# Patient Record
Sex: MALE | Race: WHITE | NOT HISPANIC OR LATINO | Employment: FULL TIME | URBAN - METROPOLITAN AREA
[De-identification: names, ages, dates, MRNs, and addresses within clinical notes are randomized per-mention and may not be internally consistent; named-entity substitution may affect disease eponyms.]

---

## 2017-03-22 RX ORDER — METFORMIN HYDROCHLORIDE 500 MG/1
TABLET ORAL
Qty: 180 TABLET | Refills: 0 | Status: SHIPPED | OUTPATIENT
Start: 2017-03-22 | End: 2017-03-30 | Stop reason: SDUPTHER

## 2017-03-23 DIAGNOSIS — E11.65 UNCONTROLLED TYPE 2 DIABETES MELLITUS WITH COMPLICATION, UNSPECIFIED LONG TERM INSULIN USE STATUS: Primary | ICD-10-CM

## 2017-03-23 DIAGNOSIS — E11.8 UNCONTROLLED TYPE 2 DIABETES MELLITUS WITH COMPLICATION, UNSPECIFIED LONG TERM INSULIN USE STATUS: Primary | ICD-10-CM

## 2017-03-28 ENCOUNTER — LAB VISIT (OUTPATIENT)
Dept: LAB | Facility: HOSPITAL | Age: 42
End: 2017-03-28
Attending: INTERNAL MEDICINE
Payer: COMMERCIAL

## 2017-03-28 DIAGNOSIS — E11.65 UNCONTROLLED TYPE 2 DIABETES MELLITUS WITH COMPLICATION, UNSPECIFIED LONG TERM INSULIN USE STATUS: ICD-10-CM

## 2017-03-28 DIAGNOSIS — E11.8 UNCONTROLLED TYPE 2 DIABETES MELLITUS WITH COMPLICATION, UNSPECIFIED LONG TERM INSULIN USE STATUS: ICD-10-CM

## 2017-03-28 LAB
ALBUMIN SERPL BCP-MCNC: 3.9 G/DL
ALP SERPL-CCNC: 77 U/L
ALT SERPL W/O P-5'-P-CCNC: 14 U/L
ANION GAP SERPL CALC-SCNC: 13 MMOL/L
AST SERPL-CCNC: 17 U/L
BILIRUB SERPL-MCNC: 0.7 MG/DL
BUN SERPL-MCNC: 14 MG/DL
CALCIUM SERPL-MCNC: 9.7 MG/DL
CHLORIDE SERPL-SCNC: 101 MMOL/L
CO2 SERPL-SCNC: 23 MMOL/L
CREAT SERPL-MCNC: 1.1 MG/DL
EST. GFR  (AFRICAN AMERICAN): >60 ML/MIN/1.73 M^2
EST. GFR  (NON AFRICAN AMERICAN): >60 ML/MIN/1.73 M^2
GLUCOSE SERPL-MCNC: 205 MG/DL
POTASSIUM SERPL-SCNC: 4.4 MMOL/L
PROT SERPL-MCNC: 7.5 G/DL
SODIUM SERPL-SCNC: 137 MMOL/L

## 2017-03-28 PROCEDURE — 80053 COMPREHEN METABOLIC PANEL: CPT

## 2017-03-28 PROCEDURE — 83036 HEMOGLOBIN GLYCOSYLATED A1C: CPT

## 2017-03-28 PROCEDURE — 36415 COLL VENOUS BLD VENIPUNCTURE: CPT | Mod: PO

## 2017-03-29 LAB
ESTIMATED AVG GLUCOSE: 214 MG/DL
HBA1C MFR BLD HPLC: 9.1 %

## 2017-03-30 ENCOUNTER — OFFICE VISIT (OUTPATIENT)
Dept: INTERNAL MEDICINE | Facility: CLINIC | Age: 42
End: 2017-03-30
Payer: COMMERCIAL

## 2017-03-30 VITALS
HEART RATE: 68 BPM | BODY MASS INDEX: 24.15 KG/M2 | SYSTOLIC BLOOD PRESSURE: 110 MMHG | WEIGHT: 159.38 LBS | HEIGHT: 68 IN | DIASTOLIC BLOOD PRESSURE: 72 MMHG | TEMPERATURE: 98 F | RESPIRATION RATE: 16 BRPM

## 2017-03-30 DIAGNOSIS — F41.9 ANXIETY: ICD-10-CM

## 2017-03-30 PROCEDURE — 99213 OFFICE O/P EST LOW 20 MIN: CPT | Mod: S$GLB,,, | Performed by: INTERNAL MEDICINE

## 2017-03-30 PROCEDURE — 3060F POS MICROALBUMINURIA REV: CPT | Mod: S$GLB,,, | Performed by: INTERNAL MEDICINE

## 2017-03-30 PROCEDURE — 3046F HEMOGLOBIN A1C LEVEL >9.0%: CPT | Mod: S$GLB,,, | Performed by: INTERNAL MEDICINE

## 2017-03-30 PROCEDURE — 99999 PR PBB SHADOW E&M-EST. PATIENT-LVL III: CPT | Mod: PBBFAC,,, | Performed by: INTERNAL MEDICINE

## 2017-03-30 PROCEDURE — 1160F RVW MEDS BY RX/DR IN RCRD: CPT | Mod: S$GLB,,, | Performed by: INTERNAL MEDICINE

## 2017-03-30 PROCEDURE — 2022F DILAT RTA XM EVC RTNOPTHY: CPT | Mod: S$GLB,,, | Performed by: INTERNAL MEDICINE

## 2017-03-30 RX ORDER — KETOCONAZOLE 20 MG/ML
SHAMPOO, SUSPENSION TOPICAL
Refills: 6 | COMMUNITY
Start: 2017-01-30 | End: 2017-10-18 | Stop reason: SDUPTHER

## 2017-03-30 RX ORDER — SYRINGE WITH NEEDLE, 1 ML 25GX5/8"
SYRINGE, EMPTY DISPOSABLE MISCELLANEOUS
Refills: 1 | COMMUNITY
Start: 2017-01-25 | End: 2017-07-05 | Stop reason: SDUPTHER

## 2017-03-30 RX ORDER — METFORMIN HYDROCHLORIDE 850 MG/1
850 TABLET ORAL 2 TIMES DAILY WITH MEALS
Qty: 60 TABLET | Refills: 2 | Status: SHIPPED | OUTPATIENT
Start: 2017-03-30 | End: 2017-07-19 | Stop reason: SDUPTHER

## 2017-03-30 RX ORDER — ESCITALOPRAM OXALATE 10 MG/1
10 TABLET ORAL DAILY
Qty: 30 TABLET | Refills: 0 | Status: SHIPPED | OUTPATIENT
Start: 2017-03-30 | End: 2017-05-31 | Stop reason: SDUPTHER

## 2017-03-30 NOTE — MR AVS SNAPSHOT
[facility]   [facility]  [place] [address]  Phone: [phone]  Fax: [phone]                  Jason Sandra   3/30/2017 7:40 AM   Office Visit    Description:  Male : 1975   Provider:  Meghan Brewster MD   Department:  [facility]           Reason for Visit     Follow-up           Diagnoses this Visit        Comments    Uncontrolled type 2 diabetes mellitus without complication, without long-term current use of insulin    -  Primary     Anxiety                To Do List           Goals (5 Years of Data)     None       These Medications        Disp Refills Start End    metformin (GLUCOPHAGE) 850 MG tablet 60 tablet 2 3/30/2017     Take 1 tablet (850 mg total) by mouth 2 (two) times daily with meals. - Oral    Pharmacy: [facility]/pharmacy #[id number] - [place] - [address] Ph #: [phone]       escitalopram oxalate (LEXAPRO) 10 MG tablet 30 tablet 0 3/30/2017 3/30/2018    Take 1 tablet (10 mg total) by mouth once daily. - Oral    Pharmacy: [facility]/pharmacy #[id number] - [place], [place] - [address] Ph #: [phone]         [facility]     [facility] On Call Nurse Care Line -  Assistance  Unless otherwise directed by your provider, please contact [facility], our nurse care line that is available for  assistance.     Registered nurses in the [facility] provide: appointment scheduling, clinical advisement, health education, and other advisory services.  Call: [phone] (toll free)               Medications           Message regarding Medications     Verify the changes and/or additions to your medication regime listed below are the same as discussed with your clinician today.  If any of these changes or additions are incorrect, please notify your healthcare provider.        START taking these NEW medications        Refills    escitalopram oxalate (LEXAPRO) 10 MG tablet 0    Sig: Take 1 tablet (10 mg total) by mouth  "once daily.    Class: Normal    Route: Oral      CHANGE how you are taking these medications     Start Taking Instead of    metformin (GLUCOPHAGE) 850 MG tablet metformin (GLUCOPHAGE) 500 MG tablet    Dosage:  Take 1 tablet (850 mg total) by mouth 2 (two) times daily with meals. Dosage:  TAKE 1 TABLET (500 MG TOTAL) BY MOUTH 2 (TWO) TIMES DAILY WITH MEALS.    Reason for Change:  Reorder            Verify that the below list of medications is an accurate representation of the medications you are currently taking.  If none reported, the list may be blank. If incorrect, please contact your healthcare provider. Carry this list with you in case of emergency.           Current Medications     BD LUER-CHRISTY SYRINGE 3 mL 21 x 1 1/2" Syrg 1 UNITS BY MISC.(NON-DRUG COMBO ROUTE) ROUTE ONCE A WEEK.    ketoconazole (NIZORAL) 2 % shampoo USE TO SHAMPOO SCALP EVERY SEBORRHEA    metformin (GLUCOPHAGE) 850 MG tablet Take 1 tablet (850 mg total) by mouth 2 (two) times daily with meals.    syringe, disposable, 1 mL Syrg 1 Units by Misc.(Non-Drug; Combo Route) route once a week.    testosterone cypionate (DEPOTESTOTERONE CYPIONATE) 200 mg/mL injection INJECT 1 ML INTO THE MUSCLE TWICE WEEKLY    escitalopram oxalate (LEXAPRO) 10 MG tablet Take 1 tablet (10 mg total) by mouth once daily.           Clinical Reference Information           Your Vitals Were     BP Pulse Temp Resp Height Weight    110/72 (BP Location: Left arm, Patient Position: Sitting, BP Method: Manual) 68 97.9 °F (36.6 °C) (Oral) 16 5' 7.5" (1.715 m) 72.3 kg (159 lb 6.3 oz)    BMI                24.6 kg/m2          Blood Pressure          Most Recent Value    BP  110/72      Allergies as of 3/30/2017     No Known Allergies      Immunizations Administered on Date of Encounter - 3/30/2017     None      [facility] Sign-Up     Activating your [facility] account is as easy as 1-2-3!     1) Visit [email or web address], select Sign Up Now, enter this activation code and your date of " birth, then select Next.  [email or web address]  Expires: 5/14/2017  8:04 AM      2) Create a username and password to use when you visit [facility] in the future and select a security question in case you lose your password and select Next.    3) Enter your e-mail address and click Sign Up!    Additional Information  If you have questions, please e-mail [email or web address] or call [phone] to talk to our [facility] staff. Remember, [facility] is NOT to be used for urgent needs. For medical emergencies, dial 911.         Language Assistance Services     ATTENTION: Language assistance services are available, free of charge. Please call [phone].      ATENCIÓN: Si habla español, tiene a suero disposición servicios gratuitos de asistencia lingüística. Llame al [phone].     CHÚ Ý: N?u b?n nói Ti?ng Vi?t, có các d?ch v? h? tr? ngôn ng? mi?n phí dành cho b?n. G?i s? [phone].         [facility] complies with applicable Federal civil rights laws and does not discriminate on the basis of race, color, national origin, age, disability, or sex.

## 2017-03-30 NOTE — PROGRESS NOTES
Subjective:       Patient ID: Jason Sandra is a 42 y.o. male who presents for Follow-up      Diabetes   He presents for his follow-up diabetic visit. He has type 2 diabetes mellitus. His disease course has been worsening. Hypoglycemia symptoms include headaches (occasional) and nervousness/anxiousness. Pertinent negatives for hypoglycemia include no dizziness. Pertinent negatives for diabetes include no blurred vision, no chest pain, no fatigue, no polydipsia, no polyuria, no visual change and no weakness. There are no hypoglycemic complications. Pertinent negatives for diabetic complications include no heart disease or nephropathy. Risk factors for coronary artery disease include diabetes mellitus, male sex, sedentary lifestyle and stress. He is compliant with treatment some of the time. He is following a generally unhealthy diet. He rarely participates in exercise. An ACE inhibitor/angiotensin II receptor blocker is not being taken.   Anxiety   Presents for follow-up visit. Symptoms include insomnia and nervous/anxious behavior. Patient reports no chest pain, depressed mood, dizziness, hyperventilation, nausea, palpitations or shortness of breath. Symptoms occur most days. The severity of symptoms is causing significant distress. The quality of sleep is fair.       Patient reports noncompliance with diabetic diet and says he stopped taking metformin shortly after it was prescribed in December. He says he stopped because he was worried that it was worsening his anxiety but he noticed no improvement in anxiety. He does have an old BG meter but has not checked his blood sugar in years. He admits to eating fast food regularly but recently resumed a healthy diet and plans to begin exercising. Has a treadmill and weights at home. Weight has decreased from 164 lbs to 159 lbs since th last visit.      Review of Systems   Constitutional: Negative for chills, fatigue and fever.   HENT: Negative for congestion, rhinorrhea and  sinus pressure.    Eyes: Negative for blurred vision and visual disturbance.   Respiratory: Negative for cough, chest tightness, shortness of breath and wheezing.    Cardiovascular: Negative for chest pain, palpitations and leg swelling.   Gastrointestinal: Negative for abdominal pain, constipation, diarrhea, nausea and vomiting.   Endocrine: Negative for polydipsia and polyuria.   Musculoskeletal: Negative for arthralgias and myalgias.   Skin: Negative for rash.   Neurological: Positive for headaches (occasional). Negative for dizziness, weakness and light-headedness.   Hematological: Negative for adenopathy.   Psychiatric/Behavioral: Positive for sleep disturbance. Negative for dysphoric mood. The patient is nervous/anxious and has insomnia.        Objective:      Physical Exam   Constitutional: He is oriented to person, place, and time. Vital signs are normal. He appears well-developed and well-nourished. No distress.   HENT:   Head: Normocephalic and atraumatic.   Right Ear: Hearing and external ear normal.   Left Ear: Hearing and external ear normal.   Nose: Nose normal.   Mouth/Throat: Uvula is midline and oropharynx is clear and moist. No oropharyngeal exudate.   Eyes: EOM and lids are normal.   Neck: Trachea normal and normal range of motion. Neck supple.   Cardiovascular: Normal rate, regular rhythm, normal heart sounds, intact distal pulses and normal pulses.    No murmur heard.  Pulmonary/Chest: Effort normal and breath sounds normal. He has no wheezes.   Abdominal: Soft. Bowel sounds are normal. He exhibits no distension.   Musculoskeletal: He exhibits no edema or tenderness.   Neurological: He is alert and oriented to person, place, and time. Coordination and gait normal.   Skin: Skin is warm, dry and intact. No rash noted. He is not diaphoretic.   Psychiatric: He has a normal mood and affect.   Vitals reviewed.      Assessment:       1. Uncontrolled type 2 diabetes mellitus without complication,  without long-term current use of insulin    2. Anxiety        Plan:       -- start metformin 850mg bid  -- start Lexapro 10mg daily  -- check HbA1c and bmp at next visit    RTC in 3 months for follow up    Meghan Brewster MD

## 2017-04-12 ENCOUNTER — TELEPHONE (OUTPATIENT)
Dept: INTERNAL MEDICINE | Facility: CLINIC | Age: 42
End: 2017-04-12

## 2017-04-12 RX ORDER — LANCETS
1 EACH MISCELLANEOUS 2 TIMES DAILY
Qty: 100 EACH | Refills: 5 | Status: SHIPPED | OUTPATIENT
Start: 2017-04-12 | End: 2019-06-21

## 2017-04-12 NOTE — TELEPHONE ENCOUNTER
----- Message from Summer Rico sent at 4/12/2017  9:38 AM CDT -----  Contact: Self/[phone]  Diabetic or Medical Supplies.  What supplies are needed: lancets  What is the brand name of the supplies: Freestyle  Is this a refill or new RX:  refill  Who prescribed the supplies:    Pharmacy/company name, phone # and location:  [facility]/pharmacy #[id number] - [place], [place] - [address] [phone] (Phone)  [phone] (Fax)    Comments:

## 2017-04-17 NOTE — TELEPHONE ENCOUNTER
----- Message from Shanae MATT Knott sent at 4/13/2017  3:32 PM CDT -----  Contact: Call Pt [phone]  RX request - refill or new RX.  Is this a refill or new RX:  New  RX name and strength: One Touch Ultra Test Strips, and Meter  Directions: 1 strip TID  Is this a 30 day or 90 day RX:  90 days  Pharmacy name and phone #: [facility], [phone]   Comments:

## 2017-04-18 ENCOUNTER — TELEPHONE (OUTPATIENT)
Dept: INTERNAL MEDICINE | Facility: CLINIC | Age: 42
End: 2017-04-18

## 2017-04-18 RX ORDER — INSULIN PUMP SYRINGE, 3 ML
1 EACH MISCELLANEOUS ONCE
Qty: 1 EACH | Refills: 0 | Status: SHIPPED | OUTPATIENT
Start: 2017-04-18 | End: 2019-06-21

## 2017-04-18 NOTE — TELEPHONE ENCOUNTER
----- Message from Brennon Mitchell sent at 4/18/2017  9:03 AM CDT -----  Contact: Donna Mackenzie at [phone]  Pt script for One Touch Strips/System is not covered by insurance. Freestyle brand is covered. Pharmacy wants to know if they can switch script to the Freestyle brand.

## 2017-05-31 RX ORDER — ESCITALOPRAM OXALATE 10 MG/1
10 TABLET ORAL DAILY
Qty: 30 TABLET | Refills: 0 | Status: SHIPPED | OUTPATIENT
Start: 2017-05-31 | End: 2017-10-18

## 2017-06-23 ENCOUNTER — TELEPHONE (OUTPATIENT)
Dept: INTERNAL MEDICINE | Facility: CLINIC | Age: 42
End: 2017-06-23

## 2017-06-23 NOTE — TELEPHONE ENCOUNTER
----- Message from Shanae Knott sent at 6/23/2017  9:30 AM CDT -----  Contact: Pt [phone]  Pt called requesting to speak to you concerning labs

## 2017-07-05 DIAGNOSIS — E29.1 HYPOGONADISM MALE: Primary | ICD-10-CM

## 2017-07-05 RX ORDER — SYRINGE WITH NEEDLE, 1 ML 25GX5/8"
SYRINGE, EMPTY DISPOSABLE MISCELLANEOUS
Qty: 50 SYRINGE | Refills: 0 | Status: SHIPPED | OUTPATIENT
Start: 2017-07-05 | End: 2019-09-26 | Stop reason: SDUPTHER

## 2017-07-05 RX ORDER — TESTOSTERONE CYPIONATE 200 MG/ML
INJECTION, SOLUTION INTRAMUSCULAR
Qty: 10 ML | Refills: 0 | Status: SHIPPED | OUTPATIENT
Start: 2017-07-05 | End: 2017-10-18 | Stop reason: SDUPTHER

## 2017-07-05 NOTE — TELEPHONE ENCOUNTER
Pt has not been seen since 1/15. He needs routine f/u appt and lab testing. Will refill T rx now, will need appt prior to any more refills. Yearly lab testing is required while on T.    CBC, T, PSA ordered.

## 2017-07-19 RX ORDER — METFORMIN HYDROCHLORIDE 850 MG/1
850 TABLET ORAL 2 TIMES DAILY WITH MEALS
Qty: 60 TABLET | Refills: 2 | Status: SHIPPED | OUTPATIENT
Start: 2017-07-19 | End: 2017-08-04 | Stop reason: SDUPTHER

## 2017-08-04 RX ORDER — METFORMIN HYDROCHLORIDE 850 MG/1
850 TABLET ORAL 2 TIMES DAILY WITH MEALS
Qty: 180 TABLET | Refills: 0 | Status: SHIPPED | OUTPATIENT
Start: 2017-08-04 | End: 2017-10-19

## 2017-08-25 ENCOUNTER — OFFICE VISIT (OUTPATIENT)
Dept: INTERNAL MEDICINE | Facility: CLINIC | Age: 42
End: 2017-08-25
Payer: COMMERCIAL

## 2017-08-25 VITALS
BODY MASS INDEX: 23.93 KG/M2 | WEIGHT: 157.88 LBS | SYSTOLIC BLOOD PRESSURE: 123 MMHG | DIASTOLIC BLOOD PRESSURE: 79 MMHG | TEMPERATURE: 98 F | HEIGHT: 68 IN | RESPIRATION RATE: 16 BRPM | HEART RATE: 89 BPM

## 2017-08-25 DIAGNOSIS — H66.90 EAR INFECTION: Primary | ICD-10-CM

## 2017-08-25 PROCEDURE — 99999 PR PBB SHADOW E&M-EST. PATIENT-LVL III: CPT | Mod: PBBFAC,,, | Performed by: FAMILY MEDICINE

## 2017-08-25 PROCEDURE — 99214 OFFICE O/P EST MOD 30 MIN: CPT | Mod: S$GLB,,, | Performed by: FAMILY MEDICINE

## 2017-08-25 PROCEDURE — 3008F BODY MASS INDEX DOCD: CPT | Mod: S$GLB,,, | Performed by: FAMILY MEDICINE

## 2017-08-25 RX ORDER — SULFAMETHOXAZOLE AND TRIMETHOPRIM 800; 160 MG/1; MG/1
1 TABLET ORAL 2 TIMES DAILY
Qty: 20 TABLET | Refills: 0 | Status: SHIPPED | OUTPATIENT
Start: 2017-08-25 | End: 2017-09-04

## 2017-08-25 NOTE — PROGRESS NOTES
Subjective:       Patient ID: Jason Sandra is a 42 y.o. male.    Chief Complaint: Facial Swelling    HPI 42-year-old white male presents to clinic today secondary to a complaint of left ear swelling first noted on Friday and worsening.  He cannot recall any recent injury but does remember bumping his ear a few weeks ago.  He denies any pain to the ear itself but does note that the ear has been swelling.  He has not used any medicine for treatment.  Review of Systems   Constitutional: Negative for appetite change, chills, fatigue and fever.   HENT: Positive for ear pain (Left ear swelling). Negative for congestion, hearing loss, postnasal drip, rhinorrhea, sinus pressure, sore throat and tinnitus.    Eyes: Negative for redness, itching and visual disturbance.   Respiratory: Negative for cough, chest tightness and shortness of breath.    Cardiovascular: Negative for chest pain and palpitations.   Gastrointestinal: Negative for abdominal pain, constipation, diarrhea, nausea and vomiting.   Genitourinary: Negative for decreased urine volume, difficulty urinating, dysuria, frequency, hematuria and urgency.   Musculoskeletal: Negative for back pain, myalgias, neck pain and neck stiffness.   Skin: Negative for rash.   Neurological: Positive for headaches. Negative for dizziness and light-headedness.   Psychiatric/Behavioral: Negative.        Objective:      Physical Exam   Constitutional: He is oriented to person, place, and time. He appears well-developed and well-nourished. No distress.   HENT:   Head: Normocephalic and atraumatic.   Right Ear: External ear normal.   Left Ear: External ear normal. There is swelling.   Ears:    Nose: Nose normal.   Mouth/Throat: Oropharynx is clear and moist. No oropharyngeal exudate.   Eyes: Conjunctivae and EOM are normal. Pupils are equal, round, and reactive to light. Right eye exhibits no discharge. Left eye exhibits no discharge. No scleral icterus.   Neck: Normal range of motion.  Neck supple. No JVD present. No tracheal deviation present. No thyromegaly present.   Cardiovascular: Normal rate, regular rhythm, normal heart sounds and intact distal pulses.  Exam reveals no gallop and no friction rub.    No murmur heard.  Pulmonary/Chest: Effort normal and breath sounds normal. No stridor. No respiratory distress. He has no wheezes. He has no rales.   Abdominal: Soft. Bowel sounds are normal. He exhibits no distension and no mass. There is no tenderness. There is no rebound and no guarding.   Musculoskeletal: Normal range of motion. He exhibits no edema or tenderness.   Lymphadenopathy:     He has no cervical adenopathy.   Neurological: He is alert and oriented to person, place, and time.   Skin: Skin is warm and dry. No rash noted. He is not diaphoretic. No erythema. No pallor.   Psychiatric: He has a normal mood and affect. His behavior is normal. Judgment and thought content normal.   Nursing note and vitals reviewed.      Assessment:       1. Ear infection        Plan:      1.  Bactrim DS 1 tablet by mouth twice a day ×10 days.  2.  Tylenol and ibuprofen as needed for pain.  3.  Cool compresses as needed.  4.  Return to clinic as needed if symptoms persist or worsen.

## 2017-10-12 ENCOUNTER — LAB VISIT (OUTPATIENT)
Dept: LAB | Facility: HOSPITAL | Age: 42
End: 2017-10-12
Attending: INTERNAL MEDICINE
Payer: COMMERCIAL

## 2017-10-12 DIAGNOSIS — E29.1 HYPOGONADISM MALE: ICD-10-CM

## 2017-10-12 LAB
ANION GAP SERPL CALC-SCNC: 9 MMOL/L
BASOPHILS # BLD AUTO: 0.01 K/UL
BASOPHILS NFR BLD: 0.2 %
BUN SERPL-MCNC: 14 MG/DL
CALCIUM SERPL-MCNC: 9.5 MG/DL
CHLORIDE SERPL-SCNC: 101 MMOL/L
CO2 SERPL-SCNC: 27 MMOL/L
COMPLEXED PSA SERPL-MCNC: 1.3 NG/ML
CREAT SERPL-MCNC: 1.1 MG/DL
DIFFERENTIAL METHOD: NORMAL
EOSINOPHIL # BLD AUTO: 0.2 K/UL
EOSINOPHIL NFR BLD: 3.7 %
ERYTHROCYTE [DISTWIDTH] IN BLOOD BY AUTOMATED COUNT: 13.3 %
EST. GFR  (AFRICAN AMERICAN): >60 ML/MIN/1.73 M^2
EST. GFR  (NON AFRICAN AMERICAN): >60 ML/MIN/1.73 M^2
ESTIMATED AVG GLUCOSE: 137 MG/DL
GLUCOSE SERPL-MCNC: 124 MG/DL
HBA1C MFR BLD HPLC: 6.4 %
HCT VFR BLD AUTO: 43.8 %
HGB BLD-MCNC: 15.5 G/DL
LYMPHOCYTES # BLD AUTO: 2.1 K/UL
LYMPHOCYTES NFR BLD: 37.5 %
MCH RBC QN AUTO: 30.8 PG
MCHC RBC AUTO-ENTMCNC: 35.4 G/DL
MCV RBC AUTO: 87 FL
MONOCYTES # BLD AUTO: 0.4 K/UL
MONOCYTES NFR BLD: 7.9 %
NEUTROPHILS # BLD AUTO: 2.8 K/UL
NEUTROPHILS NFR BLD: 50.3 %
PLATELET # BLD AUTO: 219 K/UL
PMV BLD AUTO: 10.4 FL
POTASSIUM SERPL-SCNC: 4.6 MMOL/L
RBC # BLD AUTO: 5.04 M/UL
SODIUM SERPL-SCNC: 137 MMOL/L
TESTOST SERPL-MCNC: 204 NG/DL
WBC # BLD AUTO: 5.46 K/UL

## 2017-10-12 PROCEDURE — 84153 ASSAY OF PSA TOTAL: CPT

## 2017-10-12 PROCEDURE — 36415 COLL VENOUS BLD VENIPUNCTURE: CPT | Mod: PO

## 2017-10-12 PROCEDURE — 80048 BASIC METABOLIC PNL TOTAL CA: CPT

## 2017-10-12 PROCEDURE — 84403 ASSAY OF TOTAL TESTOSTERONE: CPT

## 2017-10-12 PROCEDURE — 83036 HEMOGLOBIN GLYCOSYLATED A1C: CPT

## 2017-10-12 PROCEDURE — 85025 COMPLETE CBC W/AUTO DIFF WBC: CPT

## 2017-10-18 ENCOUNTER — OFFICE VISIT (OUTPATIENT)
Dept: UROLOGY | Facility: CLINIC | Age: 42
End: 2017-10-18
Attending: UROLOGY
Payer: COMMERCIAL

## 2017-10-18 VITALS
WEIGHT: 167 LBS | SYSTOLIC BLOOD PRESSURE: 130 MMHG | DIASTOLIC BLOOD PRESSURE: 83 MMHG | HEART RATE: 80 BPM | BODY MASS INDEX: 25.31 KG/M2 | HEIGHT: 68 IN

## 2017-10-18 DIAGNOSIS — E29.1 HYPOGONADISM MALE: Primary | ICD-10-CM

## 2017-10-18 PROCEDURE — 99214 OFFICE O/P EST MOD 30 MIN: CPT | Mod: S$GLB,,, | Performed by: UROLOGY

## 2017-10-18 RX ORDER — TESTOSTERONE CYPIONATE 200 MG/ML
INJECTION, SOLUTION INTRAMUSCULAR
Qty: 20 ML | Refills: 5 | Status: SHIPPED | OUTPATIENT
Start: 2017-10-18 | End: 2018-08-01 | Stop reason: SDUPTHER

## 2017-10-18 RX ORDER — TESTOSTERONE CYPIONATE 200 MG/ML
INJECTION, SOLUTION INTRAMUSCULAR
Qty: 20 ML | Refills: 5 | Status: SHIPPED | OUTPATIENT
Start: 2017-10-18 | End: 2017-10-18 | Stop reason: SDUPTHER

## 2017-10-18 RX ORDER — KETOCONAZOLE 20 MG/ML
SHAMPOO, SUSPENSION TOPICAL
Qty: 120 ML | Refills: 6 | Status: SHIPPED | OUTPATIENT
Start: 2017-10-18 | End: 2018-07-09

## 2017-10-18 NOTE — PROGRESS NOTES
"Subjective:       Jason Sandra is a 42 y.o. male who is an established pt who was seen for for evaluation of low testosterone.      He was last seen in 1/15 for low T. He is on q weekly injection therapy (200mg, 1mL IM) currently. He does notice some fatigue at the end of the week after injection. Each injection lasting 4-5 days. He has previously tried Testopel with a good initial response but symptoms returned 3 months later. He did not have this repeated.     He also has a history of chronic prostatitis but is asymptomatic currently.      6/14: T - 586, PSA - 0.8, H  12/14: PSA - 0.9, Hgb - 16.4  10/17: T - 204, PSA - 1.3, Hgb - 15.5       The following portions of the patient's history were reviewed and updated as appropriate: allergies, current medications, past family history, past medical history, past social history, past surgical history and problem list.    Review of Systems  Constitutional: no fever or chills  ENT: no nasal congestion or sore throat  Respiratory: no cough or shortness of breath  Cardiovascular: no chest pain or palpitations  Gastrointestinal: no nausea or vomiting, tolerating diet  Genitourinary: as per HPI  Hematologic/Lymphatic: no easy bruising or lymphadenopathy  Musculoskeletal: no arthralgias or myalgias  Skin: no rashes or lesions  Neurological: no seizures or tremors  Behavioral/Psych: no auditory or visual hallucinations       Objective:    Vitals: /83 (BP Location: Right arm, Patient Position: Sitting, BP Method: Large (Automatic))   Pulse 80   Ht 5' 7.5" (1.715 m)   Wt 75.8 kg (167 lb)   BMI 25.77 kg/m²     Physical Exam   General: well developed, well nourished in no acute distress  Head: normocephalic, atraumatic  Neck: supple, trachea midline  HEENT: EOMI, mucus membranes moist, sclera anicteric, no hearing impairment  Lungs: symmetric expansion, non-labored breathing  Cardiovascular: regular rate and rhythm, normal pulses  Abdomen: soft, non tender, non distended, " no palpable masses, no hernias, bladder nonpalpable  Musculoskeletal: no peripheral edema, normal ROM  Lymphatics: no cervical or inguinal lymphadenopathy  Skin: no rashes or lesions  Neuro: alert and oriented x 3, no gross deficits  Psych: normal judgment and insight, normal mood/affect and non-anxious  Genitourinary:   patient declined exam   PATRICIA: patient declined exam      Lab Review   Urine analysis shows - no urine given    Lab Results   Component Value Date    WBC 5.46 10/12/2017    HGB 15.5 10/12/2017    HCT 43.8 10/12/2017    MCV 87 10/12/2017     10/12/2017     Lab Results   Component Value Date    CREATININE 1.1 10/12/2017    BUN 14 10/12/2017     Lab Results   Component Value Date    PSA 0.74 05/12/2011       Imaging  NA        Assessment/Plan:      1. Hypogonadism male    - Refill T injection therapy - now taking it 2x weekly   - Testopel 2013   - Propecia given for hair loss, he will stop if T injections less effective     2. Chronic prostatitis    - No current issues, stable        Follow up in 12 months with labs

## 2017-10-19 ENCOUNTER — OFFICE VISIT (OUTPATIENT)
Dept: INTERNAL MEDICINE | Facility: CLINIC | Age: 42
End: 2017-10-19
Payer: COMMERCIAL

## 2017-10-19 VITALS
WEIGHT: 168 LBS | SYSTOLIC BLOOD PRESSURE: 114 MMHG | RESPIRATION RATE: 12 BRPM | TEMPERATURE: 98 F | HEART RATE: 80 BPM | BODY MASS INDEX: 25.46 KG/M2 | DIASTOLIC BLOOD PRESSURE: 72 MMHG | HEIGHT: 68 IN

## 2017-10-19 DIAGNOSIS — R06.02 SOB (SHORTNESS OF BREATH): ICD-10-CM

## 2017-10-19 DIAGNOSIS — F41.9 ANXIETY: ICD-10-CM

## 2017-10-19 PROCEDURE — 90686 IIV4 VACC NO PRSV 0.5 ML IM: CPT | Mod: S$GLB,,, | Performed by: INTERNAL MEDICINE

## 2017-10-19 PROCEDURE — 90471 IMMUNIZATION ADMIN: CPT | Mod: S$GLB,,, | Performed by: INTERNAL MEDICINE

## 2017-10-19 PROCEDURE — 99214 OFFICE O/P EST MOD 30 MIN: CPT | Mod: 25,S$GLB,, | Performed by: INTERNAL MEDICINE

## 2017-10-19 PROCEDURE — 99999 PR PBB SHADOW E&M-EST. PATIENT-LVL IV: CPT | Mod: PBBFAC,,, | Performed by: INTERNAL MEDICINE

## 2017-10-19 RX ORDER — METFORMIN HYDROCHLORIDE EXTENDED-RELEASE TABLETS 1000 MG/1
1000 TABLET, FILM COATED, EXTENDED RELEASE ORAL
Qty: 90 TABLET | Refills: 3 | Status: SHIPPED | OUTPATIENT
Start: 2017-10-19 | End: 2017-10-25

## 2017-10-19 NOTE — PROGRESS NOTES
Subjective:       Patient ID: Jsaon Sandra is a 42 y.o. male.    Chief Complaint: f/u diabetes    HPI     42-year-old male here for follow-up of diabetes.    Diabetes -   Medications: Metformin 850 mg bid (ACEi/ARB, statin).  He has been taking the metformin for a few months and reports that it is messing with his stomach.  BG Range: does not check his BG  Patient does check his feet on a regular basis.  He does occasionally have numbness and tingling of his feet.   His last A1c's were   Hemoglobin A1C   Date Value Ref Range Status   10/12/2017 6.4 (H) 4.0 - 5.6 % Final     Comment:     According to ADA guidelines, hemoglobin A1c <7.0% represents  optimal control in non-pregnant diabetic patients. Different  metrics may apply to specific patient populations.   Standards of Medical Care in Diabetes-2016.  For the purpose of screening for the presence of diabetes:  <5.7%     Consistent with the absence of diabetes  5.7-6.4%  Consistent with increasing risk for diabetes   (prediabetes)  >or=6.5%  Consistent with diabetes  Currently, no consensus exists for use of hemoglobin A1c  for diagnosis of diabetes for children.  This Hemoglobin A1c assay has significant interference with fetal   hemoglobin   (HbF). The results are invalid for patients with abnormal amounts of   HbF,   including those with known Hereditary Persistence   of Fetal Hemoglobin. Heterozygous hemoglobin variants (HbAS, HbAC,   HbAD, HbAE, HbA2) do not significantly interfere with this assay;   however, presence of multiple variants in a sample may impact the %   interference.     03/28/2017 9.1 (H) 4.5 - 6.2 % Final     Comment:     According to ADA guidelines, hemoglobin A1C <7.0% represents  optimal control in non-pregnant diabetic patients.  Different  metrics may apply to specific populations.   Standards of Medical Care in Diabetes - 2016.  For the purpose of screening for the presence of diabetes:  <5.7%     Consistent with the absence of  diabetes  5.7-6.4%  Consistent with increasing risk for diabetes   (prediabetes)  >or=6.5%  Consistent with diabetes  Currently no consensus exists for use of hemoglobin A1C  for diagnosis of diabetes for children.     12/20/2016 8.5 (H) 4.5 - 6.2 % Final     Comment:     According to ADA guidelines, hemoglobin A1C <7.0% represents  optimal control in non-pregnant diabetic patients.  Different  metrics may apply to specific populations.   Standards of Medical Care in Diabetes - 2016.  For the purpose of screening for the presence of diabetes:  <5.7%     Consistent with the absence of diabetes  5.7-6.4%  Consistent with increasing risk for diabetes   (prediabetes)  >or=6.5%  Consistent with diabetes  Currently no consensus exists for use of hemoglobin A1C  for diagnosis of diabetes for children.     .   Urine microalbumin to creatinine ratio: UTD.  Patient does not have CKD.  Complications present include: - retinopathy, - neuropathy, - nephropathy, - stroke, - heart disease, and - peripheral vascular disease.    He has not been taking the lexapro.  He is asking for a therapist recommendation.    He is winded sometimes.  He does not get winded at work as a .  When he runs, he feels it for a while.    Review of Systems    Objective:      Physical Exam   Constitutional: He is oriented to person, place, and time. He appears well-developed and well-nourished.   HENT:   Head: Normocephalic and atraumatic.   Mouth/Throat: No oropharyngeal exudate.   Eyes: EOM are normal. Pupils are equal, round, and reactive to light. Right eye exhibits no discharge. Left eye exhibits no discharge. No scleral icterus.   Neck: Normal range of motion. Neck supple. No tracheal deviation present. No thyromegaly present.   Cardiovascular: Normal rate, regular rhythm and normal heart sounds.  Exam reveals no gallop and no friction rub.    No murmur heard.  Pulmonary/Chest: Effort normal and breath sounds normal. No respiratory distress.  He has no wheezes. He has no rales. He exhibits no tenderness.   Abdominal: Soft. Bowel sounds are normal. He exhibits no distension and no mass. There is no tenderness. There is no rebound and no guarding.   Musculoskeletal: Normal range of motion. He exhibits no edema or tenderness.   Neurological: He is alert and oriented to person, place, and time.   Skin: Skin is warm and dry. No rash noted. No erythema. No pallor.   Psychiatric: He has a normal mood and affect. His behavior is normal.   Vitals reviewed.      Assessment:       1. Uncontrolled type 2 diabetes mellitus without complication, without long-term current use of insulin    2. Anxiety    3. SOB (shortness of breath)        Plan:       1.  Metformin 1000 mg daily.  2.  Refer to psychology.  Given number.  3.  Check lung function test.

## 2017-10-23 ENCOUNTER — HOSPITAL ENCOUNTER (OUTPATIENT)
Dept: PULMONOLOGY | Facility: CLINIC | Age: 42
Discharge: HOME | End: 2017-10-23
Payer: COMMERCIAL

## 2017-10-23 ENCOUNTER — TELEPHONE (OUTPATIENT)
Dept: UROLOGY | Facility: CLINIC | Age: 42
End: 2017-10-23

## 2017-10-23 DIAGNOSIS — R06.02 SOB (SHORTNESS OF BREATH): ICD-10-CM

## 2017-10-23 LAB
POST FEV1 FVC: 0.83
POST FEV1: 3.27
POST FVC: 3.95
PRE FEV1 FVC: 80
PRE FEV1: 3.02
PRE FVC: 3.77
PREDICTED FEV1 FVC: 83
PREDICTED FEV1: 3.53
PREDICTED FVC: 4.26

## 2017-10-23 PROCEDURE — 94060 EVALUATION OF WHEEZING: CPT | Mod: S$GLB,,, | Performed by: INTERNAL MEDICINE

## 2017-10-23 PROCEDURE — 94729 DIFFUSING CAPACITY: CPT | Mod: S$GLB,,, | Performed by: INTERNAL MEDICINE

## 2017-10-23 PROCEDURE — 94727 GAS DIL/WSHOT DETER LNG VOL: CPT | Mod: 51,S$GLB,, | Performed by: INTERNAL MEDICINE

## 2017-10-23 NOTE — TELEPHONE ENCOUNTER
----- Message from Ericka Becerra sent at 10/23/2017  3:08 PM CDT -----  Contact: Self  Pt is callling to speak with Staff regarding the pt's medication; testosterone cypionate (DEPOTESTOTERONE CYPIONATE) 200 mg/mL injection.  Pt says they have been packaged differently and it will cost him a lot more for less.  He would like to speak with your regarding a solution that will help him; cost wise.    He can be reached at [phone].    Thank you.

## 2017-10-23 NOTE — TELEPHONE ENCOUNTER
Spoke to pt advised medication was given to him do to what insurance will cover. Pt states will decide if he can afford this because it is more than what he used to pay./rivera

## 2017-10-25 ENCOUNTER — TELEPHONE (OUTPATIENT)
Dept: INTERNAL MEDICINE | Facility: CLINIC | Age: 42
End: 2017-10-25

## 2017-10-25 RX ORDER — FLUTICASONE PROPIONATE AND SALMETEROL 100; 50 UG/1; UG/1
1 POWDER RESPIRATORY (INHALATION) 2 TIMES DAILY
Qty: 60 EACH | Refills: 3 | Status: SHIPPED | OUTPATIENT
Start: 2017-10-25 | End: 2018-07-09

## 2017-10-25 RX ORDER — ALBUTEROL SULFATE 90 UG/1
1-2 AEROSOL, METERED RESPIRATORY (INHALATION) EVERY 6 HOURS PRN
Qty: 1 EACH | Refills: 3 | Status: SHIPPED | OUTPATIENT
Start: 2017-10-25 | End: 2018-07-09 | Stop reason: SDUPTHER

## 2017-10-25 RX ORDER — METFORMIN HYDROCHLORIDE 500 MG/1
1000 TABLET, EXTENDED RELEASE ORAL
Qty: 180 TABLET | Refills: 3 | Status: SHIPPED | OUTPATIENT
Start: 2017-10-25 | End: 2018-02-16 | Stop reason: SDUPTHER

## 2017-10-25 NOTE — TELEPHONE ENCOUNTER
Lung function test suggestive of asthma or COPD.  I'm going to call in Advair to use twice a day along with a rescue inhaler.  Patient should return to clinic in a month to reassess.

## 2017-10-25 NOTE — TELEPHONE ENCOUNTER
----- Message from Ericka Becerra sent at 10/25/2017  2:22 PM CDT -----  Contact: Danny, [facility]  Mr. Delgado is calling to speak with Staff regarding clarification on a new prescription.    He can be reached at [phone].    Thank you.

## 2017-11-08 RX ORDER — METFORMIN HYDROCHLORIDE 850 MG/1
850 TABLET ORAL 2 TIMES DAILY WITH MEALS
Qty: 180 TABLET | Refills: 0 | Status: SHIPPED | OUTPATIENT
Start: 2017-11-08 | End: 2018-02-16 | Stop reason: SDUPTHER

## 2018-01-26 DIAGNOSIS — E11.9 TYPE 2 DIABETES MELLITUS WITHOUT COMPLICATION: ICD-10-CM

## 2018-02-16 RX ORDER — METFORMIN HYDROCHLORIDE 850 MG/1
850 TABLET ORAL 2 TIMES DAILY WITH MEALS
Qty: 180 TABLET | Refills: 0 | Status: SHIPPED | OUTPATIENT
Start: 2018-02-16 | End: 2018-07-09 | Stop reason: SDUPTHER

## 2018-02-21 DIAGNOSIS — E11.8 UNCONTROLLED TYPE 2 DIABETES MELLITUS WITH COMPLICATION, UNSPECIFIED LONG TERM INSULIN USE STATUS: Primary | ICD-10-CM

## 2018-02-21 DIAGNOSIS — E11.65 UNCONTROLLED TYPE 2 DIABETES MELLITUS WITH COMPLICATION, UNSPECIFIED LONG TERM INSULIN USE STATUS: Primary | ICD-10-CM

## 2018-06-05 DIAGNOSIS — E11.8 TYPE 2 DIABETES MELLITUS WITH COMPLICATION, UNSPECIFIED WHETHER LONG TERM INSULIN USE: Primary | ICD-10-CM

## 2018-06-15 ENCOUNTER — LAB VISIT (OUTPATIENT)
Dept: LAB | Facility: HOSPITAL | Age: 43
End: 2018-06-15
Attending: NURSE PRACTITIONER
Payer: COMMERCIAL

## 2018-06-15 DIAGNOSIS — E11.8 TYPE 2 DIABETES MELLITUS WITH COMPLICATION, UNSPECIFIED WHETHER LONG TERM INSULIN USE: ICD-10-CM

## 2018-06-15 LAB
ALBUMIN SERPL BCP-MCNC: 4.2 G/DL
ALP SERPL-CCNC: 85 U/L
ALT SERPL W/O P-5'-P-CCNC: 15 U/L
ANION GAP SERPL CALC-SCNC: 9 MMOL/L
AST SERPL-CCNC: 19 U/L
BASOPHILS # BLD AUTO: 0.05 K/UL
BASOPHILS NFR BLD: 0.7 %
BILIRUB SERPL-MCNC: 1 MG/DL
BUN SERPL-MCNC: 13 MG/DL
CALCIUM SERPL-MCNC: 10.2 MG/DL
CHLORIDE SERPL-SCNC: 99 MMOL/L
CHOLEST SERPL-MCNC: 201 MG/DL
CHOLEST/HDLC SERPL: 3.5 {RATIO}
CO2 SERPL-SCNC: 28 MMOL/L
CREAT SERPL-MCNC: 1 MG/DL
DIFFERENTIAL METHOD: NORMAL
EOSINOPHIL # BLD AUTO: 0.1 K/UL
EOSINOPHIL NFR BLD: 0.7 %
ERYTHROCYTE [DISTWIDTH] IN BLOOD BY AUTOMATED COUNT: 11.7 %
EST. GFR  (AFRICAN AMERICAN): >60 ML/MIN/1.73 M^2
EST. GFR  (NON AFRICAN AMERICAN): >60 ML/MIN/1.73 M^2
ESTIMATED AVG GLUCOSE: 249 MG/DL
GLUCOSE SERPL-MCNC: 150 MG/DL
HBA1C MFR BLD HPLC: 10.3 %
HCT VFR BLD AUTO: 44.2 %
HDLC SERPL-MCNC: 57 MG/DL
HDLC SERPL: 28.4 %
HGB BLD-MCNC: 15.3 G/DL
IMM GRANULOCYTES # BLD AUTO: 0.02 K/UL
IMM GRANULOCYTES NFR BLD AUTO: 0.3 %
LDLC SERPL CALC-MCNC: 125.2 MG/DL
LYMPHOCYTES # BLD AUTO: 2.8 K/UL
LYMPHOCYTES NFR BLD: 37.5 %
MCH RBC QN AUTO: 29.8 PG
MCHC RBC AUTO-ENTMCNC: 34.6 G/DL
MCV RBC AUTO: 86 FL
MONOCYTES # BLD AUTO: 0.6 K/UL
MONOCYTES NFR BLD: 8.2 %
NEUTROPHILS # BLD AUTO: 4 K/UL
NEUTROPHILS NFR BLD: 52.6 %
NONHDLC SERPL-MCNC: 144 MG/DL
NRBC BLD-RTO: 0 /100 WBC
PLATELET # BLD AUTO: 229 K/UL
PMV BLD AUTO: 10.4 FL
POTASSIUM SERPL-SCNC: 3.8 MMOL/L
PROT SERPL-MCNC: 7.8 G/DL
RBC # BLD AUTO: 5.14 M/UL
SODIUM SERPL-SCNC: 136 MMOL/L
TRIGL SERPL-MCNC: 94 MG/DL
TSH SERPL DL<=0.005 MIU/L-ACNC: 1.64 UIU/ML
WBC # BLD AUTO: 7.52 K/UL

## 2018-06-15 PROCEDURE — 80053 COMPREHEN METABOLIC PANEL: CPT

## 2018-06-15 PROCEDURE — 85025 COMPLETE CBC W/AUTO DIFF WBC: CPT

## 2018-06-15 PROCEDURE — 36415 COLL VENOUS BLD VENIPUNCTURE: CPT | Mod: PO

## 2018-06-15 PROCEDURE — 84443 ASSAY THYROID STIM HORMONE: CPT

## 2018-06-15 PROCEDURE — 83036 HEMOGLOBIN GLYCOSYLATED A1C: CPT

## 2018-06-15 PROCEDURE — 80061 LIPID PANEL: CPT

## 2018-07-09 ENCOUNTER — OFFICE VISIT (OUTPATIENT)
Dept: INTERNAL MEDICINE | Facility: CLINIC | Age: 43
End: 2018-07-09
Payer: COMMERCIAL

## 2018-07-09 VITALS
TEMPERATURE: 99 F | HEIGHT: 68 IN | SYSTOLIC BLOOD PRESSURE: 110 MMHG | BODY MASS INDEX: 23.63 KG/M2 | DIASTOLIC BLOOD PRESSURE: 64 MMHG | WEIGHT: 155.88 LBS | HEART RATE: 70 BPM | RESPIRATION RATE: 18 BRPM | OXYGEN SATURATION: 98 %

## 2018-07-09 DIAGNOSIS — E78.00 HYPERCHOLESTEROLEMIA: ICD-10-CM

## 2018-07-09 DIAGNOSIS — M25.511 ACUTE PAIN OF BOTH SHOULDERS: ICD-10-CM

## 2018-07-09 DIAGNOSIS — Z00.00 ANNUAL PHYSICAL EXAM: Primary | ICD-10-CM

## 2018-07-09 DIAGNOSIS — Z91.199 NONCOMPLIANCE: ICD-10-CM

## 2018-07-09 DIAGNOSIS — Z91.89 CANDIDATE FOR STATIN THERAPY DUE TO RISK OF FUTURE CARDIOVASCULAR EVENT: ICD-10-CM

## 2018-07-09 DIAGNOSIS — M25.512 ACUTE PAIN OF BOTH SHOULDERS: ICD-10-CM

## 2018-07-09 DIAGNOSIS — J98.4 RESTRICTIVE LUNG DISEASE: ICD-10-CM

## 2018-07-09 DIAGNOSIS — R06.02 SHORTNESS OF BREATH: ICD-10-CM

## 2018-07-09 PROCEDURE — 90732 PPSV23 VACC 2 YRS+ SUBQ/IM: CPT | Mod: S$GLB,,, | Performed by: INTERNAL MEDICINE

## 2018-07-09 PROCEDURE — 99396 PREV VISIT EST AGE 40-64: CPT | Mod: 25,S$GLB,, | Performed by: INTERNAL MEDICINE

## 2018-07-09 PROCEDURE — 3046F HEMOGLOBIN A1C LEVEL >9.0%: CPT | Mod: CPTII,S$GLB,, | Performed by: INTERNAL MEDICINE

## 2018-07-09 PROCEDURE — 90471 IMMUNIZATION ADMIN: CPT | Mod: S$GLB,,, | Performed by: INTERNAL MEDICINE

## 2018-07-09 PROCEDURE — 99999 PR PBB SHADOW E&M-EST. PATIENT-LVL IV: CPT | Mod: PBBFAC,,, | Performed by: INTERNAL MEDICINE

## 2018-07-09 RX ORDER — ATORVASTATIN CALCIUM 10 MG/1
10 TABLET, FILM COATED ORAL DAILY
Qty: 90 TABLET | Refills: 0 | Status: SHIPPED | OUTPATIENT
Start: 2018-07-09 | End: 2018-10-31 | Stop reason: SDUPTHER

## 2018-07-09 RX ORDER — ALBUTEROL SULFATE 90 UG/1
1-2 AEROSOL, METERED RESPIRATORY (INHALATION) EVERY 6 HOURS PRN
Qty: 1 EACH | Refills: 0 | Status: SHIPPED | OUTPATIENT
Start: 2018-07-09 | End: 2018-08-08

## 2018-07-09 RX ORDER — METFORMIN HYDROCHLORIDE 1000 MG/1
1000 TABLET ORAL 2 TIMES DAILY WITH MEALS
Qty: 180 TABLET | Refills: 0 | Status: SHIPPED | OUTPATIENT
Start: 2018-07-09 | End: 2018-10-22 | Stop reason: SDUPTHER

## 2018-07-09 NOTE — PROGRESS NOTES
"Subjective:      Jason Sandra is a 43 y.o. male who presents for annual exam.    Family History:  family history includes No Known Problems in his mother.    Health Maintenance:  Health Maintenance       Date Due Completion Date    Foot Exam 03/02/1983 ---    Eye Exam 03/02/1983 ---    Pneumococcal PPSV23 (Medium Risk) (1) 03/02/1991 ---    Low Dose Statin 03/02/1994 ---    Influenza Vaccine 08/01/2018 10/19/2017    Urine Microalbumin 10/12/2018 10/12/2017    Hemoglobin A1c 12/15/2018 6/15/2018    Lipid Panel 06/15/2019 6/15/2018    TETANUS VACCINE 12/20/2026 12/20/2016        Eye exam: needs  Dental Exam: 2017    Tetanus: 2016    Diet: admits to unhealthy diet  Exercise: minimal  Body mass index is 23.7 kg/m².    Meds:   Current Outpatient Prescriptions:     metFORMIN (GLUCOPHAGE) 1000 MG tablet, Take 1 tablet (1,000 mg total) by mouth 2 (two) times daily with meals., Disp: 180 tablet, Rfl: 0    testosterone cypionate (DEPOTESTOTERONE CYPIONATE) 200 mg/mL injection, INJECT 1 ML INTO THE MUSCLE TWICE WEEKLY, Disp: 20 mL, Rfl: 5    albuterol 90 mcg/actuation inhaler, Inhale 1-2 puffs into the lungs every 6 (six) hours as needed., Disp: 1 each, Rfl: 0    atorvastatin (LIPITOR) 10 MG tablet, Take 1 tablet (10 mg total) by mouth once daily., Disp: 90 tablet, Rfl: 0    BD LUER-CHRISTY SYRINGE 3 mL 21 gauge x 1 1/2" Syrg, 1 UNITS BY MISC.(NON-DRUG COMBO ROUTE) ROUTE ONCE A WEEK., Disp: 50 Syringe, Rfl: 0    blood sugar diagnostic Strp, 1 strip by Misc.(Non-Drug; Combo Route) route 3 (three) times daily. One touch ultra, Disp: 300 strip, Rfl: 1    blood-glucose meter kit, 1 each by Other route once. Use as instructed, Disp: 1 each, Rfl: 0    lancets Misc, 1 lancet by Misc.(Non-Drug; Combo Route) route 2 (two) times daily., Disp: 100 each, Rfl: 5    syringe, disposable, 1 mL Syrg, 1 Units by Misc.(Non-Drug; Combo Route) route once a week., Disp: 50 Syringe, Rfl: 2    PMHx:   Past Medical History:   Diagnosis Date    " Diabetes mellitus        PSHx:     Past Surgical History:   Procedure Laterality Date    NOSE SURGERY         SocHx:   Social History     Social History    Marital status:      Spouse name: N/A    Number of children: N/A    Years of education: N/A     Social History Main Topics    Smoking status: Never Smoker    Smokeless tobacco: None    Alcohol use Yes      Comment: socially    Drug use: No    Sexual activity: Yes     Partners: Female     Other Topics Concern    None     Social History Narrative    [place]       Review of Systems   Constitutional: Negative for chills, diaphoresis, fatigue and fever.   HENT: Negative for congestion, dental problem, ear discharge, ear pain, hearing loss, mouth sores, postnasal drip, rhinorrhea, sinus pressure and sore throat.    Eyes: Negative for redness and visual disturbance.   Respiratory: Positive for shortness of breath (intermittent, has not tried albuterol) and wheezing. Negative for cough and chest tightness.    Cardiovascular: Negative for chest pain, palpitations and leg swelling.   Gastrointestinal: Negative for abdominal pain, blood in stool, constipation, diarrhea, nausea and vomiting. Abdominal distention: at times, does not take Advair.   Endocrine: Negative for polydipsia, polyphagia and polyuria.   Genitourinary: Negative for dysuria, frequency, hematuria and urgency.   Musculoskeletal: Negative for arthralgias and myalgias.   Skin: Negative for rash.   Neurological: Negative for dizziness, weakness, light-headedness, numbness and headaches.        Occasional tingling in feet   Hematological: Negative for adenopathy.   Psychiatric/Behavioral: Negative for dysphoric mood. The patient is not nervous/anxious.        Objective:      Physical Exam   Constitutional: He is oriented to person, place, and time. Vital signs are normal. He appears well-developed and well-nourished. No distress.   HENT:   Head: Normocephalic and atraumatic.   Right Ear:  Hearing, tympanic membrane, external ear and ear canal normal. Tympanic membrane is not erythematous and not bulging.   Left Ear: Hearing, tympanic membrane, external ear and ear canal normal. Tympanic membrane is not erythematous and not bulging.   Nose: Nose normal.   Mouth/Throat: Uvula is midline, oropharynx is clear and moist and mucous membranes are normal. No oropharyngeal exudate or posterior oropharyngeal erythema.   Eyes: Conjunctivae, EOM and lids are normal. Pupils are equal, round, and reactive to light. No scleral icterus.   Neck: Normal range of motion. Neck supple. No thyroid mass and no thyromegaly present.   Cardiovascular: Normal rate, regular rhythm, normal heart sounds, intact distal pulses and normal pulses.    No murmur heard.  Pulmonary/Chest: Effort normal and breath sounds normal. No tachypnea and no bradypnea. He has no wheezes.   Abdominal: Soft. Bowel sounds are normal. He exhibits no distension. There is no tenderness. There is no rigidity, no rebound and no guarding.   Musculoskeletal: Normal range of motion. He exhibits no edema.        Right shoulder: He exhibits normal range of motion, no tenderness, no bony tenderness, no swelling, no pain and no spasm.        Left shoulder: He exhibits normal range of motion, no swelling and no pain.   Lymphadenopathy:     He has no cervical adenopathy.        Right: No supraclavicular adenopathy present.        Left: No supraclavicular adenopathy present.   Neurological: He is alert and oriented to person, place, and time. He has normal reflexes. He displays normal reflexes. Coordination and gait normal.   Skin: Skin is warm, dry and intact. No rash noted.   Psychiatric: He has a normal mood and affect.   Vitals reviewed.      Assessment:       1. Annual physical exam    2. Uncontrolled type 2 diabetes mellitus without complication, without long-term current use of insulin    3. Acute pain of both shoulders    4. Restrictive lung disease    5.  Shortness of breath    6. Candidate for statin therapy due to risk of future cardiovascular event    7. Hypercholesterolemia    8. Noncompliance        Plan:       1. Annual physical exam  - reviewed labs with patient  - Pneumococcal Polysaccharide Vaccine (23 Valent) (SQ/IM)    2. Uncontrolled type 2 diabetes mellitus without complication, without long-term current use of insulin  - HbA1c 6.4 --> 10.3, poor diet and minimal exercise  - patient also not clear of exact dose of metformin he was taking  - metFORMIN (GLUCOPHAGE) 1000 MG tablet; Take 1 tablet (1,000 mg total) by mouth 2 (two) times daily with meals.  Dispense: 180 tablet; Refill: 0  - atorvastatin (LIPITOR) 10 MG tablet; Take 1 tablet (10 mg total) by mouth once daily.  Dispense: 90 tablet; Refill: 0  - Hemoglobin A1c; Future at next visit    3. Acute pain of both shoulders  - minimal pain today, should use tylenol if needed  - call if shoulder pain worsens    4. Restrictive lung disease  - PFT's with restrictive changes  - albuterol 90 mcg/actuation inhaler; Inhale 1-2 puffs into the lungs every 6 (six) hours as needed.  Dispense: 1 each; Refill: 0    5. Shortness of breath  - albuterol 90 mcg/actuation inhaler; Inhale 1-2 puffs into the lungs every 6 (six) hours as needed.  Dispense: 1 each; Refill: 0    6. Candidate for statin therapy due to risk of future cardiovascular event  - atorvastatin (LIPITOR) 10 MG tablet; Take 1 tablet (10 mg total) by mouth once daily.  Dispense: 90 tablet; Refill: 0    7. Hypercholesterolemia  - start lipitor, check cmp, lipid panel at next visit    8. Noncompliance  - resume medications and healthy diet    RTC in 3 months or sooner if needed    Meghan Brewster MD

## 2018-07-09 NOTE — Clinical Note
Recall 3mo f/u, please make a note that ok for him to schedule at 4pm due to work---- but not for everybody please

## 2018-07-16 ENCOUNTER — LAB VISIT (OUTPATIENT)
Dept: LAB | Facility: OTHER | Age: 43
End: 2018-07-16
Attending: UROLOGY
Payer: COMMERCIAL

## 2018-07-16 DIAGNOSIS — E29.1 HYPOGONADISM MALE: ICD-10-CM

## 2018-07-16 LAB
BASOPHILS # BLD AUTO: 0.02 K/UL
BASOPHILS NFR BLD: 0.4 %
COMPLEXED PSA SERPL-MCNC: 1 NG/ML
DIFFERENTIAL METHOD: ABNORMAL
EOSINOPHIL # BLD AUTO: 0.1 K/UL
EOSINOPHIL NFR BLD: 0.9 %
ERYTHROCYTE [DISTWIDTH] IN BLOOD BY AUTOMATED COUNT: 12.9 %
HCT VFR BLD AUTO: 39.9 %
HGB BLD-MCNC: 13.5 G/DL
LYMPHOCYTES # BLD AUTO: 2.1 K/UL
LYMPHOCYTES NFR BLD: 36.2 %
MCH RBC QN AUTO: 29.3 PG
MCHC RBC AUTO-ENTMCNC: 33.8 G/DL
MCV RBC AUTO: 87 FL
MONOCYTES # BLD AUTO: 0.6 K/UL
MONOCYTES NFR BLD: 10 %
NEUTROPHILS # BLD AUTO: 3 K/UL
NEUTROPHILS NFR BLD: 52.3 %
PLATELET # BLD AUTO: 236 K/UL
PMV BLD AUTO: 9.7 FL
RBC # BLD AUTO: 4.61 M/UL
TESTOST SERPL-MCNC: 346 NG/DL
WBC # BLD AUTO: 5.69 K/UL

## 2018-07-16 PROCEDURE — 84153 ASSAY OF PSA TOTAL: CPT

## 2018-07-16 PROCEDURE — 84403 ASSAY OF TOTAL TESTOSTERONE: CPT

## 2018-07-16 PROCEDURE — 36415 COLL VENOUS BLD VENIPUNCTURE: CPT

## 2018-07-16 PROCEDURE — 85025 COMPLETE CBC W/AUTO DIFF WBC: CPT

## 2018-08-01 ENCOUNTER — OFFICE VISIT (OUTPATIENT)
Dept: UROLOGY | Facility: CLINIC | Age: 43
End: 2018-08-01
Attending: UROLOGY
Payer: COMMERCIAL

## 2018-08-01 VITALS
WEIGHT: 155.31 LBS | HEIGHT: 68 IN | BODY MASS INDEX: 23.54 KG/M2 | DIASTOLIC BLOOD PRESSURE: 75 MMHG | SYSTOLIC BLOOD PRESSURE: 123 MMHG | HEART RATE: 70 BPM

## 2018-08-01 DIAGNOSIS — N41.1 CHRONIC PROSTATITIS: ICD-10-CM

## 2018-08-01 DIAGNOSIS — E29.1 HYPOGONADISM MALE: Primary | ICD-10-CM

## 2018-08-01 LAB
BILIRUB SERPL-MCNC: NORMAL MG/DL
BLOOD URINE, POC: NORMAL
COLOR, POC UA: YELLOW
GLUCOSE UR QL STRIP: 50
KETONES UR QL STRIP: NORMAL
LEUKOCYTE ESTERASE URINE, POC: NORMAL
NITRITE, POC UA: NORMAL
PH, POC UA: 6
PROTEIN, POC: NORMAL
SPECIFIC GRAVITY, POC UA: 1.01
UROBILINOGEN, POC UA: NORMAL

## 2018-08-01 PROCEDURE — 99214 OFFICE O/P EST MOD 30 MIN: CPT | Mod: 25,S$GLB,, | Performed by: UROLOGY

## 2018-08-01 PROCEDURE — 3008F BODY MASS INDEX DOCD: CPT | Mod: CPTII,S$GLB,, | Performed by: UROLOGY

## 2018-08-01 PROCEDURE — 81002 URINALYSIS NONAUTO W/O SCOPE: CPT | Mod: S$GLB,,, | Performed by: UROLOGY

## 2018-08-01 RX ORDER — TESTOSTERONE CYPIONATE 200 MG/ML
INJECTION, SOLUTION INTRAMUSCULAR
Qty: 10 ML | Refills: 5 | Status: SHIPPED | OUTPATIENT
Start: 2018-08-01 | End: 2018-10-15 | Stop reason: SDUPTHER

## 2018-08-01 RX ORDER — TESTOSTERONE CYPIONATE 200 MG/ML
200 INJECTION, SOLUTION INTRAMUSCULAR
Status: COMPLETED | OUTPATIENT
Start: 2018-08-01 | End: 2018-08-08

## 2018-08-01 NOTE — PROGRESS NOTES
"Subjective:       Jason Sandra is a 43 y.o. male who is an established pt who was seen for for evaluation of low testosterone.      He is on q weekly injection therapy (200mg, 1mL IM) currently. He does notice some fatigue at the end of the week after injection. Each injection lasting 4-5 days. He has previously tried Testopel with a good initial response but symptoms returned 3 months later. He did not have this repeated.     Has not had T injection in about a month. Needs refill rx today.     He also has a history of chronic prostatitis but is asymptomatic currently.      6/14: T - 586, PSA - 0.8, H  12/14: PSA - 0.9, Hgb - 16.4  10/17: T - 204, PSA - 1.3, Hgb - 15.5   7/18: T - 346, PSA - 1.0, Hgb - 13.5    AUASS - 11, 2 (mostly satisfied)      The following portions of the patient's history were reviewed and updated as appropriate: allergies, current medications, past family history, past medical history, past social history, past surgical history and problem list.    Review of Systems  Constitutional: no fever or chills  ENT: no nasal congestion or sore throat  Respiratory: no cough or shortness of breath  Cardiovascular: no chest pain or palpitations  Gastrointestinal: no nausea or vomiting, tolerating diet  Genitourinary: as per HPI  Hematologic/Lymphatic: no easy bruising or lymphadenopathy  Musculoskeletal: no arthralgias or myalgias  Skin: no rashes or lesions  Neurological: no seizures or tremors  Behavioral/Psych: no auditory or visual hallucinations       Objective:    Vitals: /75 (BP Location: Right arm, Patient Position: Sitting, BP Method: Medium (Automatic))   Pulse 70   Ht 5' 8" (1.727 m)   Wt 70.5 kg (155 lb 5 oz)   BMI 23.62 kg/m²     Physical Exam   General: well developed, well nourished in no acute distress  Head: normocephalic, atraumatic  Neck: supple, trachea midline  HEENT: EOMI, mucus membranes moist, sclera anicteric, no hearing impairment  Lungs: symmetric expansion, " non-labored breathing  Cardiovascular: regular rate and rhythm, normal pulses  Abdomen: soft, non tender, non distended, no palpable masses, no hernias, bladder nonpalpable  Musculoskeletal: no peripheral edema, normal ROM  Lymphatics: no cervical or inguinal lymphadenopathy  Skin: no rashes or lesions  Neuro: alert and oriented x 3, no gross deficits  Psych: normal judgment and insight, normal mood/affect and non-anxious  Genitourinary:   patient declined exam   PATRICIA: patient declined exam      Lab Review   Urine analysis shows - 50 glucose    Lab Results   Component Value Date    WBC 5.69 07/16/2018    HGB 13.5 (L) 07/16/2018    HCT 39.9 (L) 07/16/2018    MCV 87 07/16/2018     07/16/2018     Lab Results   Component Value Date    CREATININE 1.0 06/15/2018    BUN 13 06/15/2018     Lab Results   Component Value Date    PSA 0.74 05/12/2011       Imaging  NA        Assessment/Plan:      1. Hypogonadism male    - Refill T injection therapy - now taking it 2x weekly (1cc 2x weekly)   - Testopel 2013   - Propecia given previously for hair loss, he will stop if T injections less effective     2. Chronic prostatitis    - No current issues, stable     May give T injection now. Will await insurance approval first.        Follow up in 12 months with labs

## 2018-08-03 DIAGNOSIS — E29.1 HYPOGONADISM IN MALE: Primary | ICD-10-CM

## 2018-08-08 ENCOUNTER — CLINICAL SUPPORT (OUTPATIENT)
Dept: UROLOGY | Facility: CLINIC | Age: 43
End: 2018-08-08
Payer: COMMERCIAL

## 2018-08-08 VITALS
BODY MASS INDEX: 23.49 KG/M2 | DIASTOLIC BLOOD PRESSURE: 70 MMHG | HEIGHT: 68 IN | HEART RATE: 67 BPM | WEIGHT: 155 LBS | SYSTOLIC BLOOD PRESSURE: 112 MMHG

## 2018-08-08 DIAGNOSIS — E29.1 HYPOGONADISM MALE: Primary | ICD-10-CM

## 2018-08-08 PROCEDURE — 96372 THER/PROPH/DIAG INJ SC/IM: CPT | Mod: S$GLB,,, | Performed by: NURSE PRACTITIONER

## 2018-08-08 RX ADMIN — TESTOSTERONE CYPIONATE 200 MG: 200 INJECTION, SOLUTION INTRAMUSCULAR at 11:08

## 2018-08-08 NOTE — PROGRESS NOTES
Jason was seen today for follow-up.    Diagnoses and all orders for this visit:    Hypogonadism male      Pt here today for testosterone injection.  Testosterone cypionate 200mg IM administered to right glut.  Tolerated well. Resume injections at home in 2 weeks.   Follow up with Dr. Bentley as previously scheduled.

## 2018-08-13 ENCOUNTER — OFFICE VISIT (OUTPATIENT)
Dept: PRIMARY CARE CLINIC | Facility: CLINIC | Age: 43
End: 2018-08-13
Payer: COMMERCIAL

## 2018-08-13 VITALS
BODY MASS INDEX: 23.37 KG/M2 | RESPIRATION RATE: 18 BRPM | HEIGHT: 68 IN | HEART RATE: 89 BPM | OXYGEN SATURATION: 97 % | WEIGHT: 154.19 LBS | DIASTOLIC BLOOD PRESSURE: 77 MMHG | TEMPERATURE: 99 F | SYSTOLIC BLOOD PRESSURE: 131 MMHG

## 2018-08-13 DIAGNOSIS — M25.552 PAIN OF LEFT HIP JOINT: ICD-10-CM

## 2018-08-13 DIAGNOSIS — R63.4 WEIGHT LOSS: ICD-10-CM

## 2018-08-13 DIAGNOSIS — E29.1 HYPOGONADISM MALE: ICD-10-CM

## 2018-08-13 DIAGNOSIS — M25.511 CHRONIC RIGHT SHOULDER PAIN: ICD-10-CM

## 2018-08-13 DIAGNOSIS — M79.605 LEG PAIN, DIFFUSE, LEFT: ICD-10-CM

## 2018-08-13 DIAGNOSIS — R00.2 PALPITATION: ICD-10-CM

## 2018-08-13 DIAGNOSIS — G89.29 CHRONIC RIGHT SHOULDER PAIN: ICD-10-CM

## 2018-08-13 DIAGNOSIS — J98.4 RESTRICTIVE LUNG DISEASE: ICD-10-CM

## 2018-08-13 DIAGNOSIS — R53.82 CHRONIC FATIGUE: ICD-10-CM

## 2018-08-13 PROCEDURE — 3045F PR MOST RECENT HEMOGLOBIN A1C LEVEL 7.0-9.0%: CPT | Mod: CPTII,S$GLB,, | Performed by: INTERNAL MEDICINE

## 2018-08-13 PROCEDURE — 3008F BODY MASS INDEX DOCD: CPT | Mod: CPTII,S$GLB,, | Performed by: INTERNAL MEDICINE

## 2018-08-13 PROCEDURE — 99999 PR PBB SHADOW E&M-EST. PATIENT-LVL V: CPT | Mod: PBBFAC,,, | Performed by: INTERNAL MEDICINE

## 2018-08-13 PROCEDURE — 99213 OFFICE O/P EST LOW 20 MIN: CPT | Mod: S$GLB,,, | Performed by: INTERNAL MEDICINE

## 2018-08-14 NOTE — PROGRESS NOTES
Subjective:       Patient ID: Jason Sandra is a 43 y.o. male.    Chief Complaint: Leg Numbness    HPI  Pt c/o pain left hip thigh down left leg for mos not better and extreme tired fatigue has low testosteron on injection last level was normal and wt loss normal appetide and palpiations in heart at night  Review of Systems    Objective:      Physical Exam   Constitutional: He is oriented to person, place, and time. He appears well-developed and well-nourished. No distress.   HENT:   Head: Normocephalic and atraumatic.   Right Ear: External ear normal.   Left Ear: External ear normal.   Nose: Nose normal.   Mouth/Throat: Oropharynx is clear and moist. No oropharyngeal exudate.   Eyes: Conjunctivae and EOM are normal. Pupils are equal, round, and reactive to light. Right eye exhibits no discharge. Left eye exhibits no discharge.   Neck: Normal range of motion. Neck supple. No thyromegaly present.   Cardiovascular: Normal rate, regular rhythm, normal heart sounds and intact distal pulses. Exam reveals no gallop and no friction rub.   No murmur heard.  Pulmonary/Chest: Effort normal and breath sounds normal. No respiratory distress. He has no wheezes. He has no rales. He exhibits no tenderness.   Abdominal: Soft. Bowel sounds are normal. He exhibits no distension. There is no tenderness. There is no rebound and no guarding.   Musculoskeletal: Normal range of motion. He exhibits no edema, tenderness or deformity.   Lymphadenopathy:     He has no cervical adenopathy.   Neurological: He is alert and oriented to person, place, and time.   Skin: Skin is warm and dry. Capillary refill takes less than 2 seconds. No rash noted. No erythema.   Psychiatric: He has a normal mood and affect. Judgment and thought content normal.   Nursing note and vitals reviewed.      Assessment:       1. Uncontrolled type 2 diabetes mellitus without complication, without long-term current use of insulin    2. Weight loss    3. Chronic fatigue    4.  Leg pain, diffuse, left    5. Hypogonadism male    6. Chronic right shoulder pain    7. Restrictive lung disease    8. Palpitation    9. Pain of left hip joint        Plan:       Uncontrolled type 2 diabetes mellitus without complication, without long-term current use of insulin  -     POCT URINE DIPSTICK WITHOUT MICROSCOPE  -     Microalbumin/creatinine urine ratio; Future; Expected date: 08/13/2018  -     Hemoglobin A1c; Future; Expected date: 08/13/2018    Weight loss  -     X-Ray Chest PA And Lateral; Future; Expected date: 08/13/2018  -     TSH; Future; Expected date: 08/13/2018  -     T4, free; Future; Expected date: 08/13/2018    Chronic fatigue  -     CBC auto differential; Future; Expected date: 08/13/2018  -     Comprehensive metabolic panel; Future; Expected date: 08/13/2018  -     KIRTI; Future; Expected date: 08/13/2018    Leg pain, diffuse, left  -     Rheumatoid factor; Future; Expected date: 08/13/2018  -     Sedimentation rate; Future; Expected date: 08/13/2018  -     Uric acid; Future; Expected date: 08/13/2018  -     X-Ray Lumbar Spine AP And Lateral; Future; Expected date: 08/13/2018    Hypogonadism male  -     Luteinizing hormone; Future; Expected date: 08/13/2018  -     Follicle stimulating hormone; Future; Expected date: 08/13/2018  -     Testosterone; Future; Expected date: 08/13/2018    Chronic right shoulder pain  -     X-ray Shoulder 2 or More Views Right; Future; Expected date: 08/13/2018    Restrictive lung disease    Palpitation  -     POCT EKG 12-LEAD (NOT FOR [facility] USE)    Pain of left hip joint  -     X-Ray Hip 2 or 3 views Left; Future; Expected date: 08/13/2018

## 2018-08-16 ENCOUNTER — TELEPHONE (OUTPATIENT)
Dept: PRIMARY CARE CLINIC | Facility: CLINIC | Age: 43
End: 2018-08-16

## 2018-08-16 NOTE — TELEPHONE ENCOUNTER
----- Message from Sherine Massey sent at 8/16/2018 11:58 AM CDT -----  Contact: self  Pt would like to speak with the nurse regarding test results.  He can be reached at [phone]

## 2018-08-17 NOTE — TELEPHONE ENCOUNTER
Let pt know all xrays are normal except CXR not done not sure why? Pt can come to hospital have it done any time during working hrs

## 2018-08-17 NOTE — TELEPHONE ENCOUNTER
Let pt know all labs are normal and DM fairly controlled Hgba1c 7.6 need <7.0 try control DM better with diet exercise labs in 3mos

## 2018-08-17 NOTE — TELEPHONE ENCOUNTER
Spoke with patient notified him of results states his understanding. Asking for results of all x-rays. Please result.

## 2018-08-21 ENCOUNTER — TELEPHONE (OUTPATIENT)
Dept: PRIMARY CARE CLINIC | Facility: CLINIC | Age: 43
End: 2018-08-21

## 2018-08-21 NOTE — TELEPHONE ENCOUNTER
----- Message from Maricruz Patel sent at 8/21/2018 11:26 AM CDT -----  Type:  Patient Returning Call    Who Called:  Patient  Who Left Message for Patient:  Not documented  Does the patient know what this is regarding?:  Test results  Best Call Back Number:  [phone]  Additional Information:  Phone call not documented in chart.

## 2018-08-21 NOTE — TELEPHONE ENCOUNTER
Spoke with patient and notified of results, scheduled appointment for tomorrow because patient states there is definitely something wrong and wants to possibly see about having another test done.

## 2018-08-22 ENCOUNTER — OFFICE VISIT (OUTPATIENT)
Dept: PRIMARY CARE CLINIC | Facility: CLINIC | Age: 43
End: 2018-08-22
Payer: COMMERCIAL

## 2018-08-22 VITALS
BODY MASS INDEX: 23.57 KG/M2 | RESPIRATION RATE: 18 BRPM | OXYGEN SATURATION: 97 % | SYSTOLIC BLOOD PRESSURE: 119 MMHG | HEART RATE: 87 BPM | WEIGHT: 155.5 LBS | DIASTOLIC BLOOD PRESSURE: 82 MMHG | HEIGHT: 68 IN | TEMPERATURE: 99 F

## 2018-08-22 DIAGNOSIS — M54.32 SCIATICA OF LEFT SIDE: ICD-10-CM

## 2018-08-22 DIAGNOSIS — G89.29 HIP PAIN, CHRONIC, LEFT: ICD-10-CM

## 2018-08-22 DIAGNOSIS — M79.605 LEG PAIN, DIFFUSE, LEFT: Primary | ICD-10-CM

## 2018-08-22 DIAGNOSIS — M25.552 HIP PAIN, CHRONIC, LEFT: ICD-10-CM

## 2018-08-22 PROCEDURE — 3045F PR MOST RECENT HEMOGLOBIN A1C LEVEL 7.0-9.0%: CPT | Mod: CPTII,S$GLB,, | Performed by: INTERNAL MEDICINE

## 2018-08-22 PROCEDURE — 99213 OFFICE O/P EST LOW 20 MIN: CPT | Mod: S$GLB,,, | Performed by: INTERNAL MEDICINE

## 2018-08-22 PROCEDURE — 99999 PR PBB SHADOW E&M-EST. PATIENT-LVL IV: CPT | Mod: PBBFAC,,, | Performed by: INTERNAL MEDICINE

## 2018-08-22 PROCEDURE — 3008F BODY MASS INDEX DOCD: CPT | Mod: CPTII,S$GLB,, | Performed by: INTERNAL MEDICINE

## 2018-08-22 NOTE — PROGRESS NOTES
Subjective:       Patient ID: Jason Sandra is a 43 y.o. male.    Chief Complaint: Results    HPI  Pt is here for F/U discuss labs xray and left hip still hurt a lot when sitting ambulating radiate down left left and into left lower back review labs all normal except Hgba1c 7.6 still sl high pt states can work on it get lower 7.0 since lower back pain and left hip pain down left leg still hurt a lot and interfere with his job will get MRI LS and left Hip  Review of Systems    Objective:      Physical Exam   Constitutional: He is oriented to person, place, and time. He appears well-developed and well-nourished. No distress.   HENT:   Head: Normocephalic and atraumatic.   Right Ear: External ear normal.   Left Ear: External ear normal.   Nose: Nose normal.   Mouth/Throat: Oropharynx is clear and moist. No oropharyngeal exudate.   Eyes: Conjunctivae and EOM are normal. Pupils are equal, round, and reactive to light. Right eye exhibits no discharge. Left eye exhibits no discharge.   Neck: Normal range of motion. Neck supple. No thyromegaly present.   Cardiovascular: Normal rate, regular rhythm, normal heart sounds and intact distal pulses. Exam reveals no gallop and no friction rub.   No murmur heard.  Pulmonary/Chest: Effort normal and breath sounds normal. No respiratory distress. He has no wheezes. He has no rales. He exhibits no tenderness.   Abdominal: Soft. Bowel sounds are normal. He exhibits no distension. There is no tenderness. There is no rebound and no guarding.   Genitourinary: Penile tenderness (tenderness left lateral hip with aplaption radiating down left leg and into left lower back) present.   Musculoskeletal: Normal range of motion. He exhibits no edema, tenderness or deformity.   Lymphadenopathy:     He has no cervical adenopathy.   Neurological: He is alert and oriented to person, place, and time.   Skin: Skin is warm and dry. Capillary refill takes less than 2 seconds. No rash noted. No erythema.    Psychiatric: He has a normal mood and affect. Judgment and thought content normal.   Nursing note and vitals reviewed.      Assessment:       1. Leg pain, diffuse, left    2. Sciatica of left side    3. Hip pain, chronic, left    4. Uncontrolled type 2 diabetes mellitus without complication, without long-term current use of insulin        Plan:       Leg pain, diffuse, left  -     MRI Lumbar Spine Without Contrast; Future; Expected date: 08/22/2018    Sciatica of left side  -     MRI Lumbar Spine Without Contrast; Future; Expected date: 08/22/2018    Hip pain, chronic, left  -     MRI Hip Without Contrast Left; Future; Expected date: 08/22/2018    Uncontrolled type 2 diabetes mellitus without complication, without long-term current use of insulin  Comments:  fairly control discuss with ptneed lower 7.0 pt will try dietexercise and repeat labs in 3 mos

## 2018-08-24 NOTE — TELEPHONE ENCOUNTER
Spoke with patient notified him of results also scheduled both MRI's for patient. Patient verbally states understanding

## 2018-09-06 ENCOUNTER — TELEPHONE (OUTPATIENT)
Dept: PRIMARY CARE CLINIC | Facility: CLINIC | Age: 43
End: 2018-09-06

## 2018-09-06 NOTE — TELEPHONE ENCOUNTER
----- Message from Marcy Lopez sent at 9/6/2018  3:39 PM CDT -----  Contact: [phone]  Patient is requesting a call back from the nurse want to know was mri results received?    Please call the patient upon request at phone number [phone].

## 2018-09-07 ENCOUNTER — OFFICE VISIT (OUTPATIENT)
Dept: PRIMARY CARE CLINIC | Facility: CLINIC | Age: 43
End: 2018-09-07
Payer: COMMERCIAL

## 2018-09-07 VITALS
OXYGEN SATURATION: 97 % | DIASTOLIC BLOOD PRESSURE: 72 MMHG | WEIGHT: 155.19 LBS | SYSTOLIC BLOOD PRESSURE: 136 MMHG | HEIGHT: 68 IN | TEMPERATURE: 98 F | RESPIRATION RATE: 18 BRPM | BODY MASS INDEX: 23.52 KG/M2 | HEART RATE: 75 BPM

## 2018-09-07 DIAGNOSIS — M51.16 LUMBAR DISC DISEASE WITH RADICULOPATHY: Primary | ICD-10-CM

## 2018-09-07 DIAGNOSIS — M25.511 CHRONIC RIGHT SHOULDER PAIN: ICD-10-CM

## 2018-09-07 DIAGNOSIS — G89.29 CHRONIC RIGHT SHOULDER PAIN: ICD-10-CM

## 2018-09-07 DIAGNOSIS — D86.9 SARCOIDOSIS: ICD-10-CM

## 2018-09-07 PROCEDURE — 3008F BODY MASS INDEX DOCD: CPT | Mod: CPTII,S$GLB,, | Performed by: INTERNAL MEDICINE

## 2018-09-07 PROCEDURE — 99999 PR PBB SHADOW E&M-EST. PATIENT-LVL IV: CPT | Mod: PBBFAC,,, | Performed by: INTERNAL MEDICINE

## 2018-09-07 PROCEDURE — 99213 OFFICE O/P EST LOW 20 MIN: CPT | Mod: S$GLB,,, | Performed by: INTERNAL MEDICINE

## 2018-09-07 RX ORDER — ETODOLAC 400 MG/1
400 TABLET, FILM COATED ORAL 2 TIMES DAILY
Qty: 30 TABLET | Refills: 1 | Status: SHIPPED | OUTPATIENT
Start: 2018-09-07 | End: 2018-09-25

## 2018-09-09 NOTE — PROGRESS NOTES
Subjective:       Patient ID: Jason Sandra is a 43 y.o. male.    Chief Complaint: Results    HPI   Pt still with chronic intermittent lower back pain radiate left hip left lower ext discuss MRI result with pt pain pro from LS disc ds with spinal stenoses and still has shoulder pain for several months right a lot worse than left hurt when do doing his job at work used to lift [employer] object at work dis uss tx referal to ortho DM much better controlled  Review of Systems    Objective:      Physical Exam   Constitutional: He is oriented to person, place, and time. He appears well-developed and well-nourished. No distress.   HENT:   Head: Normocephalic and atraumatic.   Right Ear: External ear normal.   Left Ear: External ear normal.   Nose: Nose normal.   Mouth/Throat: Oropharynx is clear and moist. No oropharyngeal exudate.   Eyes: Conjunctivae and EOM are normal. Pupils are equal, round, and reactive to light. Right eye exhibits no discharge. Left eye exhibits no discharge.   Neck: Normal range of motion. Neck supple. No thyromegaly present.   Cardiovascular: Normal rate, regular rhythm, normal heart sounds and intact distal pulses. Exam reveals no gallop and no friction rub.   No murmur heard.  Pulmonary/Chest: Effort normal and breath sounds normal. No respiratory distress. He has no wheezes. He has no rales. He exhibits no tenderness.   Abdominal: Soft. Bowel sounds are normal. He exhibits no distension. There is no tenderness. There is no rebound and no guarding.   Musculoskeletal: Normal range of motion. He exhibits tenderness (rt shoulder decrease ROM with ROM rt shoulder and tender when abduct >80 degree). He exhibits no edema or deformity.   Lymphadenopathy:     He has no cervical adenopathy.   Neurological: He is alert and oriented to person, place, and time.   Skin: Skin is warm and dry. Capillary refill takes less than 2 seconds. No rash noted. No erythema.   Psychiatric: He has a normal mood and affect.  Judgment and thought content normal.   Nursing note and vitals reviewed.      Assessment:       1. Lumbar disc disease with radiculopathy    2. Chronic right shoulder pain    3. Sarcoidosis        Plan:       Lumbar disc disease with radiculopathy  Comments:  try NSAIDS recm PT and ortho due to job time contraint will try short course of NSAIDS  Orders:  -     etodolac (LODINE) 400 MG tablet; Take 1 tablet (400 mg total) by mouth 2 (two) times daily.  Dispense: 30 tablet; Refill: 1    Chronic right shoulder pain  -     MRI Shoulder Without Contrast Right; Future; Expected date: 09/09/2018    Sarcoidosis  Comments:  stable not active at present time

## 2018-09-12 DIAGNOSIS — M51.36 DDD (DEGENERATIVE DISC DISEASE), LUMBAR: Primary | ICD-10-CM

## 2018-09-18 ENCOUNTER — TELEPHONE (OUTPATIENT)
Dept: PRIMARY CARE CLINIC | Facility: CLINIC | Age: 43
End: 2018-09-18

## 2018-09-18 ENCOUNTER — TELEPHONE (OUTPATIENT)
Dept: ORTHOPEDICS | Facility: CLINIC | Age: 43
End: 2018-09-18

## 2018-09-18 LAB
LEFT EYE DM RETINOPATHY: NEGATIVE
RIGHT EYE DM RETINOPATHY: NEGATIVE

## 2018-09-18 NOTE — TELEPHONE ENCOUNTER
----- Message from RT Jessica sent at 9/18/2018  3:47 PM CDT -----  Contact: pt   pt , requesting MRI scan results, thanks.

## 2018-09-19 ENCOUNTER — OFFICE VISIT (OUTPATIENT)
Dept: OPTOMETRY | Facility: CLINIC | Age: 43
End: 2018-09-19
Payer: COMMERCIAL

## 2018-09-19 DIAGNOSIS — H52.13 MYOPIA WITH ASTIGMATISM AND PRESBYOPIA, BILATERAL: ICD-10-CM

## 2018-09-19 DIAGNOSIS — H52.4 MYOPIA WITH ASTIGMATISM AND PRESBYOPIA, BILATERAL: ICD-10-CM

## 2018-09-19 DIAGNOSIS — E11.9 TYPE 2 DIABETES MELLITUS WITHOUT OPHTHALMIC MANIFESTATIONS: Primary | ICD-10-CM

## 2018-09-19 DIAGNOSIS — H52.203 MYOPIA WITH ASTIGMATISM AND PRESBYOPIA, BILATERAL: ICD-10-CM

## 2018-09-19 PROCEDURE — 99999 PR PBB SHADOW E&M-EST. PATIENT-LVL II: CPT | Mod: PBBFAC,,, | Performed by: OPTOMETRIST

## 2018-09-19 PROCEDURE — 92015 DETERMINE REFRACTIVE STATE: CPT | Mod: S$GLB,,, | Performed by: OPTOMETRIST

## 2018-09-19 PROCEDURE — 92004 COMPRE OPH EXAM NEW PT 1/>: CPT | Mod: S$GLB,,, | Performed by: OPTOMETRIST

## 2018-09-19 NOTE — PROGRESS NOTES
Assessment /Plan     For exam results, see Encounter Report.    Type 2 diabetes mellitus without ophthalmic manifestations    Myopia with astigmatism and presbyopia, bilateral            1.  No retinopathy--monitor yearly.  BS control.  Eye health normal OU.  2.  Bifocal rx given.

## 2018-09-25 ENCOUNTER — OFFICE VISIT (OUTPATIENT)
Dept: ORTHOPEDICS | Facility: CLINIC | Age: 43
End: 2018-09-25
Payer: COMMERCIAL

## 2018-09-25 ENCOUNTER — HOSPITAL ENCOUNTER (OUTPATIENT)
Dept: RADIOLOGY | Facility: HOSPITAL | Age: 43
Discharge: HOME | End: 2018-09-25
Attending: PHYSICIAN ASSISTANT
Payer: COMMERCIAL

## 2018-09-25 VITALS
HEIGHT: 68 IN | BODY MASS INDEX: 23.79 KG/M2 | HEART RATE: 83 BPM | WEIGHT: 156.94 LBS | SYSTOLIC BLOOD PRESSURE: 105 MMHG | DIASTOLIC BLOOD PRESSURE: 72 MMHG

## 2018-09-25 DIAGNOSIS — M25.852 FEMOROACETABULAR IMPINGEMENT OF LEFT HIP: Primary | ICD-10-CM

## 2018-09-25 DIAGNOSIS — M25.511 RIGHT SHOULDER PAIN, UNSPECIFIED CHRONICITY: ICD-10-CM

## 2018-09-25 DIAGNOSIS — M51.36 DDD (DEGENERATIVE DISC DISEASE), LUMBAR: ICD-10-CM

## 2018-09-25 PROCEDURE — 72120 X-RAY BEND ONLY L-S SPINE: CPT | Mod: TC

## 2018-09-25 PROCEDURE — 3008F BODY MASS INDEX DOCD: CPT | Mod: CPTII,S$GLB,, | Performed by: PHYSICIAN ASSISTANT

## 2018-09-25 PROCEDURE — 99204 OFFICE O/P NEW MOD 45 MIN: CPT | Mod: S$GLB,,, | Performed by: PHYSICIAN ASSISTANT

## 2018-09-25 PROCEDURE — 99999 PR PBB SHADOW E&M-EST. PATIENT-LVL III: CPT | Mod: PBBFAC,,, | Performed by: PHYSICIAN ASSISTANT

## 2018-09-25 PROCEDURE — 72120 X-RAY BEND ONLY L-S SPINE: CPT | Mod: 26,,, | Performed by: RADIOLOGY

## 2018-09-25 RX ORDER — MELOXICAM 15 MG/1
15 TABLET ORAL DAILY
Qty: 30 TABLET | Refills: 0 | Status: SHIPPED | OUTPATIENT
Start: 2018-09-25 | End: 2018-10-09 | Stop reason: SDUPTHER

## 2018-09-25 NOTE — PROGRESS NOTES
"DATE: 9/25/2018  PATIENT: Jason Sandra    Supervising Physician: Rolando Polanco M.D.    CHIEF COMPLAINT: low back and left hip pain    HISTORY:  Jason Sandra is a 43 y.o. male here for initial evaluation of low back and left hip and anterior leg pain (Back - 8, Leg - 8).  The pain in the hip is what bothers him most.  The pain has been present for 2 years. The patient describes the pain as aching.  The pain is worse with nothing in particular and improved by nothing in particular. There is occasional associated numbness and tingling. There is no subjective weakness. Prior treatments have included nothing in particular, no medications, physical therapy, ESIs or surgery.    The patient denies myelopathic symptoms such as handwriting changes or difficulty with buttons/coins/keys. Denies perineal paresthesias, bowel/bladder dysfunction.    PAST MEDICAL/SURGICAL HISTORY:  Past Medical History:   Diagnosis Date    Diabetes mellitus     Hypogonadism in male      Past Surgical History:   Procedure Laterality Date    NOSE SURGERY         Medications:   Current Outpatient Medications on File Prior to Visit   Medication Sig Dispense Refill    metFORMIN (GLUCOPHAGE) 1000 MG tablet Take 1 tablet (1,000 mg total) by mouth 2 (two) times daily with meals. 180 tablet 0    syringe, disposable, 1 mL Syrg 1 Units by Misc.(Non-Drug; Combo Route) route once a week. 50 Syringe 2    testosterone cypionate (DEPOTESTOTERONE CYPIONATE) 200 mg/mL injection INJECT 1 ML INTO THE MUSCLE TWICE WEEKLY 10 mL 5    atorvastatin (LIPITOR) 10 MG tablet Take 1 tablet (10 mg total) by mouth once daily. 90 tablet 0    BD LUER-CHRISTY SYRINGE 3 mL 21 gauge x 1 1/2" Syrg 1 UNITS BY MISC.(NON-DRUG COMBO ROUTE) ROUTE ONCE A WEEK. 50 Syringe 0    blood sugar diagnostic Strp 1 strip by Misc.(Non-Drug; Combo Route) route 3 (three) times daily. One touch ultra 300 strip 1    blood-glucose meter kit 1 each by Other route once. Use as instructed 1 each 0    " lancets Misc 1 lancet by Misc.(Non-Drug; Combo Route) route 2 (two) times daily. 100 each 5    [DISCONTINUED] etodolac (LODINE) 400 MG tablet Take 1 tablet (400 mg total) by mouth 2 (two) times daily. 30 tablet 1     No current facility-administered medications on file prior to visit.        Social History:   Social History     Socioeconomic History    Marital status:      Spouse name: Not on file    Number of children: Not on file    Years of education: Not on file    Highest education level: Not on file   Social Needs    Financial resource strain: Not on file    Food insecurity - worry: Not on file    Food insecurity - inability: Not on file    Transportation needs - medical: Not on file    Transportation needs - non-medical: Not on file   Occupational History    Not on file   Tobacco Use    Smoking status: Never Smoker    Smokeless tobacco: Never Used   Substance and Sexual Activity    Alcohol use: Yes     Comment: socially    Drug use: No    Sexual activity: Yes     Partners: Female   Other Topics Concern    Not on file   Social History Narrative           REVIEW OF SYSTEMS:  Constitution: Negative. Negative for chills, fever and night sweats.   Cardiovascular: Negative for chest pain and syncope.   Respiratory: Negative for cough and shortness of breath.   Gastrointestinal: See HPI. Negative for nausea/vomiting. Negative for abdominal pain.  Genitourinary: See HPI. Negative for discoloration or dysuria.  Skin: Negative for dry skin, itching and rash.   Hematologic/Lymphatic: Negative for bleeding problem. Does not bruise/bleed easily.   Musculoskeletal: Negative for falls and muscle weakness.   Neurological: See HPI. No seizures.   Endocrine: Negative for polydipsia, polyphagia and polyuria.   Allergic/Immunologic: Negative for hives and persistent infections.     EXAM:  /72 (BP Location: Left arm, Patient Position: Sitting, BP Method: Large (Automatic))   Pulse 83   Ht 5'  "8" (1.727 m)   Wt 71.2 kg (156 lb 15.5 oz)   BMI 23.87 kg/m²     General: The patient is a very pleasant 43 y.o. male in no apparent distress, the patient is oriented to person, place and time.  Psych: Normal mood and affect  HEENT: Vision grossly intact, hearing intact to the spoken word.  Lungs: Respirations unlabored.  Gait: Normal station and gait, no difficulty with toe or heel walk.   Skin: Dorsal lumbar skin negative for rashes, lesions, hairy patches and surgical scars. There is no lumbar tenderness to palpation.  Range of motion: Lumbar range of motion is acceptable.  Spinal Balance: Global saggital and coronal spinal balance acceptable, not significant for scoliosis and kyphosis.  Musculoskeletal: No pain with the range of motion of the bilateral hips. No trochanteric tenderness to palpation.  Vascular: Bilateral lower extremities warm and well perfused, dorsalis pedis pulses 2+ bilaterally.  Neurological: Normal strength and tone in all major motor groups in the bilateral lower extremities. Normal sensation to light touch in the L2-S1 dermatomes bilaterally.  Deep tendon reflexes symmetric 2+ in the bilateral lower extremities.  Negative Babinski bilaterally. Straight leg raise negative bilaterally.    IMAGING:      Today I personally reviewed AP, Lat and Flex/Ex  upright L-spine films that demonstrate normal disc spacing and alignment.  No acute abnormalities.    MRI lumbar spine demonstrates mild neural foraminal narrowing at L5/S1 without significant spinal stenosis.     MRI left hip shows femoroacetabular impingement of the left hip.      Body mass index is 23.87 kg/m².    Hemoglobin A1C   Date Value Ref Range Status   08/13/2018 7.6 (H) 4.0 - 5.6 % Final     Comment:     ADA Screening Guidelines:  5.7-6.4%  Consistent with prediabetes  >or=6.5%  Consistent with diabetes  High levels of fetal hemoglobin interfere with the HbA1C  assay. Heterozygous hemoglobin variants (HbS, HgC, etc)do  not " significantly interfere with this assay.   However, presence of multiple variants may affect accuracy.     06/15/2018 10.3 (H) 4.0 - 5.6 % Final     Comment:     ADA Screening Guidelines:  5.7-6.4%  Consistent with prediabetes  >or=6.5%  Consistent with diabetes  High levels of fetal hemoglobin interfere with the HbA1C  assay. Heterozygous hemoglobin variants (HbS, HgC, etc)do  not significantly interfere with this assay.   However, presence of multiple variants may affect accuracy.     10/12/2017 6.4 (H) 4.0 - 5.6 % Final     Comment:     According to ADA guidelines, hemoglobin A1c <7.0% represents  optimal control in non-pregnant diabetic patients. Different  metrics may apply to specific patient populations.   Standards of Medical Care in Diabetes-2016.  For the purpose of screening for the presence of diabetes:  <5.7%     Consistent with the absence of diabetes  5.7-6.4%  Consistent with increasing risk for diabetes   (prediabetes)  >or=6.5%  Consistent with diabetes  Currently, no consensus exists for use of hemoglobin A1c  for diagnosis of diabetes for children.  This Hemoglobin A1c assay has significant interference with fetal   hemoglobin   (HbF). The results are invalid for patients with abnormal amounts of   HbF,   including those with known Hereditary Persistence   of Fetal Hemoglobin. Heterozygous hemoglobin variants (HbAS, HbAC,   HbAD, HbAE, HbA2) do not significantly interfere with this assay;   however, presence of multiple variants in a sample may impact the %   interference.         ASSESSMENT/PLAN:    Diagnoses and all orders for this visit:    Femoroacetabular impingement of left hip  -     Ambulatory Referral to Physical/Occupational Therapy    Right shoulder pain, unspecified chronicity  -     Ambulatory Referral to Physical/Occupational Therapy    Other orders  -     meloxicam (MOBIC) 15 MG tablet; Take 1 tablet (15 mg total) by mouth once daily.        Referral for PT placed today at [facility].  Prescription for mobic sent to the pharmacy.  Follow up after therapy if symptoms persist.     Follow-up if symptoms worsen or fail to improve.

## 2018-10-09 RX ORDER — ETODOLAC 400 MG/1
TABLET, FILM COATED ORAL
Qty: 30 TABLET | Refills: 1 | Status: SHIPPED | OUTPATIENT
Start: 2018-10-09 | End: 2019-06-21

## 2018-10-15 RX ORDER — TESTOSTERONE CYPIONATE 200 MG/ML
INJECTION, SOLUTION INTRAMUSCULAR
Qty: 10 ML | Refills: 5 | Status: SHIPPED | OUTPATIENT
Start: 2018-10-15 | End: 2019-06-21

## 2018-10-22 RX ORDER — METFORMIN HYDROCHLORIDE 1000 MG/1
TABLET ORAL
Qty: 180 TABLET | Refills: 0 | Status: SHIPPED | OUTPATIENT
Start: 2018-10-22 | End: 2019-01-28 | Stop reason: SDUPTHER

## 2018-10-31 DIAGNOSIS — Z91.89 CANDIDATE FOR STATIN THERAPY DUE TO RISK OF FUTURE CARDIOVASCULAR EVENT: ICD-10-CM

## 2018-10-31 RX ORDER — ATORVASTATIN CALCIUM 10 MG/1
TABLET, FILM COATED ORAL
Qty: 90 TABLET | Refills: 0 | Status: SHIPPED | OUTPATIENT
Start: 2018-10-31 | End: 2019-06-21

## 2018-11-01 RX ORDER — MELOXICAM 15 MG/1
TABLET ORAL
Qty: 30 TABLET | Refills: 0 | Status: SHIPPED | OUTPATIENT
Start: 2018-11-01 | End: 2019-06-21

## 2019-01-28 RX ORDER — METFORMIN HYDROCHLORIDE 1000 MG/1
TABLET ORAL
Qty: 180 TABLET | Refills: 0 | Status: SHIPPED | OUTPATIENT
Start: 2019-01-28 | End: 2020-08-12 | Stop reason: SDUPTHER

## 2019-06-07 ENCOUNTER — OFFICE VISIT (OUTPATIENT)
Dept: URGENT CARE | Facility: CLINIC | Age: 44
End: 2019-06-07
Payer: COMMERCIAL

## 2019-06-07 VITALS
HEART RATE: 77 BPM | TEMPERATURE: 98 F | WEIGHT: 147 LBS | OXYGEN SATURATION: 98 % | RESPIRATION RATE: 16 BRPM | HEIGHT: 68 IN | SYSTOLIC BLOOD PRESSURE: 135 MMHG | BODY MASS INDEX: 22.28 KG/M2 | DIASTOLIC BLOOD PRESSURE: 76 MMHG

## 2019-06-07 DIAGNOSIS — Z87.438 HX OF ACUTE PROSTATITIS: ICD-10-CM

## 2019-06-07 DIAGNOSIS — R39.15 URGENCY OF URINATION: Primary | ICD-10-CM

## 2019-06-07 DIAGNOSIS — R30.0 DYSURIA: ICD-10-CM

## 2019-06-07 LAB
BILIRUB UR QL STRIP: NEGATIVE
GLUCOSE UR QL STRIP: POSITIVE
KETONES UR QL STRIP: NEGATIVE
LEUKOCYTE ESTERASE UR QL STRIP: NEGATIVE
PH, POC UA: 6 (ref 5–8)
POC BLOOD, URINE: NEGATIVE
POC NITRATES, URINE: NEGATIVE
PROT UR QL STRIP: NEGATIVE
SP GR UR STRIP: 1.01 (ref 1–1.03)
UROBILINOGEN UR STRIP-ACNC: ABNORMAL (ref 0.3–2.2)

## 2019-06-07 PROCEDURE — 81003 URINALYSIS AUTO W/O SCOPE: CPT | Mod: QW,S$GLB,, | Performed by: EMERGENCY MEDICINE

## 2019-06-07 PROCEDURE — 87086 URINE CULTURE/COLONY COUNT: CPT

## 2019-06-07 PROCEDURE — 3008F PR BODY MASS INDEX (BMI) DOCUMENTED: ICD-10-PCS | Mod: CPTII,S$GLB,, | Performed by: EMERGENCY MEDICINE

## 2019-06-07 PROCEDURE — 81003 POCT URINALYSIS, DIPSTICK, AUTOMATED, W/O SCOPE: ICD-10-PCS | Mod: QW,S$GLB,, | Performed by: EMERGENCY MEDICINE

## 2019-06-07 PROCEDURE — 99203 PR OFFICE/OUTPT VISIT, NEW, LEVL III, 30-44 MIN: ICD-10-PCS | Mod: S$GLB,,, | Performed by: EMERGENCY MEDICINE

## 2019-06-07 PROCEDURE — 99203 OFFICE O/P NEW LOW 30 MIN: CPT | Mod: S$GLB,,, | Performed by: EMERGENCY MEDICINE

## 2019-06-07 PROCEDURE — 3008F BODY MASS INDEX DOCD: CPT | Mod: CPTII,S$GLB,, | Performed by: EMERGENCY MEDICINE

## 2019-06-07 RX ORDER — CIPROFLOXACIN 500 MG/1
500 TABLET ORAL 2 TIMES DAILY
Qty: 20 TABLET | Refills: 1 | Status: SHIPPED | OUTPATIENT
Start: 2019-06-07 | End: 2019-06-18 | Stop reason: ALTCHOICE

## 2019-06-08 NOTE — PROGRESS NOTES
"Subjective:       Patient ID: Jason Sandra is a 44 y.o. male.    Vitals:  height is 5' 8" (1.727 m) and weight is 66.7 kg (147 lb). His temperature is 98.2 °F (36.8 °C). His blood pressure is 135/76 and his pulse is 77. His respiration is 16 and oxygen saturation is 98%.     Chief Complaint: Urinary Tract Infection    Pt c/o lower back pian and frequency, pt has had prostatitis before   Onset three days         Patient states he has a history of chronic prostatitis that at 1 point took years to get rid of.  He states that over the last several days he has had a left-sided deep dull flank pain as well as left lower pelvic pain and some dysuria and urgency and always has frequency but he thinks that is because of his diabetes that he does not take the medicine 4.  He denies fevers or other abdominal pain or vomiting or nausea or hematuria or cloudy urine or pain when sitting or pain when defecating, however he states he feels somewhat similar to the last time when he was diagnosed with prostatitis and treated with long-term antibiotics.    Urinary Tract Infection    This is a new problem. Episode onset: three days. The problem has been gradually worsening. The patient is experiencing no pain. There has been no fever. He is sexually active. There is no history of pyelonephritis. Associated symptoms include frequency and urgency. Pertinent negatives include no chills, hematuria, nausea, vomiting or rash. He has tried nothing for the symptoms.       Constitution: Negative for chills and fever.   Neck: Negative for painful lymph nodes.   Gastrointestinal: Negative for abdominal pain, nausea and vomiting.   Genitourinary: Positive for frequency and urgency. Negative for dysuria, urine decreased, hematuria, history of kidney stones, genital trauma, painful intercourse, genital sore, penile discharge, painful ejaculation, penile pain, penile swelling, scrotal swelling and testicular pain.   Musculoskeletal: Negative for " back pain.   Skin: Negative for rash and lesion.   Hematologic/Lymphatic: Negative for swollen lymph nodes.       Objective:      Physical Exam   Constitutional: He is oriented to person, place, and time. He appears well-developed and well-nourished. No distress.   HENT:   Head: Normocephalic and atraumatic.   Nose: No nasal deformity. No epistaxis.   Mouth/Throat: Mucous membranes are normal.   Eyes: Conjunctivae and lids are normal.   Neck: Trachea normal, normal range of motion and phonation normal. Neck supple.   Cardiovascular: Normal rate, regular rhythm, normal heart sounds and intact distal pulses.   Pulmonary/Chest: Effort normal and breath sounds normal.   Abdominal: Soft. Normal appearance and bowel sounds are normal. There is no tenderness. There is no CVA tenderness.   Genitourinary:   Genitourinary Comments: Deferred rectal and prostate exam.  Empirically treating with Urology follow-up so as not to see infection in this uncontrolled diabetic.   Musculoskeletal: Normal range of motion.   Neurological: He is alert and oriented to person, place, and time. He has normal reflexes.   Skin: Skin is warm, dry and intact. He is not diaphoretic.   Psychiatric: He has a normal mood and affect. His speech is normal. Cognition and memory are normal.   Nursing note and vitals reviewed.      Assessment:       1. Urgency of urination    2. Hx of acute prostatitis    3. Dysuria        Plan:         Urgency of urination  -     Culture, Urine  -     Ambulatory referral to Urology    Hx of acute prostatitis  -     Culture, Urine  -     Ambulatory referral to Urology    Dysuria  -     POCT Urinalysis, Dipstick, Automated, W/O Scope  -     Culture, Urine  -     Ambulatory referral to Urology    Other orders  -     ciprofloxacin HCl (CIPRO) 500 MG tablet; Take 1 tablet (500 mg total) by mouth 2 (two) times daily. for 10 days  Dispense: 20 tablet; Refill: 1          Patient Instructions     Follow-up with Urology.  Referral  made from urgent care and they will call you to set up appointment  Urinalysis at the bedside is negative for infection  Urine culture sent and we will call you with the results  Ciprofloxacin prescription 10 day course.  Flush her system with fluids  Go to the ER for high fevers and worse symptoms                Anatomy of the Male Urinary Tract  Your urinary tract helps to get rid of your bodys liquid waste. The kidneys constantly filter the blood to collect unneeded chemicals and water, making urine. Urine travels through the ureters to the bladder. The bladder fills with urine, holding it until youre ready to release it. Signals from the brain tell the sphincter (muscles around the opening of the bladder) when to let urine flow out of the bladder. The urethra is the tube that carries urine from the bladder out of the body. In men, the prostate gland wraps around the urethra near the bladder.     Front view of male urinary tract.     Date Last Reviewed: 1/1/2017 © 2000-2017 [employer]. [address] [place] [address]. All rights reserved. This information is not intended as a substitute for professional medical care. Always follow your healthcare professional's instructions.        Dysuria     Painful urination (dysuria) is often caused by a problem in the urinary tract.   Dysuria is pain felt during urination. It is often described as a burning. Learn more about this problem and how it can be treated.  What causes dysuria?  Possible causes include:  · Infection with a bacteria or virus such as a urinary tract infection (UTI or a sexually transmitted infection (STI)  · Sensitivity or allergy to chemicals such as those found in lotions and other products  · Prostate or bladder problems  · Radiation therapy to the pelvic area  How is dysuria diagnosed?  Your healthcare provider will examine you. He or she will ask about your symptoms and health. After talking with you and doing a  "physical exam, your healthcare provider may know what is causing your dysuria. He or she will usually request  a sample of your urine. Tests of your urine, or a "urinalysis," are done. A urinalysis may include:  · Looking at the urine sample (visual exam)  · Checking for substances (chemical exam)  · Looking at a small amount under a microscope (microscopic exam)  Some parts of the urinalysis may be done in the provider's office and some in a lab. And, the urine sample may be checked for bacteria and yeast (urine culture). Your healthcare provider will tell you more about these tests if they are needed.  How is dysuria treated?  Treatment depends on the cause. If you have a bacterial infection, you may need antibiotics. You may be given medicines to make it easier for you to urinate and help relieve pain. Your healthcare provider can tell you more about your treatment options. Untreated, symptoms may get worse.  When to call your healthcare provider  Call the healthcare provider right away if you have any of the following:  · Fever of 100.4°F (38°C) or higher   · No improvement after three days of treatment  · Trouble urinating because of pain  · New or increased discharge from the vagina or penis  · Rash or joint pain  · Increased back or abdominal pain  · Enlarged painful lymph nodes (lumps) in the groin   Date Last Reviewed: 1/1/2017  © 5108-5857 [employer]. [address] [place] [address]. All rights reserved. This information is not intended as a substitute for professional medical care. Always follow your healthcare professional's instructions.        Prostatitis    The prostate gland is located deep inside the body at the base of the bladder. Prostatitis is an inflammation of the prostate gland. This can occur with or without infection. Most cases of prostatitis are long term (chronic). Most do not involve a bacterial infection.  · Chronic prostatitis is more common in older men. It " is usually an inflammatory condition and not an infection. But, bacterial infection can also cause chronic prostatitis. It can cause pain in the rectum, urethra, bladder, or scrotum. It can also make you unable to fully empty the bladder.  You may urinate often, or have burning with urination. Prostatitis may also cause painful ejaculation and erectile dysfunction.  · Sudden onset (acute) prostatitis usually occurs in men younger than 35. It is from a bacterial infection. You may have severe symptoms such as fever, chills, muscle aches, and pain in the area between the scrotum and anus (perineum). You may have a hard time urinating, or have pain or burning when urinating. There may be blood or pus in the urine.  Your healthcare provider may do a culture test on prostate fluids or discharge from the penis. This will help determine if bacteria are the cause. Treatment can include antibiotics, anti-inflammatory medicine, prostate medicines, and stool softeners.  Home care  These guidelines will help you care for yourself at home:  · Rest at home until the fever is gone and you are feeling better.  · A hot sitz bath may offer some relief. Fill a tub with 6 inches of hot water. Allow the water to run so you can keep it hot for 10 to 15 minutes.  · Drink plenty of fluids. Do not drink alcohol or caffeine until all symptoms are gone.  · If your healthcare gives you an antibiotic, take it exactly as you are told. Take it until it is all gone.  · Constipation causes straining and pain. Avoid constipation by eating natural laxatives such as prunes, fresh fruits, and whole-grain cereals. If needed, use a mild over-the-counter (OTC) laxative for constipation. An OTC stool softener may be used to keep the stools soft.  · If sex is uncomfortable or painful, avoid until symptoms get better.  · You may use OTC medicines for pain and fever, unless another medicine was given. If you have chronic liver or kidney disease, talk with  your healthcare provider before using these medicines. Also talk with your provider if you've ever had a stomach ulcer or GI bleeding.  Follow-up care  Follow up with your healthcare provider, a urologist, or as advised to be sure you are responding to treatment. Your healthcare provider may want to see you after you finish your antibiotics to be sure the infection has cleared. If a culture was taken, you may call for the results as directed. A culture test can help your healthcare provider know if you are on the correct antibiotic.  Call 911  Call 911 if any of these occur:  · Weakness, dizziness, or fainting  When to seek medical advice  Call your healthcare provider right away if any of these occur:  · Fever of 100.4°F (38°C) or higher after 3 days of treatment, or as advised  · Unable to pass urine for 8 hours  · Pressure or pain in your bladder gets worse  · Painful swelling of the testicle or scrotum  Date Last Reviewed: 10/1/2016  © 5022-5555 [employer]. [address] [place] [address], [place] [address]. All rights reserved. This information is not intended as a substitute for professional medical care. Always follow your healthcare professional's instructions.

## 2019-06-08 NOTE — PATIENT INSTRUCTIONS
Follow-up with Urology.  Referral made from urgent care and they will call you to set up appointment  Urinalysis at the bedside is negative for infection  Urine culture sent and we will call you with the results  Ciprofloxacin prescription 10 day course.  Flush her system with fluids  Go to the ER for high fevers and worse symptoms                Anatomy of the Male Urinary Tract  Your urinary tract helps to get rid of your bodys liquid waste. The kidneys constantly filter the blood to collect unneeded chemicals and water, making urine. Urine travels through the ureters to the bladder. The bladder fills with urine, holding it until youre ready to release it. Signals from the brain tell the sphincter (muscles around the opening of the bladder) when to let urine flow out of the bladder. The urethra is the tube that carries urine from the bladder out of the body. In men, the prostate gland wraps around the urethra near the bladder.     Front view of male urinary tract.     Date Last Reviewed: 1/1/2017  © 4146-2587 [employer]. [address] [place] [address], [place] [address]. All rights reserved. This information is not intended as a substitute for professional medical care. Always follow your healthcare professional's instructions.        Dysuria     Painful urination (dysuria) is often caused by a problem in the urinary tract.   Dysuria is pain felt during urination. It is often described as a burning. Learn more about this problem and how it can be treated.  What causes dysuria?  Possible causes include:  · Infection with a bacteria or virus such as a urinary tract infection (UTI or a sexually transmitted infection (STI)  · Sensitivity or allergy to chemicals such as those found in lotions and other products  · Prostate or bladder problems  · Radiation therapy to the pelvic area  How is dysuria diagnosed?  Your healthcare provider will examine you. He or she will ask about your symptoms and health. After  "talking with you and doing a physical exam, your healthcare provider may know what is causing your dysuria. He or she will usually request  a sample of your urine. Tests of your urine, or a "urinalysis," are done. A urinalysis may include:  · Looking at the urine sample (visual exam)  · Checking for substances (chemical exam)  · Looking at a small amount under a microscope (microscopic exam)  Some parts of the urinalysis may be done in the provider's office and some in a lab. And, the urine sample may be checked for bacteria and yeast (urine culture). Your healthcare provider will tell you more about these tests if they are needed.  How is dysuria treated?  Treatment depends on the cause. If you have a bacterial infection, you may need antibiotics. You may be given medicines to make it easier for you to urinate and help relieve pain. Your healthcare provider can tell you more about your treatment options. Untreated, symptoms may get worse.  When to call your healthcare provider  Call the healthcare provider right away if you have any of the following:  · Fever of 100.4°F (38°C) or higher   · No improvement after three days of treatment  · Trouble urinating because of pain  · New or increased discharge from the vagina or penis  · Rash or joint pain  · Increased back or abdominal pain  · Enlarged painful lymph nodes (lumps) in the groin   Date Last Reviewed: 1/1/2017  © 9039-9006 [employer]. [address] [place] [address]. All rights reserved. This information is not intended as a substitute for professional medical care. Always follow your healthcare professional's instructions.        Prostatitis    The prostate gland is located deep inside the body at the base of the bladder. Prostatitis is an inflammation of the prostate gland. This can occur with or without infection. Most cases of prostatitis are long term (chronic). Most do not involve a bacterial infection.  · Chronic prostatitis is " more common in older men. It is usually an inflammatory condition and not an infection. But, bacterial infection can also cause chronic prostatitis. It can cause pain in the rectum, urethra, bladder, or scrotum. It can also make you unable to fully empty the bladder.  You may urinate often, or have burning with urination. Prostatitis may also cause painful ejaculation and erectile dysfunction.  · Sudden onset (acute) prostatitis usually occurs in men younger than 35. It is from a bacterial infection. You may have severe symptoms such as fever, chills, muscle aches, and pain in the area between the scrotum and anus (perineum). You may have a hard time urinating, or have pain or burning when urinating. There may be blood or pus in the urine.  Your healthcare provider may do a culture test on prostate fluids or discharge from the penis. This will help determine if bacteria are the cause. Treatment can include antibiotics, anti-inflammatory medicine, prostate medicines, and stool softeners.  Home care  These guidelines will help you care for yourself at home:  · Rest at home until the fever is gone and you are feeling better.  · A hot sitz bath may offer some relief. Fill a tub with 6 inches of hot water. Allow the water to run so you can keep it hot for 10 to 15 minutes.  · Drink plenty of fluids. Do not drink alcohol or caffeine until all symptoms are gone.  · If your healthcare gives you an antibiotic, take it exactly as you are told. Take it until it is all gone.  · Constipation causes straining and pain. Avoid constipation by eating natural laxatives such as prunes, fresh fruits, and whole-grain cereals. If needed, use a mild over-the-counter (OTC) laxative for constipation. An OTC stool softener may be used to keep the stools soft.  · If sex is uncomfortable or painful, avoid until symptoms get better.  · You may use OTC medicines for pain and fever, unless another medicine was given. If you have chronic liver or  kidney disease, talk with your healthcare provider before using these medicines. Also talk with your provider if you've ever had a stomach ulcer or GI bleeding.  Follow-up care  Follow up with your healthcare provider, a urologist, or as advised to be sure you are responding to treatment. Your healthcare provider may want to see you after you finish your antibiotics to be sure the infection has cleared. If a culture was taken, you may call for the results as directed. A culture test can help your healthcare provider know if you are on the correct antibiotic.  Call 911  Call 911 if any of these occur:  · Weakness, dizziness, or fainting  When to seek medical advice  Call your healthcare provider right away if any of these occur:  · Fever of 100.4°F (38°C) or higher after 3 days of treatment, or as advised  · Unable to pass urine for 8 hours  · Pressure or pain in your bladder gets worse  · Painful swelling of the testicle or scrotum  Date Last Reviewed: 10/1/2016  © 2298-5347 [employer]. [address] [place] [address], [place] [address]. All rights reserved. This information is not intended as a substitute for professional medical care. Always follow your healthcare professional's instructions.

## 2019-06-09 ENCOUNTER — TELEPHONE (OUTPATIENT)
Dept: URGENT CARE | Facility: CLINIC | Age: 44
End: 2019-06-09

## 2019-06-09 LAB — BACTERIA UR CULT: NO GROWTH

## 2019-06-09 NOTE — TELEPHONE ENCOUNTER
Patient contact regarding negative urine culture results.  Patient informed to continue current treatment and to follow up with Urology Clinic as previously scheduled if symptoms do not improve.  Patient verbalizes understanding instructions and agrees with plan.

## 2019-06-18 ENCOUNTER — APPOINTMENT (OUTPATIENT)
Dept: LAB | Facility: HOSPITAL | Age: 44
End: 2019-06-18
Attending: NURSE PRACTITIONER
Payer: COMMERCIAL

## 2019-06-18 ENCOUNTER — OFFICE VISIT (OUTPATIENT)
Dept: UROLOGY | Facility: CLINIC | Age: 44
End: 2019-06-18
Payer: COMMERCIAL

## 2019-06-18 VITALS
TEMPERATURE: 98 F | DIASTOLIC BLOOD PRESSURE: 70 MMHG | HEART RATE: 72 BPM | BODY MASS INDEX: 22.19 KG/M2 | HEIGHT: 68 IN | SYSTOLIC BLOOD PRESSURE: 111 MMHG | WEIGHT: 146.38 LBS | RESPIRATION RATE: 18 BRPM

## 2019-06-18 DIAGNOSIS — R79.89 LOW TESTOSTERONE IN MALE: ICD-10-CM

## 2019-06-18 DIAGNOSIS — N41.1 CHRONIC PROSTATITIS: Primary | ICD-10-CM

## 2019-06-18 LAB
BACTERIA #/AREA URNS HPF: NORMAL /HPF
BILIRUB SERPL-MCNC: ABNORMAL MG/DL
BILIRUB UR QL STRIP: NEGATIVE
BLOOD URINE, POC: ABNORMAL
CLARITY UR: CLEAR
COLOR UR: YELLOW
COLOR, POC UA: YELLOW
GLUCOSE UR QL STRIP: 500
GLUCOSE UR QL STRIP: ABNORMAL
HGB UR QL STRIP: NEGATIVE
KETONES UR QL STRIP: ABNORMAL
KETONES UR QL STRIP: NEGATIVE
LEUKOCYTE ESTERASE UR QL STRIP: NEGATIVE
LEUKOCYTE ESTERASE URINE, POC: ABNORMAL
MICROSCOPIC COMMENT: NORMAL
NITRITE UR QL STRIP: NEGATIVE
NITRITE, POC UA: ABNORMAL
PH UR STRIP: 6 [PH] (ref 5–8)
PH, POC UA: 5
PROT UR QL STRIP: NEGATIVE
PROTEIN, POC: ABNORMAL
RBC #/AREA URNS HPF: 2 /HPF (ref 0–4)
SP GR UR STRIP: <=1.005 (ref 1–1.03)
SPECIFIC GRAVITY, POC UA: 1
SQUAMOUS #/AREA URNS HPF: 3 /HPF
URN SPEC COLLECT METH UR: ABNORMAL
UROBILINOGEN UR STRIP-ACNC: NEGATIVE EU/DL
UROBILINOGEN, POC UA: 0.2
WBC #/AREA URNS HPF: 0 /HPF (ref 0–5)
YEAST URNS QL MICRO: NORMAL

## 2019-06-18 PROCEDURE — 3008F BODY MASS INDEX DOCD: CPT | Mod: CPTII,S$GLB,, | Performed by: NURSE PRACTITIONER

## 2019-06-18 PROCEDURE — 99999 PR PBB SHADOW E&M-EST. PATIENT-LVL IV: ICD-10-PCS | Mod: PBBFAC,,, | Performed by: NURSE PRACTITIONER

## 2019-06-18 PROCEDURE — 87086 URINE CULTURE/COLONY COUNT: CPT

## 2019-06-18 PROCEDURE — 99999 PR PBB SHADOW E&M-EST. PATIENT-LVL IV: CPT | Mod: PBBFAC,,, | Performed by: NURSE PRACTITIONER

## 2019-06-18 PROCEDURE — 3008F PR BODY MASS INDEX (BMI) DOCUMENTED: ICD-10-PCS | Mod: CPTII,S$GLB,, | Performed by: NURSE PRACTITIONER

## 2019-06-18 PROCEDURE — 99214 OFFICE O/P EST MOD 30 MIN: CPT | Mod: 25,S$GLB,, | Performed by: NURSE PRACTITIONER

## 2019-06-18 PROCEDURE — 99214 PR OFFICE/OUTPT VISIT, EST, LEVL IV, 30-39 MIN: ICD-10-PCS | Mod: 25,S$GLB,, | Performed by: NURSE PRACTITIONER

## 2019-06-18 PROCEDURE — 81000 URINALYSIS NONAUTO W/SCOPE: CPT

## 2019-06-18 PROCEDURE — 81002 URINALYSIS NONAUTO W/O SCOPE: CPT | Mod: S$GLB,,, | Performed by: NURSE PRACTITIONER

## 2019-06-18 PROCEDURE — 81002 POCT URINE DIPSTICK WITHOUT MICROSCOPE: ICD-10-PCS | Mod: S$GLB,,, | Performed by: NURSE PRACTITIONER

## 2019-06-18 RX ORDER — DOXYCYCLINE HYCLATE 100 MG
100 TABLET ORAL 2 TIMES DAILY
Qty: 60 TABLET | Refills: 0 | Status: SHIPPED | OUTPATIENT
Start: 2019-06-18 | End: 2019-07-18

## 2019-06-18 NOTE — LETTER
June 18, 2019      Jason Garrido MD  [address] [facility]  [place] [address]           [facility]  [address] [place] [address] [address]  [facility] [address]  Phone: [phone]  Fax: [phone]          Patient: Jason Sandra   MR Number: [id number]   YOB: 1975   Date of Visit: 6/18/2019       Dear Dr. Jason Garrido:    Thank you for referring Jason Sandra to me for evaluation. Attached you will find relevant portions of my assessment and plan of care.    If you have questions, please do not hesitate to call me. I look forward to following Jason Sandra along with you.    Sincerely,    Georgia Aviles, [facility]    Enclosure  CC:  No Recipients    If you would like to receive this communication electronically, please contact [email or web address] or [phone] to request more information on [employer] Link access.    For providers and/or their staff who would like to refer a patient to [facility], please contact us through our one-stop-shop provider referral line, [facility], at [phone].    If you feel you have received this communication in error or would no longer like to receive these types of communications, please e-mail [email or web address]

## 2019-06-18 NOTE — PROGRESS NOTES
[facility] Note  Staff: BERNARDO HolguinC    PCP: Dr. Tyler Castillo    Chief Complaint: Chronic prostatitis    Subjective:        HPI: Jason Sandra is a 44 y.o. male presents with prostate pain, increased urinary frequency and urgency of urination.    Pt was recently seen by Urgent Care on 6/7/2019 for possible UTI vs. Prostate infection.  His complaints were of lower back pains x 3 days prior to UC visit.  Pt was prescribed Cipro 500 mg BID x 10 days.     Pt was last seen by Dr. Aracelis Bentley at [facility] on 8/1/2018 for evaluation of low testosterone levels.    Last PSA Screening:   Lab Results   Component Value Date    PSA 0.74 05/12/2011    PSA 0.90 03/07/2011    PSA 0.85 12/09/2010    PSADIAG 1.0 07/16/2018    PSADIAG 1.3 10/12/2017     REVIEW OF SYSTEMS:  A comprehensive 10 system review was performed and is negative except as noted above in HPI    PMHx:  Past Medical History:   Diagnosis Date    Diabetes mellitus     Hypogonadism in male      Kidney stones: No  Cataracts? None    PSHx:  Past Surgical History:   Procedure Laterality Date    NOSE SURGERY       Fam Hx:   malignancies: No    kidney stones: No     Allergies:  Patient has no known allergies.    Medications: reviewed   Anticoagulation: Yes - Mobic 15 mg daily    Objective:     Vitals:    06/18/19 1538   BP: 111/70   Pulse: 72   Resp: 18   Temp: 98.1 °F (36.7 °C)     General:WDWN in NAD  Eyes: PERRLA, normal conjunctiva  Respiratory: no increased work on breathing, clear to auscultation  Cardiovascular: regular rate and rhythm. No obvious extremity edema.  GI: palpation of masses. No tenderness. No hepatosplenomegaly to palpation.  Musculoskeletal: normal range of motion of bilateral upper extremities. Normal muscle strength and tone.  Skin: no obvious rashes or lesions. No tightening of skin noted.  Neurologic: CN grossly normal. Normal sensation.   Psychiatric: awake, alert and oriented  x 3. Mood and affect normal. Cooperative.    :  Pt deferred    LABS REVIEW:  UA today:  Color:Clear, Yellow  Spec. Grav.  1.005  PH  5.0  500 Glucose    UCx:   Results for orders placed or performed in visit on 06/07/19   Culture, Urine   Result Value Ref Range    Urine Culture, Routine No growth      Cr:   Lab Results   Component Value Date    CREATININE 0.8 08/13/2018     Assessment:       1. Chronic prostatitis    2. Low testosterone in male          Plan:   Hx of chronic prostatitis:    UA and Urine culture to be performed.  Vibra-tabs 100 mg BID x 30 days prescribed to pt today.  Draw PSA, total and free     F/u with Dr. Bentley at [facility] for further re-evaluation and treatment of testosterone therapy and prostatitis.    [facility]: Active    Georgia Aviles, PEDROP-C

## 2019-06-18 NOTE — PATIENT INSTRUCTIONS
Chronic Prostatitis/Chronic Pelvic Pain Syndrome (CP/CPPS)      With a healthy prostate, urine flows easily through the urethra.     Chronic prostatitis/chronic pelvic pain syndrome (CP/CPPS) is ongoing pain in the area of the prostate gland. CP/CPPS is the most common of the 4 types of prostatitis which also include acute bacterial prostatitis, chronic bacterial prostatitis, and asymptomatic inflammatory prostatitis. The prostate gland is part of the male reproductive system. It sits just below the bladder and surrounds the urethra. The urethra is the tube that takes urine and semen out of the body. CP/CPPS is the most common form of pain of the gland. It is also known as nonbacterial prostatitis. Symptoms such as pain and trouble urinating may come and go.  What causes CP/CPPS?  The exact cause of CP/CPPS isnt known. It may be caused by an infection that comes back again and again. It may be caused by inflammation of the gland. Muscle spasms in the pelvis may be a cause. Other causes of CP/CPPS may include:  · Stress that tightens the pelvic muscles  · Urine flowing back up into the prostate ducts  · Not ejaculating often  In many cases, the cause isnt clear.  What are the symptoms of CP/CPPS?  Some men dont have symptoms. Or, they may have symptoms that come and go. The symptoms can include:  · Pain in the genitals and pelvic area  · Trouble urinating  · Pain while urinating  · Pain during or after ejaculation  How is CP/CPPS diagnosed?  Your healthcare provider will ask about your medical history and your symptoms. He or she may give you a physical exam, including a rectal exam. Your urine, blood, and semen may be tested for bacteria or certain chemicals. In some cases, you may have other tests. You may have an ultrasound or a transrectal ultrasound-guided biopsy. This is done to take tiny pieces of tissue to look at with a microscope. Or, you may have imaging tests such as a CT scan, MRI, or urodynamic  studies that look at urine flow and other issues. These are done to look at your abdomen and pelvic areas.  How is CP/CPPS treated?  The goal of treatment is to help relieve symptoms. Treatments can include one or more of these:  · Antibiotics  · Anti-inflammatory or muscle-relaxing medicines  · Alpha-blocker medicines, which relax the muscles in and around the gland  · Sitz baths  · Prostate massage  · Dietary changes  · Biofeedback  · Surgery  · Other medicines or herbal treatments  Date Last Reviewed: 1/1/2017 © 2000-2017 [employer]. [address] [place] [address]. All rights reserved. This information is not intended as a substitute for professional medical care. Always follow your healthcare professional's instructions.

## 2019-06-19 ENCOUNTER — LAB VISIT (OUTPATIENT)
Dept: LAB | Facility: OTHER | Age: 44
End: 2019-06-19
Payer: COMMERCIAL

## 2019-06-19 DIAGNOSIS — E78.00 HYPERCHOLESTEROLEMIA: ICD-10-CM

## 2019-06-19 DIAGNOSIS — Z91.89 CANDIDATE FOR STATIN THERAPY DUE TO RISK OF FUTURE CARDIOVASCULAR EVENT: ICD-10-CM

## 2019-06-19 DIAGNOSIS — E29.1 HYPOGONADISM MALE: ICD-10-CM

## 2019-06-19 DIAGNOSIS — N41.1 CHRONIC PROSTATITIS: ICD-10-CM

## 2019-06-19 LAB
ALBUMIN SERPL BCP-MCNC: 4 G/DL (ref 3.5–5.2)
ALP SERPL-CCNC: 83 U/L (ref 55–135)
ALT SERPL W/O P-5'-P-CCNC: 19 U/L (ref 10–44)
ANION GAP SERPL CALC-SCNC: 8 MMOL/L (ref 8–16)
AST SERPL-CCNC: 18 U/L (ref 10–40)
BASOPHILS # BLD AUTO: 0.02 K/UL (ref 0–0.2)
BASOPHILS NFR BLD: 0.3 % (ref 0–1.9)
BILIRUB SERPL-MCNC: 0.6 MG/DL (ref 0.1–1)
BUN SERPL-MCNC: 18 MG/DL (ref 6–20)
CALCIUM SERPL-MCNC: 10.2 MG/DL (ref 8.7–10.5)
CHLORIDE SERPL-SCNC: 100 MMOL/L (ref 95–110)
CO2 SERPL-SCNC: 27 MMOL/L (ref 23–29)
COMPLEXED PSA SERPL-MCNC: 0.52 NG/ML (ref 0–4)
CREAT SERPL-MCNC: 1.1 MG/DL (ref 0.5–1.4)
DIFFERENTIAL METHOD: NORMAL
EOSINOPHIL # BLD AUTO: 0.1 K/UL (ref 0–0.5)
EOSINOPHIL NFR BLD: 0.9 % (ref 0–8)
ERYTHROCYTE [DISTWIDTH] IN BLOOD BY AUTOMATED COUNT: 12.4 % (ref 11.5–14.5)
EST. GFR  (AFRICAN AMERICAN): >60 ML/MIN/1.73 M^2
EST. GFR  (NON AFRICAN AMERICAN): >60 ML/MIN/1.73 M^2
ESTIMATED AVG GLUCOSE: 309 MG/DL (ref 68–131)
GLUCOSE SERPL-MCNC: 242 MG/DL (ref 70–110)
HBA1C MFR BLD HPLC: 12.4 % (ref 4–5.6)
HCT VFR BLD AUTO: 42.3 % (ref 40–54)
HGB BLD-MCNC: 14.5 G/DL (ref 14–18)
LYMPHOCYTES # BLD AUTO: 2.4 K/UL (ref 1–4.8)
LYMPHOCYTES NFR BLD: 40.9 % (ref 18–48)
MCH RBC QN AUTO: 29.3 PG (ref 27–31)
MCHC RBC AUTO-ENTMCNC: 34.3 G/DL (ref 32–36)
MCV RBC AUTO: 86 FL (ref 82–98)
MONOCYTES # BLD AUTO: 0.5 K/UL (ref 0.3–1)
MONOCYTES NFR BLD: 9.2 % (ref 4–15)
NEUTROPHILS # BLD AUTO: 2.8 K/UL (ref 1.8–7.7)
NEUTROPHILS NFR BLD: 48.4 % (ref 38–73)
PLATELET # BLD AUTO: 227 K/UL (ref 150–350)
PMV BLD AUTO: 10.2 FL (ref 9.2–12.9)
POTASSIUM SERPL-SCNC: 5 MMOL/L (ref 3.5–5.1)
PROT SERPL-MCNC: 7.4 G/DL (ref 6–8.4)
RBC # BLD AUTO: 4.95 M/UL (ref 4.6–6.2)
SODIUM SERPL-SCNC: 135 MMOL/L (ref 136–145)
TESTOST SERPL-MCNC: 435 NG/DL (ref 304–1227)
WBC # BLD AUTO: 5.75 K/UL (ref 3.9–12.7)

## 2019-06-19 PROCEDURE — 84154 ASSAY OF PSA FREE: CPT

## 2019-06-19 PROCEDURE — 85025 COMPLETE CBC W/AUTO DIFF WBC: CPT

## 2019-06-19 PROCEDURE — 84153 ASSAY OF PSA TOTAL: CPT

## 2019-06-19 PROCEDURE — 36415 COLL VENOUS BLD VENIPUNCTURE: CPT

## 2019-06-19 PROCEDURE — 84403 ASSAY OF TOTAL TESTOSTERONE: CPT

## 2019-06-19 PROCEDURE — 80053 COMPREHEN METABOLIC PANEL: CPT

## 2019-06-19 PROCEDURE — 83036 HEMOGLOBIN GLYCOSYLATED A1C: CPT

## 2019-06-20 LAB
BACTERIA UR CULT: NO GROWTH
PROSTATE SPECIFIC ANTIGEN, TOTAL: 0.52 NG/ML (ref 0–4)
PSA FREE MFR SERPL: 17.31 %
PSA FREE SERPL-MCNC: 0.09 NG/ML (ref 0.01–1.5)

## 2019-06-21 ENCOUNTER — OFFICE VISIT (OUTPATIENT)
Dept: PRIMARY CARE CLINIC | Facility: CLINIC | Age: 44
End: 2019-06-21
Payer: COMMERCIAL

## 2019-06-21 VITALS
TEMPERATURE: 98 F | HEIGHT: 68 IN | RESPIRATION RATE: 18 BRPM | SYSTOLIC BLOOD PRESSURE: 107 MMHG | BODY MASS INDEX: 22.1 KG/M2 | WEIGHT: 145.81 LBS | HEART RATE: 89 BPM | DIASTOLIC BLOOD PRESSURE: 67 MMHG | OXYGEN SATURATION: 99 %

## 2019-06-21 DIAGNOSIS — Z91.199 NONCOMPLIANCE WITH DIABETES TREATMENT: ICD-10-CM

## 2019-06-21 DIAGNOSIS — E11.9 TYPE 2 DIABETES MELLITUS WITHOUT COMPLICATION: ICD-10-CM

## 2019-06-21 PROCEDURE — 99213 OFFICE O/P EST LOW 20 MIN: CPT | Mod: S$GLB,,, | Performed by: INTERNAL MEDICINE

## 2019-06-21 PROCEDURE — 3008F PR BODY MASS INDEX (BMI) DOCUMENTED: ICD-10-PCS | Mod: CPTII,S$GLB,, | Performed by: INTERNAL MEDICINE

## 2019-06-21 PROCEDURE — 3046F HEMOGLOBIN A1C LEVEL >9.0%: CPT | Mod: CPTII,S$GLB,, | Performed by: INTERNAL MEDICINE

## 2019-06-21 PROCEDURE — 3046F PR MOST RECENT HEMOGLOBIN A1C LEVEL > 9.0%: ICD-10-PCS | Mod: CPTII,S$GLB,, | Performed by: INTERNAL MEDICINE

## 2019-06-21 PROCEDURE — 99213 PR OFFICE/OUTPT VISIT, EST, LEVL III, 20-29 MIN: ICD-10-PCS | Mod: S$GLB,,, | Performed by: INTERNAL MEDICINE

## 2019-06-21 PROCEDURE — 3008F BODY MASS INDEX DOCD: CPT | Mod: CPTII,S$GLB,, | Performed by: INTERNAL MEDICINE

## 2019-06-21 PROCEDURE — 99999 PR PBB SHADOW E&M-EST. PATIENT-LVL III: CPT | Mod: PBBFAC,,, | Performed by: INTERNAL MEDICINE

## 2019-06-21 PROCEDURE — 99999 PR PBB SHADOW E&M-EST. PATIENT-LVL III: ICD-10-PCS | Mod: PBBFAC,,, | Performed by: INTERNAL MEDICINE

## 2019-06-21 RX ORDER — GLIMEPIRIDE 2 MG/1
2 TABLET ORAL
Qty: 90 TABLET | Refills: 3 | Status: SHIPPED | OUTPATIENT
Start: 2019-06-21 | End: 2020-08-12 | Stop reason: SDUPTHER

## 2019-06-21 RX ORDER — LANCETS
EACH MISCELLANEOUS
Qty: 200 EACH | Refills: 3 | Status: SHIPPED | OUTPATIENT
Start: 2019-06-21 | End: 2021-10-28

## 2019-06-21 RX ORDER — INSULIN PUMP SYRINGE, 3 ML
EACH MISCELLANEOUS
Qty: 1 EACH | Refills: 0 | Status: SHIPPED | OUTPATIENT
Start: 2019-06-21 | End: 2020-06-20

## 2019-06-22 NOTE — PROGRESS NOTES
Subjective:       Patient ID: Jason Sandra is a 44 y.o. male.    Chief Complaint: Annual Exam    HPI  Pt is here c/o wt loss polyuria poly dipsia has not been taking his mefoirmin and not c/w diet in last few mos but begin to cahnge back to DM diet and start taking metformin again BID no sob cp no n/v/d ofever chill  Review of Systems    Objective:      Physical Exam   Constitutional: He is oriented to person, place, and time. He appears well-developed and well-nourished. No distress.   Apparent wt loss   HENT:   Head: Normocephalic and atraumatic.   Right Ear: External ear normal.   Left Ear: External ear normal.   Nose: Nose normal.   Mouth/Throat: Oropharynx is clear and moist. No oropharyngeal exudate.   Eyes: Pupils are equal, round, and reactive to light. Conjunctivae and EOM are normal. Right eye exhibits no discharge. Left eye exhibits no discharge.   Neck: Normal range of motion. Neck supple. No thyromegaly present.   Cardiovascular: Normal rate, regular rhythm, normal heart sounds and intact distal pulses. Exam reveals no gallop and no friction rub.   No murmur heard.  Pulmonary/Chest: Effort normal and breath sounds normal. No respiratory distress. He has no wheezes. He has no rales. He exhibits no tenderness.   Abdominal: Soft. Bowel sounds are normal. He exhibits no distension. There is no tenderness. There is no rebound and no guarding.   Musculoskeletal: Normal range of motion. He exhibits no edema, tenderness or deformity.   Lymphadenopathy:     He has no cervical adenopathy.   Neurological: He is alert and oriented to person, place, and time.   Skin: Skin is warm and dry. Capillary refill takes less than 2 seconds. No rash noted. No erythema.   Psychiatric: He has a normal mood and affect. Judgment and thought content normal.   Nursing note and vitals reviewed.      Assessment:       1. Uncontrolled type 2 diabetes mellitus without complication, without long-term current use of insulin    2.  Noncompliance with diabetes treatment        Plan:       Uncontrolled type 2 diabetes mellitus without complication, without long-term current use of insulin  -     blood-glucose meter kit; To check BG 2 times daily, to use with insurance preferred meter  Dispense: 1 each; Refill: 0  -     lancets Misc; To check BG 2 times daily, to use with insurance preferred meter  Dispense: 200 each; Refill: 3  -     blood sugar diagnostic Strp; To check BG 2 times daily, to use with insurance preferred meter  Dispense: 200 strip; Refill: 3  -     glimepiride (AMARYL) 2 MG tablet; Take 1 tablet (2 mg total) by mouth before breakfast.  Dispense: 90 tablet; Refill: 3    Noncompliance with diabetes treatment  Comments:  pt begin back on diet for DM and ytake metormin bid will add glimepiride since glucose still high repeat labs in 6 weeks

## 2019-07-16 RX ORDER — DOXYCYCLINE HYCLATE 100 MG
TABLET ORAL
Qty: 60 TABLET | Refills: 0 | OUTPATIENT
Start: 2019-07-16

## 2019-08-30 NOTE — TELEPHONE ENCOUNTER
Prescribed Metformin ER 1,000 mg (1 daily), however, pt's insurance company will not cover this, but will cover Metformin 500 mg (2 by mouth daily).    Advised Danny that we will confer with the MD and a new rx will be sent to the pharmacy electronically.    Verbalized understanding and agreeable to such.    Thanks.   no

## 2019-09-24 ENCOUNTER — PATIENT OUTREACH (OUTPATIENT)
Dept: ADMINISTRATIVE | Facility: OTHER | Age: 44
End: 2019-09-24

## 2019-09-24 DIAGNOSIS — E11.9 TYPE 2 DIABETES MELLITUS WITHOUT COMPLICATION, UNSPECIFIED WHETHER LONG TERM INSULIN USE: Primary | ICD-10-CM

## 2019-09-26 ENCOUNTER — OFFICE VISIT (OUTPATIENT)
Dept: UROLOGY | Facility: CLINIC | Age: 44
End: 2019-09-26
Payer: COMMERCIAL

## 2019-09-26 VITALS
SYSTOLIC BLOOD PRESSURE: 122 MMHG | BODY MASS INDEX: 24.57 KG/M2 | WEIGHT: 162.13 LBS | DIASTOLIC BLOOD PRESSURE: 64 MMHG | HEART RATE: 68 BPM | RESPIRATION RATE: 15 BRPM | HEIGHT: 68 IN

## 2019-09-26 DIAGNOSIS — N41.1 CHRONIC PROSTATITIS: ICD-10-CM

## 2019-09-26 DIAGNOSIS — R79.89 LOW TESTOSTERONE IN MALE: Primary | ICD-10-CM

## 2019-09-26 PROCEDURE — 99999 PR PBB SHADOW E&M-EST. PATIENT-LVL III: ICD-10-PCS | Mod: PBBFAC,,, | Performed by: UROLOGY

## 2019-09-26 PROCEDURE — 99214 OFFICE O/P EST MOD 30 MIN: CPT | Mod: S$GLB,,, | Performed by: UROLOGY

## 2019-09-26 PROCEDURE — 3008F PR BODY MASS INDEX (BMI) DOCUMENTED: ICD-10-PCS | Mod: CPTII,S$GLB,, | Performed by: UROLOGY

## 2019-09-26 PROCEDURE — 99214 PR OFFICE/OUTPT VISIT, EST, LEVL IV, 30-39 MIN: ICD-10-PCS | Mod: S$GLB,,, | Performed by: UROLOGY

## 2019-09-26 PROCEDURE — 3008F BODY MASS INDEX DOCD: CPT | Mod: CPTII,S$GLB,, | Performed by: UROLOGY

## 2019-09-26 PROCEDURE — 99999 PR PBB SHADOW E&M-EST. PATIENT-LVL III: CPT | Mod: PBBFAC,,, | Performed by: UROLOGY

## 2019-09-26 RX ORDER — TESTOSTERONE CYPIONATE 200 MG/ML
200 INJECTION, SOLUTION INTRAMUSCULAR
Qty: 10 ML | Refills: 5 | Status: SHIPPED | OUTPATIENT
Start: 2019-09-26 | End: 2019-09-26 | Stop reason: SDUPTHER

## 2019-09-26 RX ORDER — TESTOSTERONE CYPIONATE 200 MG/ML
200 INJECTION, SOLUTION INTRAMUSCULAR
Qty: 10 ML | Refills: 5 | Status: SHIPPED | OUTPATIENT
Start: 2019-09-26 | End: 2020-04-24

## 2019-09-26 RX ORDER — FINASTERIDE 1 MG/1
1 TABLET, FILM COATED ORAL DAILY
Refills: 4 | COMMUNITY
Start: 2019-07-17 | End: 2020-03-02 | Stop reason: SDUPTHER

## 2019-09-26 RX ORDER — SYRINGE W-NEEDLE,DISPOSAB,3 ML 25GX5/8"
SYRINGE, EMPTY DISPOSABLE MISCELLANEOUS
Qty: 50 SYRINGE | Refills: 11 | Status: SHIPPED | OUTPATIENT
Start: 2019-09-26 | End: 2021-02-11 | Stop reason: SDUPTHER

## 2019-09-26 RX ORDER — KETOCONAZOLE 20 MG/ML
SHAMPOO, SUSPENSION TOPICAL
Refills: 11 | COMMUNITY
Start: 2019-07-22 | End: 2021-10-28

## 2019-09-26 RX ORDER — BETAMETHASONE VALERATE 1.2 MG/G
AEROSOL, FOAM TOPICAL
Refills: 5 | COMMUNITY
Start: 2019-07-23 | End: 2020-03-02

## 2019-09-26 NOTE — PROGRESS NOTES
"Subjective:       Jason Sandra is a 44 y.o. male who is an established pt who was seen for for evaluation of low testosterone.      He is on q weekly injection therapy (200mg, 1mL IM) currently. He does notice some fatigue at the end of the week after injection. Each injection lasting 4-5 days. He has previously tried Testopel with a good initial response but symptoms returned 3 months later. He did not have this repeated.     He also has a history of chronic prostatitis but is asymptomatic currently.      Doing well with injections - doing injections twice weekly.    6/14: T - 586, PSA - 0.8, H  12/14: PSA - 0.9, Hgb - 16.4  10/17: T - 204, PSA - 1.3, Hgb - 15.5   7/18: T - 346, PSA - 1.0, Hgb - 13.5  9/19: T - 435, PSA - 0.52, Hgb - 14.5       AUASS - 11, 2 (mostly satisfied)      The following portions of the patient's history were reviewed and updated as appropriate: allergies, current medications, past family history, past medical history, past social history, past surgical history and problem list.    Review of Systems  Constitutional: no fever or chills  ENT: no nasal congestion or sore throat  Respiratory: no cough or shortness of breath  Cardiovascular: no chest pain or palpitations  Gastrointestinal: no nausea or vomiting, tolerating diet  Genitourinary: as per HPI  Hematologic/Lymphatic: no easy bruising or lymphadenopathy  Musculoskeletal: no arthralgias or myalgias  Skin: no rashes or lesions  Neurological: no seizures or tremors  Behavioral/Psych: no auditory or visual hallucinations       Objective:    Vitals: /64   Pulse 68   Resp 15   Ht 5' 8" (1.727 m)   Wt 73.5 kg (162 lb 2.4 oz)   BMI 24.65 kg/m²     Physical Exam   General: well developed, well nourished in no acute distress  Head: normocephalic, atraumatic  Neck: supple, trachea midline  HEENT: EOMI, mucus membranes moist, sclera anicteric, no hearing impairment  Lungs: symmetric expansion, non-labored breathing  Cardiovascular: " regular rate and rhythm, normal pulses  Abdomen: soft, non tender, non distended, no palpable masses, no hernias, bladder nonpalpable  Musculoskeletal: no peripheral edema, normal ROM  Lymphatics: no cervical or inguinal lymphadenopathy  Skin: no rashes or lesions  Neuro: alert and oriented x 3, no gross deficits  Psych: normal judgment and insight, normal mood/affect and non-anxious  Genitourinary:   patient declined exam   PATRICIA: patient declined exam      Lab Review   Urine analysis shows - 50 glucose    Lab Results   Component Value Date    WBC 5.75 06/19/2019    HGB 14.5 06/19/2019    HCT 42.3 06/19/2019    MCV 86 06/19/2019     06/19/2019     Lab Results   Component Value Date    CREATININE 1.1 06/19/2019    BUN 18 06/19/2019     Lab Results   Component Value Date    PSA 0.74 05/12/2011       Imaging  NA        Assessment/Plan:      1. Hypogonadism male    - Refill T injection therapy - now taking it 2x weekly (1cc 2x weekly)   - Testopel 2013   - Propecia given previously for hair loss, he will stop if T injections less effective     2. Chronic prostatitis    - No current issues, stable         Follow up in 12 months with labs

## 2019-10-24 DIAGNOSIS — E11.9 TYPE 2 DIABETES MELLITUS WITHOUT COMPLICATION: ICD-10-CM

## 2020-01-09 DIAGNOSIS — E11.9 TYPE 2 DIABETES MELLITUS WITHOUT COMPLICATION: ICD-10-CM

## 2020-02-19 ENCOUNTER — TELEPHONE (OUTPATIENT)
Dept: PRIMARY CARE CLINIC | Facility: CLINIC | Age: 45
End: 2020-02-19

## 2020-02-19 DIAGNOSIS — Z13.220 ENCOUNTER FOR LIPID SCREENING FOR CARDIOVASCULAR DISEASE: ICD-10-CM

## 2020-02-19 DIAGNOSIS — Z13.6 ENCOUNTER FOR LIPID SCREENING FOR CARDIOVASCULAR DISEASE: ICD-10-CM

## 2020-02-19 NOTE — TELEPHONE ENCOUNTER
Spoke to patient and he wants his routine labs ordered so he can have them drawn before he schedules a office visit.    ----- Message from Sierra Watkins sent at 2/19/2020  4:07 PM CST -----  Contact: Patient  Type: Needs Medical Advice    Who Called:  Jason, patient  Symptoms (please be specific):  N/A  How long has patient had these symptoms:  N/A  Pharmacy name and phone #:  N/A  Best Call Back Number: [phone]  Additional Information: Calling to ask for lab orders to be added to his chart. Please call him. Thanks.

## 2020-02-20 NOTE — TELEPHONE ENCOUNTER
Pt advised lab orders entered.  Lab will require a 10 hour fast. Call to schedule follow up appt one week after having labs drawn.  Pt declined scheduling f/u appt now.

## 2020-02-24 ENCOUNTER — LAB VISIT (OUTPATIENT)
Dept: LAB | Facility: OTHER | Age: 45
End: 2020-02-24
Attending: INTERNAL MEDICINE
Payer: COMMERCIAL

## 2020-02-24 DIAGNOSIS — Z13.6 ENCOUNTER FOR LIPID SCREENING FOR CARDIOVASCULAR DISEASE: ICD-10-CM

## 2020-02-24 DIAGNOSIS — Z13.220 ENCOUNTER FOR LIPID SCREENING FOR CARDIOVASCULAR DISEASE: ICD-10-CM

## 2020-02-24 LAB
ALBUMIN SERPL BCP-MCNC: 4.3 G/DL (ref 3.5–5.2)
ALP SERPL-CCNC: 81 U/L (ref 55–135)
ALT SERPL W/O P-5'-P-CCNC: 24 U/L (ref 10–44)
ANION GAP SERPL CALC-SCNC: 8 MMOL/L (ref 8–16)
AST SERPL-CCNC: 25 U/L (ref 10–40)
BASOPHILS # BLD AUTO: 0.02 K/UL (ref 0–0.2)
BASOPHILS NFR BLD: 0.4 % (ref 0–1.9)
BILIRUB SERPL-MCNC: 1 MG/DL (ref 0.1–1)
BUN SERPL-MCNC: 14 MG/DL (ref 6–20)
CALCIUM SERPL-MCNC: 9.7 MG/DL (ref 8.7–10.5)
CHLORIDE SERPL-SCNC: 101 MMOL/L (ref 95–110)
CHOLEST SERPL-MCNC: 200 MG/DL (ref 120–199)
CHOLEST/HDLC SERPL: 3.4 {RATIO} (ref 2–5)
CO2 SERPL-SCNC: 28 MMOL/L (ref 23–29)
CREAT SERPL-MCNC: 1.2 MG/DL (ref 0.5–1.4)
DIFFERENTIAL METHOD: NORMAL
EOSINOPHIL # BLD AUTO: 0.1 K/UL (ref 0–0.5)
EOSINOPHIL NFR BLD: 1.4 % (ref 0–8)
ERYTHROCYTE [DISTWIDTH] IN BLOOD BY AUTOMATED COUNT: 12 % (ref 11.5–14.5)
EST. GFR  (AFRICAN AMERICAN): >60 ML/MIN/1.73 M^2
EST. GFR  (NON AFRICAN AMERICAN): >60 ML/MIN/1.73 M^2
ESTIMATED AVG GLUCOSE: 235 MG/DL (ref 68–131)
GLUCOSE SERPL-MCNC: 264 MG/DL (ref 70–110)
HBA1C MFR BLD HPLC: 9.8 % (ref 4–5.6)
HCT VFR BLD AUTO: 45.4 % (ref 40–54)
HDLC SERPL-MCNC: 58 MG/DL (ref 40–75)
HDLC SERPL: 29 % (ref 20–50)
HGB BLD-MCNC: 15 G/DL (ref 14–18)
IMM GRANULOCYTES # BLD AUTO: 0.01 K/UL (ref 0–0.04)
IMM GRANULOCYTES NFR BLD AUTO: 0.2 % (ref 0–0.5)
LDLC SERPL CALC-MCNC: 131.8 MG/DL (ref 63–159)
LYMPHOCYTES # BLD AUTO: 1.8 K/UL (ref 1–4.8)
LYMPHOCYTES NFR BLD: 34.9 % (ref 18–48)
MCH RBC QN AUTO: 29 PG (ref 27–31)
MCHC RBC AUTO-ENTMCNC: 33 G/DL (ref 32–36)
MCV RBC AUTO: 88 FL (ref 82–98)
MONOCYTES # BLD AUTO: 0.5 K/UL (ref 0.3–1)
MONOCYTES NFR BLD: 9.9 % (ref 4–15)
NEUTROPHILS # BLD AUTO: 2.8 K/UL (ref 1.8–7.7)
NEUTROPHILS NFR BLD: 53.2 % (ref 38–73)
NONHDLC SERPL-MCNC: 142 MG/DL
NRBC BLD-RTO: 0 /100 WBC
PLATELET # BLD AUTO: 241 K/UL (ref 150–350)
PMV BLD AUTO: 10.2 FL (ref 9.2–12.9)
POTASSIUM SERPL-SCNC: 4.8 MMOL/L (ref 3.5–5.1)
PROT SERPL-MCNC: 7.7 G/DL (ref 6–8.4)
RBC # BLD AUTO: 5.17 M/UL (ref 4.6–6.2)
SODIUM SERPL-SCNC: 137 MMOL/L (ref 136–145)
TRIGL SERPL-MCNC: 51 MG/DL (ref 30–150)
WBC # BLD AUTO: 5.16 K/UL (ref 3.9–12.7)

## 2020-02-24 PROCEDURE — 80053 COMPREHEN METABOLIC PANEL: CPT

## 2020-02-24 PROCEDURE — 83036 HEMOGLOBIN GLYCOSYLATED A1C: CPT

## 2020-02-24 PROCEDURE — 85025 COMPLETE CBC W/AUTO DIFF WBC: CPT

## 2020-02-24 PROCEDURE — 80061 LIPID PANEL: CPT

## 2020-02-24 PROCEDURE — 36415 COLL VENOUS BLD VENIPUNCTURE: CPT

## 2020-03-02 ENCOUNTER — OFFICE VISIT (OUTPATIENT)
Dept: PRIMARY CARE CLINIC | Facility: CLINIC | Age: 45
End: 2020-03-02
Payer: COMMERCIAL

## 2020-03-02 VITALS
DIASTOLIC BLOOD PRESSURE: 80 MMHG | HEART RATE: 75 BPM | SYSTOLIC BLOOD PRESSURE: 130 MMHG | RESPIRATION RATE: 18 BRPM | WEIGHT: 157.19 LBS | BODY MASS INDEX: 23.82 KG/M2 | TEMPERATURE: 98 F | HEIGHT: 68 IN | OXYGEN SATURATION: 99 %

## 2020-03-02 DIAGNOSIS — G47.429 NARCOLEPSY DUE TO UNDERLYING CONDITION WITHOUT CATAPLEXY: ICD-10-CM

## 2020-03-02 DIAGNOSIS — R53.82 CHRONIC FATIGUE: ICD-10-CM

## 2020-03-02 DIAGNOSIS — R40.0 UNCONTROLLED DAYTIME SOMNOLENCE: ICD-10-CM

## 2020-03-02 PROCEDURE — 3008F BODY MASS INDEX DOCD: CPT | Mod: CPTII,S$GLB,, | Performed by: INTERNAL MEDICINE

## 2020-03-02 PROCEDURE — 99999 PR PBB SHADOW E&M-EST. PATIENT-LVL IV: ICD-10-PCS | Mod: PBBFAC,,, | Performed by: INTERNAL MEDICINE

## 2020-03-02 PROCEDURE — 3046F PR MOST RECENT HEMOGLOBIN A1C LEVEL > 9.0%: ICD-10-PCS | Mod: CPTII,S$GLB,, | Performed by: INTERNAL MEDICINE

## 2020-03-02 PROCEDURE — 99213 OFFICE O/P EST LOW 20 MIN: CPT | Mod: S$GLB,,, | Performed by: INTERNAL MEDICINE

## 2020-03-02 PROCEDURE — 99999 PR PBB SHADOW E&M-EST. PATIENT-LVL IV: CPT | Mod: PBBFAC,,, | Performed by: INTERNAL MEDICINE

## 2020-03-02 PROCEDURE — 99213 PR OFFICE/OUTPT VISIT, EST, LEVL III, 20-29 MIN: ICD-10-PCS | Mod: S$GLB,,, | Performed by: INTERNAL MEDICINE

## 2020-03-02 PROCEDURE — 3008F PR BODY MASS INDEX (BMI) DOCUMENTED: ICD-10-PCS | Mod: CPTII,S$GLB,, | Performed by: INTERNAL MEDICINE

## 2020-03-02 PROCEDURE — 3046F HEMOGLOBIN A1C LEVEL >9.0%: CPT | Mod: CPTII,S$GLB,, | Performed by: INTERNAL MEDICINE

## 2020-03-02 RX ORDER — INSULIN PUMP SYRINGE, 3 ML
EACH MISCELLANEOUS
Qty: 1 EACH | Refills: 0 | Status: SHIPPED | OUTPATIENT
Start: 2020-03-02 | End: 2021-10-28

## 2020-03-02 RX ORDER — LANCETS 28 GAUGE
EACH MISCELLANEOUS
Qty: 200 EACH | Refills: 3 | Status: SHIPPED | OUTPATIENT
Start: 2020-03-02 | End: 2021-10-28

## 2020-03-02 NOTE — PROGRESS NOTES
Subjective:       Patient ID: Jason Sandra is a 45 y.o. male.    Chief Complaint: Follow-up; Arm Pain (left arm pain); and Results (lab results)    HPI  patient here for follow-up and review blood test own unremarkable except for high hemoglobin A1c and high glucose from diabetes patient is on metformin and glipizide does not have the glucometer to check his glucose at home seen by diabetic teaching nurse before but not endocrinology patient main complaint today is feeling sleepy and go into sleep during the daytime for few seconds on and off not able to control it and headache .he denies short of breath chest pain has sleep apnea study for more than 10 yrs ago was normal patient also having chronic pain in the left knee left leg had MRI of lumbar spine with lumbar spine disc dizzy L4-L5 with moderate spinal stenosis  Review of Systems    Objective:      Physical Exam   Constitutional: He is oriented to person, place, and time. He appears well-developed and well-nourished. No distress.   HENT:   Head: Normocephalic and atraumatic.   Right Ear: External ear normal.   Left Ear: External ear normal.   Nose: Nose normal.   Mouth/Throat: Oropharynx is clear and moist. No oropharyngeal exudate.   Eyes: Pupils are equal, round, and reactive to light. Conjunctivae and EOM are normal. Right eye exhibits no discharge. Left eye exhibits no discharge.   Neck: Normal range of motion. Neck supple. No thyromegaly present.   Cardiovascular: Normal rate, regular rhythm, normal heart sounds and intact distal pulses. Exam reveals no gallop and no friction rub.   No murmur heard.  Pulmonary/Chest: Effort normal and breath sounds normal. No respiratory distress. He has no wheezes. He has no rales. He exhibits no tenderness.   Abdominal: Soft. Bowel sounds are normal. He exhibits no distension. There is no tenderness. There is no rebound and no guarding.   Musculoskeletal: Normal range of motion. He exhibits no edema, tenderness or  deformity.   Lymphadenopathy:     He has no cervical adenopathy.   Neurological: He is alert and oriented to person, place, and time.   Skin: Skin is warm and dry. Capillary refill takes less than 2 seconds. No rash noted. No erythema.   Psychiatric: He has a normal mood and affect. Judgment and thought content normal.   Nursing note and vitals reviewed.      Assessment:       1. Uncontrolled type 2 diabetes mellitus without complication, without long-term current use of insulin    2. Chronic fatigue    3. Narcolepsy due to underlying condition without cataplexy    4. Uncontrolled daytime somnolence        Plan:       Uncontrolled type 2 diabetes mellitus without complication, without long-term current use of insulin  -     SITagliptin (JANUVIA) 50 MG Tab; Take 1 tablet (50 mg total) by mouth once daily.  Dispense: 90 tablet; Refill: 3  -     blood-glucose meter kit; To check BG 2 times daily, to use with insurance preferred meter  Dispense: 1 each; Refill: 0  -     lancets Misc; To check BG 2 times daily, to use with insurance preferred meter  Dispense: 200 each; Refill: 3  -     blood sugar diagnostic Strp; To check BG 2 times daily, to use with insurance preferred meter  Dispense: 200 strip; Refill: 3  -     Ambulatory referral/consult to Diabetes Education; Future; Expected date: 03/09/2020  -     Ambulatory referral/consult to Endocrinology; Future; Expected date: 03/09/2020    Chronic fatigue  -     Ambulatory referral/consult to Neurology; Future; Expected date: 03/09/2020    Narcolepsy due to underlying condition without cataplexy  -     Ambulatory referral/consult to Neurology; Future; Expected date: 03/09/2020  -     Polysomnogram (CPAP will be added if patient meets diagnostic criteria.); Future    Uncontrolled daytime somnolence  -     Polysomnogram (CPAP will be added if patient meets diagnostic criteria.); Future

## 2020-03-03 ENCOUNTER — TELEPHONE (OUTPATIENT)
Dept: PRIMARY CARE CLINIC | Facility: CLINIC | Age: 45
End: 2020-03-03

## 2020-03-03 NOTE — TELEPHONE ENCOUNTER
----- Message from Ananya Best sent at 3/3/2020  9:58 AM CST -----  Contact: [phone]  Type: Returning a call    Who left a message? Someone from the office    When did the practice call? today    Comments:  Please advise, thank you

## 2020-03-03 NOTE — TELEPHONE ENCOUNTER
Called pt. Explained about appt. With [facility] management and the need to set up an appointment with [facility] to get better control on his elevated A1c of 9.8. Also gave pt. The number to Dr. Ruano Neurology to be evaluated for his narcolepsy

## 2020-03-09 ENCOUNTER — PATIENT OUTREACH (OUTPATIENT)
Dept: ADMINISTRATIVE | Facility: OTHER | Age: 45
End: 2020-03-09

## 2020-03-13 ENCOUNTER — TELEPHONE (OUTPATIENT)
Dept: PRIMARY CARE CLINIC | Facility: CLINIC | Age: 45
End: 2020-03-13

## 2020-03-13 NOTE — TELEPHONE ENCOUNTER
----- Message from Sierra Watkins sent at 3/13/2020 11:03 AM CDT -----  Contact: Patient  Type: Needs Medical Advice    Who Called:  Jason patient  Symptoms (please be specific):  N/A  How long has patient had these symptoms:  N/A  Pharmacy name and phone #:  N/A  Best Call Back Number: [phone]  Additional Information: Calling to inquire about the MRI that was supposed to be scheduled. Please advise. Thanks.

## 2020-03-17 ENCOUNTER — TELEPHONE (OUTPATIENT)
Dept: PRIMARY CARE CLINIC | Facility: CLINIC | Age: 45
End: 2020-03-17

## 2020-03-17 NOTE — TELEPHONE ENCOUNTER
----- Message from Renay Hdez sent at 3/17/2020  4:01 PM CDT -----  Contact: Patient [phone]  Patient is returning a phone call.  Who left a message for the patient:  Mila WHARTON  Does patient know what this is regarding:  Not sure  Comments:

## 2020-03-25 ENCOUNTER — TELEPHONE (OUTPATIENT)
Dept: DIABETES | Facility: CLINIC | Age: 45
End: 2020-03-25

## 2020-04-17 RX ORDER — TESTOSTERONE CYPIONATE 200 MG/ML
200 INJECTION, SOLUTION INTRAMUSCULAR
Qty: 10 ML | Refills: 5 | Status: CANCELLED | OUTPATIENT
Start: 2020-04-20 | End: 2020-10-19

## 2020-04-21 DIAGNOSIS — Z01.84 ANTIBODY RESPONSE EXAMINATION: ICD-10-CM

## 2020-04-27 RX ORDER — TESTOSTERONE CYPIONATE 200 MG/ML
200 INJECTION, SOLUTION INTRAMUSCULAR
Qty: 10 ML | Refills: 0 | Status: SHIPPED | OUTPATIENT
Start: 2020-04-27 | End: 2020-05-01 | Stop reason: SDUPTHER

## 2020-04-28 ENCOUNTER — LAB VISIT (OUTPATIENT)
Dept: LAB | Facility: OTHER | Age: 45
End: 2020-04-28
Attending: INTERNAL MEDICINE
Payer: COMMERCIAL

## 2020-04-28 ENCOUNTER — TELEPHONE (OUTPATIENT)
Dept: UROLOGY | Facility: CLINIC | Age: 45
End: 2020-04-28

## 2020-04-28 ENCOUNTER — PATIENT MESSAGE (OUTPATIENT)
Dept: UROLOGY | Facility: CLINIC | Age: 45
End: 2020-04-28

## 2020-04-28 DIAGNOSIS — Z01.84 ANTIBODY RESPONSE EXAMINATION: ICD-10-CM

## 2020-04-28 LAB — SARS-COV-2 IGG SERPL QL IA: NEGATIVE

## 2020-04-28 PROCEDURE — 36415 COLL VENOUS BLD VENIPUNCTURE: CPT

## 2020-04-28 PROCEDURE — 86769 SARS-COV-2 COVID-19 ANTIBODY: CPT

## 2020-04-28 NOTE — TELEPHONE ENCOUNTER
Tried to contact regarding medication being refill voice mail not set up. Will send a message via [facility]rivera

## 2020-04-29 ENCOUNTER — TELEPHONE (OUTPATIENT)
Dept: UROLOGY | Facility: CLINIC | Age: 45
End: 2020-04-29

## 2020-04-29 NOTE — TELEPHONE ENCOUNTER
----- Message from Maddison Loomis sent at 4/29/2020  4:09 PM CDT -----  Contact: Patient   Type: Patient Call Back    Who called: Patient     What is the request in detail: Pt is requesting a call back in regards to his testosterone cypionate (DEPOTESTOTERONE CYPIONATE) 200 mg/mL injection. Pt states that the dosage is not right.     Can the clinic reply by [facility]?    Would the patient rather a call back or a response via [facility]? Call back     Best call back number: [phone]

## 2020-04-30 ENCOUNTER — TELEPHONE (OUTPATIENT)
Dept: UROLOGY | Facility: CLINIC | Age: 45
End: 2020-04-30

## 2020-04-30 NOTE — TELEPHONE ENCOUNTER
----- Message from Michela Moreland sent at 4/30/2020  3:43 PM CDT -----  Type:  Patient Returning Call    Who Called: pt     Who Left Message for Patient:  Michela    Does the patient know what this is regarding?: yes    Would the patient rather a call back or a response via [facility]? Call back     Best Call Back Number: [phone]    Additional Information:

## 2020-05-01 ENCOUNTER — TELEPHONE (OUTPATIENT)
Dept: UROLOGY | Facility: CLINIC | Age: 45
End: 2020-05-01

## 2020-05-01 DIAGNOSIS — R79.89 LOW TESTOSTERONE IN MALE: Primary | ICD-10-CM

## 2020-05-01 RX ORDER — TESTOSTERONE CYPIONATE 200 MG/ML
200 INJECTION, SOLUTION INTRAMUSCULAR
Qty: 10 ML | Refills: 0 | Status: SHIPPED | OUTPATIENT
Start: 2020-05-04 | End: 2020-06-10 | Stop reason: SDUPTHER

## 2020-05-01 NOTE — TELEPHONE ENCOUNTER
Spoke to patient, new Rx has been sent to pharmacy and patient has been scheduled for labs and a follow up.

## 2020-05-01 NOTE — TELEPHONE ENCOUNTER
----- Message from Lorie Leach sent at 5/1/2020  1:09 PM CDT -----  Contact: Patient [phone]  Type:  Patient Returning Call    Who Called: Patient    Who Left Message for Patient:  Tasha    Does the patient know what this is regarding?: Rx    Would the patient rather a call back or a response via [facility]? Call back    Best Call Back Number: [phone]

## 2020-05-01 NOTE — TELEPHONE ENCOUNTER
Patient states Dr. Bentley prescribed 200mg of testerone twice a week. He is requesting a corrected Rx

## 2020-05-01 NOTE — TELEPHONE ENCOUNTER
This is a very high dose.  I will refill in following Dr. Bentley's order and note.    I will reorder labs.    I want him to come in for a visit in a month to review the labs and also to make sure he is injecting correctly.  Please make sure he is injecting IM and not SQ.

## 2020-05-05 ENCOUNTER — TELEPHONE (OUTPATIENT)
Dept: PRIMARY CARE CLINIC | Facility: CLINIC | Age: 45
End: 2020-05-05

## 2020-05-05 NOTE — TELEPHONE ENCOUNTER
Pt. Was last seen on 03/01/2020 last imaging performed on this pt. Was an MRI in 2018. Nothing discuss in his note from March regarding needing a CT scan. Please advise on which imaging this pt. Needs if any at all?

## 2020-05-05 NOTE — TELEPHONE ENCOUNTER
----- Message from Sierra Watknis sent at 5/5/2020  3:37 PM CDT -----  Contact: Patient  Type: Needs Medical Advice    Who Called:  Jason patient  Symptoms (please be specific):  N/A  How long has patient had these symptoms:  N/A  Pharmacy name and phone #:  N/A  Best Call Back Number: [phone]  Additional Information: Patient thought an MRI or CT Scan was supposed to be ordered for him. Please advise. Thanks.

## 2020-05-07 ENCOUNTER — OFFICE VISIT (OUTPATIENT)
Dept: PRIMARY CARE CLINIC | Facility: CLINIC | Age: 45
End: 2020-05-07
Payer: COMMERCIAL

## 2020-05-07 DIAGNOSIS — R55 CONSCIOUSNESS LOSS, TRANSIENT: ICD-10-CM

## 2020-05-07 DIAGNOSIS — R55 SYNCOPE, UNSPECIFIED SYNCOPE TYPE: Primary | ICD-10-CM

## 2020-05-07 DIAGNOSIS — R51.9 NONINTRACTABLE HEADACHE, UNSPECIFIED CHRONICITY PATTERN, UNSPECIFIED HEADACHE TYPE: ICD-10-CM

## 2020-05-07 PROCEDURE — 99213 OFFICE O/P EST LOW 20 MIN: CPT | Mod: 95,,, | Performed by: INTERNAL MEDICINE

## 2020-05-07 PROCEDURE — 99213 PR OFFICE/OUTPT VISIT, EST, LEVL III, 20-29 MIN: ICD-10-PCS | Mod: 95,,, | Performed by: INTERNAL MEDICINE

## 2020-05-07 NOTE — PROGRESS NOTES
Subjective:    The patient location is: home  The chief complaint leading to consultation is: uncontrol fall into sleep  Visit type: audio only  Total time spent with patient: 10 minutes  Each patient to whom he or she provides medical services by telemedicine is:  (1) informed of the relationship between the physician and patient and the respective role of any other health care provider with respect to management of the patient; and (2) notified that he or she may decline to receive medical services by telemedicine and may withdraw from such care at any time.    Notes:    Patient ID: Jason Sandra is a 45 y.o. male.    Chief Complaint: No chief complaint on file.    HPI  patient is still with episodic transient loss of consciousness like he is going into deep sleep with headache is happen any time not able to control he denies  fever chill no nausea vomiting or any other neurological symptoms has not had CT scan of the brain yet  Review of Systems    Objective:      Physical Exam  on the phone patient sounds stable no distress no headache no short of breath or chest pain no physical symptom  Assessment:       1. Syncope, unspecified syncope type    2. Consciousness loss, transient    3. Nonintractable headache, unspecified chronicity pattern, unspecified headache type        Plan:       Syncope, unspecified syncope type  -     CT Head Without Contrast; Future; Expected date: 05/07/2020    Consciousness loss, transient  -     CT Head Without Contrast; Future; Expected date: 05/07/2020    Nonintractable headache, unspecified chronicity pattern, unspecified headache type  -     CT Head Without Contrast; Future; Expected date: 05/07/2020

## 2020-05-12 ENCOUNTER — PATIENT OUTREACH (OUTPATIENT)
Dept: ADMINISTRATIVE | Facility: OTHER | Age: 45
End: 2020-05-12

## 2020-05-14 ENCOUNTER — PATIENT MESSAGE (OUTPATIENT)
Dept: DIABETES | Facility: CLINIC | Age: 45
End: 2020-05-14

## 2020-06-05 ENCOUNTER — LAB VISIT (OUTPATIENT)
Dept: LAB | Facility: OTHER | Age: 45
End: 2020-06-05
Attending: INTERNAL MEDICINE
Payer: COMMERCIAL

## 2020-06-05 DIAGNOSIS — R79.89 LOW TESTOSTERONE IN MALE: ICD-10-CM

## 2020-06-05 LAB
BASOPHILS # BLD AUTO: 0.03 K/UL (ref 0–0.2)
BASOPHILS NFR BLD: 0.5 % (ref 0–1.9)
DIFFERENTIAL METHOD: NORMAL
EOSINOPHIL # BLD AUTO: 0.1 K/UL (ref 0–0.5)
EOSINOPHIL NFR BLD: 1.6 % (ref 0–8)
ERYTHROCYTE [DISTWIDTH] IN BLOOD BY AUTOMATED COUNT: 13.3 % (ref 11.5–14.5)
HCT VFR BLD AUTO: 44.6 % (ref 40–54)
HGB BLD-MCNC: 14.9 G/DL (ref 14–18)
IMM GRANULOCYTES # BLD AUTO: 0.02 K/UL (ref 0–0.04)
IMM GRANULOCYTES NFR BLD AUTO: 0.3 % (ref 0–0.5)
LYMPHOCYTES # BLD AUTO: 2.5 K/UL (ref 1–4.8)
LYMPHOCYTES NFR BLD: 38.7 % (ref 18–48)
MCH RBC QN AUTO: 29.6 PG (ref 27–31)
MCHC RBC AUTO-ENTMCNC: 33.4 G/DL (ref 32–36)
MCV RBC AUTO: 89 FL (ref 82–98)
MONOCYTES # BLD AUTO: 0.6 K/UL (ref 0.3–1)
MONOCYTES NFR BLD: 9.6 % (ref 4–15)
NEUTROPHILS # BLD AUTO: 3.1 K/UL (ref 1.8–7.7)
NEUTROPHILS NFR BLD: 49.3 % (ref 38–73)
NRBC BLD-RTO: 0 /100 WBC
PLATELET # BLD AUTO: 259 K/UL (ref 150–350)
PMV BLD AUTO: 9.7 FL (ref 9.2–12.9)
RBC # BLD AUTO: 5.04 M/UL (ref 4.6–6.2)
WBC # BLD AUTO: 6.33 K/UL (ref 3.9–12.7)

## 2020-06-05 PROCEDURE — 85025 COMPLETE CBC W/AUTO DIFF WBC: CPT

## 2020-06-05 PROCEDURE — 84403 ASSAY OF TOTAL TESTOSTERONE: CPT

## 2020-06-05 PROCEDURE — 80061 LIPID PANEL: CPT

## 2020-06-05 PROCEDURE — 80076 HEPATIC FUNCTION PANEL: CPT

## 2020-06-05 PROCEDURE — 84153 ASSAY OF PSA TOTAL: CPT

## 2020-06-05 PROCEDURE — 36415 COLL VENOUS BLD VENIPUNCTURE: CPT

## 2020-06-06 LAB
ALBUMIN SERPL BCP-MCNC: 4.1 G/DL (ref 3.5–5.2)
ALP SERPL-CCNC: 87 U/L (ref 55–135)
ALT SERPL W/O P-5'-P-CCNC: 16 U/L (ref 10–44)
AST SERPL-CCNC: 20 U/L (ref 10–40)
BILIRUB DIRECT SERPL-MCNC: 0.3 MG/DL (ref 0.1–0.3)
BILIRUB SERPL-MCNC: 0.6 MG/DL (ref 0.1–1)
CHOLEST SERPL-MCNC: 179 MG/DL (ref 120–199)
CHOLEST/HDLC SERPL: 3.3 {RATIO} (ref 2–5)
COMPLEXED PSA SERPL-MCNC: 0.78 NG/ML (ref 0–4)
HDLC SERPL-MCNC: 54 MG/DL (ref 40–75)
HDLC SERPL: 30.2 % (ref 20–50)
LDLC SERPL CALC-MCNC: 111.6 MG/DL (ref 63–159)
NONHDLC SERPL-MCNC: 125 MG/DL
PROT SERPL-MCNC: 7.7 G/DL (ref 6–8.4)
TESTOST SERPL-MCNC: >1500 NG/DL (ref 304–1227)
TRIGL SERPL-MCNC: 67 MG/DL (ref 30–150)

## 2020-06-10 ENCOUNTER — OFFICE VISIT (OUTPATIENT)
Dept: UROLOGY | Facility: CLINIC | Age: 45
End: 2020-06-10
Payer: COMMERCIAL

## 2020-06-10 ENCOUNTER — PATIENT OUTREACH (OUTPATIENT)
Dept: ADMINISTRATIVE | Facility: OTHER | Age: 45
End: 2020-06-10

## 2020-06-10 VITALS
SYSTOLIC BLOOD PRESSURE: 138 MMHG | HEIGHT: 68 IN | DIASTOLIC BLOOD PRESSURE: 74 MMHG | BODY MASS INDEX: 24.13 KG/M2 | WEIGHT: 159.19 LBS

## 2020-06-10 DIAGNOSIS — R79.89 LOW TESTOSTERONE IN MALE: Primary | ICD-10-CM

## 2020-06-10 DIAGNOSIS — N40.0 BPH WITHOUT OBSTRUCTION/LOWER URINARY TRACT SYMPTOMS: ICD-10-CM

## 2020-06-10 PROCEDURE — 99213 OFFICE O/P EST LOW 20 MIN: CPT | Mod: S$GLB,,, | Performed by: UROLOGY

## 2020-06-10 PROCEDURE — 3008F BODY MASS INDEX DOCD: CPT | Mod: CPTII,S$GLB,, | Performed by: UROLOGY

## 2020-06-10 PROCEDURE — 3008F PR BODY MASS INDEX (BMI) DOCUMENTED: ICD-10-PCS | Mod: CPTII,S$GLB,, | Performed by: UROLOGY

## 2020-06-10 PROCEDURE — 99999 PR PBB SHADOW E&M-EST. PATIENT-LVL III: ICD-10-PCS | Mod: PBBFAC,,, | Performed by: UROLOGY

## 2020-06-10 PROCEDURE — 99999 PR PBB SHADOW E&M-EST. PATIENT-LVL III: CPT | Mod: PBBFAC,,, | Performed by: UROLOGY

## 2020-06-10 PROCEDURE — 99213 PR OFFICE/OUTPT VISIT, EST, LEVL III, 20-29 MIN: ICD-10-PCS | Mod: S$GLB,,, | Performed by: UROLOGY

## 2020-06-10 RX ORDER — TESTOSTERONE CYPIONATE 200 MG/ML
200 INJECTION, SOLUTION INTRAMUSCULAR
Qty: 10 ML | Refills: 4 | Status: SHIPPED | OUTPATIENT
Start: 2020-06-11 | End: 2020-07-20 | Stop reason: SDUPTHER

## 2020-06-10 NOTE — PROGRESS NOTES
Subjective:       Patient ID: Jason Sandra is a 45 y.o. male who was last seen in this office Visit date not found    Chief Complaint:   Chief Complaint   Patient presents with    Low Testosterone     1 year follow up on low testosterone/ lab review       He is on q weekly injection therapy (200mg, 1mL IM) currently. He does notice some fatigue at the end of the week after injection. Each injection lasting 4-5 days. He has previously tried Testopel with a good initial response but symptoms returned 3 months later. He did not have this repeated.     He also has a history of chronic prostatitis but is asymptomatic currently.      Doing well with injections - doing injections twice weekly.    6/14: T - 586, PSA - 0.8, H  12/14: PSA - 0.9, Hgb - 16.4  10/17: T - 204, PSA - 1.3, Hgb - 15.5   7/18: T - 346, PSA - 1.0, Hgb - 13.5  9/19: T - 435, PSA - 0.52, Hgb - 14.5     6/5/2020 labs reviewed: he had injected that morning.    ACTIVE MEDICAL ISSUES:  Patient Active Problem List   Diagnosis    Diabetes type 2, uncontrolled    SOB (shortness of breath)    Hypercholesterolemia    Restrictive lung disease    Hypogonadism male    Chronic prostatitis       ALLERGIES AND MEDICATIONS: updated and reviewed.  Review of patient's allergies indicates:  No Known Allergies  Current Outpatient Medications   Medication Sig    blood sugar diagnostic Strp 1 strip by Misc.(Non-Drug; Combo Route) route 3 (three) times daily. One touch ultra    blood sugar diagnostic Strp To check BG 2 times daily, to use with insurance preferred meter    blood sugar diagnostic Strp To check BG 2 times daily, to use with insurance preferred meter    blood-glucose meter kit To check BG 2 times daily, to use with insurance preferred meter    blood-glucose meter kit To check BG 2 times daily, to use with insurance preferred meter    clobetasoL (CLOBEX) 0.05 % shampoo Apply to scalp in place of regular shampoo daily as needed for scalp itching  "   finasteride (PROPECIA) 1 mg tablet Take 1 tablet (1 mg total) by mouth once daily.    glimepiride (AMARYL) 2 MG tablet Take 1 tablet (2 mg total) by mouth before breakfast.    ketoconazole (NIZORAL) 2 % shampoo APPLY TO SCALP IN PLACE OF REGULAR SHAMPOO 2-3X WEEKLY, LEAVE ON FOR 5 MINUTES    lancets 28 gauge Misc To check BG 2 times daily, to use with insurance preferred meter    lancets Misc To check BG 2 times daily, to use with insurance preferred meter    metFORMIN (GLUCOPHAGE) 1000 MG tablet TAKE 1 TABLET BY MOUTH TWICE A DAY WITH FOOD    minoxidiL (LONITEN) 2.5 MG tablet take two tablets by mouth daily    SITagliptin (JANUVIA) 50 MG Tab Take 1 tablet (50 mg total) by mouth once daily.    syringe with needle (BD LUER-CHRISTY SYRINGE) 3 mL 21 gauge x 1 1/2" Syrg 1 UNITS BY MISC.(NON-DRUG COMBO ROUTE) ROUTE ONCE A WEEK.    syringe, disposable, 1 mL Syrg 1 Units by Misc.(Non-Drug; Combo Route) route once a week.    [START ON 6/11/2020] testosterone cypionate (DEPOTESTOTERONE CYPIONATE) 200 mg/mL injection Inject 1 mL (200 mg total) into the muscle twice a week.     No current facility-administered medications for this visit.        Review of Systems   Constitutional: Negative for activity change, fatigue, fever and unexpected weight change.   HENT: Negative for congestion.    Eyes: Negative for redness.   Respiratory: Negative for chest tightness and shortness of breath.    Cardiovascular: Negative for chest pain and leg swelling.   Gastrointestinal: Negative for abdominal pain, constipation, diarrhea, nausea and vomiting.   Genitourinary: Negative for dysuria, flank pain, frequency, hematuria, penile pain, penile swelling, scrotal swelling, testicular pain and urgency.   Musculoskeletal: Negative for arthralgias and back pain.   Neurological: Negative for dizziness and light-headedness.   Psychiatric/Behavioral: Negative for behavioral problems and confusion. The patient is not nervous/anxious.    All " "other systems reviewed and are negative.      Objective:      Vitals:    06/10/20 1555   BP: 138/74   Weight: 72.2 kg (159 lb 2.8 oz)   Height: 5' 8" (1.727 m)     Physical Exam   Nursing note and vitals reviewed.  Constitutional: He is oriented to person, place, and time. He appears well-developed and well-nourished.   HENT:   Head: Normocephalic.   Eyes: Conjunctivae are normal.   Neck: Normal range of motion. Neck supple. No tracheal deviation present. No thyromegaly present.   Cardiovascular: Normal rate and normal heart sounds.    Pulmonary/Chest: Effort normal and breath sounds normal. No respiratory distress. He has no wheezes.   Abdominal: Soft. Bowel sounds are normal. There is no hepatosplenomegaly. There is no tenderness. There is no rebound and no CVA tenderness. No hernia.   Musculoskeletal: Normal range of motion. He exhibits no edema or tenderness.   Lymphadenopathy:     He has no cervical adenopathy.   Neurological: He is alert and oriented to person, place, and time.   Skin: Skin is warm and dry. No rash noted. No erythema.     Psychiatric: He has a normal mood and affect. His behavior is normal. Judgment and thought content normal.       Urine dipstick shows negative for all components.  Micro exam: negative for WBC's or RBC's.    Assessment:       1. Low testosterone in male    2. BPH without obstruction/lower urinary tract symptoms          Plan:       1. Low testosterone in male  We reviewed how to inject.  I explained to him that he is at a very high dose, but it seems that he is injecting IM and not SQ.  His labs look fine.      Follow up in 6 months with labs with Dr. Bentley    2. BPH without obstruction/lower urinary tract symptoms  No issues            Follow up in about 6 months (around 12/10/2020) for Follow up, Review labs.      "

## 2020-08-13 RX ORDER — METFORMIN HYDROCHLORIDE 1000 MG/1
TABLET ORAL
Qty: 180 TABLET | Refills: 0 | Status: SHIPPED | OUTPATIENT
Start: 2020-08-13 | End: 2021-10-28

## 2020-08-13 RX ORDER — GLIMEPIRIDE 2 MG/1
2 TABLET ORAL
Qty: 90 TABLET | Refills: 3 | Status: SHIPPED | OUTPATIENT
Start: 2020-08-13 | End: 2021-10-05 | Stop reason: SDUPTHER

## 2020-09-29 ENCOUNTER — PATIENT MESSAGE (OUTPATIENT)
Dept: OTHER | Facility: OTHER | Age: 45
End: 2020-09-29

## 2020-10-05 ENCOUNTER — PATIENT MESSAGE (OUTPATIENT)
Dept: ADMINISTRATIVE | Facility: HOSPITAL | Age: 45
End: 2020-10-05

## 2020-12-23 DIAGNOSIS — E11.9 TYPE 2 DIABETES MELLITUS WITHOUT COMPLICATION: ICD-10-CM

## 2021-01-04 ENCOUNTER — PATIENT MESSAGE (OUTPATIENT)
Dept: ADMINISTRATIVE | Facility: HOSPITAL | Age: 46
End: 2021-01-04

## 2021-02-03 DIAGNOSIS — R79.89 LOW TESTOSTERONE IN MALE: ICD-10-CM

## 2021-02-03 RX ORDER — TESTOSTERONE CYPIONATE 200 MG/ML
200 INJECTION, SOLUTION INTRAMUSCULAR
Qty: 10 ML | Refills: 4 | Status: SHIPPED | OUTPATIENT
Start: 2021-02-04 | End: 2021-02-11 | Stop reason: SDUPTHER

## 2021-02-10 ENCOUNTER — LAB VISIT (OUTPATIENT)
Dept: LAB | Facility: OTHER | Age: 46
End: 2021-02-10
Attending: UROLOGY
Payer: COMMERCIAL

## 2021-02-10 DIAGNOSIS — R79.89 LOW TESTOSTERONE IN MALE: ICD-10-CM

## 2021-02-10 LAB
ALBUMIN SERPL BCP-MCNC: 4.1 G/DL (ref 3.5–5.2)
ALP SERPL-CCNC: 146 U/L (ref 55–135)
ALT SERPL W/O P-5'-P-CCNC: 25 U/L (ref 10–44)
AST SERPL-CCNC: 21 U/L (ref 10–40)
BASOPHILS # BLD AUTO: 0.03 K/UL (ref 0–0.2)
BASOPHILS NFR BLD: 0.5 % (ref 0–1.9)
BILIRUB DIRECT SERPL-MCNC: 0.3 MG/DL (ref 0.1–0.3)
BILIRUB SERPL-MCNC: 0.7 MG/DL (ref 0.1–1)
CHOLEST SERPL-MCNC: 196 MG/DL (ref 120–199)
CHOLEST/HDLC SERPL: 3.8 {RATIO} (ref 2–5)
COMPLEXED PSA SERPL-MCNC: 0.53 NG/ML (ref 0–4)
DIFFERENTIAL METHOD: ABNORMAL
EOSINOPHIL # BLD AUTO: 0.1 K/UL (ref 0–0.5)
EOSINOPHIL NFR BLD: 1.1 % (ref 0–8)
ERYTHROCYTE [DISTWIDTH] IN BLOOD BY AUTOMATED COUNT: 12.9 % (ref 11.5–14.5)
HCT VFR BLD AUTO: 40.7 % (ref 40–54)
HDLC SERPL-MCNC: 52 MG/DL (ref 40–75)
HDLC SERPL: 26.5 % (ref 20–50)
HGB BLD-MCNC: 13.7 G/DL (ref 14–18)
IMM GRANULOCYTES # BLD AUTO: 0.02 K/UL (ref 0–0.04)
IMM GRANULOCYTES NFR BLD AUTO: 0.4 % (ref 0–0.5)
LDLC SERPL CALC-MCNC: 102.6 MG/DL (ref 63–159)
LYMPHOCYTES # BLD AUTO: 1.8 K/UL (ref 1–4.8)
LYMPHOCYTES NFR BLD: 33 % (ref 18–48)
MCH RBC QN AUTO: 28.7 PG (ref 27–31)
MCHC RBC AUTO-ENTMCNC: 33.7 G/DL (ref 32–36)
MCV RBC AUTO: 85 FL (ref 82–98)
MONOCYTES # BLD AUTO: 0.5 K/UL (ref 0.3–1)
MONOCYTES NFR BLD: 9.9 % (ref 4–15)
NEUTROPHILS # BLD AUTO: 3 K/UL (ref 1.8–7.7)
NEUTROPHILS NFR BLD: 55.1 % (ref 38–73)
NONHDLC SERPL-MCNC: 144 MG/DL
NRBC BLD-RTO: 0 /100 WBC
PLATELET # BLD AUTO: 234 K/UL (ref 150–350)
PMV BLD AUTO: 10 FL (ref 9.2–12.9)
PROT SERPL-MCNC: 7.5 G/DL (ref 6–8.4)
RBC # BLD AUTO: 4.78 M/UL (ref 4.6–6.2)
TESTOST SERPL-MCNC: 421 NG/DL (ref 304–1227)
TRIGL SERPL-MCNC: 207 MG/DL (ref 30–150)
WBC # BLD AUTO: 5.48 K/UL (ref 3.9–12.7)

## 2021-02-10 PROCEDURE — 80061 LIPID PANEL: CPT

## 2021-02-10 PROCEDURE — 80076 HEPATIC FUNCTION PANEL: CPT

## 2021-02-10 PROCEDURE — 84153 ASSAY OF PSA TOTAL: CPT

## 2021-02-10 PROCEDURE — 36415 COLL VENOUS BLD VENIPUNCTURE: CPT

## 2021-02-10 PROCEDURE — 85025 COMPLETE CBC W/AUTO DIFF WBC: CPT

## 2021-02-10 PROCEDURE — 84403 ASSAY OF TOTAL TESTOSTERONE: CPT

## 2021-02-11 ENCOUNTER — OFFICE VISIT (OUTPATIENT)
Dept: UROLOGY | Facility: CLINIC | Age: 46
End: 2021-02-11
Payer: COMMERCIAL

## 2021-02-11 ENCOUNTER — PATIENT OUTREACH (OUTPATIENT)
Dept: ADMINISTRATIVE | Facility: OTHER | Age: 46
End: 2021-02-11

## 2021-02-11 VITALS — HEIGHT: 68 IN | WEIGHT: 159 LBS | BODY MASS INDEX: 24.1 KG/M2

## 2021-02-11 DIAGNOSIS — N41.1 CHRONIC PROSTATITIS: ICD-10-CM

## 2021-02-11 DIAGNOSIS — N40.0 BPH WITHOUT OBSTRUCTION/LOWER URINARY TRACT SYMPTOMS: ICD-10-CM

## 2021-02-11 DIAGNOSIS — R79.89 LOW TESTOSTERONE IN MALE: Primary | ICD-10-CM

## 2021-02-11 PROCEDURE — 99213 PR OFFICE/OUTPT VISIT, EST, LEVL III, 20-29 MIN: ICD-10-PCS | Mod: S$GLB,,, | Performed by: UROLOGY

## 2021-02-11 PROCEDURE — 1126F AMNT PAIN NOTED NONE PRSNT: CPT | Mod: S$GLB,,, | Performed by: UROLOGY

## 2021-02-11 PROCEDURE — 3008F BODY MASS INDEX DOCD: CPT | Mod: CPTII,S$GLB,, | Performed by: UROLOGY

## 2021-02-11 PROCEDURE — 1126F PR PAIN SEVERITY QUANTIFIED, NO PAIN PRESENT: ICD-10-PCS | Mod: S$GLB,,, | Performed by: UROLOGY

## 2021-02-11 PROCEDURE — 99999 PR PBB SHADOW E&M-EST. PATIENT-LVL III: CPT | Mod: PBBFAC,,, | Performed by: UROLOGY

## 2021-02-11 PROCEDURE — 3008F PR BODY MASS INDEX (BMI) DOCUMENTED: ICD-10-PCS | Mod: CPTII,S$GLB,, | Performed by: UROLOGY

## 2021-02-11 PROCEDURE — 99999 PR PBB SHADOW E&M-EST. PATIENT-LVL III: ICD-10-PCS | Mod: PBBFAC,,, | Performed by: UROLOGY

## 2021-02-11 PROCEDURE — 99213 OFFICE O/P EST LOW 20 MIN: CPT | Mod: S$GLB,,, | Performed by: UROLOGY

## 2021-02-11 RX ORDER — TESTOSTERONE CYPIONATE 200 MG/ML
200 INJECTION, SOLUTION INTRAMUSCULAR
Qty: 10 ML | Refills: 5 | Status: SHIPPED | OUTPATIENT
Start: 2021-02-11 | End: 2021-08-24

## 2021-02-11 RX ORDER — SYRINGE W-NEEDLE,DISPOSAB,3 ML 25GX5/8"
SYRINGE, EMPTY DISPOSABLE MISCELLANEOUS
Qty: 50 SYRINGE | Refills: 11 | Status: SHIPPED | OUTPATIENT
Start: 2021-02-11 | End: 2021-10-28

## 2021-03-10 DIAGNOSIS — E11.9 TYPE 2 DIABETES MELLITUS WITHOUT COMPLICATION: ICD-10-CM

## 2021-07-06 ENCOUNTER — PATIENT MESSAGE (OUTPATIENT)
Dept: ADMINISTRATIVE | Facility: HOSPITAL | Age: 46
End: 2021-07-06

## 2021-08-06 ENCOUNTER — IMMUNIZATION (OUTPATIENT)
Dept: INTERNAL MEDICINE | Facility: CLINIC | Age: 46
End: 2021-08-06
Payer: COMMERCIAL

## 2021-08-06 DIAGNOSIS — Z23 NEED FOR VACCINATION: Primary | ICD-10-CM

## 2021-08-06 PROCEDURE — 0001A COVID-19, MRNA, LNP-S, PF, 30 MCG/0.3 ML DOSE VACCINE: CPT | Mod: CV19,,, | Performed by: INTERNAL MEDICINE

## 2021-08-06 PROCEDURE — 0001A COVID-19, MRNA, LNP-S, PF, 30 MCG/0.3 ML DOSE VACCINE: ICD-10-PCS | Mod: CV19,,, | Performed by: INTERNAL MEDICINE

## 2021-08-06 PROCEDURE — 91300 COVID-19, MRNA, LNP-S, PF, 30 MCG/0.3 ML DOSE VACCINE: ICD-10-PCS | Mod: ,,, | Performed by: INTERNAL MEDICINE

## 2021-08-06 PROCEDURE — 91300 COVID-19, MRNA, LNP-S, PF, 30 MCG/0.3 ML DOSE VACCINE: CPT | Mod: ,,, | Performed by: INTERNAL MEDICINE

## 2021-08-23 DIAGNOSIS — R79.89 LOW TESTOSTERONE IN MALE: ICD-10-CM

## 2021-08-24 RX ORDER — TESTOSTERONE CYPIONATE 200 MG/ML
200 INJECTION, SOLUTION INTRAMUSCULAR
Qty: 8 ML | Refills: 5 | Status: SHIPPED | OUTPATIENT
Start: 2021-08-26 | End: 2022-03-07

## 2021-08-27 ENCOUNTER — IMMUNIZATION (OUTPATIENT)
Dept: INTERNAL MEDICINE | Facility: CLINIC | Age: 46
End: 2021-08-27
Payer: COMMERCIAL

## 2021-08-27 DIAGNOSIS — Z23 NEED FOR VACCINATION: Primary | ICD-10-CM

## 2021-08-27 PROCEDURE — 91300 COVID-19, MRNA, LNP-S, PF, 30 MCG/0.3 ML DOSE VACCINE: CPT | Mod: ,,, | Performed by: INTERNAL MEDICINE

## 2021-08-27 PROCEDURE — 91300 COVID-19, MRNA, LNP-S, PF, 30 MCG/0.3 ML DOSE VACCINE: ICD-10-PCS | Mod: ,,, | Performed by: INTERNAL MEDICINE

## 2021-08-27 PROCEDURE — 0002A COVID-19, MRNA, LNP-S, PF, 30 MCG/0.3 ML DOSE VACCINE: CPT | Mod: CV19,,, | Performed by: INTERNAL MEDICINE

## 2021-08-27 PROCEDURE — 0002A COVID-19, MRNA, LNP-S, PF, 30 MCG/0.3 ML DOSE VACCINE: ICD-10-PCS | Mod: CV19,,, | Performed by: INTERNAL MEDICINE

## 2021-10-04 ENCOUNTER — TELEPHONE (OUTPATIENT)
Dept: PRIMARY CARE CLINIC | Facility: CLINIC | Age: 46
End: 2021-10-04

## 2021-10-06 RX ORDER — GLIMEPIRIDE 2 MG/1
2 TABLET ORAL
Qty: 90 TABLET | Refills: 0 | Status: SHIPPED | OUTPATIENT
Start: 2021-10-06 | End: 2021-10-28 | Stop reason: SDUPTHER

## 2021-10-21 ENCOUNTER — LAB VISIT (OUTPATIENT)
Dept: LAB | Facility: OTHER | Age: 46
End: 2021-10-21
Attending: INTERNAL MEDICINE
Payer: COMMERCIAL

## 2021-10-21 DIAGNOSIS — E11.9 TYPE 2 DIABETES MELLITUS WITHOUT COMPLICATION: ICD-10-CM

## 2021-10-21 DIAGNOSIS — R79.89 LOW TESTOSTERONE IN MALE: ICD-10-CM

## 2021-10-21 LAB
BASOPHILS # BLD AUTO: 0.03 K/UL (ref 0–0.2)
BASOPHILS NFR BLD: 0.5 % (ref 0–1.9)
CHOLEST SERPL-MCNC: 201 MG/DL (ref 120–199)
CHOLEST/HDLC SERPL: 4.3 {RATIO} (ref 2–5)
COMPLEXED PSA SERPL-MCNC: 0.74 NG/ML (ref 0–4)
DIFFERENTIAL METHOD: ABNORMAL
EOSINOPHIL # BLD AUTO: 0.1 K/UL (ref 0–0.5)
EOSINOPHIL NFR BLD: 1.8 % (ref 0–8)
ERYTHROCYTE [DISTWIDTH] IN BLOOD BY AUTOMATED COUNT: 11.7 % (ref 11.5–14.5)
ESTIMATED AVG GLUCOSE: ABNORMAL MG/DL (ref 68–131)
HBA1C MFR BLD: >14 % (ref 4–5.6)
HCT VFR BLD AUTO: 43.9 % (ref 40–54)
HDLC SERPL-MCNC: 47 MG/DL (ref 40–75)
HDLC SERPL: 23.4 % (ref 20–50)
HGB BLD-MCNC: 15.1 G/DL (ref 14–18)
IMM GRANULOCYTES # BLD AUTO: 0.01 K/UL (ref 0–0.04)
IMM GRANULOCYTES NFR BLD AUTO: 0.2 % (ref 0–0.5)
LDLC SERPL CALC-MCNC: 142.8 MG/DL (ref 63–159)
LYMPHOCYTES # BLD AUTO: 1.9 K/UL (ref 1–4.8)
LYMPHOCYTES NFR BLD: 33.2 % (ref 18–48)
MCH RBC QN AUTO: 29.5 PG (ref 27–31)
MCHC RBC AUTO-ENTMCNC: 34.4 G/DL (ref 32–36)
MCV RBC AUTO: 86 FL (ref 82–98)
MONOCYTES # BLD AUTO: 0.9 K/UL (ref 0.3–1)
MONOCYTES NFR BLD: 15.1 % (ref 4–15)
NEUTROPHILS # BLD AUTO: 2.8 K/UL (ref 1.8–7.7)
NEUTROPHILS NFR BLD: 49.2 % (ref 38–73)
NONHDLC SERPL-MCNC: 154 MG/DL
NRBC BLD-RTO: 0 /100 WBC
PLATELET # BLD AUTO: 232 K/UL (ref 150–450)
PMV BLD AUTO: 9.6 FL (ref 9.2–12.9)
RBC # BLD AUTO: 5.12 M/UL (ref 4.6–6.2)
TESTOST SERPL-MCNC: >1500 NG/DL (ref 304–1227)
TRIGL SERPL-MCNC: 56 MG/DL (ref 30–150)
WBC # BLD AUTO: 5.63 K/UL (ref 3.9–12.7)

## 2021-10-21 PROCEDURE — 85025 COMPLETE CBC W/AUTO DIFF WBC: CPT | Performed by: UROLOGY

## 2021-10-21 PROCEDURE — 84403 ASSAY OF TOTAL TESTOSTERONE: CPT | Performed by: UROLOGY

## 2021-10-21 PROCEDURE — 36415 COLL VENOUS BLD VENIPUNCTURE: CPT | Performed by: UROLOGY

## 2021-10-21 PROCEDURE — 84153 ASSAY OF PSA TOTAL: CPT | Performed by: UROLOGY

## 2021-10-21 PROCEDURE — 83036 HEMOGLOBIN GLYCOSYLATED A1C: CPT | Performed by: INTERNAL MEDICINE

## 2021-10-21 PROCEDURE — 80061 LIPID PANEL: CPT | Performed by: UROLOGY

## 2021-10-28 ENCOUNTER — OFFICE VISIT (OUTPATIENT)
Dept: PRIMARY CARE CLINIC | Facility: CLINIC | Age: 46
End: 2021-10-28
Payer: COMMERCIAL

## 2021-10-28 VITALS
OXYGEN SATURATION: 98 % | HEART RATE: 101 BPM | RESPIRATION RATE: 18 BRPM | DIASTOLIC BLOOD PRESSURE: 80 MMHG | SYSTOLIC BLOOD PRESSURE: 126 MMHG | WEIGHT: 158.06 LBS | BODY MASS INDEX: 23.95 KG/M2 | HEIGHT: 68 IN

## 2021-10-28 DIAGNOSIS — E11.65 UNCONTROLLED TYPE 2 DIABETES MELLITUS WITH HYPERGLYCEMIA: ICD-10-CM

## 2021-10-28 DIAGNOSIS — Z13.220 ENCOUNTER FOR LIPID SCREENING FOR CARDIOVASCULAR DISEASE: ICD-10-CM

## 2021-10-28 DIAGNOSIS — Z13.6 ENCOUNTER FOR LIPID SCREENING FOR CARDIOVASCULAR DISEASE: ICD-10-CM

## 2021-10-28 DIAGNOSIS — Z00.00 ANNUAL PHYSICAL EXAM: ICD-10-CM

## 2021-10-28 DIAGNOSIS — E11.9 TYPE 2 DIABETES MELLITUS WITHOUT COMPLICATION, WITHOUT LONG-TERM CURRENT USE OF INSULIN: Primary | ICD-10-CM

## 2021-10-28 DIAGNOSIS — G47.429 NARCOLEPSY DUE TO UNDERLYING CONDITION WITHOUT CATAPLEXY: ICD-10-CM

## 2021-10-28 PROCEDURE — 3079F DIAST BP 80-89 MM HG: CPT | Mod: CPTII,S$GLB,, | Performed by: INTERNAL MEDICINE

## 2021-10-28 PROCEDURE — 99999 PR PBB SHADOW E&M-EST. PATIENT-LVL IV: CPT | Mod: PBBFAC,,, | Performed by: INTERNAL MEDICINE

## 2021-10-28 PROCEDURE — 3008F BODY MASS INDEX DOCD: CPT | Mod: CPTII,S$GLB,, | Performed by: INTERNAL MEDICINE

## 2021-10-28 PROCEDURE — 3046F PR MOST RECENT HEMOGLOBIN A1C LEVEL > 9.0%: ICD-10-PCS | Mod: CPTII,S$GLB,, | Performed by: INTERNAL MEDICINE

## 2021-10-28 PROCEDURE — 1160F PR REVIEW ALL MEDS BY PRESCRIBER/CLIN PHARMACIST DOCUMENTED: ICD-10-PCS | Mod: CPTII,S$GLB,, | Performed by: INTERNAL MEDICINE

## 2021-10-28 PROCEDURE — 99214 OFFICE O/P EST MOD 30 MIN: CPT | Mod: S$GLB,,, | Performed by: INTERNAL MEDICINE

## 2021-10-28 PROCEDURE — 99999 PR PBB SHADOW E&M-EST. PATIENT-LVL IV: ICD-10-PCS | Mod: PBBFAC,,, | Performed by: INTERNAL MEDICINE

## 2021-10-28 PROCEDURE — 3008F PR BODY MASS INDEX (BMI) DOCUMENTED: ICD-10-PCS | Mod: CPTII,S$GLB,, | Performed by: INTERNAL MEDICINE

## 2021-10-28 PROCEDURE — 1160F RVW MEDS BY RX/DR IN RCRD: CPT | Mod: CPTII,S$GLB,, | Performed by: INTERNAL MEDICINE

## 2021-10-28 PROCEDURE — 3074F SYST BP LT 130 MM HG: CPT | Mod: CPTII,S$GLB,, | Performed by: INTERNAL MEDICINE

## 2021-10-28 PROCEDURE — 3046F HEMOGLOBIN A1C LEVEL >9.0%: CPT | Mod: CPTII,S$GLB,, | Performed by: INTERNAL MEDICINE

## 2021-10-28 PROCEDURE — 99214 PR OFFICE/OUTPT VISIT, EST, LEVL IV, 30-39 MIN: ICD-10-PCS | Mod: S$GLB,,, | Performed by: INTERNAL MEDICINE

## 2021-10-28 PROCEDURE — 1159F MED LIST DOCD IN RCRD: CPT | Mod: CPTII,S$GLB,, | Performed by: INTERNAL MEDICINE

## 2021-10-28 PROCEDURE — 1159F PR MEDICATION LIST DOCUMENTED IN MEDICAL RECORD: ICD-10-PCS | Mod: CPTII,S$GLB,, | Performed by: INTERNAL MEDICINE

## 2021-10-28 PROCEDURE — 3079F PR MOST RECENT DIASTOLIC BLOOD PRESSURE 80-89 MM HG: ICD-10-PCS | Mod: CPTII,S$GLB,, | Performed by: INTERNAL MEDICINE

## 2021-10-28 PROCEDURE — 3074F PR MOST RECENT SYSTOLIC BLOOD PRESSURE < 130 MM HG: ICD-10-PCS | Mod: CPTII,S$GLB,, | Performed by: INTERNAL MEDICINE

## 2021-10-28 RX ORDER — ATORVASTATIN CALCIUM 20 MG/1
20 TABLET, FILM COATED ORAL DAILY
Qty: 90 TABLET | Refills: 3 | Status: ON HOLD | OUTPATIENT
Start: 2021-10-28 | End: 2023-01-01

## 2021-10-28 RX ORDER — GLIMEPIRIDE 4 MG/1
4 TABLET ORAL
Qty: 90 TABLET | Refills: 3 | Status: SHIPPED | OUTPATIENT
Start: 2021-10-28 | End: 2022-04-20

## 2021-11-09 ENCOUNTER — OFFICE VISIT (OUTPATIENT)
Dept: SLEEP MEDICINE | Facility: CLINIC | Age: 46
End: 2021-11-09
Payer: COMMERCIAL

## 2021-11-09 VITALS
HEIGHT: 68 IN | SYSTOLIC BLOOD PRESSURE: 119 MMHG | BODY MASS INDEX: 23.95 KG/M2 | HEART RATE: 87 BPM | WEIGHT: 158.06 LBS | DIASTOLIC BLOOD PRESSURE: 76 MMHG

## 2021-11-09 DIAGNOSIS — G47.429 NARCOLEPSY DUE TO UNDERLYING CONDITION WITHOUT CATAPLEXY: Primary | ICD-10-CM

## 2021-11-09 DIAGNOSIS — R06.83 SNORING: ICD-10-CM

## 2021-11-09 DIAGNOSIS — G47.10 PERSISTENT DISORDER OF INITIATING OR MAINTAINING WAKEFULNESS: ICD-10-CM

## 2021-11-09 DIAGNOSIS — R35.1 NOCTURIA: ICD-10-CM

## 2021-11-09 DIAGNOSIS — G47.10 HYPERSOMNOLENCE: ICD-10-CM

## 2021-11-09 PROCEDURE — 99204 PR OFFICE/OUTPT VISIT, NEW, LEVL IV, 45-59 MIN: ICD-10-PCS | Mod: S$GLB,,, | Performed by: INTERNAL MEDICINE

## 2021-11-09 PROCEDURE — 1159F MED LIST DOCD IN RCRD: CPT | Mod: CPTII,S$GLB,, | Performed by: INTERNAL MEDICINE

## 2021-11-09 PROCEDURE — 99999 PR PBB SHADOW E&M-EST. PATIENT-LVL III: CPT | Mod: PBBFAC,,, | Performed by: INTERNAL MEDICINE

## 2021-11-09 PROCEDURE — 3078F PR MOST RECENT DIASTOLIC BLOOD PRESSURE < 80 MM HG: ICD-10-PCS | Mod: CPTII,S$GLB,, | Performed by: INTERNAL MEDICINE

## 2021-11-09 PROCEDURE — 3074F PR MOST RECENT SYSTOLIC BLOOD PRESSURE < 130 MM HG: ICD-10-PCS | Mod: CPTII,S$GLB,, | Performed by: INTERNAL MEDICINE

## 2021-11-09 PROCEDURE — 99204 OFFICE O/P NEW MOD 45 MIN: CPT | Mod: S$GLB,,, | Performed by: INTERNAL MEDICINE

## 2021-11-09 PROCEDURE — 3008F PR BODY MASS INDEX (BMI) DOCUMENTED: ICD-10-PCS | Mod: CPTII,S$GLB,, | Performed by: INTERNAL MEDICINE

## 2021-11-09 PROCEDURE — 3046F PR MOST RECENT HEMOGLOBIN A1C LEVEL > 9.0%: ICD-10-PCS | Mod: CPTII,S$GLB,, | Performed by: INTERNAL MEDICINE

## 2021-11-09 PROCEDURE — 3074F SYST BP LT 130 MM HG: CPT | Mod: CPTII,S$GLB,, | Performed by: INTERNAL MEDICINE

## 2021-11-09 PROCEDURE — 3078F DIAST BP <80 MM HG: CPT | Mod: CPTII,S$GLB,, | Performed by: INTERNAL MEDICINE

## 2021-11-09 PROCEDURE — 3008F BODY MASS INDEX DOCD: CPT | Mod: CPTII,S$GLB,, | Performed by: INTERNAL MEDICINE

## 2021-11-09 PROCEDURE — 3046F HEMOGLOBIN A1C LEVEL >9.0%: CPT | Mod: CPTII,S$GLB,, | Performed by: INTERNAL MEDICINE

## 2021-11-09 PROCEDURE — 99999 PR PBB SHADOW E&M-EST. PATIENT-LVL III: ICD-10-PCS | Mod: PBBFAC,,, | Performed by: INTERNAL MEDICINE

## 2021-11-09 PROCEDURE — 1159F PR MEDICATION LIST DOCUMENTED IN MEDICAL RECORD: ICD-10-PCS | Mod: CPTII,S$GLB,, | Performed by: INTERNAL MEDICINE

## 2021-11-11 ENCOUNTER — TELEPHONE (OUTPATIENT)
Dept: SLEEP MEDICINE | Facility: OTHER | Age: 46
End: 2021-11-11
Payer: COMMERCIAL

## 2021-11-11 RX ORDER — GLIMEPIRIDE 2 MG/1
2 TABLET ORAL
Qty: 90 TABLET | Refills: 0 | OUTPATIENT
Start: 2021-11-11 | End: 2022-02-09

## 2021-11-16 ENCOUNTER — TELEPHONE (OUTPATIENT)
Dept: SLEEP MEDICINE | Facility: OTHER | Age: 46
End: 2021-11-16
Payer: COMMERCIAL

## 2021-11-17 ENCOUNTER — HOSPITAL ENCOUNTER (OUTPATIENT)
Dept: SLEEP MEDICINE | Facility: OTHER | Age: 46
Discharge: HOME | End: 2021-11-17
Attending: INTERNAL MEDICINE
Payer: COMMERCIAL

## 2021-11-17 DIAGNOSIS — G47.10 PERSISTENT DISORDER OF INITIATING OR MAINTAINING WAKEFULNESS: ICD-10-CM

## 2021-11-17 DIAGNOSIS — R06.83 SNORING: ICD-10-CM

## 2021-11-17 DIAGNOSIS — R35.1 NOCTURIA: ICD-10-CM

## 2021-11-17 DIAGNOSIS — G47.10 HYPERSOMNOLENCE: ICD-10-CM

## 2021-11-17 DIAGNOSIS — G47.429 NARCOLEPSY DUE TO UNDERLYING CONDITION WITHOUT CATAPLEXY: ICD-10-CM

## 2021-11-17 PROCEDURE — 95800 SLP STDY UNATTENDED: CPT

## 2021-11-17 PROCEDURE — 95806 PR SLEEP STUDY, UNATTENDED, SIMUL RECORD HR/O2 SAT/RESP FLOW/RESP EFFT: ICD-10-PCS | Mod: 26,,, | Performed by: INTERNAL MEDICINE

## 2021-11-17 PROCEDURE — 95806 SLEEP STUDY UNATT&RESP EFFT: CPT | Mod: 26,,, | Performed by: INTERNAL MEDICINE

## 2021-11-22 ENCOUNTER — PATIENT MESSAGE (OUTPATIENT)
Dept: SLEEP MEDICINE | Facility: CLINIC | Age: 46
End: 2021-11-22
Payer: COMMERCIAL

## 2021-12-10 ENCOUNTER — PATIENT MESSAGE (OUTPATIENT)
Dept: SLEEP MEDICINE | Facility: CLINIC | Age: 46
End: 2021-12-10
Payer: COMMERCIAL

## 2021-12-13 DIAGNOSIS — G47.33 OSA (OBSTRUCTIVE SLEEP APNEA): Primary | ICD-10-CM

## 2022-01-10 ENCOUNTER — PATIENT MESSAGE (OUTPATIENT)
Dept: ADMINISTRATIVE | Facility: OTHER | Age: 47
End: 2022-01-10
Payer: COMMERCIAL

## 2022-01-21 ENCOUNTER — PATIENT MESSAGE (OUTPATIENT)
Dept: ADMINISTRATIVE | Facility: HOSPITAL | Age: 47
End: 2022-01-21
Payer: COMMERCIAL

## 2022-03-05 DIAGNOSIS — R79.89 LOW TESTOSTERONE IN MALE: ICD-10-CM

## 2022-03-07 RX ORDER — TESTOSTERONE CYPIONATE 200 MG/ML
200 INJECTION, SOLUTION INTRAMUSCULAR
Qty: 8 ML | Refills: 5 | Status: SHIPPED | OUTPATIENT
Start: 2022-03-07 | End: 2022-09-14

## 2022-03-21 ENCOUNTER — PATIENT MESSAGE (OUTPATIENT)
Dept: ADMINISTRATIVE | Facility: HOSPITAL | Age: 47
End: 2022-03-21
Payer: COMMERCIAL

## 2022-04-20 RX ORDER — GLIMEPIRIDE 2 MG/1
2 TABLET ORAL
Qty: 30 TABLET | Refills: 0 | Status: ON HOLD | OUTPATIENT
Start: 2022-04-20 | End: 2023-01-01

## 2022-04-20 NOTE — TELEPHONE ENCOUNTER
Patient is extremely overdue for blood work, especially an A1C. Patient's last A1C was over a 14. NEEDS AND APPOINTMENT AND LABS ASAP!

## 2022-04-20 NOTE — TELEPHONE ENCOUNTER
Care Due:                  Date            Visit Type   Department     Provider  --------------------------------------------------------------------------------                                EP -                              PRIMARY      [facility]  Last Visit: 10-      CARE ([facility])   PRIMARY CARE   Tyler Castillo  Next Visit: None Scheduled  None         None Found                                                            Last  Test          Frequency    Reason                     Performed    Due Date  --------------------------------------------------------------------------------    CMP.........  12 months..  atorvastatin.............  Not Found    Overdue    Powered by [employer]. Reference number: [id number].   4/20/2022 8:37:19 AM CDT

## 2022-04-21 ENCOUNTER — TELEPHONE (OUTPATIENT)
Dept: PRIMARY CARE CLINIC | Facility: CLINIC | Age: 47
End: 2022-04-21
Payer: COMMERCIAL

## 2022-09-28 ENCOUNTER — PATIENT OUTREACH (OUTPATIENT)
Dept: ADMINISTRATIVE | Facility: HOSPITAL | Age: 47
End: 2022-09-28
Payer: COMMERCIAL

## 2022-09-28 ENCOUNTER — PATIENT MESSAGE (OUTPATIENT)
Dept: ADMINISTRATIVE | Facility: HOSPITAL | Age: 47
End: 2022-09-28
Payer: COMMERCIAL

## 2023-01-01 PROBLEM — R00.2 PALPITATIONS: Status: ACTIVE | Noted: 2023-01-01

## 2023-01-03 PROBLEM — R00.2 PALPITATIONS: Status: RESOLVED | Noted: 2023-01-01 | Resolved: 2023-01-03

## 2023-01-03 PROBLEM — N17.9 AKI (ACUTE KIDNEY INJURY): Status: ACTIVE | Noted: 2023-01-03

## 2023-01-03 PROBLEM — N17.9 AKI (ACUTE KIDNEY INJURY): Status: RESOLVED | Noted: 2023-01-03 | Resolved: 2023-01-03

## 2023-01-04 ENCOUNTER — PATIENT OUTREACH (OUTPATIENT)
Dept: ADMINISTRATIVE | Facility: CLINIC | Age: 48
End: 2023-01-04
Payer: COMMERCIAL

## 2023-01-04 NOTE — PROGRESS NOTES
C3 nurse spoke with Jason Sandra  for a TCC post hospital discharge follow up call. The patient has a scheduled HOSFU appointment with Tyler Castillo MD  on 01/13/2023 @ 2825.    PLEASE NOTE HOSP F/U SHOULD BE WITHIN 5-7 DAYS POST DISCHARGE DATE.    DECLINE NP

## 2023-01-13 ENCOUNTER — OFFICE VISIT (OUTPATIENT)
Dept: PRIMARY CARE CLINIC | Facility: CLINIC | Age: 48
End: 2023-01-13
Payer: COMMERCIAL

## 2023-01-13 VITALS
HEIGHT: 66 IN | HEART RATE: 93 BPM | DIASTOLIC BLOOD PRESSURE: 72 MMHG | RESPIRATION RATE: 18 BRPM | WEIGHT: 147.63 LBS | SYSTOLIC BLOOD PRESSURE: 108 MMHG | BODY MASS INDEX: 23.72 KG/M2 | OXYGEN SATURATION: 99 %

## 2023-01-13 DIAGNOSIS — E11.10 DIABETIC KETOACIDOSIS WITHOUT COMA ASSOCIATED WITH TYPE 2 DIABETES MELLITUS: Primary | ICD-10-CM

## 2023-01-13 DIAGNOSIS — Z13.6 ENCOUNTER FOR LIPID SCREENING FOR CARDIOVASCULAR DISEASE: ICD-10-CM

## 2023-01-13 DIAGNOSIS — E11.65 UNCONTROLLED TYPE 2 DIABETES MELLITUS WITH HYPERGLYCEMIA: ICD-10-CM

## 2023-01-13 DIAGNOSIS — Z11.59 NEED FOR HEPATITIS C SCREENING TEST: ICD-10-CM

## 2023-01-13 DIAGNOSIS — Z13.220 ENCOUNTER FOR LIPID SCREENING FOR CARDIOVASCULAR DISEASE: ICD-10-CM

## 2023-01-13 PROCEDURE — 1160F RVW MEDS BY RX/DR IN RCRD: CPT | Mod: CPTII,S$GLB,, | Performed by: INTERNAL MEDICINE

## 2023-01-13 PROCEDURE — 3074F PR MOST RECENT SYSTOLIC BLOOD PRESSURE < 130 MM HG: ICD-10-PCS | Mod: CPTII,S$GLB,, | Performed by: INTERNAL MEDICINE

## 2023-01-13 PROCEDURE — 3008F PR BODY MASS INDEX (BMI) DOCUMENTED: ICD-10-PCS | Mod: CPTII,S$GLB,, | Performed by: INTERNAL MEDICINE

## 2023-01-13 PROCEDURE — 1111F DSCHRG MED/CURRENT MED MERGE: CPT | Mod: CPTII,S$GLB,, | Performed by: INTERNAL MEDICINE

## 2023-01-13 PROCEDURE — 1159F MED LIST DOCD IN RCRD: CPT | Mod: CPTII,S$GLB,, | Performed by: INTERNAL MEDICINE

## 2023-01-13 PROCEDURE — 99999 PR PBB SHADOW E&M-EST. PATIENT-LVL V: ICD-10-PCS | Mod: PBBFAC,,, | Performed by: INTERNAL MEDICINE

## 2023-01-13 PROCEDURE — 1159F PR MEDICATION LIST DOCUMENTED IN MEDICAL RECORD: ICD-10-PCS | Mod: CPTII,S$GLB,, | Performed by: INTERNAL MEDICINE

## 2023-01-13 PROCEDURE — 3078F PR MOST RECENT DIASTOLIC BLOOD PRESSURE < 80 MM HG: ICD-10-PCS | Mod: CPTII,S$GLB,, | Performed by: INTERNAL MEDICINE

## 2023-01-13 PROCEDURE — 99214 OFFICE O/P EST MOD 30 MIN: CPT | Mod: S$GLB,,, | Performed by: INTERNAL MEDICINE

## 2023-01-13 PROCEDURE — 3074F SYST BP LT 130 MM HG: CPT | Mod: CPTII,S$GLB,, | Performed by: INTERNAL MEDICINE

## 2023-01-13 PROCEDURE — 3078F DIAST BP <80 MM HG: CPT | Mod: CPTII,S$GLB,, | Performed by: INTERNAL MEDICINE

## 2023-01-13 PROCEDURE — 3008F BODY MASS INDEX DOCD: CPT | Mod: CPTII,S$GLB,, | Performed by: INTERNAL MEDICINE

## 2023-01-13 PROCEDURE — 1160F PR REVIEW ALL MEDS BY PRESCRIBER/CLIN PHARMACIST DOCUMENTED: ICD-10-PCS | Mod: CPTII,S$GLB,, | Performed by: INTERNAL MEDICINE

## 2023-01-13 PROCEDURE — 99214 PR OFFICE/OUTPT VISIT, EST, LEVL IV, 30-39 MIN: ICD-10-PCS | Mod: S$GLB,,, | Performed by: INTERNAL MEDICINE

## 2023-01-13 PROCEDURE — 1111F PR DISCHARGE MEDS RECONCILED W/ CURRENT OUTPATIENT MED LIST: ICD-10-PCS | Mod: CPTII,S$GLB,, | Performed by: INTERNAL MEDICINE

## 2023-01-13 PROCEDURE — 99999 PR PBB SHADOW E&M-EST. PATIENT-LVL V: CPT | Mod: PBBFAC,,, | Performed by: INTERNAL MEDICINE

## 2023-01-13 NOTE — LETTER
January 13, 2023      [facility] [address], [address]  [place] [address]  Phone: [phone]  Fax: [phone]       Patient: Jason Sandra   YOB: 1975  Date of Visit: 01/13/2023    To Whom It May Concern:    Nicky Sandra  was at [facility] on 01/13/2023. The patient may return to work/school on 1/14/2023 with no restrictions. If you have any questions or concerns, or if I can be of further assistance, please do not hesitate to contact me.    Sincerely,    Tyler Castillo MD

## 2023-01-16 NOTE — PROGRESS NOTES
Subjective:       Patient ID: Jason Sandra is a 47 y.o. male.    Chief Complaint: Hospital Follow Up    HPI visit today for follow-up of hospitalization review hospital records he has history a diabetes mellitus he has not been follow the diet or take any medication and has been working very hard since his family is moving he went into DKA requiring hospitalization for 3 day DKA resolved patient is doing much better now physically tolerate p.o. diet no nausea vomiting and all Levemir he currently deny any physical symptoms he has appointment with endocrinology he is doing Accu-Chek twice a day and glucose level has been going down better but not below 150 before meal yet  Review of Systems   Constitutional:  Negative for unexpected weight change.   Respiratory:  Negative for shortness of breath.    Cardiovascular:  Negative for chest pain.   Gastrointestinal:  Negative for abdominal pain.   Musculoskeletal:  Negative for back pain.   Psychiatric/Behavioral:  Negative for dysphoric mood.      Objective:      Physical Exam  Vitals and nursing note reviewed.   Constitutional:       General: He is not in acute distress.     Appearance: He is well-developed.   HENT:      Head: Normocephalic and atraumatic.      Right Ear: External ear normal.      Left Ear: External ear normal.      Nose: Nose normal.      Mouth/Throat:      Pharynx: No oropharyngeal exudate.   Eyes:      Extraocular Movements: Extraocular movements intact.      Conjunctiva/sclera: Conjunctivae normal.      Pupils: Pupils are equal, round, and reactive to light.   Neck:      Thyroid: No thyromegaly.   Cardiovascular:      Rate and Rhythm: Normal rate and regular rhythm.      Heart sounds: Normal heart sounds. No murmur heard.    No friction rub. No gallop.   Pulmonary:      Effort: Pulmonary effort is normal. No respiratory distress.      Breath sounds: Normal breath sounds. No wheezing.   Abdominal:      General: Bowel sounds are normal. There is  "no distension.      Palpations: Abdomen is soft.      Tenderness: There is no abdominal tenderness.   Musculoskeletal:         General: No tenderness or deformity. Normal range of motion.      Cervical back: Normal range of motion and neck supple.   Lymphadenopathy:      Cervical: No cervical adenopathy.   Skin:     General: Skin is warm and dry.      Findings: No erythema or rash.   Neurological:      Mental Status: He is alert and oriented to person, place, and time.   Psychiatric:         Thought Content: Thought content normal.         Judgment: Judgment normal.       Assessment:       1. Need for hepatitis C screening test    2. Uncontrolled type 2 diabetes mellitus with hyperglycemia          Plan:       Need for hepatitis C screening test  -     HEPATITIS C RNA, QUANTITATIVE, PCR; Future; Expected date: 01/13/2023  -     HIV 1/2 Ag/Ab (4th Gen); Future; Expected date: 01/13/2023    Uncontrolled type 2 diabetes mellitus with hyperglycemia  -     Ambulatory referral/consult to Endocrinology; Future; Expected date: 01/14/2023        Medication List with Changes/Refills   Current Medications    BLOOD SUGAR DIAGNOSTIC STRP    To check BG 2 times daily, to use with insurance preferred meter    BLOOD-GLUCOSE METER KIT    To check BG 2 times daily, to use with insurance preferred meter    INSULIN DETEMIR U-100 (LEVEMIR FLEXTOUCH U-100 INSULN) 100 UNIT/ML (3 ML) INPN PEN    Inject 20 Units into the skin every evening.    KETOCONAZOLE (NIZORAL) 2 % SHAMPOO    apply to scalp in place of regular shampoo 2-3x per week    LANCETS MISC    To check BG 2 times daily, to use with insurance preferred meter    PEN NEEDLE, DIABETIC 32 GAUGE X 5/32" NDLE    1 each by Misc.(Non-Drug; Combo Route) route Daily.    TESTOSTERONE CYPIONATE (DEPOTESTOTERONE CYPIONATE) 200 MG/ML INJECTION    INJECT 1 ML (200 MG TOTAL) INTO THE MUSCLE TWICE A WEEK.        " Home

## 2023-01-17 ENCOUNTER — OFFICE VISIT (OUTPATIENT)
Dept: DIABETES | Facility: CLINIC | Age: 48
End: 2023-01-17
Payer: COMMERCIAL

## 2023-01-17 VITALS
OXYGEN SATURATION: 98 % | SYSTOLIC BLOOD PRESSURE: 108 MMHG | DIASTOLIC BLOOD PRESSURE: 70 MMHG | HEART RATE: 92 BPM | HEIGHT: 66 IN | BODY MASS INDEX: 23.67 KG/M2 | WEIGHT: 147.31 LBS

## 2023-01-17 DIAGNOSIS — E10.9 KETOSIS PRONE DIABETES: ICD-10-CM

## 2023-01-17 DIAGNOSIS — E11.65 UNCONTROLLED TYPE 2 DIABETES MELLITUS WITH HYPERGLYCEMIA: ICD-10-CM

## 2023-01-17 DIAGNOSIS — E11.65 TYPE 2 DIABETES MELLITUS WITH HYPERGLYCEMIA, WITH LONG-TERM CURRENT USE OF INSULIN: ICD-10-CM

## 2023-01-17 DIAGNOSIS — Z79.4 TYPE 2 DIABETES MELLITUS WITH HYPERGLYCEMIA, WITH LONG-TERM CURRENT USE OF INSULIN: ICD-10-CM

## 2023-01-17 DIAGNOSIS — E78.5 DYSLIPIDEMIA, GOAL LDL BELOW 100: ICD-10-CM

## 2023-01-17 DIAGNOSIS — E10.9 INSULIN DEPENDENT DIABETES MELLITUS TYPE IA: ICD-10-CM

## 2023-01-17 DIAGNOSIS — Z71.9 HEALTH EDUCATION/COUNSELING: ICD-10-CM

## 2023-01-17 DIAGNOSIS — R79.89 LOW SERUM C-PEPTIDE: ICD-10-CM

## 2023-01-17 DIAGNOSIS — E10.65 TYPE 1 DIABETES MELLITUS WITH HYPERGLYCEMIA: Primary | ICD-10-CM

## 2023-01-17 LAB — GLUCOSE SERPL-MCNC: 378 MG/DL (ref 70–110)

## 2023-01-17 PROCEDURE — 3066F PR DOCUMENTATION OF TREATMENT FOR NEPHROPATHY: ICD-10-PCS | Mod: CPTII,S$GLB,, | Performed by: NURSE PRACTITIONER

## 2023-01-17 PROCEDURE — 99999 PR PBB SHADOW E&M-EST. PATIENT-LVL V: CPT | Mod: PBBFAC,,, | Performed by: NURSE PRACTITIONER

## 2023-01-17 PROCEDURE — 3008F PR BODY MASS INDEX (BMI) DOCUMENTED: ICD-10-PCS | Mod: CPTII,S$GLB,, | Performed by: NURSE PRACTITIONER

## 2023-01-17 PROCEDURE — 3078F DIAST BP <80 MM HG: CPT | Mod: CPTII,S$GLB,, | Performed by: NURSE PRACTITIONER

## 2023-01-17 PROCEDURE — 1160F RVW MEDS BY RX/DR IN RCRD: CPT | Mod: CPTII,S$GLB,, | Performed by: NURSE PRACTITIONER

## 2023-01-17 PROCEDURE — 3008F BODY MASS INDEX DOCD: CPT | Mod: CPTII,S$GLB,, | Performed by: NURSE PRACTITIONER

## 2023-01-17 PROCEDURE — 3061F PR NEG MICROALBUMINURIA RESULT DOCUMENTED/REVIEW: ICD-10-PCS | Mod: CPTII,S$GLB,, | Performed by: NURSE PRACTITIONER

## 2023-01-17 PROCEDURE — 3078F PR MOST RECENT DIASTOLIC BLOOD PRESSURE < 80 MM HG: ICD-10-PCS | Mod: CPTII,S$GLB,, | Performed by: NURSE PRACTITIONER

## 2023-01-17 PROCEDURE — 1159F MED LIST DOCD IN RCRD: CPT | Mod: CPTII,S$GLB,, | Performed by: NURSE PRACTITIONER

## 2023-01-17 PROCEDURE — 99205 PR OFFICE/OUTPT VISIT, NEW, LEVL V, 60-74 MIN: ICD-10-PCS | Mod: S$GLB,,, | Performed by: NURSE PRACTITIONER

## 2023-01-17 PROCEDURE — 3074F SYST BP LT 130 MM HG: CPT | Mod: CPTII,S$GLB,, | Performed by: NURSE PRACTITIONER

## 2023-01-17 PROCEDURE — 3074F PR MOST RECENT SYSTOLIC BLOOD PRESSURE < 130 MM HG: ICD-10-PCS | Mod: CPTII,S$GLB,, | Performed by: NURSE PRACTITIONER

## 2023-01-17 PROCEDURE — 3061F NEG MICROALBUMINURIA REV: CPT | Mod: CPTII,S$GLB,, | Performed by: NURSE PRACTITIONER

## 2023-01-17 PROCEDURE — 3046F PR MOST RECENT HEMOGLOBIN A1C LEVEL > 9.0%: ICD-10-PCS | Mod: CPTII,S$GLB,, | Performed by: NURSE PRACTITIONER

## 2023-01-17 PROCEDURE — 82962 POCT GLUCOSE, HAND-HELD DEVICE: ICD-10-PCS | Mod: S$GLB,,, | Performed by: NURSE PRACTITIONER

## 2023-01-17 PROCEDURE — 82962 GLUCOSE BLOOD TEST: CPT | Mod: S$GLB,,, | Performed by: NURSE PRACTITIONER

## 2023-01-17 PROCEDURE — 1111F DSCHRG MED/CURRENT MED MERGE: CPT | Mod: CPTII,S$GLB,, | Performed by: NURSE PRACTITIONER

## 2023-01-17 PROCEDURE — 3046F HEMOGLOBIN A1C LEVEL >9.0%: CPT | Mod: CPTII,S$GLB,, | Performed by: NURSE PRACTITIONER

## 2023-01-17 PROCEDURE — 1111F PR DISCHARGE MEDS RECONCILED W/ CURRENT OUTPATIENT MED LIST: ICD-10-PCS | Mod: CPTII,S$GLB,, | Performed by: NURSE PRACTITIONER

## 2023-01-17 PROCEDURE — 99999 PR PBB SHADOW E&M-EST. PATIENT-LVL V: ICD-10-PCS | Mod: PBBFAC,,, | Performed by: NURSE PRACTITIONER

## 2023-01-17 PROCEDURE — 1160F PR REVIEW ALL MEDS BY PRESCRIBER/CLIN PHARMACIST DOCUMENTED: ICD-10-PCS | Mod: CPTII,S$GLB,, | Performed by: NURSE PRACTITIONER

## 2023-01-17 PROCEDURE — 1159F PR MEDICATION LIST DOCUMENTED IN MEDICAL RECORD: ICD-10-PCS | Mod: CPTII,S$GLB,, | Performed by: NURSE PRACTITIONER

## 2023-01-17 PROCEDURE — 99205 OFFICE O/P NEW HI 60 MIN: CPT | Mod: S$GLB,,, | Performed by: NURSE PRACTITIONER

## 2023-01-17 PROCEDURE — 3066F NEPHROPATHY DOC TX: CPT | Mod: CPTII,S$GLB,, | Performed by: NURSE PRACTITIONER

## 2023-01-17 RX ORDER — INSULIN LISPRO-AABC 100 [IU]/ML
INJECTION, SOLUTION SUBCUTANEOUS
Qty: 5 PEN | Refills: 6 | Status: SHIPPED | OUTPATIENT
Start: 2023-01-17 | End: 2023-03-22 | Stop reason: SDUPTHER

## 2023-01-17 RX ORDER — PEN NEEDLE, DIABETIC 30 GX3/16"
1 NEEDLE, DISPOSABLE MISCELLANEOUS 4 TIMES DAILY
Qty: 100 EACH | Refills: 11 | Status: SHIPPED | OUTPATIENT
Start: 2023-01-17 | End: 2023-02-13 | Stop reason: SDUPTHER

## 2023-01-17 RX ORDER — BLOOD-GLUCOSE SENSOR
EACH MISCELLANEOUS
Qty: 3 EACH | Refills: 11 | Status: SHIPPED | OUTPATIENT
Start: 2023-01-17 | End: 2023-11-28 | Stop reason: SDUPTHER

## 2023-01-17 RX ORDER — BLOOD-GLUCOSE TRANSMITTER
EACH MISCELLANEOUS
Qty: 1 EACH | Refills: 4 | Status: SHIPPED | OUTPATIENT
Start: 2023-01-17 | End: 2023-11-28 | Stop reason: SDUPTHER

## 2023-01-17 RX ORDER — ATORVASTATIN CALCIUM 20 MG/1
20 TABLET, FILM COATED ORAL DAILY
Qty: 90 TABLET | Refills: 2 | Status: SHIPPED | OUTPATIENT
Start: 2023-01-17 | End: 2023-11-22 | Stop reason: SDUPTHER

## 2023-01-17 RX ORDER — GLUCAGON 3 MG/1
POWDER NASAL
Qty: 2 EACH | Refills: 1 | Status: SHIPPED | OUTPATIENT
Start: 2023-01-17

## 2023-01-17 RX ORDER — INSULIN DEGLUDEC 100 U/ML
16 INJECTION, SOLUTION SUBCUTANEOUS NIGHTLY
Qty: 5 PEN | Refills: 6 | Status: SHIPPED | OUTPATIENT
Start: 2023-01-17 | End: 2023-03-22 | Stop reason: SDUPTHER

## 2023-01-17 NOTE — PATIENT INSTRUCTIONS
Levemir  16 units nightly -- until you run out and then change to Tresiba -- 16 units nightly   You do not have to take with food     Start Lyumjev-- this is meal time insulin   This is with food- right before eating   Start with 8 units before each meal-- 3 times per day   If you skip a meal- skip the Lyumjev   2-4 units for carbohydrate snacks--- chips, cookies, ice cream, fruits         Snacks can be an important part of a balanced, healthy meal plan. They allow you to eat more frequently, feeling full and satisfied throughout the day. Also, they allow you to spread carbohydrates evenly, which may stabilize blood sugars.  Plus, snacks are enjoyable!     The amount of carbohydrate needed at snacks varies. Generally, less than 15 grams of carbohydrate per snack is recommended.  Below you will find some tasty treats.       0-5 gm carb  Crystal Light  Vitamin Water Zero  Herbal tea, unsweetened  2 tsp peanut butter on celery  1./2 cup sugar-free jell-o  1 sugar-free popsicle  ¼ cup blueberries  8oz Blue Gabriella unsweetened almond milk  5 baby carrots & celery sticks, cucumbers, bell peppers dipped in ¼ cup salsa, 2Tbsp light ranch dressing or 2Tbsp plain Greek yogurt  10 Goldfish crackers  ½ oz low-fat cheese or string cheese  1 closed handful of nuts, unsalted  1 Tbsp of sunflower seeds, unsalted  1 cup Smart Pop popcorn  1 whole grain brown rice cake        15 gm carb  1 small piece of fruit or ½ banana or 1/2 cup lite canned fruit  3 anne marie cracker squares  3 cups Smart Pop popcorn, top spray butter, Lester lite salt or cinnamon and Truvia  5 Vanilla Wafers  ½ cup low fat, no added sugar ice cream or frozen yogurt (Blue bell, Blue Bunny, Weight Watchers, Skinny Cow)  ½ turkey, ham, or chicken sandwich  ½ c fruit with ½ c Cottage cheese  4-6 unsalted wheat crackers with 1 oz low fat cheese or 1 tbsp peanut butter   30-45 goldfish crackers (depending on flavor)   7-8 Jewish mini brown rice cakes (caramel, apple  cinnamon, chocolate)   12 Islam mini brown rice cakes (cheddar, bbq, ranch)   1/3 cup hummus dip with raw veg  1/2 whole wheat bettie, 1Tbsp hummus  Mini Pizza (1/2 whole wheat English muffin, low-fat  cheese, tomato sauce)  100 calorie snack pack (Oreo, Chips Ahoy, Ritz Mix, Baked Cheetos)  4-6 oz. light or Greek Style yogurt (Chobani, Yoplait, Okios, [place]oneyfield)  ½ cup sugar-free pudding    6 in. wheat tortilla or bettie oven toasted chips (topped with spray butter flavoring, cinnamon, Truvia OR spray butter, garlic powder, chili powder)   18 BBQ Popchips (available at [employer], [employer], Fresh Market)

## 2023-01-17 NOTE — PROGRESS NOTES
CC:   Chief Complaint   Patient presents with    Diabetes Mellitus       HPI: Jason Sandra is a 47 y.o. male presents for an initial visit today for the management of diabetes     He was diagnosed with Type 2  diabetes at least since 2011 on routine labs   Pre diabetes prior to that   He was initially started on Metformin     Family hx of diabetes: maybe younger brother, father   Hospitalized for diabetes: yes- DKA in 01/2023  Insulin therapy: 2023  No personal or FH of thyroid cancer or personal of pancreatic cancer or pancreatitis.       Recently hospitalized for DKA on January 1, 2023  On his arrival to the emergency room he reported that he had been on any medications for diabetes in over a year  Moving to the [place] area  His blood sugar was 316  + beta -6.2  A1c was >14% in October 2021   He was sent home on basal insulin    He works at [employer]      DIABETES COMPLICATIONS: none      Diabetes Management Status    ASA:  No    Statin: Not taking  ACE/ARB: Not taking      The 10-year ASCVD risk score (Darleen BARCENAS, et al., 2019) is: 3.2%    Values used to calculate the score:      Age: 47 years      Sex: Male      Is Non- : No      Diabetic: Yes      Tobacco smoker: No      Systolic Blood Pressure: 108 mmHg      Is BP treated: No      HDL Cholesterol: 66 mg/dL      Total Cholesterol: 197 mg/dL    Screening or Prevention Patient's value Goal Complete/Controlled?   HgA1C Testing and Control   Lab Results   Component Value Date    HGBA1C >14.0 (H) 01/17/2023      Annually/Less than 8% No     Lipid profile : 01/17/2023 Annually No     LDL control Lab Results   Component Value Date    LDLCALC 121.0 01/17/2023    Annually/Less than 100 mg/dl  No     Nephropathy screening Lab Results   Component Value Date    LABMICR <5.0 01/17/2023     Lab Results   Component Value Date    PROTEINUA 1+ (A) 01/01/2023    Annually Yes     Blood pressure BP Readings from Last 1 Encounters:   01/17/23 108/70     Less than 140/90 Yes     Dilated retinal exam : 09/19/2018 Annually Yes     Foot exam   : 01/17/2023 Annually No         CURRENT A1C:    Hemoglobin A1C   Date Value Ref Range Status   01/17/2023 >14.0 (H) 4.0 - 5.6 % Final     Comment:     ADA Screening Guidelines:  5.7-6.4%  Consistent with prediabetes  >or=6.5%  Consistent with diabetes    High levels of fetal hemoglobin interfere with the HbA1C  assay. Heterozygous hemoglobin variants (HbS, HgC, etc)do  not significantly interfere with this assay.   However, presence of multiple variants may affect accuracy.     10/21/2021 >14.0 (H) 4.0 - 5.6 % Final     Comment:     ADA Screening Guidelines:  5.7-6.4%  Consistent with prediabetes  >or=6.5%  Consistent with diabetes    High levels of fetal hemoglobin interfere with the HbA1C  assay. Heterozygous hemoglobin variants (HbS, HgC, etc)do  not significantly interfere with this assay.   However, presence of multiple variants may affect accuracy.     02/24/2020 9.8 (H) 4.0 - 5.6 % Final     Comment:     ADA Screening Guidelines:  5.7-6.4%  Consistent with prediabetes  >or=6.5%  Consistent with diabetes  High levels of fetal hemoglobin interfere with the HbA1C  assay. Heterozygous hemoglobin variants (HbS, HgC, etc)do  not significantly interfere with this assay.   However, presence of multiple variants may affect accuracy.         GOAL A1C: 6.5% without hypoglycemia       DM MEDICATIONS USED IN THE PAST: Levmeir    Metformin   Glipizide   Januvia   Glimepiride       CURRENT DIABETES MEDICATIONS: Levemir  20 units nightly at 8 -9 PM    Insulin: pens.    Missed doses: rarely   Injecting to abdomen   Once the insulin leaked out         BLOOD GLUCOSE MONITORING:  Checking his BG 2 times per day   Per oral recall: 70's in the AM -- every day waking up in the 70's and feeling bad   300's after eating   Before eating 170-200's   200-300's with eating        Results for orders placed or performed in visit on 01/17/23   POCT  Glucose, Hand-Held Device   Result Value Ref Range    POC Glucose 378 (A) 70 - 110 MG/DL         HYPOGLYCEMIA:  No  Glucagon kit: denies   Medic alert bracelet: denies       MEALS: eating 3 meals per day   BF: ham and cheese croissant   Lunch: cafeteria at work   Dinner: home cooked or fast food - spaghetti   Snack: peanuts, chips  Drinks: water and milk   Skim milk - 2 glasses per day   Previously coke zeros -- 20 a day        CURRENT EXERCISE:  Yes  Home routine   Push ups/ dips/ sit ups       Review of Systems  Review of Systems   Constitutional:  Negative for appetite change, fatigue and unexpected weight change.   HENT:  Negative for trouble swallowing.    Eyes:  Negative for visual disturbance.   Respiratory:  Negative for shortness of breath.    Cardiovascular:  Negative for chest pain.   Gastrointestinal:  Negative for nausea.   Endocrine: Positive for polydipsia, polyphagia and polyuria.   Genitourinary:         + Nocturia 2-3 times per night    Skin:  Negative for wound.   Neurological:  Negative for numbness.     Physical Exam   Physical Exam  Vitals and nursing note reviewed.   Constitutional:       Appearance: He is well-developed.   HENT:      Head: Normocephalic and atraumatic.      Right Ear: External ear normal.      Left Ear: External ear normal.      Nose: Nose normal.   Neck:      Thyroid: No thyromegaly.      Trachea: No tracheal deviation.   Cardiovascular:      Rate and Rhythm: Normal rate and regular rhythm.      Heart sounds: No murmur heard.  Pulmonary:      Effort: Pulmonary effort is normal. No respiratory distress.      Breath sounds: Normal breath sounds.   Abdominal:      Palpations: Abdomen is soft.      Tenderness: There is no abdominal tenderness.      Hernia: No hernia is present.   Musculoskeletal:      Cervical back: Normal range of motion and neck supple.   Skin:     General: Skin is warm and dry.      Capillary Refill: Capillary refill takes less than 2 seconds.      Findings:  No rash.      Comments: Injection sites are normal appearing. No lipo hypertropthy or atrophy     Neurological:      Mental Status: He is alert and oriented to person, place, and time.      Cranial Nerves: No cranial nerve deficit.   Psychiatric:         Behavior: Behavior normal.         Judgment: Judgment normal.       FOOT EXAMINATION: Appropriate footwear     Protective Sensation (w/ 10 gram monofilament):  Right: Intact  Left: Intact    Visual Inspection:  Normal -  Bilateral, Nails Intact - without Evidence of Foot Deformity- Bilateral, and Onychomycosis -  Bilateral    Pedal Pulses:   Right: Present  Left: Present    Posterior tibialis:   Right:Present  Left: Present        Lab Results   Component Value Date    TSH 2.641 01/01/2023           Insulin dependent diabetes mellitus type IA  Labs with low c-peptide today   Confirming that patient is insulin def and more like T1DM   Antibodies pending   Uncontrolled   Needs prandial insulin.   Needs to work on diabetic diet   dexcom sample in clinic today       -- Medication Changes:  Weight based at 0.5 units/kg/day TDD   Levemir  16 units nightly -- until you run out and then change to Tresiba -- 16 units nightly   You do not have to take with food     Start Lyumjev-- this is meal time insulin   This is with food- right before eating   Start with 8 units before each meal-- 3 times per day   If you skip a meal- skip the Lyumjev   2-4 units for carbohydrate snacks--- chips, cookies, ice cream, fruits       -- Reviewed goals of therapy are to get the best control we can without hypoglycemia.  -- Reviewed patient's current insulin regimen. Clarified proper insulin dose and timing in relation to meals, etc. Insulin injection sites and proper rotation instructed.    -- Advised frequent self blood glucose monitoring.  Patient encouraged to document glucose results and bring them to every clinic visit. RX for dexcom personal CGM sent to pharmacy - [facility].   --  Hypoglycemia precautions discussed. Instructed on precautions before driving.    -- Call for Bg repeatedly < 70 or > 200.   -- Close adherence to lifestyle changes recommended.   -- Periodic follow ups for eye evaluations, foot care and dental care suggested.  -- Refer to diabetes education -- 2 visits  1. Diabetes diet/ comprehensive review - T1- low - c-peptide   2. Dexcom start         Dyslipidemia, goal LDL below 100  Start Lipitor 20 mg nightly       Addendum 01/23/2023---scott and anti islet cell antibody negative  However C-peptide level low with hyperglycemia  Recently admitted for DKA  Insulin dependent ketosis prone      Spent 60 minutes with patient with >50% time spent in counseling, as noted on diet, insulin, T1/T2, medication options, prandial insulin, BG monitoring, dexcom, dexcom sample   Discussed DM course, progression, and the need for good BG control to prevent or allay long-term complications. Reviewed proper hypoglycemia management. Discussed consistency in BG monitoring to allow for safe titration of insulin.       Addendum 01/30/2023  Antibodies negative  C-peptide level low  Will treat as a type 1 diabetic at this time        Follow up in about 6 weeks (around 2/28/2023).  Labs today please-- all labs -   Dexcom sample today please   Schedule with diabetes education at [facility]-- extended visit-- 1- to discuss diabetes and comprehensive review 2. Dexcom start   Follow up with me in 6 weeks         Orders Placed This Encounter   Procedures    Anti-islet cell antibody     Standing Status:   Future     Number of Occurrences:   1     Standing Expiration Date:   7/17/2024    C-Peptide     Standing Status:   Future     Number of Occurrences:   1     Standing Expiration Date:   7/17/2024    Glutamic Acid Decarboxylase     Standing Status:   Future     Number of Occurrences:   1     Standing Expiration Date:   7/17/2024    Basic Metabolic Panel     Standing Status:   Future     Number of Occurrences:    1     Standing Expiration Date:   7/17/2024    Microalbumin/Creatinine Ratio, Urine     Standing Status:   Future     Number of Occurrences:   1     Standing Expiration Date:   7/17/2024     Order Specific Question:   Specimen Source     Answer:   Urine    Lipid Panel     Standing Status:   Future     Number of Occurrences:   1     Standing Expiration Date:   3/17/2024    Hemoglobin A1C     Standing Status:   Future     Number of Occurrences:   1     Standing Expiration Date:   7/17/2024    INSULIN ANTIBODY     Standing Status:   Future     Number of Occurrences:   1     Standing Expiration Date:   3/17/2024    Ambulatory referral/consult to Diabetes Education     Standing Status:   Future     Standing Expiration Date:   7/17/2024     Referral Priority:   Routine     Referral Type:   Consultation     Referral Reason:   Specialty Services Required     Requested Specialty:   Diabetes     Number of Visits Requested:   1    POCT Glucose, Hand-Held Device       Recommendations were discussed with the patient in detail  The patient verbalized understanding and agrees with the plan outlined as above.     This note was partly generated with [employer] voice recognition software. I apologize for any possible typographical errors.

## 2023-01-18 PROBLEM — E10.65 TYPE 1 DIABETES MELLITUS WITH HYPERGLYCEMIA: Status: ACTIVE | Noted: 2023-01-17

## 2023-01-18 PROBLEM — E78.5 DYSLIPIDEMIA, GOAL LDL BELOW 100: Status: ACTIVE | Noted: 2018-07-09

## 2023-01-18 NOTE — ASSESSMENT & PLAN NOTE
Labs with low c-peptide today   Confirming that patient is insulin def and more like T1DM   Antibodies pending   Uncontrolled   Needs prandial insulin.   Needs to work on diabetic diet   dexcom sample in clinic today       -- Medication Changes:  Weight based at 0.5 units/kg/day TDD   Levemir  16 units nightly -- until you run out and then change to Tresiba -- 16 units nightly   You do not have to take with food     Start Lyumjev-- this is meal time insulin   This is with food- right before eating   Start with 8 units before each meal-- 3 times per day   If you skip a meal- skip the Lyumjev   2-4 units for carbohydrate snacks--- chips, cookies, ice cream, fruits       -- Reviewed goals of therapy are to get the best control we can without hypoglycemia.  -- Reviewed patient's current insulin regimen. Clarified proper insulin dose and timing in relation to meals, etc. Insulin injection sites and proper rotation instructed.    -- Advised frequent self blood glucose monitoring.  Patient encouraged to document glucose results and bring them to every clinic visit. RX for dexcom personal CGM sent to pharmacy - [facility].   -- Hypoglycemia precautions discussed. Instructed on precautions before driving.    -- Call for Bg repeatedly < 70 or > 200.   -- Close adherence to lifestyle changes recommended.   -- Periodic follow ups for eye evaluations, foot care and dental care suggested.  -- Refer to diabetes education -- 2 visits  1. Diabetes diet/ comprehensive review - T1- low - c-peptide   2. Dexcom start

## 2023-01-23 PROBLEM — E10.9 INSULIN DEPENDENT DIABETES MELLITUS TYPE IA: Status: ACTIVE | Noted: 2023-01-17

## 2023-01-23 PROBLEM — E10.9 KETOSIS PRONE DIABETES: Status: ACTIVE | Noted: 2023-01-23

## 2023-01-24 ENCOUNTER — PATIENT MESSAGE (OUTPATIENT)
Dept: DIABETES | Facility: CLINIC | Age: 48
End: 2023-01-24
Payer: COMMERCIAL

## 2023-01-24 ENCOUNTER — TELEPHONE (OUTPATIENT)
Dept: PHARMACY | Facility: CLINIC | Age: 48
End: 2023-01-24
Payer: COMMERCIAL

## 2023-01-24 NOTE — TELEPHONE ENCOUNTER
DOCUMENTATION ONLY  Dexcom G6  Sensor  Approval date: 1/19/2023 to 1/18/2024  Case ID# [id number]    Transmitter  Approval date: 1/19/2023 to 1/18/2024  Case ID# [id number]       Approval date: 1/19/2023 to 1/18/2024  Case ID# [id number]

## 2023-01-25 ENCOUNTER — TELEPHONE (OUTPATIENT)
Dept: PHARMACY | Facility: CLINIC | Age: 48
End: 2023-01-25
Payer: COMMERCIAL

## 2023-02-08 ENCOUNTER — CLINICAL SUPPORT (OUTPATIENT)
Dept: DIABETES | Facility: CLINIC | Age: 48
End: 2023-02-08
Payer: COMMERCIAL

## 2023-02-08 VITALS — HEIGHT: 66 IN | WEIGHT: 168.44 LBS | BODY MASS INDEX: 27.07 KG/M2

## 2023-02-08 DIAGNOSIS — Z79.4 TYPE 2 DIABETES MELLITUS WITH HYPERGLYCEMIA, WITH LONG-TERM CURRENT USE OF INSULIN: ICD-10-CM

## 2023-02-08 DIAGNOSIS — E11.65 TYPE 2 DIABETES MELLITUS WITH HYPERGLYCEMIA, WITH LONG-TERM CURRENT USE OF INSULIN: ICD-10-CM

## 2023-02-08 PROCEDURE — G0108 DIAB MANAGE TRN  PER INDIV: HCPCS | Mod: S$GLB,,, | Performed by: FAMILY MEDICINE

## 2023-02-08 PROCEDURE — 99999 PR PBB SHADOW E&M-EST. PATIENT-LVL III: ICD-10-PCS | Mod: PBBFAC,,,

## 2023-02-08 PROCEDURE — G0108 PR DIAB MANAGE TRN  PER INDIV: ICD-10-PCS | Mod: S$GLB,,, | Performed by: FAMILY MEDICINE

## 2023-02-08 PROCEDURE — 99999 PR PBB SHADOW E&M-EST. PATIENT-LVL III: CPT | Mod: PBBFAC,,,

## 2023-02-09 NOTE — PROGRESS NOTES
Diabetes Care Specialist Progress Note  Author: Carlita Suarez RN, CDE  Date: 2/9/2023    Program Intake  Reason for Diabetes Program Visit:: Initial Diabetes Assessment  Current diabetes risk level:: high    Lab Results   Component Value Date    HGBA1C >14.0 (H) 01/17/2023     Clinical     Problem Review  Reviewed Problem List with Patient: yes    Clinical Assessment  Current Diabetes Treatment: Insulin    Medication Information  How do you obtain your medications?: Patient drives  How many days a week do you miss your medications?: Never  Do you sometimes have difficulty refilling your medications?: No  Medication adherence impacting ability to self-manage diabetes?: No     Nutritional Status  Meal Plan 24 Hour Recall: Breakfast, Lunch, Dinner, Snack  Meal Plan 24 Hour Recall - Breakfast: 3-4am: [employer] or [employer] ham and cheese croissant (2) - water , 6-7a second breakfast: 1 breakfast sandwich - water  Meal Plan 24 Hour Recall - Lunch: hospital cafeteria 11:30a: special of the day meat/veggie/starch plate - water  Meal Plan 24 Hour Recall - Dinner: eats on drive home (3pm) - lunchmeat sandwich, water  and second dinner (6-8pm) - red beans, spaghetti etc. water  Meal Plan 24 Hour Recall - Snack: 9am break: hosp cafeteria: omelet w/ ham, cheese, spinach, tomato - water 9-10pm: protein shake  Change in appetite?: Yes  Recent Changes in Weight: Weight Gain  Current nutritional status an area of need that is impacting patient's ability to self-manage diabetes?: No    Additional Social History     Access to Mass Media & Technology  Does the patient have access to any of the following devices or technologies?: Smart phone  Media or technology needs impacting ability to self-manage diabetes?: No    Cognitive/Behavioral Health  Alert and Oriented: Yes  Difficulty Thinking: No  Requires Prompting: No  Requires assistance for routine expression?: No  Cognitive or behavioral barriers impacting ability to self-manage  diabetes?: No    Culture/Yarsani  Culture or Jehovah's witness beliefs that may impact ability to access healthcare: No    Communication  Language preference: English  Hearing Problems: No  Vision Problems: No  Communication needs impacting ability to self-manage diabetes?: No    Health Literacy  Preferred Learning Method: Face to Face  How often do you need to have someone help you read instructions, pamphlets, or written material from your doctor or pharmacy?: Rarely  Health literacy needs impacting ability to self-manage diabetes?: No    Diabetes Self-Management Skills Assessment    Diabetes Disease Process/Treatment Options  Patient/caregiver able to state what happens when someone has diabetes.: somewhat  Patient/caregiver knows what type of diabetes they have.: yes  Diabetes Type : Type I (has been dx w/ Type 2 for years, stopped taking meds last year - in hosp w/ DKA in Jan - understands labwork changes: DONELL/Type I)  Patient/caregiver able to identify at least three signs and symptoms of diabetes.: yes  Identified signs and symptoms:: blurred vision, fatigue, frequent urination, increased thirst  Patient able to identify at least three risk factors for diabetes.: yes  Identified risk factors:: age over 40  Diabetes Disease Process/Treatment Options: Skills Assessment Completed: Yes  Assessment indicates:: Adequate understanding  Area of need?: No    Nutrition/Healthy Eating  Challenges to healthy eating:: portion control, snacking between meals and at night  Method of carbohydrate measurement:: no method  Patient can identify foods that impact blood sugar.: yes  Patient-identified foods:: fruit/fruit juice, soda, sweets  Nutrition/Healthy Eating Skills Assessment Completed:: Yes  Assessment indicates:: Knowledge deficit, Instruction Needed  Area of need?: Yes    Physical Activity/Exercise  Patient's daily activity level:: constantly moving  Patient formally exercises outside of work.: yes  Exercise Type: using  exercise equipment, weight training  Intensity: Moderate  Frequency: four or more times a week  Duration: 30 min  Patient can identify forms of physical activity.: yes  Stated forms of physical activity:: any movement performed by muscles that uses energy  Patient can identify reasons why exercise/physical activity is important in diabetes management.: yes  Identified reasons:: helps insulin work better, improves blood circulation, lowers risk of heart disease and stroke, relieves stress, keeps body and joints flexible, strengthens heart, muscles, and bones, lowers blood glucose, blood pressure, and cholesterol, tones muscles  Physical Activity/Exercise Skills Assessment Completed: : Yes  Assessment indicates:: Knowledge deficit, Instruction Needed  Area of need?: Yes    Medications  Patient is able to describe current diabetes management routine.: yes  Diabetes management routine:: diet, exercise, insulin  Patient is able to identify current diabetes medications, dosages, and appropriate timing of medications.: (S) yes (some questions/confusion about snack dosing)  Patient understands the purpose of the medications taken for diabetes.: yes  Patient reports problems or concerns with current medication regimen.: no  Medication Skills Assessment Completed:: Yes  Assessment indicates:: Knowledge deficit, Instruction Needed  Area of need?: Yes    Home Blood Glucose Monitoring  Patient states that blood sugar is checked at home daily.: yes  Monitoring Method:: personal continuous glucose monitor  Personal CGM type:: Dexcom G6  Patient is able to use personal CGM appropriately.: yes  CGM Report reviewed?: yes  Home Blood Glucose Monitoring Skills Assessment Completed: : Yes  Assessment indicates:: Adequate understanding, Knowledge deficit, Instruction Needed  Area of need?: Yes    Acute Complications  Patient is able to identify types of acute complications: Yes  Patient Identified:: Diabetic Ketoacidosis, Hyperglycemia,  Hypoglycemia  Patient is able to state the basic meaning of hypoglycemia?: Yes  Able to state the blood sugar range for hypoglycemia?: (S) no (see comments) (feeling pseudo hypo and having symptoms @ BG of 100 - has been treating)  Patient can identify general symptoms of hypoglycemia: yes  Patient identified:: shakiness, dizziness  Able to state proper treatment of hypoglycemia?: no (see comments)  Patient is able to state the basic meaning of hyperglycemia?: Yes  Able to state the blood sugar range for hyperglycemia?: yes  Patient stated range:: >250  Patient able to state proper treatment of hyperglycemia?: yes  Patient identified:: contact provider, increase water intake, monitor blood sugar, take medication as recommended  Acute Complications Skills Assessment Completed: : Yes  Assessment indicates:: Knowledge deficit, Instruction Needed  Area of need?: Yes    Chronic Complications  Chronic Complications Skills Assessment Completed: : No  Deferred due to:: Time    Psychosocial/Coping  Psychosocial/Coping Skills Assessment Completed: : No  Deffered due to:: Time    Diabetes Self Support Plan     Assessment Summary and Plan    Based on today's diabetes care assessment, the following areas of need were identified:      Social 2/8/2023   Access to Mass Media/Tech No   Cognitive/Behavioral Health No   Culture/Oriental orthodox No   Communication No   Health Literacy No      Clinical 2/8/2023   Medication Adherence No   Nutritional Status No      Diabetes Self-Management Skills 2/8/2023   Diabetes Disease Process/Treatment Options No   Nutrition/Healthy Eating Yes: see care planning    Physical Activity/Exercise Yes: pt exercising during lunch hour since energy levels improving    Medication Yes: see care planning    Home Blood Glucose Monitoring Yes: using Dexcom G6 appropriately via cell phone tylor - reviewed in range goals for BG    Acute Complications Yes: see care planning       Today's interventions were provided through  individual discussion, instruction, and written materials were provided.      Patient verbalized understanding of instruction and written materials.  Pt was able to return back demonstration of instructions today. Patient understood key points, needs reinforcement and further instruction.     Diabetes Self-Management Care Plan:    Today's Diabetes Self-Management Care Plan was developed with Jason's input. Jason has agreed to work toward the following goal(s) to improve his/her overall diabetes control.      Care Plan: Diabetes Management   Updates made since 1/10/2023 12:00 AM        Problem: Medications         Goal: Pt will take MDI as prescribed.    Start Date: 2023   Expected End Date: 3/11/2023   Priority: High   Barriers: No Barriers Identified   Note:    23 -   In depth review of pt meals/snacks and insulin dosing. Confirmed meals = ~60 grams of carb, and take 3-8u full meal doses >4 hours apart. Reviewed snack dosinu for ~30gram snack for right now. Did discuss ways to reduce carbs in snacks and help require less insulin for these low carb snacks.        Task: Reviewed with patient all current diabetes medications and provided basic review of the purpose, dosage, frequency, side effects, and storage of both oral and injectable diabetes medications. Completed 2023        Task: Reviewed possible resources for acquiring cost prohibitive medication.         Task: Instructed patient on how to self-administer MDI. Completed 2023        Task: Discussed guidelines for preventing, detecting and treating hypoglycemia and hyperglycemia and reviewed the importance of meal and medication timing with diabetes mediations for prevention of hypoglycemia and maximum drug benefit. Completed 2023        Problem: Acute Complications         Goal: Pt will only drink sweet drink when BG <70; when symptomatic in 100's, pt will eat small snack w/ protein and carbs and monitor BG.    Start Date: 2023    Expected End Date: 3/11/2023   Priority: High   Barriers: No Barriers Identified        Task: Reviewed proper treatment of hypoglycemia with the rule of 15--patient to eat 15g simple carbohydrate (4 glucose tablets, 1 glucose gel, 5 pieces hard candy, ½ cup fruit juice, ½ can regular soda, etc) and wait 15 minutes and recheck home glucose. Completed 2/9/2023        Task: Reviewed common causes and precautions to help prevent hyper/hypoglycemic events. Completed 2/9/2023        Task: Reviewed signs and symptoms of hyper/hypoglycemia, what range is considered to be hyper/hypoglycemia, and when to seek further medical attention. Completed 2/9/2023        Task: Discussed, sick day planning, natural disaster planning, and/or travel planning to prevent hyper/hypoglycemia. Completed 2/9/2023        Task: Discussed risk factors for developing diabetic ketoacidosis (DKA), strategies for reducing risk, testing with ketone test strips if BG is >240mg/dl, basic protocol for managing DKA, and when to seek further medical attention. Completed 2/9/2023        Problem: Healthy Eating         Goal: Pt will make 2 snacks per day <15 grams of carb per snack.    Start Date: 2/8/2023   Expected End Date: 3/11/2023   Priority: High   Barriers: No Barriers Identified   Note:    2/8/23 -   Pt with +polyphagia, although reports having large appetite since began working ~15yo. 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 Reviewed pt meal patterns, has long commute and eating fast food breakfast. Discussed importance of swapping out for higher quality breakfast. Pt agreeable to working on packing healthier snacks that include veggies (like veggie + hummus). Reviewed insulin dosing for snacks.        Task: Reviewed the sources and role of Carbohydrate, Protein, and Fat and how each nutrient impacts blood sugar. Completed 2/9/2023        Task: Provided visual examples using dry measuring cups, food models, and other familiar objects  such as computer mouse, deck or cards, tennis ball etc. to help with visualization of portions. Completed 2/9/2023        Task: Explained how to count carbohydrates using the food label and the use of dry measuring cups for accurate carb counting. Completed 2/9/2023        Task: Discussed strategies for choosing healthier menu options when dining out. Completed 2/9/2023        Task: Recommended replacing beverages containing high sugar content with noncaloric/sugar free options and/or water. Completed 2/9/2023        Task: Review the importance of balancing carbohydrates with each meal using portion control techniques to count servings of carbohydrate and label reading to identify serving size and amount of total carbs per serving. Completed 2/9/2023        Task: Provided Sample plate method and reviewed the use of the plate to estimate amounts of carbohydrate per meal. Completed 2/9/2023        Follow Up Plan     Follow up in about 1 month (around 3/8/2023) for review of Dexcom upload and goal progress. .    Today's care plan and follow up schedule was discussed with patient.  Jason verbalized understanding of the care plan, goals, and agrees to follow up plan.        The patient was encouraged to communicate with his/her health care provider/physician and care team regarding his/her condition(s) and treatment.  I provided the patient with my contact information today and encouraged to contact me via phone or [facility] Patient Portal as needed.     Length of Visit   Total Time: 60 Minutes

## 2023-02-13 DIAGNOSIS — Z79.4 TYPE 2 DIABETES MELLITUS WITH HYPERGLYCEMIA, WITH LONG-TERM CURRENT USE OF INSULIN: ICD-10-CM

## 2023-02-13 DIAGNOSIS — E11.65 TYPE 2 DIABETES MELLITUS WITH HYPERGLYCEMIA, WITH LONG-TERM CURRENT USE OF INSULIN: ICD-10-CM

## 2023-02-13 RX ORDER — PEN NEEDLE, DIABETIC 30 GX3/16"
NEEDLE, DISPOSABLE MISCELLANEOUS
Qty: 200 EACH | Refills: 11 | Status: SHIPPED | OUTPATIENT
Start: 2023-02-13 | End: 2023-11-28 | Stop reason: SDUPTHER

## 2023-02-13 NOTE — TELEPHONE ENCOUNTER
----- Message from Vira Suarez sent at 2/13/2023  3:08 PM CST -----  Regarding: Consult/Advisory      Name Of Caller:      Contact Preference?: [phone]      Nature of Call? Pt is calling because he needs more pen needle, diabetic 32 gauge x 5/32 and the pharmacy states that the insurance will not pay for it until March

## 2023-02-14 ENCOUNTER — PATIENT OUTREACH (OUTPATIENT)
Dept: ADMINISTRATIVE | Facility: HOSPITAL | Age: 48
End: 2023-02-14
Payer: COMMERCIAL

## 2023-02-14 NOTE — PROGRESS NOTES
Health Maintenance Due   Topic Date Due    Hepatitis C Screening  Never done    HIV Screening  Never done    Colorectal Cancer Screening  Never done    Pneumococcal Vaccines (Age 0-64) (2 - PCV) 07/09/2019    Eye Exam  09/19/2019    COVID-19 Vaccine (3 - Booster for Pfizer series) 10/22/2021        Chart review done.   HM updated.   Immunizations reviewed & updated.   Care Everywhere updated.   [facility] reviewed

## 2023-03-01 ENCOUNTER — OFFICE VISIT (OUTPATIENT)
Dept: URGENT CARE | Facility: CLINIC | Age: 48
End: 2023-03-01
Payer: COMMERCIAL

## 2023-03-01 VITALS
DIASTOLIC BLOOD PRESSURE: 88 MMHG | SYSTOLIC BLOOD PRESSURE: 144 MMHG | HEART RATE: 89 BPM | HEIGHT: 68 IN | OXYGEN SATURATION: 98 % | WEIGHT: 162 LBS | TEMPERATURE: 98 F | BODY MASS INDEX: 24.55 KG/M2 | RESPIRATION RATE: 18 BRPM

## 2023-03-01 DIAGNOSIS — R53.83 FATIGUE, UNSPECIFIED TYPE: ICD-10-CM

## 2023-03-01 DIAGNOSIS — I88.9 PREAURICULAR LYMPHADENITIS: ICD-10-CM

## 2023-03-01 DIAGNOSIS — M54.6 ACUTE MIDLINE THORACIC BACK PAIN: Primary | ICD-10-CM

## 2023-03-01 PROCEDURE — 1160F RVW MEDS BY RX/DR IN RCRD: CPT | Mod: CPTII,S$GLB,, | Performed by: NURSE PRACTITIONER

## 2023-03-01 PROCEDURE — 99214 PR OFFICE/OUTPT VISIT, EST, LEVL IV, 30-39 MIN: ICD-10-PCS | Mod: S$GLB,,, | Performed by: NURSE PRACTITIONER

## 2023-03-01 PROCEDURE — 3066F NEPHROPATHY DOC TX: CPT | Mod: CPTII,S$GLB,, | Performed by: NURSE PRACTITIONER

## 2023-03-01 PROCEDURE — 3079F DIAST BP 80-89 MM HG: CPT | Mod: CPTII,S$GLB,, | Performed by: NURSE PRACTITIONER

## 2023-03-01 PROCEDURE — 99214 OFFICE O/P EST MOD 30 MIN: CPT | Mod: S$GLB,,, | Performed by: NURSE PRACTITIONER

## 2023-03-01 PROCEDURE — 1159F MED LIST DOCD IN RCRD: CPT | Mod: CPTII,S$GLB,, | Performed by: NURSE PRACTITIONER

## 2023-03-01 PROCEDURE — 3008F PR BODY MASS INDEX (BMI) DOCUMENTED: ICD-10-PCS | Mod: CPTII,S$GLB,, | Performed by: NURSE PRACTITIONER

## 2023-03-01 PROCEDURE — 1160F PR REVIEW ALL MEDS BY PRESCRIBER/CLIN PHARMACIST DOCUMENTED: ICD-10-PCS | Mod: CPTII,S$GLB,, | Performed by: NURSE PRACTITIONER

## 2023-03-01 PROCEDURE — 1159F PR MEDICATION LIST DOCUMENTED IN MEDICAL RECORD: ICD-10-PCS | Mod: CPTII,S$GLB,, | Performed by: NURSE PRACTITIONER

## 2023-03-01 PROCEDURE — 3008F BODY MASS INDEX DOCD: CPT | Mod: CPTII,S$GLB,, | Performed by: NURSE PRACTITIONER

## 2023-03-01 PROCEDURE — 3061F NEG MICROALBUMINURIA REV: CPT | Mod: CPTII,S$GLB,, | Performed by: NURSE PRACTITIONER

## 2023-03-01 PROCEDURE — 3046F PR MOST RECENT HEMOGLOBIN A1C LEVEL > 9.0%: ICD-10-PCS | Mod: CPTII,S$GLB,, | Performed by: NURSE PRACTITIONER

## 2023-03-01 PROCEDURE — 3077F PR MOST RECENT SYSTOLIC BLOOD PRESSURE >= 140 MM HG: ICD-10-PCS | Mod: CPTII,S$GLB,, | Performed by: NURSE PRACTITIONER

## 2023-03-01 PROCEDURE — 3061F PR NEG MICROALBUMINURIA RESULT DOCUMENTED/REVIEW: ICD-10-PCS | Mod: CPTII,S$GLB,, | Performed by: NURSE PRACTITIONER

## 2023-03-01 PROCEDURE — 3066F PR DOCUMENTATION OF TREATMENT FOR NEPHROPATHY: ICD-10-PCS | Mod: CPTII,S$GLB,, | Performed by: NURSE PRACTITIONER

## 2023-03-01 PROCEDURE — 3077F SYST BP >= 140 MM HG: CPT | Mod: CPTII,S$GLB,, | Performed by: NURSE PRACTITIONER

## 2023-03-01 PROCEDURE — 3046F HEMOGLOBIN A1C LEVEL >9.0%: CPT | Mod: CPTII,S$GLB,, | Performed by: NURSE PRACTITIONER

## 2023-03-01 PROCEDURE — 3079F PR MOST RECENT DIASTOLIC BLOOD PRESSURE 80-89 MM HG: ICD-10-PCS | Mod: CPTII,S$GLB,, | Performed by: NURSE PRACTITIONER

## 2023-03-01 RX ORDER — CEPHALEXIN 500 MG/1
500 CAPSULE ORAL EVERY 12 HOURS
Qty: 14 CAPSULE | Refills: 0 | Status: SHIPPED | OUTPATIENT
Start: 2023-03-01 | End: 2023-03-09

## 2023-03-01 NOTE — PROGRESS NOTES
"Subjective:       Patient ID: Jason Sandra is a 47 y.o. male.    Vitals:  height is 5' 8" (1.727 m) and weight is 73.5 kg (162 lb). His oral temperature is 98.1 °F (36.7 °C). His blood pressure is 144/88 (abnormal) and his pulse is 89. His respiration is 18 and oxygen saturation is 98%.     Chief Complaint: Facial Pain and Neck Pain    Pt presents today with upper back pain near his neck, fatigue, swollen glands by right ear & pain when eating x1 wk. States when he chews it hurts his ear/gland. Tried tylenol w/o relief.     Facial Pain  This is a new problem. The current episode started in the past 7 days. The problem occurs intermittently. The problem has been gradually worsening. Associated symptoms include fatigue, headaches, neck pain and swollen glands. Pertinent negatives include no chest pain, congestion, coughing, fever, numbness, sore throat, urinary symptoms or weakness. The symptoms are aggravated by eating. He has tried acetaminophen for the symptoms. The treatment provided no relief.     Constitution: Positive for fatigue. Negative for fever.   HENT:  Negative for congestion and sore throat.    Neck: Positive for neck pain.   Cardiovascular:  Negative for chest pain.   Respiratory:  Negative for cough.    Neurological:  Positive for headaches. Negative for numbness.     Objective:     Physical Exam   Constitutional: He is oriented to person, place, and time. He appears well-developed. He is cooperative.  Non-toxic appearance. He does not appear ill. No distress.   HENT:   Head: Normocephalic and atraumatic.       Ears:   Right Ear: Hearing, external ear and ear canal normal. Tympanic membrane is not erythematous. A middle ear effusion is present.   Left Ear: Hearing, external ear and ear canal normal. Tympanic membrane is not erythematous. A middle ear effusion is present.   Nose: Nose normal. No mucosal edema, rhinorrhea or nasal deformity. No epistaxis. Right sinus exhibits no maxillary sinus " tenderness and no frontal sinus tenderness. Left sinus exhibits no maxillary sinus tenderness and no frontal sinus tenderness.   Mouth/Throat: Uvula is midline and mucous membranes are normal. No trismus in the jaw. Normal dentition. No uvula swelling. Posterior oropharyngeal edema and posterior oropharyngeal erythema present. No oropharyngeal exudate. Tonsils are 2+ on the right. Tonsils are 2+ on the left.   Eyes: Conjunctivae and lids are normal. No scleral icterus.   Neck: Trachea normal and phonation normal. Neck supple. No edema present. No erythema present. No neck rigidity present.   Cardiovascular: Regular rhythm, normal heart sounds and normal pulses.   Pulmonary/Chest: Effort normal and breath sounds normal. No respiratory distress. He has no decreased breath sounds. He has no wheezes. He has no rhonchi.   Abdominal: Normal appearance.   Musculoskeletal: Normal range of motion.         General: No deformity. Normal range of motion.      Thoracic back: He exhibits tenderness. He exhibits no bony tenderness.        Back:    Neurological: He is alert and oriented to person, place, and time. He exhibits normal muscle tone. Coordination normal.   Skin: Skin is warm, dry, intact, not diaphoretic and not pale.   Psychiatric: His speech is normal and behavior is normal. Judgment and thought content normal.   Nursing note and vitals reviewed.      Assessment:       1. Acute midline thoracic back pain    2. Preauricular lymphadenitis    3. Fatigue, unspecified type          Plan:         Acute midline thoracic back pain    Preauricular lymphadenitis  -     cephALEXin (KEFLEX) 500 MG capsule; Take 1 capsule (500 mg total) by mouth every 12 (twelve) hours. for 7 days  Dispense: 14 capsule; Refill: 0    Fatigue, unspecified type                 Use heat/ice to area for 20 minutes 3-4 times a day for comfort.  No heavy lifting.  Take ibuprofen as directed for pain. You may take tylenol instead if you are unable to take  NSAIDS.  Take muscle relaxer as prescribed for muscle spasms. Avoid driving since it may cause drowsiness.  Apply analgesic rubs as directed on label to help with pain.  Go to ER if you have fever of 103 or higher, bowel/bladder incontinence, numbness, tingling, or weakness to lower extremities, or if your symptoms worsen in any way.    Follow up with ortho/PCP within 2 weeks if no improvement.

## 2023-03-02 NOTE — PATIENT INSTRUCTIONS
Use heat/ice to area for 20 minutes 3-4 times a day for comfort.  No heavy lifting.  Take ibuprofen as directed for pain. You may take tylenol instead if you are unable to take NSAIDS.  Take muscle relaxer as prescribed for muscle spasms. Avoid driving since it may cause drowsiness.  Apply analgesic rubs as directed on label to help with pain.  Go to ER if you have fever of 103 or higher, bowel/bladder incontinence, numbness, tingling, or weakness to lower extremities, or if your symptoms worsen in any way.    Follow up with ortho/PCP within 2 weeks if no improvement.

## 2023-03-04 ENCOUNTER — OFFICE VISIT (OUTPATIENT)
Dept: INTERNAL MEDICINE | Facility: CLINIC | Age: 48
End: 2023-03-04
Payer: COMMERCIAL

## 2023-03-04 VITALS
HEART RATE: 85 BPM | WEIGHT: 162.69 LBS | DIASTOLIC BLOOD PRESSURE: 96 MMHG | SYSTOLIC BLOOD PRESSURE: 138 MMHG | BODY MASS INDEX: 24.66 KG/M2 | HEIGHT: 68 IN | OXYGEN SATURATION: 99 % | TEMPERATURE: 97 F

## 2023-03-04 DIAGNOSIS — M54.2 NECK PAIN: Primary | ICD-10-CM

## 2023-03-04 DIAGNOSIS — R59.0 PERIAURICULAR ADENOPATHY: ICD-10-CM

## 2023-03-04 DIAGNOSIS — E10.9 INSULIN DEPENDENT DIABETES MELLITUS TYPE IA: ICD-10-CM

## 2023-03-04 PROCEDURE — 3066F PR DOCUMENTATION OF TREATMENT FOR NEPHROPATHY: ICD-10-PCS | Mod: CPTII,S$GLB,, | Performed by: FAMILY MEDICINE

## 2023-03-04 PROCEDURE — 3075F SYST BP GE 130 - 139MM HG: CPT | Mod: CPTII,S$GLB,, | Performed by: FAMILY MEDICINE

## 2023-03-04 PROCEDURE — 3008F PR BODY MASS INDEX (BMI) DOCUMENTED: ICD-10-PCS | Mod: CPTII,S$GLB,, | Performed by: FAMILY MEDICINE

## 2023-03-04 PROCEDURE — 1160F PR REVIEW ALL MEDS BY PRESCRIBER/CLIN PHARMACIST DOCUMENTED: ICD-10-PCS | Mod: CPTII,S$GLB,, | Performed by: FAMILY MEDICINE

## 2023-03-04 PROCEDURE — 3008F BODY MASS INDEX DOCD: CPT | Mod: CPTII,S$GLB,, | Performed by: FAMILY MEDICINE

## 2023-03-04 PROCEDURE — 3075F PR MOST RECENT SYSTOLIC BLOOD PRESS GE 130-139MM HG: ICD-10-PCS | Mod: CPTII,S$GLB,, | Performed by: FAMILY MEDICINE

## 2023-03-04 PROCEDURE — 3080F DIAST BP >= 90 MM HG: CPT | Mod: CPTII,S$GLB,, | Performed by: FAMILY MEDICINE

## 2023-03-04 PROCEDURE — 3046F PR MOST RECENT HEMOGLOBIN A1C LEVEL > 9.0%: ICD-10-PCS | Mod: CPTII,S$GLB,, | Performed by: FAMILY MEDICINE

## 2023-03-04 PROCEDURE — 3061F PR NEG MICROALBUMINURIA RESULT DOCUMENTED/REVIEW: ICD-10-PCS | Mod: CPTII,S$GLB,, | Performed by: FAMILY MEDICINE

## 2023-03-04 PROCEDURE — 3046F HEMOGLOBIN A1C LEVEL >9.0%: CPT | Mod: CPTII,S$GLB,, | Performed by: FAMILY MEDICINE

## 2023-03-04 PROCEDURE — 1159F PR MEDICATION LIST DOCUMENTED IN MEDICAL RECORD: ICD-10-PCS | Mod: CPTII,S$GLB,, | Performed by: FAMILY MEDICINE

## 2023-03-04 PROCEDURE — 3080F PR MOST RECENT DIASTOLIC BLOOD PRESSURE >= 90 MM HG: ICD-10-PCS | Mod: CPTII,S$GLB,, | Performed by: FAMILY MEDICINE

## 2023-03-04 PROCEDURE — 3061F NEG MICROALBUMINURIA REV: CPT | Mod: CPTII,S$GLB,, | Performed by: FAMILY MEDICINE

## 2023-03-04 PROCEDURE — 3066F NEPHROPATHY DOC TX: CPT | Mod: CPTII,S$GLB,, | Performed by: FAMILY MEDICINE

## 2023-03-04 PROCEDURE — 99214 OFFICE O/P EST MOD 30 MIN: CPT | Mod: S$GLB,,, | Performed by: FAMILY MEDICINE

## 2023-03-04 PROCEDURE — 99214 PR OFFICE/OUTPT VISIT, EST, LEVL IV, 30-39 MIN: ICD-10-PCS | Mod: S$GLB,,, | Performed by: FAMILY MEDICINE

## 2023-03-04 PROCEDURE — 1159F MED LIST DOCD IN RCRD: CPT | Mod: CPTII,S$GLB,, | Performed by: FAMILY MEDICINE

## 2023-03-04 PROCEDURE — 99999 PR PBB SHADOW E&M-EST. PATIENT-LVL V: CPT | Mod: PBBFAC,,, | Performed by: FAMILY MEDICINE

## 2023-03-04 PROCEDURE — 1160F RVW MEDS BY RX/DR IN RCRD: CPT | Mod: CPTII,S$GLB,, | Performed by: FAMILY MEDICINE

## 2023-03-04 PROCEDURE — 99999 PR PBB SHADOW E&M-EST. PATIENT-LVL V: ICD-10-PCS | Mod: PBBFAC,,, | Performed by: FAMILY MEDICINE

## 2023-03-04 RX ORDER — CLINDAMYCIN HYDROCHLORIDE 300 MG/1
300 CAPSULE ORAL EVERY 8 HOURS
Qty: 30 CAPSULE | Refills: 0 | Status: SHIPPED | OUTPATIENT
Start: 2023-03-04 | End: 2023-03-14

## 2023-03-04 RX ORDER — BLOOD-GLUCOSE METER
EACH MISCELLANEOUS
COMMUNITY
Start: 2023-01-03

## 2023-03-04 RX ORDER — CYCLOBENZAPRINE HCL 5 MG
5-10 TABLET ORAL 3 TIMES DAILY PRN
Qty: 30 TABLET | Refills: 1 | Status: SHIPPED | OUTPATIENT
Start: 2023-03-04 | End: 2023-03-14

## 2023-03-04 NOTE — PROGRESS NOTES
Subjective:       Patient ID: Jason Sandra is a 48 y.o. male.    Chief Complaint: Adenopathy    PCP: no local PCP, new to area    Patient presents to clinic today reporting continued pain/swelling near right ear. Symptoms started 10 days ago, seen at urgent care and started on keflex. No improvement. Also reports neck pain. No fever, normal range of motion of neck. Denies sore throat or difficulty swallowing. No hearing loss. Has appointment with Mariana Joyce on Monday at [facility]. Patient is otherwise without concerns today.    Review of Systems   Constitutional:  Negative for chills, fatigue, fever and unexpected weight change.   HENT:  Positive for ear pain. Negative for congestion, hearing loss, sore throat and trouble swallowing.    Eyes:  Negative for visual disturbance.   Respiratory:  Negative for cough and shortness of breath.    Cardiovascular:  Negative for chest pain.   Musculoskeletal:  Positive for myalgias and neck pain.   Neurological:  Positive for headaches.       Objective:      Physical Exam  Vitals reviewed.   Constitutional:       General: He is not in acute distress.     Appearance: He is well-developed.   HENT:      Head: Normocephalic and atraumatic.        Right Ear: Tympanic membrane and ear canal normal.      Left Ear: Tympanic membrane and ear canal normal.      Nose: No mucosal edema or rhinorrhea.      Comments: Pale, boggy appearance to nasal mucosa     Mouth/Throat:      Dentition: Normal dentition.      Pharynx: Oropharynx is clear. Uvula midline. No pharyngeal swelling, oropharyngeal exudate or posterior oropharyngeal erythema.   Eyes:      General:         Right eye: No discharge.         Left eye: No discharge.      Conjunctiva/sclera: Conjunctivae normal.      Pupils: Pupils are equal, round, and reactive to light.   Neck:      Comments: Able to touch his chin to his chest  Cardiovascular:      Rate and Rhythm: Normal rate and regular rhythm.      Heart sounds: No murmur  heard.    No friction rub. No gallop.   Pulmonary:      Effort: Pulmonary effort is normal. No respiratory distress.      Breath sounds: Normal breath sounds. No wheezing or rales.   Musculoskeletal:      Cervical back: Neck supple. No edema, erythema or rigidity. Pain with movement, spinous process tenderness and muscular tenderness present. Decreased range of motion.   Lymphadenopathy:      Cervical: No cervical adenopathy.   Skin:     General: Skin is warm and dry.      Findings: No rash.   Neurological:      Mental Status: He is alert and oriented to person, place, and time.       Assessment:       1. Neck pain    2. Periauricular adenopathy    3. Insulin dependent diabetes mellitus type IA        Plan:   1. Neck pain  -     cyclobenzaprine (FLEXERIL) 5 MG tablet; Take 1-2 tablets (5-10 mg total) by mouth 3 (three) times daily as needed for Muscle spasms.  Dispense: 30 tablet; Refill: 1  -     X-Ray Cervical Spine AP And Lateral; Future; Expected date: 03/04/2023    2. Periauricular adenopathy  -     clindamycin (CLEOCIN) 300 MG capsule; Take 1 capsule (300 mg total) by mouth every 8 (eight) hours. for 10 days  Dispense: 30 capsule; Refill: 0    3. Insulin dependent diabetes mellitus type IA  Assessment & Plan:  Uncontrolled, reports followed by Endocrinology        Flexeril as above, advised no driving or operating heavy machinery with medication.  Avoid NSAIDs secondary to elevated BP/diabetes.  Discontinued keflex and start clindamycin. Advised if no better and no worse, I have low suspicion for infectious process.  C-spine x-ray to evaluate for cervical disc disease.  Consider ENT consult if symptoms worsen/persist.  Advised ER for neck rigidity, fever, or significant worsening symptoms.  Keep appointment scheduled on Monday with Mariana Joyce NP for reassessment.  Patient expressed understanding and agreement with plan.

## 2023-03-05 ENCOUNTER — HOSPITAL ENCOUNTER (EMERGENCY)
Facility: HOSPITAL | Age: 48
Discharge: HOME | End: 2023-03-05
Attending: EMERGENCY MEDICINE
Payer: COMMERCIAL

## 2023-03-05 VITALS
BODY MASS INDEX: 24.86 KG/M2 | WEIGHT: 163.5 LBS | TEMPERATURE: 98 F | HEART RATE: 97 BPM | OXYGEN SATURATION: 99 % | SYSTOLIC BLOOD PRESSURE: 141 MMHG | DIASTOLIC BLOOD PRESSURE: 83 MMHG | RESPIRATION RATE: 20 BRPM

## 2023-03-05 DIAGNOSIS — M54.12 CERVICAL RADICULAR PAIN: ICD-10-CM

## 2023-03-05 DIAGNOSIS — H92.03: Primary | ICD-10-CM

## 2023-03-05 LAB
ALBUMIN SERPL BCP-MCNC: 3.8 G/DL (ref 3.5–5.2)
ALP SERPL-CCNC: 80 U/L (ref 55–135)
ALT SERPL W/O P-5'-P-CCNC: 24 U/L (ref 10–44)
ANION GAP SERPL CALC-SCNC: 10 MMOL/L (ref 8–16)
AST SERPL-CCNC: 24 U/L (ref 10–40)
BASOPHILS # BLD AUTO: 0.04 K/UL (ref 0–0.2)
BASOPHILS NFR BLD: 0.9 % (ref 0–1.9)
BILIRUB SERPL-MCNC: 0.7 MG/DL (ref 0.1–1)
BUN SERPL-MCNC: 19 MG/DL (ref 6–20)
CALCIUM SERPL-MCNC: 9.4 MG/DL (ref 8.7–10.5)
CHLORIDE SERPL-SCNC: 104 MMOL/L (ref 95–110)
CO2 SERPL-SCNC: 24 MMOL/L (ref 23–29)
CREAT SERPL-MCNC: 0.9 MG/DL (ref 0.5–1.4)
DIFFERENTIAL METHOD: ABNORMAL
EOSINOPHIL # BLD AUTO: 0.1 K/UL (ref 0–0.5)
EOSINOPHIL NFR BLD: 1.7 % (ref 0–8)
ERYTHROCYTE [DISTWIDTH] IN BLOOD BY AUTOMATED COUNT: 11 % (ref 11.5–14.5)
EST. GFR  (NO RACE VARIABLE): >60 ML/MIN/1.73 M^2
GLUCOSE SERPL-MCNC: 96 MG/DL (ref 70–110)
HCT VFR BLD AUTO: 48.1 % (ref 40–54)
HGB BLD-MCNC: 16.3 G/DL (ref 14–18)
IMM GRANULOCYTES # BLD AUTO: 0.01 K/UL (ref 0–0.04)
IMM GRANULOCYTES NFR BLD AUTO: 0.2 % (ref 0–0.5)
LYMPHOCYTES # BLD AUTO: 2 K/UL (ref 1–4.8)
LYMPHOCYTES NFR BLD: 43.8 % (ref 18–48)
MCH RBC QN AUTO: 30.5 PG (ref 27–31)
MCHC RBC AUTO-ENTMCNC: 33.9 G/DL (ref 32–36)
MCV RBC AUTO: 90 FL (ref 82–98)
MONOCYTES # BLD AUTO: 0.5 K/UL (ref 0.3–1)
MONOCYTES NFR BLD: 11 % (ref 4–15)
NEUTROPHILS # BLD AUTO: 2 K/UL (ref 1.8–7.7)
NEUTROPHILS NFR BLD: 42.4 % (ref 38–73)
NRBC BLD-RTO: 0 /100 WBC
PLATELET # BLD AUTO: 207 K/UL (ref 150–450)
PMV BLD AUTO: 9.5 FL (ref 9.2–12.9)
POTASSIUM SERPL-SCNC: 4 MMOL/L (ref 3.5–5.1)
PROT SERPL-MCNC: 7.5 G/DL (ref 6–8.4)
RBC # BLD AUTO: 5.34 M/UL (ref 4.6–6.2)
SODIUM SERPL-SCNC: 138 MMOL/L (ref 136–145)
WBC # BLD AUTO: 4.64 K/UL (ref 3.9–12.7)

## 2023-03-05 PROCEDURE — 99285 EMERGENCY DEPT VISIT HI MDM: CPT | Mod: 25

## 2023-03-05 PROCEDURE — 85025 COMPLETE CBC W/AUTO DIFF WBC: CPT | Performed by: EMERGENCY MEDICINE

## 2023-03-05 PROCEDURE — 80053 COMPREHEN METABOLIC PANEL: CPT | Performed by: EMERGENCY MEDICINE

## 2023-03-05 PROCEDURE — 25500020 PHARM REV CODE 255: Performed by: EMERGENCY MEDICINE

## 2023-03-05 RX ADMIN — IOHEXOL 75 ML: 350 INJECTION, SOLUTION INTRAVENOUS at 10:03

## 2023-03-05 NOTE — Clinical Note
"Jason Connellian" Jocy was seen and treated in our emergency department on 3/5/2023.  He may return to work on 03/07/2023.       If you have any questions or concerns, please don't hesitate to call.      Melo Hammond MD"

## 2023-03-06 ENCOUNTER — OFFICE VISIT (OUTPATIENT)
Dept: INTERNAL MEDICINE | Facility: CLINIC | Age: 48
End: 2023-03-06
Payer: COMMERCIAL

## 2023-03-06 ENCOUNTER — HOSPITAL ENCOUNTER (OUTPATIENT)
Dept: RADIOLOGY | Facility: HOSPITAL | Age: 48
Discharge: HOME | End: 2023-03-06
Attending: FAMILY MEDICINE
Payer: COMMERCIAL

## 2023-03-06 VITALS
OXYGEN SATURATION: 98 % | WEIGHT: 163.81 LBS | HEART RATE: 103 BPM | DIASTOLIC BLOOD PRESSURE: 70 MMHG | TEMPERATURE: 98 F | BODY MASS INDEX: 24.83 KG/M2 | SYSTOLIC BLOOD PRESSURE: 124 MMHG | HEIGHT: 68 IN

## 2023-03-06 DIAGNOSIS — M54.12 CERVICAL RADICULOPATHY: ICD-10-CM

## 2023-03-06 DIAGNOSIS — R59.0 PERIAURICULAR ADENOPATHY: ICD-10-CM

## 2023-03-06 DIAGNOSIS — M54.2 NECK PAIN: ICD-10-CM

## 2023-03-06 DIAGNOSIS — E10.9 INSULIN DEPENDENT DIABETES MELLITUS TYPE IA: ICD-10-CM

## 2023-03-06 DIAGNOSIS — R41.3 MEMORY DEFICITS: ICD-10-CM

## 2023-03-06 DIAGNOSIS — R68.84 PAIN IN UPPER JAW: ICD-10-CM

## 2023-03-06 DIAGNOSIS — M54.2 NECK PAIN: Primary | ICD-10-CM

## 2023-03-06 PROCEDURE — 3078F PR MOST RECENT DIASTOLIC BLOOD PRESSURE < 80 MM HG: ICD-10-PCS | Mod: CPTII,S$GLB,,

## 2023-03-06 PROCEDURE — 3066F PR DOCUMENTATION OF TREATMENT FOR NEPHROPATHY: ICD-10-PCS | Mod: CPTII,S$GLB,,

## 2023-03-06 PROCEDURE — 3046F HEMOGLOBIN A1C LEVEL >9.0%: CPT | Mod: CPTII,S$GLB,,

## 2023-03-06 PROCEDURE — 1159F MED LIST DOCD IN RCRD: CPT | Mod: CPTII,S$GLB,,

## 2023-03-06 PROCEDURE — 99214 OFFICE O/P EST MOD 30 MIN: CPT | Mod: S$GLB,,,

## 2023-03-06 PROCEDURE — 72040 X-RAY EXAM NECK SPINE 2-3 VW: CPT | Mod: 26,,, | Performed by: RADIOLOGY

## 2023-03-06 PROCEDURE — 72040 XR CERVICAL SPINE AP LATERAL: ICD-10-PCS | Mod: 26,,, | Performed by: RADIOLOGY

## 2023-03-06 PROCEDURE — 3046F PR MOST RECENT HEMOGLOBIN A1C LEVEL > 9.0%: ICD-10-PCS | Mod: CPTII,S$GLB,,

## 2023-03-06 PROCEDURE — 99214 PR OFFICE/OUTPT VISIT, EST, LEVL IV, 30-39 MIN: ICD-10-PCS | Mod: S$GLB,,,

## 2023-03-06 PROCEDURE — 3074F PR MOST RECENT SYSTOLIC BLOOD PRESSURE < 130 MM HG: ICD-10-PCS | Mod: CPTII,S$GLB,,

## 2023-03-06 PROCEDURE — 1160F PR REVIEW ALL MEDS BY PRESCRIBER/CLIN PHARMACIST DOCUMENTED: ICD-10-PCS | Mod: CPTII,S$GLB,,

## 2023-03-06 PROCEDURE — 3061F PR NEG MICROALBUMINURIA RESULT DOCUMENTED/REVIEW: ICD-10-PCS | Mod: CPTII,S$GLB,,

## 2023-03-06 PROCEDURE — 3061F NEG MICROALBUMINURIA REV: CPT | Mod: CPTII,S$GLB,,

## 2023-03-06 PROCEDURE — 72040 X-RAY EXAM NECK SPINE 2-3 VW: CPT | Mod: TC

## 2023-03-06 PROCEDURE — 3008F PR BODY MASS INDEX (BMI) DOCUMENTED: ICD-10-PCS | Mod: CPTII,S$GLB,,

## 2023-03-06 PROCEDURE — 1159F PR MEDICATION LIST DOCUMENTED IN MEDICAL RECORD: ICD-10-PCS | Mod: CPTII,S$GLB,,

## 2023-03-06 PROCEDURE — 3074F SYST BP LT 130 MM HG: CPT | Mod: CPTII,S$GLB,,

## 2023-03-06 PROCEDURE — 3066F NEPHROPATHY DOC TX: CPT | Mod: CPTII,S$GLB,,

## 2023-03-06 PROCEDURE — 99999 PR PBB SHADOW E&M-EST. PATIENT-LVL V: ICD-10-PCS | Mod: PBBFAC,,,

## 2023-03-06 PROCEDURE — 99999 PR PBB SHADOW E&M-EST. PATIENT-LVL V: CPT | Mod: PBBFAC,,,

## 2023-03-06 PROCEDURE — 3008F BODY MASS INDEX DOCD: CPT | Mod: CPTII,S$GLB,,

## 2023-03-06 PROCEDURE — 1160F RVW MEDS BY RX/DR IN RCRD: CPT | Mod: CPTII,S$GLB,,

## 2023-03-06 PROCEDURE — 3078F DIAST BP <80 MM HG: CPT | Mod: CPTII,S$GLB,,

## 2023-03-06 RX ORDER — GABAPENTIN 100 MG/1
100 CAPSULE ORAL 3 TIMES DAILY
Qty: 90 CAPSULE | Refills: 11 | Status: SHIPPED | OUTPATIENT
Start: 2023-03-06 | End: 2023-04-05

## 2023-03-06 NOTE — PROGRESS NOTES
Jason Sandra  03/06/2023  [id number]    Primary Doctor Margo  Patient Care Team:  Primary Doctor Margo as PCP - General  Carlita Suarez, RN, CDE as Diabetes Educator (Diabetes)          Visit Type:an urgent visit for an existing problem     Chief Complaint:  Chief Complaint   Patient presents with    Neck Pain    Mid-back Pain       History of Present Illness:    Patient seen in office on 3/4 for neck pain and periauricular adenopathy  Was given flexeril and clindamycin   Xray of cervical spine was ordered    Prior seeing Dr. Mcconnell on March 4 went to   Started on keflex  He felt that it was not helping the lymph nodes  Dr. Mcconnell switched medicine to Clindamycin     Went to the ER on yesterday     male patient with a PMHx of DM who presents to the Emergency Department for evaluation of lymphadenopathy which onset gradually 12 days PTA. Pt complains of pain on both sides of his neck which began 12 days PTA. Pt reports it feels as if this area is swollen. Pt also reports occasional numbness to all 4 limbs. Pt also reports right TMJ/jaw pain, especially when chewing food. Pt reports being told he has lupus years ago which was not treated at the time and has not come up again. Family member also reports pt has had a cognitive decline over the past 8 months, mostly shown by a worsening memory    CMP and CBC did not show any abnormalities  CT soft tissue neck   No acute abnormality.  Multiple normal sized cervical lymph nodes without convincing evidence of pathologic adenopathy    Was told to follow up with ENT and Neurologist     Having headaches and numbness in his arm  He also has pain in his jaw  When he eats feels a sharp pain     Noticed that he has some problems with his memory  Started a few years ago  Has not discussed with his PCP       History:  Past Medical History:   Diagnosis Date    Diabetes mellitus     Hypogonadism in male      Past Surgical History:   Procedure Laterality Date    NOSE SURGERY       Family  History   Problem Relation Age of Onset    No Known Problems Mother      Social History     Socioeconomic History    Marital status: Legally    Tobacco Use    Smoking status: Never     Passive exposure: Never    Smokeless tobacco: Never   Substance and Sexual Activity    Alcohol use: Yes     Comment: socially    Drug use: No    Sexual activity: Yes     Partners: Female   Social History Narrative    [place]     Social Determinants of Health     Financial Resource Strain: Low Risk     Difficulty of Paying Living Expenses: Not hard at all   Food Insecurity: No Food Insecurity    Worried About Running Out of Food in the Last Year: Never true    Ran Out of Food in the Last Year: Never true   Transportation Needs: No Transportation Needs    Lack of Transportation (Medical): No    Lack of Transportation (Non-Medical): No   Stress: No Stress Concern Present    Feeling of Stress : Only a little   Social Connections: Moderately Isolated    Frequency of Communication with Friends and Family: More than three times a week    Frequency of Social Gatherings with Friends and Family: More than three times a week    Attends Restoration Services: Never    Active Member of Clubs or Organizations: No    Attends Club or Organization Meetings: Never    Marital Status:    Housing Stability: Low Risk     Unable to Pay for Housing in the Last Year: No    Number of Places Lived in the Last Year: 2    Unstable Housing in the Last Year: No     Patient Active Problem List   Diagnosis    Diabetic ketoacidosis without coma associated with type 2 diabetes mellitus    SOB (shortness of breath)    Dyslipidemia, goal LDL below 100    Restrictive lung disease    Hypogonadism male    Chronic prostatitis    Narcolepsy due to underlying condition without cataplexy    Insulin dependent diabetes mellitus type IA    Ketosis prone diabetes     Review of patient's allergies indicates:  No Known Allergies    The following were reviewed at this  "visit: active problem list, medication list, allergies, family history, social history, and health maintenance.    Medications:  Current Outpatient Medications on File Prior to Visit   Medication Sig Dispense Refill    ACCU-CHEK GUIDE ME GLUCOSE MTR Misc CHECK BLOOD SUGAR TWICE A DAY      atorvastatin (LIPITOR) 20 MG tablet Take 1 tablet (20 mg total) by mouth once daily. 90 tablet 2    blood sugar diagnostic Strp To check BG 2 times daily, to use with insurance preferred meter 200 strip 2    blood-glucose sensor (DEXCOM G6 SENSOR) Chen 1 sensor every 10 days 3 each 11    blood-glucose transmitter (DEXCOM G6 TRANSMITTER) Chen 1 transmitter every 3 months 1 each 4    cephALEXin (KEFLEX) 500 MG capsule Take 1 capsule (500 mg total) by mouth every 12 (twelve) hours. for 7 days 14 capsule 0    clindamycin (CLEOCIN) 300 MG capsule Take 1 capsule (300 mg total) by mouth every 8 (eight) hours. for 10 days 30 capsule 0    cyclobenzaprine (FLEXERIL) 5 MG tablet Take 1-2 tablets (5-10 mg total) by mouth 3 (three) times daily as needed for Muscle spasms. 30 tablet 1    glucagon (BAQSIMI) 3 mg/actuation Spry 3 mg (one actuation) into a single nostril; if no response, may repeat in 15 minutes using a new intranasal device. 2 each 1    insulin degludec (TRESIBA FLEXTOUCH U-100) 100 unit/mL (3 mL) insulin pen Inject 16 Units into the skin every evening. Max TDD of 30 units 5 pen 6    insulin lispro-aabc (LYUMJEV KWIKPEN U-100 INSULIN) 100 unit/mL pen Inject 8 units 3 times per day before meals, 2-4 units for snack, . + correction scale. MAX TDD Of 50 units 5 pen 6    lancets Misc To check BG 2 times daily, to use with insurance preferred meter 200 each 2    pen needle, diabetic 32 gauge x 5/32" Ndle To use 7 times per day with insulin injections- for meals, long-acting insulin and for snacks 200 each 11    testosterone cypionate (DEPOTESTOTERONE CYPIONATE) 200 mg/mL injection INJECT 1 ML (200 MG TOTAL) INTO THE MUSCLE TWICE A " WEEK. 10 mL 5     No current facility-administered medications on file prior to visit.       Medications have been reviewed and reconciled with patient at this visit.  Barriers to medications reviewed with patient.    Adverse reactions to current medications reviewed with patient..    Over the counter medications reviewed and reconciled with patient.    Exam:  Wt Readings from Last 3 Encounters:   03/06/23 74.3 kg (163 lb 12.8 oz)   03/05/23 74.2 kg (163 lb 7.5 oz)   03/04/23 73.8 kg (162 lb 11.2 oz)     Temp Readings from Last 3 Encounters:   03/06/23 98.3 °F (36.8 °C) (Tympanic)   03/05/23 97.8 °F (36.6 °C) (Oral)   03/04/23 97.4 °F (36.3 °C) (Tympanic)     BP Readings from Last 3 Encounters:   03/06/23 124/70   03/05/23 (!) 141/83   03/04/23 (!) 138/96     Pulse Readings from Last 3 Encounters:   03/06/23 103   03/05/23 97   03/04/23 85     Body mass index is 24.91 kg/m².      Review of Systems   Respiratory:  Negative for shortness of breath.    Cardiovascular:  Negative for chest pain and palpitations.   Musculoskeletal:  Positive for neck pain.   Psychiatric/Behavioral:  Positive for memory loss.    Physical Exam  Vitals and nursing note reviewed.   Constitutional:       General: He is not in acute distress.     Appearance: He is well-developed. He is not diaphoretic.   HENT:      Head: Normocephalic and atraumatic.      Comments: Boggy nasal turbinates      Right Ear: Tympanic membrane and external ear normal.      Left Ear: Tympanic membrane and external ear normal.   Eyes:      General:         Right eye: No discharge.         Left eye: No discharge.      Conjunctiva/sclera: Conjunctivae normal.      Pupils: Pupils are equal, round, and reactive to light.   Cardiovascular:      Rate and Rhythm: Normal rate and regular rhythm.      Heart sounds: Normal heart sounds. No murmur heard.  Pulmonary:      Effort: Pulmonary effort is normal. No respiratory distress.      Breath sounds: Normal breath sounds. No  wheezing.   Abdominal:      General: Bowel sounds are normal.   Musculoskeletal:      Cervical back: Tenderness present. No rigidity. Pain with movement present.   Lymphadenopathy:      Head:      Right side of head: Preauricular adenopathy present.      Left side of head: Preauricular adenopathy present.   Neurological:      Mental Status: He is alert.       Laboratory Reviewed ({Yes)  Lab Results   Component Value Date    WBC 4.64 03/05/2023    HGB 16.3 03/05/2023    HCT 48.1 03/05/2023     03/05/2023    CHOL 197 01/17/2023    TRIG 50 01/17/2023    HDL 66 01/17/2023    ALT 24 03/05/2023    AST 24 03/05/2023     03/05/2023    K 4.0 03/05/2023     03/05/2023    CREATININE 0.9 03/05/2023    BUN 19 03/05/2023    CO2 24 03/05/2023    TSH 2.641 01/01/2023    PSA 0.74 05/12/2011    HGBA1C >14.0 (H) 01/17/2023       Jason was seen today for neck pain and mid-back pain.    Diagnoses and all orders for this visit:    Neck pain  -     gabapentin (NEURONTIN) 100 MG capsule; Take 1 capsule (100 mg total) by mouth 3 (three) times daily.    Periauricular adenopathy  -     Ambulatory referral/consult to ENT; Future    Cervical radiculopathy  -     gabapentin (NEURONTIN) 100 MG capsule; Take 1 capsule (100 mg total) by mouth 3 (three) times daily.    Insulin dependent diabetes mellitus type IA  Referred to endo for DM management     Memory deficits  -     VITAMIN B12; Future  -     HIV 1/2 Ag/Ab (4th Gen); Future  -     RPR; Future  -     Ambulatory referral/consult to Neurology; Future    Pain in upper jaw  -     Ambulatory referral/consult to ENT; Future    Offered physical therapy for pain  Pt refused at this time   Dr. Mcconnell ordered CT of cervical spine    Encouraged to continue using Clindamycin for periauricular adenopathy     Refer to ENT and Neuro  Est care appt with Dr. Mcconnell at later time     Care Plan/Goals: Reviewed    Goals    None         Follow up: No follow-ups on file.    After visit summary  was printed and given to patient upon discharge today.  Patient goals and care plan are included in After Visit Summary.

## 2023-03-09 ENCOUNTER — OFFICE VISIT (OUTPATIENT)
Dept: INTERNAL MEDICINE | Facility: CLINIC | Age: 48
End: 2023-03-09
Payer: COMMERCIAL

## 2023-03-09 ENCOUNTER — LAB VISIT (OUTPATIENT)
Dept: LAB | Facility: HOSPITAL | Age: 48
End: 2023-03-09
Payer: COMMERCIAL

## 2023-03-09 ENCOUNTER — OFFICE VISIT (OUTPATIENT)
Dept: NEUROLOGY | Facility: CLINIC | Age: 48
End: 2023-03-09
Payer: COMMERCIAL

## 2023-03-09 ENCOUNTER — OFFICE VISIT (OUTPATIENT)
Dept: OTOLARYNGOLOGY | Facility: CLINIC | Age: 48
End: 2023-03-09
Payer: COMMERCIAL

## 2023-03-09 VITALS
SYSTOLIC BLOOD PRESSURE: 142 MMHG | HEIGHT: 68 IN | WEIGHT: 160.94 LBS | RESPIRATION RATE: 20 BRPM | WEIGHT: 160.94 LBS | OXYGEN SATURATION: 100 % | DIASTOLIC BLOOD PRESSURE: 86 MMHG | BODY MASS INDEX: 24.39 KG/M2 | HEIGHT: 68 IN | HEART RATE: 80 BPM | DIASTOLIC BLOOD PRESSURE: 89 MMHG | BODY MASS INDEX: 24.39 KG/M2 | TEMPERATURE: 98 F | SYSTOLIC BLOOD PRESSURE: 138 MMHG | HEART RATE: 86 BPM

## 2023-03-09 DIAGNOSIS — Z11.59 NEED FOR HEPATITIS C SCREENING TEST: ICD-10-CM

## 2023-03-09 DIAGNOSIS — R76.8 ANA POSITIVE: ICD-10-CM

## 2023-03-09 DIAGNOSIS — R59.0 PERIAURICULAR ADENOPATHY: ICD-10-CM

## 2023-03-09 DIAGNOSIS — R41.3 MEMORY CHANGES: ICD-10-CM

## 2023-03-09 DIAGNOSIS — R41.9 COGNITIVE COMPLAINTS WITH NORMAL EXAM: ICD-10-CM

## 2023-03-09 DIAGNOSIS — R07.9 CHEST PAIN, UNSPECIFIED TYPE: ICD-10-CM

## 2023-03-09 DIAGNOSIS — R68.89 FORGETFULNESS: ICD-10-CM

## 2023-03-09 DIAGNOSIS — E10.9 INSULIN DEPENDENT DIABETES MELLITUS TYPE IA: ICD-10-CM

## 2023-03-09 DIAGNOSIS — Z82.0 FAMILY HISTORY OF ALZHEIMER'S DISEASE: ICD-10-CM

## 2023-03-09 DIAGNOSIS — Z65.8 PSYCHOSOCIAL STRESSORS: ICD-10-CM

## 2023-03-09 DIAGNOSIS — M32.9 PERSONAL HISTORY OF SYSTEMIC LUPUS ERYTHEMATOSUS (SLE): ICD-10-CM

## 2023-03-09 DIAGNOSIS — R41.3 MEMORY DEFICITS: ICD-10-CM

## 2023-03-09 DIAGNOSIS — M32.9 PERSONAL HISTORY OF SYSTEMIC LUPUS ERYTHEMATOSUS (SLE): Primary | ICD-10-CM

## 2023-03-09 DIAGNOSIS — K11.20 SIALOADENITIS: Primary | ICD-10-CM

## 2023-03-09 DIAGNOSIS — R21 BUTTERFLY RASH: ICD-10-CM

## 2023-03-09 DIAGNOSIS — R68.84 PAIN IN UPPER JAW: ICD-10-CM

## 2023-03-09 DIAGNOSIS — R41.9 COGNITIVE COMPLAINTS WITH NORMAL EXAM: Primary | ICD-10-CM

## 2023-03-09 LAB
FOLATE SERPL-MCNC: 14.3 NG/ML (ref 4–24)
HCV AB SERPL QL IA: NORMAL
HCYS SERPL-SCNC: 4 UMOL/L (ref 4–16.5)
HIV 1+2 AB+HIV1 P24 AG SERPL QL IA: NORMAL
TSH SERPL DL<=0.005 MIU/L-ACNC: 3.84 UIU/ML (ref 0.4–4)
VIT B12 SERPL-MCNC: 459 PG/ML (ref 210–950)

## 2023-03-09 PROCEDURE — 3066F NEPHROPATHY DOC TX: CPT | Mod: CPTII,S$GLB,, | Performed by: OTOLARYNGOLOGY

## 2023-03-09 PROCEDURE — 3066F NEPHROPATHY DOC TX: CPT | Mod: CPTII,S$GLB,, | Performed by: FAMILY MEDICINE

## 2023-03-09 PROCEDURE — 3061F NEG MICROALBUMINURIA REV: CPT | Mod: CPTII,S$GLB,, | Performed by: FAMILY MEDICINE

## 2023-03-09 PROCEDURE — 99214 PR OFFICE/OUTPT VISIT, EST, LEVL IV, 30-39 MIN: ICD-10-PCS | Mod: S$GLB,,, | Performed by: FAMILY MEDICINE

## 2023-03-09 PROCEDURE — 99999 PR PBB SHADOW E&M-EST. PATIENT-LVL V: ICD-10-PCS | Mod: PBBFAC,,, | Performed by: FAMILY MEDICINE

## 2023-03-09 PROCEDURE — 1159F PR MEDICATION LIST DOCUMENTED IN MEDICAL RECORD: ICD-10-PCS | Mod: CPTII,S$GLB,, | Performed by: NURSE PRACTITIONER

## 2023-03-09 PROCEDURE — 3046F HEMOGLOBIN A1C LEVEL >9.0%: CPT | Mod: CPTII,S$GLB,, | Performed by: OTOLARYNGOLOGY

## 2023-03-09 PROCEDURE — 3066F PR DOCUMENTATION OF TREATMENT FOR NEPHROPATHY: ICD-10-PCS | Mod: CPTII,S$GLB,, | Performed by: OTOLARYNGOLOGY

## 2023-03-09 PROCEDURE — 86592 SYPHILIS TEST NON-TREP QUAL: CPT

## 2023-03-09 PROCEDURE — 1159F PR MEDICATION LIST DOCUMENTED IN MEDICAL RECORD: ICD-10-PCS | Mod: CPTII,S$GLB,, | Performed by: OTOLARYNGOLOGY

## 2023-03-09 PROCEDURE — 3008F BODY MASS INDEX DOCD: CPT | Mod: CPTII,S$GLB,, | Performed by: FAMILY MEDICINE

## 2023-03-09 PROCEDURE — 3046F HEMOGLOBIN A1C LEVEL >9.0%: CPT | Mod: CPTII,S$GLB,, | Performed by: FAMILY MEDICINE

## 2023-03-09 PROCEDURE — 86039 ANTINUCLEAR ANTIBODIES (ANA): CPT

## 2023-03-09 PROCEDURE — 3008F PR BODY MASS INDEX (BMI) DOCUMENTED: ICD-10-PCS | Mod: CPTII,S$GLB,, | Performed by: FAMILY MEDICINE

## 2023-03-09 PROCEDURE — 82746 ASSAY OF FOLIC ACID SERUM: CPT | Performed by: NURSE PRACTITIONER

## 2023-03-09 PROCEDURE — 99999 PR PBB SHADOW E&M-EST. PATIENT-LVL V: CPT | Mod: PBBFAC,,, | Performed by: FAMILY MEDICINE

## 2023-03-09 PROCEDURE — 36415 COLL VENOUS BLD VENIPUNCTURE: CPT | Performed by: FAMILY MEDICINE

## 2023-03-09 PROCEDURE — 3079F DIAST BP 80-89 MM HG: CPT | Mod: CPTII,S$GLB,, | Performed by: NURSE PRACTITIONER

## 2023-03-09 PROCEDURE — 3061F NEG MICROALBUMINURIA REV: CPT | Mod: CPTII,S$GLB,, | Performed by: NURSE PRACTITIONER

## 2023-03-09 PROCEDURE — 1159F MED LIST DOCD IN RCRD: CPT | Mod: CPTII,S$GLB,, | Performed by: FAMILY MEDICINE

## 2023-03-09 PROCEDURE — 99205 OFFICE O/P NEW HI 60 MIN: CPT | Mod: S$GLB,,, | Performed by: NURSE PRACTITIONER

## 2023-03-09 PROCEDURE — 3046F PR MOST RECENT HEMOGLOBIN A1C LEVEL > 9.0%: ICD-10-PCS | Mod: CPTII,S$GLB,, | Performed by: OTOLARYNGOLOGY

## 2023-03-09 PROCEDURE — 99999 PR PBB SHADOW E&M-EST. PATIENT-LVL V: CPT | Mod: PBBFAC,,, | Performed by: NURSE PRACTITIONER

## 2023-03-09 PROCEDURE — 3061F NEG MICROALBUMINURIA REV: CPT | Mod: CPTII,S$GLB,, | Performed by: OTOLARYNGOLOGY

## 2023-03-09 PROCEDURE — 86803 HEPATITIS C AB TEST: CPT | Performed by: FAMILY MEDICINE

## 2023-03-09 PROCEDURE — 3077F SYST BP >= 140 MM HG: CPT | Mod: CPTII,S$GLB,, | Performed by: NURSE PRACTITIONER

## 2023-03-09 PROCEDURE — 3061F PR NEG MICROALBUMINURIA RESULT DOCUMENTED/REVIEW: ICD-10-PCS | Mod: CPTII,S$GLB,, | Performed by: FAMILY MEDICINE

## 2023-03-09 PROCEDURE — 3079F DIAST BP 80-89 MM HG: CPT | Mod: CPTII,S$GLB,, | Performed by: FAMILY MEDICINE

## 2023-03-09 PROCEDURE — 1159F MED LIST DOCD IN RCRD: CPT | Mod: CPTII,S$GLB,, | Performed by: OTOLARYNGOLOGY

## 2023-03-09 PROCEDURE — 86038 ANTINUCLEAR ANTIBODIES: CPT | Performed by: FAMILY MEDICINE

## 2023-03-09 PROCEDURE — 99204 PR OFFICE/OUTPT VISIT, NEW, LEVL IV, 45-59 MIN: ICD-10-PCS | Mod: S$GLB,,, | Performed by: OTOLARYNGOLOGY

## 2023-03-09 PROCEDURE — 3046F HEMOGLOBIN A1C LEVEL >9.0%: CPT | Mod: CPTII,S$GLB,, | Performed by: NURSE PRACTITIONER

## 2023-03-09 PROCEDURE — 3066F PR DOCUMENTATION OF TREATMENT FOR NEPHROPATHY: ICD-10-PCS | Mod: CPTII,S$GLB,, | Performed by: FAMILY MEDICINE

## 2023-03-09 PROCEDURE — 3077F PR MOST RECENT SYSTOLIC BLOOD PRESSURE >= 140 MM HG: ICD-10-PCS | Mod: CPTII,S$GLB,, | Performed by: NURSE PRACTITIONER

## 2023-03-09 PROCEDURE — 99999 PR PBB SHADOW E&M-EST. PATIENT-LVL III: CPT | Mod: PBBFAC,,, | Performed by: OTOLARYNGOLOGY

## 2023-03-09 PROCEDURE — 3066F NEPHROPATHY DOC TX: CPT | Mod: CPTII,S$GLB,, | Performed by: NURSE PRACTITIONER

## 2023-03-09 PROCEDURE — 3079F PR MOST RECENT DIASTOLIC BLOOD PRESSURE 80-89 MM HG: ICD-10-PCS | Mod: CPTII,S$GLB,, | Performed by: NURSE PRACTITIONER

## 2023-03-09 PROCEDURE — 3066F PR DOCUMENTATION OF TREATMENT FOR NEPHROPATHY: ICD-10-PCS | Mod: CPTII,S$GLB,, | Performed by: NURSE PRACTITIONER

## 2023-03-09 PROCEDURE — 82607 VITAMIN B-12: CPT

## 2023-03-09 PROCEDURE — 3008F PR BODY MASS INDEX (BMI) DOCUMENTED: ICD-10-PCS | Mod: CPTII,S$GLB,, | Performed by: NURSE PRACTITIONER

## 2023-03-09 PROCEDURE — 99999 PR PBB SHADOW E&M-EST. PATIENT-LVL V: ICD-10-PCS | Mod: PBBFAC,,, | Performed by: NURSE PRACTITIONER

## 2023-03-09 PROCEDURE — 3075F SYST BP GE 130 - 139MM HG: CPT | Mod: CPTII,S$GLB,, | Performed by: FAMILY MEDICINE

## 2023-03-09 PROCEDURE — 1159F MED LIST DOCD IN RCRD: CPT | Mod: CPTII,S$GLB,, | Performed by: NURSE PRACTITIONER

## 2023-03-09 PROCEDURE — 83090 ASSAY OF HOMOCYSTEINE: CPT | Performed by: NURSE PRACTITIONER

## 2023-03-09 PROCEDURE — 3075F PR MOST RECENT SYSTOLIC BLOOD PRESS GE 130-139MM HG: ICD-10-PCS | Mod: CPTII,S$GLB,, | Performed by: FAMILY MEDICINE

## 2023-03-09 PROCEDURE — 99204 OFFICE O/P NEW MOD 45 MIN: CPT | Mod: S$GLB,,, | Performed by: OTOLARYNGOLOGY

## 2023-03-09 PROCEDURE — 3046F PR MOST RECENT HEMOGLOBIN A1C LEVEL > 9.0%: ICD-10-PCS | Mod: CPTII,S$GLB,, | Performed by: NURSE PRACTITIONER

## 2023-03-09 PROCEDURE — 99999 PR PBB SHADOW E&M-EST. PATIENT-LVL III: ICD-10-PCS | Mod: PBBFAC,,, | Performed by: OTOLARYNGOLOGY

## 2023-03-09 PROCEDURE — 99205 PR OFFICE/OUTPT VISIT, NEW, LEVL V, 60-74 MIN: ICD-10-PCS | Mod: S$GLB,,, | Performed by: NURSE PRACTITIONER

## 2023-03-09 PROCEDURE — 99214 OFFICE O/P EST MOD 30 MIN: CPT | Mod: S$GLB,,, | Performed by: FAMILY MEDICINE

## 2023-03-09 PROCEDURE — 3061F PR NEG MICROALBUMINURIA RESULT DOCUMENTED/REVIEW: ICD-10-PCS | Mod: CPTII,S$GLB,, | Performed by: NURSE PRACTITIONER

## 2023-03-09 PROCEDURE — 3008F BODY MASS INDEX DOCD: CPT | Mod: CPTII,S$GLB,, | Performed by: NURSE PRACTITIONER

## 2023-03-09 PROCEDURE — 87389 HIV-1 AG W/HIV-1&-2 AB AG IA: CPT

## 2023-03-09 PROCEDURE — 3079F PR MOST RECENT DIASTOLIC BLOOD PRESSURE 80-89 MM HG: ICD-10-PCS | Mod: CPTII,S$GLB,, | Performed by: FAMILY MEDICINE

## 2023-03-09 PROCEDURE — 3061F PR NEG MICROALBUMINURIA RESULT DOCUMENTED/REVIEW: ICD-10-PCS | Mod: CPTII,S$GLB,, | Performed by: OTOLARYNGOLOGY

## 2023-03-09 PROCEDURE — 84443 ASSAY THYROID STIM HORMONE: CPT | Performed by: NURSE PRACTITIONER

## 2023-03-09 PROCEDURE — 1159F PR MEDICATION LIST DOCUMENTED IN MEDICAL RECORD: ICD-10-PCS | Mod: CPTII,S$GLB,, | Performed by: FAMILY MEDICINE

## 2023-03-09 PROCEDURE — 86235 NUCLEAR ANTIGEN ANTIBODY: CPT | Mod: 59 | Performed by: FAMILY MEDICINE

## 2023-03-09 PROCEDURE — 3046F PR MOST RECENT HEMOGLOBIN A1C LEVEL > 9.0%: ICD-10-PCS | Mod: CPTII,S$GLB,, | Performed by: FAMILY MEDICINE

## 2023-03-09 NOTE — PROGRESS NOTES
Subjective:       Patient ID: Jason Sandra is a 48 y.o. male here today to establish care.    Chief Complaint: Establish Care    Patient presents to clinic today to establish care.    Review of Systems   Constitutional:  Positive for chills and fatigue. Negative for fever and unexpected weight change.   HENT:  Negative for congestion, dental problem, ear pain, hearing loss, rhinorrhea and trouble swallowing.    Eyes:  Negative for pain and visual disturbance.   Respiratory:  Negative for cough and shortness of breath.    Cardiovascular:  Positive for chest pain and leg swelling. Negative for palpitations.   Gastrointestinal:  Negative for abdominal distention, abdominal pain, blood in stool, constipation, diarrhea, nausea and vomiting.   Genitourinary:  Negative for difficulty urinating, scrotal swelling and testicular pain.   Musculoskeletal:  Positive for arthralgias. Negative for myalgias.   Skin:  Positive for rash.   Neurological:  Positive for numbness and headaches. Negative for dizziness and weakness.   Hematological:  Positive for adenopathy. Does not bruise/bleed easily.   Psychiatric/Behavioral:  Negative for dysphoric mood and sleep disturbance. The patient is not nervous/anxious.      Objective:      Physical Exam  Vitals reviewed.   Constitutional:       General: He is not in acute distress.     Appearance: He is well-developed.   HENT:      Head: Normocephalic and atraumatic.     Eyes:      General: Lids are normal. No scleral icterus.     Extraocular Movements: Extraocular movements intact.      Conjunctiva/sclera: Conjunctivae normal.      Pupils: Pupils are equal, round, and reactive to light.   Cardiovascular:      Rate and Rhythm: Normal rate and regular rhythm.      Heart sounds: No murmur heard.    No friction rub. No gallop.   Pulmonary:      Effort: Pulmonary effort is normal.      Breath sounds: Normal breath sounds. No decreased breath sounds, wheezing, rhonchi or rales.    Neurological:      Mental Status: He is alert and oriented to person, place, and time.      Cranial Nerves: No cranial nerve deficit.      Gait: Gait normal.   Psychiatric:         Mood and Affect: Mood and affect normal.       Assessment:       1. Personal history of systemic lupus erythematosus (SLE)    2. Chest pain, unspecified type    3. Butterfly rash    4. Periauricular adenopathy    5. Insulin dependent diabetes mellitus type IA    6. Memory changes    7. Need for hepatitis C screening test          Plan:   1. Personal history of systemic lupus erythematosus (SLE)  -     KIRTI; Future; Expected date: 03/09/2023  -     Ambulatory referral/consult to Rheumatology; Future; Expected date: 03/16/2023    2. Chest pain, unspecified type  -     Ambulatory referral/consult to Cardiology; Future; Expected date: 03/16/2023    3. Butterfly rash  -     Ambulatory referral/consult to Rheumatology; Future; Expected date: 03/16/2023    4. Periauricular adenopathy  Assessment & Plan:  CT neck in ER showed no concerning LAD; patient reports improvement on clindamycin; no pain on left, still with some on the right; scheduled to see ENT today      5. Insulin dependent diabetes mellitus type IA  Overview:  Followed by Endocrinology, Luly Pacheco NP, continue current treatment plan     Assessment & Plan:  Follow up 3/22/23      6. Memory changes  Assessment & Plan:  Scheduled with Neurology today      7. Need for hepatitis C screening test  -     Hepatitis C Antibody; Future; Expected date: 03/09/2023      Health Maintenance reviewed/updated.

## 2023-03-09 NOTE — PROGRESS NOTES
Referring Provider:    [place]  [id number] [place] [address]  Subjective:   Patient: Jason Sandra [id number], :1975   Visit date:3/9/2023 10:56 AM    Chief Complaint:  Other (Right periauricular adenopathy, onset 2 weeks ago. Pain on bitting into something  )    HPI:    Prior notes reviewed by myself.  Clinical documentation obtained by nursing staff reviewed.     48-year-old gentleman presents for evaluation of possible periauricular lymphadenopathy.  This was 1st noted 2 weeks ago.  He has also noticed some tenderness in this area as well as pain whenever he 1st bites into food.  No foul taste in his mouth.  This has subsided with oral antibiotics but has not completely resolved.  He has a history of SLE and diabetes.      Objective:     Physical Exam:  Vitals:  There were no vitals taken for this visit.  General appearance:  Well developed, well nourished    Ears:  Otoscopy of external auditory canals and tympanic membranes was normal, clinical speech reception thresholds grossly intact, no mass/lesion of auricle.    Nose:  No masses/lesions of external nose, nasal mucosa, septum, and turbinates were within normal limits.    Mouth:  No mass/lesion of lips, teeth, gums, hard/soft palate, tongue, tonsils, or oropharynx.    Neck & Lymphatics:  No cervical lymphadenopathy, no neck mass/crepitus/ asymmetry, trachea is midline, no thyroid enlargement/tenderness/mass. TTP over right TMJ and right parotid gland        [x]  Data Reviewed:    Lab Results   Component Value Date    WBC 4.64 2023    HGB 16.3 2023    HCT 48.1 2023    MCV 90 2023    EOSINOPHIL 1.7 2023         [x]  Independent interpretation of test: Diffuse inflammation of right parotid gland  CT Soft Tissue Neck With Contrast  Order: [id number]  Status: Final result     Visible to patient: Yes (seen)     Next appt: 2023 at 12:00 PM in Rheumatology (Juan R Sen MD)     0 Result  Notes  Details    Reading Physician Reading Date Result Priority   Alex Ortiz MD  [phone] 3/5/2023 STAT     Narrative & Impression  EXAMINATION:  CT SOFT TISSUE NECK WITH CONTRAST     CLINICAL HISTORY:  Lymphadenopathy, neck;     TECHNIQUE:  Low dose axial images as well as sagittal and coronal reconstructions were performed from the skull base to the clavicles following the intravenous administration of 75 mL of Omnipaque 350.  All CT scans at this location are performed using dose modulation techniques as appropriate to a performed exam including the following: Automated exposure control; adjustment of the mA and/or kV according to patient size (this includes techniques or standardized protocols for targeted exams where dose is matched to indication/reason for exam; i. e. extremities or head); use of iterative reconstruction technique.     COMPARISON:  None.     FINDINGS:  Multiple normal sized cervical lymph nodes without convincing evidence of pathologic adenopathy.  Parotid, submandibular, and thyroid glands are unremarkable.     Pharyngeal and laryngeal soft tissues demonstrate no acute abnormality.     No visualized soft tissue mass or focal fluid collection.     Unremarkable lung apices.  No acute osseous abnormality is evident.  Visualized intracranial structures demonstrate no acute abnormality.     Impression:     No acute abnormality.  Multiple normal sized cervical lymph nodes without convincing evidence of pathologic adenopathy.  Consider further follow-up as warranted.        Electronically signed by: Alex Ortiz  Date:                                            03/05/2023  Time:                                           11:25             Assessment & Plan:   Sialoadenitis    Periauricular adenopathy  -     Ambulatory referral/consult to ENT    Pain in upper jaw  -     Ambulatory referral/consult to ENT        We reviewed his history, exam and imaging in detail.  Today he has minimal  swelling but some residual tenderness over his right parotid gland as well as over his right TMJ.  He has clear saliva noted from both parotid ducts.  His CT demonstrates inflammation of the right parotid gland that would be consistent with sialoadenitis.  This appears to be nearly resolved on physical exam today.  We discussed conservative treatment measures as noted below and he will follow up as needed.    His exam today is consistent with sialoadenitis of the right parotid.    We also discussed conservative measures to help treat sialoadenitis, including massage, warm compresses, aggressive hydration, and oral sialogogues (lemon drops or peppermints).  I will see the patient back in 2-3 weeks with a CT to evaluate their progress.  If their condition worsens in any way, the will return to the ER or come see me for a sooner appointment.

## 2023-03-09 NOTE — PROGRESS NOTES
Subjective:       Patient ID: Jason Sandra is a 48 y.o. male.    Chief Complaint: Memory issues     Referred by PCP: Mariana Joyce NP       HPIThe patient is here for memory loss evaluation. Per wife she noted changes that began approximately 10 months ago in 2022. The patient spouse joined visit via telephone. 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 She states that they are building a house and the patient is  forgetting details about the build such as supplies picked out or the decision that him and his wife has made for the building plan. He becomes agigated if she tries to get him to recall what was previously discussed. The patient  is no forgetting where placed certain things. The patient not  forgetting names. The patient is driving and denies getting lost.  The patient is not losing personal items and does not put them in odd places. No confusion around and inside the house. Has trouble managing appointments, forgetful of certain task. Patient is able to remember date and time, keeping up with medications and appointments and keeping up with major holidays and political changes. The patient is independent in handling finances. The patient is still independent with ADLs. No hallucinations or delusions. No seizures. He does have psychosocial stressors. Has issues with communication issues with wife. Father with diagnosis of AD and Mother with MG. No language problems. Uncontrolled DM Type I > 14.0 01-17--2023, that is now being managed by PCP.  No problems handling tools. No history of strokes.  No history of hypothyroidism. No history of alcoholism. No history of B12 deficiency. No history of depression. No history of STDs.  No history of HIV infection. No toxic exposures.  No history of traumatic brain injury. No tremors or abnormal movements. No falls or instability. No urinary incontinence. Patient becomes tired easily. No change in sleep and good  appetite. Father has AD 89.         Review of Systems   Constitutional:  Positive for activity change.   HENT:  Positive for ear pain.    Eyes: Negative.    Respiratory: Negative.     Cardiovascular: Negative.    Gastrointestinal: Negative.    Endocrine: Negative.    Genitourinary: Negative.    Musculoskeletal:  Positive for back pain, myalgias and neck pain.   Skin: Negative.    Allergic/Immunologic: Negative.    Neurological:  Positive for numbness.        Bilateral arms numbness, and legs numbness    Hematological: Negative.    Psychiatric/Behavioral:  Positive for confusion and decreased concentration.                Current Outpatient Medications:     ACCU-CHEK GUIDE ME GLUCOSE MTR Misc, CHECK BLOOD SUGAR TWICE A DAY, Disp: , Rfl:     atorvastatin (LIPITOR) 20 MG tablet, Take 1 tablet (20 mg total) by mouth once daily., Disp: 90 tablet, Rfl: 2    blood sugar diagnostic Strp, To check BG 2 times daily, to use with insurance preferred meter, Disp: 200 strip, Rfl: 2    blood-glucose sensor (DEXCOM G6 SENSOR) Chen, 1 sensor every 10 days, Disp: 3 each, Rfl: 11    blood-glucose transmitter (DEXCOM G6 TRANSMITTER) Chen, 1 transmitter every 3 months, Disp: 1 each, Rfl: 4    cephALEXin (KEFLEX) 500 MG capsule, Take 1 capsule (500 mg total) by mouth every 12 (twelve) hours. for 7 days, Disp: 14 capsule, Rfl: 0    clindamycin (CLEOCIN) 300 MG capsule, Take 1 capsule (300 mg total) by mouth every 8 (eight) hours. for 10 days, Disp: 30 capsule, Rfl: 0    cyclobenzaprine (FLEXERIL) 5 MG tablet, Take 1-2 tablets (5-10 mg total) by mouth 3 (three) times daily as needed for Muscle spasms., Disp: 30 tablet, Rfl: 1    gabapentin (NEURONTIN) 100 MG capsule, Take 1 capsule (100 mg total) by mouth 3 (three) times daily., Disp: 90 capsule, Rfl: 11    glucagon (BAQSIMI) 3 mg/actuation Spry, 3 mg (one actuation) into a single nostril; if no response, may repeat in 15 minutes using a new intranasal device., Disp: 2 each, Rfl: 1     "insulin degludec (TRESIBA FLEXTOUCH U-100) 100 unit/mL (3 mL) insulin pen, Inject 16 Units into the skin every evening. Max TDD of 30 units, Disp: 5 pen, Rfl: 6    insulin lispro-aabc (LYUMJEV KWIKPEN U-100 INSULIN) 100 unit/mL pen, Inject 8 units 3 times per day before meals, 2-4 units for snack, . + correction scale. MAX TDD Of 50 units, Disp: 5 pen, Rfl: 6    lancets Misc, To check BG 2 times daily, to use with insurance preferred meter, Disp: 200 each, Rfl: 2    pen needle, diabetic 32 gauge x 5/32" Ndle, To use 7 times per day with insulin injections- for meals, long-acting insulin and for snacks, Disp: 200 each, Rfl: 11    testosterone cypionate (DEPOTESTOTERONE CYPIONATE) 200 mg/mL injection, INJECT 1 ML (200 MG TOTAL) INTO THE MUSCLE TWICE A WEEK., Disp: 10 mL, Rfl: 5  Past Medical History:   Diagnosis Date    Diabetes mellitus     Hypogonadism in male      Past Surgical History:   Procedure Laterality Date    NOSE SURGERY       Social History     Socioeconomic History    Marital status: Legally    Tobacco Use    Smoking status: Never     Passive exposure: Never    Smokeless tobacco: Never   Substance and Sexual Activity    Alcohol use: Yes     Comment: socially    Drug use: No    Sexual activity: Yes     Partners: Female   Social History Narrative         Social Determinants of Health     Financial Resource Strain: Low Risk     Difficulty of Paying Living Expenses: Not hard at all   Food Insecurity: No Food Insecurity    Worried About Running Out of Food in the Last Year: Never true    Ran Out of Food in the Last Year: Never true   Transportation Needs: No Transportation Needs    Lack of Transportation (Medical): No    Lack of Transportation (Non-Medical): No   Stress: No Stress Concern Present    Feeling of Stress : Only a little   Social Connections: Moderately Isolated    Frequency of Communication with Friends and Family: More than three times a week    Frequency of Social Gatherings " with Friends and Family: More than three times a week    Attends [employer] Services: Never    Active Member of Clubs or Organizations: No    Attends Club or Organization Meetings: Never    Marital Status:    Housing Stability: Low Risk     Unable to Pay for Housing in the Last Year: No    Number of Places Lived in the Last Year: 2    Unstable Housing in the Last Year: No       Past/Current Medical/Surgical History, Past/Current Social History, Past/Current Family History and Past/Current Medications were reviewed in detail.      Objective:           VITAL SIGNS WERE REVIEWED      GENERAL APPEARANCE:     The patient looks comfortable.    Body habitus is normal BMI 24.47 KG     No signs of respiratory distress.    Normal breathing pattern.    No dysmorphic features    Normal eye contact.     GENERAL MEDICAL EXAM:    HEENT:  Head is atraumatic normocephalic.     No tender temporal arteries. Fundoscopic (Ophthalmoscopic) exam showed no disc edema.      Neck and Axillae: No JVD. No visible lesions.    No carotid bruits. No thyromegaly. No lymphadenopathy.    Cardiopulmonary: No cyanosis. No tachypnea. Normal respiratory effort.    Clear breath sounds. S1, S2 with regular rhythm . No murmurs.     Gastrointestinal/Urogenital:  No jaundice. No stomas or lesions. No visible hernias. No catheters.     Abdomen is soft non-tender. No masses or organomegaly.    Skin, Hair and Nails: No pathognonomic skin rash. No neurofibromatosis. No visible lesions.No stigmata of autoimmune disease. No clubbing.    Skin is warm and moist. No palpable masses.    Limbs: No varicose veins. No visible swelling.    No palpable edema. Pulses are symmetric. Pedal pulses are palpable.      Muskoskeletal: No visible deformities.No visible lesions.    No spine tenderness. No signs of longstanding neuropathy. No dislocations or fractures.            Neurologic Exam     Mental Status   Oriented to person, place, and time.   Oriented to person.    Oriented to place. Oriented to country, city, area, street and number.   Oriented to time. Oriented to year, month, date, day and season.   Registration: recalls 3 of 3 objects. Follows 3 step commands.   Attention: normal. Concentration: normal.   Speech: speech is normal and not slurred   Level of consciousness: alert  Knowledge: good and consistent with education. Able to perform simple calculations.   Able to name object. Able to read. Able to repeat. Able to write. Normal comprehension.     MOCA 28/30    Visuospatial/Executive 5/5    Naming                            3/3    Attention                         6/6    Language                         3/3    Abstraction                    2/2    Recall                                3/5    Orientation                     6/6    Recovered Recall 1 with cue     NORMAL-MILD NCD 26-30    Education some college      Cranial Nerves     CN II   Visual fields full to confrontation.   Visual acuity: normal with correction  Right visual field deficit: none  Left visual field deficit: none     CN III, IV, VI   Pupils are equal, round, and reactive to light.  Extraocular motions are normal.   Right pupil: Size: 2 mm. Shape: regular. Reactivity: brisk. Consensual response: intact. Accommodation: intact.   Left pupil: Size: 2 mm. Shape: regular. Reactivity: brisk. Consensual response: intact. Accommodation: intact.   CN III: no CN III palsy  CN VI: no CN VI palsy  Nystagmus: none   Diplopia: none  Ophthalmoparesis: none  Upgaze: normal  Downgaze: normal  Conjugate gaze: present  Vestibulo-ocular reflex: present    CN V   Facial sensation intact.   Right facial sensation deficit: none  Left facial sensation deficit: none    CN VII   Right facial weakness: none  Left facial weakness: none    CN VIII   CN VIII normal.   Hearing: intact    CN IX, X   CN IX normal.   CN X normal.   Palate: symmetric    CN XI   CN XI normal.   Right sternocleidomastoid strength: normal  Left  sternocleidomastoid strength: normal  Right trapezius strength: normal  Left trapezius strength: normal    CN XII   CN XII normal.   Tongue: not atrophic  Fasciculations: absent  Tongue deviation: none    Motor Exam   Muscle bulk: normal  Overall muscle tone: normal  Right arm tone: normal  Left arm tone: normal  Right arm pronator drift: absent  Left arm pronator drift: absent  Right leg tone: normal  Left leg tone: normal    Strength   Strength 5/5 throughout.   Right neck flexion: 5/5  Left neck flexion: 5/5  Right neck extension: 5/5  Left neck extension: 5/5  Right deltoid: 5/5  Left deltoid: 5/5  Right biceps: 5/5  Left biceps: 5/5  Right triceps: 5/5  Left triceps: 5/5  Right wrist flexion: 5/5  Left wrist flexion: 5/5  Right wrist extension: 5/5  Left wrist extension: 5/5  Right interossei: 5/5  Left interossei: 5/5  Right iliopsoas: 5/5  Left iliopsoas: 5/5  Right quadriceps: 5/5  Left quadriceps: 5/5  Right hamstrin/5  Left hamstrin/5  Right glutei: 5/5  Left glutei: 5/5  Right anterior tibial: 5/5  Left anterior tibial: 5/5  Right posterior tibial: 5/5  Left posterior tibial: 5/5  Right peroneal: 5/5  Left peroneal: 5/5  Right gastroc: 5/5  Left gastroc: 5/5    Sensory Exam   Light touch normal.   Right arm light touch: normal  Left arm light touch: normal  Right leg light touch: normal  Left leg light touch: normal  Vibration normal.   Right arm vibration: normal  Left arm vibration: normal  Right leg vibration: normal  Left leg vibration: normal  Proprioception normal.   Right arm proprioception: normal  Left arm proprioception: normal  Right leg proprioception: normal  Left leg proprioception: normal  Pinprick normal.   Right arm pinprick: normal  Left arm pinprick: normal  Right leg pinprick: normal  Left leg pinprick: normal  Graphesthesia: normal  Stereognosis: normal    Gait, Coordination, and Reflexes     Gait  Gait: normal    Coordination   Romberg: negative  Finger to nose coordination:  normal  Heel to shin coordination: normal  Tandem walking coordination: normal    Tremor   Resting tremor: absent  Intention tremor: absent  Action tremor: absent    Reflexes   Right brachioradialis: 2+  Left brachioradialis: 2+  Right biceps: 2+  Left biceps: 2+  Right triceps: 2+  Left triceps: 2+  Right patellar: 2+  Left patellar: 2+  Right plantar: normal  Left plantar: normal  Right Lewis: absent  Left Lewis: absent  Right ankle clonus: absent  Left ankle clonus: absent  Right pendular knee jerk: absent  Left pendular knee jerk: absent    Lab Results   Component Value Date    WBC 4.64 03/05/2023    HGB 16.3 03/05/2023    HCT 48.1 03/05/2023    MCV 90 03/05/2023     03/05/2023     Sodium   Date Value Ref Range Status   03/05/2023 138 136 - 145 mmol/L Final     Potassium   Date Value Ref Range Status   03/05/2023 4.0 3.5 - 5.1 mmol/L Final     Chloride   Date Value Ref Range Status   03/05/2023 104 95 - 110 mmol/L Final     CO2   Date Value Ref Range Status   03/05/2023 24 23 - 29 mmol/L Final     Glucose   Date Value Ref Range Status   03/05/2023 96 70 - 110 mg/dL Final     BUN   Date Value Ref Range Status   03/05/2023 19 6 - 20 mg/dL Final     Creatinine   Date Value Ref Range Status   03/05/2023 0.9 0.5 - 1.4 mg/dL Final     Calcium   Date Value Ref Range Status   03/05/2023 9.4 8.7 - 10.5 mg/dL Final     Total Protein   Date Value Ref Range Status   03/05/2023 7.5 6.0 - 8.4 g/dL Final     Albumin   Date Value Ref Range Status   03/05/2023 3.8 3.5 - 5.2 g/dL Final     Total Bilirubin   Date Value Ref Range Status   03/05/2023 0.7 0.1 - 1.0 mg/dL Final     Comment:     For infants and newborns, interpretation of results should be based  on gestational age, weight and in agreement with clinical  observations.    Premature Infant recommended reference ranges:  Up to 24 hours.............<8.0 mg/dL  Up to 48 hours............<12.0 mg/dL  3-5 days..................<15.0 mg/dL  6-29  days.................<15.0 mg/dL       Alkaline Phosphatase   Date Value Ref Range Status   03/05/2023 80 55 - 135 U/L Final     AST   Date Value Ref Range Status   03/05/2023 24 10 - 40 U/L Final     ALT   Date Value Ref Range Status   03/05/2023 24 10 - 44 U/L Final     Anion Gap   Date Value Ref Range Status   03/05/2023 10 8 - 16 mmol/L Final     eGFR if    Date Value Ref Range Status   02/24/2020 >60 >60 mL/min/1.73 m^2 Final     eGFR if non    Date Value Ref Range Status   02/24/2020 >60 >60 mL/min/1.73 m^2 Final     Comment:     Calculation used to obtain the estimated glomerular filtration  rate (eGFR) is the CKD-EPI equation.        No results found for: [id number]  Lab Results   Component Value Date    TSH 2.641 01/01/2023    FREET4 0.76 08/13/2018     Labs Reviewed:     03-09- 2023    TSH 3.840 NL, HC 4.0 NL, FA  14.3 NL, KIRTI +, KIRTI Profile neg, RPR Neg, HIV Neg, Vitamin B12 459 NL     Labs normal Except positive KIRTI, will refer to Rheumatology for further evaluation   CT of Head WO    05-    No acute intracerebral process   Labs Reviewed:    03-    RPR Neg, Vitamin B 12 358 NL, HIV neg,  TSH 2.756 NL     NL   Reviewed the neuroimaging independently       Assessment:       1. Cognitive complaints with normal exam    2. Memory deficits    3. Family history of Alzheimer's disease    4. Forgetfulness    5. Psychosocial stressors          Plan:     COGNITIVE COMPLAINTS WITH NORMAL EXAM/ FORGETFULNESS/FAMILY HISTORY OF ALZHEIMER (FATHER) / PSYCHOSOCIAL STRESS/       EVALUATION     Brain MRI to evaluate the frontal and temporal lobes.    TSH,HIV, RPR, FA,, HC, B12, MMA, RPR to evaluate for treatable causes of memory loss.    Comprehensive Neuropsychological Testing.    Explained to the patient and family that medications are intended to slow down the progression and not stop it or reverse the disease.The disease is relentless, progressive and so far cannot be  controlled.     I counseled the patient and family that the rate of progression is extremely variable and the average (10 years) is an inaccurate measure.     NO DMA recommended at this time    Recommend optimization of Psychosocial stressors     HOME CARE     Help with finances and decision making.    Join support group.    Proofing the house and use labeling.    Avoid antihistamines and anticholinergics.    Avoid changing routine.    Use written reminders.    Avoid multitasking.    Healthy diet, exercise (physical and cognitive).    Good sleep hygiene.    MEDICAL/SURGICAL COMORBIDITIES     All relevant medical comorbidities noted and managed by primary care physician and medical care team.          MISCELLANEOUS MEDICAL PROBLEMS       HEALTHY LIFESTYLE AND PREVENTATIVE CARE    Encouraged the patient to adhere to the age-appropriate health maintenance guidelines including screening tests and vaccinations.     Discussed the overall importance of healthy lifestyle, optimal weight, exercise, healthy diet, good sleep hygiene and avoiding drugs including smoking, alcohol and recreational drugs. The patient verbalized full understanding.       Advised the patient to follow COVID-19 prevention measures.       I spent 140 minutes face to face with the patient    Time spent in counseling and coordination of care including discussions etiology of diagnosis, pathogenesis of diagnosis, prognosis of diagnosis,, diagnostic results, impression and recommendations, diagnostic studies, management, risks and benefits of treatment, instructions of disease self-management, treatment instructions, follow up requirements, patient and family counseling/involvement in care compliance with treatment regimen. All of the patient's questions were answered during this discussion.         Jackelyn Wolf, MSN NP      Collaborating Provider: Bobby Ruiz MD, FAAN Neurologist/Epileptologist

## 2023-03-09 NOTE — ASSESSMENT & PLAN NOTE
CT neck in ER showed no concerning LAD; patient reports improvement on clindamycin; no pain on left, still with some on the right; scheduled to see ENT today

## 2023-03-10 LAB
ANA PATTERN 1: NORMAL
ANA PATTERN 2: NORMAL
ANA SER QL IF: POSITIVE
ANA TITER 2: NORMAL
ANA TITR SER IF: NORMAL {TITER}
RPR SER QL: NORMAL

## 2023-03-11 LAB
ANTI SM ANTIBODY: 0.12 RATIO (ref 0–0.99)
ANTI SM/RNP ANTIBODY: 0.14 RATIO (ref 0–0.99)
ANTI-SM INTERPRETATION: NEGATIVE
ANTI-SM/RNP INTERPRETATION: NEGATIVE
ANTI-SSA ANTIBODY: 0.1 RATIO (ref 0–0.99)
ANTI-SSA INTERPRETATION: NEGATIVE
ANTI-SSB ANTIBODY: 0.1 RATIO (ref 0–0.99)
ANTI-SSB INTERPRETATION: NEGATIVE
DSDNA AB SER-ACNC: NORMAL [IU]/ML

## 2023-03-12 PROBLEM — Z82.0 FAMILY HISTORY OF ALZHEIMER'S DISEASE: Status: ACTIVE | Noted: 2023-03-12

## 2023-03-12 PROBLEM — Z65.8 PSYCHOSOCIAL STRESSORS: Status: ACTIVE | Noted: 2023-03-12

## 2023-03-12 PROBLEM — R68.89 FORGETFULNESS: Status: ACTIVE | Noted: 2023-03-09

## 2023-03-12 PROBLEM — R41.9 COGNITIVE COMPLAINTS WITH NORMAL EXAM: Status: ACTIVE | Noted: 2023-03-12

## 2023-03-12 PROBLEM — R76.8 ANA POSITIVE: Status: ACTIVE | Noted: 2023-03-12

## 2023-03-12 NOTE — PROGRESS NOTES
Labs Reviewed:     03-09- 2023    TSH 3.840 NL, HC 4.0 NL, FA  14.3 NL, KIRTI +, KIRTI Profile neg, RPR Neg, HIV Neg, Vitamin B12 459 NL     Labs normal Except positive KIRTI, will refer to Rheumatology for further evaluation

## 2023-03-14 ENCOUNTER — HOSPITAL ENCOUNTER (OUTPATIENT)
Dept: RADIOLOGY | Facility: HOSPITAL | Age: 48
Discharge: HOME | End: 2023-03-14
Attending: INTERNAL MEDICINE
Payer: COMMERCIAL

## 2023-03-14 ENCOUNTER — OFFICE VISIT (OUTPATIENT)
Dept: RHEUMATOLOGY | Facility: CLINIC | Age: 48
End: 2023-03-14
Payer: COMMERCIAL

## 2023-03-14 ENCOUNTER — OFFICE VISIT (OUTPATIENT)
Dept: CARDIOLOGY | Facility: CLINIC | Age: 48
End: 2023-03-14
Payer: COMMERCIAL

## 2023-03-14 VITALS
DIASTOLIC BLOOD PRESSURE: 82 MMHG | HEART RATE: 102 BPM | OXYGEN SATURATION: 98 % | SYSTOLIC BLOOD PRESSURE: 140 MMHG | BODY MASS INDEX: 24.74 KG/M2 | WEIGHT: 162.69 LBS

## 2023-03-14 VITALS
HEIGHT: 68 IN | WEIGHT: 160.94 LBS | SYSTOLIC BLOOD PRESSURE: 143 MMHG | DIASTOLIC BLOOD PRESSURE: 87 MMHG | HEART RATE: 94 BPM | BODY MASS INDEX: 24.39 KG/M2

## 2023-03-14 DIAGNOSIS — R21 BUTTERFLY RASH: ICD-10-CM

## 2023-03-14 DIAGNOSIS — R41.9 COGNITIVE COMPLAINTS WITH NORMAL EXAM: ICD-10-CM

## 2023-03-14 DIAGNOSIS — R53.82 CHRONIC FATIGUE: ICD-10-CM

## 2023-03-14 DIAGNOSIS — Z51.81 MEDICATION MONITORING ENCOUNTER: ICD-10-CM

## 2023-03-14 DIAGNOSIS — M35.00 SICCA, UNSPECIFIED TYPE: ICD-10-CM

## 2023-03-14 DIAGNOSIS — R76.8 ANA POSITIVE: ICD-10-CM

## 2023-03-14 DIAGNOSIS — E55.9 VITAMIN D INSUFFICIENCY: ICD-10-CM

## 2023-03-14 DIAGNOSIS — E11.69 HYPERLIPIDEMIA ASSOCIATED WITH TYPE 2 DIABETES MELLITUS: ICD-10-CM

## 2023-03-14 DIAGNOSIS — R03.0 ELEVATED BLOOD PRESSURE READING: ICD-10-CM

## 2023-03-14 DIAGNOSIS — R94.31 ABNORMAL ELECTROCARDIOGRAM (ECG) (EKG): Primary | ICD-10-CM

## 2023-03-14 DIAGNOSIS — L40.50 PSORIATIC ARTHRITIS: ICD-10-CM

## 2023-03-14 DIAGNOSIS — E11.10 DIABETIC KETOACIDOSIS WITHOUT COMA ASSOCIATED WITH TYPE 2 DIABETES MELLITUS: ICD-10-CM

## 2023-03-14 DIAGNOSIS — M32.9 PERSONAL HISTORY OF SYSTEMIC LUPUS ERYTHEMATOSUS (SLE): ICD-10-CM

## 2023-03-14 DIAGNOSIS — L40.50 PSORIATIC ARTHRITIS: Primary | ICD-10-CM

## 2023-03-14 DIAGNOSIS — R07.9 CHEST PAIN, UNSPECIFIED TYPE: ICD-10-CM

## 2023-03-14 DIAGNOSIS — Z82.49 FAMILY HISTORY OF HEART DISEASE: ICD-10-CM

## 2023-03-14 DIAGNOSIS — R41.3 MEMORY DEFICITS: ICD-10-CM

## 2023-03-14 DIAGNOSIS — E10.9 INSULIN DEPENDENT DIABETES MELLITUS TYPE IA: ICD-10-CM

## 2023-03-14 DIAGNOSIS — Z79.899 HIGH RISK MEDICATION USE: ICD-10-CM

## 2023-03-14 DIAGNOSIS — E78.5 HYPERLIPIDEMIA ASSOCIATED WITH TYPE 2 DIABETES MELLITUS: ICD-10-CM

## 2023-03-14 DIAGNOSIS — Z65.8 PSYCHOSOCIAL STRESSORS: ICD-10-CM

## 2023-03-14 PROCEDURE — 99999 PR PBB SHADOW E&M-EST. PATIENT-LVL V: ICD-10-PCS | Mod: PBBFAC,,, | Performed by: STUDENT IN AN ORGANIZED HEALTH CARE EDUCATION/TRAINING PROGRAM

## 2023-03-14 PROCEDURE — 99204 OFFICE O/P NEW MOD 45 MIN: CPT | Mod: S$GLB,,, | Performed by: INTERNAL MEDICINE

## 2023-03-14 PROCEDURE — 1159F MED LIST DOCD IN RCRD: CPT | Mod: CPTII,S$GLB,, | Performed by: STUDENT IN AN ORGANIZED HEALTH CARE EDUCATION/TRAINING PROGRAM

## 2023-03-14 PROCEDURE — 3046F PR MOST RECENT HEMOGLOBIN A1C LEVEL > 9.0%: ICD-10-PCS | Mod: CPTII,S$GLB,, | Performed by: STUDENT IN AN ORGANIZED HEALTH CARE EDUCATION/TRAINING PROGRAM

## 2023-03-14 PROCEDURE — 3008F BODY MASS INDEX DOCD: CPT | Mod: CPTII,S$GLB,, | Performed by: STUDENT IN AN ORGANIZED HEALTH CARE EDUCATION/TRAINING PROGRAM

## 2023-03-14 PROCEDURE — 73130 X-RAY EXAM OF HAND: CPT | Mod: 26,50,, | Performed by: RADIOLOGY

## 2023-03-14 PROCEDURE — 3066F NEPHROPATHY DOC TX: CPT | Mod: CPTII,S$GLB,, | Performed by: STUDENT IN AN ORGANIZED HEALTH CARE EDUCATION/TRAINING PROGRAM

## 2023-03-14 PROCEDURE — 3008F BODY MASS INDEX DOCD: CPT | Mod: CPTII,S$GLB,, | Performed by: INTERNAL MEDICINE

## 2023-03-14 PROCEDURE — 1159F PR MEDICATION LIST DOCUMENTED IN MEDICAL RECORD: ICD-10-PCS | Mod: CPTII,S$GLB,, | Performed by: INTERNAL MEDICINE

## 2023-03-14 PROCEDURE — 72200 XR SACROILIAC JOINTS 3 VIEWS: ICD-10-PCS | Mod: 26,,, | Performed by: RADIOLOGY

## 2023-03-14 PROCEDURE — 3046F HEMOGLOBIN A1C LEVEL >9.0%: CPT | Mod: CPTII,S$GLB,, | Performed by: INTERNAL MEDICINE

## 2023-03-14 PROCEDURE — 3077F SYST BP >= 140 MM HG: CPT | Mod: CPTII,S$GLB,, | Performed by: STUDENT IN AN ORGANIZED HEALTH CARE EDUCATION/TRAINING PROGRAM

## 2023-03-14 PROCEDURE — 73130 X-RAY EXAM OF HAND: CPT | Mod: TC,50,FY,PO

## 2023-03-14 PROCEDURE — 3079F PR MOST RECENT DIASTOLIC BLOOD PRESSURE 80-89 MM HG: ICD-10-PCS | Mod: CPTII,S$GLB,, | Performed by: INTERNAL MEDICINE

## 2023-03-14 PROCEDURE — 3061F PR NEG MICROALBUMINURIA RESULT DOCUMENTED/REVIEW: ICD-10-PCS | Mod: CPTII,S$GLB,, | Performed by: INTERNAL MEDICINE

## 2023-03-14 PROCEDURE — 3066F NEPHROPATHY DOC TX: CPT | Mod: CPTII,S$GLB,, | Performed by: INTERNAL MEDICINE

## 2023-03-14 PROCEDURE — 3079F DIAST BP 80-89 MM HG: CPT | Mod: CPTII,S$GLB,, | Performed by: INTERNAL MEDICINE

## 2023-03-14 PROCEDURE — 3061F NEG MICROALBUMINURIA REV: CPT | Mod: CPTII,S$GLB,, | Performed by: STUDENT IN AN ORGANIZED HEALTH CARE EDUCATION/TRAINING PROGRAM

## 2023-03-14 PROCEDURE — 73630 XR FOOT COMPLETE 3 VIEW BILATERAL: ICD-10-PCS | Mod: 26,50,, | Performed by: RADIOLOGY

## 2023-03-14 PROCEDURE — 99204 OFFICE O/P NEW MOD 45 MIN: CPT | Mod: S$GLB,,, | Performed by: STUDENT IN AN ORGANIZED HEALTH CARE EDUCATION/TRAINING PROGRAM

## 2023-03-14 PROCEDURE — 99999 PR PBB SHADOW E&M-EST. PATIENT-LVL V: ICD-10-PCS | Mod: PBBFAC,,, | Performed by: INTERNAL MEDICINE

## 2023-03-14 PROCEDURE — 3046F PR MOST RECENT HEMOGLOBIN A1C LEVEL > 9.0%: ICD-10-PCS | Mod: CPTII,S$GLB,, | Performed by: INTERNAL MEDICINE

## 2023-03-14 PROCEDURE — 99204 PR OFFICE/OUTPT VISIT, NEW, LEVL IV, 45-59 MIN: ICD-10-PCS | Mod: S$GLB,,, | Performed by: STUDENT IN AN ORGANIZED HEALTH CARE EDUCATION/TRAINING PROGRAM

## 2023-03-14 PROCEDURE — 73130 XR HAND COMPLETE 3 VIEWS BILATERAL: ICD-10-PCS | Mod: 26,50,, | Performed by: RADIOLOGY

## 2023-03-14 PROCEDURE — 1159F PR MEDICATION LIST DOCUMENTED IN MEDICAL RECORD: ICD-10-PCS | Mod: CPTII,S$GLB,, | Performed by: STUDENT IN AN ORGANIZED HEALTH CARE EDUCATION/TRAINING PROGRAM

## 2023-03-14 PROCEDURE — 3079F PR MOST RECENT DIASTOLIC BLOOD PRESSURE 80-89 MM HG: ICD-10-PCS | Mod: CPTII,S$GLB,, | Performed by: STUDENT IN AN ORGANIZED HEALTH CARE EDUCATION/TRAINING PROGRAM

## 2023-03-14 PROCEDURE — 3066F PR DOCUMENTATION OF TREATMENT FOR NEPHROPATHY: ICD-10-PCS | Mod: CPTII,S$GLB,, | Performed by: STUDENT IN AN ORGANIZED HEALTH CARE EDUCATION/TRAINING PROGRAM

## 2023-03-14 PROCEDURE — 99999 PR PBB SHADOW E&M-EST. PATIENT-LVL V: CPT | Mod: PBBFAC,,, | Performed by: INTERNAL MEDICINE

## 2023-03-14 PROCEDURE — 1159F MED LIST DOCD IN RCRD: CPT | Mod: CPTII,S$GLB,, | Performed by: INTERNAL MEDICINE

## 2023-03-14 PROCEDURE — 72200 X-RAY EXAM SI JOINTS: CPT | Mod: 26,,, | Performed by: RADIOLOGY

## 2023-03-14 PROCEDURE — 72200 X-RAY EXAM SI JOINTS: CPT | Mod: TC,FY,PO

## 2023-03-14 PROCEDURE — 73630 X-RAY EXAM OF FOOT: CPT | Mod: 26,50,, | Performed by: RADIOLOGY

## 2023-03-14 PROCEDURE — 99204 PR OFFICE/OUTPT VISIT, NEW, LEVL IV, 45-59 MIN: ICD-10-PCS | Mod: S$GLB,,, | Performed by: INTERNAL MEDICINE

## 2023-03-14 PROCEDURE — 3061F PR NEG MICROALBUMINURIA RESULT DOCUMENTED/REVIEW: ICD-10-PCS | Mod: CPTII,S$GLB,, | Performed by: STUDENT IN AN ORGANIZED HEALTH CARE EDUCATION/TRAINING PROGRAM

## 2023-03-14 PROCEDURE — 3077F SYST BP >= 140 MM HG: CPT | Mod: CPTII,S$GLB,, | Performed by: INTERNAL MEDICINE

## 2023-03-14 PROCEDURE — 3061F NEG MICROALBUMINURIA REV: CPT | Mod: CPTII,S$GLB,, | Performed by: INTERNAL MEDICINE

## 2023-03-14 PROCEDURE — 3008F PR BODY MASS INDEX (BMI) DOCUMENTED: ICD-10-PCS | Mod: CPTII,S$GLB,, | Performed by: INTERNAL MEDICINE

## 2023-03-14 PROCEDURE — 3079F DIAST BP 80-89 MM HG: CPT | Mod: CPTII,S$GLB,, | Performed by: STUDENT IN AN ORGANIZED HEALTH CARE EDUCATION/TRAINING PROGRAM

## 2023-03-14 PROCEDURE — 73630 X-RAY EXAM OF FOOT: CPT | Mod: TC,50,FY,PO

## 2023-03-14 PROCEDURE — 3008F PR BODY MASS INDEX (BMI) DOCUMENTED: ICD-10-PCS | Mod: CPTII,S$GLB,, | Performed by: STUDENT IN AN ORGANIZED HEALTH CARE EDUCATION/TRAINING PROGRAM

## 2023-03-14 PROCEDURE — 3066F PR DOCUMENTATION OF TREATMENT FOR NEPHROPATHY: ICD-10-PCS | Mod: CPTII,S$GLB,, | Performed by: INTERNAL MEDICINE

## 2023-03-14 PROCEDURE — 99999 PR PBB SHADOW E&M-EST. PATIENT-LVL V: CPT | Mod: PBBFAC,,, | Performed by: STUDENT IN AN ORGANIZED HEALTH CARE EDUCATION/TRAINING PROGRAM

## 2023-03-14 PROCEDURE — 3046F HEMOGLOBIN A1C LEVEL >9.0%: CPT | Mod: CPTII,S$GLB,, | Performed by: STUDENT IN AN ORGANIZED HEALTH CARE EDUCATION/TRAINING PROGRAM

## 2023-03-14 PROCEDURE — 3077F PR MOST RECENT SYSTOLIC BLOOD PRESSURE >= 140 MM HG: ICD-10-PCS | Mod: CPTII,S$GLB,, | Performed by: STUDENT IN AN ORGANIZED HEALTH CARE EDUCATION/TRAINING PROGRAM

## 2023-03-14 PROCEDURE — 3077F PR MOST RECENT SYSTOLIC BLOOD PRESSURE >= 140 MM HG: ICD-10-PCS | Mod: CPTII,S$GLB,, | Performed by: INTERNAL MEDICINE

## 2023-03-14 RX ORDER — HYDROXYCHLOROQUINE SULFATE 200 MG/1
200 TABLET, FILM COATED ORAL DAILY
Qty: 30 TABLET | Refills: 3 | Status: SHIPPED | OUTPATIENT
Start: 2023-03-14 | End: 2023-04-13

## 2023-03-14 RX ORDER — METHOTREXATE 2.5 MG/1
15 TABLET ORAL
Qty: 24 TABLET | Refills: 3 | Status: SHIPPED | OUTPATIENT
Start: 2023-03-14 | End: 2024-03-13

## 2023-03-14 RX ORDER — FOLIC ACID 1 MG/1
1 TABLET ORAL DAILY
Qty: 30 TABLET | Refills: 4 | Status: SHIPPED | OUTPATIENT
Start: 2023-03-14 | End: 2023-06-09

## 2023-03-14 NOTE — PROGRESS NOTES
Section of Cardiology                  Cardiac Clinic Note    Chief Complaint/Reason for consultation: chest pain      HPI:   Jason Sandra is a 48 y.o. male with h/o HLD, diabetes who was referred to cardiology clinic by PCP Dr. Mcconnell for evaluation.    3/14/23  Reports chest pain and SOB about 6 months, has been stable  Works in construction, walks up stairs, heavy lifting  SOB was a few days ago, had to stop his activity   Chest pain, intermittent throughout the day, stops his activity  Palpitations are separate  Has joint pain, neck pain, shoulder  (sister has lupus)  Saw rheum today, started on MTX as a trial   Exercise daily, with weight training, no cardio, no issues with this for 25 min    Family hx: dad- CABG at age 59 yo  Denies tobacco or ETOH abuse       EKG 1/1/23 ST, nonspec TW abn, LVH, 107 bpm    ECHO      STRESS TEST    LHC      ROS: All 10 systems reviewed. Please refer to the HPI for pertinent positives. All other systems negative.     Past Medical History  Past Medical History:   Diagnosis Date    Diabetes mellitus     Hypogonadism in male        Surgical History  Past Surgical History:   Procedure Laterality Date    NOSE SURGERY            Allergies:   Review of patient's allergies indicates:  No Known Allergies    Social History:  Social History     Socioeconomic History    Marital status: Legally    Tobacco Use    Smoking status: Never     Passive exposure: Never    Smokeless tobacco: Never   Substance and Sexual Activity    Alcohol use: Yes     Comment: socially    Drug use: No    Sexual activity: Yes     Partners: Female   Social History Narrative         Social Determinants of Health     Financial Resource Strain: Low Risk     Difficulty of Paying Living Expenses: Not hard at all   Food Insecurity: No Food Insecurity    Worried About Running Out of Food in the Last Year: Never true    Ran Out of Food in the Last Year: Never true   Transportation Needs: No  Transportation Needs    Lack of Transportation (Medical): No    Lack of Transportation (Non-Medical): No   Stress: No Stress Concern Present    Feeling of Stress : Only a little   Social Connections: Moderately Isolated    Frequency of Communication with Friends and Family: More than three times a week    Frequency of Social Gatherings with Friends and Family: More than three times a week    Attends Oriental orthodox Services: Never    Active Member of Clubs or Organizations: No    Attends Club or Organization Meetings: Never    Marital Status:    Housing Stability: Low Risk     Unable to Pay for Housing in the Last Year: No    Number of Places Lived in the Last Year: 2    Unstable Housing in the Last Year: No       Family History:  family history includes No Known Problems in his mother.    Home Medications:  Current Outpatient Medications on File Prior to Visit   Medication Sig Dispense Refill    ACCU-CHEK GUIDE ME GLUCOSE MTR Misc CHECK BLOOD SUGAR TWICE A DAY      atorvastatin (LIPITOR) 20 MG tablet Take 1 tablet (20 mg total) by mouth once daily. 90 tablet 2    blood sugar diagnostic Strp To check BG 2 times daily, to use with insurance preferred meter 200 strip 2    blood-glucose sensor (DEXCOM G6 SENSOR) Chen 1 sensor every 10 days 3 each 11    blood-glucose transmitter (DEXCOM G6 TRANSMITTER) Chen 1 transmitter every 3 months 1 each 4    folic acid (FOLVITE) 1 MG tablet Take 1 tablet (1 mg total) by mouth once daily. 30 tablet 4    gabapentin (NEURONTIN) 100 MG capsule Take 1 capsule (100 mg total) by mouth 3 (three) times daily. 90 capsule 11    glucagon (BAQSIMI) 3 mg/actuation Spry 3 mg (one actuation) into a single nostril; if no response, may repeat in 15 minutes using a new intranasal device. 2 each 1    hydrOXYchloroQUINE (PLAQUENIL) 200 mg tablet Take 1 tablet (200 mg total) by mouth once daily. 30 tablet 3    insulin degludec (TRESIBA FLEXTOUCH U-100) 100 unit/mL (3 mL) insulin pen Inject 16 Units  "into the skin every evening. Max TDD of 30 units 5 pen 6    insulin lispro-aabc (LYUMJEV KWIKPEN U-100 INSULIN) 100 unit/mL pen Inject 8 units 3 times per day before meals, 2-4 units for snack, . + correction scale. MAX TDD Of 50 units 5 pen 6    lancets Misc To check BG 2 times daily, to use with insurance preferred meter 200 each 2    methotrexate 2.5 MG Tab Take 6 tablets (15 mg total) by mouth every 7 days. 24 tablet 3    pen needle, diabetic 32 gauge x 5/32" Ndle To use 7 times per day with insulin injections- for meals, long-acting insulin and for snacks 200 each 11    testosterone cypionate (DEPOTESTOTERONE CYPIONATE) 200 mg/mL injection INJECT 1 ML (200 MG TOTAL) INTO THE MUSCLE TWICE A WEEK. 10 mL 5    clindamycin (CLEOCIN) 300 MG capsule Take 1 capsule (300 mg total) by mouth every 8 (eight) hours. for 10 days (Patient not taking: Reported on 3/14/2023) 30 capsule 0    cyclobenzaprine (FLEXERIL) 5 MG tablet Take 1-2 tablets (5-10 mg total) by mouth 3 (three) times daily as needed for Muscle spasms. (Patient not taking: Reported on 3/14/2023) 30 tablet 1     No current facility-administered medications on file prior to visit.       Physical exam:  BP (!) 140/82   Pulse 102   Wt 73.8 kg (162 lb 11.2 oz)   SpO2 98%   BMI 24.74 kg/m²         General: Pt is a 48 y.o. year old male who is AAOx3, in NAD, is pleasant, well nourished, looks stated age  HEENT: PERRL, EOMI, Oral mucosa pink & moist  CVS  No abnormal cardiac pulsations noted on inspection. JVP not raised. The apical impulse is normal on palpation, and is located in the left 5th intercostal space in the mid - clavicular line. No palpable thrills or abnormal pulsations noted. RR, S1 - S2 heard, no murmurs, rubs or gallops appreciated.   PUL : CTA B/L. No wheezes/crackles heard   ABD : BS +, soft. No tenderness elicited   LE : No C/C/E. Distal Pulses palpable B/L         LABS:    Chemistry:   Lab Results   Component Value Date     03/05/2023    " K 4.0 03/05/2023     03/05/2023    CO2 24 03/05/2023    BUN 19 03/05/2023    CREATININE 0.9 03/05/2023    CALCIUM 9.4 03/05/2023     Cardiac Markers:   Lab Results   Component Value Date    TROPONINI <0.006 01/01/2023     Cardiac Markers (Last 3):   Lab Results   Component Value Date    TROPONINI <0.006 01/01/2023     CBC:   Lab Results   Component Value Date    WBC 4.64 03/05/2023    HGB 16.3 03/05/2023    HCT 48.1 03/05/2023    HCT 47 01/01/2023    MCV 90 03/05/2023     03/05/2023     Lipids:   Lab Results   Component Value Date    CHOL 197 01/17/2023    TRIG 50 01/17/2023    HDL 66 01/17/2023     Coagulation: No results found for: PT, INR, APTT        Assessment      1. Abnormal electrocardiogram (ECG) (EKG)    2. Chest pain, unspecified type    3. Diabetic ketoacidosis without coma associated with type 2 diabetes mellitus    4. Hyperlipidemia associated with type 2 diabetes mellitus    5. Family history of heart disease    6. Elevated blood pressure reading         Plan:    Chest pain/shortness of breath   Abnormal EKG   Family history of heart disease   Obtain nuclear stress test  Obtain echocardiogram     Diabetes   A1c > 14  Continue therapy    HLD   as of 1/23  Continue statin    Elevated blood pressure reading   Blood pressure been      This note was prepared using voice recognition system and is likely to have sound alike errors that may have been overlooked even after proofreading.     I have reviewed all pertinent chart information.  Plans and recommendations have been formulated under my direct supervision. All questions answered and patient voiced understanding.   If symptoms persist go to the ED.    RTC in 1month        Karl Payne MD  Cardiology

## 2023-03-14 NOTE — PROGRESS NOTES
RHEUMATOLOGY OUTPATIENT CLINIC NOTE    3/14/2023    Attending Rheumatologist: Juan R Sen  Primary Care Provider/Physician Requesting Consultation: Zoraida Mcconnell MD   Chief Complaint/Reason For Consultation:  Positive KIRTI      Subjective:     Jason Sandra is a 48 y.o. White male with positive KIRTI and chronic pain syndrome for evaluation.    Main concern of chronic widespread pain.    Review of Systems   Constitutional:  Positive for malaise/fatigue. Negative for fever.   HENT:          Moderate xerostomia   Eyes:  Negative for photophobia and pain.        No hx of glaucoma   Respiratory:  Negative for cough, hemoptysis and shortness of breath (REYNA, chronic).         No hx of asthma   Gastrointestinal:  Negative for blood in stool.        Refers intermittent dysphagia to solids.  No hx diverticulosis   Genitourinary:  Negative for hematuria.   Musculoskeletal:  Positive for back pain, joint pain, myalgias and neck pain.        FHx of SLE on sister   Skin:  Positive for rash.        History of sister with history of rash reminiscent of psoriasis.  Denies RP   Neurological:  Positive for weakness. Negative for focal weakness.   Endo/Heme/Allergies:         No hx of dvt/pe     Chronic comorbid conditions affecting medical decision making today:  Past Medical History:   Diagnosis Date    Diabetes mellitus     Hypogonadism in male      Past Surgical History:   Procedure Laterality Date    NOSE SURGERY       Family History   Problem Relation Age of Onset    No Known Problems Mother      Social History     Tobacco Use   Smoking Status Never    Passive exposure: Never   Smokeless Tobacco Never       Current Outpatient Medications:     ACCU-CHEK GUIDE ME GLUCOSE MTR Misc, CHECK BLOOD SUGAR TWICE A DAY, Disp: , Rfl:     atorvastatin (LIPITOR) 20 MG tablet, Take 1 tablet (20 mg total) by mouth once daily., Disp: 90 tablet, Rfl: 2    blood sugar diagnostic Strp, To check BG 2 times daily, to use with insurance  "preferred meter, Disp: 200 strip, Rfl: 2    blood-glucose sensor (DEXCOM G6 SENSOR) Chen, 1 sensor every 10 days, Disp: 3 each, Rfl: 11    blood-glucose transmitter (DEXCOM G6 TRANSMITTER) Chen, 1 transmitter every 3 months, Disp: 1 each, Rfl: 4    gabapentin (NEURONTIN) 100 MG capsule, Take 1 capsule (100 mg total) by mouth 3 (three) times daily., Disp: 90 capsule, Rfl: 11    glucagon (BAQSIMI) 3 mg/actuation Spry, 3 mg (one actuation) into a single nostril; if no response, may repeat in 15 minutes using a new intranasal device., Disp: 2 each, Rfl: 1    insulin degludec (TRESIBA FLEXTOUCH U-100) 100 unit/mL (3 mL) insulin pen, Inject 16 Units into the skin every evening. Max TDD of 30 units, Disp: 5 pen, Rfl: 6    insulin lispro-aabc (LYUMJEV KWIKPEN U-100 INSULIN) 100 unit/mL pen, Inject 8 units 3 times per day before meals, 2-4 units for snack, . + correction scale. MAX TDD Of 50 units, Disp: 5 pen, Rfl: 6    lancets Misc, To check BG 2 times daily, to use with insurance preferred meter, Disp: 200 each, Rfl: 2    pen needle, diabetic 32 gauge x 5/32" Ndle, To use 7 times per day with insulin injections- for meals, long-acting insulin and for snacks, Disp: 200 each, Rfl: 11    testosterone cypionate (DEPOTESTOTERONE CYPIONATE) 200 mg/mL injection, INJECT 1 ML (200 MG TOTAL) INTO THE MUSCLE TWICE A WEEK., Disp: 10 mL, Rfl: 5    clindamycin (CLEOCIN) 300 MG capsule, Take 1 capsule (300 mg total) by mouth every 8 (eight) hours. for 10 days (Patient not taking: Reported on 3/14/2023), Disp: 30 capsule, Rfl: 0    cyclobenzaprine (FLEXERIL) 5 MG tablet, Take 1-2 tablets (5-10 mg total) by mouth 3 (three) times daily as needed for Muscle spasms. (Patient not taking: Reported on 3/14/2023), Disp: 30 tablet, Rfl: 1     Objective:     Vitals:    03/14/23 1150   BP: (!) 143/87   Pulse: 94     Physical Exam   Eyes: Conjunctivae are normal.   Pulmonary/Chest: Effort normal. No respiratory distress.   Musculoskeletal:         " General: Tenderness and deformity present. No swelling.   Skin: Rash noted. There is erythema.   Toe nail keratosis, onycholysis, ridging, yellow spots     Reviewed available old and all outside pertinent medical records available.    All lab results personally reviewed and interpreted by me.       ASSESSMENT      Encounter Diagnoses   Name Primary?    Personal history of systemic lupus erythematosus (SLE)     Butterfly rash     KIRTI positive     Diabetic ketoacidosis without coma associated with type 2 diabetes mellitus     Insulin dependent diabetes mellitus type IA     Vitamin D insufficiency     Psoriatic arthritis Yes    Memory deficits     Psychosocial stressors     Cognitive complaints with normal exam     High risk medication use     Medication monitoring encounter     Sicca, unspecified type     Chronic fatigue       PLAN     Chronic widespread pain and fatigue.  Psoriasiform rash with reports of malar rash intermittently. Associated sicca sx.  FHx of sister with skin disorder.  TTP some enthesis on exam, + nail changes.  Squeeze tenderness some joints.  Elevated KIRTI w/ negative SUMAN profile.  No concerning cytopenias or evidence of nephropathy despite hx od DM I.  Impression of UCTD favoring psoriatic arthritis based on rash tylor, nail changes, enthesopathy.  DDx CTD related rash, considering incomplete SLE or early SjS vs inflamed seborrheic dermatitis in context of OA and FMS sx. Suggest trial of MTX 15mg per week, FA and HCQ for at least 3 months.  Side effects discussed.  Dermatology evaluation to consider skin bx if no improvement of rash w/ DRMARDs. Suggest biotene supp for sicca sx.  More specific ab for prognosis.  XR to establish baseline.  Repeat lbs close to f/u visit.    Juan R Sen M.D.

## 2023-03-17 ENCOUNTER — PATIENT MESSAGE (OUTPATIENT)
Dept: DIABETES | Facility: CLINIC | Age: 48
End: 2023-03-17
Payer: COMMERCIAL

## 2023-03-20 ENCOUNTER — LAB VISIT (OUTPATIENT)
Dept: LAB | Facility: OTHER | Age: 48
End: 2023-03-20
Attending: INTERNAL MEDICINE
Payer: COMMERCIAL

## 2023-03-20 DIAGNOSIS — Z13.6 ENCOUNTER FOR LIPID SCREENING FOR CARDIOVASCULAR DISEASE: ICD-10-CM

## 2023-03-20 DIAGNOSIS — E11.9 TYPE 2 DIABETES MELLITUS WITHOUT COMPLICATION, WITHOUT LONG-TERM CURRENT USE OF INSULIN: ICD-10-CM

## 2023-03-20 DIAGNOSIS — R68.89 FORGETFULNESS: ICD-10-CM

## 2023-03-20 DIAGNOSIS — R41.9 COGNITIVE COMPLAINTS WITH NORMAL EXAM: ICD-10-CM

## 2023-03-20 DIAGNOSIS — K11.20 SIALOADENITIS: Primary | ICD-10-CM

## 2023-03-20 DIAGNOSIS — G47.429 NARCOLEPSY DUE TO UNDERLYING CONDITION WITHOUT CATAPLEXY: ICD-10-CM

## 2023-03-20 DIAGNOSIS — Z11.59 NEED FOR HEPATITIS C SCREENING TEST: ICD-10-CM

## 2023-03-20 DIAGNOSIS — R41.3 MEMORY DEFICITS: ICD-10-CM

## 2023-03-20 DIAGNOSIS — E11.10 DIABETIC KETOACIDOSIS WITHOUT COMA ASSOCIATED WITH TYPE 2 DIABETES MELLITUS: ICD-10-CM

## 2023-03-20 DIAGNOSIS — E11.65 UNCONTROLLED TYPE 2 DIABETES MELLITUS WITH HYPERGLYCEMIA: ICD-10-CM

## 2023-03-20 DIAGNOSIS — Z82.0 FAMILY HISTORY OF ALZHEIMER'S DISEASE: ICD-10-CM

## 2023-03-20 DIAGNOSIS — Z13.220 ENCOUNTER FOR LIPID SCREENING FOR CARDIOVASCULAR DISEASE: ICD-10-CM

## 2023-03-20 LAB
ALBUMIN SERPL BCP-MCNC: 3.8 G/DL (ref 3.5–5.2)
ALBUMIN/CREAT UR: 6.3 UG/MG (ref 0–30)
ALP SERPL-CCNC: 79 U/L (ref 55–135)
ALT SERPL W/O P-5'-P-CCNC: 30 U/L (ref 10–44)
ANION GAP SERPL CALC-SCNC: 7 MMOL/L (ref 8–16)
AST SERPL-CCNC: 23 U/L (ref 10–40)
BASOPHILS # BLD AUTO: 0.03 K/UL (ref 0–0.2)
BASOPHILS NFR BLD: 0.6 % (ref 0–1.9)
BILIRUB SERPL-MCNC: 0.6 MG/DL (ref 0.1–1)
BUN SERPL-MCNC: 18 MG/DL (ref 6–20)
CALCIUM SERPL-MCNC: 9.7 MG/DL (ref 8.7–10.5)
CHLORIDE SERPL-SCNC: 102 MMOL/L (ref 95–110)
CHOLEST SERPL-MCNC: 165 MG/DL (ref 120–199)
CHOLEST/HDLC SERPL: 3 {RATIO} (ref 2–5)
CO2 SERPL-SCNC: 25 MMOL/L (ref 23–29)
CREAT SERPL-MCNC: 1 MG/DL (ref 0.5–1.4)
CREAT UR-MCNC: 80 MG/DL (ref 23–375)
DIFFERENTIAL METHOD: ABNORMAL
EOSINOPHIL # BLD AUTO: 0.1 K/UL (ref 0–0.5)
EOSINOPHIL NFR BLD: 1.8 % (ref 0–8)
ERYTHROCYTE [DISTWIDTH] IN BLOOD BY AUTOMATED COUNT: 11 % (ref 11.5–14.5)
EST. GFR  (NO RACE VARIABLE): >60 ML/MIN/1.73 M^2
ESTIMATED AVG GLUCOSE: 203 MG/DL (ref 68–131)
GLUCOSE SERPL-MCNC: 203 MG/DL (ref 70–110)
HBA1C MFR BLD: 8.7 % (ref 4–5.6)
HCT VFR BLD AUTO: 47.2 % (ref 40–54)
HDLC SERPL-MCNC: 55 MG/DL (ref 40–75)
HDLC SERPL: 33.3 % (ref 20–50)
HGB BLD-MCNC: 16 G/DL (ref 14–18)
HIV 1+2 AB+HIV1 P24 AG SERPL QL IA: NORMAL
IMM GRANULOCYTES # BLD AUTO: 0.01 K/UL (ref 0–0.04)
IMM GRANULOCYTES NFR BLD AUTO: 0.2 % (ref 0–0.5)
LDLC SERPL CALC-MCNC: 101 MG/DL (ref 63–159)
LYMPHOCYTES # BLD AUTO: 1.7 K/UL (ref 1–4.8)
LYMPHOCYTES NFR BLD: 34.6 % (ref 18–48)
MCH RBC QN AUTO: 30.7 PG (ref 27–31)
MCHC RBC AUTO-ENTMCNC: 33.9 G/DL (ref 32–36)
MCV RBC AUTO: 90 FL (ref 82–98)
MICROALBUMIN UR DL<=1MG/L-MCNC: 5 UG/ML
MONOCYTES # BLD AUTO: 0.4 K/UL (ref 0.3–1)
MONOCYTES NFR BLD: 7.8 % (ref 4–15)
NEUTROPHILS # BLD AUTO: 2.8 K/UL (ref 1.8–7.7)
NEUTROPHILS NFR BLD: 55 % (ref 38–73)
NONHDLC SERPL-MCNC: 110 MG/DL
NRBC BLD-RTO: 0 /100 WBC
PLATELET # BLD AUTO: 172 K/UL (ref 150–450)
PMV BLD AUTO: 9.3 FL (ref 9.2–12.9)
POTASSIUM SERPL-SCNC: 4.6 MMOL/L (ref 3.5–5.1)
PROT SERPL-MCNC: 7.8 G/DL (ref 6–8.4)
RBC # BLD AUTO: 5.22 M/UL (ref 4.6–6.2)
RPR SER QL: NORMAL
SODIUM SERPL-SCNC: 134 MMOL/L (ref 136–145)
T4 FREE SERPL-MCNC: 1.04 NG/DL (ref 0.71–1.51)
TRIGL SERPL-MCNC: 45 MG/DL (ref 30–150)
TSH SERPL DL<=0.005 MIU/L-ACNC: 2.76 UIU/ML (ref 0.4–4)
VIT B12 SERPL-MCNC: 358 PG/ML (ref 210–950)
WBC # BLD AUTO: 5 K/UL (ref 3.9–12.7)

## 2023-03-20 PROCEDURE — 85025 COMPLETE CBC W/AUTO DIFF WBC: CPT | Performed by: INTERNAL MEDICINE

## 2023-03-20 PROCEDURE — 36415 COLL VENOUS BLD VENIPUNCTURE: CPT | Performed by: INTERNAL MEDICINE

## 2023-03-20 PROCEDURE — 83036 HEMOGLOBIN GLYCOSYLATED A1C: CPT | Performed by: INTERNAL MEDICINE

## 2023-03-20 PROCEDURE — 82570 ASSAY OF URINE CREATININE: CPT | Performed by: INTERNAL MEDICINE

## 2023-03-20 PROCEDURE — 84439 ASSAY OF FREE THYROXINE: CPT | Performed by: INTERNAL MEDICINE

## 2023-03-20 PROCEDURE — 86592 SYPHILIS TEST NON-TREP QUAL: CPT | Performed by: NURSE PRACTITIONER

## 2023-03-20 PROCEDURE — 87389 HIV-1 AG W/HIV-1&-2 AB AG IA: CPT | Performed by: INTERNAL MEDICINE

## 2023-03-20 PROCEDURE — 82607 VITAMIN B-12: CPT | Performed by: NURSE PRACTITIONER

## 2023-03-20 PROCEDURE — 87522 HEPATITIS C REVRS TRNSCRPJ: CPT | Performed by: INTERNAL MEDICINE

## 2023-03-20 PROCEDURE — 84443 ASSAY THYROID STIM HORMONE: CPT | Performed by: INTERNAL MEDICINE

## 2023-03-20 PROCEDURE — 80061 LIPID PANEL: CPT | Performed by: INTERNAL MEDICINE

## 2023-03-20 PROCEDURE — 80053 COMPREHEN METABOLIC PANEL: CPT | Performed by: INTERNAL MEDICINE

## 2023-03-20 RX ORDER — HYDROCODONE BITARTRATE AND ACETAMINOPHEN 5; 325 MG/1; MG/1
1 TABLET ORAL EVERY 6 HOURS PRN
Qty: 12 TABLET | Refills: 0 | Status: SHIPPED | OUTPATIENT
Start: 2023-03-20 | End: 2023-03-23 | Stop reason: SDUPTHER

## 2023-03-20 RX ORDER — AMOXICILLIN AND CLAVULANATE POTASSIUM 875; 125 MG/1; MG/1
1 TABLET, FILM COATED ORAL 2 TIMES DAILY
Qty: 20 TABLET | Refills: 0 | Status: SHIPPED | OUTPATIENT
Start: 2023-03-20 | End: 2023-03-28

## 2023-03-21 ENCOUNTER — TELEPHONE (OUTPATIENT)
Dept: DIABETES | Facility: CLINIC | Age: 48
End: 2023-03-21
Payer: COMMERCIAL

## 2023-03-21 LAB
HCV RNA SERPL QL NAA+PROBE: NOT DETECTED
HCV RNA SPEC NAA+PROBE-ACNC: NOT DETECTED IU/ML

## 2023-03-21 NOTE — PROGRESS NOTES
CC:   Chief Complaint   Patient presents with    Diabetes Mellitus       HPI: Jason Sandra is a 48 y.o. male presents for a follow up visit today for the management of diabetes     He was diagnosed with Type 2  diabetes at least since 2011 on routine labs   Pre diabetes prior to that   He was initially started on Metformin     Family hx of diabetes: maybe younger brother, father   Hospitalized for diabetes: yes- DKA in 01/2023  Insulin therapy: 2023  No personal or FH of thyroid cancer or personal of pancreatic cancer or pancreatitis.       hospitalized for DKA on January 1, 2023  On his arrival to the emergency room he reported that he had been on any medications for diabetes in over a year  His blood sugar was 316  + beta -6.2  A1c was >14% in October 2021   He was sent home on basal insulin    He works at [employer]    Our last visit was in January of 2023  At that visit I weight based his insulin doses at 0.5 units/kg per day total daily dose  Levemir 16 units nightly---then transition to Tresiba 16 units nightly--he reports that he has been taking 20 units nightly  Start Lyumjev with meals --reports compliance  Has missed a couple doses of mealtime insulin but for the most part he is taking his insulin consistently  We also sent in a prescription for the Dexcom personal CGM  He met with diabetes education for Dexcom teaching and to review type 1 diabetes  Recent A1c went from greater than 14% to 8.7%  He reports over the last month he has been in a lot of pain---following with ENT for a salivary gland issue--also possible concerns for lupus  He reports that over the last month his blood sugar readings have been very high  He may be interested in insulin pump  See attached Dexcom download    1/17/2023- DINA negative   Anti islet cell antibody negative, insulin antibody negative  C-peptide level low at 0.59--with a blood sugar of 331    Blood sugar readings are above goal on Dexcom download.  Please see  attached              DIABETES COMPLICATIONS: none      Diabetes Management Status    ASA:  No    Statin: Taking Lipitor 20 mg nightly --has missed some doses  ACE/ARB: Not taking      The 10-year ASCVD risk score (Darleen BARCENAS, et al., 2019) is: 5.3%    Values used to calculate the score:      Age: 48 years      Sex: Male      Is Non- : No      Diabetic: Yes      Tobacco smoker: No      Systolic Blood Pressure: 140 mmHg      Is BP treated: No      HDL Cholesterol: 55 mg/dL      Total Cholesterol: 165 mg/dL    Screening or Prevention Patient's value Goal Complete/Controlled?   HgA1C Testing and Control   Lab Results   Component Value Date    HGBA1C 8.7 (H) 03/20/2023      Annually/Less than 8% No     Lipid profile : 03/20/2023 Annually No     LDL control Lab Results   Component Value Date    LDLCALC 101.0 03/20/2023    Annually/Less than 100 mg/dl  No     Nephropathy screening Lab Results   Component Value Date    LABMICR 5.0 03/20/2023     Lab Results   Component Value Date    PROTEINUA 1+ (A) 01/01/2023    Annually Yes     Blood pressure BP Readings from Last 1 Encounters:   03/22/23 (!) 140/80    Less than 140/90 Yes     Dilated retinal exam : 09/19/2018 Annually Yes     Foot exam   : 01/17/2023 Annually No         CURRENT A1C:    Hemoglobin A1C   Date Value Ref Range Status   03/20/2023 8.7 (H) 4.0 - 5.6 % Final     Comment:     ADA Screening Guidelines:  5.7-6.4%  Consistent with prediabetes  >or=6.5%  Consistent with diabetes    High levels of fetal hemoglobin interfere with the HbA1C  assay. Heterozygous hemoglobin variants (HbS, HgC, etc)do  not significantly interfere with this assay.   However, presence of multiple variants may affect accuracy.     01/17/2023 >14.0 (H) 4.0 - 5.6 % Final     Comment:     ADA Screening Guidelines:  5.7-6.4%  Consistent with prediabetes  >or=6.5%  Consistent with diabetes    High levels of fetal hemoglobin interfere with the HbA1C  assay. Heterozygous  hemoglobin variants (HbS, HgC, etc)do  not significantly interfere with this assay.   However, presence of multiple variants may affect accuracy.     10/21/2021 >14.0 (H) 4.0 - 5.6 % Final     Comment:     ADA Screening Guidelines:  5.7-6.4%  Consistent with prediabetes  >or=6.5%  Consistent with diabetes    High levels of fetal hemoglobin interfere with the HbA1C  assay. Heterozygous hemoglobin variants (HbS, HgC, etc)do  not significantly interfere with this assay.   However, presence of multiple variants may affect accuracy.         GOAL A1C: 6.5% without hypoglycemia       DM MEDICATIONS USED IN THE PAST: Levmeir    Metformin   Glipizide   Januvia   Glimepiride   Tresiba       CURRENT DIABETES MEDICATIONS: Tresiba 20 units nightly at 8 -9 PM    Lyumjev 8 units TID AC   2-4 units for a snack   Insulin: pens.    Missed doses: a couple of missed doses --Lyumjev - but not happening often   Injecting to abdomen           BLOOD GLUCOSE MONITORING:    Sensor type: Dexcom G 6   Average BG readin  Time in range: 23%   39% high   37% very high   Estimated A1c of 8.8%   Site change: q10 days    2 week prior - average is 232   21% in target range   42% high   36% very high   <1% low   0% very low     Supplies -- pharmacy     Sensor was downloaded in clinic today and reviewed with patient.   Please see attached document for download.           HYPOGLYCEMIA:  rarely   Glucagon kit: Baqsimi    Medic alert bracelet: denies       MEALS: eating 3 meals per day   BF: ham and cheese croissant   Lunch: cafeteria at work   Dinner: home cooked or fast food - spaghetti   Snack: peanuts, chips  Drinks: water and milk   Skim milk - 2 glasses per day   Previously coke zeros -- 20 a day        CURRENT EXERCISE:  Yes  Home routine   Push ups/ dips/ sit ups       Review of Systems  Review of Systems   Constitutional:  Negative for appetite change, fatigue and unexpected weight change.   HENT:  Negative for trouble swallowing.          + salivary gland -- pain right side jaw and side of ear near cheek   Sialoadenitis   Eyes:  Negative for visual disturbance.   Respiratory:  Negative for shortness of breath.    Cardiovascular:  Positive for leg swelling. Negative for chest pain.   Gastrointestinal:  Negative for nausea.   Endocrine: Negative for polydipsia, polyphagia and polyuria.   Genitourinary:         + Nocturia 2x times per night    Skin:  Negative for wound.   Neurological:  Positive for numbness.     Physical Exam   Physical Exam  Vitals and nursing note reviewed.   Constitutional:       Appearance: He is well-developed.   HENT:      Head: Normocephalic and atraumatic.      Right Ear: External ear normal.      Left Ear: External ear normal.      Nose: Nose normal.   Neck:      Thyroid: No thyromegaly.      Trachea: No tracheal deviation.   Cardiovascular:      Rate and Rhythm: Normal rate and regular rhythm.      Heart sounds: No murmur heard.     Comments: Trace BLE edema   Pulmonary:      Effort: Pulmonary effort is normal. No respiratory distress.      Breath sounds: Normal breath sounds.   Abdominal:      Palpations: Abdomen is soft.      Tenderness: There is no abdominal tenderness.      Hernia: No hernia is present.   Musculoskeletal:      Cervical back: Normal range of motion and neck supple.      Right lower leg: Edema present.      Left lower leg: Edema present.   Skin:     General: Skin is warm and dry.      Capillary Refill: Capillary refill takes less than 2 seconds.      Findings: No rash.      Comments: Injection sites and dexcom sites are normal appearing. No lipo hypertropthy or atrophy     Neurological:      Mental Status: He is alert and oriented to person, place, and time.      Cranial Nerves: No cranial nerve deficit.   Psychiatric:         Behavior: Behavior normal.         Judgment: Judgment normal.       FOOT EXAMINATION: Appropriate footwear       Lab Results   Component Value Date    TSH 2.756 03/20/2023            Insulin dependent diabetes mellitus type IA  So far antibodies have been negative  C-peptide level was low though  Treated more as a type 1 diabetic  Ketosis prone  Will repeat scott and add zinc antibodies with a repeat C-peptide next time he has lab work done  He reports blood sugar readings have been high over the last month due to pain  Wearing the Dexcom consistently  May be interested in insulin pump  Brochures provided on Omnipod 5 and tandem---would recommend a closed loop system        -- Medication Changes:    Adjust Tresiba to  24 units nighty   Same time every night   Do not need to eat to take     Adjust Lyumjev to 10 units with meals   If you skip a meal, skip the Lyumjev   2-4 units for snacks     Start doing sliding scale/correction scale as needed with meal time insulin   With using correction scale: before meals only -- before eating only   150-200: +1 unit  201-250: +2 units  251-300: +3 units  301-350: +4 units  >350: +5 units              -- Reviewed goals of therapy are to get the best control we can without hypoglycemia.  -- Reviewed patient's current insulin regimen. Clarified proper insulin dose and timing in relation to meals, etc. Insulin injection sites and proper rotation instructed.    -- Advised frequent self blood glucose monitoring.  Patient encouraged to document glucose results and bring them to every clinic visit. Continue dexcom personal CGM g6 -- supplies from  pharmacy - [facility].   -- Hypoglycemia precautions discussed. Instructed on precautions before driving.    -- Call for Bg repeatedly < 70 or > 200.   -- Close adherence to lifestyle changes recommended.   -- Periodic follow ups for eye evaluations, foot care and dental care suggested.  -- Refer to diabetes education -- [facility] location for insulin pump evaluation       Patient has diabetes mellitus and manages diabetes with intensive insulin regimen and uses prandial and basal insulin daily  Patient  requires a therapeutic CGM and is willing to use therapeutic CGM for the necessary frequent adjustments of insulin therapy.  I have completed an in-person visit during the previous 6 months and will continue to have in-person visits every 6 months to assess adherence to their CGM regimen and diabetes treatment plan.  Due to COVID pandemic and need for remote monitoring this tool is medically necessary          Hyperlipidemia associated with type 2 diabetes mellitus  On statin per ADA recommendations  LDL goal < 100.  On Lipitor 20 mg nightly but has missed some doses  Continue with current statin at this time  Will adjust if next LDL is above goal  Discussed compliance      Ketosis prone diabetes  See above        Spent 25 minutes with patient with > 50% time spent in counseling, as noted above on insulin, personal CGM, insulin pumps, diet, correction scale      Follow up in about 8 weeks (around 5/17/2023).  Schedule with diabetes education at [facility] for pump evaluation   Follow up with me in 8 weeks for dexcom download and review         Orders Placed This Encounter   Procedures    C-Peptide     Standing Status:   Future     Standing Expiration Date:   9/22/2024    Glucose, Fasting     Standing Status:   Future     Standing Expiration Date:   5/20/2024    Zinc Transporter 8 (ZnT8) Antibody     Standing Status:   Future     Standing Expiration Date:   5/20/2024    GLUTAMIC ACID DECARBOXYLASE     Standing Status:   Future     Standing Expiration Date:   5/20/2024    Ambulatory referral/consult to Diabetes Education     Standing Status:   Future     Standing Expiration Date:   9/22/2024     Referral Priority:   Routine     Referral Type:   Consultation     Referral Reason:   Specialty Services Required     Requested Specialty:   Diabetes     Number of Visits Requested:   1       Recommendations were discussed with the patient in detail  The patient verbalized understanding and agrees with the plan outlined as above.      This note was partly generated with [employer] voice recognition software. I apologize for any possible typographical errors.

## 2023-03-22 ENCOUNTER — PATIENT MESSAGE (OUTPATIENT)
Dept: ADMINISTRATIVE | Facility: HOSPITAL | Age: 48
End: 2023-03-22
Payer: COMMERCIAL

## 2023-03-22 ENCOUNTER — PATIENT OUTREACH (OUTPATIENT)
Dept: ADMINISTRATIVE | Facility: HOSPITAL | Age: 48
End: 2023-03-22
Payer: COMMERCIAL

## 2023-03-22 ENCOUNTER — OFFICE VISIT (OUTPATIENT)
Dept: DIABETES | Facility: CLINIC | Age: 48
End: 2023-03-22
Payer: COMMERCIAL

## 2023-03-22 VITALS
OXYGEN SATURATION: 98 % | DIASTOLIC BLOOD PRESSURE: 80 MMHG | WEIGHT: 161 LBS | BODY MASS INDEX: 24.4 KG/M2 | HEART RATE: 94 BPM | SYSTOLIC BLOOD PRESSURE: 140 MMHG | HEIGHT: 68 IN

## 2023-03-22 DIAGNOSIS — E10.9 INSULIN DEPENDENT DIABETES MELLITUS TYPE IA: Primary | ICD-10-CM

## 2023-03-22 DIAGNOSIS — E10.9 KETOSIS PRONE DIABETES: ICD-10-CM

## 2023-03-22 DIAGNOSIS — Z79.4 TYPE 2 DIABETES MELLITUS WITH HYPERGLYCEMIA, WITH LONG-TERM CURRENT USE OF INSULIN: ICD-10-CM

## 2023-03-22 DIAGNOSIS — E78.5 HYPERLIPIDEMIA ASSOCIATED WITH TYPE 2 DIABETES MELLITUS: ICD-10-CM

## 2023-03-22 DIAGNOSIS — E11.65 TYPE 2 DIABETES MELLITUS WITH HYPERGLYCEMIA, WITH LONG-TERM CURRENT USE OF INSULIN: ICD-10-CM

## 2023-03-22 DIAGNOSIS — Z71.9 HEALTH EDUCATION/COUNSELING: ICD-10-CM

## 2023-03-22 DIAGNOSIS — E11.69 HYPERLIPIDEMIA ASSOCIATED WITH TYPE 2 DIABETES MELLITUS: ICD-10-CM

## 2023-03-22 PROCEDURE — 1160F PR REVIEW ALL MEDS BY PRESCRIBER/CLIN PHARMACIST DOCUMENTED: ICD-10-PCS | Mod: CPTII,S$GLB,, | Performed by: NURSE PRACTITIONER

## 2023-03-22 PROCEDURE — 1159F PR MEDICATION LIST DOCUMENTED IN MEDICAL RECORD: ICD-10-PCS | Mod: CPTII,S$GLB,, | Performed by: NURSE PRACTITIONER

## 2023-03-22 PROCEDURE — 3061F NEG MICROALBUMINURIA REV: CPT | Mod: CPTII,S$GLB,, | Performed by: NURSE PRACTITIONER

## 2023-03-22 PROCEDURE — 99214 PR OFFICE/OUTPT VISIT, EST, LEVL IV, 30-39 MIN: ICD-10-PCS | Mod: S$GLB,,, | Performed by: NURSE PRACTITIONER

## 2023-03-22 PROCEDURE — 3079F DIAST BP 80-89 MM HG: CPT | Mod: CPTII,S$GLB,, | Performed by: NURSE PRACTITIONER

## 2023-03-22 PROCEDURE — 99999 PR PBB SHADOW E&M-EST. PATIENT-LVL V: CPT | Mod: PBBFAC,,, | Performed by: NURSE PRACTITIONER

## 2023-03-22 PROCEDURE — 3066F PR DOCUMENTATION OF TREATMENT FOR NEPHROPATHY: ICD-10-PCS | Mod: CPTII,S$GLB,, | Performed by: NURSE PRACTITIONER

## 2023-03-22 PROCEDURE — 95251 PR GLUCOSE MONITOR, 72 HOUR, PHYS INTERP: ICD-10-PCS | Mod: S$GLB,,, | Performed by: NURSE PRACTITIONER

## 2023-03-22 PROCEDURE — 3052F PR MOST RECENT HEMOGLOBIN A1C LEVEL 8.0 - < 9.0%: ICD-10-PCS | Mod: CPTII,S$GLB,, | Performed by: NURSE PRACTITIONER

## 2023-03-22 PROCEDURE — 3077F PR MOST RECENT SYSTOLIC BLOOD PRESSURE >= 140 MM HG: ICD-10-PCS | Mod: CPTII,S$GLB,, | Performed by: NURSE PRACTITIONER

## 2023-03-22 PROCEDURE — 3079F PR MOST RECENT DIASTOLIC BLOOD PRESSURE 80-89 MM HG: ICD-10-PCS | Mod: CPTII,S$GLB,, | Performed by: NURSE PRACTITIONER

## 2023-03-22 PROCEDURE — 3077F SYST BP >= 140 MM HG: CPT | Mod: CPTII,S$GLB,, | Performed by: NURSE PRACTITIONER

## 2023-03-22 PROCEDURE — 3052F HG A1C>EQUAL 8.0%<EQUAL 9.0%: CPT | Mod: CPTII,S$GLB,, | Performed by: NURSE PRACTITIONER

## 2023-03-22 PROCEDURE — 3061F PR NEG MICROALBUMINURIA RESULT DOCUMENTED/REVIEW: ICD-10-PCS | Mod: CPTII,S$GLB,, | Performed by: NURSE PRACTITIONER

## 2023-03-22 PROCEDURE — 99214 OFFICE O/P EST MOD 30 MIN: CPT | Mod: S$GLB,,, | Performed by: NURSE PRACTITIONER

## 2023-03-22 PROCEDURE — 99999 PR PBB SHADOW E&M-EST. PATIENT-LVL V: ICD-10-PCS | Mod: PBBFAC,,, | Performed by: NURSE PRACTITIONER

## 2023-03-22 PROCEDURE — 95251 CONT GLUC MNTR ANALYSIS I&R: CPT | Mod: S$GLB,,, | Performed by: NURSE PRACTITIONER

## 2023-03-22 PROCEDURE — 3008F PR BODY MASS INDEX (BMI) DOCUMENTED: ICD-10-PCS | Mod: CPTII,S$GLB,, | Performed by: NURSE PRACTITIONER

## 2023-03-22 PROCEDURE — 3066F NEPHROPATHY DOC TX: CPT | Mod: CPTII,S$GLB,, | Performed by: NURSE PRACTITIONER

## 2023-03-22 PROCEDURE — 1160F RVW MEDS BY RX/DR IN RCRD: CPT | Mod: CPTII,S$GLB,, | Performed by: NURSE PRACTITIONER

## 2023-03-22 PROCEDURE — 3008F BODY MASS INDEX DOCD: CPT | Mod: CPTII,S$GLB,, | Performed by: NURSE PRACTITIONER

## 2023-03-22 PROCEDURE — 1159F MED LIST DOCD IN RCRD: CPT | Mod: CPTII,S$GLB,, | Performed by: NURSE PRACTITIONER

## 2023-03-22 RX ORDER — INSULIN DEGLUDEC 100 U/ML
24 INJECTION, SOLUTION SUBCUTANEOUS NIGHTLY
Qty: 5 PEN | Refills: 6 | Status: SHIPPED | OUTPATIENT
Start: 2023-03-22 | End: 2023-08-03 | Stop reason: SDUPTHER

## 2023-03-22 RX ORDER — INSULIN LISPRO-AABC 100 [IU]/ML
INJECTION, SOLUTION SUBCUTANEOUS
Qty: 5 PEN | Refills: 6 | Status: SHIPPED | OUTPATIENT
Start: 2023-03-22 | End: 2023-11-28 | Stop reason: SDUPTHER

## 2023-03-22 NOTE — ASSESSMENT & PLAN NOTE
On statin per ADA recommendations  LDL goal < 100.  On Lipitor 20 mg nightly but has missed some doses  Continue with current statin at this time  Will adjust if next LDL is above goal  Discussed compliance

## 2023-03-22 NOTE — ASSESSMENT & PLAN NOTE
So far antibodies have been negative  C-peptide level was low though  Treated more as a type 1 diabetic  Ketosis prone  Will repeat scott and add zinc antibodies with a repeat C-peptide next time he has lab work done  He reports blood sugar readings have been high over the last month due to pain  Wearing the Dexcom consistently  May be interested in insulin pump  Brochures provided on Omnipod 5 and tandem---would recommend a closed loop system        -- Medication Changes:    Adjust Tresiba to  24 units nighty   Same time every night   Do not need to eat to take     Adjust Lyumjev to 10 units with meals   If you skip a meal, skip the Lyumjev   2-4 units for snacks     Start doing sliding scale/correction scale as needed with meal time insulin   With using correction scale: before meals only -- before eating only   150-200: +1 unit  201-250: +2 units  251-300: +3 units  301-350: +4 units  >350: +5 units              -- Reviewed goals of therapy are to get the best control we can without hypoglycemia.  -- Reviewed patient's current insulin regimen. Clarified proper insulin dose and timing in relation to meals, etc. Insulin injection sites and proper rotation instructed.    -- Advised frequent self blood glucose monitoring.  Patient encouraged to document glucose results and bring them to every clinic visit. Continue dexcom personal CGM g6 -- supplies from  pharmacy - [facility].   -- Hypoglycemia precautions discussed. Instructed on precautions before driving.    -- Call for Bg repeatedly < 70 or > 200.   -- Close adherence to lifestyle changes recommended.   -- Periodic follow ups for eye evaluations, foot care and dental care suggested.  -- Refer to diabetes education -- [facility] location for insulin pump evaluation       Patient has diabetes mellitus and manages diabetes with intensive insulin regimen and uses prandial and basal insulin daily  Patient requires a therapeutic CGM and is willing to use therapeutic  CGM for the necessary frequent adjustments of insulin therapy.  I have completed an in-person visit during the previous 6 months and will continue to have in-person visits every 6 months to assess adherence to their CGM regimen and diabetes treatment plan.  Due to COVID pandemic and need for remote monitoring this tool is medically necessary

## 2023-03-22 NOTE — PROGRESS NOTES
Working Diabetic Eye Report.    Pt denies eye exam in few years. Offered to schedule appt. He requested an appt at [facility] in [place], was unable to pull up schedule. Pt states he is working at [facility] and will schedule appt himself.

## 2023-03-22 NOTE — Clinical Note
Jose F Zazueta,   He needs another appointment with you. He may be interested in an insulin pump. I gave him brochures on the tandem and the Omnipod 5. His sugars are running high - so I adjusted his insulin doses today.  When he sees you next please let me know if you still running high and I can adjust further.  All of this is still very new to him but he is telling me he is being compliant in doing what he is supposed to be doing.  He has been in a lot of pain so maybe that is why his readings are higher  Thanks Luly

## 2023-03-22 NOTE — PATIENT INSTRUCTIONS
Tresiba 24 units nighty   Same time every night   Do not need to eat to take     Adjust Lyumjev to 10 units with meals   If you skip a meal, skip the Lyumjev   2-4 units for snacks     Start doing sliding scale/correction scale as needed with meal time insulin   With using correction scale: before meals only -- before eating only   150-200: +1 unit  201-250: +2 units  251-300: +3 units  301-350: +4 units  >350: +5 units          Snacks can be an important part of a balanced, healthy meal plan. They allow you to eat more frequently, feeling full and satisfied throughout the day. Also, they allow you to spread carbohydrates evenly, which may stabilize blood sugars.  Plus, snacks are enjoyable!     The amount of carbohydrate needed at snacks varies. Generally, less than 15 grams of carbohydrate per snack is recommended.  Below you will find some tasty treats.       0-5 gm carb  Crystal Light  Vitamin Water Zero  Herbal tea, unsweetened  2 tsp peanut butter on celery  1./2 cup sugar-free jell-o  1 sugar-free popsicle  ¼ cup blueberries  8oz Blue Gabriella unsweetened almond milk  5 baby carrots & celery sticks, cucumbers, bell peppers dipped in ¼ cup salsa, 2Tbsp light ranch dressing or 2Tbsp plain Greek yogurt  10 Goldfish crackers  ½ oz low-fat cheese or string cheese  1 closed handful of nuts, unsalted  1 Tbsp of sunflower seeds, unsalted  1 cup Smart Pop popcorn  1 whole grain brown rice cake        15 gm carb  1 small piece of fruit or ½ banana or 1/2 cup lite canned fruit  3 anne marie cracker squares  3 cups Smart Pop popcorn, top spray butter, Lester lite salt or cinnamon and Truvia  5 Vanilla Wafers  ½ cup low fat, no added sugar ice cream or frozen yogurt (Blue bell, Blue Bunny, Weight Watchers, Skinny Cow)  ½ turkey, ham, or chicken sandwich  ½ c fruit with ½ c Cottage cheese  4-6 unsalted wheat crackers with 1 oz low fat cheese or 1 tbsp peanut butter   30-45 goldfish crackers (depending on flavor)   7-8 Evangelical  mini brown rice cakes (caramel, apple cinnamon, chocolate)   12 Voodoo mini brown rice cakes (cheddar, bbq, ranch)   1/3 cup hummus dip with raw veg  1/2 whole wheat bettie, 1Tbsp hummus  Mini Pizza (1/2 whole wheat English muffin, low-fat  cheese, tomato sauce)  100 calorie snack pack (Oreo, Chips Ahoy, Ritz Mix, Baked Cheetos)  4-6 oz. light or Greek Style yogurt (Chobani, Yoplait, Okios, [place]oneyfield)  ½ cup sugar-free pudding    6 in. wheat tortilla or bettie oven toasted chips (topped with spray butter flavoring, cinnamon, Truvia OR spray butter, garlic powder, chili powder)   18 BBQ Popchips (available at [employer], [employer], Fresh [employer])

## 2023-03-23 ENCOUNTER — OFFICE VISIT (OUTPATIENT)
Dept: OTOLARYNGOLOGY | Facility: CLINIC | Age: 48
End: 2023-03-23
Payer: COMMERCIAL

## 2023-03-23 VITALS — BODY MASS INDEX: 23.63 KG/M2 | TEMPERATURE: 98 F | WEIGHT: 155.44 LBS

## 2023-03-23 DIAGNOSIS — K11.20 SIALOADENITIS: ICD-10-CM

## 2023-03-23 PROCEDURE — 3052F HG A1C>EQUAL 8.0%<EQUAL 9.0%: CPT | Mod: CPTII,S$GLB,, | Performed by: PHYSICIAN ASSISTANT

## 2023-03-23 PROCEDURE — 1159F MED LIST DOCD IN RCRD: CPT | Mod: CPTII,S$GLB,, | Performed by: PHYSICIAN ASSISTANT

## 2023-03-23 PROCEDURE — 3052F PR MOST RECENT HEMOGLOBIN A1C LEVEL 8.0 - < 9.0%: ICD-10-PCS | Mod: CPTII,S$GLB,, | Performed by: PHYSICIAN ASSISTANT

## 2023-03-23 PROCEDURE — 99999 PR PBB SHADOW E&M-EST. PATIENT-LVL IV: CPT | Mod: PBBFAC,,, | Performed by: PHYSICIAN ASSISTANT

## 2023-03-23 PROCEDURE — 3066F PR DOCUMENTATION OF TREATMENT FOR NEPHROPATHY: ICD-10-PCS | Mod: CPTII,S$GLB,, | Performed by: PHYSICIAN ASSISTANT

## 2023-03-23 PROCEDURE — 99214 OFFICE O/P EST MOD 30 MIN: CPT | Mod: S$GLB,,, | Performed by: PHYSICIAN ASSISTANT

## 2023-03-23 PROCEDURE — 99999 PR PBB SHADOW E&M-EST. PATIENT-LVL IV: ICD-10-PCS | Mod: PBBFAC,,, | Performed by: PHYSICIAN ASSISTANT

## 2023-03-23 PROCEDURE — 3008F PR BODY MASS INDEX (BMI) DOCUMENTED: ICD-10-PCS | Mod: CPTII,S$GLB,, | Performed by: PHYSICIAN ASSISTANT

## 2023-03-23 PROCEDURE — 3066F NEPHROPATHY DOC TX: CPT | Mod: CPTII,S$GLB,, | Performed by: PHYSICIAN ASSISTANT

## 2023-03-23 PROCEDURE — 3061F PR NEG MICROALBUMINURIA RESULT DOCUMENTED/REVIEW: ICD-10-PCS | Mod: CPTII,S$GLB,, | Performed by: PHYSICIAN ASSISTANT

## 2023-03-23 PROCEDURE — 99214 PR OFFICE/OUTPT VISIT, EST, LEVL IV, 30-39 MIN: ICD-10-PCS | Mod: S$GLB,,, | Performed by: PHYSICIAN ASSISTANT

## 2023-03-23 PROCEDURE — 3061F NEG MICROALBUMINURIA REV: CPT | Mod: CPTII,S$GLB,, | Performed by: PHYSICIAN ASSISTANT

## 2023-03-23 PROCEDURE — 3008F BODY MASS INDEX DOCD: CPT | Mod: CPTII,S$GLB,, | Performed by: PHYSICIAN ASSISTANT

## 2023-03-23 PROCEDURE — 1159F PR MEDICATION LIST DOCUMENTED IN MEDICAL RECORD: ICD-10-PCS | Mod: CPTII,S$GLB,, | Performed by: PHYSICIAN ASSISTANT

## 2023-03-23 RX ORDER — HYDROCODONE BITARTRATE AND ACETAMINOPHEN 5; 325 MG/1; MG/1
1 TABLET ORAL EVERY 6 HOURS PRN
Qty: 12 TABLET | Refills: 0 | Status: SHIPPED | OUTPATIENT
Start: 2023-03-23 | End: 2023-03-28

## 2023-03-23 NOTE — PROGRESS NOTES
Subjective:       Patient ID: Jason Sandra is a 48 y.o. male.    Chief Complaint: Other (Salivary gland)    48-year-old gentleman with history of SLE and diabetes who presents today for recheck of RIGHT parotid sialoadenitis.  He was first here with Dr. Cox on 3/9/23 with two week history of right parotid tenderness, worse when he first bites into food.  No foul taste in his mouth.  Dr. Cox reviewed his CT and noted inflammation of the right parotid gland that would be consistent with sialoadenitis.  He was treated with oral antibiotics and pain subsided but not resolved.  Patient sent a message on 3/19/23 stating pain unbearable and requesting more testing.  Dr. Cox sent in Augmentin and Norco for pain and placed a referral to Dr. Doyle.  Patient states today that his pain continues, more intense with first bite.  He reports seeing an outside ENT (Dr. Watts) earlier this week who told him that the CT scan did not go up high enough and he recommended additional imaging.  Patient reports he is not in network and neither is Dr. Doyle.  He requests additional imaging today for further workup.    Review of Systems   HENT:  Negative for facial swelling and sore throat.        Objective:      Physical Exam  Constitutional:       Appearance: Normal appearance.   HENT:      Head: Normocephalic and atraumatic.      Jaw: Tenderness present. No trismus or swelling.      Comments: Tender over right parotid and TMJ but no discrete mass or swelling, no overlying erythema; has clear saliva from parotid duct, no purulence      Right Ear: Tympanic membrane, ear canal and external ear normal.      Left Ear: Tympanic membrane, ear canal and external ear normal.      Nose: Nose normal.      Mouth/Throat:      Mouth: Mucous membranes are moist.   Neurological:      Mental Status: He is alert.         CT Soft Tissue Neck With Contrast  Order: [id number]  Status: Final result     Visible to patient: Yes (seen)     Next  appt: 03/14/2023 at 12:00 PM in Rheumatology (Juan R Sen MD)     0 Result Notes  Details     Reading Physician Reading Date Result Priority   Alex Ortiz MD  [phone] 3/5/2023 STAT      Narrative & Impression  EXAMINATION:  CT SOFT TISSUE NECK WITH CONTRAST     CLINICAL HISTORY:  Lymphadenopathy, neck;     TECHNIQUE:  Low dose axial images as well as sagittal and coronal reconstructions were performed from the skull base to the clavicles following the intravenous administration of 75 mL of Omnipaque 350.  All CT scans at this location are performed using dose modulation techniques as appropriate to a performed exam including the following: Automated exposure control; adjustment of the mA and/or kV according to patient size (this includes techniques or standardized protocols for targeted exams where dose is matched to indication/reason for exam; i. e. extremities or head); use of iterative reconstruction technique.     COMPARISON:  None.     FINDINGS:  Multiple normal sized cervical lymph nodes without convincing evidence of pathologic adenopathy.  Parotid, submandibular, and thyroid glands are unremarkable.     Pharyngeal and laryngeal soft tissues demonstrate no acute abnormality.     No visualized soft tissue mass or focal fluid collection.     Unremarkable lung apices.  No acute osseous abnormality is evident.  Visualized intracranial structures demonstrate no acute abnormality.     Impression:     No acute abnormality.  Multiple normal sized cervical lymph nodes without convincing evidence of pathologic adenopathy.  Consider further follow-up as warranted.        Electronically signed by: Alex Ortiz  Date:                                            03/05/2023  Time:                                           11:25             Assessment:       Problem List Items Addressed This Visit    None  Visit Diagnoses       Sialoadenitis        Relevant Medications    HYDROcodone-acetaminophen (NORCO)  5-325 mg per tablet              Plan:          Discussed with patient that I work very closely with Dr. Cox and will defer to his interpretation of recent CT Neck.  I do not recommend additional imaging at this time and will place a referral to Dr. Trejo in [place] for possible sialoendoscopy.  I recommend he start the Augmentin which Dr. Cox sent in to his pharmacy earlier this week and will also send in few more days of pain medication.  Discussed with patient that I will discuss his concerns about inadequate imaging with Dr. Cox when he returns next week.

## 2023-03-27 ENCOUNTER — PATIENT MESSAGE (OUTPATIENT)
Dept: INTERNAL MEDICINE | Facility: CLINIC | Age: 48
End: 2023-03-27
Payer: COMMERCIAL

## 2023-03-27 ENCOUNTER — HOSPITAL ENCOUNTER (OUTPATIENT)
Dept: RADIOLOGY | Facility: HOSPITAL | Age: 48
Discharge: HOME | End: 2023-03-27
Attending: NURSE PRACTITIONER
Payer: COMMERCIAL

## 2023-03-27 DIAGNOSIS — Z82.0 FAMILY HISTORY OF ALZHEIMER'S DISEASE: ICD-10-CM

## 2023-03-27 DIAGNOSIS — R41.3 MEMORY DEFICITS: ICD-10-CM

## 2023-03-27 DIAGNOSIS — R41.9 COGNITIVE COMPLAINTS WITH NORMAL EXAM: ICD-10-CM

## 2023-03-27 DIAGNOSIS — R68.89 FORGETFULNESS: ICD-10-CM

## 2023-03-27 PROCEDURE — 70551 MRI BRAIN STEM W/O DYE: CPT | Mod: TC

## 2023-03-27 PROCEDURE — 70551 MRI BRAIN WITHOUT CONTRAST: ICD-10-PCS | Mod: 26,,, | Performed by: RADIOLOGY

## 2023-03-27 PROCEDURE — 70551 MRI BRAIN STEM W/O DYE: CPT | Mod: 26,,, | Performed by: RADIOLOGY

## 2023-03-28 ENCOUNTER — PATIENT MESSAGE (OUTPATIENT)
Dept: INTERNAL MEDICINE | Facility: CLINIC | Age: 48
End: 2023-03-28

## 2023-03-28 ENCOUNTER — PATIENT MESSAGE (OUTPATIENT)
Dept: NEUROLOGY | Facility: CLINIC | Age: 48
End: 2023-03-28
Payer: COMMERCIAL

## 2023-03-28 ENCOUNTER — OFFICE VISIT (OUTPATIENT)
Dept: INTERNAL MEDICINE | Facility: CLINIC | Age: 48
End: 2023-03-28
Payer: COMMERCIAL

## 2023-03-28 ENCOUNTER — LAB VISIT (OUTPATIENT)
Dept: LAB | Facility: OTHER | Age: 48
End: 2023-03-28
Attending: NURSE PRACTITIONER
Payer: COMMERCIAL

## 2023-03-28 VITALS
DIASTOLIC BLOOD PRESSURE: 92 MMHG | HEART RATE: 113 BPM | WEIGHT: 156.19 LBS | OXYGEN SATURATION: 98 % | HEIGHT: 68 IN | BODY MASS INDEX: 23.67 KG/M2 | RESPIRATION RATE: 19 BRPM | TEMPERATURE: 98 F | SYSTOLIC BLOOD PRESSURE: 146 MMHG

## 2023-03-28 DIAGNOSIS — M54.2 NECK PAIN: Primary | ICD-10-CM

## 2023-03-28 DIAGNOSIS — E10.9 INSULIN DEPENDENT DIABETES MELLITUS TYPE IA: ICD-10-CM

## 2023-03-28 DIAGNOSIS — M32.9 PERSONAL HISTORY OF SYSTEMIC LUPUS ERYTHEMATOSUS (SLE): ICD-10-CM

## 2023-03-28 DIAGNOSIS — R03.0 ELEVATED BLOOD PRESSURE READING: ICD-10-CM

## 2023-03-28 LAB
C PEPTIDE SERPL-MCNC: 0.61 NG/ML (ref 0.78–5.19)
GLUCOSE SERPL-MCNC: 224 MG/DL (ref 70–110)

## 2023-03-28 PROCEDURE — 99999 PR PBB SHADOW E&M-EST. PATIENT-LVL V: CPT | Mod: PBBFAC,,, | Performed by: PHYSICIAN ASSISTANT

## 2023-03-28 PROCEDURE — 86341 ISLET CELL ANTIBODY: CPT | Performed by: NURSE PRACTITIONER

## 2023-03-28 PROCEDURE — 3080F DIAST BP >= 90 MM HG: CPT | Mod: CPTII,S$GLB,, | Performed by: PHYSICIAN ASSISTANT

## 2023-03-28 PROCEDURE — 3061F NEG MICROALBUMINURIA REV: CPT | Mod: CPTII,S$GLB,, | Performed by: PHYSICIAN ASSISTANT

## 2023-03-28 PROCEDURE — 3052F HG A1C>EQUAL 8.0%<EQUAL 9.0%: CPT | Mod: CPTII,S$GLB,, | Performed by: PHYSICIAN ASSISTANT

## 2023-03-28 PROCEDURE — 3066F NEPHROPATHY DOC TX: CPT | Mod: CPTII,S$GLB,, | Performed by: PHYSICIAN ASSISTANT

## 2023-03-28 PROCEDURE — 3077F PR MOST RECENT SYSTOLIC BLOOD PRESSURE >= 140 MM HG: ICD-10-PCS | Mod: CPTII,S$GLB,, | Performed by: PHYSICIAN ASSISTANT

## 2023-03-28 PROCEDURE — 3061F PR NEG MICROALBUMINURIA RESULT DOCUMENTED/REVIEW: ICD-10-PCS | Mod: CPTII,S$GLB,, | Performed by: PHYSICIAN ASSISTANT

## 2023-03-28 PROCEDURE — 3066F PR DOCUMENTATION OF TREATMENT FOR NEPHROPATHY: ICD-10-PCS | Mod: CPTII,S$GLB,, | Performed by: PHYSICIAN ASSISTANT

## 2023-03-28 PROCEDURE — 3008F PR BODY MASS INDEX (BMI) DOCUMENTED: ICD-10-PCS | Mod: CPTII,S$GLB,, | Performed by: PHYSICIAN ASSISTANT

## 2023-03-28 PROCEDURE — 99213 OFFICE O/P EST LOW 20 MIN: CPT | Mod: S$GLB,,, | Performed by: PHYSICIAN ASSISTANT

## 2023-03-28 PROCEDURE — 99213 PR OFFICE/OUTPT VISIT, EST, LEVL III, 20-29 MIN: ICD-10-PCS | Mod: S$GLB,,, | Performed by: PHYSICIAN ASSISTANT

## 2023-03-28 PROCEDURE — 3080F PR MOST RECENT DIASTOLIC BLOOD PRESSURE >= 90 MM HG: ICD-10-PCS | Mod: CPTII,S$GLB,, | Performed by: PHYSICIAN ASSISTANT

## 2023-03-28 PROCEDURE — 1159F MED LIST DOCD IN RCRD: CPT | Mod: CPTII,S$GLB,, | Performed by: PHYSICIAN ASSISTANT

## 2023-03-28 PROCEDURE — 3077F SYST BP >= 140 MM HG: CPT | Mod: CPTII,S$GLB,, | Performed by: PHYSICIAN ASSISTANT

## 2023-03-28 PROCEDURE — 3052F PR MOST RECENT HEMOGLOBIN A1C LEVEL 8.0 - < 9.0%: ICD-10-PCS | Mod: CPTII,S$GLB,, | Performed by: PHYSICIAN ASSISTANT

## 2023-03-28 PROCEDURE — 84681 ASSAY OF C-PEPTIDE: CPT | Performed by: NURSE PRACTITIONER

## 2023-03-28 PROCEDURE — 86341 ISLET CELL ANTIBODY: CPT | Mod: 91 | Performed by: NURSE PRACTITIONER

## 2023-03-28 PROCEDURE — 3008F BODY MASS INDEX DOCD: CPT | Mod: CPTII,S$GLB,, | Performed by: PHYSICIAN ASSISTANT

## 2023-03-28 PROCEDURE — 1160F RVW MEDS BY RX/DR IN RCRD: CPT | Mod: CPTII,S$GLB,, | Performed by: PHYSICIAN ASSISTANT

## 2023-03-28 PROCEDURE — 99999 PR PBB SHADOW E&M-EST. PATIENT-LVL V: ICD-10-PCS | Mod: PBBFAC,,, | Performed by: PHYSICIAN ASSISTANT

## 2023-03-28 PROCEDURE — 82947 ASSAY GLUCOSE BLOOD QUANT: CPT | Performed by: NURSE PRACTITIONER

## 2023-03-28 PROCEDURE — 1160F PR REVIEW ALL MEDS BY PRESCRIBER/CLIN PHARMACIST DOCUMENTED: ICD-10-PCS | Mod: CPTII,S$GLB,, | Performed by: PHYSICIAN ASSISTANT

## 2023-03-28 PROCEDURE — 1159F PR MEDICATION LIST DOCUMENTED IN MEDICAL RECORD: ICD-10-PCS | Mod: CPTII,S$GLB,, | Performed by: PHYSICIAN ASSISTANT

## 2023-03-28 RX ORDER — KETOROLAC TROMETHAMINE 10 MG/1
10 TABLET, FILM COATED ORAL EVERY 6 HOURS PRN
Qty: 20 TABLET | Refills: 0 | Status: SHIPPED | OUTPATIENT
Start: 2023-03-28 | End: 2023-04-05

## 2023-03-28 RX ORDER — INSULIN DETEMIR 100 [IU]/ML
INJECTION, SOLUTION SUBCUTANEOUS
COMMUNITY
Start: 2023-03-16 | End: 2023-05-26

## 2023-03-28 NOTE — PROGRESS NOTES
Subjective:       Patient ID: Jason Sandra is a 48 y.o. male.    Chief Complaint: Hypertension and Numbness      Jason Sandra is a 48 y.o. male who presents today with complaints of elevated blood pressure readings and neck pain with extremity numbness. He denies PMH of HTN and has never been on HTN medications before. Reports seeing rheumatology and started on methotrexate and plaquenil. He denies recent injury or trauma. He denies CP or SOB.       Review of Systems   Constitutional:  Negative for chills, fatigue, fever and unexpected weight change.   Respiratory:  Negative for shortness of breath.    Cardiovascular:  Negative for chest pain.   Musculoskeletal:  Positive for arthralgias, back pain, myalgias and neck pain. Negative for gait problem.   Neurological:  Positive for numbness. Negative for weakness.     Objective:      Physical Exam  Vitals and nursing note reviewed.   Constitutional:       General: He is not in acute distress.     Appearance: He is well-developed.   HENT:      Head: Normocephalic and atraumatic.   Eyes:      General: Lids are normal. No scleral icterus.     Extraocular Movements: Extraocular movements intact.      Conjunctiva/sclera: Conjunctivae normal.   Pulmonary:      Effort: Pulmonary effort is normal.   Neurological:      Mental Status: He is alert.      Cranial Nerves: No cranial nerve deficit.   Psychiatric:         Mood and Affect: Affect normal. Mood is anxious.       Assessment:       1. Neck pain    2. Elevated blood pressure reading    3. Personal history of systemic lupus erythematosus (SLE)        Plan:   1. Neck pain  -     ketorolac (TORADOL) 10 mg tablet; Take 1 tablet (10 mg total) by mouth every 6 (six) hours as needed for Pain.  Dispense: 20 tablet; Refill: 0    2. Elevated blood pressure reading  Assessment & Plan:  Suspect chronic pain to be a factor, schedule NV in 2 weeks to recheck, if still elevated, consider adding BP medication      3. Personal  history of systemic lupus erythematosus (SLE)  Overview:  Followed by Rheumatology, continue current treatment plan     Assessment & Plan:  Encouraged patient to continue f/u with Rheumatology for ongoing issues, increase gabapentin from 100mg TID to 200mg TID.

## 2023-03-29 ENCOUNTER — OFFICE VISIT (OUTPATIENT)
Dept: NEUROLOGY | Facility: CLINIC | Age: 48
End: 2023-03-29
Payer: COMMERCIAL

## 2023-03-29 VITALS
SYSTOLIC BLOOD PRESSURE: 161 MMHG | WEIGHT: 162.5 LBS | HEART RATE: 102 BPM | HEIGHT: 68 IN | BODY MASS INDEX: 24.63 KG/M2 | DIASTOLIC BLOOD PRESSURE: 89 MMHG

## 2023-03-29 DIAGNOSIS — E11.10 DIABETIC KETOACIDOSIS WITHOUT COMA ASSOCIATED WITH TYPE 2 DIABETES MELLITUS: ICD-10-CM

## 2023-03-29 DIAGNOSIS — Z82.49 FAMILY HISTORY OF HEART DISEASE: ICD-10-CM

## 2023-03-29 DIAGNOSIS — R51.9 PERSISTENT HEADACHES: ICD-10-CM

## 2023-03-29 DIAGNOSIS — Z82.0 FAMILY HISTORY OF ALZHEIMER'S DISEASE: ICD-10-CM

## 2023-03-29 DIAGNOSIS — R76.8 ANA POSITIVE: ICD-10-CM

## 2023-03-29 DIAGNOSIS — R41.3 MEMORY DEFICITS: ICD-10-CM

## 2023-03-29 DIAGNOSIS — R41.9 COGNITIVE COMPLAINTS WITH NORMAL EXAM: Primary | ICD-10-CM

## 2023-03-29 DIAGNOSIS — Z65.8 PSYCHOSOCIAL STRESSORS: ICD-10-CM

## 2023-03-29 DIAGNOSIS — M54.81 OCCIPITAL NEURALGIA, UNSPECIFIED LATERALITY: ICD-10-CM

## 2023-03-29 DIAGNOSIS — R68.89 FORGETFULNESS: ICD-10-CM

## 2023-03-29 DIAGNOSIS — E10.9 KETOSIS PRONE DIABETES: ICD-10-CM

## 2023-03-29 DIAGNOSIS — G08 DURAL VENOUS SINUS THROMBOSIS: ICD-10-CM

## 2023-03-29 DIAGNOSIS — G47.429 NARCOLEPSY DUE TO UNDERLYING CONDITION WITHOUT CATAPLEXY: ICD-10-CM

## 2023-03-29 DIAGNOSIS — E10.9 INSULIN DEPENDENT DIABETES MELLITUS TYPE IA: ICD-10-CM

## 2023-03-29 PROBLEM — R03.0 ELEVATED BLOOD PRESSURE READING: Status: ACTIVE | Noted: 2023-03-29

## 2023-03-29 PROCEDURE — 3077F PR MOST RECENT SYSTOLIC BLOOD PRESSURE >= 140 MM HG: ICD-10-PCS | Mod: CPTII,S$GLB,, | Performed by: NURSE PRACTITIONER

## 2023-03-29 PROCEDURE — 3066F PR DOCUMENTATION OF TREATMENT FOR NEPHROPATHY: ICD-10-PCS | Mod: CPTII,S$GLB,, | Performed by: NURSE PRACTITIONER

## 2023-03-29 PROCEDURE — 3077F SYST BP >= 140 MM HG: CPT | Mod: CPTII,S$GLB,, | Performed by: NURSE PRACTITIONER

## 2023-03-29 PROCEDURE — 3008F PR BODY MASS INDEX (BMI) DOCUMENTED: ICD-10-PCS | Mod: CPTII,S$GLB,, | Performed by: NURSE PRACTITIONER

## 2023-03-29 PROCEDURE — 3079F DIAST BP 80-89 MM HG: CPT | Mod: CPTII,S$GLB,, | Performed by: NURSE PRACTITIONER

## 2023-03-29 PROCEDURE — 3061F PR NEG MICROALBUMINURIA RESULT DOCUMENTED/REVIEW: ICD-10-PCS | Mod: CPTII,S$GLB,, | Performed by: NURSE PRACTITIONER

## 2023-03-29 PROCEDURE — 99215 PR OFFICE/OUTPT VISIT, EST, LEVL V, 40-54 MIN: ICD-10-PCS | Mod: S$GLB,,, | Performed by: NURSE PRACTITIONER

## 2023-03-29 PROCEDURE — 3061F NEG MICROALBUMINURIA REV: CPT | Mod: CPTII,S$GLB,, | Performed by: NURSE PRACTITIONER

## 2023-03-29 PROCEDURE — 3008F BODY MASS INDEX DOCD: CPT | Mod: CPTII,S$GLB,, | Performed by: NURSE PRACTITIONER

## 2023-03-29 PROCEDURE — 3066F NEPHROPATHY DOC TX: CPT | Mod: CPTII,S$GLB,, | Performed by: NURSE PRACTITIONER

## 2023-03-29 PROCEDURE — 3052F PR MOST RECENT HEMOGLOBIN A1C LEVEL 8.0 - < 9.0%: ICD-10-PCS | Mod: CPTII,S$GLB,, | Performed by: NURSE PRACTITIONER

## 2023-03-29 PROCEDURE — 99999 PR PBB SHADOW E&M-EST. PATIENT-LVL V: ICD-10-PCS | Mod: PBBFAC,,, | Performed by: NURSE PRACTITIONER

## 2023-03-29 PROCEDURE — 99215 OFFICE O/P EST HI 40 MIN: CPT | Mod: S$GLB,,, | Performed by: NURSE PRACTITIONER

## 2023-03-29 PROCEDURE — 1159F MED LIST DOCD IN RCRD: CPT | Mod: CPTII,S$GLB,, | Performed by: NURSE PRACTITIONER

## 2023-03-29 PROCEDURE — 3052F HG A1C>EQUAL 8.0%<EQUAL 9.0%: CPT | Mod: CPTII,S$GLB,, | Performed by: NURSE PRACTITIONER

## 2023-03-29 PROCEDURE — 99999 PR PBB SHADOW E&M-EST. PATIENT-LVL V: CPT | Mod: PBBFAC,,, | Performed by: NURSE PRACTITIONER

## 2023-03-29 PROCEDURE — 1159F PR MEDICATION LIST DOCUMENTED IN MEDICAL RECORD: ICD-10-PCS | Mod: CPTII,S$GLB,, | Performed by: NURSE PRACTITIONER

## 2023-03-29 PROCEDURE — 3079F PR MOST RECENT DIASTOLIC BLOOD PRESSURE 80-89 MM HG: ICD-10-PCS | Mod: CPTII,S$GLB,, | Performed by: NURSE PRACTITIONER

## 2023-03-29 RX ORDER — AMITRIPTYLINE HYDROCHLORIDE 10 MG/1
10 TABLET, FILM COATED ORAL NIGHTLY
Qty: 30 TABLET | Refills: 11 | Status: SHIPPED | OUTPATIENT
Start: 2023-03-29 | End: 2023-05-01

## 2023-03-29 NOTE — ASSESSMENT & PLAN NOTE
Suspect chronic pain to be a factor, schedule NV in 2 weeks to recheck, if still elevated, consider adding BP medication

## 2023-03-29 NOTE — PROGRESS NOTES
Subjective:       Patient ID: Jason Sandra is a 48 y.o. male.    Chief Complaint: cognitive complaints with normal exam  (Follow up), Results, Headache, Pain, and Patient Education    Referred by PCP: Mariana Joyce NP       HPIThe patient is here for memory loss evaluation. Per wife she noted changes that began approximately 10 months ago in 2022. The patient spouse joined visit via telephone. 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 She states that they are building a house and the patient is  forgetting details about the build such as supplies picked out or the decision that him and his wife has made for the building plan. He becomes agigated if she tries to get him to recall what was previously discussed. The patient  is no forgetting where placed certain things. The patient not  forgetting names. The patient is driving and denies getting lost.  The patient is not losing personal items and does not put them in odd places. No confusion around and inside the house. Has trouble managing appointments, forgetful of certain task. Patient is able to remember date and time, keeping up with medications and appointments and keeping up with major holidays and political changes. The patient is independent in handling finances. The patient is still independent with ADLs. No hallucinations or delusions. No seizures. He does have psychosocial stressors. Has issues with communication issues with wife. Father with diagnosis of AD and Mother with MG. No language problems. Uncontrolled DM Type I > 14.0 01-17--2023, that is now being managed by PCP.  No problems handling tools. No history of strokes.  No history of hypothyroidism. No history of alcoholism. No history of B12 deficiency. No history of depression. No history of STDs.  No history of HIV infection. No toxic exposures.  No history of traumatic brain injury. No tremors or abnormal movements. No falls or instability. No  urinary incontinence. Patient becomes tired easily. No change in sleep and good appetite. Father has AD 89.       INTERVAL NOTE 03-: Patient present for follow up for Memory Work up results, and new complaint of headache and neck pain. Discussed Memory work up : 3- RPR Neg, Vitamin B 12 358 NL, HIV neg,  TSH 2.756 NL . MRI Brain WO 03- No Significant MRI abnormality of the brain CNP pending. No decline in cognitive or behavioral changes.     The patient is here for evaluation of headaches. The headaches started approximately  5 to 6 weeks ago. Patient reports the pain radiates from neck to  back of head, and involves both sides, pain described as a sharp stabbing pain and sometimes throbbing nature. It can involve the right side, the left side or both sides. The headaches are getting more frequent and come everyday lasting seconds and up to 20 minutes. The headaches are also getting more severe 6-8/10 headaches. Patient states Tylenol is not helpful. He reports that  Toradol partially helpful, Hydrocodone did not help.  The headache is associated with nausea. No sound or light sensitivity. The headache builds up slowly towards the middle of the day or the end of the day. The headaches are progressively getting worse and interfering with daily activity.  The headache is associated with scalp sensitivity. Complains of neck pain with radiation. The headache is not positional or postural but worsens with movement and Valsalva. Sleep doesn't help lessen. No history of head trauma. No history of seizures. No history of smoking. No current caffeine overuse.  No blacking out.  No fever, he reports chills. The patient cannot think of food that aggravates the headache. Family history is remarkable for migraine.     Abortive therapies (tried and failed): Hydrocodone failed, Tylenol failed, Toradol partially helpful     Preventative therapies (tried and failed):None         Review of Systems    Constitutional:  Positive for activity change.   HENT:  Positive for ear pain.    Eyes: Negative.    Respiratory: Negative.     Cardiovascular: Negative.    Gastrointestinal: Negative.    Endocrine: Negative.    Genitourinary: Negative.    Musculoskeletal:  Positive for back pain, myalgias and neck pain.   Skin: Negative.    Allergic/Immunologic: Negative.    Neurological:  Positive for numbness.        Bilateral arms numbness, and legs numbness    Hematological: Negative.    Psychiatric/Behavioral:  Positive for confusion and decreased concentration.                Current Outpatient Medications:     ACCU-CHEK GUIDE ME GLUCOSE MTR Misc, CHECK BLOOD SUGAR TWICE A DAY, Disp: , Rfl:     atorvastatin (LIPITOR) 20 MG tablet, Take 1 tablet (20 mg total) by mouth once daily., Disp: 90 tablet, Rfl: 2    blood sugar diagnostic Strp, To check BG 2 times daily, to use with insurance preferred meter, Disp: 200 strip, Rfl: 2    blood-glucose sensor (DEXCOM G6 SENSOR) Chen, 1 sensor every 10 days, Disp: 3 each, Rfl: 11    blood-glucose transmitter (DEXCOM G6 TRANSMITTER) Chen, 1 transmitter every 3 months, Disp: 1 each, Rfl: 4    folic acid (FOLVITE) 1 MG tablet, Take 1 tablet (1 mg total) by mouth once daily., Disp: 30 tablet, Rfl: 4    gabapentin (NEURONTIN) 100 MG capsule, Take 1 capsule (100 mg total) by mouth 3 (three) times daily., Disp: 90 capsule, Rfl: 11    glucagon (BAQSIMI) 3 mg/actuation Spry, 3 mg (one actuation) into a single nostril; if no response, may repeat in 15 minutes using a new intranasal device., Disp: 2 each, Rfl: 1    hydrOXYchloroQUINE (PLAQUENIL) 200 mg tablet, Take 1 tablet (200 mg total) by mouth once daily., Disp: 30 tablet, Rfl: 3    insulin degludec (TRESIBA FLEXTOUCH U-100) 100 unit/mL (3 mL) insulin pen, Inject 24 Units into the skin every evening. Max TDD of 30 units, Disp: 5 pen, Rfl: 6    insulin lispro-aabc (LYUMJEV KWIKPEN U-100 INSULIN) 100 unit/mL pen, Inject 10 units 3 times per day  "before meals, 2-4 units for snack, . + correction scale. MAX TDD Of 50 units, Disp: 5 pen, Rfl: 6    ketorolac (TORADOL) 10 mg tablet, Take 1 tablet (10 mg total) by mouth every 6 (six) hours as needed for Pain., Disp: 20 tablet, Rfl: 0    lancets Misc, To check BG 2 times daily, to use with insurance preferred meter, Disp: 200 each, Rfl: 2    LEVEMIR FLEXPEN 100 unit/mL (3 mL) InPn pen, Inject into the skin., Disp: , Rfl:     methotrexate 2.5 MG Tab, Take 6 tablets (15 mg total) by mouth every 7 days., Disp: 24 tablet, Rfl: 3    pen needle, diabetic 32 gauge x 5/32" Ndle, To use 7 times per day with insulin injections- for meals, long-acting insulin and for snacks, Disp: 200 each, Rfl: 11    amitriptyline (ELAVIL) 10 MG tablet, Take 1 tablet (10 mg total) by mouth every evening., Disp: 30 tablet, Rfl: 11    testosterone cypionate (DEPOTESTOTERONE CYPIONATE) 200 mg/mL injection, INJECT 1 ML (200 MG TOTAL) INTO THE MUSCLE TWICE A WEEK., Disp: 10 mL, Rfl: 5  Past Medical History:   Diagnosis Date    Diabetes mellitus     Hypogonadism in male      Past Surgical History:   Procedure Laterality Date    NOSE SURGERY       Social History     Socioeconomic History    Marital status: Legally    Tobacco Use    Smoking status: Never     Passive exposure: Never    Smokeless tobacco: Never   Substance and Sexual Activity    Alcohol use: Yes     Comment: socially    Drug use: No    Sexual activity: Yes     Partners: Female   Social History Narrative         Social Determinants of Health     Financial Resource Strain: Low Risk     Difficulty of Paying Living Expenses: Not hard at all   Food Insecurity: No Food Insecurity    Worried About Running Out of Food in the Last Year: Never true    Ran Out of Food in the Last Year: Never true   Transportation Needs: No Transportation Needs    Lack of Transportation (Medical): No    Lack of Transportation (Non-Medical): No   Stress: No Stress Concern Present    Feeling of " Stress : Only a little   Social Connections: Moderately Isolated    Frequency of Communication with Friends and Family: More than three times a week    Frequency of Social Gatherings with Friends and Family: More than three times a week    Attends [employer] Services: Never    Active Member of Clubs or Organizations: No    Attends Club or Organization Meetings: Never    Marital Status:    Housing Stability: Low Risk     Unable to Pay for Housing in the Last Year: No    Number of Places Lived in the Last Year: 2    Unstable Housing in the Last Year: No       Past/Current Medical/Surgical History, Past/Current Social History, Past/Current Family History and Past/Current Medications were reviewed in detail.      Objective:           VITAL SIGNS WERE REVIEWED      GENERAL APPEARANCE:     The patient looks comfortable, anxious.     Body habitus is normal BMI 24.47 KG     No signs of respiratory distress.    Normal breathing pattern.    No dysmorphic features    Normal eye contact.     GENERAL MEDICAL EXAM:    HEENT:  Head is atraumatic normocephalic.     No tender temporal arteries. Fundoscopic (Ophthalmoscopic) exam showed no disc edema.      Neck and Axillae: No JVD. No visible lesions.    No carotid bruits. No thyromegaly. No lymphadenopathy.    Cardiopulmonary: No cyanosis. No tachypnea. Normal respiratory effort.    Clear breath sounds. S1, S2 with regular rhythm . No murmurs.     Gastrointestinal/Urogenital:  No jaundice. No stomas or lesions. No visible hernias. No catheters.     Abdomen is soft non-tender. No masses or organomegaly.    Skin, Hair and Nails: No pathognonomic skin rash. No neurofibromatosis. No visible lesions.No stigmata of autoimmune disease. No clubbing.    Skin is warm and moist. No palpable masses.    Limbs: No varicose veins. No visible swelling.    No palpable edema. Pulses are symmetric. Pedal pulses are palpable.      Muskoskeletal: No visible deformities.No visible lesions.    No  spine tenderness. No signs of longstanding neuropathy. No dislocations or fractures.            Neurologic Exam     Mental Status   Oriented to person, place, and time.   Oriented to person.   Oriented to place. Oriented to country, city, area, street and number.   Oriented to time. Oriented to year, month, date, day and season.   Registration: recalls 3 of 3 objects. Follows 3 step commands.   Attention: normal. Concentration: normal.   Speech: speech is normal and not slurred   Level of consciousness: alert  Knowledge: good and consistent with education. Able to perform simple calculations.   Able to name object. Able to read. Able to repeat. Able to write. Normal comprehension.     MOCA 28/30    Visuospatial/Executive 5/5    Naming                            3/3    Attention                         6/6    Language                         3/3    Abstraction                    2/2    Recall                                3/5    Orientation                     6/6    Recovered Recall 1 with cue     NORMAL-MILD NCD 26-30    Education some college      Cranial Nerves     CN II   Visual fields full to confrontation.   Visual acuity: normal with correction  Right visual field deficit: none  Left visual field deficit: none     CN III, IV, VI   Pupils are equal, round, and reactive to light.  Extraocular motions are normal.   Right pupil: Size: 2 mm. Shape: regular. Reactivity: brisk. Consensual response: intact. Accommodation: intact.   Left pupil: Size: 2 mm. Shape: regular. Reactivity: brisk. Consensual response: intact. Accommodation: intact.   CN III: no CN III palsy  CN VI: no CN VI palsy  Nystagmus: none   Diplopia: none  Ophthalmoparesis: none  Upgaze: normal  Downgaze: normal  Conjugate gaze: present  Vestibulo-ocular reflex: present    CN V   Facial sensation intact.   Right facial sensation deficit: none  Left facial sensation deficit: none    CN VII   Right facial weakness: none  Left facial weakness:  none    CN VIII   CN VIII normal.   Hearing: intact    CN IX, X   CN IX normal.   CN X normal.   Palate: symmetric    CN XI   CN XI normal.   Right sternocleidomastoid strength: normal  Left sternocleidomastoid strength: normal  Right trapezius strength: normal  Left trapezius strength: normal    CN XII   CN XII normal.   Tongue: not atrophic  Fasciculations: absent  Tongue deviation: none    Motor Exam   Muscle bulk: normal  Overall muscle tone: normal  Right arm tone: normal  Left arm tone: normal  Right arm pronator drift: absent  Left arm pronator drift: absent  Right leg tone: normal  Left leg tone: normal    Strength   Strength 5/5 throughout.   Right neck flexion: 5/5  Left neck flexion: 5/5  Right neck extension: 5/5  Left neck extension: 5/5  Right deltoid: 5/5  Left deltoid: 5/5  Right biceps: 5/5  Left biceps: 5/5  Right triceps: 5/5  Left triceps: 5/5  Right wrist flexion: 5/5  Left wrist flexion: 5/5  Right wrist extension: 5/5  Left wrist extension: 5/5  Right interossei: 5/5  Left interossei: 5/5  Right iliopsoas: 5/5  Left iliopsoas: 5/5  Right quadriceps: 5/5  Left quadriceps: 5/5  Right hamstrin/5  Left hamstrin/5  Right glutei: 5/5  Left glutei: 5/5  Right anterior tibial: 5/5  Left anterior tibial: 5/5  Right posterior tibial: 5/5  Left posterior tibial: 5/5  Right peroneal: 5/5  Left peroneal: 5/5  Right gastroc: 5/5  Left gastroc: 5/5    Sensory Exam   Light touch normal.   Right arm light touch: normal  Left arm light touch: normal  Right leg light touch: normal  Left leg light touch: normal  Vibration normal.   Right arm vibration: normal  Left arm vibration: normal  Right leg vibration: normal  Left leg vibration: normal  Proprioception normal.   Right arm proprioception: normal  Left arm proprioception: normal  Right leg proprioception: normal  Left leg proprioception: normal  Pinprick normal.   Right arm pinprick: normal  Left arm pinprick: normal  Right leg pinprick: normal  Left  leg pinprick: normal  Graphesthesia: normal  Stereognosis: normal    Gait, Coordination, and Reflexes     Gait  Gait: normal    Coordination   Romberg: negative  Finger to nose coordination: normal  Heel to shin coordination: normal  Tandem walking coordination: normal    Tremor   Resting tremor: absent  Intention tremor: absent  Action tremor: absent    Reflexes   Right brachioradialis: 2+  Left brachioradialis: 2+  Right biceps: 2+  Left biceps: 2+  Right triceps: 2+  Left triceps: 2+  Right patellar: 2+  Left patellar: 2+  Right plantar: normal  Left plantar: normal  Right Lewis: absent  Left Lewis: absent  Right ankle clonus: absent  Left ankle clonus: absent  Right pendular knee jerk: absent  Left pendular knee jerk: absent    Lab Results   Component Value Date    WBC 5.00 03/20/2023    HGB 16.0 03/20/2023    HCT 47.2 03/20/2023    MCV 90 03/20/2023     03/20/2023     Sodium   Date Value Ref Range Status   03/20/2023 134 (L) 136 - 145 mmol/L Final     Potassium   Date Value Ref Range Status   03/20/2023 4.6 3.5 - 5.1 mmol/L Final     Chloride   Date Value Ref Range Status   03/20/2023 102 95 - 110 mmol/L Final     CO2   Date Value Ref Range Status   03/20/2023 25 23 - 29 mmol/L Final     Glucose   Date Value Ref Range Status   03/20/2023 203 (H) 70 - 110 mg/dL Final     BUN   Date Value Ref Range Status   03/20/2023 18 6 - 20 mg/dL Final     Creatinine   Date Value Ref Range Status   03/20/2023 1.0 0.5 - 1.4 mg/dL Final     Calcium   Date Value Ref Range Status   03/20/2023 9.7 8.7 - 10.5 mg/dL Final     Total Protein   Date Value Ref Range Status   03/20/2023 7.8 6.0 - 8.4 g/dL Final     Albumin   Date Value Ref Range Status   03/20/2023 3.8 3.5 - 5.2 g/dL Final     Total Bilirubin   Date Value Ref Range Status   03/20/2023 0.6 0.1 - 1.0 mg/dL Final     Comment:     For infants and newborns, interpretation of results should be based  on gestational age, weight and in agreement with  clinical  observations.    Premature Infant recommended reference ranges:  Up to 24 hours.............<8.0 mg/dL  Up to 48 hours............<12.0 mg/dL  3-5 days..................<15.0 mg/dL  6-29 days.................<15.0 mg/dL       Alkaline Phosphatase   Date Value Ref Range Status   03/20/2023 79 55 - 135 U/L Final     AST   Date Value Ref Range Status   03/20/2023 23 10 - 40 U/L Final     ALT   Date Value Ref Range Status   03/20/2023 30 10 - 44 U/L Final     Anion Gap   Date Value Ref Range Status   03/20/2023 7 (L) 8 - 16 mmol/L Final     eGFR if    Date Value Ref Range Status   02/24/2020 >60 >60 mL/min/1.73 m^2 Final     eGFR if non    Date Value Ref Range Status   02/24/2020 >60 >60 mL/min/1.73 m^2 Final     Comment:     Calculation used to obtain the estimated glomerular filtration  rate (eGFR) is the CKD-EPI equation.        Lab Results   Component Value Date    [id number] 358 03/20/2023     Lab Results   Component Value Date    TSH 2.756 03/20/2023    FREET4 1.04 03/20/2023     Labs Reviewed:     03-09- 2023    TSH 3.840 NL, HC 4.0 NL, FA  14.3 NL, KIRTI +, KIRTI Profile neg, RPR Neg, HIV Neg, Vitamin B12 459 NL     Labs normal Except positive KIRTI, will refer to Rheumatology for further evaluation   CT of Head WO    05-    No acute intracerebral process   Labs Reviewed:    03-    RPR Neg, Vitamin B 12 358 NL, HIV neg,  TSH 2.756 NL     NL     MRI Brain WO    03-    No Significant MRI abnormality of the brain     MRI C- Spine WO:    04-    Mild DDD no acute findings noted. Normal cord signals and caliber.         Assessment:       1. Cognitive complaints with normal exam    2. Occipital neuralgia, unspecified laterality    3. Memory deficits    4. Family history of Alzheimer's disease    5. Forgetfulness    6. Psychosocial stressors    7. KIRTI positive    8. Diabetic ketoacidosis without coma associated with type 2 diabetes mellitus    9. Narcolepsy  due to underlying condition without cataplexy    10. Insulin dependent diabetes mellitus type IA    11. Ketosis prone diabetes    12. Family history of heart disease    13. Rule out Dural venous sinus thrombosis    14. Persistent headaches          Plan:     COGNITIVE COMPLAINTS WITH NORMAL EXAM/ FORGETFULNESS/FAMILY HISTORY OF ALZHEIMER (FATHER) / PSYCHOSOCIAL STRESS/       EVALUATION     Comprehensive Neuropsychological Testing.    Explained to the patient and family that medications are intended to slow down the progression and not stop it or reverse the disease.The disease is relentless, progressive and so far cannot be controlled.     I counseled the patient and family that the rate of progression is extremely variable and the average (10 years) is an inaccurate measure.     NO DMA recommended at this time    Recommend optimization of Psychosocial stressors     HOME CARE     Help with finances and decision making.    Join support group.    Proofing the house and use labeling.    Avoid antihistamines and anticholinergics.    Avoid changing routine.    Use written reminders.    Avoid multitasking.    Healthy diet, exercise (physical and cognitive).    Good sleep hygiene.         NEW ONSET ACUTE HEADACHES OCCIPITAL NEURALGIA BILATERAL      MRV BRAIN R/O DVT    MRI C-SPINE WO     OPHTHALMOLOGY EVALUATION    HEADACHE DIARY     DISCUSSED THE THREE-FOLD MANAGEMENT OF MIGRAINE:      LIFESTYLE CHANGES:       Good sleep hygiene  Avoid general triggers like lack of sleep/too much sleep, prolonged sun exposure, excessive screen time and specific triggers based on you own diary   Minimize physical and emotional stress  Smoking avoidance and cessation  Limit caffeine drinks to 1-2 a day   Good hydration   Small frequent meals and avoid skipping meals   Moderate 30-minute-long aerobic exercises 3 times/week. Avoid strenuous exercise         ABORTIVE MEDICATIONS (ACUTE-RESCUE MEDICATIONS):     Should only be taken 2-3  times/week to avoid rebound and overuse headaches.    OTC NSAIDS no more than 2 to 3 times per week        NEXT OPTIONS:    Triptans: Sumatriptan (Imitrex), Rizatriptan (Maxalt).    Gepants: Nuretc (rimegepant)75 mg >Ubrelvy (ubrogepant) 100 mg    Ditans: Reyvow (lasmiditan) 100 mg (No driving due to sedation)    Fioricet without codeine with Reglan.      Prednisone with Reglan.      LAST RESORT:     DHE NS Trudhesa (Max 2 a week)     C/I: concomitant use of vasoconstrictors like Triptans, strong CY inhibitors such as HAART PIs (eg, ritonavir, nelfinavir, or indinavir) and Macrolides (eg, erythromycin or clarithromycin), CAD, PVD, Stroke/TIA and Uncontrolled HTN.  Serious SEs include Vasospasm and Fibrosis (chronic use).       PREVENTATIVE  MEDICATIONS:       Will try Amitriptyline/Elavil (TCA) 10 mg po HS cause sleepiness, dry eyes, dry mouth, urinary retention, and rarely cardiac arrhythmias      Since the patient's headache is very frequent a lengthy discussion about preventative medications was carried out.The patient understands that prevention means DECREASING frequency and severity and NOT elimination.The patient was made aware that any new medication can cause serious allergic reaction.The medication is considered failure only if a therapeutic dose reached and maintained for 6-8 weeks.        HELPFUL SUPPLEMENTS:     Helpful supplements include Co-Q 10, B2, Mg, Feverfew (Dolovent combination) and butterbur (Petadolex)      NEXT OPTIONS:       Topiramate/Topamax (TPM) slow titration to 50 mg BID which can cause mental slowing, transient tingling, kidney stones, weight loss, cleft lip and palate and rarely glaucoma and visual field defects . The patient was encouraged to drink a lot of fluids.     Propranolol/Inderal  (BB)slow titration to 80 mg BID which can cause low blood pressure, slow heart rate, erectile dysfunction, depression, airway obstruction and heart failure exacerbations. Cannot be used with  migraine associate with focal neurological deficits.    Lamotrigine/Lamictal  (LTG)slow titration to 100 mg BID which can cause serious skin rash and rare cardiac arrhythmias. LTG is superior to other therapies for specifically reducing migraine aura.     ANTI-CGRP AGENTS: Qulipta (alogepant) 60 mg QD, Erenumab (Aimovig) 140 mg SQ Pen monthly (Reported cases of Constipation and BP elevation) , Galcanezumab (Emgality) 120 mg SQ Pen monthly after a loading dose of 240 mg  and Fremnezumab (Ajovy) (Ligand Blocker): 225 mg SQ monthly or 675 mg every 3 months     Botox 200 units every 3 months .        LAST RESORT OPTIONS:      Namenda 10 mg BID     Neuromodulation: Cefaly, Relivion     Valproic acid/ Depakote         MEDICAL/SURGICAL COMORBIDITIES     All relevant medical comorbidities noted and managed by primary care physician and medical care team.          MISCELLANEOUS MEDICAL PROBLEMS       HEALTHY LIFESTYLE AND PREVENTATIVE CARE    Encouraged the patient to adhere to the age-appropriate health maintenance guidelines including screening tests and vaccinations.     Discussed the overall importance of healthy lifestyle, optimal weight, exercise, healthy diet, good sleep hygiene and avoiding drugs including smoking, alcohol and recreational drugs. The patient verbalized full understanding.       Advised the patient to follow COVID-19 prevention measures.       I spent 140 minutes face to face with the patient    Time spent in counseling and coordination of care including discussions etiology of diagnosis, pathogenesis of diagnosis, prognosis of diagnosis,, diagnostic results, impression and recommendations, diagnostic studies, management, risks and benefits of treatment, instructions of disease self-management, treatment instructions, follow up requirements, patient and family counseling/involvement in care compliance with treatment regimen. All of the patient's questions were answered during this discussion.        Total time E/M 60 minutes    Jackelyn Wolf, MSN NP      Collaborating Provider: Bobby Ruiz MD, FAAN Neurologist/Epileptologist

## 2023-03-29 NOTE — ASSESSMENT & PLAN NOTE
Encouraged patient to continue f/u with Rheumatology for ongoing issues, increase gabapentin from 100mg TID to 200mg TID.

## 2023-03-29 NOTE — PATIENT INSTRUCTIONS
What is occipital neuralgia?  Occipital neuralgia (ON) is a condition in which the occipital nerves, the nerves that run through the scalp, are injured or inflamed. This causes headaches that feel like severe piercing, throbbing or shock-like pain in the upper neck, back of the head or behind the ears. It is not uncommon in a facial pain practice or in a headache clinic to hear from patients about pain in their face and head that originates, focuses, or culminates in the back of the head, the region that is called occiput. The patients description of the pain location may - and usually does - help in making  a correct diagnosis as most nerves in the head and neck region cover very specific anatomical distributions.    The trigeminal nerve, for example, is the main provider of sensation to the entire half of ones face. - and, similarly, the sensation in the region behind the ear and above the hairline in the back of ones head is supplied by a very specific group of nerves:, the occipital nerves. There are three occipital nerves on each side: the greater, the lesser, and the third occipital nerves, and all of them originate from the upper cervical spinal nerve roots, mainly from the second and third cervical levels (C2 and C3).      As the sensory information from the occiput is carried by the occipital nerves to the central nervous system, it travels through sensory ganglia and nerve roots and then enters the spinal cord in the upper part of the neck. There it is processed in the same area that is involved with sensation from the face and the rest of the head - the so-called trigemino-cervical complex. These intricate connections explain frequent overlap of the occipital pain with various migraine and headache conditions as well as some instances of occipital pain radiating into the forehead or getting aggravated by the facial pain.     It is important to note that among many painful conditions that involve the  occipital region, the true occipital neuralgia presents a very specific pain syndrome that can be successfully treated in most patients, as long it is properly diagnosed and addressed.    Occipital neuralgia pain  Occipital neuralgia (ON) is a relatively rare condition that manifests itself with pain on one or both sides of the head. Unilateral ON (on one side) is seen in 85% of cases. The pain usually starts in the back of the head and travels higher and toward the front, eventually  reaching the very top of the head (the vertex). It is described as shooting, electric-shock like, or stabbing in nature (and this in medical terminology is referred to as paroxysmal lancinating pain). Very often there is also a dull aching pain between the shooting attacks located in the same general area.     The duration of attacks lasting from few seconds to few minutes, the severe intensity of pain, presence of either tenderness over the course of the occipital nerves or trigger points within the occipital area, as well as pain or discomfort observed with innocuous stimulation of the scalp or hair (such as hair brushing or shampooing that would not normally cause pain) are all characteristic features of ON.         How is occipital neuralgia diagnosed?  Another classical feature that helps in making proper diagnosis is the improvement or disappearance of pain in response to numbing the nerve with an injection of local anesthetic in the vicinity of the nerve in question (nerve block). Such blocks are used to both diagnose and treat ON as the pain relief from a single injection may last quite long. In order to make diagnosis of ON, the patient is asked or tested for all of the above-mentioned above features, keeping in mind that other conditions that present with pain in the occipital region (migraines, cluster headaches and hemicranias, tension headaches, cervicogenic headaches that arise from dysfunction of the joints within the  spinal column and neighboring cervical muscles, etc.) have to be ruled out first.    Very frequently, in order to rule out associated anatomical pathology, it is necessary to perform appropriate imaging of the head and neck - this would usually include MRI of the brain and the cervical spine. The imaging would allow detection of Chiari malformations, cervical spondylosis, vascular, and neoplastic conditions; in most ON cases the MRI studies are read as normal or almost normal.    What causes occipital neuralgia?  Interestingly enough, the exact source of pain in ON remains unknown - it is commonly accepted as a neuropathic pain condition, meaning that the underlying process is the malfunction of the nervous system. The occipital nerves, the culprit of ON, appear to be hyperactive and irritated but the reason for this irritation is often unclear. Multiple existing theories postulate compression or entrapment of the nerve or nerves anywhere along their course in the patients neck and head, but there is no consensus or a universally accepted understanding of the underlying pathology.    Treatment for occipital neuralgia  As with all chronic pain syndromes, the treatment of ON is administered in systematic fashion - starting from conservative measures: medications, interventions, and, ultimately, surgery. As the natural course of ON may be self-limiting and the pain may improve over time, it may be prudent to avoid risky interventions early on in the course of the disease, but medically-refractory cases (those not yielding to treatment) are often considered for invasive treatments as the pain may become disabling and making risks of interventional or surgical treatment justified.    The common initial treatments include application of cold and warm packs, massage, and physical therapy. Rest also frequently reduces the pain.     Medication for occipital neuralgia  Among available medications, initial preference is given  to conventional anti-inflammatory drugs and muscle relaxants. The next level of treatment would include those commonly used for neuropathic pain conditions- anticonvulsants and antidepressants, including gabapentin, amitriptyline, pregabalin, carbamazepine, and nortriptyline. Although useful in relieving the pain, the opioid medications are to be avoided in ON and other neuropathic pain conditions.    Nerve blocks  The nerve blocks are considered next. Your doctor may use the block or blocks for diagnostic and for therapeutic purposes. Nerve blocks may include both short- and long-lasting local anesthetics; the medications are injected in the vicinity of each suspected nerve, and as a result of injection the territory that the nerve supplies becomes temporarily numb. Along with numbness, patients experience improvement or complete relief of their ON pain, but duration of this relief tends to be longer than duration of numbness, and sometimes the pain relief may turn out to be long-lasting or even permanent. This course of events, however, is observed in only a small fraction of ON patients, and therefore the nerve blocks have to be repeated, usually with addition of corticosteroids to the local anesthetics, adding anti-inflammatory effect to the anesthesia.    Other non-surgical treatment options  Other interventional (non-surgical) ON treatment options include injections of botulinum toxin, pulsed radiofrequency treatments, and short-term electrical nerve stimulation (so called percutaneous electrical nerve stimulation or PENS). Each of these interventional modalities is able to provide significant reduction in pain intensity in a majority of ON patients, but the longevity of improvement varies from person to person and permanent pain relief is rarely seen.    Surgery for occipital neuralgia  Surgery is reserved for the most refractory patients who fail to respond to non-surgical treatments and those with  intolerable pain who experience pain recurrence after the use of less invasive approaches. Although many specific surgical procedures are available for ON patients, all of them are divided into three main groups: decompression, neuromodulation, and neuro-destruction.    Decompression surgery is based on a presumption that the pain comes from the occipital nerve(s) being compressed along their course through the muscles and fascial layers with additional aggravation from neighboring arteries that are expected to travel next to the nerves. During surgery, the nerves are released at one or several points, usually by cutting the adjacent muscle and fascia, and the additional compression points from the vessels are protected by physical separation of neural and vascular structures.    In case of unsuccessful decompression or if the pain recurs due to scar formation, there is an option to interrupt transmission of painful signals or remove the hyperactive neural structures - this is accomplished by destructive interventions which include neurectomy or neurotomy, ganglionectomy, and rhizotomy that are aimed at the nerves, spinal ganglia, and spinal nerve roots, respectively. All of these interventions are considered established treatment options for ON, but the patients are expected to discuss with their surgeons the associated risks of complications and possibility of improvement, as well as contingency plans in cases of insufficient pain relief or pain recurrence.    A very different approach in treatment of ON is based on pain suppression with electrical stimulation that is delivered by an implanted device. This technique, called occipital nerve stimulation (ONS), was developed in the 1970s and perfected to its current shape in late 1990s. It  is now considered a standard approach to the treatment of medically-refractory ON pain. Several years ago, practice guidelines backed by a national neurosurgical society (the  [employer]) recommended ONS for ON patients based on evidence gathered through multiple peer-reviewed publications. Despite this, however, ONS remains one of those procedures that require a complicated approval process from most insurance companies.    The surgery for ONS includes implantation of one or two electrodes in the immediate vicinity of the nerve so that the electrical pulses can reach the nerve when the device is activated. During the initial testing period (the trial), the electrodes are connected to an external device to check for the degree of improvement and presence of any side effects; these temporary (externalized) electrodes are usually removed at the end of the trial.    Later on, the implantation of the permanent device involves insertion of both the electrodes and an internal pulse generator that serves as the power source and a brain of the ONS system. The devices available for ONS today allow patients to turn stimulation on and off, make it stronger and weaker, adjust settings, and switch between different programs based on pattern and severity of their pain.  All of this is done with an external remote control that communicates with the implanted generator using telemetry. Among multiple generators and systems available for ONS today there are some devices that are rechargeable and can last, with proper recharging, up to 15 years.    No surgical treatment of ON is perfect - each modality has its own set of risks and limitations - but with proper diagnostic evaluation and clear expectations of treatment it is possible to achieve lasting pain relief, so the diagnosis of ON should not be considered a lifelong burden but rather a treatable condition that can be improved and potentially cured as long as there is a well-informed patient and a team of experienced physicians and surgeons.

## 2023-03-30 ENCOUNTER — PATIENT MESSAGE (OUTPATIENT)
Dept: NEUROLOGY | Facility: CLINIC | Age: 48
End: 2023-03-30
Payer: COMMERCIAL

## 2023-03-30 ENCOUNTER — OFFICE VISIT (OUTPATIENT)
Dept: OTOLARYNGOLOGY | Facility: CLINIC | Age: 48
End: 2023-03-30
Payer: COMMERCIAL

## 2023-03-30 ENCOUNTER — LAB VISIT (OUTPATIENT)
Dept: LAB | Facility: HOSPITAL | Age: 48
End: 2023-03-30
Attending: OTOLARYNGOLOGY
Payer: COMMERCIAL

## 2023-03-30 VITALS
WEIGHT: 166.44 LBS | BODY MASS INDEX: 25.23 KG/M2 | SYSTOLIC BLOOD PRESSURE: 155 MMHG | HEART RATE: 91 BPM | DIASTOLIC BLOOD PRESSURE: 97 MMHG | HEIGHT: 68 IN

## 2023-03-30 DIAGNOSIS — K11.20 SIALOADENITIS: ICD-10-CM

## 2023-03-30 PROCEDURE — 99999 PR PBB SHADOW E&M-EST. PATIENT-LVL IV: CPT | Mod: PBBFAC,,, | Performed by: OTOLARYNGOLOGY

## 2023-03-30 PROCEDURE — 1159F MED LIST DOCD IN RCRD: CPT | Mod: CPTII,S$GLB,, | Performed by: OTOLARYNGOLOGY

## 2023-03-30 PROCEDURE — 99999 PR PBB SHADOW E&M-EST. PATIENT-LVL IV: ICD-10-PCS | Mod: PBBFAC,,, | Performed by: OTOLARYNGOLOGY

## 2023-03-30 PROCEDURE — 3052F HG A1C>EQUAL 8.0%<EQUAL 9.0%: CPT | Mod: CPTII,S$GLB,, | Performed by: OTOLARYNGOLOGY

## 2023-03-30 PROCEDURE — 99213 OFFICE O/P EST LOW 20 MIN: CPT | Mod: S$GLB,,, | Performed by: OTOLARYNGOLOGY

## 2023-03-30 PROCEDURE — 3052F PR MOST RECENT HEMOGLOBIN A1C LEVEL 8.0 - < 9.0%: ICD-10-PCS | Mod: CPTII,S$GLB,, | Performed by: OTOLARYNGOLOGY

## 2023-03-30 PROCEDURE — 3077F PR MOST RECENT SYSTOLIC BLOOD PRESSURE >= 140 MM HG: ICD-10-PCS | Mod: CPTII,S$GLB,, | Performed by: OTOLARYNGOLOGY

## 2023-03-30 PROCEDURE — 3066F NEPHROPATHY DOC TX: CPT | Mod: CPTII,S$GLB,, | Performed by: OTOLARYNGOLOGY

## 2023-03-30 PROCEDURE — 3080F PR MOST RECENT DIASTOLIC BLOOD PRESSURE >= 90 MM HG: ICD-10-PCS | Mod: CPTII,S$GLB,, | Performed by: OTOLARYNGOLOGY

## 2023-03-30 PROCEDURE — 3077F SYST BP >= 140 MM HG: CPT | Mod: CPTII,S$GLB,, | Performed by: OTOLARYNGOLOGY

## 2023-03-30 PROCEDURE — 86235 NUCLEAR ANTIGEN ANTIBODY: CPT | Performed by: OTOLARYNGOLOGY

## 2023-03-30 PROCEDURE — 3061F NEG MICROALBUMINURIA REV: CPT | Mod: CPTII,S$GLB,, | Performed by: OTOLARYNGOLOGY

## 2023-03-30 PROCEDURE — 3061F PR NEG MICROALBUMINURIA RESULT DOCUMENTED/REVIEW: ICD-10-PCS | Mod: CPTII,S$GLB,, | Performed by: OTOLARYNGOLOGY

## 2023-03-30 PROCEDURE — 3008F BODY MASS INDEX DOCD: CPT | Mod: CPTII,S$GLB,, | Performed by: OTOLARYNGOLOGY

## 2023-03-30 PROCEDURE — 3008F PR BODY MASS INDEX (BMI) DOCUMENTED: ICD-10-PCS | Mod: CPTII,S$GLB,, | Performed by: OTOLARYNGOLOGY

## 2023-03-30 PROCEDURE — 99213 PR OFFICE/OUTPT VISIT, EST, LEVL III, 20-29 MIN: ICD-10-PCS | Mod: S$GLB,,, | Performed by: OTOLARYNGOLOGY

## 2023-03-30 PROCEDURE — 3080F DIAST BP >= 90 MM HG: CPT | Mod: CPTII,S$GLB,, | Performed by: OTOLARYNGOLOGY

## 2023-03-30 PROCEDURE — 1160F RVW MEDS BY RX/DR IN RCRD: CPT | Mod: CPTII,S$GLB,, | Performed by: OTOLARYNGOLOGY

## 2023-03-30 PROCEDURE — 86235 NUCLEAR ANTIGEN ANTIBODY: CPT | Mod: 59 | Performed by: OTOLARYNGOLOGY

## 2023-03-30 PROCEDURE — 1160F PR REVIEW ALL MEDS BY PRESCRIBER/CLIN PHARMACIST DOCUMENTED: ICD-10-PCS | Mod: CPTII,S$GLB,, | Performed by: OTOLARYNGOLOGY

## 2023-03-30 PROCEDURE — 36415 COLL VENOUS BLD VENIPUNCTURE: CPT | Performed by: OTOLARYNGOLOGY

## 2023-03-30 PROCEDURE — 1159F PR MEDICATION LIST DOCUMENTED IN MEDICAL RECORD: ICD-10-PCS | Mod: CPTII,S$GLB,, | Performed by: OTOLARYNGOLOGY

## 2023-03-30 PROCEDURE — 3066F PR DOCUMENTATION OF TREATMENT FOR NEPHROPATHY: ICD-10-PCS | Mod: CPTII,S$GLB,, | Performed by: OTOLARYNGOLOGY

## 2023-03-31 ENCOUNTER — PATIENT MESSAGE (OUTPATIENT)
Dept: INTERNAL MEDICINE | Facility: CLINIC | Age: 48
End: 2023-03-31
Payer: COMMERCIAL

## 2023-03-31 ENCOUNTER — PATIENT MESSAGE (OUTPATIENT)
Dept: DIABETES | Facility: CLINIC | Age: 48
End: 2023-03-31
Payer: COMMERCIAL

## 2023-03-31 LAB — ZNT8 AB SERPL IA-ACNC: <15 U/ML

## 2023-04-01 ENCOUNTER — PATIENT MESSAGE (OUTPATIENT)
Dept: INTERNAL MEDICINE | Facility: CLINIC | Age: 48
End: 2023-04-01
Payer: COMMERCIAL

## 2023-04-03 ENCOUNTER — PATIENT MESSAGE (OUTPATIENT)
Dept: INTERNAL MEDICINE | Facility: CLINIC | Age: 48
End: 2023-04-03
Payer: COMMERCIAL

## 2023-04-03 ENCOUNTER — TELEPHONE (OUTPATIENT)
Dept: NEUROLOGY | Facility: CLINIC | Age: 48
End: 2023-04-03
Payer: COMMERCIAL

## 2023-04-03 PROBLEM — K11.20 SIALOADENITIS: Status: ACTIVE | Noted: 2023-04-03

## 2023-04-03 LAB
ANTI-SSA ANTIBODY: 0.09 RATIO (ref 0–0.99)
ANTI-SSA INTERPRETATION: NEGATIVE
ANTI-SSB ANTIBODY: 0.09 RATIO (ref 0–0.99)
ANTI-SSB ANTIBODY: 0.09 RATIO (ref 0–0.99)
ANTI-SSB INTERPRETATION: NEGATIVE
ANTI-SSB INTERPRETATION: NEGATIVE

## 2023-04-03 NOTE — ASSESSMENT & PLAN NOTE
Etiology uncertain.  Sjogren's serologies ordered.  Will consider sialendoscopy after these results are available.

## 2023-04-03 NOTE — PROGRESS NOTES
CC: R parotid sialadenitis      HPI   48 y.o. male presents with recent history of right parotid pain and swelling.  He reports significant pain with the 1st bite of food.  He was previously seen by Dr. Cox who suggested that he may benefit from sialoendoscopy.  He he does have a history of autoimmune disease but has not been evaluated for Sjogren's.  Recent neck CT revealed prominence of the R parotid but no focal abnormalities.    Review of Systems   Constitutional: Negative for fatigue and unexpected weight change.   HENT: Per HPI.  Eyes: Negative for visual disturbance.   Respiratory: Negative for shortness of breath, hemoptysis   Cardiovascular: Negative for chest pain and palpitations.   Musculoskeletal: Negative for decreased ROM, back pain.   Skin: Negative for rash, sunburn, itching.   Neurological: Negative for dizziness and seizures.   Hematological: Negative for adenopathy. Does not bruise/bleed easily.   Endocrine: Negative for rapid weight loss/weight gain, heat/cold intolerance.     Past Medical History   Patient Active Problem List   Diagnosis    Diabetic ketoacidosis without coma associated with type 2 diabetes mellitus    SOB (shortness of breath)    Hyperlipidemia associated with type 2 diabetes mellitus    Restrictive lung disease    Hypogonadism male    Chronic prostatitis    Narcolepsy due to underlying condition without cataplexy    Insulin dependent diabetes mellitus type IA    Ketosis prone diabetes    Periauricular adenopathy    Personal history of systemic lupus erythematosus (SLE)    Butterfly rash    Chest pain    Memory deficits    KIRTI positive    Psychosocial stressors    Family history of Alzheimer's disease    Cognitive complaints with normal exam    Family history of heart disease    Elevated blood pressure reading    Rule out Dural venous sinus thrombosis    Persistent headaches    Sialoadenitis           Past Surgical History   Past Surgical History:   Procedure Laterality Date     NOSE SURGERY           Family History   Family History   Problem Relation Age of Onset    No Known Problems Mother            Social History   .  Social History     Socioeconomic History    Marital status: Legally    Tobacco Use    Smoking status: Never     Passive exposure: Never    Smokeless tobacco: Never   Substance and Sexual Activity    Alcohol use: Yes     Comment: socially    Drug use: No    Sexual activity: Yes     Partners: Female   Social History Narrative    [place]     Social Determinants of Health     Financial Resource Strain: Low Risk     Difficulty of Paying Living Expenses: Not hard at all   Food Insecurity: No Food Insecurity    Worried About Running Out of Food in the Last Year: Never true    Ran Out of Food in the Last Year: Never true   Transportation Needs: No Transportation Needs    Lack of Transportation (Medical): No    Lack of Transportation (Non-Medical): No   Stress: No Stress Concern Present    Feeling of Stress : Only a little   Social Connections: Moderately Isolated    Frequency of Communication with Friends and Family: More than three times a week    Frequency of Social Gatherings with Friends and Family: More than three times a week    Attends Bahai Services: Never    Active Member of Clubs or Organizations: No    Attends Club or Organization Meetings: Never    Marital Status:    Housing Stability: Low Risk     Unable to Pay for Housing in the Last Year: No    Number of Places Lived in the Last Year: 2    Unstable Housing in the Last Year: No         Allergies   Review of patient's allergies indicates:  No Known Allergies        Physical Exam     Vitals:    03/30/23 1555   BP: (!) 155/97   Pulse: 91         Body mass index is 25.31 kg/m².      General: AOx3, NAD   Respiratory:  Symmetric chest rise, normal effort  Nose: No gross nasal septal deviation. Inferior Turbinates WNL bilaterally. No septal perforation. No masses/lesions.   Oral Cavity:  Oral Tongue  mobile, no lesions noted. Hard Palate WNL. No buccal or FOM lesions.  Oropharynx:  No masses/lesions of the posterior pharyngeal wall. Tonsillar fossa without lesions. Soft palate without masses. Midline uvula.   Neck: No scars.  No cervical lymphadenopathy, thyromegaly or thyroid nodules.  Normal range of motion.    Face: House Brackmann I bilaterally.     Assessment/Plan  Problem List Items Addressed This Visit          ENT    Sialoadenitis     Etiology uncertain.  Sjogren's serologies ordered.  Will consider sialendoscopy after these results are available.           Relevant Orders    ANTI -SSA ANTIBODY    ANTI-SSB ANTIBODY    ANTI-SSB ANTIBODY

## 2023-04-03 NOTE — TELEPHONE ENCOUNTER
Good afternoon Shubham,      Can we get this patient scheduled with  for the interview appt. Per  if we can expedite appointment for patient.       Thank you.

## 2023-04-04 ENCOUNTER — TELEPHONE (OUTPATIENT)
Dept: INTERNAL MEDICINE | Facility: CLINIC | Age: 48
End: 2023-04-04
Payer: COMMERCIAL

## 2023-04-04 LAB — GAD65 AB SER-SCNC: 0.01 NMOL/L

## 2023-04-04 NOTE — TELEPHONE ENCOUNTER
Dr Jeff Perez(dentist)   contacted office in regards to patient complaining of joint paint and that when he bites down it hurts , also complaining of back and neck pain, stated he was awaiting test results from  informed  that he wasn't awaiting any test results and the only lab results, I seen for  was KIRTI informed him of those lab results and who he was to follow up with  informed me that he will take it from there.     He did state the way that he would treat this is with Prednisone medrol pack and go from there.

## 2023-04-05 ENCOUNTER — OFFICE VISIT (OUTPATIENT)
Dept: INTERNAL MEDICINE | Facility: CLINIC | Age: 48
End: 2023-04-05
Payer: COMMERCIAL

## 2023-04-05 ENCOUNTER — TELEPHONE (OUTPATIENT)
Dept: ADMINISTRATIVE | Facility: OTHER | Age: 48
End: 2023-04-05
Payer: COMMERCIAL

## 2023-04-05 ENCOUNTER — LAB VISIT (OUTPATIENT)
Dept: LAB | Facility: HOSPITAL | Age: 48
End: 2023-04-05
Attending: FAMILY MEDICINE
Payer: COMMERCIAL

## 2023-04-05 VITALS
SYSTOLIC BLOOD PRESSURE: 138 MMHG | WEIGHT: 156.06 LBS | RESPIRATION RATE: 16 BRPM | TEMPERATURE: 99 F | OXYGEN SATURATION: 98 % | HEART RATE: 110 BPM | DIASTOLIC BLOOD PRESSURE: 82 MMHG | HEIGHT: 68 IN | BODY MASS INDEX: 23.65 KG/M2

## 2023-04-05 DIAGNOSIS — M25.50 ARTHRALGIA, UNSPECIFIED JOINT: ICD-10-CM

## 2023-04-05 DIAGNOSIS — M25.50 ARTHRALGIA, UNSPECIFIED JOINT: Primary | ICD-10-CM

## 2023-04-05 PROCEDURE — 99999 PR PBB SHADOW E&M-EST. PATIENT-LVL IV: ICD-10-PCS | Mod: PBBFAC,,, | Performed by: FAMILY MEDICINE

## 2023-04-05 PROCEDURE — 3075F SYST BP GE 130 - 139MM HG: CPT | Mod: CPTII,S$GLB,, | Performed by: FAMILY MEDICINE

## 2023-04-05 PROCEDURE — 99999 PR PBB SHADOW E&M-EST. PATIENT-LVL IV: CPT | Mod: PBBFAC,,, | Performed by: FAMILY MEDICINE

## 2023-04-05 PROCEDURE — 3079F DIAST BP 80-89 MM HG: CPT | Mod: CPTII,S$GLB,, | Performed by: FAMILY MEDICINE

## 2023-04-05 PROCEDURE — 3079F PR MOST RECENT DIASTOLIC BLOOD PRESSURE 80-89 MM HG: ICD-10-PCS | Mod: CPTII,S$GLB,, | Performed by: FAMILY MEDICINE

## 2023-04-05 PROCEDURE — 99213 PR OFFICE/OUTPT VISIT, EST, LEVL III, 20-29 MIN: ICD-10-PCS | Mod: S$GLB,,, | Performed by: FAMILY MEDICINE

## 2023-04-05 PROCEDURE — 1159F PR MEDICATION LIST DOCUMENTED IN MEDICAL RECORD: ICD-10-PCS | Mod: CPTII,S$GLB,, | Performed by: FAMILY MEDICINE

## 2023-04-05 PROCEDURE — 36415 COLL VENOUS BLD VENIPUNCTURE: CPT | Performed by: FAMILY MEDICINE

## 2023-04-05 PROCEDURE — 86617 LYME DISEASE ANTIBODY: CPT | Mod: 59 | Performed by: FAMILY MEDICINE

## 2023-04-05 PROCEDURE — 3075F PR MOST RECENT SYSTOLIC BLOOD PRESS GE 130-139MM HG: ICD-10-PCS | Mod: CPTII,S$GLB,, | Performed by: FAMILY MEDICINE

## 2023-04-05 PROCEDURE — 3066F NEPHROPATHY DOC TX: CPT | Mod: CPTII,S$GLB,, | Performed by: FAMILY MEDICINE

## 2023-04-05 PROCEDURE — 3008F PR BODY MASS INDEX (BMI) DOCUMENTED: ICD-10-PCS | Mod: CPTII,S$GLB,, | Performed by: FAMILY MEDICINE

## 2023-04-05 PROCEDURE — 1159F MED LIST DOCD IN RCRD: CPT | Mod: CPTII,S$GLB,, | Performed by: FAMILY MEDICINE

## 2023-04-05 PROCEDURE — 3066F PR DOCUMENTATION OF TREATMENT FOR NEPHROPATHY: ICD-10-PCS | Mod: CPTII,S$GLB,, | Performed by: FAMILY MEDICINE

## 2023-04-05 PROCEDURE — 3061F NEG MICROALBUMINURIA REV: CPT | Mod: CPTII,S$GLB,, | Performed by: FAMILY MEDICINE

## 2023-04-05 PROCEDURE — 3052F PR MOST RECENT HEMOGLOBIN A1C LEVEL 8.0 - < 9.0%: ICD-10-PCS | Mod: CPTII,S$GLB,, | Performed by: FAMILY MEDICINE

## 2023-04-05 PROCEDURE — 3008F BODY MASS INDEX DOCD: CPT | Mod: CPTII,S$GLB,, | Performed by: FAMILY MEDICINE

## 2023-04-05 PROCEDURE — 99213 OFFICE O/P EST LOW 20 MIN: CPT | Mod: S$GLB,,, | Performed by: FAMILY MEDICINE

## 2023-04-05 PROCEDURE — 3061F PR NEG MICROALBUMINURIA RESULT DOCUMENTED/REVIEW: ICD-10-PCS | Mod: CPTII,S$GLB,, | Performed by: FAMILY MEDICINE

## 2023-04-05 PROCEDURE — 3052F HG A1C>EQUAL 8.0%<EQUAL 9.0%: CPT | Mod: CPTII,S$GLB,, | Performed by: FAMILY MEDICINE

## 2023-04-05 RX ORDER — METHYLPREDNISOLONE 4 MG/1
TABLET ORAL
COMMUNITY
Start: 2023-03-27 | End: 2023-05-01

## 2023-04-05 NOTE — PROGRESS NOTES
Subjective:       Patient ID: Jason Sandra is a 48 y.o. male.    Chief Complaint: Follow-up    Patient presents to clinic today requesting lyme disease testing. He reports having a rash on face, arms and abdomen in August 2022 after working in the yard. He thought it was poison ivy, but due to symptoms, especially joint pains, his wife wanted him to be tested for lyme disease. He reports gabapentin not helping much. Patient is otherwise without concerns today.    Review of Systems   Constitutional:  Positive for fatigue. Negative for chills, fever and unexpected weight change.   Eyes:  Negative for visual disturbance.   Respiratory:  Negative for shortness of breath.    Cardiovascular:  Negative for chest pain.   Musculoskeletal:  Positive for arthralgias. Negative for myalgias.   Neurological:  Positive for headaches.       Objective:      Physical Exam  Vitals reviewed.   Constitutional:       General: He is not in acute distress.     Appearance: He is well-developed.   HENT:      Head: Normocephalic and atraumatic.   Eyes:      General: Lids are normal. No scleral icterus.     Extraocular Movements: Extraocular movements intact.      Conjunctiva/sclera: Conjunctivae normal.      Pupils: Pupils are equal, round, and reactive to light.   Pulmonary:      Effort: Pulmonary effort is normal.   Neurological:      Mental Status: He is alert and oriented to person, place, and time.      Cranial Nerves: No cranial nerve deficit.      Gait: Gait normal.   Psychiatric:         Mood and Affect: Mood and affect normal.       Assessment:       1. Arthralgia, unspecified joint        Plan:   1. Arthralgia, unspecified joint  -     LYME DISEASE [place]OT; Future; Expected date: 04/05/2023

## 2023-04-05 NOTE — TELEPHONE ENCOUNTER
Spoke to patient who declined rescheduling Diabetes education appointment of 4-4-23 as he is not sure when he will reschedule. Portal message sent.

## 2023-04-09 ENCOUNTER — PATIENT MESSAGE (OUTPATIENT)
Dept: INTERNAL MEDICINE | Facility: CLINIC | Age: 48
End: 2023-04-09
Payer: COMMERCIAL

## 2023-04-09 DIAGNOSIS — M25.50 ARTHRALGIA, UNSPECIFIED JOINT: Primary | ICD-10-CM

## 2023-04-10 ENCOUNTER — HOSPITAL ENCOUNTER (OUTPATIENT)
Dept: RADIOLOGY | Facility: HOSPITAL | Age: 48
Discharge: HOME | End: 2023-04-10
Attending: NURSE PRACTITIONER
Payer: COMMERCIAL

## 2023-04-10 ENCOUNTER — TELEPHONE (OUTPATIENT)
Dept: INTERNAL MEDICINE | Facility: CLINIC | Age: 48
End: 2023-04-10
Payer: COMMERCIAL

## 2023-04-10 DIAGNOSIS — G08 DURAL VENOUS SINUS THROMBOSIS: ICD-10-CM

## 2023-04-10 DIAGNOSIS — M54.81 OCCIPITAL NEURALGIA, UNSPECIFIED LATERALITY: ICD-10-CM

## 2023-04-10 DIAGNOSIS — R51.9 PERSISTENT HEADACHES: ICD-10-CM

## 2023-04-10 PROCEDURE — 70544 MR ANGIOGRAPHY HEAD W/O DYE: CPT | Mod: TC,PN

## 2023-04-10 PROCEDURE — 72141 MRI NECK SPINE W/O DYE: CPT | Mod: TC,PN

## 2023-04-10 PROCEDURE — 72141 MRI CERVICAL SPINE WITHOUT CONTRAST: ICD-10-PCS | Mod: 26,,, | Performed by: RADIOLOGY

## 2023-04-10 PROCEDURE — 72141 MRI NECK SPINE W/O DYE: CPT | Mod: 26,,, | Performed by: RADIOLOGY

## 2023-04-10 PROCEDURE — 70544 MRV BRAIN WITHOUT CONTRAST: ICD-10-PCS | Mod: 26,,, | Performed by: RADIOLOGY

## 2023-04-10 PROCEDURE — 70544 MR ANGIOGRAPHY HEAD W/O DYE: CPT | Mod: 26,,, | Performed by: RADIOLOGY

## 2023-04-10 NOTE — TELEPHONE ENCOUNTER
Called patient back. He was just attempting to update MD. I advised him that unless he were in distress, we need him to make the MRI today to get a picture of what is going on. Discussed s/sx of when to seek emergency attention.

## 2023-04-11 ENCOUNTER — TELEPHONE (OUTPATIENT)
Dept: NEUROLOGY | Facility: CLINIC | Age: 48
End: 2023-04-11
Payer: COMMERCIAL

## 2023-04-11 ENCOUNTER — PATIENT MESSAGE (OUTPATIENT)
Dept: OTOLARYNGOLOGY | Facility: CLINIC | Age: 48
End: 2023-04-11
Payer: COMMERCIAL

## 2023-04-11 NOTE — TELEPHONE ENCOUNTER
"I see where patient is scheduled to see Dr. Hamilton in orthopedics on Monday 04/17/2023 at 4:00 at our [place] location.    Kalani Rosen (Allye), [facility]  Rheumatology Department     "

## 2023-04-11 NOTE — TELEPHONE ENCOUNTER
----- Message from Sneha Wolf NP sent at 4/11/2023 10:25 AM CDT -----  MRV Brain WO:    04-    Normal MRV No evidence of dural venous sinus thrombosis

## 2023-04-12 ENCOUNTER — CLINICAL SUPPORT (OUTPATIENT)
Dept: INTERNAL MEDICINE | Facility: CLINIC | Age: 48
End: 2023-04-12
Payer: COMMERCIAL

## 2023-04-12 ENCOUNTER — OFFICE VISIT (OUTPATIENT)
Dept: OPHTHALMOLOGY | Facility: CLINIC | Age: 48
End: 2023-04-12
Payer: COMMERCIAL

## 2023-04-12 VITALS — SYSTOLIC BLOOD PRESSURE: 136 MMHG | DIASTOLIC BLOOD PRESSURE: 84 MMHG

## 2023-04-12 DIAGNOSIS — H52.4 MYOPIA WITH ASTIGMATISM AND PRESBYOPIA, BILATERAL: ICD-10-CM

## 2023-04-12 DIAGNOSIS — H52.203 MYOPIA WITH ASTIGMATISM AND PRESBYOPIA, BILATERAL: ICD-10-CM

## 2023-04-12 DIAGNOSIS — R76.8 ANA POSITIVE: ICD-10-CM

## 2023-04-12 DIAGNOSIS — Z79.899 LONG-TERM USE OF PLAQUENIL: ICD-10-CM

## 2023-04-12 DIAGNOSIS — M32.9 PERSONAL HISTORY OF SYSTEMIC LUPUS ERYTHEMATOSUS (SLE): ICD-10-CM

## 2023-04-12 DIAGNOSIS — K11.20 SIALOADENITIS: Primary | ICD-10-CM

## 2023-04-12 DIAGNOSIS — E11.3293 MILD NONPROLIFERATIVE DIABETIC RETINOPATHY OF BOTH EYES WITHOUT MACULAR EDEMA ASSOCIATED WITH TYPE 2 DIABETES MELLITUS: Primary | ICD-10-CM

## 2023-04-12 DIAGNOSIS — R51.9 PERSISTENT HEADACHES: ICD-10-CM

## 2023-04-12 DIAGNOSIS — H52.13 MYOPIA WITH ASTIGMATISM AND PRESBYOPIA, BILATERAL: ICD-10-CM

## 2023-04-12 DIAGNOSIS — R03.0 ELEVATED BLOOD PRESSURE READING: Primary | ICD-10-CM

## 2023-04-12 PROCEDURE — 3052F HG A1C>EQUAL 8.0%<EQUAL 9.0%: CPT | Mod: CPTII,S$GLB,, | Performed by: OPTOMETRIST

## 2023-04-12 PROCEDURE — 92134 CPTRZ OPH DX IMG PST SGM RTA: CPT | Mod: S$GLB,,, | Performed by: OPTOMETRIST

## 2023-04-12 PROCEDURE — 3052F PR MOST RECENT HEMOGLOBIN A1C LEVEL 8.0 - < 9.0%: ICD-10-PCS | Mod: CPTII,S$GLB,, | Performed by: OPTOMETRIST

## 2023-04-12 PROCEDURE — 99999 PR PBB SHADOW E&M-EST. PATIENT-LVL II: CPT | Mod: PBBFAC,,,

## 2023-04-12 PROCEDURE — 3061F PR NEG MICROALBUMINURIA RESULT DOCUMENTED/REVIEW: ICD-10-PCS | Mod: CPTII,S$GLB,, | Performed by: OPTOMETRIST

## 2023-04-12 PROCEDURE — 92004 PR EYE EXAM, NEW PATIENT,COMPREHESV: ICD-10-PCS | Mod: S$GLB,,, | Performed by: OPTOMETRIST

## 2023-04-12 PROCEDURE — 92134 POSTERIOR SEGMENT OCT RETINA (OCULAR COHERENCE TOMOGRAPHY)-BOTH EYES: ICD-10-PCS | Mod: S$GLB,,, | Performed by: OPTOMETRIST

## 2023-04-12 PROCEDURE — 1159F PR MEDICATION LIST DOCUMENTED IN MEDICAL RECORD: ICD-10-PCS | Mod: CPTII,S$GLB,, | Performed by: OPTOMETRIST

## 2023-04-12 PROCEDURE — 3066F NEPHROPATHY DOC TX: CPT | Mod: CPTII,S$GLB,, | Performed by: OPTOMETRIST

## 2023-04-12 PROCEDURE — 99999 PR PBB SHADOW E&M-EST. PATIENT-LVL III: ICD-10-PCS | Mod: PBBFAC,,, | Performed by: OPTOMETRIST

## 2023-04-12 PROCEDURE — 3066F PR DOCUMENTATION OF TREATMENT FOR NEPHROPATHY: ICD-10-PCS | Mod: CPTII,S$GLB,, | Performed by: OPTOMETRIST

## 2023-04-12 PROCEDURE — 3061F NEG MICROALBUMINURIA REV: CPT | Mod: CPTII,S$GLB,, | Performed by: OPTOMETRIST

## 2023-04-12 PROCEDURE — 1160F PR REVIEW ALL MEDS BY PRESCRIBER/CLIN PHARMACIST DOCUMENTED: ICD-10-PCS | Mod: CPTII,S$GLB,, | Performed by: OPTOMETRIST

## 2023-04-12 PROCEDURE — 92015 DETERMINE REFRACTIVE STATE: CPT | Mod: S$GLB,,, | Performed by: OPTOMETRIST

## 2023-04-12 PROCEDURE — 99999 PR PBB SHADOW E&M-EST. PATIENT-LVL III: CPT | Mod: PBBFAC,,, | Performed by: OPTOMETRIST

## 2023-04-12 PROCEDURE — 1160F RVW MEDS BY RX/DR IN RCRD: CPT | Mod: CPTII,S$GLB,, | Performed by: OPTOMETRIST

## 2023-04-12 PROCEDURE — 99999 PR PBB SHADOW E&M-EST. PATIENT-LVL II: ICD-10-PCS | Mod: PBBFAC,,,

## 2023-04-12 PROCEDURE — 1159F MED LIST DOCD IN RCRD: CPT | Mod: CPTII,S$GLB,, | Performed by: OPTOMETRIST

## 2023-04-12 PROCEDURE — 92015 PR REFRACTION: ICD-10-PCS | Mod: S$GLB,,, | Performed by: OPTOMETRIST

## 2023-04-12 PROCEDURE — 92004 COMPRE OPH EXAM NEW PT 1/>: CPT | Mod: S$GLB,,, | Performed by: OPTOMETRIST

## 2023-04-12 RX ORDER — LIDOCAINE HYDROCHLORIDE 10 MG/ML
1 INJECTION, SOLUTION EPIDURAL; INFILTRATION; INTRACAUDAL; PERINEURAL ONCE
Status: CANCELLED | OUTPATIENT
Start: 2023-04-12 | End: 2023-04-12

## 2023-04-12 RX ORDER — SODIUM CHLORIDE 0.9 % (FLUSH) 0.9 %
10 SYRINGE (ML) INJECTION
Status: CANCELLED | OUTPATIENT
Start: 2023-04-12

## 2023-04-12 NOTE — PROGRESS NOTES
HPI     Plaquenil            Comments: Plaquenil use: 200 mg qd started March 2023  Last 10-2 VF: none  Last mOCT: 4/12/23              Comments    New to [facility]  Diagnosed with diabetes in 2013  Blood sugar 150   Lab Results       Component                Value               Date                       HGBA1C                   8.7 (H)             03/20/2023            Vision changes since last eye exam?: no     Any eye pain today: no    Other ocular symptoms: floaters but not often    Interested in contact lens fitting today? no                     Last edited by Amy Kraus MA on 4/12/2023  8:18 AM.            Assessment /Plan     For exam results, see Encounter Report.    Mild nonproliferative diabetic retinopathy of both eyes without macular edema associated with type 2 diabetes mellitus  Discussed importance of blood sugar control and regular dilated eye exams.   Continue close care with PCP regarding diabetes.    Personal history of systemic lupus erythematosus (SLE)  Long-term use of Plaquenil  KIRTI positive  -     Posterior Segment OCT Retina-Both eyes  No maculopathy present today.   Stressed importance of follow up visits with pt.  RTC 12 months for dilation,10-2 VF, and mOCT.   PRN sooner if any va changes.    Myopia with astigmatism and presbyopia, bilateral  Eyeglass Final Rx       Eyeglass Final Rx         Sphere Cylinder Axis    Right -1.25 +1.75 005    Left -1.75 +2.00 175      Type: SVL distance only    Expiration Date: 4/12/2024                  RTC 1 yr for 10-2 VF, gOCT and dilated eye exam or PRN if any problems.   Discussed above and answered questions.

## 2023-04-12 NOTE — PROGRESS NOTES
Patient identified using 2 patient identifier method. Pleasant mood. BP taken manually in right arm with reading of 136/84. NADN. Instructed to follow-up as scheduled/PRN. Verbalized understanding.

## 2023-04-13 LAB
B BURGDOR IGG SER QL IB: NEGATIVE
B BURGDOR IGM SER QL IB: NEGATIVE

## 2023-04-14 DIAGNOSIS — M25.552 LEFT HIP PAIN: Primary | ICD-10-CM

## 2023-04-15 ENCOUNTER — PATIENT MESSAGE (OUTPATIENT)
Dept: INTERNAL MEDICINE | Facility: CLINIC | Age: 48
End: 2023-04-15
Payer: COMMERCIAL

## 2023-04-16 ENCOUNTER — PATIENT MESSAGE (OUTPATIENT)
Dept: UROLOGY | Facility: CLINIC | Age: 48
End: 2023-04-16
Payer: COMMERCIAL

## 2023-04-17 ENCOUNTER — OFFICE VISIT (OUTPATIENT)
Dept: SPORTS MEDICINE | Facility: CLINIC | Age: 48
End: 2023-04-17
Payer: COMMERCIAL

## 2023-04-17 ENCOUNTER — HOSPITAL ENCOUNTER (OUTPATIENT)
Dept: RADIOLOGY | Facility: HOSPITAL | Age: 48
Discharge: HOME | End: 2023-04-17
Attending: STUDENT IN AN ORGANIZED HEALTH CARE EDUCATION/TRAINING PROGRAM
Payer: COMMERCIAL

## 2023-04-17 DIAGNOSIS — M25.551 GREATER TROCHANTERIC PAIN SYNDROME OF BOTH LOWER EXTREMITIES: ICD-10-CM

## 2023-04-17 DIAGNOSIS — M25.552 LEFT HIP PAIN: ICD-10-CM

## 2023-04-17 DIAGNOSIS — M25.50 ARTHRALGIA, UNSPECIFIED JOINT: ICD-10-CM

## 2023-04-17 DIAGNOSIS — M25.552 GREATER TROCHANTERIC PAIN SYNDROME OF BOTH LOWER EXTREMITIES: ICD-10-CM

## 2023-04-17 DIAGNOSIS — M54.16 LUMBAR RADICULOPATHY: Primary | ICD-10-CM

## 2023-04-17 PROCEDURE — 1160F PR REVIEW ALL MEDS BY PRESCRIBER/CLIN PHARMACIST DOCUMENTED: ICD-10-PCS | Mod: CPTII,S$GLB,, | Performed by: STUDENT IN AN ORGANIZED HEALTH CARE EDUCATION/TRAINING PROGRAM

## 2023-04-17 PROCEDURE — 99203 OFFICE O/P NEW LOW 30 MIN: CPT | Mod: S$GLB,,, | Performed by: STUDENT IN AN ORGANIZED HEALTH CARE EDUCATION/TRAINING PROGRAM

## 2023-04-17 PROCEDURE — 3061F NEG MICROALBUMINURIA REV: CPT | Mod: CPTII,S$GLB,, | Performed by: STUDENT IN AN ORGANIZED HEALTH CARE EDUCATION/TRAINING PROGRAM

## 2023-04-17 PROCEDURE — 1160F RVW MEDS BY RX/DR IN RCRD: CPT | Mod: CPTII,S$GLB,, | Performed by: STUDENT IN AN ORGANIZED HEALTH CARE EDUCATION/TRAINING PROGRAM

## 2023-04-17 PROCEDURE — 99203 PR OFFICE/OUTPT VISIT, NEW, LEVL III, 30-44 MIN: ICD-10-PCS | Mod: S$GLB,,, | Performed by: STUDENT IN AN ORGANIZED HEALTH CARE EDUCATION/TRAINING PROGRAM

## 2023-04-17 PROCEDURE — 73521 X-RAY EXAM HIPS BI 2 VIEWS: CPT | Mod: 26,,, | Performed by: RADIOLOGY

## 2023-04-17 PROCEDURE — 3066F PR DOCUMENTATION OF TREATMENT FOR NEPHROPATHY: ICD-10-PCS | Mod: CPTII,S$GLB,, | Performed by: STUDENT IN AN ORGANIZED HEALTH CARE EDUCATION/TRAINING PROGRAM

## 2023-04-17 PROCEDURE — 73521 X-RAY EXAM HIPS BI 2 VIEWS: CPT | Mod: TC,PN

## 2023-04-17 PROCEDURE — 73521 XR HIPS BILATERAL 2 VIEW INCL AP PELVIS: ICD-10-PCS | Mod: 26,,, | Performed by: RADIOLOGY

## 2023-04-17 PROCEDURE — 3061F PR NEG MICROALBUMINURIA RESULT DOCUMENTED/REVIEW: ICD-10-PCS | Mod: CPTII,S$GLB,, | Performed by: STUDENT IN AN ORGANIZED HEALTH CARE EDUCATION/TRAINING PROGRAM

## 2023-04-17 PROCEDURE — 3052F HG A1C>EQUAL 8.0%<EQUAL 9.0%: CPT | Mod: CPTII,S$GLB,, | Performed by: STUDENT IN AN ORGANIZED HEALTH CARE EDUCATION/TRAINING PROGRAM

## 2023-04-17 PROCEDURE — 99999 PR PBB SHADOW E&M-EST. PATIENT-LVL IV: CPT | Mod: PBBFAC,,, | Performed by: STUDENT IN AN ORGANIZED HEALTH CARE EDUCATION/TRAINING PROGRAM

## 2023-04-17 PROCEDURE — 1159F MED LIST DOCD IN RCRD: CPT | Mod: CPTII,S$GLB,, | Performed by: STUDENT IN AN ORGANIZED HEALTH CARE EDUCATION/TRAINING PROGRAM

## 2023-04-17 PROCEDURE — 1159F PR MEDICATION LIST DOCUMENTED IN MEDICAL RECORD: ICD-10-PCS | Mod: CPTII,S$GLB,, | Performed by: STUDENT IN AN ORGANIZED HEALTH CARE EDUCATION/TRAINING PROGRAM

## 2023-04-17 PROCEDURE — 99999 PR PBB SHADOW E&M-EST. PATIENT-LVL IV: ICD-10-PCS | Mod: PBBFAC,,, | Performed by: STUDENT IN AN ORGANIZED HEALTH CARE EDUCATION/TRAINING PROGRAM

## 2023-04-17 PROCEDURE — 3052F PR MOST RECENT HEMOGLOBIN A1C LEVEL 8.0 - < 9.0%: ICD-10-PCS | Mod: CPTII,S$GLB,, | Performed by: STUDENT IN AN ORGANIZED HEALTH CARE EDUCATION/TRAINING PROGRAM

## 2023-04-17 PROCEDURE — 3066F NEPHROPATHY DOC TX: CPT | Mod: CPTII,S$GLB,, | Performed by: STUDENT IN AN ORGANIZED HEALTH CARE EDUCATION/TRAINING PROGRAM

## 2023-04-17 NOTE — PROGRESS NOTES
Patient ID: Jason Sandra  YOB: 1975  MRN: [id number]    Chief Complaint: Pain and Numbness of the Left Hip and Pain and Numbness of the Right Hip    Referred By: Dr. Mcconnell    Occupation: maintenance      History of Present Illness: Jason Sandra is a 48 y.o. male who presents today with Pain and Numbness of the Left Hip and Pain and Numbness of the Right Hip    He complains of bilateral hip pain that began in early March 2023 without any inciting injury.  He complains of deep lateral hip pain L>R with some radiation into his thigh on the left side.  The symptoms are constant, aching, throbbing, and sharp.  It worsens with walking and rising from a seated position.  He has not had any formal treatment to date.    Past Medical History:   Past Medical History:   Diagnosis Date    Diabetes mellitus     Hypogonadism in male      Past Surgical History:   Procedure Laterality Date    NOSE SURGERY       Family History   Problem Relation Age of Onset    Stroke Mother     Glaucoma Mother     Cataracts Mother      Social History     Socioeconomic History    Marital status: Legally    Tobacco Use    Smoking status: Never     Passive exposure: Never    Smokeless tobacco: Never   Substance and Sexual Activity    Alcohol use: Yes     Comment: socially    Drug use: No    Sexual activity: Yes     Partners: Female   Social History Narrative         Social Determinants of Health     Financial Resource Strain: Low Risk     Difficulty of Paying Living Expenses: Not hard at all   Food Insecurity: No Food Insecurity    Worried About Running Out of Food in the Last Year: Never true    Ran Out of Food in the Last Year: Never true   Transportation Needs: No Transportation Needs    Lack of Transportation (Medical): No    Lack of Transportation (Non-Medical): No   Stress: No Stress Concern Present    Feeling of Stress : Only a little   Social Connections: Moderately Isolated    Frequency of  Communication with Friends and Family: More than three times a week    Frequency of Social Gatherings with Friends and Family: More than three times a week    Attends [employer] Services: Never    Active Member of Clubs or Organizations: No    Attends Club or Organization Meetings: Never    Marital Status:    Housing Stability: Low Risk     Unable to Pay for Housing in the Last Year: No    Number of Places Lived in the Last Year: 2    Unstable Housing in the Last Year: No     Medication List with Changes/Refills   Current Medications    ACCU-CHEK GUIDE ME GLUCOSE MTR MISC    CHECK BLOOD SUGAR TWICE A DAY    AMITRIPTYLINE (ELAVIL) 10 MG TABLET    Take 1 tablet (10 mg total) by mouth every evening.    ATORVASTATIN (LIPITOR) 20 MG TABLET    Take 1 tablet (20 mg total) by mouth once daily.    BLOOD SUGAR DIAGNOSTIC STRP    To check BG 2 times daily, to use with insurance preferred meter    BLOOD-GLUCOSE SENSOR (DEXCOM G6 SENSOR) RADHA    1 sensor every 10 days    BLOOD-GLUCOSE TRANSMITTER (DEXCOM G6 TRANSMITTER) RADHA    1 transmitter every 3 months    FOLIC ACID (FOLVITE) 1 MG TABLET    Take 1 tablet (1 mg total) by mouth once daily.    GLUCAGON (BAQSIMI) 3 MG/ACTUATION SPRY    3 mg (one actuation) into a single nostril; if no response, may repeat in 15 minutes using a new intranasal device.    INSULIN DEGLUDEC (TRESIBA FLEXTOUCH U-100) 100 UNIT/ML (3 ML) INSULIN PEN    Inject 24 Units into the skin every evening. Max TDD of 30 units    INSULIN LISPRO-AABC (LYUMJEV KWIKPEN U-100 INSULIN) 100 UNIT/ML PEN    Inject 10 units 3 times per day before meals, 2-4 units for snack, . + correction scale. MAX TDD Of 50 units    LANCETS MISC    To check BG 2 times daily, to use with insurance preferred meter    LEVEMIR FLEXPEN 100 UNIT/ML (3 ML) INPN PEN    Inject into the skin.    METHOTREXATE 2.5 MG TAB    Take 6 tablets (15 mg total) by mouth every 7 days.    METHYLPREDNISOLONE (MEDROL DOSEPACK) 4 MG TABLET    TAKE 6 TABLETS  "ON DAY 1 AS DIRECTED ON PACKAGE AND DECREASE BY 1 TAB EACH DAY FOR A TOTAL OF 6 DAYS    PEN NEEDLE, DIABETIC 32 GAUGE X 5/32" NDLE    To use 7 times per day with insulin injections- for meals, long-acting insulin and for snacks    TESTOSTERONE CYPIONATE (DEPOTESTOTERONE CYPIONATE) 200 MG/ML INJECTION    INJECT 1 ML (200 MG TOTAL) INTO THE MUSCLE TWICE A WEEK.     Review of patient's allergies indicates:  No Known Allergies    Physical Exam:   There is no height or weight on file to calculate BMI.    Physical Exam  Vitals reviewed.   Constitutional:       Appearance: Normal appearance.   HENT:      Head: Normocephalic and atraumatic.      Nose: Nose normal.   Eyes:      Extraocular Movements: Extraocular movements intact.      Conjunctiva/sclera: Conjunctivae normal.   Pulmonary:      Effort: Pulmonary effort is normal.   Skin:     General: Skin is warm and dry.   Neurological:      General: No focal deficit present.      Mental Status: He is alert and oriented to person, place, and time.   Psychiatric:         Mood and Affect: Mood normal.     Detailed MSK exam:     Left Hip:  Inspection: No swelling, erythema or ecchymosis.   Palpation tenderness: Greater trochanter  Range of motion: 120 deg Flexion         30 deg IR         50 deg ER  Strength:  5/5 Extension    5-/5 Flexion    5/5 Abduction    5/-5 Adduction  Special Tests: Positive REGGIE for groin pain    Positive FADIR for deep lateral hip pain  Mild positive Log Roll  Mild positive Circumduction    SLR positive for hamstring tightness, no radicular symptoms  N/V Exam:  Tibial:    Normal sensory (plantar foot)  Normal motor (FHL)    Sup Peroneal:   Normal sensory (dorsal foot)  Normal motor (Peroneals)            Deep Peroneal:   Normal sensory (1st web space)  Normal motor (EHL)    Sural:   Normal sensory (lateral foot)   Saphenous:   Normal sensory (medial lower leg)   Normal pedal pulses, warm and well perfused with capillary refill < 2 sec       Right " Hip:  Inspection: No swelling, erythema or ecchymosis.   Palpation tenderness: Greater trochanter  Range of motion: 120 deg Flexion         30 deg IR         50 deg ER  Strength:  5/5 Extension    5-/5 Flexion    5/5 Abduction    5/-5 Adduction  Special Tests: Positive REGGIE for groin pain    Positive FADIR for deep lateral hip pain  Mild positive Log Roll  Mild positive Circumduction    SLR positive for hamstring tightness, no radicular symptoms  N/V Exam:  Tibial:    Normal sensory (plantar foot)  Normal motor (FHL)    Sup Peroneal:   Normal sensory (dorsal foot)  Normal motor (Peroneals)            Deep Peroneal:   Normal sensory (1st web space)  Normal motor (EHL)    Sural:   Normal sensory (lateral foot)   Saphenous:   Normal sensory (medial lower leg)   Normal pedal pulses, warm and well perfused with capillary refill < 2 sec     Imaging:  X-Ray Hips Bilateral 2 View Incl AP Pelvis  Narrative: EXAMINATION:  XR HIPS BILATERAL 2 VIEW INCL AP PELVIS    CLINICAL HISTORY:  Pain in left hip    TECHNIQUE:  Four view hip x-ray with AP pelvis    COMPARISON:  08/04/2018    FINDINGS:  No fracture or dislocation.  No significant arthritic changes identified.  No lytic or blastic osseous lesions.  Impression: Negative exam.    Electronically signed by: Tyler Jordan  Date:    04/17/2023  Time:    15:38      Relevant imaging results were reviewed and interpreted by me and per my read:  Relatively normal appearing radiographs of the bilateral hips and pelvis.  Normal alignment.  Well-maintained joint spaces.  No significant osteophytic changes.  No fractures or other acute abnormalities.    This was discussed with the patient and / or family today.     Patient Instructions   Assessment:  Jason Sandra is a 48 y.o. male with a chief complaint of Pain and Numbness of the Left Hip and Pain and Numbness of the Right Hip    Encounter Diagnoses   Name Primary?    Lumbar radiculopathy Yes    Arthralgia, unspecified joint      "Greater trochanteric pain syndrome of both lower extremities       Plan:  XR reviewed - relatively normal XR of both hips & pelvis  History and clinical exam is suggestive of multifactorial hip/back pain.  I am concerned that he is experiencing lumbar radiculopathy, primarily affecting his left leg, radiating down to his lower leg.  He is also having some pain about the bilateral greater trochanters.  He also describes deep "C sign", concerning for intra-articular cause of pain  Order EMG bilateral lower extremities with Dr. Wiggins.    You will receive a call from them to schedule shortly.    We will contact you after the EMG to discuss next steps  Consider Lumbar XR at next visit pending EMG results  May consider greater trochanter injection vs hip joint injection at next visit, pending EMG results  No changes to medications at this time, as has already tried/failed multiple NSAIDs and oral steroids    Follow-up: after EMG/NCV or sooner if there are any problems between now and then.    Thank you for choosing [facility] and Dr. Andrea Hamilton for your orthopedic & sports medicine care. It is our goal to provide you with exceptional care that will help keep you healthy, active, and get you back in the game.    Please do not hesitate to reach out to us via email, phone, or MyChart with any questions, concerns, or feedback.    If you are experiencing pain/discomfort ,or have questions after 5pm and would like to be connected to the [facility] on-call team, please call this number and specify which Sports Medicine provider is treating you: [phone]      A copy of today's visit note has been sent to the referring provider.       Andrea Hamilton MD  Primary Care Sports Medicine    Disclaimer: This note was prepared using a voice recognition system and is likely to have sound alike errors within the text.   "

## 2023-04-17 NOTE — PROGRESS NOTES
"Subjective:      Jason Sandra is a 48 y.o. male who presents for evaluation of bilateral leg pain.      Established patient of Dr. Bentley; new to me today.    He recently reached out to Urology because new onset bilateral leg pain, neck pain and jaw pain.  He was concerned for prostate cancer.  He was instructed to follow-up with PCP for further evaluation.  He is scheduled for follow-up with PCP tomorrow and has nerve conduction studies scheduled this month.    He reports of history low testosterone.  Currently self injecting 200 mg twice a week.  No recent testosterone levels noted in chart.  Last seen by Urology in 2021    He reports baseline urinary frequency.  He denies dysuria, flank pain, fever, chills, nausea, vomiting.    The following portions of the patient's history were reviewed and updated as appropriate: allergies, current medications, past family history, past medical history, past social history, past surgical history and problem list.    Review of Systems  Constitutional: no fever or chills  ENT: no nasal congestion or sore throat  Respiratory: no cough or shortness of breath  Cardiovascular: no chest pain or palpitations  Gastrointestinal: no nausea or vomiting, tolerating diet  Genitourinary: as per HPI  Hematologic/Lymphatic: no easy bruising or lymphadenopathy  Musculoskeletal: no arthralgias or myalgias  Neurological: no seizures or tremors  Behavioral/Psych: no auditory or visual hallucinations     Objective:   Vitals: BP (!) 141/82 (BP Location: Left arm, Patient Position: Sitting, BP Method: Small (Automatic))   Pulse (!) 114   Resp 16   Ht 5' 8" (1.727 m)   Wt 70.4 kg (155 lb 1.5 oz)   BMI 23.58 kg/m²     Physical Exam   General: alert and oriented, no acute distress  Head: normocephalic, atraumatic  Neck: supple, no lymphadenopathy, normal ROM, no masses  Respiratory: Symmetric expansion, non-labored breathing  Cardiovascular: regular rate and rhythm, nomal pulses, no " peripheral edema  Abdomen: soft, non tender  Genitourinary: defer   Skin: normal coloration and turgor, no rashes, no suspicious skin lesions noted  Neuro: alert and oriented x3, no gross deficits  Psych: normal judgment and insight, normal mood/affect, and non-anxious    Physical Exam    Lab Review   Urinalysis demonstrates negative for all components  Lab Results   Component Value Date    WBC 5.00 03/20/2023    HGB 16.0 03/20/2023    HCT 47.2 03/20/2023    HCT 47 01/01/2023    MCV 90 03/20/2023     03/20/2023     Lab Results   Component Value Date    CREATININE 1.0 03/20/2023    BUN 18 03/20/2023     Lab Results   Component Value Date    PSA 0.74 05/12/2011     Lab Results   Component Value Date    PSA 0.74 05/12/2011    PSA 0.90 03/07/2011    PSA 0.85 12/09/2010    PSA 0.72 03/23/2010    PSADIAG 0.74 10/21/2021    PSADIAG 0.53 02/10/2021    PSADIAG 0.78 06/05/2020    PSADIAG 0.52 06/19/2019    PSADIAG 1.0 07/16/2018    PSADIAG 1.3 10/12/2017    PSATOTAL 0.52 06/19/2019    PSAFREE 0.09 06/19/2019    PSAFREEPCT 17.31 06/19/2019     Bladder Scan PVR: 25 cc     Assessment and Plan:   1. Encounter for prostate cancer screening  - Prostate Specific Antigen, Diagnostic; Future  - Urine culture  - Urinalysis Microscopic    2. Low testosterone in male  --patient is aware that his current dosage of testosterone is extremely high and I am not comfortable refilling.  I recommend that he trials 200 mg once weekly and reassess his symptoms.  If he wishes to discuss high dose testosterone it is recommended that he follow-up with Dr. Bentley  - Testosterone; Future  - testosterone cypionate (DEPOTESTOTERONE CYPIONATE) 200 mg/mL injection; Inject 1 mL (200 mg total) into the muscle once a week.  Dispense: 10 mL; Refill: 1     --will notify with results  --RTC in 1 year      This note is dictated on M*Modal word recognition program.  There are word recognition mistakes that are occasionally missed on review.

## 2023-04-17 NOTE — PATIENT INSTRUCTIONS
"Assessment:  Jason Sandra is a 48 y.o. male with a chief complaint of Pain and Numbness of the Left Hip and Pain and Numbness of the Right Hip    Encounter Diagnoses   Name Primary?    Lumbar radiculopathy Yes    Arthralgia, unspecified joint     Greater trochanteric pain syndrome of both lower extremities       Plan:  XR reviewed - relatively normal XR of both hips & pelvis  History and clinical exam is suggestive of multifactorial hip/back pain.  I am concerned that he is experiencing lumbar radiculopathy, primarily affecting his left leg, radiating down to his lower leg.  He is also having some pain about the bilateral greater trochanters.  He also describes deep "C sign", concerning for intra-articular cause of pain  Order EMG bilateral lower extremities with Dr. Wiggins.    You will receive a call from them to schedule shortly.    We will contact you after the EMG to discuss next steps  Consider Lumbar XR at next visit pending EMG results  May consider greater trochanter injection vs hip joint injection at next visit, pending EMG results  No changes to medications at this time, as has already tried/failed multiple NSAIDs and oral steroids    Follow-up: after EMG/NCV or sooner if there are any problems between now and then.    Thank you for choosing [facility] and Dr. Andrea Hamilton for your orthopedic & sports medicine care. It is our goal to provide you with exceptional care that will help keep you healthy, active, and get you back in the game.    Please do not hesitate to reach out to us via email, phone, or MyChart with any questions, concerns, or feedback.    If you are experiencing pain/discomfort ,or have questions after 5pm and would like to be connected to the [facility] on-call team, please call this number and specify which Sports Medicine provider is treating you: [phone]    "

## 2023-04-18 ENCOUNTER — OFFICE VISIT (OUTPATIENT)
Dept: UROLOGY | Facility: CLINIC | Age: 48
End: 2023-04-18
Payer: COMMERCIAL

## 2023-04-18 VITALS
HEIGHT: 68 IN | DIASTOLIC BLOOD PRESSURE: 82 MMHG | HEART RATE: 114 BPM | BODY MASS INDEX: 23.51 KG/M2 | SYSTOLIC BLOOD PRESSURE: 141 MMHG | RESPIRATION RATE: 16 BRPM | WEIGHT: 155.13 LBS

## 2023-04-18 DIAGNOSIS — Z12.5 ENCOUNTER FOR PROSTATE CANCER SCREENING: Primary | ICD-10-CM

## 2023-04-18 DIAGNOSIS — R79.89 LOW TESTOSTERONE IN MALE: ICD-10-CM

## 2023-04-18 LAB
BACTERIA #/AREA URNS HPF: NORMAL /HPF
MICROSCOPIC COMMENT: NORMAL
RBC #/AREA URNS HPF: 0 /HPF (ref 0–4)
WBC #/AREA URNS HPF: 0 /HPF (ref 0–5)

## 2023-04-18 PROCEDURE — 3008F BODY MASS INDEX DOCD: CPT | Mod: CPTII,S$GLB,, | Performed by: NURSE PRACTITIONER

## 2023-04-18 PROCEDURE — 99999 PR PBB SHADOW E&M-EST. PATIENT-LVL IV: CPT | Mod: PBBFAC,,, | Performed by: NURSE PRACTITIONER

## 2023-04-18 PROCEDURE — 1160F PR REVIEW ALL MEDS BY PRESCRIBER/CLIN PHARMACIST DOCUMENTED: ICD-10-PCS | Mod: CPTII,S$GLB,, | Performed by: NURSE PRACTITIONER

## 2023-04-18 PROCEDURE — 3066F PR DOCUMENTATION OF TREATMENT FOR NEPHROPATHY: ICD-10-PCS | Mod: CPTII,S$GLB,, | Performed by: NURSE PRACTITIONER

## 2023-04-18 PROCEDURE — 3052F HG A1C>EQUAL 8.0%<EQUAL 9.0%: CPT | Mod: CPTII,S$GLB,, | Performed by: NURSE PRACTITIONER

## 2023-04-18 PROCEDURE — 3066F NEPHROPATHY DOC TX: CPT | Mod: CPTII,S$GLB,, | Performed by: NURSE PRACTITIONER

## 2023-04-18 PROCEDURE — 3061F PR NEG MICROALBUMINURIA RESULT DOCUMENTED/REVIEW: ICD-10-PCS | Mod: CPTII,S$GLB,, | Performed by: NURSE PRACTITIONER

## 2023-04-18 PROCEDURE — 1160F RVW MEDS BY RX/DR IN RCRD: CPT | Mod: CPTII,S$GLB,, | Performed by: NURSE PRACTITIONER

## 2023-04-18 PROCEDURE — 81001 URINALYSIS AUTO W/SCOPE: CPT | Performed by: NURSE PRACTITIONER

## 2023-04-18 PROCEDURE — 99999 PR PBB SHADOW E&M-EST. PATIENT-LVL IV: ICD-10-PCS | Mod: PBBFAC,,, | Performed by: NURSE PRACTITIONER

## 2023-04-18 PROCEDURE — 3077F SYST BP >= 140 MM HG: CPT | Mod: CPTII,S$GLB,, | Performed by: NURSE PRACTITIONER

## 2023-04-18 PROCEDURE — 3052F PR MOST RECENT HEMOGLOBIN A1C LEVEL 8.0 - < 9.0%: ICD-10-PCS | Mod: CPTII,S$GLB,, | Performed by: NURSE PRACTITIONER

## 2023-04-18 PROCEDURE — 3079F DIAST BP 80-89 MM HG: CPT | Mod: CPTII,S$GLB,, | Performed by: NURSE PRACTITIONER

## 2023-04-18 PROCEDURE — 3008F PR BODY MASS INDEX (BMI) DOCUMENTED: ICD-10-PCS | Mod: CPTII,S$GLB,, | Performed by: NURSE PRACTITIONER

## 2023-04-18 PROCEDURE — 3079F PR MOST RECENT DIASTOLIC BLOOD PRESSURE 80-89 MM HG: ICD-10-PCS | Mod: CPTII,S$GLB,, | Performed by: NURSE PRACTITIONER

## 2023-04-18 PROCEDURE — 99214 PR OFFICE/OUTPT VISIT, EST, LEVL IV, 30-39 MIN: ICD-10-PCS | Mod: S$GLB,,, | Performed by: NURSE PRACTITIONER

## 2023-04-18 PROCEDURE — 3061F NEG MICROALBUMINURIA REV: CPT | Mod: CPTII,S$GLB,, | Performed by: NURSE PRACTITIONER

## 2023-04-18 PROCEDURE — 1159F PR MEDICATION LIST DOCUMENTED IN MEDICAL RECORD: ICD-10-PCS | Mod: CPTII,S$GLB,, | Performed by: NURSE PRACTITIONER

## 2023-04-18 PROCEDURE — 3077F PR MOST RECENT SYSTOLIC BLOOD PRESSURE >= 140 MM HG: ICD-10-PCS | Mod: CPTII,S$GLB,, | Performed by: NURSE PRACTITIONER

## 2023-04-18 PROCEDURE — 99214 OFFICE O/P EST MOD 30 MIN: CPT | Mod: S$GLB,,, | Performed by: NURSE PRACTITIONER

## 2023-04-18 PROCEDURE — 87086 URINE CULTURE/COLONY COUNT: CPT | Performed by: NURSE PRACTITIONER

## 2023-04-18 PROCEDURE — 1159F MED LIST DOCD IN RCRD: CPT | Mod: CPTII,S$GLB,, | Performed by: NURSE PRACTITIONER

## 2023-04-18 RX ORDER — TESTOSTERONE CYPIONATE 200 MG/ML
200 INJECTION, SOLUTION INTRAMUSCULAR WEEKLY
Qty: 10 ML | Refills: 1 | Status: SHIPPED | OUTPATIENT
Start: 2023-04-18 | End: 2023-12-04 | Stop reason: SDUPTHER

## 2023-04-19 ENCOUNTER — PATIENT MESSAGE (OUTPATIENT)
Dept: ADMINISTRATIVE | Facility: HOSPITAL | Age: 48
End: 2023-04-19
Payer: COMMERCIAL

## 2023-04-19 ENCOUNTER — LAB VISIT (OUTPATIENT)
Dept: LAB | Facility: HOSPITAL | Age: 48
End: 2023-04-19
Attending: FAMILY MEDICINE
Payer: COMMERCIAL

## 2023-04-19 ENCOUNTER — OFFICE VISIT (OUTPATIENT)
Dept: INTERNAL MEDICINE | Facility: CLINIC | Age: 48
End: 2023-04-19
Payer: COMMERCIAL

## 2023-04-19 ENCOUNTER — PATIENT MESSAGE (OUTPATIENT)
Dept: SURGERY | Facility: HOSPITAL | Age: 48
End: 2023-04-19
Payer: COMMERCIAL

## 2023-04-19 VITALS
OXYGEN SATURATION: 97 % | DIASTOLIC BLOOD PRESSURE: 84 MMHG | TEMPERATURE: 98 F | SYSTOLIC BLOOD PRESSURE: 126 MMHG | HEIGHT: 68 IN | WEIGHT: 151.56 LBS | HEART RATE: 84 BPM | BODY MASS INDEX: 22.97 KG/M2 | RESPIRATION RATE: 17 BRPM

## 2023-04-19 DIAGNOSIS — R00.2 PALPITATIONS: ICD-10-CM

## 2023-04-19 DIAGNOSIS — M79.89 SYMPTOM OF LEG SWELLING: ICD-10-CM

## 2023-04-19 DIAGNOSIS — R00.2 PALPITATIONS: Primary | ICD-10-CM

## 2023-04-19 DIAGNOSIS — Z12.11 COLON CANCER SCREENING: ICD-10-CM

## 2023-04-19 DIAGNOSIS — R06.09 DYSPNEA ON EXERTION: ICD-10-CM

## 2023-04-19 DIAGNOSIS — M79.605 PAIN OF LEFT LOWER EXTREMITY: ICD-10-CM

## 2023-04-19 LAB — BACTERIA UR CULT: NO GROWTH

## 2023-04-19 PROCEDURE — 3008F PR BODY MASS INDEX (BMI) DOCUMENTED: ICD-10-PCS | Mod: CPTII,S$GLB,, | Performed by: FAMILY MEDICINE

## 2023-04-19 PROCEDURE — 3066F PR DOCUMENTATION OF TREATMENT FOR NEPHROPATHY: ICD-10-PCS | Mod: CPTII,S$GLB,, | Performed by: FAMILY MEDICINE

## 2023-04-19 PROCEDURE — 85379 FIBRIN DEGRADATION QUANT: CPT | Performed by: FAMILY MEDICINE

## 2023-04-19 PROCEDURE — 3079F PR MOST RECENT DIASTOLIC BLOOD PRESSURE 80-89 MM HG: ICD-10-PCS | Mod: CPTII,S$GLB,, | Performed by: FAMILY MEDICINE

## 2023-04-19 PROCEDURE — 99215 OFFICE O/P EST HI 40 MIN: CPT | Mod: S$GLB,,, | Performed by: FAMILY MEDICINE

## 2023-04-19 PROCEDURE — 3074F PR MOST RECENT SYSTOLIC BLOOD PRESSURE < 130 MM HG: ICD-10-PCS | Mod: CPTII,S$GLB,, | Performed by: FAMILY MEDICINE

## 2023-04-19 PROCEDURE — 99999 PR PBB SHADOW E&M-EST. PATIENT-LVL V: CPT | Mod: PBBFAC,,, | Performed by: FAMILY MEDICINE

## 2023-04-19 PROCEDURE — 36415 COLL VENOUS BLD VENIPUNCTURE: CPT | Performed by: FAMILY MEDICINE

## 2023-04-19 PROCEDURE — 3079F DIAST BP 80-89 MM HG: CPT | Mod: CPTII,S$GLB,, | Performed by: FAMILY MEDICINE

## 2023-04-19 PROCEDURE — 3061F NEG MICROALBUMINURIA REV: CPT | Mod: CPTII,S$GLB,, | Performed by: FAMILY MEDICINE

## 2023-04-19 PROCEDURE — 3052F HG A1C>EQUAL 8.0%<EQUAL 9.0%: CPT | Mod: CPTII,S$GLB,, | Performed by: FAMILY MEDICINE

## 2023-04-19 PROCEDURE — 3074F SYST BP LT 130 MM HG: CPT | Mod: CPTII,S$GLB,, | Performed by: FAMILY MEDICINE

## 2023-04-19 PROCEDURE — 3066F NEPHROPATHY DOC TX: CPT | Mod: CPTII,S$GLB,, | Performed by: FAMILY MEDICINE

## 2023-04-19 PROCEDURE — 3052F PR MOST RECENT HEMOGLOBIN A1C LEVEL 8.0 - < 9.0%: ICD-10-PCS | Mod: CPTII,S$GLB,, | Performed by: FAMILY MEDICINE

## 2023-04-19 PROCEDURE — 3008F BODY MASS INDEX DOCD: CPT | Mod: CPTII,S$GLB,, | Performed by: FAMILY MEDICINE

## 2023-04-19 PROCEDURE — 99999 PR PBB SHADOW E&M-EST. PATIENT-LVL V: ICD-10-PCS | Mod: PBBFAC,,, | Performed by: FAMILY MEDICINE

## 2023-04-19 PROCEDURE — 3061F PR NEG MICROALBUMINURIA RESULT DOCUMENTED/REVIEW: ICD-10-PCS | Mod: CPTII,S$GLB,, | Performed by: FAMILY MEDICINE

## 2023-04-19 PROCEDURE — 99215 PR OFFICE/OUTPT VISIT, EST, LEVL V, 40-54 MIN: ICD-10-PCS | Mod: S$GLB,,, | Performed by: FAMILY MEDICINE

## 2023-04-19 PROCEDURE — 1159F MED LIST DOCD IN RCRD: CPT | Mod: CPTII,S$GLB,, | Performed by: FAMILY MEDICINE

## 2023-04-19 PROCEDURE — 1159F PR MEDICATION LIST DOCUMENTED IN MEDICAL RECORD: ICD-10-PCS | Mod: CPTII,S$GLB,, | Performed by: FAMILY MEDICINE

## 2023-04-19 NOTE — PROGRESS NOTES
Subjective:       Patient ID: Jason Sandra is a 48 y.o. male.    Chief Complaint: Other (Patient requesting further testing. /Patient stated that his neck, spine, shoulder pain radiates down to his left leg. )    Patient presents to clinic today requesting further testing for his issues.  His wife is present via cell phone.  They are requesting D-dimer test due to concerns about blood clots from his past COVID shot.  They expressed frustration that his pain issues are not improving and he is barely able to get out of bed and stand up straight.  This is affecting his ability to work and thus their family and finances.  Patient reports intermittent swelling in his left leg.  His wife reports that his left leg appear smaller than the right leg.  On further questioning they do report that he has shortness of breath with exertion and occasional palpitations. He has seen Cardiology and is scheduled for additional testing. Patient reports that none of the treatments that have been advised by specialist have helped with any of his symptoms. He recently saw Orthopedics and they have arrange nerve conduction studies. He is also scheduled for evaluation with Dr. Durand (Neuropsychology). His wife states that they want answers and they feel they are not getting any answers from the doctors. They are concerned about blood clots resulting from covid vaccine causing his symptoms and also express concerns that he could have a cancer.     Review of Systems   Constitutional:  Positive for chills. Negative for fatigue, fever and unexpected weight change.   Eyes:  Negative for visual disturbance.   Respiratory:  Positive for shortness of breath.    Cardiovascular:  Positive for palpitations. Negative for chest pain.   Musculoskeletal:  Positive for arthralgias, myalgias and neck pain.   Neurological:  Positive for headaches.       Objective:      Physical Exam  Vitals reviewed.   Constitutional:       General: He is not in acute  distress.     Appearance: He is well-developed.   HENT:      Head: Normocephalic and atraumatic.   Eyes:      General: Lids are normal. No scleral icterus.     Extraocular Movements: Extraocular movements intact.      Conjunctiva/sclera: Conjunctivae normal.      Pupils: Pupils are equal, round, and reactive to light.   Cardiovascular:      Rate and Rhythm: Normal rate and regular rhythm.      Heart sounds: No murmur heard.    No friction rub. No gallop.   Pulmonary:      Effort: Pulmonary effort is normal.      Breath sounds: Normal breath sounds. No decreased breath sounds, wheezing, rhonchi or rales.   Musculoskeletal:      Right lower leg: No swelling, deformity or tenderness. No edema.      Left lower leg: No swelling, deformity or tenderness. No edema.      Comments: Left leg slightly smaller than right leg   Neurological:      Mental Status: He is alert and oriented to person, place, and time.   Psychiatric:         Mood and Affect: Affect normal. Mood is anxious.       Assessment:       1. Palpitations    2. Dyspnea on exertion    3. Symptom of leg swelling    4. Pain of left lower extremity    5. Colon cancer screening        Plan:   1. Palpitations  -     D-Dimer, Quantitative; Future; Expected date: 04/19/2023    2. Dyspnea on exertion  -     D-Dimer, Quantitative; Future; Expected date: 04/19/2023    3. Symptom of leg swelling  -     D-Dimer, Quantitative; Future; Expected date: 04/19/2023    4. Pain of left lower extremity  -     Ambulatory referral/consult to Hematology / Oncology; Future; Expected date: 04/26/2023    5. Colon cancer screening  -     Ambulatory referral/consult to Endo Procedure ; Future; Expected date: 04/20/2023      Will check d-dimer, advised if abnormal to present to ER. Referral to hematology/oncology per request.  Advised that there is no current indication based on today's exam to order lower extremity ultrasound.  I have advised patient and his wife that he should  "proceed with recommended evaluation from specialists. Encouraged to contact specialists if recommended treatments are ineffective and/or his symptoms worsen.  Advised to present to ER for severe/worsening symptoms.  Advised that psychiatry consult should be considered, patient/wife are adamant that this is not the problem, they reports any anxiety is simply related to dealing with this unknown medical condition.  Encouraged against "googling"regarding medical conditions.  Encouraged to communicate with care team so we can assist with determining the cause of his symptoms.  Ultimately they are free to get second opinion if desired.  Patient expressed understanding and agreement with plan.    45 minutes of total time spent on the encounter, which includes face to face time and non-face to face time preparing to see the patient (eg, review of tests), Obtaining and/or reviewing separately obtained history, Documenting clinical information in the electronic or other health record, Independently interpreting results (not separately reported) and communicating results to the patient/family/caregiver, or Care coordination (not separately reported).     "

## 2023-04-20 ENCOUNTER — PATIENT MESSAGE (OUTPATIENT)
Dept: SURGERY | Facility: HOSPITAL | Age: 48
End: 2023-04-20
Payer: COMMERCIAL

## 2023-04-20 DIAGNOSIS — K11.20 SIALOADENITIS: Primary | ICD-10-CM

## 2023-04-20 LAB — D DIMER PPP IA.FEU-MCNC: <0.19 MG/L FEU

## 2023-04-21 ENCOUNTER — HOSPITAL ENCOUNTER (OUTPATIENT)
Dept: RADIOLOGY | Facility: HOSPITAL | Age: 48
Discharge: HOME | End: 2023-04-21
Attending: STUDENT IN AN ORGANIZED HEALTH CARE EDUCATION/TRAINING PROGRAM
Payer: COMMERCIAL

## 2023-04-21 ENCOUNTER — HOSPITAL ENCOUNTER (OUTPATIENT)
Dept: CARDIOLOGY | Facility: HOSPITAL | Age: 48
Discharge: HOME | End: 2023-04-21
Attending: STUDENT IN AN ORGANIZED HEALTH CARE EDUCATION/TRAINING PROGRAM
Payer: COMMERCIAL

## 2023-04-21 VITALS
SYSTOLIC BLOOD PRESSURE: 126 MMHG | HEART RATE: 108 BPM | BODY MASS INDEX: 22.88 KG/M2 | WEIGHT: 151 LBS | HEIGHT: 68 IN | DIASTOLIC BLOOD PRESSURE: 84 MMHG

## 2023-04-21 DIAGNOSIS — E11.69 HYPERLIPIDEMIA ASSOCIATED WITH TYPE 2 DIABETES MELLITUS: ICD-10-CM

## 2023-04-21 DIAGNOSIS — R94.31 ABNORMAL ELECTROCARDIOGRAM (ECG) (EKG): ICD-10-CM

## 2023-04-21 DIAGNOSIS — E11.10 DIABETIC KETOACIDOSIS WITHOUT COMA ASSOCIATED WITH TYPE 2 DIABETES MELLITUS: ICD-10-CM

## 2023-04-21 DIAGNOSIS — R07.9 CHEST PAIN, UNSPECIFIED TYPE: ICD-10-CM

## 2023-04-21 DIAGNOSIS — E78.5 HYPERLIPIDEMIA ASSOCIATED WITH TYPE 2 DIABETES MELLITUS: ICD-10-CM

## 2023-04-21 DIAGNOSIS — Z82.49 FAMILY HISTORY OF HEART DISEASE: ICD-10-CM

## 2023-04-21 LAB
AORTIC ROOT ANNULUS: 3.12 CM
AV INDEX (PROSTH): 0.82
AV MEAN GRADIENT: 3 MMHG
AV PEAK GRADIENT: 6 MMHG
AV VALVE AREA: 2.37 CM2
AV VELOCITY RATIO: 0.8
BSA FOR ECHO PROCEDURE: 1.81 M2
CV ECHO LV RWT: 0.37 CM
CV STRESS BASE HR: 115 BPM
DIASTOLIC BLOOD PRESSURE: 90 MMHG
DOP CALC AO PEAK VEL: 1.19 M/S
DOP CALC AO VTI: 18.2 CM
DOP CALC LVOT AREA: 2.9 CM2
DOP CALC LVOT DIAMETER: 1.92 CM
DOP CALC LVOT PEAK VEL: 0.95 M/S
DOP CALC LVOT STROKE VOLUME: 43.12 CM3
DOP CALC RVOT PEAK VEL: 0.9 M/S
DOP CALC RVOT VTI: 14.5 CM
DOP CALCLVOT PEAK VEL VTI: 14.9 CM
E WAVE DECELERATION TIME: 160.1 MSEC
E/A RATIO: 0.75
E/E' RATIO: 4.95 M/S
ECHO LV POSTERIOR WALL: 0.79 CM (ref 0.6–1.1)
EJECTION FRACTION: 35 %
FRACTIONAL SHORTENING: 17 % (ref 28–44)
INTERVENTRICULAR SEPTUM: 0.81 CM (ref 0.6–1.1)
IVRT: 117.03 MSEC
LA MAJOR: 2.74 CM
LA MINOR: 3.86 CM
LA WIDTH: 2.7 CM
LEFT ATRIUM SIZE: 2.55 CM
LEFT ATRIUM VOLUME INDEX MOD: 11.7 ML/M2
LEFT ATRIUM VOLUME INDEX: 10.4 ML/M2
LEFT ATRIUM VOLUME MOD: 21.2 CM3
LEFT ATRIUM VOLUME: 18.76 CM3
LEFT INTERNAL DIMENSION IN SYSTOLE: 3.59 CM (ref 2.1–4)
LEFT VENTRICLE DIASTOLIC VOLUME INDEX: 46.2 ML/M2
LEFT VENTRICLE DIASTOLIC VOLUME: 83.62 ML
LEFT VENTRICLE MASS INDEX: 58 G/M2
LEFT VENTRICLE SYSTOLIC VOLUME INDEX: 29.8 ML/M2
LEFT VENTRICLE SYSTOLIC VOLUME: 53.99 ML
LEFT VENTRICULAR INTERNAL DIMENSION IN DIASTOLE: 4.31 CM (ref 3.5–6)
LEFT VENTRICULAR MASS: 105.73 G
LV LATERAL E/E' RATIO: 4.7 M/S
LV SEPTAL E/E' RATIO: 5.22 M/S
LVOT MG: 2.18 MMHG
LVOT MV: 0.72 CM/S
MV PEAK A VEL: 0.63 M/S
MV PEAK E VEL: 0.47 M/S
MV STENOSIS PRESSURE HALF TIME: 46.43 MS
MV VALVE AREA P 1/2 METHOD: 4.74 CM2
NUC REST EJECTION FRACTION: 58
NUC STRESS EJECTION FRACTION: 58 %
OHS CV CPX 85 PERCENT MAX PREDICTED HEART RATE MALE: 145
OHS CV CPX ESTIMATED METS: 1
OHS CV CPX MAX PREDICTED HEART RATE: 170
OHS CV CPX PATIENT IS FEMALE: 0
OHS CV CPX PATIENT IS MALE: 1
OHS CV CPX PEAK DIASTOLIC BLOOD PRESSURE: 79 MMHG
OHS CV CPX PEAK HEAR RATE: 120 BPM
OHS CV CPX PEAK RATE PRESSURE PRODUCT: NORMAL
OHS CV CPX PEAK SYSTOLIC BLOOD PRESSURE: 140 MMHG
OHS CV CPX PERCENT MAX PREDICTED HEART RATE ACHIEVED: 71
OHS CV CPX RATE PRESSURE PRODUCT PRESENTING: NORMAL
PV MEAN GRADIENT: 2.09 MMHG
PV PEAK VELOCITY: 1.04 CM/S
RA MAJOR: 2.83 CM
RA PRESSURE: 8 MMHG
RA WIDTH: 2.47 CM
RIGHT VENTRICULAR END-DIASTOLIC DIMENSION: 2.33 CM
SINUS: 2.91 CM
STJ: 2.8 CM
STRESS ECHO POST EXERCISE DUR MIN: 0 MINUTES
STRESS ECHO POST EXERCISE DUR SEC: 51 SECONDS
SYSTOLIC BLOOD PRESSURE: 151 MMHG
TDI LATERAL: 0.1 M/S
TDI SEPTAL: 0.09 M/S
TDI: 0.1 M/S
TRICUSPID ANNULAR PLANE SYSTOLIC EXCURSION: 1.38 CM

## 2023-04-21 PROCEDURE — 78452 HT MUSCLE IMAGE SPECT MULT: CPT

## 2023-04-21 PROCEDURE — 93306 TTE W/DOPPLER COMPLETE: CPT | Mod: 26,,, | Performed by: INTERNAL MEDICINE

## 2023-04-21 PROCEDURE — 93018 CV STRESS TEST I&R ONLY: CPT | Mod: ,,, | Performed by: INTERNAL MEDICINE

## 2023-04-21 PROCEDURE — 63600175 PHARM REV CODE 636 W HCPCS: Performed by: STUDENT IN AN ORGANIZED HEALTH CARE EDUCATION/TRAINING PROGRAM

## 2023-04-21 PROCEDURE — 93018 NUCLEAR STRESS - CARDIOLOGY INTERPRETED (CUPID ONLY): ICD-10-PCS | Mod: ,,, | Performed by: INTERNAL MEDICINE

## 2023-04-21 PROCEDURE — 78452 HT MUSCLE IMAGE SPECT MULT: CPT | Mod: 26,,, | Performed by: INTERNAL MEDICINE

## 2023-04-21 PROCEDURE — A9502 TC99M TETROFOSMIN: HCPCS

## 2023-04-21 PROCEDURE — 78452 NUCLEAR STRESS - CARDIOLOGY INTERPRETED (CUPID ONLY): ICD-10-PCS | Mod: 26,,, | Performed by: INTERNAL MEDICINE

## 2023-04-21 PROCEDURE — 93016 NUCLEAR STRESS - CARDIOLOGY INTERPRETED (CUPID ONLY): ICD-10-PCS | Mod: ,,, | Performed by: INTERNAL MEDICINE

## 2023-04-21 PROCEDURE — 93017 CV STRESS TEST TRACING ONLY: CPT

## 2023-04-21 PROCEDURE — 93016 CV STRESS TEST SUPVJ ONLY: CPT | Mod: ,,, | Performed by: INTERNAL MEDICINE

## 2023-04-21 PROCEDURE — 93306 ECHO (CUPID ONLY): ICD-10-PCS | Mod: 26,,, | Performed by: INTERNAL MEDICINE

## 2023-04-21 PROCEDURE — 93306 TTE W/DOPPLER COMPLETE: CPT

## 2023-04-21 RX ORDER — REGADENOSON 0.08 MG/ML
0.4 INJECTION, SOLUTION INTRAVENOUS ONCE
Status: COMPLETED | OUTPATIENT
Start: 2023-04-21 | End: 2023-04-21

## 2023-04-21 RX ADMIN — REGADENOSON 0.4 MG: 0.08 INJECTION, SOLUTION INTRAVENOUS at 12:04

## 2023-04-26 ENCOUNTER — OFFICE VISIT (OUTPATIENT)
Dept: CARDIOLOGY | Facility: CLINIC | Age: 48
End: 2023-04-26
Payer: COMMERCIAL

## 2023-04-26 ENCOUNTER — PATIENT MESSAGE (OUTPATIENT)
Dept: SURGERY | Facility: HOSPITAL | Age: 48
End: 2023-04-26
Payer: COMMERCIAL

## 2023-04-26 ENCOUNTER — HOSPITAL ENCOUNTER (OUTPATIENT)
Dept: RADIOLOGY | Facility: HOSPITAL | Age: 48
Discharge: HOME | End: 2023-04-26
Attending: OTOLARYNGOLOGY
Payer: COMMERCIAL

## 2023-04-26 VITALS
HEART RATE: 103 BPM | SYSTOLIC BLOOD PRESSURE: 135 MMHG | OXYGEN SATURATION: 99 % | WEIGHT: 155.44 LBS | DIASTOLIC BLOOD PRESSURE: 90 MMHG | BODY MASS INDEX: 23.63 KG/M2

## 2023-04-26 DIAGNOSIS — Z82.49 FAMILY HISTORY OF HEART DISEASE: ICD-10-CM

## 2023-04-26 DIAGNOSIS — K11.20 SIALOADENITIS: ICD-10-CM

## 2023-04-26 DIAGNOSIS — R06.02 SOB (SHORTNESS OF BREATH): ICD-10-CM

## 2023-04-26 DIAGNOSIS — E78.5 HYPERLIPIDEMIA ASSOCIATED WITH TYPE 2 DIABETES MELLITUS: ICD-10-CM

## 2023-04-26 DIAGNOSIS — I50.41 ACUTE COMBINED SYSTOLIC AND DIASTOLIC CONGESTIVE HEART FAILURE: Primary | ICD-10-CM

## 2023-04-26 DIAGNOSIS — E11.69 HYPERLIPIDEMIA ASSOCIATED WITH TYPE 2 DIABETES MELLITUS: ICD-10-CM

## 2023-04-26 DIAGNOSIS — R07.9 CHEST PAIN, UNSPECIFIED TYPE: ICD-10-CM

## 2023-04-26 PROCEDURE — 76536 US SOFT TISSUE HEAD NECK THYROID: ICD-10-PCS | Mod: 26,,, | Performed by: RADIOLOGY

## 2023-04-26 PROCEDURE — 3008F BODY MASS INDEX DOCD: CPT | Mod: CPTII,S$GLB,, | Performed by: STUDENT IN AN ORGANIZED HEALTH CARE EDUCATION/TRAINING PROGRAM

## 2023-04-26 PROCEDURE — 3080F PR MOST RECENT DIASTOLIC BLOOD PRESSURE >= 90 MM HG: ICD-10-PCS | Mod: CPTII,S$GLB,, | Performed by: STUDENT IN AN ORGANIZED HEALTH CARE EDUCATION/TRAINING PROGRAM

## 2023-04-26 PROCEDURE — 3075F SYST BP GE 130 - 139MM HG: CPT | Mod: CPTII,S$GLB,, | Performed by: STUDENT IN AN ORGANIZED HEALTH CARE EDUCATION/TRAINING PROGRAM

## 2023-04-26 PROCEDURE — 3061F PR NEG MICROALBUMINURIA RESULT DOCUMENTED/REVIEW: ICD-10-PCS | Mod: CPTII,S$GLB,, | Performed by: STUDENT IN AN ORGANIZED HEALTH CARE EDUCATION/TRAINING PROGRAM

## 2023-04-26 PROCEDURE — 99214 PR OFFICE/OUTPT VISIT, EST, LEVL IV, 30-39 MIN: ICD-10-PCS | Mod: S$GLB,,, | Performed by: STUDENT IN AN ORGANIZED HEALTH CARE EDUCATION/TRAINING PROGRAM

## 2023-04-26 PROCEDURE — 3052F PR MOST RECENT HEMOGLOBIN A1C LEVEL 8.0 - < 9.0%: ICD-10-PCS | Mod: CPTII,S$GLB,, | Performed by: STUDENT IN AN ORGANIZED HEALTH CARE EDUCATION/TRAINING PROGRAM

## 2023-04-26 PROCEDURE — 1159F MED LIST DOCD IN RCRD: CPT | Mod: CPTII,S$GLB,, | Performed by: STUDENT IN AN ORGANIZED HEALTH CARE EDUCATION/TRAINING PROGRAM

## 2023-04-26 PROCEDURE — 3075F PR MOST RECENT SYSTOLIC BLOOD PRESS GE 130-139MM HG: ICD-10-PCS | Mod: CPTII,S$GLB,, | Performed by: STUDENT IN AN ORGANIZED HEALTH CARE EDUCATION/TRAINING PROGRAM

## 2023-04-26 PROCEDURE — 99999 PR PBB SHADOW E&M-EST. PATIENT-LVL IV: CPT | Mod: PBBFAC,,, | Performed by: STUDENT IN AN ORGANIZED HEALTH CARE EDUCATION/TRAINING PROGRAM

## 2023-04-26 PROCEDURE — 3008F PR BODY MASS INDEX (BMI) DOCUMENTED: ICD-10-PCS | Mod: CPTII,S$GLB,, | Performed by: STUDENT IN AN ORGANIZED HEALTH CARE EDUCATION/TRAINING PROGRAM

## 2023-04-26 PROCEDURE — 3066F PR DOCUMENTATION OF TREATMENT FOR NEPHROPATHY: ICD-10-PCS | Mod: CPTII,S$GLB,, | Performed by: STUDENT IN AN ORGANIZED HEALTH CARE EDUCATION/TRAINING PROGRAM

## 2023-04-26 PROCEDURE — 76536 US EXAM OF HEAD AND NECK: CPT | Mod: TC

## 2023-04-26 PROCEDURE — 3080F DIAST BP >= 90 MM HG: CPT | Mod: CPTII,S$GLB,, | Performed by: STUDENT IN AN ORGANIZED HEALTH CARE EDUCATION/TRAINING PROGRAM

## 2023-04-26 PROCEDURE — 99999 PR PBB SHADOW E&M-EST. PATIENT-LVL IV: ICD-10-PCS | Mod: PBBFAC,,, | Performed by: STUDENT IN AN ORGANIZED HEALTH CARE EDUCATION/TRAINING PROGRAM

## 2023-04-26 PROCEDURE — 3061F NEG MICROALBUMINURIA REV: CPT | Mod: CPTII,S$GLB,, | Performed by: STUDENT IN AN ORGANIZED HEALTH CARE EDUCATION/TRAINING PROGRAM

## 2023-04-26 PROCEDURE — 99214 OFFICE O/P EST MOD 30 MIN: CPT | Mod: S$GLB,,, | Performed by: STUDENT IN AN ORGANIZED HEALTH CARE EDUCATION/TRAINING PROGRAM

## 2023-04-26 PROCEDURE — 76536 US EXAM OF HEAD AND NECK: CPT | Mod: 26,,, | Performed by: RADIOLOGY

## 2023-04-26 PROCEDURE — 1159F PR MEDICATION LIST DOCUMENTED IN MEDICAL RECORD: ICD-10-PCS | Mod: CPTII,S$GLB,, | Performed by: STUDENT IN AN ORGANIZED HEALTH CARE EDUCATION/TRAINING PROGRAM

## 2023-04-26 PROCEDURE — 3066F NEPHROPATHY DOC TX: CPT | Mod: CPTII,S$GLB,, | Performed by: STUDENT IN AN ORGANIZED HEALTH CARE EDUCATION/TRAINING PROGRAM

## 2023-04-26 PROCEDURE — 3052F HG A1C>EQUAL 8.0%<EQUAL 9.0%: CPT | Mod: CPTII,S$GLB,, | Performed by: STUDENT IN AN ORGANIZED HEALTH CARE EDUCATION/TRAINING PROGRAM

## 2023-04-26 RX ORDER — METOPROLOL SUCCINATE 25 MG/1
25 TABLET, EXTENDED RELEASE ORAL DAILY
Qty: 30 TABLET | Refills: 11 | Status: SHIPPED | OUTPATIENT
Start: 2023-04-26 | End: 2023-05-25 | Stop reason: SDUPTHER

## 2023-04-26 RX ORDER — FUROSEMIDE 20 MG/1
20 TABLET ORAL DAILY
Qty: 30 TABLET | Refills: 6 | Status: SHIPPED | OUTPATIENT
Start: 2023-04-26 | End: 2023-05-01 | Stop reason: ALTCHOICE

## 2023-04-26 RX ORDER — SACUBITRIL AND VALSARTAN 24; 26 MG/1; MG/1
1 TABLET, FILM COATED ORAL 2 TIMES DAILY
Qty: 60 TABLET | Refills: 11 | Status: SHIPPED | OUTPATIENT
Start: 2023-04-26

## 2023-04-26 NOTE — PROGRESS NOTES
Section of Cardiology                  Cardiac Clinic Note    Chief Complaint/Reason for consultation: chest pain      HPI:   Jason Sandra is a 48 y.o. male with h/o HLD, diabetes who was referred to cardiology clinic by PCP Dr. Mcconnell for evaluation.    3/14/23  Reports chest pain and SOB about 6 months, has been stable  Works in construction, walks up stairs, heavy lifting  SOB was a few days ago, had to stop his activity   Chest pain, intermittent throughout the day, stops his activity  Palpitations are separate  Has joint pain, neck pain, shoulder  (sister has lupus)  Saw rheum today, started on MTX as a trial   Exercise daily, with weight training, no cardio, no issues with this for 25 min    Family hx: dad- CABG at age 61 yo  Denies tobacco or ETOH abuse       4/26/23  Comes in with wife  Echo 4/23 with EF 35%  Stress test 4/23 negative   Has been fatigue and SOB  Started having pain in his joints (neck, knee)  LE swelling  Reports sharp and dull chest pain, not daily, does not last long   No orthopnea, PND, syncope      EKG 1/1/23 ST, nonspec TW abn, LVH, 107 bpm    ECHO  4/23  The left ventricle is normal in size with moderately decreased systolic function.  The estimated ejection fraction is 35%.  Grade I left ventricular diastolic dysfunction.  There is moderate left ventricular global hypokinesis.  Normal right ventricular size with normal right ventricular systolic function.  Intermediate central venous pressure (8 mmHg).    STRESS TEST    [facility]      ROS: All 10 systems reviewed. Please refer to the HPI for pertinent positives. All other systems negative.     Past Medical History  Past Medical History:   Diagnosis Date    Diabetes mellitus     Hypogonadism in male        Surgical History  Past Surgical History:   Procedure Laterality Date    NOSE SURGERY            Allergies:   Review of patient's allergies indicates:  No Known Allergies    Social History:  Social History      Socioeconomic History    Marital status: Legally    Tobacco Use    Smoking status: Never     Passive exposure: Never    Smokeless tobacco: Never   Substance and Sexual Activity    Alcohol use: Yes     Comment: socially    Drug use: No    Sexual activity: Yes     Partners: Female   Social History Narrative         Social Determinants of Health     Financial Resource Strain: Low Risk     Difficulty of Paying Living Expenses: Not hard at all   Food Insecurity: No Food Insecurity    Worried About Running Out of Food in the Last Year: Never true    Ran Out of Food in the Last Year: Never true   Transportation Needs: No Transportation Needs    Lack of Transportation (Medical): No    Lack of Transportation (Non-Medical): No   Stress: No Stress Concern Present    Feeling of Stress : Only a little   Social Connections: Moderately Isolated    Frequency of Communication with Friends and Family: More than three times a week    Frequency of Social Gatherings with Friends and Family: More than three times a week    Attends Sabianist Services: Never    Active Member of Clubs or Organizations: No    Attends Club or Organization Meetings: Never    Marital Status:    Housing Stability: Low Risk     Unable to Pay for Housing in the Last Year: No    Number of Places Lived in the Last Year: 2    Unstable Housing in the Last Year: No       Family History:  family history includes Alzheimer's disease in his father; Cataracts in his mother; Glaucoma in his mother; Stroke in his mother.    Home Medications:  Current Outpatient Medications on File Prior to Visit   Medication Sig Dispense Refill    ACCU-CHEK GUIDE ME GLUCOSE MTR Misc CHECK BLOOD SUGAR TWICE A DAY      amitriptyline (ELAVIL) 10 MG tablet Take 1 tablet (10 mg total) by mouth every evening. 30 tablet 11    atorvastatin (LIPITOR) 20 MG tablet Take 1 tablet (20 mg total) by mouth once daily. 90 tablet 2    blood sugar diagnostic Strp To check BG 2 times  "daily, to use with insurance preferred meter 200 strip 2    blood-glucose sensor (DEXCOM G6 SENSOR) Chen 1 sensor every 10 days 3 each 11    blood-glucose transmitter (DEXCOM G6 TRANSMITTER) Chen 1 transmitter every 3 months 1 each 4    folic acid (FOLVITE) 1 MG tablet Take 1 tablet (1 mg total) by mouth once daily. 30 tablet 4    glucagon (BAQSIMI) 3 mg/actuation Spry 3 mg (one actuation) into a single nostril; if no response, may repeat in 15 minutes using a new intranasal device. 2 each 1    hydrOXYchloroQUINE (PLAQUENIL) 100 mg tablet Take 200 mg by mouth once daily.      insulin degludec (TRESIBA FLEXTOUCH U-100) 100 unit/mL (3 mL) insulin pen Inject 24 Units into the skin every evening. Max TDD of 30 units 5 pen 6    insulin lispro-aabc (LYUMJEV KWIKPEN U-100 INSULIN) 100 unit/mL pen Inject 10 units 3 times per day before meals, 2-4 units for snack, . + correction scale. MAX TDD Of 50 units 5 pen 6    lancets Misc To check BG 2 times daily, to use with insurance preferred meter 200 each 2    LEVEMIR FLEXPEN 100 unit/mL (3 mL) InPn pen Inject into the skin.      methotrexate 2.5 MG Tab Take 6 tablets (15 mg total) by mouth every 7 days. 24 tablet 3    methylPREDNISolone (MEDROL DOSEPACK) 4 mg tablet TAKE 6 TABLETS ON DAY 1 AS DIRECTED ON PACKAGE AND DECREASE BY 1 TAB EACH DAY FOR A TOTAL OF 6 DAYS      pen needle, diabetic 32 gauge x 5/32" Ndle To use 7 times per day with insulin injections- for meals, long-acting insulin and for snacks 200 each 11    testosterone cypionate (DEPOTESTOTERONE CYPIONATE) 200 mg/mL injection Inject 1 mL (200 mg total) into the muscle once a week. 10 mL 1     No current facility-administered medications on file prior to visit.       Physical exam:  BP (!) 135/90 (BP Location: Left arm, Patient Position: Sitting)   Pulse 103   Wt 70.5 kg (155 lb 6.8 oz)   SpO2 99%   BMI 23.63 kg/m²         General: Pt is a 48 y.o. year old male who is AAOx3, in NAD, is pleasant, well nourished, " looks stated age  HEENT: PERRL, EOMI, Oral mucosa pink & moist  CVS  No abnormal cardiac pulsations noted on inspection. JVP not raised. The apical impulse is normal on palpation, and is located in the left 5th intercostal space in the mid - clavicular line. No palpable thrills or abnormal pulsations noted. RR, S1 - S2 heard, no murmurs, rubs or gallops appreciated.   PUL : CTA B/L. No wheezes/crackles heard   ABD : BS +, soft. No tenderness elicited   LE : No C/C/E. Distal Pulses palpable B/L         LABS:    Chemistry:   Lab Results   Component Value Date     (L) 03/20/2023    K 4.6 03/20/2023     03/20/2023    CO2 25 03/20/2023    BUN 18 03/20/2023    CREATININE 1.0 03/20/2023    CALCIUM 9.7 03/20/2023     Cardiac Markers:   Lab Results   Component Value Date    TROPONINI <0.006 01/01/2023     Cardiac Markers (Last 3):   Lab Results   Component Value Date    TROPONINI <0.006 01/01/2023     CBC:   Lab Results   Component Value Date    WBC 5.00 03/20/2023    HGB 16.0 03/20/2023    HCT 47.2 03/20/2023    HCT 47 01/01/2023    MCV 90 03/20/2023     03/20/2023     Lipids:   Lab Results   Component Value Date    CHOL 165 03/20/2023    TRIG 45 03/20/2023    HDL 55 03/20/2023     Coagulation: No results found for: PT, INR, APTT        Assessment      1. Acute combined systolic and diastolic congestive heart failure    2. Hyperlipidemia associated with type 2 diabetes mellitus    3. Family history of heart disease    4. Chest pain, unspecified type    5. SOB (shortness of breath)           Plan:    CHF/Chest pain/shortness of breath   Abnormal EKG   Family history of heart disease   Echo 4/23 with EF 35%  Stress test 4/23 negative   Start Toprol-XL, Entresto, lasix 20 mg daily   Fluid restriction, reduce salt intake discussed  BNP, CBC    Diabetes   A1c 8.7  Continue therapy    HLD   as of 1/23  Continue statin    HTN  Elevated on multiple occasions  Meds as above     This note was prepared using  voice recognition system and is likely to have sound alike errors that may have been overlooked even after proofreading.     I have reviewed all pertinent chart information.  Plans and recommendations have been formulated under my direct supervision. All questions answered and patient voiced understanding.   If symptoms persist go to the ED.    RTC in 1 month        Karl Payne MD  Cardiology

## 2023-04-27 ENCOUNTER — LAB VISIT (OUTPATIENT)
Dept: LAB | Facility: OTHER | Age: 48
End: 2023-04-27
Attending: STUDENT IN AN ORGANIZED HEALTH CARE EDUCATION/TRAINING PROGRAM
Payer: COMMERCIAL

## 2023-04-27 ENCOUNTER — PATIENT MESSAGE (OUTPATIENT)
Dept: OTOLARYNGOLOGY | Facility: CLINIC | Age: 48
End: 2023-04-27
Payer: COMMERCIAL

## 2023-04-27 DIAGNOSIS — I50.41 ACUTE COMBINED SYSTOLIC AND DIASTOLIC CONGESTIVE HEART FAILURE: ICD-10-CM

## 2023-04-27 LAB
BASOPHILS # BLD AUTO: 0.02 K/UL (ref 0–0.2)
BASOPHILS NFR BLD: 0.4 % (ref 0–1.9)
BNP SERPL-MCNC: <10 PG/ML (ref 0–99)
DIFFERENTIAL METHOD: ABNORMAL
EOSINOPHIL # BLD AUTO: 0.1 K/UL (ref 0–0.5)
EOSINOPHIL NFR BLD: 2.2 % (ref 0–8)
ERYTHROCYTE [DISTWIDTH] IN BLOOD BY AUTOMATED COUNT: 11 % (ref 11.5–14.5)
HCT VFR BLD AUTO: 45.3 % (ref 40–54)
HGB BLD-MCNC: 15.7 G/DL (ref 14–18)
IMM GRANULOCYTES # BLD AUTO: 0.01 K/UL (ref 0–0.04)
IMM GRANULOCYTES NFR BLD AUTO: 0.2 % (ref 0–0.5)
LYMPHOCYTES # BLD AUTO: 2 K/UL (ref 1–4.8)
LYMPHOCYTES NFR BLD: 44.8 % (ref 18–48)
MCH RBC QN AUTO: 29.6 PG (ref 27–31)
MCHC RBC AUTO-ENTMCNC: 34.7 G/DL (ref 32–36)
MCV RBC AUTO: 85 FL (ref 82–98)
MONOCYTES # BLD AUTO: 0.5 K/UL (ref 0.3–1)
MONOCYTES NFR BLD: 10.4 % (ref 4–15)
NEUTROPHILS # BLD AUTO: 1.9 K/UL (ref 1.8–7.7)
NEUTROPHILS NFR BLD: 42 % (ref 38–73)
NRBC BLD-RTO: 0 /100 WBC
PLATELET # BLD AUTO: 183 K/UL (ref 150–450)
PMV BLD AUTO: 9.8 FL (ref 9.2–12.9)
RBC # BLD AUTO: 5.31 M/UL (ref 4.6–6.2)
WBC # BLD AUTO: 4.53 K/UL (ref 3.9–12.7)

## 2023-04-27 PROCEDURE — 36415 COLL VENOUS BLD VENIPUNCTURE: CPT | Performed by: STUDENT IN AN ORGANIZED HEALTH CARE EDUCATION/TRAINING PROGRAM

## 2023-04-27 PROCEDURE — 83880 ASSAY OF NATRIURETIC PEPTIDE: CPT | Performed by: STUDENT IN AN ORGANIZED HEALTH CARE EDUCATION/TRAINING PROGRAM

## 2023-04-27 PROCEDURE — 85025 COMPLETE CBC W/AUTO DIFF WBC: CPT | Performed by: STUDENT IN AN ORGANIZED HEALTH CARE EDUCATION/TRAINING PROGRAM

## 2023-04-30 ENCOUNTER — NURSE TRIAGE (OUTPATIENT)
Dept: ADMINISTRATIVE | Facility: CLINIC | Age: 48
End: 2023-04-30
Payer: COMMERCIAL

## 2023-04-30 ENCOUNTER — PATIENT MESSAGE (OUTPATIENT)
Dept: RHEUMATOLOGY | Facility: CLINIC | Age: 48
End: 2023-04-30
Payer: COMMERCIAL

## 2023-04-30 NOTE — TELEPHONE ENCOUNTER
Patient states c/o kidney pain rated 7/10. Patient denies fever and vomiting. Patient's wife also states concerns regarding patient's current medication regimen, plan of care, and recent diagnoses and would like to speak with patient's Provider's regarding patient's plan of care and possible medication change or discontinuation.     Patient advised to see Provider within 24 hours or to visit an Urgent Care Center for c/o flank pain. Patient/patient's wife also advised to discuss patient's plan of care and medications with his Providers. Patient/patient's wife state understanding of care advice and state will discuss during scheduled appointments on tomorrow.     Reason for Disposition   MODERATE pain (e.g., interferes with normal activities or awakens from sleep)    Additional Information   Negative: Passed out (i.e., lost consciousness, collapsed and was not responding)   Negative: Shock suspected (e.g., cold/pale/clammy skin, too weak to stand, low BP, rapid pulse)   Negative: Difficult to awaken or acting confused (e.g., disoriented, slurred speech)   Negative: Sounds like a life-threatening emergency to the triager   Negative: Followed a major injury to the back (e.g., MVA, fall > 10 feet or 3 meters, penetrating injury, etc.)   Negative: Back pain or flank pain during pregnancy   Negative: Upper, mid or lower back pain that occurs mainly in the midline   Negative: [1] SEVERE pain (e.g., excruciating, scale 8-10) AND [2] present > 1 hour   Negative: [1] SEVERE pain (e.g., excruciating, scale 8-10) AND [2] not improved after pain medicine   Negative: [1] Sudden onset of severe flank pain AND [2] age > 60 years   Negative: [1] Abdominal pain AND [2] age > 60 years   Negative: [1] Unable to urinate (or only a few drops) > 4 hours AND [2] bladder feels very full (e.g., palpable bladder or strong urge to urinate)   Negative: Vomiting   Negative: Weakness of a leg or foot (e.g., unable to bear weight, dragging foot)    Negative: Patient sounds very sick or weak to the triager   Negative: Fever > 100.4 F (38.0 C)   Negative: Pain or burning with passing urine (urination)    Protocols used: Flank Pain-A-AH

## 2023-05-01 ENCOUNTER — OFFICE VISIT (OUTPATIENT)
Dept: NEUROLOGY | Facility: CLINIC | Age: 48
End: 2023-05-01
Payer: COMMERCIAL

## 2023-05-01 ENCOUNTER — E-CONSULT (OUTPATIENT)
Dept: CARDIOLOGY | Facility: CLINIC | Age: 48
End: 2023-05-01
Payer: COMMERCIAL

## 2023-05-01 ENCOUNTER — PATIENT MESSAGE (OUTPATIENT)
Dept: INTERNAL MEDICINE | Facility: CLINIC | Age: 48
End: 2023-05-01

## 2023-05-01 ENCOUNTER — HOSPITAL ENCOUNTER (OUTPATIENT)
Dept: PREADMISSION TESTING | Facility: HOSPITAL | Age: 48
Discharge: HOME | End: 2023-05-01
Attending: COLON & RECTAL SURGERY
Payer: COMMERCIAL

## 2023-05-01 ENCOUNTER — OFFICE VISIT (OUTPATIENT)
Dept: INTERNAL MEDICINE | Facility: CLINIC | Age: 48
End: 2023-05-01
Payer: COMMERCIAL

## 2023-05-01 ENCOUNTER — OFFICE VISIT (OUTPATIENT)
Dept: HEMATOLOGY/ONCOLOGY | Facility: CLINIC | Age: 48
End: 2023-05-01
Payer: COMMERCIAL

## 2023-05-01 VITALS
TEMPERATURE: 98 F | WEIGHT: 149.81 LBS | SYSTOLIC BLOOD PRESSURE: 122 MMHG | RESPIRATION RATE: 18 BRPM | HEART RATE: 105 BPM | OXYGEN SATURATION: 98 % | DIASTOLIC BLOOD PRESSURE: 80 MMHG | BODY MASS INDEX: 22.7 KG/M2 | HEIGHT: 68 IN

## 2023-05-01 VITALS
SYSTOLIC BLOOD PRESSURE: 134 MMHG | DIASTOLIC BLOOD PRESSURE: 87 MMHG | OXYGEN SATURATION: 99 % | HEIGHT: 68 IN | BODY MASS INDEX: 23.26 KG/M2 | WEIGHT: 153.44 LBS | TEMPERATURE: 98 F | HEART RATE: 97 BPM

## 2023-05-01 DIAGNOSIS — E78.5 HYPERLIPIDEMIA ASSOCIATED WITH TYPE 2 DIABETES MELLITUS: ICD-10-CM

## 2023-05-01 DIAGNOSIS — E11.69 HYPERLIPIDEMIA ASSOCIATED WITH TYPE 2 DIABETES MELLITUS: ICD-10-CM

## 2023-05-01 DIAGNOSIS — Z82.49 FAMILY HISTORY OF HEART DISEASE: ICD-10-CM

## 2023-05-01 DIAGNOSIS — Z82.0 FAMILY HISTORY OF ALZHEIMER'S DISEASE: ICD-10-CM

## 2023-05-01 DIAGNOSIS — I50.41 ACUTE COMBINED SYSTOLIC AND DIASTOLIC CONGESTIVE HEART FAILURE: Primary | ICD-10-CM

## 2023-05-01 DIAGNOSIS — M79.605 PAIN OF LEFT LOWER EXTREMITY: ICD-10-CM

## 2023-05-01 DIAGNOSIS — R41.9 COGNITIVE COMPLAINTS: Primary | ICD-10-CM

## 2023-05-01 DIAGNOSIS — R10.30 LOWER ABDOMINAL PAIN: ICD-10-CM

## 2023-05-01 DIAGNOSIS — G47.429 NARCOLEPSY DUE TO UNDERLYING CONDITION WITHOUT CATAPLEXY: ICD-10-CM

## 2023-05-01 DIAGNOSIS — R10.9 BILATERAL FLANK PAIN: Primary | ICD-10-CM

## 2023-05-01 DIAGNOSIS — Z65.8 PSYCHOSOCIAL STRESSORS: ICD-10-CM

## 2023-05-01 DIAGNOSIS — Z12.11 COLON CANCER SCREENING: Primary | ICD-10-CM

## 2023-05-01 PROCEDURE — 3052F PR MOST RECENT HEMOGLOBIN A1C LEVEL 8.0 - < 9.0%: ICD-10-PCS | Mod: CPTII,S$GLB,, | Performed by: STUDENT IN AN ORGANIZED HEALTH CARE EDUCATION/TRAINING PROGRAM

## 2023-05-01 PROCEDURE — 99205 PR OFFICE/OUTPT VISIT, NEW, LEVL V, 60-74 MIN: ICD-10-PCS | Mod: S$GLB,,, | Performed by: INTERNAL MEDICINE

## 2023-05-01 PROCEDURE — 99999 PR PBB SHADOW E&M-EST. PATIENT-LVL II: CPT | Mod: PBBFAC,,, | Performed by: STUDENT IN AN ORGANIZED HEALTH CARE EDUCATION/TRAINING PROGRAM

## 2023-05-01 PROCEDURE — 1160F PR REVIEW ALL MEDS BY PRESCRIBER/CLIN PHARMACIST DOCUMENTED: ICD-10-PCS | Mod: CPTII,S$GLB,, | Performed by: PHYSICIAN ASSISTANT

## 2023-05-01 PROCEDURE — 1159F PR MEDICATION LIST DOCUMENTED IN MEDICAL RECORD: ICD-10-PCS | Mod: CPTII,S$GLB,, | Performed by: STUDENT IN AN ORGANIZED HEALTH CARE EDUCATION/TRAINING PROGRAM

## 2023-05-01 PROCEDURE — 3075F PR MOST RECENT SYSTOLIC BLOOD PRESS GE 130-139MM HG: ICD-10-PCS | Mod: CPTII,S$GLB,, | Performed by: INTERNAL MEDICINE

## 2023-05-01 PROCEDURE — 4010F PR ACE/ARB THEARPY RXD/TAKEN: ICD-10-PCS | Mod: CPTII,S$GLB,, | Performed by: INTERNAL MEDICINE

## 2023-05-01 PROCEDURE — 3066F NEPHROPATHY DOC TX: CPT | Mod: CPTII,S$GLB,, | Performed by: STUDENT IN AN ORGANIZED HEALTH CARE EDUCATION/TRAINING PROGRAM

## 2023-05-01 PROCEDURE — 3052F HG A1C>EQUAL 8.0%<EQUAL 9.0%: CPT | Mod: CPTII,S$GLB,, | Performed by: STUDENT IN AN ORGANIZED HEALTH CARE EDUCATION/TRAINING PROGRAM

## 2023-05-01 PROCEDURE — 96116 NUBHVL XM PHYS/QHP 1ST HR: CPT | Mod: S$GLB,,, | Performed by: STUDENT IN AN ORGANIZED HEALTH CARE EDUCATION/TRAINING PROGRAM

## 2023-05-01 PROCEDURE — 4010F ACE/ARB THERAPY RXD/TAKEN: CPT | Mod: CPTII,S$GLB,, | Performed by: INTERNAL MEDICINE

## 2023-05-01 PROCEDURE — 3079F PR MOST RECENT DIASTOLIC BLOOD PRESSURE 80-89 MM HG: ICD-10-PCS | Mod: CPTII,S$GLB,, | Performed by: INTERNAL MEDICINE

## 2023-05-01 PROCEDURE — 3008F PR BODY MASS INDEX (BMI) DOCUMENTED: ICD-10-PCS | Mod: CPTII,S$GLB,, | Performed by: INTERNAL MEDICINE

## 2023-05-01 PROCEDURE — 99213 OFFICE O/P EST LOW 20 MIN: CPT | Mod: S$GLB,,, | Performed by: PHYSICIAN ASSISTANT

## 2023-05-01 PROCEDURE — 3061F NEG MICROALBUMINURIA REV: CPT | Mod: CPTII,S$GLB,, | Performed by: PHYSICIAN ASSISTANT

## 2023-05-01 PROCEDURE — 4010F ACE/ARB THERAPY RXD/TAKEN: CPT | Mod: CPTII,S$GLB,, | Performed by: PHYSICIAN ASSISTANT

## 2023-05-01 PROCEDURE — 3052F HG A1C>EQUAL 8.0%<EQUAL 9.0%: CPT | Mod: CPTII,S$GLB,, | Performed by: PHYSICIAN ASSISTANT

## 2023-05-01 PROCEDURE — 3066F NEPHROPATHY DOC TX: CPT | Mod: CPTII,S$GLB,, | Performed by: INTERNAL MEDICINE

## 2023-05-01 PROCEDURE — 99213 PR OFFICE/OUTPT VISIT, EST, LEVL III, 20-29 MIN: ICD-10-PCS | Mod: S$GLB,,, | Performed by: PHYSICIAN ASSISTANT

## 2023-05-01 PROCEDURE — 96116 PR NEUROBEHAVIORAL STATUS EXAM BY PSYCH/PHYS: ICD-10-PCS | Mod: S$GLB,,, | Performed by: STUDENT IN AN ORGANIZED HEALTH CARE EDUCATION/TRAINING PROGRAM

## 2023-05-01 PROCEDURE — 99999 PR PBB SHADOW E&M-EST. PATIENT-LVL V: ICD-10-PCS | Mod: PBBFAC,,, | Performed by: PHYSICIAN ASSISTANT

## 2023-05-01 PROCEDURE — 3074F SYST BP LT 130 MM HG: CPT | Mod: CPTII,S$GLB,, | Performed by: PHYSICIAN ASSISTANT

## 2023-05-01 PROCEDURE — 99446 NTRPROF PH1/NTRNET/EHR 5-10: CPT | Mod: S$GLB,,, | Performed by: STUDENT IN AN ORGANIZED HEALTH CARE EDUCATION/TRAINING PROGRAM

## 2023-05-01 PROCEDURE — 3052F PR MOST RECENT HEMOGLOBIN A1C LEVEL 8.0 - < 9.0%: ICD-10-PCS | Mod: CPTII,S$GLB,, | Performed by: PHYSICIAN ASSISTANT

## 2023-05-01 PROCEDURE — 99999 PR PBB SHADOW E&M-EST. PATIENT-LVL V: CPT | Mod: PBBFAC,,, | Performed by: PHYSICIAN ASSISTANT

## 2023-05-01 PROCEDURE — 99205 OFFICE O/P NEW HI 60 MIN: CPT | Mod: S$GLB,,, | Performed by: INTERNAL MEDICINE

## 2023-05-01 PROCEDURE — 4010F ACE/ARB THERAPY RXD/TAKEN: CPT | Mod: CPTII,S$GLB,, | Performed by: STUDENT IN AN ORGANIZED HEALTH CARE EDUCATION/TRAINING PROGRAM

## 2023-05-01 PROCEDURE — 3066F PR DOCUMENTATION OF TREATMENT FOR NEPHROPATHY: ICD-10-PCS | Mod: CPTII,S$GLB,, | Performed by: INTERNAL MEDICINE

## 2023-05-01 PROCEDURE — 3079F PR MOST RECENT DIASTOLIC BLOOD PRESSURE 80-89 MM HG: ICD-10-PCS | Mod: CPTII,S$GLB,, | Performed by: PHYSICIAN ASSISTANT

## 2023-05-01 PROCEDURE — 3066F PR DOCUMENTATION OF TREATMENT FOR NEPHROPATHY: ICD-10-PCS | Mod: CPTII,S$GLB,, | Performed by: STUDENT IN AN ORGANIZED HEALTH CARE EDUCATION/TRAINING PROGRAM

## 2023-05-01 PROCEDURE — 3079F DIAST BP 80-89 MM HG: CPT | Mod: CPTII,S$GLB,, | Performed by: INTERNAL MEDICINE

## 2023-05-01 PROCEDURE — 3074F PR MOST RECENT SYSTOLIC BLOOD PRESSURE < 130 MM HG: ICD-10-PCS | Mod: CPTII,S$GLB,, | Performed by: PHYSICIAN ASSISTANT

## 2023-05-01 PROCEDURE — 3061F PR NEG MICROALBUMINURIA RESULT DOCUMENTED/REVIEW: ICD-10-PCS | Mod: CPTII,S$GLB,, | Performed by: INTERNAL MEDICINE

## 2023-05-01 PROCEDURE — 3066F NEPHROPATHY DOC TX: CPT | Mod: CPTII,S$GLB,, | Performed by: PHYSICIAN ASSISTANT

## 2023-05-01 PROCEDURE — 4010F PR ACE/ARB THEARPY RXD/TAKEN: ICD-10-PCS | Mod: CPTII,S$GLB,, | Performed by: PHYSICIAN ASSISTANT

## 2023-05-01 PROCEDURE — 3061F NEG MICROALBUMINURIA REV: CPT | Mod: CPTII,S$GLB,, | Performed by: STUDENT IN AN ORGANIZED HEALTH CARE EDUCATION/TRAINING PROGRAM

## 2023-05-01 PROCEDURE — 3079F DIAST BP 80-89 MM HG: CPT | Mod: CPTII,S$GLB,, | Performed by: PHYSICIAN ASSISTANT

## 2023-05-01 PROCEDURE — 3061F PR NEG MICROALBUMINURIA RESULT DOCUMENTED/REVIEW: ICD-10-PCS | Mod: CPTII,S$GLB,, | Performed by: PHYSICIAN ASSISTANT

## 2023-05-01 PROCEDURE — 99499 NO LOS: ICD-10-PCS | Mod: S$GLB,,, | Performed by: STUDENT IN AN ORGANIZED HEALTH CARE EDUCATION/TRAINING PROGRAM

## 2023-05-01 PROCEDURE — 3075F SYST BP GE 130 - 139MM HG: CPT | Mod: CPTII,S$GLB,, | Performed by: INTERNAL MEDICINE

## 2023-05-01 PROCEDURE — 3052F PR MOST RECENT HEMOGLOBIN A1C LEVEL 8.0 - < 9.0%: ICD-10-PCS | Mod: CPTII,S$GLB,, | Performed by: INTERNAL MEDICINE

## 2023-05-01 PROCEDURE — 3008F PR BODY MASS INDEX (BMI) DOCUMENTED: ICD-10-PCS | Mod: CPTII,S$GLB,, | Performed by: PHYSICIAN ASSISTANT

## 2023-05-01 PROCEDURE — 99499 UNLISTED E&M SERVICE: CPT | Mod: S$GLB,,, | Performed by: STUDENT IN AN ORGANIZED HEALTH CARE EDUCATION/TRAINING PROGRAM

## 2023-05-01 PROCEDURE — 1159F MED LIST DOCD IN RCRD: CPT | Mod: CPTII,S$GLB,, | Performed by: PHYSICIAN ASSISTANT

## 2023-05-01 PROCEDURE — 3061F NEG MICROALBUMINURIA REV: CPT | Mod: CPTII,S$GLB,, | Performed by: INTERNAL MEDICINE

## 2023-05-01 PROCEDURE — 3008F BODY MASS INDEX DOCD: CPT | Mod: CPTII,S$GLB,, | Performed by: INTERNAL MEDICINE

## 2023-05-01 PROCEDURE — 3061F PR NEG MICROALBUMINURIA RESULT DOCUMENTED/REVIEW: ICD-10-PCS | Mod: CPTII,S$GLB,, | Performed by: STUDENT IN AN ORGANIZED HEALTH CARE EDUCATION/TRAINING PROGRAM

## 2023-05-01 PROCEDURE — 1160F RVW MEDS BY RX/DR IN RCRD: CPT | Mod: CPTII,S$GLB,, | Performed by: PHYSICIAN ASSISTANT

## 2023-05-01 PROCEDURE — 99999 PR PBB SHADOW E&M-EST. PATIENT-LVL V: ICD-10-PCS | Mod: PBBFAC,,, | Performed by: INTERNAL MEDICINE

## 2023-05-01 PROCEDURE — 99999 PR PBB SHADOW E&M-EST. PATIENT-LVL V: CPT | Mod: PBBFAC,,, | Performed by: INTERNAL MEDICINE

## 2023-05-01 PROCEDURE — 3052F HG A1C>EQUAL 8.0%<EQUAL 9.0%: CPT | Mod: CPTII,S$GLB,, | Performed by: INTERNAL MEDICINE

## 2023-05-01 PROCEDURE — 4010F PR ACE/ARB THEARPY RXD/TAKEN: ICD-10-PCS | Mod: CPTII,S$GLB,, | Performed by: STUDENT IN AN ORGANIZED HEALTH CARE EDUCATION/TRAINING PROGRAM

## 2023-05-01 PROCEDURE — 99999 PR PBB SHADOW E&M-EST. PATIENT-LVL II: ICD-10-PCS | Mod: PBBFAC,,, | Performed by: STUDENT IN AN ORGANIZED HEALTH CARE EDUCATION/TRAINING PROGRAM

## 2023-05-01 PROCEDURE — 99446 PR INTERPROF, PHONE/INTERNET/EHR, CONSULT, 5-10 MINS: ICD-10-PCS | Mod: S$GLB,,, | Performed by: STUDENT IN AN ORGANIZED HEALTH CARE EDUCATION/TRAINING PROGRAM

## 2023-05-01 PROCEDURE — 3008F BODY MASS INDEX DOCD: CPT | Mod: CPTII,S$GLB,, | Performed by: PHYSICIAN ASSISTANT

## 2023-05-01 PROCEDURE — 1159F MED LIST DOCD IN RCRD: CPT | Mod: CPTII,S$GLB,, | Performed by: STUDENT IN AN ORGANIZED HEALTH CARE EDUCATION/TRAINING PROGRAM

## 2023-05-01 PROCEDURE — 1159F PR MEDICATION LIST DOCUMENTED IN MEDICAL RECORD: ICD-10-PCS | Mod: CPTII,S$GLB,, | Performed by: PHYSICIAN ASSISTANT

## 2023-05-01 PROCEDURE — 3066F PR DOCUMENTATION OF TREATMENT FOR NEPHROPATHY: ICD-10-PCS | Mod: CPTII,S$GLB,, | Performed by: PHYSICIAN ASSISTANT

## 2023-05-01 RX ORDER — SOD SULF/POT CHLORIDE/MAG SULF 1.479 G
12 TABLET ORAL DAILY
Qty: 24 TABLET | Refills: 0 | Status: SHIPPED | OUTPATIENT
Start: 2023-05-01 | End: 2023-06-16

## 2023-05-01 NOTE — PROGRESS NOTES
Subjective:      DATE OF VISIT: 5/1/23     ?  Patient ID:?Jason Sandra is a 48 y.o. male.?? MR#: [id number]   ?   REFERRING PROVIDER: Zoraida Mcconnell MD  [address] [place] [address] [place] [id number]     ? Primary Care Providers:  Zoraida Mcconnell MD, MD (General)     CHIEF COMPLAINT: ?  Left lower extremity pain??   ?   HPI    Mr. Sandra  is a 48-year-old gentleman referred to Oncology Hematology due to left lower extremity pain.   He did note at 1 time mild edema left lower extremity which has since resolved.  He currently is most concerned about pain left lower extremity sharp shooting along with tingling lightening sensation.  He also endorses right cervical pain. He is had prior workup by primary care, orthopedics, neurology and pending neuropsychology with nerve conduction studies noncontrast MRI brain and spine.  He does have diabetes.    Review of Systems    ?   A comprehensive 14-point review of systems was reviewed with patient and was negative other than as specified above.   ?   PAST MEDICAL HISTORY:   Past Medical History:   Diagnosis Date    Diabetes mellitus     Hypogonadism in male     ?     PAST SURGICAL HISTORY:   Past Surgical History:   Procedure Laterality Date    NOSE SURGERY        ?   ALLERGIES:   Allergies as of 05/01/2023    (No Known Allergies)      ?   MEDICATIONS:?   Outpatient Medications Marked as Taking for the 5/1/23 encounter (Office Visit) with Natalie Monte MD   Medication Sig Dispense Refill    ACCU-CHEK GUIDE ME GLUCOSE MTR Misc CHECK BLOOD SUGAR TWICE A DAY      blood sugar diagnostic Strp To check BG 2 times daily, to use with insurance preferred meter 200 strip 2    blood-glucose sensor (DEXCOM G6 SENSOR) Chen 1 sensor every 10 days 3 each 11    blood-glucose transmitter (DEXCOM G6 TRANSMITTER) Chen 1 transmitter every 3 months 1 each 4    insulin lispro-aabc (LYUMJEV KWIKPEN U-100 INSULIN) 100 unit/mL pen Inject 10 units 3 times per day before meals, 2-4  units for snack, . + correction scale. MAX TDD Of 50 units 5 pen 6    lancets Misc To check BG 2 times daily, to use with insurance preferred meter 200 each 2    metoprolol succinate (TOPROL-XL) 25 MG 24 hr tablet Take 1 tablet (25 mg total) by mouth once daily. 30 tablet 11    sacubitriL-valsartan (ENTRESTO) 24-26 mg per tablet Take 1 tablet by mouth 2 (two) times daily. 60 tablet 11      ?   SOCIAL HISTORY:?   Social History     Tobacco Use    Smoking status: Never     Passive exposure: Never    Smokeless tobacco: Never   Substance Use Topics    Alcohol use: Yes     Comment: socially      ?      ?   FAMILY HISTORY:   family history includes Alzheimer's disease in his father; Cataracts in his mother; Glaucoma in his mother; Stroke in his mother.   ?        Objective:      Physical Exam      ?   Vitals:    05/01/23 1526   BP: 134/87   Pulse: 97   Temp: 98 °F (36.7 °C)      ?   ECOG:?0  General appearance: Generally well appearing, in no acute distress.   Head, eyes, ears, nose, and throat: moist mucous membranes.   Respiratory:  Normal work of breathing  Abdomen: nontender, nondistended.   Extremities: Warm, without edema.   Neurologic: Alert and oriented.   Skin: No rashes, ecchymoses or petechial lesion.   Psychiatric:  Normal mood and affect.    ?   Laboratory:    Lab Results   Component Value Date    WBC 4.53 04/27/2023    RBC 5.31 04/27/2023    HGB 15.7 04/27/2023    HCT 45.3 04/27/2023    MCV 85 04/27/2023    MCH 29.6 04/27/2023    MCHC 34.7 04/27/2023    RDW 11.0 (L) 04/27/2023     04/27/2023    MPV 9.8 04/27/2023    GRAN 1.9 04/27/2023    GRAN 42.0 04/27/2023    LYMPH 2.0 04/27/2023    LYMPH 44.8 04/27/2023    MONO 0.5 04/27/2023    MONO 10.4 04/27/2023    EOS 0.1 04/27/2023    BASO 0.02 04/27/2023    EOSINOPHIL 2.2 04/27/2023    BASOPHIL 0.4 04/27/2023       Sodium   Date Value Ref Range Status   03/20/2023 134 (L) 136 - 145 mmol/L Final     Potassium   Date Value Ref Range Status   03/20/2023 4.6  3.5 - 5.1 mmol/L Final     Chloride   Date Value Ref Range Status   03/20/2023 102 95 - 110 mmol/L Final     CO2   Date Value Ref Range Status   03/20/2023 25 23 - 29 mmol/L Final     Glucose   Date Value Ref Range Status   03/20/2023 203 (H) 70 - 110 mg/dL Final     BUN   Date Value Ref Range Status   03/20/2023 18 6 - 20 mg/dL Final     Creatinine   Date Value Ref Range Status   03/20/2023 1.0 0.5 - 1.4 mg/dL Final     Calcium   Date Value Ref Range Status   03/20/2023 9.7 8.7 - 10.5 mg/dL Final     Total Protein   Date Value Ref Range Status   03/20/2023 7.8 6.0 - 8.4 g/dL Final     Albumin   Date Value Ref Range Status   03/20/2023 3.8 3.5 - 5.2 g/dL Final     Total Bilirubin   Date Value Ref Range Status   03/20/2023 0.6 0.1 - 1.0 mg/dL Final     Comment:     For infants and newborns, interpretation of results should be based  on gestational age, weight and in agreement with clinical  observations.    Premature Infant recommended reference ranges:  Up to 24 hours.............<8.0 mg/dL  Up to 48 hours............<12.0 mg/dL  3-5 days..................<15.0 mg/dL  6-29 days.................<15.0 mg/dL       Alkaline Phosphatase   Date Value Ref Range Status   03/20/2023 79 55 - 135 U/L Final     AST   Date Value Ref Range Status   03/20/2023 23 10 - 40 U/L Final     ALT   Date Value Ref Range Status   03/20/2023 30 10 - 44 U/L Final     Anion Gap   Date Value Ref Range Status   03/20/2023 7 (L) 8 - 16 mmol/L Final     eGFR if    Date Value Ref Range Status   02/24/2020 >60 >60 mL/min/1.73 m^2 Final     eGFR if non    Date Value Ref Range Status   02/24/2020 >60 >60 mL/min/1.73 m^2 Final     Comment:     Calculation used to obtain the estimated glomerular filtration  rate (eGFR) is the CKD-EPI equation.          Ferritin   Date Value Ref Range Status   03/07/2011 130 20 - 300 ng/ml Final     Folate   Date Value Ref Range Status   03/09/2023 14.3 4.0 - 24.0 ng/mL Final     TSH    Date Value Ref Range Status   03/20/2023 2.756 0.400 - 4.000 uIU/mL Final           ?   Assessment/Plan:   Pain of left lower extremity  -     Ambulatory referral/consult to Hematology / Oncology       1. Pain of left lower extremity          Plan:     Problem List Items Addressed This Visit    None  Visit Diagnoses       Pain of left lower extremity                  Left lower extremity pain:  We have had extensive discussion regarding his symptoms seen by multiple specialists  including orthopedics neurology neuropsychology and primary care.  No notable edema and D-dimer negative.  MR spinal imaging and brain performed however without contrast; may consider contrasted scan if otherwise unrevealing workup.  Recommend further evaluation with other providers and follow-up with Hematology Oncology as needed.    60 minutes of total time spent on the encounter, which includes face to face time and non-face to face time preparing to see the patient (eg, review of tests), Obtaining and/or reviewing separately obtained history, Documenting clinical information in the electronic or other health record, Independently interpreting results (not separately reported) and communicating results to the patient/family/caregiver, or Care coordination (not separately reported).

## 2023-05-01 NOTE — CONSULTS
[facility] - Cardiology  Response for E-Consult     Patient Name: Jason Sandra  MRN: [id number]  Primary Care Provider: Zoraida Mcconnell MD   Requesting Provider: Vivi Mraes NP  Consults    Findings: Jason Sandra is a 48 y.o. male with h/o HLD, diabetes, newly diagnosed CHF with constant fatigue and shortness of breath.  EF found to be 35%.  Patient referred for colonoscopy.      Patient recently seen in Cardiology Clinic complaining of fatigue and shortness of breath.  Recommend colonoscopy being postponed until symptoms of shortness of breath have improved.  Recent stress test with no ischemia.  Patient will follow up with cardiology clinic.    I did not speak to the requesting provider verbally about this.     Total time of Consultation: 10 minute    Percentage of time spent on written/verbal discussion: 100%     *This eConsult is based on the clinical data available to me and is furnished without benefit of a physical examination. The eConsult will need to be interpreted in light of any clinical issues or changes in patient status not available to me at the time of filing this eConsults. Significant changes in patient condition or level of acuity should result in immediate formal consultation and reevaluation. Please alert me if you have further questions.    Thank you for your consult.     Karl Payne MD  [facility] - Cardiology

## 2023-05-01 NOTE — PROGRESS NOTES
Subjective:       Patient ID: Jason Sandra is a 48 y.o. male.    Chief Complaint: Abdominal Pain      HPI  47 y/o male presents to clinic with c/o bilateral lower back pain and bilateral lateral abdominal pain. He denies any urinary symptoms, reports normal bowel movements and reports nausea but no vomiting or diarrhea. Reports normal appetite. Denies fever or chills. Pain is achy and sharp. Pain worse on right side. Symptoms started yesterday morning without injury or trauma.     Review of Systems   Constitutional:  Negative for chills, fatigue and fever.   Respiratory:  Negative for shortness of breath.    Cardiovascular:  Negative for chest pain.   Gastrointestinal:  Positive for abdominal pain and nausea. Negative for blood in stool, constipation, diarrhea and vomiting.   Genitourinary:  Positive for flank pain. Negative for dysuria, frequency, hematuria and urgency.   Musculoskeletal:  Positive for back pain and myalgias.   Neurological:  Negative for weakness and numbness.     Objective:      Physical Exam  Vitals and nursing note reviewed.   Constitutional:       General: He is not in acute distress.     Appearance: He is well-developed.   HENT:      Head: Normocephalic and atraumatic.   Eyes:      General: Lids are normal. No scleral icterus.     Extraocular Movements: Extraocular movements intact.      Conjunctiva/sclera: Conjunctivae normal.   Cardiovascular:      Rate and Rhythm: Normal rate and regular rhythm.   Pulmonary:      Effort: Pulmonary effort is normal.      Breath sounds: Normal breath sounds. No decreased breath sounds, wheezing, rhonchi or rales.   Abdominal:      General: Abdomen is flat. Bowel sounds are normal. There is no distension.      Palpations: Abdomen is soft. There is no mass.      Tenderness: There is no right CVA tenderness or left CVA tenderness.       Musculoskeletal:        Back:    Neurological:      Mental Status: He is alert.      Cranial Nerves: No cranial nerve  deficit.   Psychiatric:         Mood and Affect: Mood and affect normal.       Assessment:       1. Bilateral flank pain    2. Lower abdominal pain        Plan:   1. Bilateral flank pain  -     Basic Metabolic Panel; Future; Expected date: 05/01/2023    2. Lower abdominal pain  -     Urinalysis, Reflex to Urine Culture Urine, Clean Catch; Future; Expected date: 05/01/2023        Recommend checking urine and kidney function since patient concerned about kidney function  Suspect musculoskeletal issue since areas are tender to palpation, no obvious abnormality and multiple areas patient c/o pain.     Lab Results   Component Value Date    COLORU Yellow 05/01/2023    APPEARANCEUA Clear 05/01/2023    PHUR 6.0 05/01/2023    SPECGRAV 1.025 05/01/2023    PROTEINUA Negative 05/01/2023    GLUCUA 2+ (A) 05/01/2023    KETONESU Negative 05/01/2023    BILIRUBINUA Negative 05/01/2023    OCCULTUA Negative 05/01/2023    NITRITE Negative 05/01/2023    UROBILINOGEN Negative 05/01/2023    LEUKOCYTESUR Negative 05/01/2023

## 2023-05-01 NOTE — Clinical Note
Hi all I wanted to reach out since I am not very clear on reason for hematology oncology consult.   Given his unclear progressive neurologic symptoms agree it is reasonable to get MRI brain and spine but question why without contrast which is limited study; other than that not sure what else I can add but please let me know how I can be helpful!  I know he is following up with Neurology soon.

## 2023-05-01 NOTE — PROGRESS NOTES
"CONFIDENTIAL NEUROPSYCHOLOGICAL EVALUATION    NAME:  Jason Sandra DATE OF SERVICE: 2023   MRN#:  [id number] EDUCATION: 12   AGE: 48 y.o. HANDEDNESS: Right    : 1975 RACE: White   SEX: Male REFERRAL: Sneha Wolf NP;  Neurology, [facility]     Referral question and neuropsychological necessity: Mr. Sandra is a 48 y.o., right-handed, white male with 12 years of formal education who was referred by his neurology nurse practitioner due to cognitive concerns and a family history of Alzheimer's disease.    Evaluation methods: I had the pleasure of seeing Jason Sandra on 2023 in person at the [facility], Department of Neurology. Data sources for the below report include review of the available medical record and an interview with the patient. At the outset of the appointment, the undersigned explained the rationale for the evaluation along with the limits of confidentiality; and verbal informed consent for this evaluation was obtained.    Summary and Impressions     Mr. Sandra is a 48 y.o., right-handed, White, male with 12 years of formal education. He was referred by his neurology nurse practitioner due to cognitive concerns in the context of a family history of Alzheimer's disease and unspecified dementia (late onset in both parents, early onset in a paternal uncle). Medical history is additionally notable for complex multiple specialty concerns. Relevant medial concerns include congestive heart failure, narcolepsy without cataplexy, hyperlipidemia, diabetes, headache, and multifocal pain of undetermined origin. Cognitive screening completed by his referring neurology team on 2023 was within normal limits (MoCA = 28/30). Most-recent neurologic exam completed on 2023 was documented as within normal limits. Most-recent brain MRI completed on 2023 was documented as reflecting "no significant MRI abnormality of the brain." Laboratory studies for reversible " causes of cognitive decline have been reviewed as noncontributory by his referring neurology team.     During interview, Mr. Sandra reported the insidious onset and generally stable course of cognitive concerns first reported to him by his wife approximately one year ago. He also reported day-to-day variability due to pain symptoms, which have begun more recently, as below. Regarding cognitive concerns, he characterized difficulties with slowed processing speed due to second-guessing himself, word-finding and spelling difficulty, and forgetfulness for conversations he has with his wife. Functionally, Mr. Sandra denied changes in functional independence related to his above cognitive concerns. Emotionally, Mr. Sandra denied any history of clinically significant depression or anxiety.     Mr. Sandra has a history of the above risk factors for cognitive decline and a family history of dementia, which increases the risk of an organic etiology for his cognitive concerns. He does not have psychological or medical concerns that would preclude gathering neuropsychological test data at this time. As such, formal neuropsychological testing is clinically indicated in order to aid in differential diagnosis and treatment planning.      ICD-10-CM Diagnoses     1. Cognitive complaints        2. Psychosocial stressors        3. Family history of Alzheimer's disease        4. Narcolepsy due to underlying condition without cataplexy          Plan/ Recommendations     Provider Recommendations:   On the basis of the above summary, neuropsychological testing is clinically indicated at this time. Mr. Sandra will be scheduled for comprehensive neuropsychological testing. A detailed report including detailed diagnostic information and recommendations will be completed after testing has been completed.     Patient Recommendations:   The next step in your care is to complete neuropsychological testing. Our office staff will reach out to you to  "schedule an appointment for the testing portion of your evaluation. Please review your after visit summary for more information about your testing appointment.     Presenting concerns      Presenting Problem/Primary Concern: Memory loss in the context of chronic pain and dementia in his parents.    Onset of Cognitive Concerns: Insidious, beginning approximately one year ago .    Course of Cognitive Concerns: Mr. Sandra reported that his cognitive skills have been stable over time. He acknowledged that pain is related to cognitive changes.    Characterization of Cognitive Concerns  Attention/ working memory: Mr. Sandra denied attention or working memory concerns.    Processing speed: Mr. Sandra reported that he second-guesses himself more than he used to and asks for people to repeat information due to slowed processing speed.    Language: Mr. Sandra reported difficulties with expressive language, specifically word-finding difficulty that has worsened recently. He discussed it has become much harder for him to spell previously-familiar words. He denied receptive language concerns.     Visual-spatial/ navigation: Mr. Sandra denied difficulties with visual-spatial skills or navigation.    Psychomotor: Mr. Sandra denied psychomotor difficulties.    Memory: Mr. Sandra denied difficulties with memory that he perceived to be concerning. His wife has reported that he is forgetful for things they discuss. He denied forgetting of important responsibilities. He attributed difficulties with remembering what his wife says to attention and competing demands in his life.    Decision making: Mr. Sandra denied difficulties with decision-making or reasoning skills.    Behavioral changes: Mr. Sandra denied changes in behavior.    Orientation: Mr. Sandra denied errors in orientation to person, place, time, or situation.       Current Mental Health  Current mood:  Mr. Sandra described his mood as "good today. I do get stress[ed] at home." He " "reported reduced frustration tolerance in the context of renovating a home with concurrent pain symptoms, fatigue, and a full-time job.     Relevant current mood concerns: Mr. Sandra denied any clinically-significant mood symptoms.    Hallucinations and delusions: Mr. Sandra denied experiencing hallucinations and there is no concern for delusional thinking.    Physical Status  Pain: Mr. Sandra reported that he developed whole body pain that began towards the end of February or the beginning of March. Pain is multifocal and he is unsure as to the reason for his pain. Their overall level of pain on the date of this assessment as a "7"/10, with 10 being the worst pain imaginable.   Sleep: Mr. Sandra reported an average sleep duration of 7 hours per night. He reported falling asleep quickly, at multiple times during the day. He reported that he has narcolepsy. He denied cataplexy.   Energy: Mr. Snadra reported fatigue. He described his level of energy as "pitiful."  Exercise/ therapy: Mr. Sandra has ceased exercise due to pain.   Balance/ gait: Mr. Sandra reported gait instability due to pain.   Falls: Mr. Sandra denied a history of falls.   Sensory changes: Mr. Sandra denied other sensory concerns.    Functional Abilities/Changes: Mr. Sandra reported that he is independent and effective in all basic and instrumental activities of daily living.   Medical appointments/adherence: Denied missed appointments or other errors following medical advice.  Medication management: Independent and effective.  Diet/nutrition: Denied difficulties with diet or dietary management.  Household duties: Reported difficulty with household tasks due to physical limitations.  Bill paying: Reported that they are independent and effective in bill-paying.  Self-care: Reported that they are independent and effective with self-care activities.  Driving: Independent and effective per self-report.     Prior Neuropsychological Assessment: Mr. Sandra reported " that he has not had prior neuropsychological testing.    Medical History     Relevant past medical history:  Past Medical History:   Diagnosis Date    Diabetes mellitus     Hypogonadism in male        Relevant early developmental history: Mr. Sandra denied any personal history of early life concerns that affected his cognitive functioning or development.    Additional neurological: Mr. Sandra denied a history of known neurologic concerns, except as documented above.    Relevant neuroimaging/ diagnostic studies:  Results for orders placed or performed during the hospital encounter of 04/10/23   MRV Brain Without Contrast    Narrative    EXAMINATION:  MRV BRAIN WITHOUT CONTRAST    CLINICAL HISTORY:  Intracranial and intraspinal phlebitis and thrombophlebitisDural venous sinus thrombosis suspected;    TECHNIQUE:  Sagittal T1.  Coronal GRE.  Noncontrast time of flight MRV of the brain, skull base, and upper neck were obtained with 3D reformats.    COMPARISON:  None    FINDINGS:  Left dominant drainage pathway of the brain.  The superior sagittal sinus, internal cerebral vein, vein of Surya, straight sinus, confluence of sinuses, transverse sinuses, sigmoid sinuses and proximal jugular veins appear normal. No evidence of venous thrombosis or significant stenosis.      Impression    No significant abnormality on MRV.  No evidence of dural venous sinus thrombosis.      Electronically signed by: Ronald Borges Jr., MD  Date:    04/10/2023  Time:    15:29   Results for orders placed or performed during the hospital encounter of 03/27/23   MRI Brain Without Contrast    Narrative    EXAMINATION:  MRI BRAIN WITHOUT CONTRAST    CLINICAL HISTORY:  Other amnesiacognitive complaint;    TECHNIQUE:  Sagittal T1. Axial T1, T2, T2 FLAIR, GRE, DWI. Coronal T2 FLAIR.    COMPARISON:  None available.    FINDINGS:  There is no restricted diffusion. The brain demonstrates normal signal intensity and morphology.    The ventricles and sulci are  normal in size and configuration.  No asymmetric atrophy or hydrocephalus.  Midline structures are normal. There are preserved arterial flow-voids on T2 weighted imaging. The paranasal sinuses and mastoid air cells are clear.    No abnormal marrow signal is identified.      Impression    No significant MRI abnormality of the brain.      Electronically signed by: Ronald Borges Jr., MD  Date:    03/28/2023  Time:    11:04   Results for orders placed or performed during the hospital encounter of 05/08/20   CT Head Without Contrast    Narrative    EXAMINATION:  CT HEAD WITHOUT CONTRAST    CLINICAL HISTORY:  headache transient loss of consciousness; Syncope and collapse    TECHNIQUE:  Low dose axial images were obtained through the head.  Coronal and sagittal reformations were also performed. Contrast was not administered.    COMPARISON:  None.    FINDINGS:  No acute intracerebral process identified.  No acute hemorrhage mass or mass effect is appreciated.  Bones are intact.      Impression    No acute intracerebral process is appreciated on today's study.      Electronically signed by: Tyler York MD  Date:    05/08/2020  Time:    12:04       Relevant family history: Mr. Sandra denied a family history of early-onset cognitive decline or relevant neurologic illness in any first-degree relatives. He reported his mother has myasthenia gravis and developed alzheimer's disease in her late eighties; his father developed dementia of an unspecified type in his late eighties; and his paternal uncle was diagnosed with Alzheimer's disease at age 62.    Current medications: Mr. Sandra has a current medication list which includes the following prescription(s): accu-chek guide me glucose mtr, amitriptyline, atorvastatin, blood sugar diagnostic, dexcom g6 sensor, dexcom g6 transmitter, folic acid, furosemide, baqsimi, hydroxychloroquine, insulin degludec, lyumjev kwikpen u-100 insulin, lancets, levemir flexpen, methotrexate,  methylprednisolone, metoprolol succinate, pen needle, diabetic, entresto, sutab, and testosterone cypionate.    Review of patient's allergies indicates:  No Known Allergies      Mental Health History     Mental Health History: Mr. Sandra denied a history of mental health concerns or hospitalizations. He denied a history of suicide attempt. He denied a history of past inpatient psychiatric hospitalization.    Family Mental Health History: Mr. Sandra denied a relevant family history of mental health concerns.     Assessment of Risk: Mr. Sandra denied past or present suicidal ideation, plan, or intent.  Based on today's interview, he was deemed to be at a low risk of harm to self at this time.    Substance Use:  Alcohol:  He reported social use.   Tobacco:  Denied  Recreational drugs: Denied  Caffeine/ Soda: Mr. Sandra reported that he used to drink 20 diet cokes or coke zeros per day, for approximately 12-13 years.    Social History     Educational/ Linguistic History  Language:Mr. Sandra's first language is English.   Education level: He completed 12 years of formal education.   Education trajectory: He did not report a history of attention problems, learning difficulties, or academic supports. He reported that Algebra 2 was difficult for him, typical of other students.     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   Work status: He works full time now, historically having worked 80 hours a week.    Living/Social History  Born/raised:  [place]  Current living situation:  With his wife and three children.   Social support:  Reported adequate support.     Behavioral Observations     Appearance:  Mr. Sandra arrived on time for his appointment and was unaccompanied. He was dressed in his work uniform; appropriately groomed; and appeared his stated age. Eye contact was within normal limits.    Gait/ Motor: No fine or gross motor abnormalities were observed.  "Gait was within normal limits. He gesticulated with his left arm only during discourse.     Sensory: Vision and hearing were within normal limits.    Speech/ language: Speech was sparse and notable for some mild articulation errors for uncommon words, e.g. "myasthenia gravis;" though was otherwise normal in rate, volume, tone, and prosody. Receptive language appeared generally intact. Expressive language appeared generally intact.    Attention and Orientation: Mr. Sandra appeared alert and was oriented to person, place, time, and situation.     Thought processes: Mr. Sandra's thought processes appeared logical, sequential, and goal oriented. There was no evidence of hallucination or delusions. Insight and judgment appeared intact.    Mood/affect:  Rapport was easy to establish and maintain. His affect was mildly restricted in range, appeared euthymic, and was consistent with stated mood. Behavior was within normal limits.     Billing Documentation     Time spent conducting face to face interview with the patient: 33 minutes; [id number].    Referral Diagnoses:  Z82.0 (ICD-10-CM) - Family history of Alzheimer's disease   R41.9 (ICD-10-CM) - Cognitive complaints with normal exam   R68.89 (ICD-10-CM) - Forgetfulness     Signatures     Thank you for the opportunity to assist in the care of your patient. Please do not hesitate to contact me at [phone] or via Epic staff message if I can be of further assistance.          _____________________  Reginaldo Durand, Ph.D.  Neuropsychologist  [facility]  Department of Neurology        "

## 2023-05-03 ENCOUNTER — PATIENT MESSAGE (OUTPATIENT)
Dept: OTOLARYNGOLOGY | Facility: CLINIC | Age: 48
End: 2023-05-03
Payer: COMMERCIAL

## 2023-05-03 ENCOUNTER — TELEPHONE (OUTPATIENT)
Dept: INTERNAL MEDICINE | Facility: CLINIC | Age: 48
End: 2023-05-03
Payer: COMMERCIAL

## 2023-05-03 NOTE — TELEPHONE ENCOUNTER
"----- Message from Micheal Suarez sent at 5/3/2023  3:49 PM CDT -----  Contact: [phone]  type: Lab    Caller is requesting to schedule their Lab appointment prior to annual appointment.  Order is not listed in EPIC.  Please enter order and contact patient to schedule.    Name of Caller: Jason Sandra    Preferred Date and Time of Labs: any    Date of Annual Physical Appointment: 6/8    Where would they like the lab performed? any    Would the patient rather a call back or a response via My [facility]? call    Best Call Back Number:[phone]    Additional Information: Pt did state that he wants to be seen for a second opinion regarding an ongoing issue. Pt did not disclose information to me just stated that Dr was highly recommended for his needs    "Do not send messages requesting lab orders prior to Physical appt on these providers: 's John Suresh Giambrone,  Elizabeth Gomez and [facility]."        "

## 2023-05-03 NOTE — TELEPHONE ENCOUNTER
"Virtual visit scheduled with Dr. Sen tomorrow 05/04/2023 at 11:30 to discuss treatment options.     Kalani Rosen (Allye), [facility]  Rheumatology Department       "

## 2023-05-03 NOTE — TELEPHONE ENCOUNTER
I spoke to pt ad advised him to come in fasting for his NP appt on 6-8-23.  Pt verbalized understanding

## 2023-05-04 ENCOUNTER — OFFICE VISIT (OUTPATIENT)
Dept: RHEUMATOLOGY | Facility: CLINIC | Age: 48
End: 2023-05-04
Payer: COMMERCIAL

## 2023-05-04 ENCOUNTER — OFFICE VISIT (OUTPATIENT)
Dept: PHYSICAL MEDICINE AND REHAB | Facility: CLINIC | Age: 48
End: 2023-05-04
Payer: COMMERCIAL

## 2023-05-04 VITALS
BODY MASS INDEX: 22.58 KG/M2 | RESPIRATION RATE: 12 BRPM | SYSTOLIC BLOOD PRESSURE: 129 MMHG | WEIGHT: 149 LBS | HEIGHT: 68 IN | DIASTOLIC BLOOD PRESSURE: 81 MMHG | HEART RATE: 93 BPM

## 2023-05-04 DIAGNOSIS — J98.4 RESTRICTIVE LUNG DISEASE: ICD-10-CM

## 2023-05-04 DIAGNOSIS — M79.7 FIBROMYALGIA: ICD-10-CM

## 2023-05-04 DIAGNOSIS — L21.9 SEBORRHEIC DERMATITIS: ICD-10-CM

## 2023-05-04 DIAGNOSIS — K11.20 SIALOADENITIS: ICD-10-CM

## 2023-05-04 DIAGNOSIS — M54.40 CHRONIC LOW BACK PAIN WITH SCIATICA, SCIATICA LATERALITY UNSPECIFIED, UNSPECIFIED BACK PAIN LATERALITY: ICD-10-CM

## 2023-05-04 DIAGNOSIS — R51.9 PERSISTENT HEADACHES: ICD-10-CM

## 2023-05-04 DIAGNOSIS — R41.3 MEMORY DEFICITS: ICD-10-CM

## 2023-05-04 DIAGNOSIS — E78.5 HYPERLIPIDEMIA ASSOCIATED WITH TYPE 2 DIABETES MELLITUS: ICD-10-CM

## 2023-05-04 DIAGNOSIS — M54.16 LUMBAR RADICULOPATHY: ICD-10-CM

## 2023-05-04 DIAGNOSIS — G89.29 CHRONIC LOW BACK PAIN WITH SCIATICA, SCIATICA LATERALITY UNSPECIFIED, UNSPECIFIED BACK PAIN LATERALITY: ICD-10-CM

## 2023-05-04 DIAGNOSIS — R21 RASH AND NONSPECIFIC SKIN ERUPTION: ICD-10-CM

## 2023-05-04 DIAGNOSIS — R21 BUTTERFLY RASH: ICD-10-CM

## 2023-05-04 DIAGNOSIS — G62.9 NEUROPATHY: ICD-10-CM

## 2023-05-04 DIAGNOSIS — N41.1 CHRONIC PROSTATITIS: ICD-10-CM

## 2023-05-04 DIAGNOSIS — E11.69 HYPERLIPIDEMIA ASSOCIATED WITH TYPE 2 DIABETES MELLITUS: ICD-10-CM

## 2023-05-04 DIAGNOSIS — R76.8 ANA POSITIVE: Primary | ICD-10-CM

## 2023-05-04 DIAGNOSIS — Z65.8 PSYCHOSOCIAL STRESSORS: ICD-10-CM

## 2023-05-04 PROBLEM — M32.9 PERSONAL HISTORY OF SYSTEMIC LUPUS ERYTHEMATOSUS (SLE): Status: RESOLVED | Noted: 2023-03-09 | Resolved: 2023-05-04

## 2023-05-04 PROCEDURE — 3074F SYST BP LT 130 MM HG: CPT | Mod: CPTII,S$GLB,, | Performed by: PHYSICAL MEDICINE & REHABILITATION

## 2023-05-04 PROCEDURE — 3052F PR MOST RECENT HEMOGLOBIN A1C LEVEL 8.0 - < 9.0%: ICD-10-PCS | Mod: CPTII,S$GLB,, | Performed by: PHYSICAL MEDICINE & REHABILITATION

## 2023-05-04 PROCEDURE — 99499 NO LOS: ICD-10-PCS | Mod: S$GLB,,, | Performed by: PHYSICAL MEDICINE & REHABILITATION

## 2023-05-04 PROCEDURE — 3052F HG A1C>EQUAL 8.0%<EQUAL 9.0%: CPT | Mod: CPTII,S$GLB,, | Performed by: PHYSICAL MEDICINE & REHABILITATION

## 2023-05-04 PROCEDURE — 3052F PR MOST RECENT HEMOGLOBIN A1C LEVEL 8.0 - < 9.0%: ICD-10-PCS | Mod: CPTII,95,, | Performed by: INTERNAL MEDICINE

## 2023-05-04 PROCEDURE — 4010F PR ACE/ARB THEARPY RXD/TAKEN: ICD-10-PCS | Mod: CPTII,S$GLB,, | Performed by: PHYSICAL MEDICINE & REHABILITATION

## 2023-05-04 PROCEDURE — 4010F ACE/ARB THERAPY RXD/TAKEN: CPT | Mod: CPTII,S$GLB,, | Performed by: PHYSICAL MEDICINE & REHABILITATION

## 2023-05-04 PROCEDURE — 3061F NEG MICROALBUMINURIA REV: CPT | Mod: CPTII,S$GLB,, | Performed by: PHYSICAL MEDICINE & REHABILITATION

## 2023-05-04 PROCEDURE — 99999 PR PBB SHADOW E&M-EST. PATIENT-LVL III: CPT | Mod: PBBFAC,,, | Performed by: PHYSICAL MEDICINE & REHABILITATION

## 2023-05-04 PROCEDURE — 4010F PR ACE/ARB THEARPY RXD/TAKEN: ICD-10-PCS | Mod: CPTII,95,, | Performed by: INTERNAL MEDICINE

## 2023-05-04 PROCEDURE — 3066F PR DOCUMENTATION OF TREATMENT FOR NEPHROPATHY: ICD-10-PCS | Mod: CPTII,95,, | Performed by: INTERNAL MEDICINE

## 2023-05-04 PROCEDURE — 95885 PR MUSC TST DONE W/NERV TST LIM: ICD-10-PCS | Mod: 59,S$GLB,, | Performed by: PHYSICAL MEDICINE & REHABILITATION

## 2023-05-04 PROCEDURE — 3061F PR NEG MICROALBUMINURIA RESULT DOCUMENTED/REVIEW: ICD-10-PCS | Mod: CPTII,S$GLB,, | Performed by: PHYSICAL MEDICINE & REHABILITATION

## 2023-05-04 PROCEDURE — 3066F PR DOCUMENTATION OF TREATMENT FOR NEPHROPATHY: ICD-10-PCS | Mod: CPTII,S$GLB,, | Performed by: PHYSICAL MEDICINE & REHABILITATION

## 2023-05-04 PROCEDURE — 95886 PR EMG COMPLETE, W/ NERVE CONDUCTION STUDIES, 5+ MUSCLES: ICD-10-PCS | Mod: S$GLB,,, | Performed by: PHYSICAL MEDICINE & REHABILITATION

## 2023-05-04 PROCEDURE — 99499 UNLISTED E&M SERVICE: CPT | Mod: S$GLB,,, | Performed by: PHYSICAL MEDICINE & REHABILITATION

## 2023-05-04 PROCEDURE — 99999 PR PBB SHADOW E&M-EST. PATIENT-LVL III: ICD-10-PCS | Mod: PBBFAC,,, | Performed by: PHYSICAL MEDICINE & REHABILITATION

## 2023-05-04 PROCEDURE — 3061F PR NEG MICROALBUMINURIA RESULT DOCUMENTED/REVIEW: ICD-10-PCS | Mod: CPTII,95,, | Performed by: INTERNAL MEDICINE

## 2023-05-04 PROCEDURE — 95909 NRV CNDJ TST 5-6 STUDIES: CPT | Mod: S$GLB,,, | Performed by: PHYSICAL MEDICINE & REHABILITATION

## 2023-05-04 PROCEDURE — 3052F HG A1C>EQUAL 8.0%<EQUAL 9.0%: CPT | Mod: CPTII,95,, | Performed by: INTERNAL MEDICINE

## 2023-05-04 PROCEDURE — 95909 PR NERVE CONDUCTION STUDY; 5-6 STUDIES: ICD-10-PCS | Mod: S$GLB,,, | Performed by: PHYSICAL MEDICINE & REHABILITATION

## 2023-05-04 PROCEDURE — 99214 PR OFFICE/OUTPT VISIT, EST, LEVL IV, 30-39 MIN: ICD-10-PCS | Mod: 95,,, | Performed by: INTERNAL MEDICINE

## 2023-05-04 PROCEDURE — 3066F NEPHROPATHY DOC TX: CPT | Mod: CPTII,95,, | Performed by: INTERNAL MEDICINE

## 2023-05-04 PROCEDURE — 4010F ACE/ARB THERAPY RXD/TAKEN: CPT | Mod: CPTII,95,, | Performed by: INTERNAL MEDICINE

## 2023-05-04 PROCEDURE — 3061F NEG MICROALBUMINURIA REV: CPT | Mod: CPTII,95,, | Performed by: INTERNAL MEDICINE

## 2023-05-04 PROCEDURE — 95886 MUSC TEST DONE W/N TEST COMP: CPT | Mod: S$GLB,,, | Performed by: PHYSICAL MEDICINE & REHABILITATION

## 2023-05-04 PROCEDURE — 3079F PR MOST RECENT DIASTOLIC BLOOD PRESSURE 80-89 MM HG: ICD-10-PCS | Mod: CPTII,S$GLB,, | Performed by: PHYSICAL MEDICINE & REHABILITATION

## 2023-05-04 PROCEDURE — 95885 MUSC TST DONE W/NERV TST LIM: CPT | Mod: 59,S$GLB,, | Performed by: PHYSICAL MEDICINE & REHABILITATION

## 2023-05-04 PROCEDURE — 3066F NEPHROPATHY DOC TX: CPT | Mod: CPTII,S$GLB,, | Performed by: PHYSICAL MEDICINE & REHABILITATION

## 2023-05-04 PROCEDURE — 3074F PR MOST RECENT SYSTOLIC BLOOD PRESSURE < 130 MM HG: ICD-10-PCS | Mod: CPTII,S$GLB,, | Performed by: PHYSICAL MEDICINE & REHABILITATION

## 2023-05-04 PROCEDURE — 3079F DIAST BP 80-89 MM HG: CPT | Mod: CPTII,S$GLB,, | Performed by: PHYSICAL MEDICINE & REHABILITATION

## 2023-05-04 PROCEDURE — 99214 OFFICE O/P EST MOD 30 MIN: CPT | Mod: 95,,, | Performed by: INTERNAL MEDICINE

## 2023-05-04 RX ORDER — PREGABALIN 50 MG/1
50 CAPSULE ORAL 3 TIMES DAILY
Qty: 90 CAPSULE | Refills: 6 | Status: SHIPPED | OUTPATIENT
Start: 2023-05-04 | End: 2023-06-16

## 2023-05-04 NOTE — PROGRESS NOTES
The patient location is: [place]  The chief complaint leading to consultation is: UCTD    Visit type: audiovisual    Face to Face time with patient: 20  30 minutes of total time spent on the encounter, which includes face to face time and non-face to face time preparing to see the patient (eg, review of tests), Obtaining and/or reviewing separately obtained history, Documenting clinical information in the electronic or other health record, Independently interpreting results (not separately reported) and communicating results to the patient/family/caregiver, or Care coordination (not separately reported).         Each patient to whom he or she provides medical services by telemedicine is:  (1) informed of the relationship between the physician and patient and the respective role of any other health care provider with respect to management of the patient; and (2) notified that he or she may decline to receive medical services by telemedicine and may withdraw from such care at any time.    RHEUMATOLOGY OUTPATIENT CLINIC NOTE    5/4/2023    Attending Rheumatologist: Juan R Sen  Primary Care Provider/Physician Requesting Consultation: Zoraida Mcconnell MD   Chief Complaint/Reason For Consultation:  No chief complaint on file.      Subjective:     Jason Sandra is a 48 y.o. White male with positive brie and FMS sx for f/u    No efficacy from MTX.  Denies active rashes at present.  Main concern of back pain w/ features of radiculpathy.    Review of Systems   Constitutional:  Positive for malaise/fatigue. Negative for fever.   HENT:          Denies sicca sx at present   Eyes:  Negative for photophobia and pain.   Respiratory:  Negative for shortness of breath.    Gastrointestinal:  Negative for blood in stool.   Genitourinary:  Negative for hematuria.   Musculoskeletal:  Positive for back pain and joint pain.   Skin:  Negative for rash.        Denies RP   Neurological:  Positive for tingling. Negative for focal weakness.      Chronic comorbid conditions affecting medical decision making today:  Past Medical History:   Diagnosis Date    Diabetes mellitus     Hypogonadism in male      Past Surgical History:   Procedure Laterality Date    NOSE SURGERY       Family History   Problem Relation Age of Onset    Stroke Mother     Glaucoma Mother     Cataracts Mother     Alzheimer's disease Father      Social History     Tobacco Use   Smoking Status Never    Passive exposure: Never   Smokeless Tobacco Never       Current Outpatient Medications:     ACCU-CHEK GUIDE ME GLUCOSE MTR Misc, CHECK BLOOD SUGAR TWICE A DAY, Disp: , Rfl:     atorvastatin (LIPITOR) 20 MG tablet, Take 1 tablet (20 mg total) by mouth once daily., Disp: 90 tablet, Rfl: 2    blood sugar diagnostic Strp, To check BG 2 times daily, to use with insurance preferred meter, Disp: 200 strip, Rfl: 2    blood-glucose sensor (DEXCOM G6 SENSOR) Chen, 1 sensor every 10 days, Disp: 3 each, Rfl: 11    blood-glucose transmitter (DEXCOM G6 TRANSMITTER) Chen, 1 transmitter every 3 months, Disp: 1 each, Rfl: 4    folic acid (FOLVITE) 1 MG tablet, Take 1 tablet (1 mg total) by mouth once daily., Disp: 30 tablet, Rfl: 4    glucagon (BAQSIMI) 3 mg/actuation Spry, 3 mg (one actuation) into a single nostril; if no response, may repeat in 15 minutes using a new intranasal device., Disp: 2 each, Rfl: 1    hydrOXYchloroQUINE (PLAQUENIL) 100 mg tablet, Take 200 mg by mouth once daily., Disp: , Rfl:     insulin degludec (TRESIBA FLEXTOUCH U-100) 100 unit/mL (3 mL) insulin pen, Inject 24 Units into the skin every evening. Max TDD of 30 units, Disp: 5 pen, Rfl: 6    insulin lispro-aabc (LYUMJEV KWIKPEN U-100 INSULIN) 100 unit/mL pen, Inject 10 units 3 times per day before meals, 2-4 units for snack, . + correction scale. MAX TDD Of 50 units, Disp: 5 pen, Rfl: 6    lancets Misc, To check BG 2 times daily, to use with insurance preferred meter, Disp: 200 each, Rfl: 2    LEVEMIR FLEXPEN 100 unit/mL (3 mL)  "InPn pen, Inject into the skin., Disp: , Rfl:     methotrexate 2.5 MG Tab, Take 6 tablets (15 mg total) by mouth every 7 days., Disp: 24 tablet, Rfl: 3    metoprolol succinate (TOPROL-XL) 25 MG 24 hr tablet, Take 1 tablet (25 mg total) by mouth once daily., Disp: 30 tablet, Rfl: 11    pen needle, diabetic 32 gauge x 5/32" Ndle, To use 7 times per day with insulin injections- for meals, long-acting insulin and for snacks, Disp: 200 each, Rfl: 11    sacubitriL-valsartan (ENTRESTO) 24-26 mg per tablet, Take 1 tablet by mouth 2 (two) times daily., Disp: 60 tablet, Rfl: 11    sod sulf-pot chloride-mag sulf (SUTAB) 1.479-0.188- 0.225 gram tablet, Take 12 tablets by mouth once daily. Take according to package instructions with indicated amount of water., Disp: 24 tablet, Rfl: 0    testosterone cypionate (DEPOTESTOTERONE CYPIONATE) 200 mg/mL injection, Inject 1 mL (200 mg total) into the muscle once a week., Disp: 10 mL, Rfl: 1     Objective:     There were no vitals filed for this visit.  Physical Exam   Eyes: Conjunctivae are normal.   Pulmonary/Chest: Effort normal. No respiratory distress.   Musculoskeletal:         General: No swelling.   Skin: No rash noted.     Reviewed available old and all outside pertinent medical records available.    All lab results personally reviewed and interpreted by me.       ASSESSMENT      Encounter Diagnoses   Name Primary?    Memory deficits     Persistent headaches     Psychosocial stressors     Sialoadenitis     Butterfly rash     Restrictive lung disease     Hyperlipidemia associated with type 2 diabetes mellitus     Chronic prostatitis     KIRTI positive Yes    Chronic low back pain with sciatica, sciatica laterality unspecified, unspecified back pain laterality     Seborrheic dermatitis     Rash and nonspecific skin eruption     Fibromyalgia     Neuropathy       PLAN     Chronic widespread pain and back pain w/ predominant mechanical pattern.  Rash w/ seborrheic dermatitis " appearance, unable to rule out CTD related of PsO based on clinical appearance alone.  Denies dactylitis or squeeze tenderness.  No response to MTX in terms of pain or rash intermittent reocurrence.  Elevated KIRTI w/ negative SUMAN profile.  DJD changes on imaging.  CAM impingement reported, no prior bone marrow edema or evidence of SI.  Impression of radiculopathy, OA, and FMS.  Component of neuropathy from DM as well.  DC MTX.  Trial of Lyrica.  Dermatology referral for rash evaluation.  Side effects discussed. Repeat labs close to f/u visit.     Juan R Sen M.D.

## 2023-05-04 NOTE — PROGRESS NOTES
[facility]   [id number]  [place] [address]  Phone: [phone]        Full Name: Jason Sandra YOB: 1975  Patient ID: [id number]      Visit Date: 5/4/2023 14:50  Age: 48 Years  Examining Physician: Dr Wiggins  Referring Physician: Dr Hamilton  Conclusion: lower ext      Chief Complaint   Patient presents with    Back Pain     Into legs       HPI: This is a 48 y.o.  male being seen in clinic today for evaluation of chronic low back achy pain with worsening of symptoms radiating into his legs-worse on the left lateral leg to the calf.  His symptoms are present with activity or rest. Position change provides some relief.     History obtained from patient    Past family, medical, social, and surgical history reviewed in chart    Review of Systems:     General- denies lethargy, weight change, fever, chills  Head/neck- denies swallowing difficulties  ENT- denies hearing changes  Cardiovascular-denies chest pain  Pulmonary- denies shortness of breath  GI- denies constipation or bowel incontinence  - denies bladder incontinence  Skin- denies wounds or rashes  Musculoskeletal- +/- weakness, + pain  Neurologic- + numbness and tingling  Psychiatric- denies depressive or psychotic features, denies anxiety  Lymphatic-denies swelling  Endocrine- denies hypoglycemic symptoms/+DM history  All other pertinent systems negative     Physical Examination:  General: Well developed, well nourished male, NAD  HEENT:NCAT EOMI bilaterally   Pulmonary:Normal respirations    Spinal Examination: CERVICAL  Active ROM is within normal limits.  Inspection: No deformity of spinal alignment.    Spinal Examination: LUMBAR or THORACIC  Active ROM is within normal limits.  Inspection: No deformity of spinal alignment.    Bilateral Upper and Lower Extremities:  Pulses are 2+ at radial bilaterally.  Shoulder/Elbow/Wrist/Hand ROM   Hip/Knee/Ankle ROM   Bilateral Extremities show normal capillary refill.  No signs of  cyanosis, rubor, edema, skin changes, or dysvascular changes of appendages.  Nails appear intact.    Neurological Exam:  Cranial Nerves:  II-XII grossly intact    Manual Muscle Testing: (Motor 5=normal)  5/5 strength bilateral upper/lower extremities    No focal atrophy is noted of either upper or lower extremity.    Bilateral Reflexes:  Lewis's response is absent bilaterally.  No clonus at knee or ankle.    Sensation: tested to light touch  - intact in legs except dec at left lateral thigh    Gait: Narrow base and good arm swing.      Entire procedure explained to patient prior to proceeding.  Verbal consent obtained        Sensory NCS      Nerve / Sites Rec. Site Onset Lat Peak Lat NP Amp PP Amp Segments Distance Velocity     ms ms µV µV  mm m/s   L Sural - Ankle (Calf)      Calf Ankle 2.96 3.85 6.1 4.5 Calf - Ankle 140 47   R Sural - Ankle (Calf)      Calf Ankle 2.98 4.23 5.3 12.0 Calf - Ankle 140 47   L Superficial peroneal - Ankle      Lat leg Ankle 2.65 3.60 8.6 3.3 Lat leg - Ankle 140 53   R Superficial peroneal - Ankle      Lat leg Ankle 1.79 2.69 2.8 7.2 Lat leg - Ankle 140 78               Motor NCS      Nerve / Sites Muscle Latency Amplitude Amp % Duration Segments Distance Lat Diff Velocity     ms mV % ms  mm ms m/s   L Peroneal - EDB      Ankle EDB 5.85 1.3 100 7.08 Ankle - EDB 80        Fib head EDB 14.02 1.7 133 7.33 Fib head - Ankle 320 8.17 39      Pop fossa EDB 15.67 1.6 127 6.81 Pop fossa - Fib head 60 1.65 36   R Peroneal - EDB      Ankle EDB 5.52 4.2 100 8.08 Ankle - EDB 80        Fib head EDB 13.85 3.3 80.2 9.27 Fib head - Ankle 320 8.33 38      Pop fossa EDB 15.40 3.4 82.4 8.73 Pop fossa - Fib head 60 1.54 39   L Tibial - AH      Ankle AH 5.15 13.6 100 5.92 Ankle - AH 80        Pop fossa AH 15.40 7.7 56.5 8.10 Pop fossa - Ankle 410 10.25 40   R Tibial - AH      Ankle AH 5.38 7.9 100 6.06 Ankle - AH 80        Pop fossa AH 15.40 6.0 76.5 8.48 Pop fossa - Ankle 400 10.02 40               EMG           EMG Summary Table     Spontaneous MUAP Recruitment   Muscle Nerve Roots IA Fib PSW Fasc H.F. Amp Dur. PPP Pattern   L. Rectus femoris Femoral L2-L4 N None None None None N N N N   L. Biceps femoris (short head) Sciatic (peroneal division) L5-S2 N None None None None N N N N   L. Tibialis anterior Deep peroneal (Fibular) L4-L5 N None None None None N N 1+ 1+   L. Gastrocnemius (Medial head) Tibial S1-S2 N None None None None N N N N   L. Extensor digitorum brevis Tibial L5-S1 1+ 1+ None None None N N 1+ Reduced   L. Abductor hallucis Tibial S1-S2 N None None None None 1+ N 1+ 1+   R. Extensor digitorum brevis Tibial L5-S1 N None None None None N N 1+ Reduced   R. Abductor hallucis Tibial S1-S2 N None None None None 2+ N 1+ Reduced       INTERPRETATION    -Bilateral superficial peroneal sensorynerve conduction study showed normal peak latency and amplitude  -Bilateral sural sensory nerve conduction study showed normal peak latency and amplitude  -Bilateral peroneal motor nerve conduction study showed normal latency, dec amplitude on left, and dec conduction velocity  -Bilateral tibial motor nerve conduction study showed normal latency, amplitude, and conduction velocity  -Needle EMG examination performed to above mentioned muscles     IMPRESSION  ABNORMAL study  2. There is electrodiagnostic evidence of an acute on chronic radiculopathy of the left L5 nerve root and a chronic radiculopathy of the right L5 and bilateral S1 nerve roots    PLAN  Discussed in detail for greater than 30 minutes about diagnosis and treatment plan    1. Follow up with referring provider: Dr. Andrea Hamilton  2. Handouts on lumbar radic and exercises provided. Rec referral to IP[facility] for GARY eval as well as PT or HB program  3. This study is good for one year. If symptoms worsen or do not improve, please re-consult.    Liz Wiggins M.D.  Physical Medicine and Rehab

## 2023-05-08 ENCOUNTER — OFFICE VISIT (OUTPATIENT)
Dept: SPORTS MEDICINE | Facility: CLINIC | Age: 48
End: 2023-05-08
Payer: COMMERCIAL

## 2023-05-08 DIAGNOSIS — M54.17 LUMBOSACRAL RADICULOPATHY: Primary | ICD-10-CM

## 2023-05-08 DIAGNOSIS — G89.29 CHRONIC BILATERAL LOW BACK PAIN WITH BILATERAL SCIATICA: ICD-10-CM

## 2023-05-08 DIAGNOSIS — M54.2 CHRONIC NECK PAIN: ICD-10-CM

## 2023-05-08 DIAGNOSIS — M54.41 CHRONIC BILATERAL LOW BACK PAIN WITH BILATERAL SCIATICA: ICD-10-CM

## 2023-05-08 DIAGNOSIS — M54.16 LUMBAR RADICULOPATHY, CHRONIC: ICD-10-CM

## 2023-05-08 DIAGNOSIS — M47.812 CERVICAL SPONDYLOSIS: ICD-10-CM

## 2023-05-08 DIAGNOSIS — M54.42 CHRONIC BILATERAL LOW BACK PAIN WITH BILATERAL SCIATICA: ICD-10-CM

## 2023-05-08 DIAGNOSIS — G89.29 CHRONIC NECK PAIN: ICD-10-CM

## 2023-05-08 PROCEDURE — 4010F ACE/ARB THERAPY RXD/TAKEN: CPT | Mod: CPTII,S$GLB,, | Performed by: STUDENT IN AN ORGANIZED HEALTH CARE EDUCATION/TRAINING PROGRAM

## 2023-05-08 PROCEDURE — 3066F NEPHROPATHY DOC TX: CPT | Mod: CPTII,S$GLB,, | Performed by: STUDENT IN AN ORGANIZED HEALTH CARE EDUCATION/TRAINING PROGRAM

## 2023-05-08 PROCEDURE — 3052F HG A1C>EQUAL 8.0%<EQUAL 9.0%: CPT | Mod: CPTII,S$GLB,, | Performed by: STUDENT IN AN ORGANIZED HEALTH CARE EDUCATION/TRAINING PROGRAM

## 2023-05-08 PROCEDURE — 99214 PR OFFICE/OUTPT VISIT, EST, LEVL IV, 30-39 MIN: ICD-10-PCS | Mod: S$GLB,,, | Performed by: STUDENT IN AN ORGANIZED HEALTH CARE EDUCATION/TRAINING PROGRAM

## 2023-05-08 PROCEDURE — 99999 PR PBB SHADOW E&M-EST. PATIENT-LVL V: ICD-10-PCS | Mod: PBBFAC,,, | Performed by: STUDENT IN AN ORGANIZED HEALTH CARE EDUCATION/TRAINING PROGRAM

## 2023-05-08 PROCEDURE — 99999 PR PBB SHADOW E&M-EST. PATIENT-LVL V: CPT | Mod: PBBFAC,,, | Performed by: STUDENT IN AN ORGANIZED HEALTH CARE EDUCATION/TRAINING PROGRAM

## 2023-05-08 PROCEDURE — 99214 OFFICE O/P EST MOD 30 MIN: CPT | Mod: S$GLB,,, | Performed by: STUDENT IN AN ORGANIZED HEALTH CARE EDUCATION/TRAINING PROGRAM

## 2023-05-08 PROCEDURE — 3066F PR DOCUMENTATION OF TREATMENT FOR NEPHROPATHY: ICD-10-PCS | Mod: CPTII,S$GLB,, | Performed by: STUDENT IN AN ORGANIZED HEALTH CARE EDUCATION/TRAINING PROGRAM

## 2023-05-08 PROCEDURE — 3061F NEG MICROALBUMINURIA REV: CPT | Mod: CPTII,S$GLB,, | Performed by: STUDENT IN AN ORGANIZED HEALTH CARE EDUCATION/TRAINING PROGRAM

## 2023-05-08 PROCEDURE — 4010F PR ACE/ARB THEARPY RXD/TAKEN: ICD-10-PCS | Mod: CPTII,S$GLB,, | Performed by: STUDENT IN AN ORGANIZED HEALTH CARE EDUCATION/TRAINING PROGRAM

## 2023-05-08 PROCEDURE — 1159F PR MEDICATION LIST DOCUMENTED IN MEDICAL RECORD: ICD-10-PCS | Mod: CPTII,S$GLB,, | Performed by: STUDENT IN AN ORGANIZED HEALTH CARE EDUCATION/TRAINING PROGRAM

## 2023-05-08 PROCEDURE — 3061F PR NEG MICROALBUMINURIA RESULT DOCUMENTED/REVIEW: ICD-10-PCS | Mod: CPTII,S$GLB,, | Performed by: STUDENT IN AN ORGANIZED HEALTH CARE EDUCATION/TRAINING PROGRAM

## 2023-05-08 PROCEDURE — 1159F MED LIST DOCD IN RCRD: CPT | Mod: CPTII,S$GLB,, | Performed by: STUDENT IN AN ORGANIZED HEALTH CARE EDUCATION/TRAINING PROGRAM

## 2023-05-08 PROCEDURE — 3052F PR MOST RECENT HEMOGLOBIN A1C LEVEL 8.0 - < 9.0%: ICD-10-PCS | Mod: CPTII,S$GLB,, | Performed by: STUDENT IN AN ORGANIZED HEALTH CARE EDUCATION/TRAINING PROGRAM

## 2023-05-08 NOTE — PATIENT INSTRUCTIONS
Assessment:  Jason Sandra is a 48 y.o. male with a chief complaint of Numbness and Pain of the Left Hip and Numbness and Pain of the Right Hip    Encounter Diagnoses   Name Primary?    Lumbosacral radiculopathy Yes    Chronic bilateral low back pain with bilateral sciatica     Cervical spondylosis     Chronic neck pain     Lumbar radiculopathy, chronic       Plan:  EMG reviewed which demonstrates presence of lumbosacral radiculopathy, acute on chronic left L5, chronic right L5, and chronic bilateral S1  We will order an MRI L spine  Referral placed for pain management for consideration for interventions (GARY, facet inj, MBB) for lumbosacral radiculopathy and cervical spondylosis  Patient declines PT trial at this time  NSAIDs contraindicated in the setting of cardiac issue  He has failed narcotic pain meds, as well as gabapentin/lyrica  Will avoid medrol dose pack in the setting of uncontrolled diabetes, as he's having wide glucose variation at this time  Will defer to Pain Management regarding any medication management at this time    Follow-up: Pain management or sooner if there are any problems between now and then.    Thank you for choosing [facility] and Dr. Andrea Hamilton for your orthopedic & sports medicine care. It is our goal to provide you with exceptional care that will help keep you healthy, active, and get you back in the game.    Please do not hesitate to reach out to us via email, phone, or MyChart with any questions, concerns, or feedback.    If you are experiencing pain/discomfort ,or have questions after 5pm and would like to be connected to the [facility] on-call team, please call this number and specify which Sports Medicine provider is treating you: [phone]

## 2023-05-08 NOTE — PROGRESS NOTES
Patient ID: Jason Sandra  YOB: 1975  MRN: [id number]    Chief Complaint: Numbness and Pain of the Left Hip and Numbness and Pain of the Right Hip    Referred By: Dr. Mcconnell    Occupation: maintenance      History of Present Illness: Jason Sandra is a 48 y.o. male who presents today with Numbness and Pain of the Left Hip and Numbness and Pain of the Right Hip    He was initially evaluated in our office on 4/17/23.   He was diagnosed with bilateral greater trochanteric pain syndrome in the presence of multifactorial hip/back pain.  He was referred for an EMG to evaluate further.  He has failed to improve with multiple NSAIDs and oral steroids.    HPI 4/17/23:  He complains of bilateral hip pain that began in early March 2023 without any inciting injury.  He complains of deep lateral hip pain L>R with some radiation into his thigh on the left side.  The symptoms are constant, aching, throbbing, and sharp.  It worsens with walking and rising from a seated position.  He has not had any formal treatment to date.      EMG - 5/4/23  INTERPRETATION  -Bilateral superficial peroneal sensorynerve conduction study showed normal peak latency and amplitude  -Bilateral sural sensory nerve conduction study showed normal peak latency and amplitude  -Bilateral peroneal motor nerve conduction study showed normal latency, dec amplitude on left, and dec conduction velocity  -Bilateral tibial motor nerve conduction study showed normal latency, amplitude, and conduction velocity  -Needle EMG examination performed to above mentioned muscles   IMPRESSION  ABNORMAL study  2. There is electrodiagnostic evidence of an acute on chronic radiculopathy of the left L5 nerve root and a chronic radiculopathy of the right L5 and bilateral S1 nerve roots       Past Medical History:   Past Medical History:   Diagnosis Date    Diabetes mellitus     Hypogonadism in male      Past Surgical History:   Procedure Laterality Date     NOSE SURGERY       Family History   Problem Relation Age of Onset    Stroke Mother     Glaucoma Mother     Cataracts Mother     Alzheimer's disease Father      Social History     Socioeconomic History    Marital status: Legally    Tobacco Use    Smoking status: Never     Passive exposure: Never    Smokeless tobacco: Never   Substance and Sexual Activity    Alcohol use: Yes     Comment: socially    Drug use: No    Sexual activity: Yes     Partners: Female   Social History Narrative         Social Determinants of Health     Financial Resource Strain: Low Risk     Difficulty of Paying Living Expenses: Not hard at all   Food Insecurity: No Food Insecurity    Worried About Running Out of Food in the Last Year: Never true    Ran Out of Food in the Last Year: Never true   Transportation Needs: No Transportation Needs    Lack of Transportation (Medical): No    Lack of Transportation (Non-Medical): No   Stress: No Stress Concern Present    Feeling of Stress : Only a little   Social Connections: Moderately Isolated    Frequency of Communication with Friends and Family: More than three times a week    Frequency of Social Gatherings with Friends and Family: More than three times a week    Attends [employer] Services: Never    Active Member of Clubs or Organizations: No    Attends Club or Organization Meetings: Never    Marital Status:    Housing Stability: Low Risk     Unable to Pay for Housing in the Last Year: No    Number of Places Lived in the Last Year: 2    Unstable Housing in the Last Year: No     Medication List with Changes/Refills   Current Medications    ACCU-CHEK GUIDE ME GLUCOSE MTR MISC    CHECK BLOOD SUGAR TWICE A DAY    ATORVASTATIN (LIPITOR) 20 MG TABLET    Take 1 tablet (20 mg total) by mouth once daily.    BLOOD SUGAR DIAGNOSTIC STRP    To check BG 2 times daily, to use with insurance preferred meter    BLOOD-GLUCOSE SENSOR (DEXCOM G6 SENSOR) RADHA    1 sensor every 10 days     "BLOOD-GLUCOSE TRANSMITTER (DEXCOM G6 TRANSMITTER) RADHA    1 transmitter every 3 months    FOLIC ACID (FOLVITE) 1 MG TABLET    Take 1 tablet (1 mg total) by mouth once daily.    GLUCAGON (BAQSIMI) 3 MG/ACTUATION SPRY    3 mg (one actuation) into a single nostril; if no response, may repeat in 15 minutes using a new intranasal device.    HYDROXYCHLOROQUINE (PLAQUENIL) 100 MG TABLET    Take 200 mg by mouth once daily.    INSULIN DEGLUDEC (TRESIBA FLEXTOUCH U-100) 100 UNIT/ML (3 ML) INSULIN PEN    Inject 24 Units into the skin every evening. Max TDD of 30 units    INSULIN LISPRO-AABC (LYUMJEV KWIKPEN U-100 INSULIN) 100 UNIT/ML PEN    Inject 10 units 3 times per day before meals, 2-4 units for snack, . + correction scale. MAX TDD Of 50 units    LANCETS MISC    To check BG 2 times daily, to use with insurance preferred meter    LEVEMIR FLEXPEN 100 UNIT/ML (3 ML) INPN PEN    Inject into the skin.    METHOTREXATE 2.5 MG TAB    Take 6 tablets (15 mg total) by mouth every 7 days.    METOPROLOL SUCCINATE (TOPROL-XL) 25 MG 24 HR TABLET    Take 1 tablet (25 mg total) by mouth once daily.    PEN NEEDLE, DIABETIC 32 GAUGE X 5/32" NDLE    To use 7 times per day with insulin injections- for meals, long-acting insulin and for snacks    PREGABALIN (LYRICA) 50 MG CAPSULE    Take 1 capsule (50 mg total) by mouth 3 (three) times daily.    SACUBITRIL-VALSARTAN (ENTRESTO) 24-26 MG PER TABLET    Take 1 tablet by mouth 2 (two) times daily.    SOD SULF-POT CHLORIDE-MAG SULF (SUTAB) 1.479-0.188- 0.225 GRAM TABLET    Take 12 tablets by mouth once daily. Take according to package instructions with indicated amount of water.    TESTOSTERONE CYPIONATE (DEPOTESTOTERONE CYPIONATE) 200 MG/ML INJECTION    Inject 1 mL (200 mg total) into the muscle once a week.     Review of patient's allergies indicates:  No Known Allergies    Physical Exam:   There is no height or weight on file to calculate BMI.    Physical Exam  Vitals reviewed.   Constitutional:  "      Appearance: Normal appearance.   HENT:      Head: Normocephalic and atraumatic.      Nose: Nose normal.   Eyes:      Extraocular Movements: Extraocular movements intact.      Conjunctiva/sclera: Conjunctivae normal.   Pulmonary:      Effort: Pulmonary effort is normal.   Skin:     General: Skin is warm and dry.   Neurological:      General: No focal deficit present.      Mental Status: He is alert and oriented to person, place, and time.   Psychiatric:         Mood and Affect: Mood normal.     Detailed MSK exam:     Left Hip:  Inspection: No swelling, erythema or ecchymosis.   Palpation tenderness: Greater trochanter  Range of motion: 120 deg Flexion         30 deg IR         50 deg ER  Strength:  5/5 Extension    5-/5 Flexion    5/5 Abduction    5/-5 Adduction  Special Tests: Positive REGGIE for groin pain    Positive FADIR for deep lateral hip pain  Mild positive Log Roll  Mild positive Circumduction    SLR positive for hamstring tightness, no radicular symptoms  N/V Exam:  Tibial:    Normal sensory (plantar foot)  Normal motor (FHL)    Sup Peroneal:   Normal sensory (dorsal foot)  Normal motor (Peroneals)            Deep Peroneal:   Normal sensory (1st web space)  Normal motor (EHL)    Sural:   Normal sensory (lateral foot)   Saphenous:   Normal sensory (medial lower leg)   Normal pedal pulses, warm and well perfused with capillary refill < 2 sec       Right Hip:  Inspection: No swelling, erythema or ecchymosis.   Palpation tenderness: Greater trochanter  Range of motion: 120 deg Flexion         30 deg IR         50 deg ER  Strength:  5/5 Extension    5-/5 Flexion    5/5 Abduction    5/-5 Adduction  Special Tests: Positive REGGIE for groin pain    Positive FADIR for deep lateral hip pain  Mild positive Log Roll  Mild positive Circumduction    SLR positive for hamstring tightness, no radicular symptoms  N/V Exam:  Tibial:    Normal sensory (plantar foot)  Normal motor (FHL)    Sup Peroneal:   Normal sensory  (dorsal foot)  Normal motor (Peroneals)            Deep Peroneal:   Normal sensory (1st web space)  Normal motor (EHL)    Sural:   Normal sensory (lateral foot)   Saphenous:   Normal sensory (medial lower leg)   Normal pedal pulses, warm and well perfused with capillary refill < 2 sec     Imaging:  No new imaging to review       This was discussed with the patient and / or family today.     Patient Instructions   Assessment:  Jason Sandra is a 48 y.o. male with a chief complaint of Numbness and Pain of the Left Hip and Numbness and Pain of the Right Hip    Encounter Diagnoses   Name Primary?    Lumbosacral radiculopathy Yes    Chronic bilateral low back pain with bilateral sciatica     Cervical spondylosis     Chronic neck pain     Lumbar radiculopathy, chronic       Plan:  EMG reviewed which demonstrates presence of lumbosacral radiculopathy, acute on chronic left L5, chronic right L5, and chronic bilateral S1  We will order an MRI L spine  Referral placed for pain management for consideration for interventions (GARY, facet inj, MBB) for lumbosacral radiculopathy and cervical spondylosis  Patient declines PT trial at this time  NSAIDs contraindicated in the setting of cardiac issue  He has failed narcotic pain meds, as well as gabapentin/lyrica  Will avoid medrol dose pack in the setting of uncontrolled diabetes, as he's having wide glucose variation at this time  Will defer to Pain Management regarding any medication management at this time    Follow-up: Pain management or sooner if there are any problems between now and then.    Thank you for choosing [facility] and Dr. Andrea Hamilton for your orthopedic & sports medicine care. It is our goal to provide you with exceptional care that will help keep you healthy, active, and get you back in the game.    Please do not hesitate to reach out to us via email, phone, or MyChart with any questions, concerns, or feedback.    If you are  experiencing pain/discomfort ,or have questions after 5pm and would like to be connected to the [facility] on-call team, please call this number and specify which Sports Medicine provider is treating you: [phone]      A copy of today's visit note has been sent to the referring provider.       Andrea Hamilton MD  Primary Care Sports Medicine    Disclaimer: This note was prepared using a voice recognition system and is likely to have sound alike errors within the text.

## 2023-05-09 ENCOUNTER — HOSPITAL ENCOUNTER (OUTPATIENT)
Dept: RADIOLOGY | Facility: HOSPITAL | Age: 48
Discharge: HOME | End: 2023-05-09
Attending: STUDENT IN AN ORGANIZED HEALTH CARE EDUCATION/TRAINING PROGRAM
Payer: COMMERCIAL

## 2023-05-09 DIAGNOSIS — M54.16 LUMBAR RADICULOPATHY, CHRONIC: ICD-10-CM

## 2023-05-09 DIAGNOSIS — M54.17 LUMBOSACRAL RADICULOPATHY: ICD-10-CM

## 2023-05-09 PROCEDURE — 72148 MRI LUMBAR SPINE W/O DYE: CPT | Mod: 26,,, | Performed by: STUDENT IN AN ORGANIZED HEALTH CARE EDUCATION/TRAINING PROGRAM

## 2023-05-09 PROCEDURE — 72148 MRI LUMBAR SPINE WITHOUT CONTRAST: ICD-10-PCS | Mod: 26,,, | Performed by: STUDENT IN AN ORGANIZED HEALTH CARE EDUCATION/TRAINING PROGRAM

## 2023-05-09 PROCEDURE — 72148 MRI LUMBAR SPINE W/O DYE: CPT | Mod: TC

## 2023-05-10 ENCOUNTER — OFFICE VISIT (OUTPATIENT)
Dept: PAIN MEDICINE | Facility: CLINIC | Age: 48
End: 2023-05-10
Payer: COMMERCIAL

## 2023-05-10 VITALS
WEIGHT: 151.88 LBS | DIASTOLIC BLOOD PRESSURE: 77 MMHG | SYSTOLIC BLOOD PRESSURE: 114 MMHG | BODY MASS INDEX: 23.02 KG/M2 | HEART RATE: 96 BPM | HEIGHT: 68 IN

## 2023-05-10 DIAGNOSIS — G89.29 CHRONIC NECK PAIN: Primary | ICD-10-CM

## 2023-05-10 DIAGNOSIS — M47.812 CERVICAL SPONDYLOSIS: ICD-10-CM

## 2023-05-10 DIAGNOSIS — M54.17 LUMBOSACRAL RADICULOPATHY: ICD-10-CM

## 2023-05-10 DIAGNOSIS — M54.2 CHRONIC NECK PAIN: Primary | ICD-10-CM

## 2023-05-10 PROCEDURE — 3078F PR MOST RECENT DIASTOLIC BLOOD PRESSURE < 80 MM HG: ICD-10-PCS | Mod: CPTII,S$GLB,, | Performed by: PAIN MEDICINE

## 2023-05-10 PROCEDURE — 3052F PR MOST RECENT HEMOGLOBIN A1C LEVEL 8.0 - < 9.0%: ICD-10-PCS | Mod: CPTII,S$GLB,, | Performed by: PAIN MEDICINE

## 2023-05-10 PROCEDURE — 99999 PR PBB SHADOW E&M-EST. PATIENT-LVL IV: CPT | Mod: PBBFAC,,, | Performed by: PAIN MEDICINE

## 2023-05-10 PROCEDURE — 3052F HG A1C>EQUAL 8.0%<EQUAL 9.0%: CPT | Mod: CPTII,S$GLB,, | Performed by: PAIN MEDICINE

## 2023-05-10 PROCEDURE — 3008F BODY MASS INDEX DOCD: CPT | Mod: CPTII,S$GLB,, | Performed by: PAIN MEDICINE

## 2023-05-10 PROCEDURE — 3061F PR NEG MICROALBUMINURIA RESULT DOCUMENTED/REVIEW: ICD-10-PCS | Mod: CPTII,S$GLB,, | Performed by: PAIN MEDICINE

## 2023-05-10 PROCEDURE — 3061F NEG MICROALBUMINURIA REV: CPT | Mod: CPTII,S$GLB,, | Performed by: PAIN MEDICINE

## 2023-05-10 PROCEDURE — 3066F PR DOCUMENTATION OF TREATMENT FOR NEPHROPATHY: ICD-10-PCS | Mod: CPTII,S$GLB,, | Performed by: PAIN MEDICINE

## 2023-05-10 PROCEDURE — 4010F ACE/ARB THERAPY RXD/TAKEN: CPT | Mod: CPTII,S$GLB,, | Performed by: PAIN MEDICINE

## 2023-05-10 PROCEDURE — 3078F DIAST BP <80 MM HG: CPT | Mod: CPTII,S$GLB,, | Performed by: PAIN MEDICINE

## 2023-05-10 PROCEDURE — 1159F MED LIST DOCD IN RCRD: CPT | Mod: CPTII,S$GLB,, | Performed by: PAIN MEDICINE

## 2023-05-10 PROCEDURE — 99999 PR PBB SHADOW E&M-EST. PATIENT-LVL IV: ICD-10-PCS | Mod: PBBFAC,,, | Performed by: PAIN MEDICINE

## 2023-05-10 PROCEDURE — 4010F PR ACE/ARB THEARPY RXD/TAKEN: ICD-10-PCS | Mod: CPTII,S$GLB,, | Performed by: PAIN MEDICINE

## 2023-05-10 PROCEDURE — 3074F SYST BP LT 130 MM HG: CPT | Mod: CPTII,S$GLB,, | Performed by: PAIN MEDICINE

## 2023-05-10 PROCEDURE — 99204 PR OFFICE/OUTPT VISIT, NEW, LEVL IV, 45-59 MIN: ICD-10-PCS | Mod: S$GLB,,, | Performed by: PAIN MEDICINE

## 2023-05-10 PROCEDURE — 3066F NEPHROPATHY DOC TX: CPT | Mod: CPTII,S$GLB,, | Performed by: PAIN MEDICINE

## 2023-05-10 PROCEDURE — 1159F PR MEDICATION LIST DOCUMENTED IN MEDICAL RECORD: ICD-10-PCS | Mod: CPTII,S$GLB,, | Performed by: PAIN MEDICINE

## 2023-05-10 PROCEDURE — 99204 OFFICE O/P NEW MOD 45 MIN: CPT | Mod: S$GLB,,, | Performed by: PAIN MEDICINE

## 2023-05-10 PROCEDURE — 3008F PR BODY MASS INDEX (BMI) DOCUMENTED: ICD-10-PCS | Mod: CPTII,S$GLB,, | Performed by: PAIN MEDICINE

## 2023-05-10 PROCEDURE — 3074F PR MOST RECENT SYSTOLIC BLOOD PRESSURE < 130 MM HG: ICD-10-PCS | Mod: CPTII,S$GLB,, | Performed by: PAIN MEDICINE

## 2023-05-10 NOTE — PROGRESS NOTES
New Patient Chronic Pain Note (Initial Visit)    Referring Physician: Andrea Hamilton MD    PCP: Zoraida Mcconnell MD    Chief Complaint:   Chief Complaint   Patient presents with    Leg Pain    Neck Pain    Hip Pain        SUBJECTIVE:    Jason Sandra is a 48 y.o. male who presents to the clinic for the evaluation of neck and lower back pain that started 2-1/2 months ago without triggering events.  The lower back is worse than the neck.  The neck pain radiates to the suboccipital and interscapular areas.  There is no radiation to the arms.  The lower back pain radiates to the back of the thighs and calves worse on the left (L5 and S1 dermatomes) with intermittent tingling and numbness in the same areas.  The pain is worse with walking and daily ADLs.  Nothing makes it better.  He rates it at 10/10 at worst, 7/10 at best, and 7/10 today.  Reports weakness in the legs.  They give out.  No falls though.  Denies loss of bowel or bladder control.      Patient denies night fever/night sweats, urinary incontinence, and bowel incontinence.       report:  Reviewed and consistent with medication use as prescribed.      Past Medical History:   Diagnosis Date    Diabetes mellitus     Hypogonadism in male      Past Surgical History:   Procedure Laterality Date    NOSE SURGERY       Social History     Socioeconomic History    Marital status: Legally    Tobacco Use    Smoking status: Never     Passive exposure: Never    Smokeless tobacco: Never   Substance and Sexual Activity    Alcohol use: Yes     Comment: socially    Drug use: No    Sexual activity: Yes     Partners: Female   Social History Narrative         Social Determinants of Health     Financial Resource Strain: Low Risk     Difficulty of Paying Living Expenses: Not hard at all   Food Insecurity: No Food Insecurity    Worried About Running Out of Food in the Last Year: Never true    Ran Out of Food in the Last Year: Never true   Transportation  Needs: No Transportation Needs    Lack of Transportation (Medical): No    Lack of Transportation (Non-Medical): No   Stress: No Stress Concern Present    Feeling of Stress : Only a little   Social Connections: Moderately Isolated    Frequency of Communication with Friends and Family: More than three times a week    Frequency of Social Gatherings with Friends and Family: More than three times a week    Attends Yarsanism Services: Never    Active Member of Clubs or Organizations: No    Attends Club or Organization Meetings: Never    Marital Status:    Housing Stability: Low Risk     Unable to Pay for Housing in the Last Year: No    Number of Places Lived in the Last Year: 2    Unstable Housing in the Last Year: No     Family History   Problem Relation Age of Onset    Stroke Mother     Glaucoma Mother     Cataracts Mother     Alzheimer's disease Father        Review of patient's allergies indicates:  No Known Allergies    Current Outpatient Medications   Medication Sig    ACCU-CHEK GUIDE ME GLUCOSE MTR Misc CHECK BLOOD SUGAR TWICE A DAY    atorvastatin (LIPITOR) 20 MG tablet Take 1 tablet (20 mg total) by mouth once daily.    blood sugar diagnostic Strp To check BG 2 times daily, to use with insurance preferred meter    blood-glucose sensor (DEXCOM G6 SENSOR) Chen 1 sensor every 10 days    blood-glucose transmitter (DEXCOM G6 TRANSMITTER) Chen 1 transmitter every 3 months    folic acid (FOLVITE) 1 MG tablet Take 1 tablet (1 mg total) by mouth once daily.    glucagon (BAQSIMI) 3 mg/actuation Spry 3 mg (one actuation) into a single nostril; if no response, may repeat in 15 minutes using a new intranasal device.    hydrOXYchloroQUINE (PLAQUENIL) 100 mg tablet Take 200 mg by mouth once daily.    insulin degludec (TRESIBA FLEXTOUCH U-100) 100 unit/mL (3 mL) insulin pen Inject 24 Units into the skin every evening. Max TDD of 30 units    insulin lispro-aabc (LYUMJEV KWIKPEN U-100 INSULIN) 100 unit/mL pen Inject 10  "units 3 times per day before meals, 2-4 units for snack, . + correction scale. MAX TDD Of 50 units    lancets Misc To check BG 2 times daily, to use with insurance preferred meter    LEVEMIR FLEXPEN 100 unit/mL (3 mL) InPn pen Inject into the skin.    methotrexate 2.5 MG Tab Take 6 tablets (15 mg total) by mouth every 7 days.    metoprolol succinate (TOPROL-XL) 25 MG 24 hr tablet Take 1 tablet (25 mg total) by mouth once daily.    pen needle, diabetic 32 gauge x 5/32" Ndle To use 7 times per day with insulin injections- for meals, long-acting insulin and for snacks    pregabalin (LYRICA) 50 MG capsule Take 1 capsule (50 mg total) by mouth 3 (three) times daily.    sacubitriL-valsartan (ENTRESTO) 24-26 mg per tablet Take 1 tablet by mouth 2 (two) times daily.    sod sulf-pot chloride-mag sulf (SUTAB) 1.479-0.188- 0.225 gram tablet Take 12 tablets by mouth once daily. Take according to package instructions with indicated amount of water.    testosterone cypionate (DEPOTESTOTERONE CYPIONATE) 200 mg/mL injection Inject 1 mL (200 mg total) into the muscle once a week.     No current facility-administered medications for this visit.       Review of Systems     GENERAL:  No weight loss, malaise or fevers.  HEENT:   No recent changes in vision or hearing  NECK:  Negative for lumps, no difficulty with swallowing.  RESPIRATORY:  Negative for cough, wheezing or shortness of breath, patient denies any recent URI.  CARDIOVASCULAR:  Negative for chest pain, leg swelling or palpitations.  GI:  Negative for abdominal discomfort, blood in stools or black stools or change in bowel habits.  MUSCULOSKELETAL:  See HPI.  SKIN:  Negative for lesions, rash, and itching.  PSYCH:  No mood disorder or recent psychosocial stressors.  Patients sleep is not disturbed secondary to pain.  HEMATOLOGY/LYMPHOLOGY:  Negative for prolonged bleeding, bruising easily or swollen nodes.  Patient is not currently taking any anti-coagulants  NEURO:   No " "history of headaches, syncope, paralysis, seizures or tremors.  All other reviewed and negative other than HPI.    OBJECTIVE:    /77   Pulse 96   Ht 5' 8" (1.727 m)   Wt 68.9 kg (151 lb 14.4 oz)   BMI 23.10 kg/m²         PHYSICAL EXAM:    GENERAL: Well appearing, in no acute distress, alert and oriented x3.  PSYCH:  Mood and affect appropriate.  SKIN: Skin color, texture, turgor normal, no rashes or lesions.  HEAD/FACE:  Normocephalic, atraumatic.  CV: No edema.  PULM: No evidence of respiratory difficulty, symmetric chest rise.  GI:  Abdomen soft and non-tender.    GAIT: slow .    CERVICAL SPINE:  Palpation: Tenderness over bilateral facet joints and paraspinous muscles. No trigger points.  ROM: Pain with extension, rotation and lateral flexion. Spurling's negative.    LUMBAR SPINE:   Palpation: Tenderness over bilateral facet joints and paraspinous muscles.   ROM: Pain with flexion, extension and rotation, worse with flexion.  Straight leg raising is positive for radicular pain on the.  SI JOINTS: bilateral SI joints, no tenderness, negative Go's   HIPS: normal and nonpainful ROM of both hip joints.    NEURO:   MOTOR: Bilateral upper and lower extremity muscle strength is normal. No atrophy or tone abnormalities are noted.  SENSORY: No loss of sensation in C4 through T1 dermatomes bilaterally, and in L3 through S1 dermatomes.  DTR's: Reflexes are physiologic and symmetric in upper extremities.    Deep Tendon Reflexes:      Reflex Scores:       Patellar reflexes are 1+ on the right side and 1+ on the left side.       Achilles reflexes are 1+ on the right side and 0 on the left side.   No clonus.  Negative Lewis's bilaterally.     EMG - 5/4/23 by Dr. Andrea Hamilton  INTERPRETATION  -Bilateral superficial peroneal sensorynerve conduction study showed normal peak latency and amplitude  -Bilateral sural sensory nerve conduction study showed normal peak latency and amplitude  -Bilateral peroneal motor " nerve conduction study showed normal latency, dec amplitude on left, and dec conduction velocity  -Bilateral tibial motor nerve conduction study showed normal latency, amplitude, and conduction velocity  -Needle EMG examination performed to above mentioned muscles   IMPRESSION  ABNORMAL study  2. There is electrodiagnostic evidence of an acute on chronic radiculopathy of the left L5 nerve root and a chronic radiculopathy of the right L5 and bilateral S1 nerve roots    Imaging:   Results for orders placed during the hospital encounter of 05/09/23    MRI Lumbar Spine Without Contrast    Narrative  EXAMINATION:  MRI LUMBAR SPINE WITHOUT CONTRAST    CLINICAL HISTORY:  Lumbar radiculopathy, symptoms persist with conservative treatment; Radiculopathy, lumbosacral region    TECHNIQUE:  Multiplanar, multisequence MR images were acquired from the thoracolumbar junction to the sacrum without the administration of contrast.    COMPARISON:  MRI dated 09/05/2018.  X-ray dated 09/25/2018    FINDINGS:  There are 5 non-rib-bearing lumbar vertebrae.  There is a rudimentary S1-S2 disc.  Alignment is unremarkable with no significant listhesis.  No acute fracture or compression deformity.  No aggressive focal signal abnormality mild Modic type 1 edema at the posterior aspect of the L5 inferior endplate.    Conus medullaris terminates at the L2 level.  Conus medullaris is normal in size and signal.    T12-L1: No significant disc pathology.  No significant spinal canal or neural foraminal stenosis.    L1-L2: No significant disc pathology.  No significant spinal canal or neural foraminal stenosis.    L2-L3: No significant disc pathology.  No significant spinal canal or neural foraminal stenosis.    L3-L4: No significant disc pathology.  Mild bilateral facet arthropathy.  No significant spinal canal or neural foraminal stenosis.    L4-L5: Small circumferential disc bulge.  Mild bilateral facet arthropathy.  No significant spinal canal  stenosis.  Mild bilateral neural foraminal stenosis.    L5-S1: Disc desiccation and small circumferential disc bulge with central protrusion and annular fissure.  Mild bilateral facet arthropathy.  No significant spinal canal stenosis.  Mild to moderate bilateral neural foraminal stenosis.  (personal evaluation:  Disc is touching bilateral S1 nerve roots in the lateral recesses and posteriorly displacing the right)  Paraspinal soft tissues are unremarkable.  Visualized intra-abdominal and pelvic contents are also unremarkable.    Impression  Mild lower lumbar level predominant degenerative changes as detailed above including Modic type 1 edema at the posterior aspect of the L5 inferior endplate.    Small disc bulge with central protrusion annular fissure at L5-S1.    No significant spinal canal stenosis.  Mild bilateral neural foraminal stenosis at L4-L5.  Mild-to-moderate bilateral neural foraminal stenosis at L5-S1.      Electronically signed by: Brian Santiago  Date:    05/10/2023  Time:    07:55        Results for orders placed during the hospital encounter of 04/10/23    MRI Cervical Spine Without Contrast    Narrative  EXAMINATION:  MRI CERVICAL SPINE WITHOUT CONTRAST    CLINICAL HISTORY:  Headache, unspecifiedNeck pain, acute, infection suspected;    TECHNIQUE:  MR Cervical spine without contrast. Sagittal T1, T2, STIR. Axial 3D, T2. Coronal T1.    COMPARISON:  None available.    FINDINGS:  Vertebral body height is normal and alignment is maintained. Marrow signal is within normal limits. The craniocervical junction is unremarkable. No abnormal cord signal or cord deformity. Intervertebral disc levels are as follows:    C2-C3 disc: Normal.    C3-C4 disc: Minimal disc bulge.  Mild left facet arthropathy.  No significant spinal or foraminal stenosis.  The dural canal measures 14 mm.    C4-C5 disc: Normal.    C5-C6 disc: Mild posterior disc bulge.  Normal uncovertebral joints and facet joints.  No spinal or  foraminal stenosis.  The dural canal measures 12 mm.    C6-C7 disc: Normal.    C7-T1 disc: Normal.    Impression  Mild degenerative spondylosis without significant spinal canal or neural foraminal stenosis.  Normal cord signal and caliber.  No signs of spondylodiscitis.      Electronically signed by: Ronald Borges Jr., MD  Date:    04/11/2023  Time:    11:28        Results for orders placed during the hospital encounter of 09/25/18    X-Ray Lumbar Spine Flexion And Extension Only    Narrative  EXAMINATION:  XR LUMBAR SPINE FLEXION AND EXTENSION ONLY    CLINICAL HISTORY:  Other intervertebral disc degeneration, lumbar region    TECHNIQUE:  Lateral flexion and extension views of the lumbar spine were obtained with the patient standing.    COMPARISON:  Lumbar spine radiographs dated 14 August 2018    FINDINGS:  The lumbar vertebral bodies remain normal in height and are in lordotic alignment with no induced listhesis demonstrated on these views.  There is stable and unremarkable radiographic appearance of the lumbar disc spaces.    IMPRESSION:    See above.      Electronically signed by: Tanner Givens MD  Date:    09/25/2018  Time:    11:46      Results for orders placed during the hospital encounter of 08/14/18    X-Ray Lumbar Spine AP And Lateral    Narrative  EXAMINATION:  XR LUMBAR SPINE AP AND LATERAL    CLINICAL HISTORY:  Low back pain, >6wks conservative tx, persistent-progressive sx, surgical candidate;Pain in left leg    TECHNIQUE:  AP, lateral and spot images were performed of the lumbar spine.    COMPARISON:  None    FINDINGS:  Multiple views of the lumbar spine demonstrate no acute fractures.    IMPRESSION:    No acute fractures.      Electronically signed by: Tyler York MD  Date:    08/14/2018  Time:    15:09        Results for orders placed during the hospital encounter of 03/06/23    X-Ray Cervical Spine AP And Lateral    Narrative  EXAM: XR CERVICAL SPINE AP LATERAL    CLINICAL HISTORY:   Cervicalgia.    TECHNIQUE:   Cervical spine x-ray, 5 views    FINDINGS:  Comparison study 03/21/2012.  No change.  There is no fracture or malalignment.  The disc spaces are well-preserved.  No degenerative sequela.    Impression  Unremarkable cervical spine x-ray.    Finalized on: 3/6/2023 4:47 PM By:  Aric Ash MD  [facility]# [id number]      2023-03-06 16:49:[id number]    [facility]          LABS:  Lab Results   Component Value Date    WBC 4.53 04/27/2023    HGB 15.7 04/27/2023    HCT 45.3 04/27/2023    MCV 85 04/27/2023     04/27/2023       CMP  Sodium   Date Value Ref Range Status   05/01/2023 137 136 - 145 mmol/L Final     Potassium   Date Value Ref Range Status   05/01/2023 4.2 3.5 - 5.1 mmol/L Final     Chloride   Date Value Ref Range Status   05/01/2023 104 95 - 110 mmol/L Final     CO2   Date Value Ref Range Status   05/01/2023 25 23 - 29 mmol/L Final     Glucose   Date Value Ref Range Status   05/01/2023 141 (H) 70 - 110 mg/dL Final     BUN   Date Value Ref Range Status   05/01/2023 17 6 - 20 mg/dL Final     Creatinine   Date Value Ref Range Status   05/01/2023 1.1 0.5 - 1.4 mg/dL Final     Calcium   Date Value Ref Range Status   05/01/2023 9.9 8.7 - 10.5 mg/dL Final     Total Protein   Date Value Ref Range Status   03/20/2023 7.8 6.0 - 8.4 g/dL Final     Albumin   Date Value Ref Range Status   03/20/2023 3.8 3.5 - 5.2 g/dL Final     Total Bilirubin   Date Value Ref Range Status   03/20/2023 0.6 0.1 - 1.0 mg/dL Final     Comment:     For infants and newborns, interpretation of results should be based  on gestational age, weight and in agreement with clinical  observations.    Premature Infant recommended reference ranges:  Up to 24 hours.............<8.0 mg/dL  Up to 48 hours............<12.0 mg/dL  3-5 days..................<15.0 mg/dL  6-29 days.................<15.0 mg/dL       Alkaline Phosphatase   Date Value Ref Range Status   03/20/2023 79 55 - 135 U/L Final     AST   Date Value Ref Range Status   03/20/2023  23 10 - 40 U/L Final     ALT   Date Value Ref Range Status   03/20/2023 30 10 - 44 U/L Final     Anion Gap   Date Value Ref Range Status   05/01/2023 8 8 - 16 mmol/L Final     eGFR if    Date Value Ref Range Status   02/24/2020 >60 >60 mL/min/1.73 m^2 Final     eGFR if non    Date Value Ref Range Status   02/24/2020 >60 >60 mL/min/1.73 m^2 Final     Comment:     Calculation used to obtain the estimated glomerular filtration  rate (eGFR) is the CKD-EPI equation.          Lab Results   Component Value Date    HGBA1C 8.7 (H) 03/20/2023             ASSESSMENT: 48 y.o. year old male with:    1. Chronic neck pain  Ambulatory referral/consult to Pain Clinic    Case Request-RAD/Other Procedure Area: Bilateral C3-5 MBB with local      2. Lumbosacral radiculopathy  Ambulatory referral/consult to Pain Clinic    Case Request-RAD/Other Procedure Area: Bilateral L5 TF GARY with local      3. Cervical spondylosis  Ambulatory referral/consult to Pain Clinic    Case Request-RAD/Other Procedure Area: Bilateral C3-5 MBB with local          Chronic neck pain  -     Ambulatory referral/consult to Pain Clinic  -     Case Request-RAD/Other Procedure Area: Bilateral C3-5 MBB with local    Lumbosacral radiculopathy  -     Ambulatory referral/consult to Pain Clinic  -     Case Request-RAD/Other Procedure Area: Bilateral L5 TF GARY with local    Cervical spondylosis  -     Ambulatory referral/consult to Pain Clinic  -     Case Request-RAD/Other Procedure Area: Bilateral C3-5 MBB with local         PLAN:   1- At this time, he has failed multiple medications, time and home exercise program.  We discussed lumbar GARY and he is eager to proceed.  We will schedule bilateral L5 transforaminal GARY.  2- Regarding his neck, his pain is axial and exam is consistent with facet arthritic pain.  He failed more conservative measures and we discussed addressing his cervical facets with diagnostic medial branch block.  He is  eager to proceed.  3- Return to clinic 1 week after the dorsal medial branch block.    - Counseled patient regarding the importance of activity modification  - This condition does not require this patient to take time off of work, and the primary goal of our Pain Management services is to improve the patient's functional capacity.  - Patient Questions: Answered all of the patient's questions regarding diagnosis, therapy, and treatment    The above plan and management options were discussed at length with patient. Patient is in agreement with the above and verbalized understanding.      Ashvin Garcia MD  Interventional Pain Management  [facility]    Disclaimer:  This note was prepared using voice recognition system and is likely to have sound alike errors that may have been overlooked even after proof reading.  Please call me with any questions

## 2023-05-10 NOTE — H&P (VIEW-ONLY)
New Patient Chronic Pain Note (Initial Visit)    Referring Physician: Andrea Hamilton MD    PCP: Zoraida Mcconnell MD    Chief Complaint:   Chief Complaint   Patient presents with    Leg Pain    Neck Pain    Hip Pain        SUBJECTIVE:    Jason Sandra is a 48 y.o. male who presents to the clinic for the evaluation of neck and lower back pain that started 2-1/2 months ago without triggering events.  The lower back is worse than the neck.  The neck pain radiates to the suboccipital and interscapular areas.  There is no radiation to the arms.  The lower back pain radiates to the back of the thighs and calves worse on the left (L5 and S1 dermatomes) with intermittent tingling and numbness in the same areas.  The pain is worse with walking and daily ADLs.  Nothing makes it better.  He rates it at 10/10 at worst, 7/10 at best, and 7/10 today.  Reports weakness in the legs.  They give out.  No falls though.  Denies loss of bowel or bladder control.      Patient denies night fever/night sweats, urinary incontinence, and bowel incontinence.       report:  Reviewed and consistent with medication use as prescribed.      Past Medical History:   Diagnosis Date    Diabetes mellitus     Hypogonadism in male      Past Surgical History:   Procedure Laterality Date    NOSE SURGERY       Social History     Socioeconomic History    Marital status: Legally    Tobacco Use    Smoking status: Never     Passive exposure: Never    Smokeless tobacco: Never   Substance and Sexual Activity    Alcohol use: Yes     Comment: socially    Drug use: No    Sexual activity: Yes     Partners: Female   Social History Narrative         Social Determinants of Health     Financial Resource Strain: Low Risk     Difficulty of Paying Living Expenses: Not hard at all   Food Insecurity: No Food Insecurity    Worried About Running Out of Food in the Last Year: Never true    Ran Out of Food in the Last Year: Never true   Transportation  Needs: No Transportation Needs    Lack of Transportation (Medical): No    Lack of Transportation (Non-Medical): No   Stress: No Stress Concern Present    Feeling of Stress : Only a little   Social Connections: Moderately Isolated    Frequency of Communication with Friends and Family: More than three times a week    Frequency of Social Gatherings with Friends and Family: More than three times a week    Attends Synagogue Services: Never    Active Member of Clubs or Organizations: No    Attends Club or Organization Meetings: Never    Marital Status:    Housing Stability: Low Risk     Unable to Pay for Housing in the Last Year: No    Number of Places Lived in the Last Year: 2    Unstable Housing in the Last Year: No     Family History   Problem Relation Age of Onset    Stroke Mother     Glaucoma Mother     Cataracts Mother     Alzheimer's disease Father        Review of patient's allergies indicates:  No Known Allergies    Current Outpatient Medications   Medication Sig    ACCU-CHEK GUIDE ME GLUCOSE MTR Misc CHECK BLOOD SUGAR TWICE A DAY    atorvastatin (LIPITOR) 20 MG tablet Take 1 tablet (20 mg total) by mouth once daily.    blood sugar diagnostic Strp To check BG 2 times daily, to use with insurance preferred meter    blood-glucose sensor (DEXCOM G6 SENSOR) Chen 1 sensor every 10 days    blood-glucose transmitter (DEXCOM G6 TRANSMITTER) Chen 1 transmitter every 3 months    folic acid (FOLVITE) 1 MG tablet Take 1 tablet (1 mg total) by mouth once daily.    glucagon (BAQSIMI) 3 mg/actuation Spry 3 mg (one actuation) into a single nostril; if no response, may repeat in 15 minutes using a new intranasal device.    hydrOXYchloroQUINE (PLAQUENIL) 100 mg tablet Take 200 mg by mouth once daily.    insulin degludec (TRESIBA FLEXTOUCH U-100) 100 unit/mL (3 mL) insulin pen Inject 24 Units into the skin every evening. Max TDD of 30 units    insulin lispro-aabc (LYUMJEV KWIKPEN U-100 INSULIN) 100 unit/mL pen Inject 10  "units 3 times per day before meals, 2-4 units for snack, . + correction scale. MAX TDD Of 50 units    lancets Misc To check BG 2 times daily, to use with insurance preferred meter    LEVEMIR FLEXPEN 100 unit/mL (3 mL) InPn pen Inject into the skin.    methotrexate 2.5 MG Tab Take 6 tablets (15 mg total) by mouth every 7 days.    metoprolol succinate (TOPROL-XL) 25 MG 24 hr tablet Take 1 tablet (25 mg total) by mouth once daily.    pen needle, diabetic 32 gauge x 5/32" Ndle To use 7 times per day with insulin injections- for meals, long-acting insulin and for snacks    pregabalin (LYRICA) 50 MG capsule Take 1 capsule (50 mg total) by mouth 3 (three) times daily.    sacubitriL-valsartan (ENTRESTO) 24-26 mg per tablet Take 1 tablet by mouth 2 (two) times daily.    sod sulf-pot chloride-mag sulf (SUTAB) 1.479-0.188- 0.225 gram tablet Take 12 tablets by mouth once daily. Take according to package instructions with indicated amount of water.    testosterone cypionate (DEPOTESTOTERONE CYPIONATE) 200 mg/mL injection Inject 1 mL (200 mg total) into the muscle once a week.     No current facility-administered medications for this visit.       Review of Systems     GENERAL:  No weight loss, malaise or fevers.  HEENT:   No recent changes in vision or hearing  NECK:  Negative for lumps, no difficulty with swallowing.  RESPIRATORY:  Negative for cough, wheezing or shortness of breath, patient denies any recent URI.  CARDIOVASCULAR:  Negative for chest pain, leg swelling or palpitations.  GI:  Negative for abdominal discomfort, blood in stools or black stools or change in bowel habits.  MUSCULOSKELETAL:  See HPI.  SKIN:  Negative for lesions, rash, and itching.  PSYCH:  No mood disorder or recent psychosocial stressors.  Patients sleep is not disturbed secondary to pain.  HEMATOLOGY/LYMPHOLOGY:  Negative for prolonged bleeding, bruising easily or swollen nodes.  Patient is not currently taking any anti-coagulants  NEURO:   No " "history of headaches, syncope, paralysis, seizures or tremors.  All other reviewed and negative other than HPI.    OBJECTIVE:    /77   Pulse 96   Ht 5' 8" (1.727 m)   Wt 68.9 kg (151 lb 14.4 oz)   BMI 23.10 kg/m²         PHYSICAL EXAM:    GENERAL: Well appearing, in no acute distress, alert and oriented x3.  PSYCH:  Mood and affect appropriate.  SKIN: Skin color, texture, turgor normal, no rashes or lesions.  HEAD/FACE:  Normocephalic, atraumatic.  CV: No edema.  PULM: No evidence of respiratory difficulty, symmetric chest rise.  GI:  Abdomen soft and non-tender.    GAIT: slow .    CERVICAL SPINE:  Palpation: Tenderness over bilateral facet joints and paraspinous muscles. No trigger points.  ROM: Pain with extension, rotation and lateral flexion. Spurling's negative.    LUMBAR SPINE:   Palpation: Tenderness over bilateral facet joints and paraspinous muscles.   ROM: Pain with flexion, extension and rotation, worse with flexion.  Straight leg raising is positive for radicular pain on the.  SI JOINTS: bilateral SI joints, no tenderness, negative Go's   HIPS: normal and nonpainful ROM of both hip joints.    NEURO:   MOTOR: Bilateral upper and lower extremity muscle strength is normal. No atrophy or tone abnormalities are noted.  SENSORY: No loss of sensation in C4 through T1 dermatomes bilaterally, and in L3 through S1 dermatomes.  DTR's: Reflexes are physiologic and symmetric in upper extremities.    Deep Tendon Reflexes:      Reflex Scores:       Patellar reflexes are 1+ on the right side and 1+ on the left side.       Achilles reflexes are 1+ on the right side and 0 on the left side.   No clonus.  Negative Lewis's bilaterally.     EMG - 5/4/23 by Dr. Andrea Hamilton  INTERPRETATION  -Bilateral superficial peroneal sensorynerve conduction study showed normal peak latency and amplitude  -Bilateral sural sensory nerve conduction study showed normal peak latency and amplitude  -Bilateral peroneal motor " nerve conduction study showed normal latency, dec amplitude on left, and dec conduction velocity  -Bilateral tibial motor nerve conduction study showed normal latency, amplitude, and conduction velocity  -Needle EMG examination performed to above mentioned muscles   IMPRESSION  ABNORMAL study  2. There is electrodiagnostic evidence of an acute on chronic radiculopathy of the left L5 nerve root and a chronic radiculopathy of the right L5 and bilateral S1 nerve roots    Imaging:   Results for orders placed during the hospital encounter of 05/09/23    MRI Lumbar Spine Without Contrast    Narrative  EXAMINATION:  MRI LUMBAR SPINE WITHOUT CONTRAST    CLINICAL HISTORY:  Lumbar radiculopathy, symptoms persist with conservative treatment; Radiculopathy, lumbosacral region    TECHNIQUE:  Multiplanar, multisequence MR images were acquired from the thoracolumbar junction to the sacrum without the administration of contrast.    COMPARISON:  MRI dated 09/05/2018.  X-ray dated 09/25/2018    FINDINGS:  There are 5 non-rib-bearing lumbar vertebrae.  There is a rudimentary S1-S2 disc.  Alignment is unremarkable with no significant listhesis.  No acute fracture or compression deformity.  No aggressive focal signal abnormality mild Modic type 1 edema at the posterior aspect of the L5 inferior endplate.    Conus medullaris terminates at the L2 level.  Conus medullaris is normal in size and signal.    T12-L1: No significant disc pathology.  No significant spinal canal or neural foraminal stenosis.    L1-L2: No significant disc pathology.  No significant spinal canal or neural foraminal stenosis.    L2-L3: No significant disc pathology.  No significant spinal canal or neural foraminal stenosis.    L3-L4: No significant disc pathology.  Mild bilateral facet arthropathy.  No significant spinal canal or neural foraminal stenosis.    L4-L5: Small circumferential disc bulge.  Mild bilateral facet arthropathy.  No significant spinal canal  stenosis.  Mild bilateral neural foraminal stenosis.    L5-S1: Disc desiccation and small circumferential disc bulge with central protrusion and annular fissure.  Mild bilateral facet arthropathy.  No significant spinal canal stenosis.  Mild to moderate bilateral neural foraminal stenosis.  (personal evaluation:  Disc is touching bilateral S1 nerve roots in the lateral recesses and posteriorly displacing the right)  Paraspinal soft tissues are unremarkable.  Visualized intra-abdominal and pelvic contents are also unremarkable.    Impression  Mild lower lumbar level predominant degenerative changes as detailed above including Modic type 1 edema at the posterior aspect of the L5 inferior endplate.    Small disc bulge with central protrusion annular fissure at L5-S1.    No significant spinal canal stenosis.  Mild bilateral neural foraminal stenosis at L4-L5.  Mild-to-moderate bilateral neural foraminal stenosis at L5-S1.      Electronically signed by: Brian Santiago  Date:    05/10/2023  Time:    07:55        Results for orders placed during the hospital encounter of 04/10/23    MRI Cervical Spine Without Contrast    Narrative  EXAMINATION:  MRI CERVICAL SPINE WITHOUT CONTRAST    CLINICAL HISTORY:  Headache, unspecifiedNeck pain, acute, infection suspected;    TECHNIQUE:  MR Cervical spine without contrast. Sagittal T1, T2, STIR. Axial 3D, T2. Coronal T1.    COMPARISON:  None available.    FINDINGS:  Vertebral body height is normal and alignment is maintained. Marrow signal is within normal limits. The craniocervical junction is unremarkable. No abnormal cord signal or cord deformity. Intervertebral disc levels are as follows:    C2-C3 disc: Normal.    C3-C4 disc: Minimal disc bulge.  Mild left facet arthropathy.  No significant spinal or foraminal stenosis.  The dural canal measures 14 mm.    C4-C5 disc: Normal.    C5-C6 disc: Mild posterior disc bulge.  Normal uncovertebral joints and facet joints.  No spinal or  foraminal stenosis.  The dural canal measures 12 mm.    C6-C7 disc: Normal.    C7-T1 disc: Normal.    Impression  Mild degenerative spondylosis without significant spinal canal or neural foraminal stenosis.  Normal cord signal and caliber.  No signs of spondylodiscitis.      Electronically signed by: Ronald Borges Jr., MD  Date:    04/11/2023  Time:    11:28        Results for orders placed during the hospital encounter of 09/25/18    X-Ray Lumbar Spine Flexion And Extension Only    Narrative  EXAMINATION:  XR LUMBAR SPINE FLEXION AND EXTENSION ONLY    CLINICAL HISTORY:  Other intervertebral disc degeneration, lumbar region    TECHNIQUE:  Lateral flexion and extension views of the lumbar spine were obtained with the patient standing.    COMPARISON:  Lumbar spine radiographs dated 14 August 2018    FINDINGS:  The lumbar vertebral bodies remain normal in height and are in lordotic alignment with no induced listhesis demonstrated on these views.  There is stable and unremarkable radiographic appearance of the lumbar disc spaces.    IMPRESSION:    See above.      Electronically signed by: Tanner Givens MD  Date:    09/25/2018  Time:    11:46      Results for orders placed during the hospital encounter of 08/14/18    X-Ray Lumbar Spine AP And Lateral    Narrative  EXAMINATION:  XR LUMBAR SPINE AP AND LATERAL    CLINICAL HISTORY:  Low back pain, >6wks conservative tx, persistent-progressive sx, surgical candidate;Pain in left leg    TECHNIQUE:  AP, lateral and spot images were performed of the lumbar spine.    COMPARISON:  None    FINDINGS:  Multiple views of the lumbar spine demonstrate no acute fractures.    IMPRESSION:    No acute fractures.      Electronically signed by: Tyler York MD  Date:    08/14/2018  Time:    15:09        Results for orders placed during the hospital encounter of 03/06/23    X-Ray Cervical Spine AP And Lateral    Narrative  EXAM: XR CERVICAL SPINE AP LATERAL    CLINICAL HISTORY:   Cervicalgia.    TECHNIQUE:   Cervical spine x-ray, 5 views    FINDINGS:  Comparison study 03/21/2012.  No change.  There is no fracture or malalignment.  The disc spaces are well-preserved.  No degenerative sequela.    Impression  Unremarkable cervical spine x-ray.    Finalized on: 3/6/2023 4:47 PM By:  Aric Ash MD  [facility]# [id number]      2023-03-06 16:[id number]    [facility]          LABS:  Lab Results   Component Value Date    WBC 4.53 04/27/2023    HGB 15.7 04/27/2023    HCT 45.3 04/27/2023    MCV 85 04/27/2023     04/27/2023       CMP  Sodium   Date Value Ref Range Status   05/01/2023 137 136 - 145 mmol/L Final     Potassium   Date Value Ref Range Status   05/01/2023 4.2 3.5 - 5.1 mmol/L Final     Chloride   Date Value Ref Range Status   05/01/2023 104 95 - 110 mmol/L Final     CO2   Date Value Ref Range Status   05/01/2023 25 23 - 29 mmol/L Final     Glucose   Date Value Ref Range Status   05/01/2023 141 (H) 70 - 110 mg/dL Final     BUN   Date Value Ref Range Status   05/01/2023 17 6 - 20 mg/dL Final     Creatinine   Date Value Ref Range Status   05/01/2023 1.1 0.5 - 1.4 mg/dL Final     Calcium   Date Value Ref Range Status   05/01/2023 9.9 8.7 - 10.5 mg/dL Final     Total Protein   Date Value Ref Range Status   03/20/2023 7.8 6.0 - 8.4 g/dL Final     Albumin   Date Value Ref Range Status   03/20/2023 3.8 3.5 - 5.2 g/dL Final     Total Bilirubin   Date Value Ref Range Status   03/20/2023 0.6 0.1 - 1.0 mg/dL Final     Comment:     For infants and newborns, interpretation of results should be based  on gestational age, weight and in agreement with clinical  observations.    Premature Infant recommended reference ranges:  Up to 24 hours.............<8.0 mg/dL  Up to 48 hours............<12.0 mg/dL  3-5 days..................<15.0 mg/dL  6-29 days.................<15.0 mg/dL       Alkaline Phosphatase   Date Value Ref Range Status   03/20/2023 79 55 - 135 U/L Final     AST   Date Value Ref Range Status   03/20/2023  23 10 - 40 U/L Final     ALT   Date Value Ref Range Status   03/20/2023 30 10 - 44 U/L Final     Anion Gap   Date Value Ref Range Status   05/01/2023 8 8 - 16 mmol/L Final     eGFR if    Date Value Ref Range Status   02/24/2020 >60 >60 mL/min/1.73 m^2 Final     eGFR if non    Date Value Ref Range Status   02/24/2020 >60 >60 mL/min/1.73 m^2 Final     Comment:     Calculation used to obtain the estimated glomerular filtration  rate (eGFR) is the CKD-EPI equation.          Lab Results   Component Value Date    HGBA1C 8.7 (H) 03/20/2023             ASSESSMENT: 48 y.o. year old male with:    1. Chronic neck pain  Ambulatory referral/consult to Pain Clinic    Case Request-RAD/Other Procedure Area: Bilateral C3-5 MBB with local      2. Lumbosacral radiculopathy  Ambulatory referral/consult to Pain Clinic    Case Request-RAD/Other Procedure Area: Bilateral L5 TF GARY with local      3. Cervical spondylosis  Ambulatory referral/consult to Pain Clinic    Case Request-RAD/Other Procedure Area: Bilateral C3-5 MBB with local          Chronic neck pain  -     Ambulatory referral/consult to Pain Clinic  -     Case Request-RAD/Other Procedure Area: Bilateral C3-5 MBB with local    Lumbosacral radiculopathy  -     Ambulatory referral/consult to Pain Clinic  -     Case Request-RAD/Other Procedure Area: Bilateral L5 TF GARY with local    Cervical spondylosis  -     Ambulatory referral/consult to Pain Clinic  -     Case Request-RAD/Other Procedure Area: Bilateral C3-5 MBB with local         PLAN:   1- At this time, he has failed multiple medications, time and home exercise program.  We discussed lumbar GARY and he is eager to proceed.  We will schedule bilateral L5 transforaminal GARY.  2- Regarding his neck, his pain is axial and exam is consistent with facet arthritic pain.  He failed more conservative measures and we discussed addressing his cervical facets with diagnostic medial branch block.  He is  eager to proceed.  3- Return to clinic 1 week after the dorsal medial branch block.    - Counseled patient regarding the importance of activity modification  - This condition does not require this patient to take time off of work, and the primary goal of our Pain Management services is to improve the patient's functional capacity.  - Patient Questions: Answered all of the patient's questions regarding diagnosis, therapy, and treatment    The above plan and management options were discussed at length with patient. Patient is in agreement with the above and verbalized understanding.      Ashvin Garcia MD  Interventional Pain Management  [facility]    Disclaimer:  This note was prepared using voice recognition system and is likely to have sound alike errors that may have been overlooked even after proof reading.  Please call me with any questions

## 2023-05-10 NOTE — H&P (VIEW-ONLY)
New Patient Chronic Pain Note (Initial Visit)    Referring Physician: Andrea Hamilton MD    PCP: Zoraida Mcconnell MD    Chief Complaint:   Chief Complaint   Patient presents with    Leg Pain    Neck Pain    Hip Pain        SUBJECTIVE:    Jason Sandra is a 48 y.o. male who presents to the clinic for the evaluation of neck and lower back pain that started 2-1/2 months ago without triggering events.  The lower back is worse than the neck.  The neck pain radiates to the suboccipital and interscapular areas.  There is no radiation to the arms.  The lower back pain radiates to the back of the thighs and calves worse on the left (L5 and S1 dermatomes) with intermittent tingling and numbness in the same areas.  The pain is worse with walking and daily ADLs.  Nothing makes it better.  He rates it at 10/10 at worst, 7/10 at best, and 7/10 today.  Reports weakness in the legs.  They give out.  No falls though.  Denies loss of bowel or bladder control.      Patient denies night fever/night sweats, urinary incontinence, and bowel incontinence.       report:  Reviewed and consistent with medication use as prescribed.      Past Medical History:   Diagnosis Date    Diabetes mellitus     Hypogonadism in male      Past Surgical History:   Procedure Laterality Date    NOSE SURGERY       Social History     Socioeconomic History    Marital status: Legally    Tobacco Use    Smoking status: Never     Passive exposure: Never    Smokeless tobacco: Never   Substance and Sexual Activity    Alcohol use: Yes     Comment: socially    Drug use: No    Sexual activity: Yes     Partners: Female   Social History Narrative         Social Determinants of Health     Financial Resource Strain: Low Risk     Difficulty of Paying Living Expenses: Not hard at all   Food Insecurity: No Food Insecurity    Worried About Running Out of Food in the Last Year: Never true    Ran Out of Food in the Last Year: Never true   Transportation  Needs: No Transportation Needs    Lack of Transportation (Medical): No    Lack of Transportation (Non-Medical): No   Stress: No Stress Concern Present    Feeling of Stress : Only a little   Social Connections: Moderately Isolated    Frequency of Communication with Friends and Family: More than three times a week    Frequency of Social Gatherings with Friends and Family: More than three times a week    Attends Restorationism Services: Never    Active Member of Clubs or Organizations: No    Attends Club or Organization Meetings: Never    Marital Status:    Housing Stability: Low Risk     Unable to Pay for Housing in the Last Year: No    Number of Places Lived in the Last Year: 2    Unstable Housing in the Last Year: No     Family History   Problem Relation Age of Onset    Stroke Mother     Glaucoma Mother     Cataracts Mother     Alzheimer's disease Father        Review of patient's allergies indicates:  No Known Allergies    Current Outpatient Medications   Medication Sig    ACCU-CHEK GUIDE ME GLUCOSE MTR Misc CHECK BLOOD SUGAR TWICE A DAY    atorvastatin (LIPITOR) 20 MG tablet Take 1 tablet (20 mg total) by mouth once daily.    blood sugar diagnostic Strp To check BG 2 times daily, to use with insurance preferred meter    blood-glucose sensor (DEXCOM G6 SENSOR) Chen 1 sensor every 10 days    blood-glucose transmitter (DEXCOM G6 TRANSMITTER) Chen 1 transmitter every 3 months    folic acid (FOLVITE) 1 MG tablet Take 1 tablet (1 mg total) by mouth once daily.    glucagon (BAQSIMI) 3 mg/actuation Spry 3 mg (one actuation) into a single nostril; if no response, may repeat in 15 minutes using a new intranasal device.    hydrOXYchloroQUINE (PLAQUENIL) 100 mg tablet Take 200 mg by mouth once daily.    insulin degludec (TRESIBA FLEXTOUCH U-100) 100 unit/mL (3 mL) insulin pen Inject 24 Units into the skin every evening. Max TDD of 30 units    insulin lispro-aabc (LYUMJEV KWIKPEN U-100 INSULIN) 100 unit/mL pen Inject 10  "units 3 times per day before meals, 2-4 units for snack, . + correction scale. MAX TDD Of 50 units    lancets Misc To check BG 2 times daily, to use with insurance preferred meter    LEVEMIR FLEXPEN 100 unit/mL (3 mL) InPn pen Inject into the skin.    methotrexate 2.5 MG Tab Take 6 tablets (15 mg total) by mouth every 7 days.    metoprolol succinate (TOPROL-XL) 25 MG 24 hr tablet Take 1 tablet (25 mg total) by mouth once daily.    pen needle, diabetic 32 gauge x 5/32" Ndle To use 7 times per day with insulin injections- for meals, long-acting insulin and for snacks    pregabalin (LYRICA) 50 MG capsule Take 1 capsule (50 mg total) by mouth 3 (three) times daily.    sacubitriL-valsartan (ENTRESTO) 24-26 mg per tablet Take 1 tablet by mouth 2 (two) times daily.    sod sulf-pot chloride-mag sulf (SUTAB) 1.479-0.188- 0.225 gram tablet Take 12 tablets by mouth once daily. Take according to package instructions with indicated amount of water.    testosterone cypionate (DEPOTESTOTERONE CYPIONATE) 200 mg/mL injection Inject 1 mL (200 mg total) into the muscle once a week.     No current facility-administered medications for this visit.       Review of Systems     GENERAL:  No weight loss, malaise or fevers.  HEENT:   No recent changes in vision or hearing  NECK:  Negative for lumps, no difficulty with swallowing.  RESPIRATORY:  Negative for cough, wheezing or shortness of breath, patient denies any recent URI.  CARDIOVASCULAR:  Negative for chest pain, leg swelling or palpitations.  GI:  Negative for abdominal discomfort, blood in stools or black stools or change in bowel habits.  MUSCULOSKELETAL:  See HPI.  SKIN:  Negative for lesions, rash, and itching.  PSYCH:  No mood disorder or recent psychosocial stressors.  Patients sleep is not disturbed secondary to pain.  HEMATOLOGY/LYMPHOLOGY:  Negative for prolonged bleeding, bruising easily or swollen nodes.  Patient is not currently taking any anti-coagulants  NEURO:   No " "history of headaches, syncope, paralysis, seizures or tremors.  All other reviewed and negative other than HPI.    OBJECTIVE:    /77   Pulse 96   Ht 5' 8" (1.727 m)   Wt 68.9 kg (151 lb 14.4 oz)   BMI 23.10 kg/m²         PHYSICAL EXAM:    GENERAL: Well appearing, in no acute distress, alert and oriented x3.  PSYCH:  Mood and affect appropriate.  SKIN: Skin color, texture, turgor normal, no rashes or lesions.  HEAD/FACE:  Normocephalic, atraumatic.  CV: No edema.  PULM: No evidence of respiratory difficulty, symmetric chest rise.  GI:  Abdomen soft and non-tender.    GAIT: slow .    CERVICAL SPINE:  Palpation: Tenderness over bilateral facet joints and paraspinous muscles. No trigger points.  ROM: Pain with extension, rotation and lateral flexion. Spurling's negative.    LUMBAR SPINE:   Palpation: Tenderness over bilateral facet joints and paraspinous muscles.   ROM: Pain with flexion, extension and rotation, worse with flexion.  Straight leg raising is positive for radicular pain on the.  SI JOINTS: bilateral SI joints, no tenderness, negative Go's   HIPS: normal and nonpainful ROM of both hip joints.    NEURO:   MOTOR: Bilateral upper and lower extremity muscle strength is normal. No atrophy or tone abnormalities are noted.  SENSORY: No loss of sensation in C4 through T1 dermatomes bilaterally, and in L3 through S1 dermatomes.  DTR's: Reflexes are physiologic and symmetric in upper extremities.    Deep Tendon Reflexes:      Reflex Scores:       Patellar reflexes are 1+ on the right side and 1+ on the left side.       Achilles reflexes are 1+ on the right side and 0 on the left side.   No clonus.  Negative Lewis's bilaterally.     EMG - 5/4/23 by Dr. Andrea Hamilton  INTERPRETATION  -Bilateral superficial peroneal sensorynerve conduction study showed normal peak latency and amplitude  -Bilateral sural sensory nerve conduction study showed normal peak latency and amplitude  -Bilateral peroneal motor " nerve conduction study showed normal latency, dec amplitude on left, and dec conduction velocity  -Bilateral tibial motor nerve conduction study showed normal latency, amplitude, and conduction velocity  -Needle EMG examination performed to above mentioned muscles   IMPRESSION  ABNORMAL study  2. There is electrodiagnostic evidence of an acute on chronic radiculopathy of the left L5 nerve root and a chronic radiculopathy of the right L5 and bilateral S1 nerve roots    Imaging:   Results for orders placed during the hospital encounter of 05/09/23    MRI Lumbar Spine Without Contrast    Narrative  EXAMINATION:  MRI LUMBAR SPINE WITHOUT CONTRAST    CLINICAL HISTORY:  Lumbar radiculopathy, symptoms persist with conservative treatment; Radiculopathy, lumbosacral region    TECHNIQUE:  Multiplanar, multisequence MR images were acquired from the thoracolumbar junction to the sacrum without the administration of contrast.    COMPARISON:  MRI dated 09/05/2018.  X-ray dated 09/25/2018    FINDINGS:  There are 5 non-rib-bearing lumbar vertebrae.  There is a rudimentary S1-S2 disc.  Alignment is unremarkable with no significant listhesis.  No acute fracture or compression deformity.  No aggressive focal signal abnormality mild Modic type 1 edema at the posterior aspect of the L5 inferior endplate.    Conus medullaris terminates at the L2 level.  Conus medullaris is normal in size and signal.    T12-L1: No significant disc pathology.  No significant spinal canal or neural foraminal stenosis.    L1-L2: No significant disc pathology.  No significant spinal canal or neural foraminal stenosis.    L2-L3: No significant disc pathology.  No significant spinal canal or neural foraminal stenosis.    L3-L4: No significant disc pathology.  Mild bilateral facet arthropathy.  No significant spinal canal or neural foraminal stenosis.    L4-L5: Small circumferential disc bulge.  Mild bilateral facet arthropathy.  No significant spinal canal  stenosis.  Mild bilateral neural foraminal stenosis.    L5-S1: Disc desiccation and small circumferential disc bulge with central protrusion and annular fissure.  Mild bilateral facet arthropathy.  No significant spinal canal stenosis.  Mild to moderate bilateral neural foraminal stenosis.  (personal evaluation:  Disc is touching bilateral S1 nerve roots in the lateral recesses and posteriorly displacing the right)  Paraspinal soft tissues are unremarkable.  Visualized intra-abdominal and pelvic contents are also unremarkable.    Impression  Mild lower lumbar level predominant degenerative changes as detailed above including Modic type 1 edema at the posterior aspect of the L5 inferior endplate.    Small disc bulge with central protrusion annular fissure at L5-S1.    No significant spinal canal stenosis.  Mild bilateral neural foraminal stenosis at L4-L5.  Mild-to-moderate bilateral neural foraminal stenosis at L5-S1.      Electronically signed by: Brian Santiago  Date:    05/10/2023  Time:    07:55        Results for orders placed during the hospital encounter of 04/10/23    MRI Cervical Spine Without Contrast    Narrative  EXAMINATION:  MRI CERVICAL SPINE WITHOUT CONTRAST    CLINICAL HISTORY:  Headache, unspecifiedNeck pain, acute, infection suspected;    TECHNIQUE:  MR Cervical spine without contrast. Sagittal T1, T2, STIR. Axial 3D, T2. Coronal T1.    COMPARISON:  None available.    FINDINGS:  Vertebral body height is normal and alignment is maintained. Marrow signal is within normal limits. The craniocervical junction is unremarkable. No abnormal cord signal or cord deformity. Intervertebral disc levels are as follows:    C2-C3 disc: Normal.    C3-C4 disc: Minimal disc bulge.  Mild left facet arthropathy.  No significant spinal or foraminal stenosis.  The dural canal measures 14 mm.    C4-C5 disc: Normal.    C5-C6 disc: Mild posterior disc bulge.  Normal uncovertebral joints and facet joints.  No spinal or  foraminal stenosis.  The dural canal measures 12 mm.    C6-C7 disc: Normal.    C7-T1 disc: Normal.    Impression  Mild degenerative spondylosis without significant spinal canal or neural foraminal stenosis.  Normal cord signal and caliber.  No signs of spondylodiscitis.      Electronically signed by: Ronald Borges Jr., MD  Date:    04/11/2023  Time:    11:28        Results for orders placed during the hospital encounter of 09/25/18    X-Ray Lumbar Spine Flexion And Extension Only    Narrative  EXAMINATION:  XR LUMBAR SPINE FLEXION AND EXTENSION ONLY    CLINICAL HISTORY:  Other intervertebral disc degeneration, lumbar region    TECHNIQUE:  Lateral flexion and extension views of the lumbar spine were obtained with the patient standing.    COMPARISON:  Lumbar spine radiographs dated 14 August 2018    FINDINGS:  The lumbar vertebral bodies remain normal in height and are in lordotic alignment with no induced listhesis demonstrated on these views.  There is stable and unremarkable radiographic appearance of the lumbar disc spaces.    IMPRESSION:    See above.      Electronically signed by: Tanner Givens MD  Date:    09/25/2018  Time:    11:46      Results for orders placed during the hospital encounter of 08/14/18    X-Ray Lumbar Spine AP And Lateral    Narrative  EXAMINATION:  XR LUMBAR SPINE AP AND LATERAL    CLINICAL HISTORY:  Low back pain, >6wks conservative tx, persistent-progressive sx, surgical candidate;Pain in left leg    TECHNIQUE:  AP, lateral and spot images were performed of the lumbar spine.    COMPARISON:  None    FINDINGS:  Multiple views of the lumbar spine demonstrate no acute fractures.    IMPRESSION:    No acute fractures.      Electronically signed by: Tyler York MD  Date:    08/14/2018  Time:    15:09        Results for orders placed during the hospital encounter of 03/06/23    X-Ray Cervical Spine AP And Lateral    Narrative  EXAM: XR CERVICAL SPINE AP LATERAL    CLINICAL HISTORY:   Cervicalgia.    TECHNIQUE:   Cervical spine x-ray, 5 views    FINDINGS:  Comparison study 03/21/2012.  No change.  There is no fracture or malalignment.  The disc spaces are well-preserved.  No degenerative sequela.    Impression  Unremarkable cervical spine x-ray.    Finalized on: 3/6/2023 4:47 PM By:  Aric Ash MD  [facility]# [id number]      2023-03-06 16:49:[id number]    [facility]          LABS:  Lab Results   Component Value Date    WBC 4.53 04/27/2023    HGB 15.7 04/27/2023    HCT 45.3 04/27/2023    MCV 85 04/27/2023     04/27/2023       CMP  Sodium   Date Value Ref Range Status   05/01/2023 137 136 - 145 mmol/L Final     Potassium   Date Value Ref Range Status   05/01/2023 4.2 3.5 - 5.1 mmol/L Final     Chloride   Date Value Ref Range Status   05/01/2023 104 95 - 110 mmol/L Final     CO2   Date Value Ref Range Status   05/01/2023 25 23 - 29 mmol/L Final     Glucose   Date Value Ref Range Status   05/01/2023 141 (H) 70 - 110 mg/dL Final     BUN   Date Value Ref Range Status   05/01/2023 17 6 - 20 mg/dL Final     Creatinine   Date Value Ref Range Status   05/01/2023 1.1 0.5 - 1.4 mg/dL Final     Calcium   Date Value Ref Range Status   05/01/2023 9.9 8.7 - 10.5 mg/dL Final     Total Protein   Date Value Ref Range Status   03/20/2023 7.8 6.0 - 8.4 g/dL Final     Albumin   Date Value Ref Range Status   03/20/2023 3.8 3.5 - 5.2 g/dL Final     Total Bilirubin   Date Value Ref Range Status   03/20/2023 0.6 0.1 - 1.0 mg/dL Final     Comment:     For infants and newborns, interpretation of results should be based  on gestational age, weight and in agreement with clinical  observations.    Premature Infant recommended reference ranges:  Up to 24 hours.............<8.0 mg/dL  Up to 48 hours............<12.0 mg/dL  3-5 days..................<15.0 mg/dL  6-29 days.................<15.0 mg/dL       Alkaline Phosphatase   Date Value Ref Range Status   03/20/2023 79 55 - 135 U/L Final     AST   Date Value Ref Range Status   03/20/2023  23 10 - 40 U/L Final     ALT   Date Value Ref Range Status   03/20/2023 30 10 - 44 U/L Final     Anion Gap   Date Value Ref Range Status   05/01/2023 8 8 - 16 mmol/L Final     eGFR if    Date Value Ref Range Status   02/24/2020 >60 >60 mL/min/1.73 m^2 Final     eGFR if non    Date Value Ref Range Status   02/24/2020 >60 >60 mL/min/1.73 m^2 Final     Comment:     Calculation used to obtain the estimated glomerular filtration  rate (eGFR) is the CKD-EPI equation.          Lab Results   Component Value Date    HGBA1C 8.7 (H) 03/20/2023             ASSESSMENT: 48 y.o. year old male with:    1. Chronic neck pain  Ambulatory referral/consult to Pain Clinic    Case Request-RAD/Other Procedure Area: Bilateral C3-5 MBB with local      2. Lumbosacral radiculopathy  Ambulatory referral/consult to Pain Clinic    Case Request-RAD/Other Procedure Area: Bilateral L5 TF GARY with local      3. Cervical spondylosis  Ambulatory referral/consult to Pain Clinic    Case Request-RAD/Other Procedure Area: Bilateral C3-5 MBB with local          Chronic neck pain  -     Ambulatory referral/consult to Pain Clinic  -     Case Request-RAD/Other Procedure Area: Bilateral C3-5 MBB with local    Lumbosacral radiculopathy  -     Ambulatory referral/consult to Pain Clinic  -     Case Request-RAD/Other Procedure Area: Bilateral L5 TF GARY with local    Cervical spondylosis  -     Ambulatory referral/consult to Pain Clinic  -     Case Request-RAD/Other Procedure Area: Bilateral C3-5 MBB with local         PLAN:   1- At this time, he has failed multiple medications, time and home exercise program.  We discussed lumbar GARY and he is eager to proceed.  We will schedule bilateral L5 transforaminal GARY.  2- Regarding his neck, his pain is axial and exam is consistent with facet arthritic pain.  He failed more conservative measures and we discussed addressing his cervical facets with diagnostic medial branch block.  He is  eager to proceed.  3- Return to clinic 1 week after the dorsal medial branch block.    - Counseled patient regarding the importance of activity modification  - This condition does not require this patient to take time off of work, and the primary goal of our Pain Management services is to improve the patient's functional capacity.  - Patient Questions: Answered all of the patient's questions regarding diagnosis, therapy, and treatment    The above plan and management options were discussed at length with patient. Patient is in agreement with the above and verbalized understanding.      Ashvin Garcia MD  Interventional Pain Management  [facility]    Disclaimer:  This note was prepared using voice recognition system and is likely to have sound alike errors that may have been overlooked even after proof reading.  Please call me with any questions

## 2023-05-19 ENCOUNTER — PATIENT MESSAGE (OUTPATIENT)
Dept: PAIN MEDICINE | Facility: HOSPITAL | Age: 48
End: 2023-05-19
Payer: COMMERCIAL

## 2023-05-20 ENCOUNTER — PATIENT MESSAGE (OUTPATIENT)
Dept: ENDOSCOPY | Facility: HOSPITAL | Age: 48
End: 2023-05-20
Payer: COMMERCIAL

## 2023-05-20 ENCOUNTER — PATIENT MESSAGE (OUTPATIENT)
Dept: NEUROLOGY | Facility: CLINIC | Age: 48
End: 2023-05-20
Payer: COMMERCIAL

## 2023-05-23 ENCOUNTER — TELEPHONE (OUTPATIENT)
Dept: PREADMISSION TESTING | Facility: HOSPITAL | Age: 48
End: 2023-05-23
Payer: COMMERCIAL

## 2023-05-23 ENCOUNTER — PATIENT MESSAGE (OUTPATIENT)
Dept: PAIN MEDICINE | Facility: HOSPITAL | Age: 48
End: 2023-05-23
Payer: COMMERCIAL

## 2023-05-23 NOTE — PRE-PROCEDURE INSTRUCTIONS
PAT instructions and arrival time sent via portal regarding procedure scheduled on 5.24     Arrival time 0730     Has patient been sick with fever or on antibiotics within the last 7 days? No     Does the patient have any open wounds, sores or rashes? No     Does the patient have any recent fractures? no     Has patient received a vaccination within the last 7 days? No     Received the COVID vaccination? yes     Has the patient stopped all medications as directed? na     Does patient have a pacemaker and or defibrillator? no     Does the patient have a ride to and from procedure and someone reliable to remain with patient?      Is the patient diabetic? yes     Does the patient have sleep apnea? Or use O2 at home? no     Is the patient receiving sedation? Yes     Is the patient instructed to remain NPO beginning at midnight the night before their procedure? yes     Procedure location confirmed with patient? Yes     Covid- Denies signs/symptoms. Instructed to notify PAT/MD if any changes.

## 2023-05-24 ENCOUNTER — HOSPITAL ENCOUNTER (OUTPATIENT)
Facility: HOSPITAL | Age: 48
Discharge: HOME | End: 2023-05-24
Attending: PAIN MEDICINE | Admitting: PAIN MEDICINE
Payer: COMMERCIAL

## 2023-05-24 ENCOUNTER — PATIENT OUTREACH (OUTPATIENT)
Dept: ADMINISTRATIVE | Facility: HOSPITAL | Age: 48
End: 2023-05-24
Payer: COMMERCIAL

## 2023-05-24 ENCOUNTER — PATIENT MESSAGE (OUTPATIENT)
Dept: ADMINISTRATIVE | Facility: HOSPITAL | Age: 48
End: 2023-05-24
Payer: COMMERCIAL

## 2023-05-24 VITALS
TEMPERATURE: 97 F | HEART RATE: 94 BPM | WEIGHT: 147.69 LBS | BODY MASS INDEX: 22.38 KG/M2 | RESPIRATION RATE: 19 BRPM | OXYGEN SATURATION: 99 % | HEIGHT: 68 IN | DIASTOLIC BLOOD PRESSURE: 61 MMHG | SYSTOLIC BLOOD PRESSURE: 111 MMHG

## 2023-05-24 DIAGNOSIS — M54.17 LUMBOSACRAL RADICULOPATHY: Primary | ICD-10-CM

## 2023-05-24 LAB — POCT GLUCOSE: 123 MG/DL (ref 70–110)

## 2023-05-24 PROCEDURE — 64483 NJX AA&/STRD TFRM EPI L/S 1: CPT | Mod: 50,,, | Performed by: PAIN MEDICINE

## 2023-05-24 PROCEDURE — 82962 GLUCOSE BLOOD TEST: CPT | Performed by: PAIN MEDICINE

## 2023-05-24 PROCEDURE — 63600175 PHARM REV CODE 636 W HCPCS: Performed by: PAIN MEDICINE

## 2023-05-24 PROCEDURE — 64483 PR EPIDURAL INJ, ANES/STEROID, TRANSFORAMINAL, LUMB/SACR, SNGL LEVL: ICD-10-PCS | Mod: 50,,, | Performed by: PAIN MEDICINE

## 2023-05-24 PROCEDURE — 25500020 PHARM REV CODE 255: Performed by: PAIN MEDICINE

## 2023-05-24 PROCEDURE — 25000003 PHARM REV CODE 250: Performed by: PAIN MEDICINE

## 2023-05-24 PROCEDURE — 64483 NJX AA&/STRD TFRM EPI L/S 1: CPT | Mod: LT | Performed by: PAIN MEDICINE

## 2023-05-24 RX ORDER — FENTANYL CITRATE 50 UG/ML
INJECTION, SOLUTION INTRAMUSCULAR; INTRAVENOUS
Status: DISCONTINUED | OUTPATIENT
Start: 2023-05-24 | End: 2023-05-24 | Stop reason: HOSPADM

## 2023-05-24 RX ORDER — LIDOCAINE HYDROCHLORIDE 10 MG/ML
INJECTION, SOLUTION EPIDURAL; INFILTRATION; INTRACAUDAL; PERINEURAL
Status: DISCONTINUED | OUTPATIENT
Start: 2023-05-24 | End: 2023-05-24 | Stop reason: HOSPADM

## 2023-05-24 RX ORDER — MIDAZOLAM HYDROCHLORIDE 1 MG/ML
INJECTION, SOLUTION INTRAMUSCULAR; INTRAVENOUS
Status: DISCONTINUED | OUTPATIENT
Start: 2023-05-24 | End: 2023-05-24 | Stop reason: HOSPADM

## 2023-05-24 RX ORDER — BETAMETHASONE SODIUM PHOSPHATE AND BETAMETHASONE ACETATE 3; 3 MG/ML; MG/ML
INJECTION, SUSPENSION INTRA-ARTICULAR; INTRALESIONAL; INTRAMUSCULAR; SOFT TISSUE
Status: DISCONTINUED | OUTPATIENT
Start: 2023-05-24 | End: 2023-05-24 | Stop reason: HOSPADM

## 2023-05-24 NOTE — INTERVAL H&P NOTE
Problem: Discharge Planning  Goal: Discharge to home or other facility with appropriate resources  1/19/2023 2256 by Camilla Joseph LPN  Outcome: Progressing  Flowsheets (Taken 1/19/2023 1945)  Discharge to home or other facility with appropriate resources: Identify barriers to discharge with patient and caregiver  1/19/2023 1048 by Yoandy Stokes RN  Outcome: Progressing     Problem: Safety - Adult  Goal: Free from fall injury  1/19/2023 2256 by Camilla Joseph LPN  Outcome: Progressing  1/19/2023 1048 by Yoandy Stokes RN  Outcome: Progressing     Problem: Skin/Tissue Integrity  Goal: Absence of new skin breakdown  Description: 1. Monitor for areas of redness and/or skin breakdown  2. Assess vascular access sites hourly  3. Every 4-6 hours minimum:  Change oxygen saturation probe site  4. Every 4-6 hours:  If on nasal continuous positive airway pressure, respiratory therapy assess nares and determine need for appliance change or resting period.   1/19/2023 2256 by Camilla Joseph LPN  Outcome: Progressing  1/19/2023 1048 by Yoandy Stokes RN  Outcome: Progressing     Problem: Pain  Goal: Verbalizes/displays adequate comfort level or baseline comfort level  1/19/2023 2256 by Camilla Joseph LPN  Outcome: Progressing  Flowsheets (Taken 1/19/2023 1945)  Verbalizes/displays adequate comfort level or baseline comfort level: Encourage patient to monitor pain and request assistance  1/19/2023 1048 by Yoandy Stokes RN  Outcome: Progressing The patient has been examined and the H&P has been reviewed:    I concur with the findings and no changes have occurred since H&P was written.    Anesthesia/Surgery risks, benefits and alternative options discussed and understood by patient/family.          There are no hospital problems to display for this patient.

## 2023-05-24 NOTE — DISCHARGE INSTRUCTIONS

## 2023-05-24 NOTE — OP NOTE
Jason Sandra  48 y.o. male      Vitals:    05/24/23 0824   BP: 102/64   Pulse: 92   Resp: (!) 96   Temp: 97 °F (36.1 °C)     Procedure Date:TODAYDATE@      INFORMED CONSENT: The procedure, risks, benefits and options were discussed with patient. There are no contraindications to the procedure. The patient expressed understanding and agreed to proceed. The personnel performing the procedure was discussed. I verify that I personally obtained consent prior to the start of the procedure and the signed consent can be found on the patient's chart.       Anesthesia:   Conscious sedation provided by M.D    The patient was monitored with continuous pulse oximetry, EKG, and intermittent blood pressure monitors.  The patient was hemodynamically stable throughout the entire process was responsive to voice, and breathing spontaneously.  Supplemental O2 was provided at 2L/min via nasal cannula.  Patient was comfortable for the duration of the procedure. (See nurse documentation and case log for sedation time)    There was a total of 3mg IV Midazolam and 100mcg Fentanyl titrated for the procedure    Pre Procedure diagnosis: Lumbosacral radiculopathy [M54.17]  Post-Procedure diagnosis: SAME     Complications: None    Specimens: None      DESCRIPTION OF PROCEDURE: The patient was brought to the procedure room. IV access was obtained prior to the procedure. The patient was positioned prone on the fluoroscopy table. Continuous hemodynamic monitoring was initiated including blood pressure, EKG, and pulse oximetry. . The skin was prepped with chlorhexidine and draped in a sterile fashion. Skin anesthesia was achieved using a total of 10mL of lidocaine, 5mL over each respective injection site.     The  L5/S1 transforaminal spaces were identified with fluoroscopy in the  AP, oblique, and lateral views.  The L5 was found to be sacralized. A 22 gauge spinal quinke needle was then advanced into the area of the trans foraminal spaces  bilateral with confirmation of proper needle position using AP, oblique, and lateral fluoroscopic views. Once the needle tip was in the area of the transforaminal space, and there was no blood, CSF or paraesthesias,  1.5 mL of Omnipaque 300mg/ml was injected on bilateral for a total of 3mL.  Fluoroscopic imaging in the AP and lateral views revealed a clear outline of the spinal nerve with proximal spread of agent through the neural foramen into the epidural space. A total combination of   12 mg of betamethasone and 1 cc of 2% lidocaine   was injected on each side for a total of 4mL of injected medications with displacement of the contrast dye confirming that the medication went into the area of the transforaminal spaces bilateral. A sterile dressing was applied.   Patient tolerated the procedure well.    Patient was taken back to the recovery room for further observation.     The patient was discharged to home in stable condition

## 2023-05-25 ENCOUNTER — PATIENT MESSAGE (OUTPATIENT)
Dept: INTERNAL MEDICINE | Facility: CLINIC | Age: 48
End: 2023-05-25

## 2023-05-25 ENCOUNTER — OFFICE VISIT (OUTPATIENT)
Dept: INTERNAL MEDICINE | Facility: CLINIC | Age: 48
End: 2023-05-25
Payer: COMMERCIAL

## 2023-05-25 VITALS
DIASTOLIC BLOOD PRESSURE: 66 MMHG | WEIGHT: 151.88 LBS | BODY MASS INDEX: 23.02 KG/M2 | HEIGHT: 68 IN | SYSTOLIC BLOOD PRESSURE: 106 MMHG | RESPIRATION RATE: 18 BRPM | OXYGEN SATURATION: 100 % | TEMPERATURE: 98 F | HEART RATE: 99 BPM

## 2023-05-25 DIAGNOSIS — Z12.12 SCREENING FOR COLORECTAL CANCER: Primary | ICD-10-CM

## 2023-05-25 DIAGNOSIS — M54.17 LUMBOSACRAL RADICULOPATHY: ICD-10-CM

## 2023-05-25 DIAGNOSIS — G89.4 CHRONIC PAIN SYNDROME: Primary | ICD-10-CM

## 2023-05-25 DIAGNOSIS — Z12.11 SCREENING FOR COLORECTAL CANCER: Primary | ICD-10-CM

## 2023-05-25 PROCEDURE — 3061F PR NEG MICROALBUMINURIA RESULT DOCUMENTED/REVIEW: ICD-10-PCS | Mod: CPTII,S$GLB,, | Performed by: STUDENT IN AN ORGANIZED HEALTH CARE EDUCATION/TRAINING PROGRAM

## 2023-05-25 PROCEDURE — 1159F PR MEDICATION LIST DOCUMENTED IN MEDICAL RECORD: ICD-10-PCS | Mod: CPTII,S$GLB,, | Performed by: STUDENT IN AN ORGANIZED HEALTH CARE EDUCATION/TRAINING PROGRAM

## 2023-05-25 PROCEDURE — 3066F NEPHROPATHY DOC TX: CPT | Mod: CPTII,S$GLB,, | Performed by: STUDENT IN AN ORGANIZED HEALTH CARE EDUCATION/TRAINING PROGRAM

## 2023-05-25 PROCEDURE — 3074F PR MOST RECENT SYSTOLIC BLOOD PRESSURE < 130 MM HG: ICD-10-PCS | Mod: CPTII,S$GLB,, | Performed by: STUDENT IN AN ORGANIZED HEALTH CARE EDUCATION/TRAINING PROGRAM

## 2023-05-25 PROCEDURE — 1159F MED LIST DOCD IN RCRD: CPT | Mod: CPTII,S$GLB,, | Performed by: STUDENT IN AN ORGANIZED HEALTH CARE EDUCATION/TRAINING PROGRAM

## 2023-05-25 PROCEDURE — 3061F NEG MICROALBUMINURIA REV: CPT | Mod: CPTII,S$GLB,, | Performed by: STUDENT IN AN ORGANIZED HEALTH CARE EDUCATION/TRAINING PROGRAM

## 2023-05-25 PROCEDURE — 99999 PR PBB SHADOW E&M-EST. PATIENT-LVL V: ICD-10-PCS | Mod: PBBFAC,,, | Performed by: STUDENT IN AN ORGANIZED HEALTH CARE EDUCATION/TRAINING PROGRAM

## 2023-05-25 PROCEDURE — 4010F PR ACE/ARB THEARPY RXD/TAKEN: ICD-10-PCS | Mod: CPTII,S$GLB,, | Performed by: STUDENT IN AN ORGANIZED HEALTH CARE EDUCATION/TRAINING PROGRAM

## 2023-05-25 PROCEDURE — 99215 OFFICE O/P EST HI 40 MIN: CPT | Mod: S$GLB,,, | Performed by: STUDENT IN AN ORGANIZED HEALTH CARE EDUCATION/TRAINING PROGRAM

## 2023-05-25 PROCEDURE — 3008F BODY MASS INDEX DOCD: CPT | Mod: CPTII,S$GLB,, | Performed by: STUDENT IN AN ORGANIZED HEALTH CARE EDUCATION/TRAINING PROGRAM

## 2023-05-25 PROCEDURE — 3078F DIAST BP <80 MM HG: CPT | Mod: CPTII,S$GLB,, | Performed by: STUDENT IN AN ORGANIZED HEALTH CARE EDUCATION/TRAINING PROGRAM

## 2023-05-25 PROCEDURE — 3052F PR MOST RECENT HEMOGLOBIN A1C LEVEL 8.0 - < 9.0%: ICD-10-PCS | Mod: CPTII,S$GLB,, | Performed by: STUDENT IN AN ORGANIZED HEALTH CARE EDUCATION/TRAINING PROGRAM

## 2023-05-25 PROCEDURE — 99215 PR OFFICE/OUTPT VISIT, EST, LEVL V, 40-54 MIN: ICD-10-PCS | Mod: S$GLB,,, | Performed by: STUDENT IN AN ORGANIZED HEALTH CARE EDUCATION/TRAINING PROGRAM

## 2023-05-25 PROCEDURE — 3052F HG A1C>EQUAL 8.0%<EQUAL 9.0%: CPT | Mod: CPTII,S$GLB,, | Performed by: STUDENT IN AN ORGANIZED HEALTH CARE EDUCATION/TRAINING PROGRAM

## 2023-05-25 PROCEDURE — 3078F PR MOST RECENT DIASTOLIC BLOOD PRESSURE < 80 MM HG: ICD-10-PCS | Mod: CPTII,S$GLB,, | Performed by: STUDENT IN AN ORGANIZED HEALTH CARE EDUCATION/TRAINING PROGRAM

## 2023-05-25 PROCEDURE — 3074F SYST BP LT 130 MM HG: CPT | Mod: CPTII,S$GLB,, | Performed by: STUDENT IN AN ORGANIZED HEALTH CARE EDUCATION/TRAINING PROGRAM

## 2023-05-25 PROCEDURE — 3066F PR DOCUMENTATION OF TREATMENT FOR NEPHROPATHY: ICD-10-PCS | Mod: CPTII,S$GLB,, | Performed by: STUDENT IN AN ORGANIZED HEALTH CARE EDUCATION/TRAINING PROGRAM

## 2023-05-25 PROCEDURE — 4010F ACE/ARB THERAPY RXD/TAKEN: CPT | Mod: CPTII,S$GLB,, | Performed by: STUDENT IN AN ORGANIZED HEALTH CARE EDUCATION/TRAINING PROGRAM

## 2023-05-25 PROCEDURE — 3008F PR BODY MASS INDEX (BMI) DOCUMENTED: ICD-10-PCS | Mod: CPTII,S$GLB,, | Performed by: STUDENT IN AN ORGANIZED HEALTH CARE EDUCATION/TRAINING PROGRAM

## 2023-05-25 PROCEDURE — 99999 PR PBB SHADOW E&M-EST. PATIENT-LVL V: CPT | Mod: PBBFAC,,, | Performed by: STUDENT IN AN ORGANIZED HEALTH CARE EDUCATION/TRAINING PROGRAM

## 2023-05-25 RX ORDER — METOPROLOL SUCCINATE 25 MG/1
25 TABLET, EXTENDED RELEASE ORAL DAILY
Qty: 30 TABLET | Refills: 6 | Status: SHIPPED | OUTPATIENT
Start: 2023-05-25 | End: 2024-05-24

## 2023-05-25 RX ORDER — AMITRIPTYLINE HYDROCHLORIDE 25 MG/1
25 TABLET, FILM COATED ORAL NIGHTLY PRN
Qty: 90 TABLET | Refills: 1 | Status: SHIPPED | OUTPATIENT
Start: 2023-05-25 | End: 2023-11-15

## 2023-05-25 NOTE — PROGRESS NOTES
Subjective:     Chief Complaint: Generalized Body Aches    HPI  Mr.Brian Sandra is a 48-year-old man with chronic prostatitis, type 2 diabetes (following with endocrinology; Tresiba 24 units q.h.s. and 10 units of mealtime insulin; has mild nonproliferative diabetic retinopathy bilaterally), hyperlipidemia (atorvastatin 20), chronic pain (leg, neck, hip, etc.; following with Orthopedic surgery, PM&R, and pain management for injections), and idiopathic rash (following with Rheumatology status post lack of methotrexate response, reassuring labs, and current undergoing Lyrica trial; Sjogren's ruled out by ENT 2 months ago), systolic heart failure (recently establish with Cardiology; EF of 35%), narcolepsy without cataplexy/subjective cognitive concerns (family history of Alzheimer's; recently established with neuropsych and is pending more formal testing), and has recently expressed concern for prostate cancer (because of leg pain?; establish with urology with reassuring PSA, testosterone, and urine studies; taking extremely high dose of testosterone) presenting as a new patient for formal 2nd opinion regarding current/ongoing diagnostic evaluations and with request to formally transition primary care.    Broad constellation of symptoms most prominently including subjective neurocognitive decline and diffuse/multifactorial pain:  - not apparently due a currently inflamed rheumatologic process; per chart checking, has a history of lupus previously on long-term Plaquenil  - being structurally evaluated/treated via broad constellation of specialists (physical Medicine and Rehabilitation, Orthopedics, and pain management); noticeably absent for physical therapy  - diabetes is currently moderately uncontrolled; however, has history of extreme levels of chronic hyperglycemia (A1c>14 for least 1 year and > 10/12 von consecutive prior years)  - per chart checking, has a history of requesting very specific labs (Lyme disease  testing, PSA with concern for prostate cancer, D-dimer due to subjective discrepancy between calf size, neurologic screening out of concern for Alzheimer's etc.)  - neurology has Recommend optimization of Psychosocial stressors and prescribed amitriptyline (does not seem to be taking this)    Review of Systems   Constitutional:  Negative for appetite change, chills and fever.   HENT: Negative.     Respiratory:  Negative for cough, chest tightness and shortness of breath.    Cardiovascular:  Negative for chest pain, palpitations and leg swelling.   Gastrointestinal:  Negative for abdominal distention, abdominal pain, blood in stool, constipation, diarrhea, nausea and vomiting.   Endocrine: Negative.    Genitourinary:  Negative for difficulty urinating, dysuria, frequency and hematuria.   Musculoskeletal: Negative.    Integumentary:  Negative.   Neurological: Negative.    Psychiatric/Behavioral: Negative.         Objective:      Vitals:    05/25/23 1359   BP: 106/66   Pulse: 99   Resp: 18   Temp: 98.1 °F (36.7 °C)      Physical Exam  Vitals reviewed.   Constitutional:       General: He is not in acute distress.     Appearance: Normal appearance.   HENT:      Head: Normocephalic and atraumatic.      Comments: Facial features are symmetric      Nose: Nose normal. No congestion or rhinorrhea.      Mouth/Throat:      Mouth: Mucous membranes are moist.      Pharynx: Oropharynx is clear. No oropharyngeal exudate or posterior oropharyngeal erythema.   Eyes:      General: No scleral icterus.     Extraocular Movements: Extraocular movements intact.      Conjunctiva/sclera: Conjunctivae normal.   Cardiovascular:      Rate and Rhythm: Normal rate and regular rhythm.      Pulses: Normal pulses.      Heart sounds: Normal heart sounds.   Pulmonary:      Effort: Pulmonary effort is normal. No respiratory distress.      Breath sounds: Normal breath sounds.   Musculoskeletal:         General: No deformity or signs of injury. Normal  "range of motion.      Cervical back: Normal range of motion.      Comments: Gait normal    Skin:     General: Skin is warm and dry.      Findings: No rash.   Neurological:      General: No focal deficit present.      Mental Status: He is alert and oriented to person, place, and time. Mental status is at baseline.   Psychiatric:         Mood and Affect: Mood normal.         Behavior: Behavior normal.         Thought Content: Thought content normal.     Current Outpatient Medications on File Prior to Visit   Medication Sig Dispense Refill    ACCU-CHEK GUIDE ME GLUCOSE MTR Misc CHECK BLOOD SUGAR TWICE A DAY      blood sugar diagnostic Strp To check BG 2 times daily, to use with insurance preferred meter 200 strip 2    blood-glucose sensor (DEXCOM G6 SENSOR) Chen 1 sensor every 10 days 3 each 11    blood-glucose transmitter (DEXCOM G6 TRANSMITTER) Chen 1 transmitter every 3 months 1 each 4    glucagon (BAQSIMI) 3 mg/actuation Spry 3 mg (one actuation) into a single nostril; if no response, may repeat in 15 minutes using a new intranasal device. 2 each 1    insulin degludec (TRESIBA FLEXTOUCH U-100) 100 unit/mL (3 mL) insulin pen Inject 24 Units into the skin every evening. Max TDD of 30 units 5 pen 6    insulin lispro-aabc (LYUMJEV KWIKPEN U-100 INSULIN) 100 unit/mL pen Inject 10 units 3 times per day before meals, 2-4 units for snack, . + correction scale. MAX TDD Of 50 units 5 pen 6    lancets Misc To check BG 2 times daily, to use with insurance preferred meter 200 each 2    metoprolol succinate (TOPROL-XL) 25 MG 24 hr tablet Take 1 tablet (25 mg total) by mouth once daily. 30 tablet 11    pen needle, diabetic 32 gauge x 5/32" Ndle To use 7 times per day with insulin injections- for meals, long-acting insulin and for snacks 200 each 11    sacubitriL-valsartan (ENTRESTO) 24-26 mg per tablet Take 1 tablet by mouth 2 (two) times daily. 60 tablet 11    atorvastatin (LIPITOR) 20 MG tablet Take 1 tablet (20 mg total) by " mouth once daily. (Patient not taking: Reported on 5/25/2023) 90 tablet 2    folic acid (FOLVITE) 1 MG tablet Take 1 tablet (1 mg total) by mouth once daily. (Patient not taking: Reported on 5/25/2023) 30 tablet 4    hydrOXYchloroQUINE (PLAQUENIL) 100 mg tablet Take 200 mg by mouth once daily.      LEVEMIR FLEXPEN 100 unit/mL (3 mL) InPn pen Inject into the skin.      methotrexate 2.5 MG Tab Take 6 tablets (15 mg total) by mouth every 7 days. (Patient not taking: Reported on 5/25/2023) 24 tablet 3    pregabalin (LYRICA) 50 MG capsule Take 1 capsule (50 mg total) by mouth 3 (three) times daily. (Patient not taking: Reported on 5/25/2023) 90 capsule 6    sod sulf-pot chloride-mag sulf (SUTAB) 1.479-0.188- 0.225 gram tablet Take 12 tablets by mouth once daily. Take according to package instructions with indicated amount of water. (Patient not taking: Reported on 5/25/2023) 24 tablet 0    testosterone cypionate (DEPOTESTOTERONE CYPIONATE) 200 mg/mL injection Inject 1 mL (200 mg total) into the muscle once a week. (Patient not taking: Reported on 5/25/2023) 10 mL 1     No current facility-administered medications on file prior to visit.         Assessment:       1. Chronic pain syndrome    2. Lumbosacral radiculopathy        Plan:       Chronic pain syndrome  Lumbosacral radiculopathy  -     Ambulatory referral/consult to Physical/Occupational Therapy; Future; Expected date: 06/01/2023   - extensive conversation had regarding constellation of symptoms, differential diagnoses, and prior evaluations/treatment trials   - after an retentive discussion, is willing to pursue PT    - also willing to trial low-dose (with intent to uptitrated to effect) q.h.s. amitriptyline for treatment of potentially underlying fibromyalgia and for secondary benefit (antispasmodic, anti-neuropathic, and anti-insomnia effects)   - follow-up p.r.n.    While in excess of 60 minutes was spent today in chart review and face-to-face with patient.      Other orders  -     amitriptyline (ELAVIL) 25 MG tablet; Take 1 tablet (25 mg total) by mouth nightly as needed for Insomnia.  Dispense: 90 tablet; Refill: 1

## 2023-05-25 NOTE — PROGRESS NOTES
Subjective:   Patient ID:  Jason Sandra is a 48 y.o. male who presents for evaluation of No chief complaint on file.      HPIJason Sandra is a 48 y.o. male with h/o HLD, diabetes, new onset CHF, EF 35% 4/23/23 saw Dr. Payne in April and was started on BB, entresto and lasix. Here today for follow up reports no significant improvement in SOB but not worse, denies any swelling, denies any CP, near-syncope at this time.    Echo 4/23 with EF 35%    Stress test neg  EKG 1/1/23 ST, nonspec TW abn, LVH, 107 bpm  FH heart disease  Past Medical History:   Diagnosis Date    Diabetes mellitus     Hypogonadism in male        Past Surgical History:   Procedure Laterality Date    NOSE SURGERY      SELECTIVE INJECTION OF ANESTHETIC AGENT AROUND LUMBAR SPINAL NERVE ROOT BY TRANSFORAMINAL APPROACH Bilateral 5/24/2023    Procedure: Bilateral L5 TF GARY with local;  Surgeon: Tony Garcia MD;  Location: [facility];  Service: Pain Management;  Laterality: Bilateral;       Social History     Tobacco Use    Smoking status: Never     Passive exposure: Never    Smokeless tobacco: Never   Substance Use Topics    Alcohol use: Not Currently     Comment: socially    Drug use: No       Family History   Problem Relation Age of Onset    Stroke Mother     Glaucoma Mother     Cataracts Mother     Alzheimer's disease Father        Current Outpatient Medications on File Prior to Visit   Medication Sig Dispense Refill    amitriptyline (ELAVIL) 25 MG tablet Take 1 tablet (25 mg total) by mouth nightly as needed for Insomnia. 90 tablet 1    atorvastatin (LIPITOR) 20 MG tablet Take 1 tablet (20 mg total) by mouth once daily. 90 tablet 2    blood sugar diagnostic Strp To check BG 2 times daily, to use with insurance preferred meter 200 strip 2    blood-glucose sensor (DEXCOM G6 SENSOR) Chen 1 sensor every 10 days 3 each 11    blood-glucose transmitter (DEXCOM G6 TRANSMITTER) Chen 1 transmitter every 3 months 1 each 4    glucagon (BAQSIMI) 3  "mg/actuation Spry 3 mg (one actuation) into a single nostril; if no response, may repeat in 15 minutes using a new intranasal device. 2 each 1    insulin degludec (TRESIBA FLEXTOUCH U-100) 100 unit/mL (3 mL) insulin pen Inject 24 Units into the skin every evening. Max TDD of 30 units 5 pen 6    insulin lispro-aabc (LYUMJEV KWIKPEN U-100 INSULIN) 100 unit/mL pen Inject 10 units 3 times per day before meals, 2-4 units for snack, . + correction scale. MAX TDD Of 50 units 5 pen 6    lancets Misc To check BG 2 times daily, to use with insurance preferred meter 200 each 2    metoprolol succinate (TOPROL-XL) 25 MG 24 hr tablet Take 1 tablet (25 mg total) by mouth once daily. 30 tablet 6    sacubitriL-valsartan (ENTRESTO) 24-26 mg per tablet Take 1 tablet by mouth 2 (two) times daily. 60 tablet 11    ACCU-CHEK GUIDE ME GLUCOSE MTR Misc CHECK BLOOD SUGAR TWICE A DAY      folic acid (FOLVITE) 1 MG tablet Take 1 tablet (1 mg total) by mouth once daily. (Patient not taking: Reported on 5/25/2023) 30 tablet 4    hydrOXYchloroQUINE (PLAQUENIL) 100 mg tablet Take 200 mg by mouth once daily.      methotrexate 2.5 MG Tab Take 6 tablets (15 mg total) by mouth every 7 days. (Patient not taking: Reported on 5/25/2023) 24 tablet 3    pen needle, diabetic 32 gauge x 5/32" Ndle To use 7 times per day with insulin injections- for meals, long-acting insulin and for snacks (Patient not taking: Reported on 5/26/2023) 200 each 11    pregabalin (LYRICA) 50 MG capsule Take 1 capsule (50 mg total) by mouth 3 (three) times daily. (Patient not taking: Reported on 5/25/2023) 90 capsule 6    sod sulf-pot chloride-mag sulf (SUTAB) 1.479-0.188- 0.225 gram tablet Take 12 tablets by mouth once daily. Take according to package instructions with indicated amount of water. (Patient not taking: Reported on 5/25/2023) 24 tablet 0    testosterone cypionate (DEPOTESTOTERONE CYPIONATE) 200 mg/mL injection Inject 1 mL (200 mg total) into the muscle once a week. " (Patient not taking: Reported on 5/25/2023) 10 mL 1    [DISCONTINUED] LEVEMIR FLEXPEN 100 unit/mL (3 mL) InPn pen Inject into the skin.       No current facility-administered medications on file prior to visit.      Wt Readings from Last 3 Encounters:   05/25/23 68.9 kg (151 lb 14.4 oz)   05/24/23 67 kg (147 lb 11.3 oz)   05/10/23 68.9 kg (151 lb 14.4 oz)     Temp Readings from Last 3 Encounters:   05/25/23 98.1 °F (36.7 °C) (Temporal)   05/24/23 97 °F (36.1 °C) (Temporal)   05/01/23 98.1 °F (36.7 °C)     BP Readings from Last 3 Encounters:   05/25/23 106/66   05/24/23 111/61   05/10/23 114/77     Pulse Readings from Last 3 Encounters:   05/25/23 99   05/24/23 94   05/10/23 96        Review of Systems   Constitutional: Negative.   HENT: Negative.     Eyes: Negative.    Cardiovascular: Negative.    Respiratory: Negative.     Skin: Negative.    Musculoskeletal: Negative.    Gastrointestinal: Negative.    Genitourinary: Negative.    Neurological: Negative.    Psychiatric/Behavioral: Negative.       Objective:   Physical Exam  Vitals and nursing note reviewed.   Constitutional:       Appearance: Normal appearance.   HENT:      Head: Normocephalic and atraumatic.   Eyes:      General:         Right eye: No discharge.         Left eye: No discharge.      Pupils: Pupils are equal, round, and reactive to light.   Cardiovascular:      Rate and Rhythm: Normal rate and regular rhythm.      Heart sounds: S1 normal and S2 normal. No murmur heard.    No friction rub.   Pulmonary:      Effort: Pulmonary effort is normal. No respiratory distress.      Breath sounds: Normal breath sounds. No rales.   Abdominal:      Palpations: Abdomen is soft.      Tenderness: There is no abdominal tenderness.   Musculoskeletal:      Cervical back: Neck supple.      Right lower leg: No edema.      Left lower leg: No edema.   Skin:     General: Skin is warm and dry.   Neurological:      General: No focal deficit present.      Mental Status: He is  alert and oriented to person, place, and time.   Psychiatric:         Mood and Affect: Mood normal.         Behavior: Behavior normal.         Thought Content: Thought content normal.       Lab Results   Component Value Date    CHOL 165 03/20/2023    CHOL 197 01/17/2023    CHOL 201 (H) 10/21/2021     Lab Results   Component Value Date    HDL 55 03/20/2023    HDL 66 01/17/2023    HDL 47 10/21/2021     Lab Results   Component Value Date    LDLCALC 101.0 03/20/2023    LDLCALC 121.0 01/17/2023    LDLCALC 142.8 10/21/2021     Lab Results   Component Value Date    TRIG 45 03/20/2023    TRIG 50 01/17/2023    TRIG 56 10/21/2021     Lab Results   Component Value Date    CHOLHDL 33.3 03/20/2023    CHOLHDL 33.5 01/17/2023    CHOLHDL 23.4 10/21/2021       Chemistry        Component Value Date/Time     05/01/2023 1642    K 4.2 05/01/2023 1642     05/01/2023 1642    CO2 25 05/01/2023 1642    BUN 17 05/01/2023 1642    CREATININE 1.1 05/01/2023 1642     (H) 05/01/2023 1642        Component Value Date/Time    CALCIUM 9.9 05/01/2023 1642    ALKPHOS 79 03/20/2023 0740    AST 23 03/20/2023 0740    ALT 30 03/20/2023 0740    BILITOT 0.6 03/20/2023 0740    ESTGFRAFRICA >60 02/24/2020 0758    EGFRNONAA >60 02/24/2020 0758          Lab Results   Component Value Date    TSH 2.756 03/20/2023     No results found for: INR, PROTIME  @RESUFAST(WBC,HGB,HCT,MCV,PLT)  @LABRCNTIP(BNP,BNPTRIAGEBLO)@  CrCl cannot be calculated (Patient's most recent lab result is older than the maximum 7 days allowed.).     Results for orders placed during the hospital encounter of 04/21/23    Echo    Interpretation Summary  · The left ventricle is normal in size with moderately decreased systolic function.  · The estimated ejection fraction is 35%.  · Grade I left ventricular diastolic dysfunction.  · There is moderate left ventricular global hypokinesis.  · Normal right ventricular size with normal right ventricular systolic function.  ·  Intermediate central venous pressure (8 mmHg).     Results for orders placed during the hospital encounter of 04/21/23    Nuclear Stress - Cardiology Interpreted    Interpretation Summary    Normal myocardial perfusion scan. There is no evidence of myocardial ischemia or infarction.    The gated perfusion images showed an ejection fraction of 58% at rest. The gated perfusion images showed an ejection fraction of 58% post stress.    There is normal wall motion at rest and post stress.    LV cavity size is normal at rest and normal at stress.    The ECG portion of the study is negative for ischemia.    The patient reported no chest pain during the stress test.    There were no arrhythmias during stress.     Assessment:      1. Hyperlipidemia associated with type 2 diabetes mellitus    2. Chest pain, unspecified type    3. Family history of heart disease    4. Elevated blood pressure reading    5. Congestive heart failure, unspecified HF chronicity, unspecified heart failure type        Plan:   Hyperlipidemia associated with type 2 diabetes mellitus    Chest pain, unspecified type    Family history of heart disease    Elevated blood pressure reading    Congestive heart failure, unspecified HF chronicity, unspecified heart failure type      Entresto, BB- CHF/HTN  OMT for DM, A1c 8.7  Statin- HLD  Limit fluid intake 1.5L per day, low Na diet  Discussed w/ patient activity restriction, encouraged very little activity, no extra stressors.    Echo end of July    RTC in Aug w/ Dr. Payne or sooner if needed    Amara Kraus, FNP-C [facility], Cardiology

## 2023-05-26 ENCOUNTER — OFFICE VISIT (OUTPATIENT)
Dept: CARDIOLOGY | Facility: CLINIC | Age: 48
End: 2023-05-26
Payer: COMMERCIAL

## 2023-05-26 VITALS
HEIGHT: 68 IN | SYSTOLIC BLOOD PRESSURE: 100 MMHG | HEART RATE: 95 BPM | DIASTOLIC BLOOD PRESSURE: 62 MMHG | BODY MASS INDEX: 22.75 KG/M2 | WEIGHT: 150.13 LBS | OXYGEN SATURATION: 99 %

## 2023-05-26 DIAGNOSIS — E11.69 HYPERLIPIDEMIA ASSOCIATED WITH TYPE 2 DIABETES MELLITUS: ICD-10-CM

## 2023-05-26 DIAGNOSIS — I50.9 CONGESTIVE HEART FAILURE, UNSPECIFIED HF CHRONICITY, UNSPECIFIED HEART FAILURE TYPE: Primary | ICD-10-CM

## 2023-05-26 DIAGNOSIS — E78.5 HYPERLIPIDEMIA ASSOCIATED WITH TYPE 2 DIABETES MELLITUS: ICD-10-CM

## 2023-05-26 DIAGNOSIS — R07.9 CHEST PAIN, UNSPECIFIED TYPE: ICD-10-CM

## 2023-05-26 DIAGNOSIS — R03.0 ELEVATED BLOOD PRESSURE READING: ICD-10-CM

## 2023-05-26 DIAGNOSIS — Z82.49 FAMILY HISTORY OF HEART DISEASE: ICD-10-CM

## 2023-05-26 PROCEDURE — 3078F DIAST BP <80 MM HG: CPT | Mod: CPTII,S$GLB,,

## 2023-05-26 PROCEDURE — 3061F PR NEG MICROALBUMINURIA RESULT DOCUMENTED/REVIEW: ICD-10-PCS | Mod: CPTII,S$GLB,,

## 2023-05-26 PROCEDURE — 3061F NEG MICROALBUMINURIA REV: CPT | Mod: CPTII,S$GLB,,

## 2023-05-26 PROCEDURE — 1159F PR MEDICATION LIST DOCUMENTED IN MEDICAL RECORD: ICD-10-PCS | Mod: CPTII,S$GLB,,

## 2023-05-26 PROCEDURE — 3074F PR MOST RECENT SYSTOLIC BLOOD PRESSURE < 130 MM HG: ICD-10-PCS | Mod: CPTII,S$GLB,,

## 2023-05-26 PROCEDURE — 99214 PR OFFICE/OUTPT VISIT, EST, LEVL IV, 30-39 MIN: ICD-10-PCS | Mod: S$GLB,,,

## 2023-05-26 PROCEDURE — 3008F BODY MASS INDEX DOCD: CPT | Mod: CPTII,S$GLB,,

## 2023-05-26 PROCEDURE — 99214 OFFICE O/P EST MOD 30 MIN: CPT | Mod: S$GLB,,,

## 2023-05-26 PROCEDURE — 3008F PR BODY MASS INDEX (BMI) DOCUMENTED: ICD-10-PCS | Mod: CPTII,S$GLB,,

## 2023-05-26 PROCEDURE — 1160F PR REVIEW ALL MEDS BY PRESCRIBER/CLIN PHARMACIST DOCUMENTED: ICD-10-PCS | Mod: CPTII,S$GLB,,

## 2023-05-26 PROCEDURE — 99999 PR PBB SHADOW E&M-EST. PATIENT-LVL V: CPT | Mod: PBBFAC,,,

## 2023-05-26 PROCEDURE — 3052F HG A1C>EQUAL 8.0%<EQUAL 9.0%: CPT | Mod: CPTII,S$GLB,,

## 2023-05-26 PROCEDURE — 1159F MED LIST DOCD IN RCRD: CPT | Mod: CPTII,S$GLB,,

## 2023-05-26 PROCEDURE — 4010F PR ACE/ARB THEARPY RXD/TAKEN: ICD-10-PCS | Mod: CPTII,S$GLB,,

## 2023-05-26 PROCEDURE — 1160F RVW MEDS BY RX/DR IN RCRD: CPT | Mod: CPTII,S$GLB,,

## 2023-05-26 PROCEDURE — 99999 PR PBB SHADOW E&M-EST. PATIENT-LVL V: ICD-10-PCS | Mod: PBBFAC,,,

## 2023-05-26 PROCEDURE — 3066F PR DOCUMENTATION OF TREATMENT FOR NEPHROPATHY: ICD-10-PCS | Mod: CPTII,S$GLB,,

## 2023-05-26 PROCEDURE — 3052F PR MOST RECENT HEMOGLOBIN A1C LEVEL 8.0 - < 9.0%: ICD-10-PCS | Mod: CPTII,S$GLB,,

## 2023-05-26 PROCEDURE — 3074F SYST BP LT 130 MM HG: CPT | Mod: CPTII,S$GLB,,

## 2023-05-26 PROCEDURE — 3066F NEPHROPATHY DOC TX: CPT | Mod: CPTII,S$GLB,,

## 2023-05-26 PROCEDURE — 4010F ACE/ARB THERAPY RXD/TAKEN: CPT | Mod: CPTII,S$GLB,,

## 2023-05-26 PROCEDURE — 3078F PR MOST RECENT DIASTOLIC BLOOD PRESSURE < 80 MM HG: ICD-10-PCS | Mod: CPTII,S$GLB,,

## 2023-05-29 ENCOUNTER — TELEPHONE (OUTPATIENT)
Dept: PREADMISSION TESTING | Facility: HOSPITAL | Age: 48
End: 2023-05-29
Payer: COMMERCIAL

## 2023-05-29 ENCOUNTER — CLINICAL SUPPORT (OUTPATIENT)
Dept: REHABILITATION | Facility: HOSPITAL | Age: 48
End: 2023-05-29
Payer: COMMERCIAL

## 2023-05-29 DIAGNOSIS — M54.17 LUMBOSACRAL RADICULOPATHY: ICD-10-CM

## 2023-05-29 DIAGNOSIS — G89.29 CHRONIC BILATERAL LOW BACK PAIN WITH BILATERAL SCIATICA: ICD-10-CM

## 2023-05-29 DIAGNOSIS — M54.42 CHRONIC BILATERAL LOW BACK PAIN WITH BILATERAL SCIATICA: ICD-10-CM

## 2023-05-29 DIAGNOSIS — Z74.09 DECREASED FUNCTIONAL MOBILITY AND ENDURANCE: ICD-10-CM

## 2023-05-29 DIAGNOSIS — M54.41 CHRONIC BILATERAL LOW BACK PAIN WITH BILATERAL SCIATICA: ICD-10-CM

## 2023-05-29 PROCEDURE — 97112 NEUROMUSCULAR REEDUCATION: CPT | Mod: PN

## 2023-05-29 PROCEDURE — 97161 PT EVAL LOW COMPLEX 20 MIN: CPT | Mod: PN

## 2023-05-29 NOTE — TELEPHONE ENCOUNTER
Called pt for PAT for procedure scheduled 6/2. Pt states he is going to do cologuard for now, will have colonoscopy done if needed but at a later date. Canceled colonoscopy request.

## 2023-05-30 PROBLEM — Z74.09 DECREASED FUNCTIONAL MOBILITY AND ENDURANCE: Status: ACTIVE | Noted: 2023-05-30

## 2023-05-30 PROBLEM — M54.42 CHRONIC BILATERAL LOW BACK PAIN WITH BILATERAL SCIATICA: Status: ACTIVE | Noted: 2023-05-30

## 2023-05-30 PROBLEM — M54.41 CHRONIC BILATERAL LOW BACK PAIN WITH BILATERAL SCIATICA: Status: ACTIVE | Noted: 2023-05-30

## 2023-05-30 PROBLEM — G89.29 CHRONIC BILATERAL LOW BACK PAIN WITH BILATERAL SCIATICA: Status: ACTIVE | Noted: 2023-05-30

## 2023-05-30 NOTE — PLAN OF CARE
[facility]  Physical Therapy Initial Evaluation    Name: Jason Sandra  Clinic Number: [id number]    Therapy Diagnosis:   Encounter Diagnoses   Name Primary?    Lumbosacral radiculopathy     Chronic bilateral low back pain with bilateral sciatica     Decreased functional mobility and endurance      Physician: Luan Lazar MD    Physician Orders: PT Eval and Treat   Medical Diagnosis from Referral: M54.17 (ICD-10-CM) - Lumbosacral radiculopathy  Evaluation Date: 5/29/2023  Authorization Period Expiration: 5/25/24  Plan of Care Expiration: 8/7/23  Visit # / Visits authorized: 1/ 1  FOTO: 1/3  Progress noted due 30 days from 5/29/23  Precautions: Standard and Diabetes, CHF    Time In: 4:15 pm  Time Out: 5: 20 pm  Total Time: 65 minutes      Subjective   Date of onset: 3 months ago  History of current condition - Jason reports: neck and back pain with B leg pain B with no known cause. Pt he just woke with this pain and it has not responded well to injections or antibiotics. CHF is a new diagnosis so it is still under Dr care. Pt states EMG had problems on the L LE and the R was ok if he understood it correctly.     Pain:  Current 8/10, worst 10/10, best 7/10   Location: low back and B LE's anterior and posterior from buttock to feet  Description: sore ache, shoots at times, numb, tingle  Aggravating Factors: walking, sitting upright  Easing Factors: hot bath,  massaging his leg, staying bent forward    Prior Therapy: yes  Social History: lives home with family  Occupation: maintenance for [employer]  Prior Level of Function: independent, yard work, fishing, hunting, working out  Current Level of Function: limited tolerance to yard work and work activities    Imaging, MRI studies: Mild lower lumbar level predominant degenerative changes as detailed above including Modic type 1 edema at the posterior aspect of the L5 inferior endplate.     Small disc bulge with central protrusion annular  fissure at L5-S1.     No significant spinal canal stenosis.  Mild bilateral neural foraminal stenosis at L4-L5.  Mild-to-moderate bilateral neural foraminal stenosis at L5-S1.    EMG: ABNORMAL study  2. There is electrodiagnostic evidence of an acute on chronic radiculopathy of the left L5 nerve root and a chronic radiculopathy of the right L5 and bilateral S1 nerve roots       Medical History:   Past Medical History:   Diagnosis Date    Diabetes mellitus     Hypogonadism in male        Surgical History:   Jason Sandra  has a past surgical history that includes Nose surgery and Selective injection of anesthetic agent around lumbar spinal nerve root by transforaminal approach (Bilateral, 5/24/2023).    Medications:   Jason has a current medication list which includes the following prescription(s): accu-chek guide me glucose mtr, amitriptyline, atorvastatin, blood sugar diagnostic, dexcom g6 sensor, dexcom g6 transmitter, folic acid, baqsimi, hydroxychloroquine, insulin degludec, lyumjev kwikpen u-100 insulin, lancets, methotrexate, metoprolol succinate, pen needle, diabetic, pregabalin, entresto, sutab, and testosterone cypionate.    Allergies:   Review of patient's allergies indicates:  No Known Allergies     Pts goals: less pain and able to walk and stand without feeling hunched over    Objective       CMS Impairment/Limitation/Restriction for FOTO low back pain Survey    Therapist reviewed FOTO scores for Jason Sandra on 5/29/2023.   FOTO documents entered into [employer] - see Media section.    Limitation Score: 56%       Posture/Structure: Pt presents with forward head posture, rounded shoulder's, reduced cervical lordosis, reduced thoracic kyphosis, and reduced lumbar lordosis.  In single leg stand moderate R hip drop is noted .    Gait: L hip hikes with R hip drop, narrow COLLINS, scissors with R LE mildly, anterior lean, reduced hip extension B.    Sensation:    Sensation: reportedly reduced in both legs but  when assessed manually he reports feeling light touch but states when PT had him range the knee that he didn't know if he was bending the knee or not.    Balance: Single leg stand R=5 sec, L=3 sec; squat mild LOB anterior    Lumbar spine AROM:     Degrees Pain Present (Y/N)   Flexion Fair reversal of lumbar curve and able to reach toes with lots of hip hinge but when reduced by PT he is only at mid shin at best due to tight lumbar flexion but reduce pelvic anterior rotation (tight hamstrings) Y (PT used wall to reduce hip hinge)   R side bend To knee n   L To knee N   Extend 5 Y but no change in radicular symptoms     CROM  Flexion 60  Extension 75  R side flexion 30  L side flexion 30  R rotation 90  L rotation 90    Hip AROM:     Degrees (R/L) Pain Present (Y/N)   Hip flex 120 L groin   Hip Abd 20 n   Hip ER (sitting) 30/40 y   Hip IR (sitting) 20/15 Y L   Hip Ext (prone) 5 n         Strength:    R L   Hip flexors L2   4+/5 4+/5  Quadriceps L234  4+/5 4+/5    Hamstrings S1   4/5 4/5   Plantar flexion S1  2/5 2/5    Dorsiflexion L4  5/5 5/5   Gluteus Medius L45S1 4+/5 4+/5   Gluteus Albert  4+/5 4+/5   Great toe extension  5/5 5/5     Special Test:  Slump Test:  -  Distraction:  +    SLR Test:  -  Quadrant:  + extension      Khai flexion standing  no change; extension + very poor ROM but no radicular reported changes  Supine SKC L groin pain only (OA like and hip flexor tighter); prone extension lags by 50 % ROM but only low back pain reported           TREATMENT   Treatment Time In: 4:40 am  Treatment Time Out: 5:20 pm  Total Treatment time separate from Evaluation: 40 minutes    Jason participated in neuromuscular re-education activities to improve: Balance, Proprioception, and Posture for 40 minutes. The following activities were included:    Sciatic nerve glides 2x 10  Single knee to chest (with opposite leg straight down (L hip flexor tighter then R))  Prone khai 2x 10 (max cues to improve from 40-60%  ROM as he is very stiff)  PPT  2x 10  Supine chin tucks 2x 10  Supine chin tuck with flexion 2x 10  PPT with bridge 2x 10  Nerve glides again (L ROM improved) 1x 10 B  Supine chin tucks again 2x 10      Home Exercises and Patient Education Provided    Education provided:   -Education on condition, HEP, and PT educated pt in the long term recovery time for this type of nerve injury and signs to watch for that would suggest a need to urgently see the Dr.     Written Home Exercises Provided: Patient instructed to cont prior HEP.  Exercises were reviewed and Jason was able to demonstrate them prior to the end of the session.  Jason demonstrated fair  understanding of the education provided.     See EMR under Patient Instructions for exercises provided 5/29/2023.    Assessment   Jason is a 48 y.o. male referred to outpatient Physical Therapy with a medical diagnosis of M54.17 (ICD-10-CM) - Lumbosacral radiculopathy. The patient presents with signs and symptoms consistent with diagnosis along with lumbar ROM loss, decreased functional mobility, neck pain, back pain and with impairments which include impairments list: ROM, strength, endurance, joint mobility, muscle length, balance, posture, gait mechanics, core strength and stability, and functional movement patterns.  These impairments are limiting patient's ability to lift,, carry, walk for long distances and stand upright prolonged.     Pt prognosis is Good.   Pt will benefit from skilled outpatient Physical Therapy to address the deficits stated above and in the chart below, provide pt/family education, and to maximize pt's level of independence.     Plan of care discussed with patient: Yes  Pt's spiritual, cultural and educational needs considered and patient is agreeable to the plan of care and goals as stated below:     Anticipated Barriers for therapy: drives 1.5 hrs to and from work, long work day, laborous job, +EMG L LE    Medical Necessity is demonstrated by  the following  History  Co-morbidities and personal factors that may impact the plan of care Co-morbidities:   See history  Not sleeping well  Recurrent back pain    Personal Factors:   lifestyle     low   Examination  Body Structures and Functions, activity limitations and participation restrictions that may impact the plan of care Body Regions:   neck  back  lower extremities  trunk    Body Systems:    ROM  strength  balance  gait  transfers  transitions    Participation Restrictions:   See current limitations    Activity limitations:   Learning and applying knowledge  no deficits    General Tasks and Commands  no deficits    Communication  no deficits    Mobility  lifting and carrying objects  walking  using transportation (bus, train, plane, car)  driving (bike, car, motorcycle)    Self care  washing oneself (bathing, drying, washing hands)  caring for body parts (brushing teeth, shaving, grooming)  toileting  dressing  looking after one's health    Domestic Life  shopping  cooking  doing house work (cleaning house, washing dishes, laundry)  assisting others    Interactions/Relationships  formal relationships  family relationships    Life Areas  employment  basic economic transactions    Community and Social Life  community life  recreation and leisure         low   Clinical Presentation evolving clinical presentation with changing clinical characteristics moderate   Decision Making/ Complexity Score: low     Goals:  Short Term Goals: In 4 weeks   1.I with HEP  2.Pt to increase lumbar ROM to able to reach to 5 inches from floor with hip hinge at normal ROM    3.Pt to increase hamstring strength to 4+/5.  4.Pt to increase pain to the knees and above only  6.Pt to improve score on the FOTO by 10%.  7. Pt to be educated on postural/body mechanics awareness.    Long Term Goals: In 10 weeks 8/7/23  1.Patient to improve score on the FOTO to 37% limitation..  2. Patient to demo increase in LE strength to 5/5 with  hamstring strength  3. Patient to have decreased pain to 4/10 or better at worst.  4. Patient to demo increase lumbar ROM to able to reach toes with normal hip hinge.  5. Patient to increase hamstring ROM to 70 degrees if able.  6. Patient to perform daily activities including dead lifting up t 65# for work tasks.      Plan   Plan of care Certification: 5/29/2023 to 8/7/23.    Outpatient Physical Therapy 2 times weekly for 10 weeks to include the following interventions: Cervical/Lumbar Traction, Electrical Stimulation IFC, NMES, Gait Training, Manual Therapy, Moist Heat/ Ice, Neuromuscular Re-ed, Patient Education, Self Care, Therapeutic Activities, Therapeutic Exercise, and DN.     Ysabel Mcbride, PT, CIDN, SFMA    Thank you for this referral.    These services are reasonable and necessary for the conditions set forth above while under my care.

## 2023-06-05 ENCOUNTER — PATIENT MESSAGE (OUTPATIENT)
Dept: PAIN MEDICINE | Facility: HOSPITAL | Age: 48
End: 2023-06-05
Payer: COMMERCIAL

## 2023-06-05 NOTE — PRE-PROCEDURE INSTRUCTIONS
Spoke with patient regarding procedure scheduled on 6.7     Arrival time 0800     Has patient been sick with fever or on antibiotics within the last 7 days? No     Does the patient have any open wounds, sores or rashes? No     Does the patient have any recent fractures? no     Has patient received a vaccination within the last 7 days? No     Received the COVID vaccination? yes     Has the patient stopped all medications as directed? na     Does patient have a pacemaker and or defibrillator? no     Does the patient have a ride to and from procedure and someone reliable to remain with patient?      Is the patient diabetic? yes     Does the patient have sleep apnea? Or use O2 at home? no     Is the patient receiving sedation? Yes     Is the patient instructed to remain NPO beginning at midnight the night before their procedure? yes     Procedure location confirmed with patient? Yes     Covid- Denies signs/symptoms. Instructed to notify PAT/MD if any changes.

## 2023-06-07 ENCOUNTER — HOSPITAL ENCOUNTER (OUTPATIENT)
Facility: HOSPITAL | Age: 48
Discharge: HOME | End: 2023-06-07
Attending: PAIN MEDICINE | Admitting: PAIN MEDICINE
Payer: COMMERCIAL

## 2023-06-07 VITALS
HEART RATE: 85 BPM | WEIGHT: 149.13 LBS | HEIGHT: 68 IN | SYSTOLIC BLOOD PRESSURE: 131 MMHG | TEMPERATURE: 98 F | OXYGEN SATURATION: 98 % | DIASTOLIC BLOOD PRESSURE: 89 MMHG | RESPIRATION RATE: 18 BRPM | BODY MASS INDEX: 22.6 KG/M2

## 2023-06-07 DIAGNOSIS — M47.812 CERVICAL SPONDYLOSIS: ICD-10-CM

## 2023-06-07 DIAGNOSIS — M47.812 CERVICAL SPONDYLOSIS: Primary | ICD-10-CM

## 2023-06-07 DIAGNOSIS — M54.2 CHRONIC NECK PAIN: Primary | ICD-10-CM

## 2023-06-07 DIAGNOSIS — G89.29 CHRONIC NECK PAIN: Primary | ICD-10-CM

## 2023-06-07 LAB — POCT GLUCOSE: 110 MG/DL (ref 70–110)

## 2023-06-07 PROCEDURE — 64491 INJ PARAVERT F JNT C/T 2 LEV: CPT | Mod: LT | Performed by: PAIN MEDICINE

## 2023-06-07 PROCEDURE — 25000003 PHARM REV CODE 250: Performed by: PAIN MEDICINE

## 2023-06-07 PROCEDURE — 64490 PR INJ DX/THER AGNT PARAVERT FACET JOINT,IMG GUIDE,CERV/THORAC, 1ST LEVEL: ICD-10-PCS | Mod: 50,,, | Performed by: PAIN MEDICINE

## 2023-06-07 PROCEDURE — 64490 INJ PARAVERT F JNT C/T 1 LEV: CPT | Mod: 50,,, | Performed by: PAIN MEDICINE

## 2023-06-07 PROCEDURE — 63600175 PHARM REV CODE 636 W HCPCS: Performed by: PAIN MEDICINE

## 2023-06-07 PROCEDURE — 64490 INJ PARAVERT F JNT C/T 1 LEV: CPT | Mod: RT | Performed by: PAIN MEDICINE

## 2023-06-07 PROCEDURE — 64491 PR INJ DX/THER AGNT PARAVERT FACET JOINT,IMG GUIDE,CERV/THORAC, 2ND LEVEL: ICD-10-PCS | Mod: 50,,, | Performed by: PAIN MEDICINE

## 2023-06-07 PROCEDURE — 99152 MOD SED SAME PHYS/QHP 5/>YRS: CPT | Performed by: PAIN MEDICINE

## 2023-06-07 PROCEDURE — 64491 INJ PARAVERT F JNT C/T 2 LEV: CPT | Mod: 50,,, | Performed by: PAIN MEDICINE

## 2023-06-07 RX ORDER — BUPIVACAINE HYDROCHLORIDE 5 MG/ML
INJECTION, SOLUTION EPIDURAL; INTRACAUDAL
Status: DISCONTINUED | OUTPATIENT
Start: 2023-06-07 | End: 2023-06-07 | Stop reason: HOSPADM

## 2023-06-07 RX ORDER — MIDAZOLAM HYDROCHLORIDE 1 MG/ML
INJECTION, SOLUTION INTRAMUSCULAR; INTRAVENOUS
Status: DISCONTINUED | OUTPATIENT
Start: 2023-06-07 | End: 2023-06-07 | Stop reason: HOSPADM

## 2023-06-07 NOTE — OP NOTE
Jason Sandra  48 y.o. male      Vitals:    06/07/23 1024   BP: 131/89   Pulse: 85   Resp: 18   Temp:        Procedure Date:TODAYDATE@      INFORMED CONSENT: The procedure, risks, benefits and options were discussed with patient. There are no contraindications to the procedure. The patient expressed understanding and agreed to proceed. The personnel performing the procedure was discussed. I verify that I personally obtained consent prior to the start of the procedure and the signed consent can be found on the patient's chart.       Anesthesia:   Conscious sedation provided by M.D    The patient was monitored with continuous pulse oximetry, EKG, and intermittent blood pressure monitors.  The patient was hemodynamically stable throughout the entire process was responsive to voice, and breathing spontaneously.  Supplemental O2 was provided at 2L/min via nasal cannula.  Patient was comfortable for the duration of the procedure. (See nurse documentation and case log for sedation time)    There was a total of 3mg IV Midazolam titrated for the procedure     Pre Procedure diagnosis: Cervical spondylosis [M47.812]  Chronic neck pain [M54.2, G89.29]  Post-Procedure diagnosis: SAME     PROCEDURE: bilateral C3/4 and C4/5 DORSAL MEDIAL BRANCH BLOCK        DESCRIPTION OF PROCEDURE:The patient was brought to the procedure room. After performing time out. IV access was obtained prior to the procedure. The patient was positioned prone on the fluoroscopy table. Continuous hemodynamic monitoring was initiated including blood pressure, EKG, and pulse oximetry. The area of the lumbar spine was prepped with chlorhexidine and draped into a sterile field. Fluoroscopy was used to identify the location of bilateral C3/4 and C4/5 medial branch nerves at the junctions of the superior articular process and the transverse processes of the vertebra above respectively. A 25 gauge spinal needle was slowly inserted at each level using AP,  lateral and oblique fluoroscopic imaging. Negative aspiration for blood or CSF was confirmed.  0.5ml bupivacaine 0.75% was injected thru each needle. The needles were removed intact. A sterile dressing was applied. No specimens collected. Patient tolerated the procedure well and was taken back to the PACU in a stable condition for observation.    Post-procedure Evaluation:  Patient was evaluated in post-op area by the RN and the pain went down from a pre-procedure level of 7 on a scale of 0-10, to a pain level of 0. Patient was able to do lumbar extension and rotation with no discomfort.       Blood Loss: Nill  Specimen: None    Ashvin Garcia

## 2023-06-07 NOTE — DISCHARGE SUMMARY
Discharge Note  Short Stay      SUMMARY     Admit Date: 5/24/2023    Attending Physician: Ashvin Garcia MD        Discharge Physician: Ashvin Garcia MD        Discharge Date: 6/7/2023 12:02 PM    Procedure(s) (LRB):  Bilateral L5 TF GARY with local (Bilateral)    Final Diagnosis: Lumbosacral radiculopathy [M54.17]    Disposition: Home or self care    Patient Instructions:   Discharge Medication List as of 5/24/2023  7:15 AM        CONTINUE these medications which have NOT CHANGED    Details   ACCU-CHEK GUIDE ME GLUCOSE MTR Misc CHECK BLOOD SUGAR TWICE A DAY, Historical Med      atorvastatin (LIPITOR) 20 MG tablet Take 1 tablet (20 mg total) by mouth once daily., Starting Tue 1/17/2023, Until Wed 1/17/2024, Normal      blood sugar diagnostic Strp To check BG 2 times daily, to use with insurance preferred meter, Normal      blood-glucose sensor (DEXCOM G6 SENSOR) Chen 1 sensor every 10 days, Normal      blood-glucose transmitter (DEXCOM G6 TRANSMITTER) Chen 1 transmitter every 3 months, Normal      folic acid (FOLVITE) 1 MG tablet Take 1 tablet (1 mg total) by mouth once daily., Starting Tue 3/14/2023, Until Wed 3/13/2024, Normal      glucagon (BAQSIMI) 3 mg/actuation Spry 3 mg (one actuation) into a single nostril; if no response, may repeat in 15 minutes using a new intranasal device., Normal      hydrOXYchloroQUINE (PLAQUENIL) 100 mg tablet Take 200 mg by mouth once daily., Historical Med      insulin degludec (TRESIBA FLEXTOUCH U-100) 100 unit/mL (3 mL) insulin pen Inject 24 Units into the skin every evening. Max TDD of 30 units, Starting Wed 3/22/2023, Until Thu 3/21/2024, Normal      insulin lispro-aabc (LYUMJEV KWIKPEN U-100 INSULIN) 100 unit/mL pen Inject 10 units 3 times per day before meals, 2-4 units for snack, . + correction scale. MAX TDD Of 50 units, Normal      lancets Misc To check BG 2 times daily, to use with insurance preferred meter, Normal      methotrexate 2.5 MG Tab Take 6 tablets (15 mg total) by  "mouth every 7 days., Starting Tue 3/14/2023, Until Wed 3/13/2024, Normal      pen needle, diabetic 32 gauge x 5/32" Ndle To use 7 times per day with insulin injections- for meals, long-acting insulin and for snacks, Normal      pregabalin (LYRICA) 50 MG capsule Take 1 capsule (50 mg total) by mouth 3 (three) times daily., Starting Thu 5/4/2023, Until Thu 11/2/2023, Normal      sacubitriL-valsartan (ENTRESTO) 24-26 mg per tablet Take 1 tablet by mouth 2 (two) times daily., Starting Wed 4/26/2023, Normal      sod sulf-pot chloride-mag sulf (SUTAB) 1.479-0.188- 0.225 gram tablet Take 12 tablets by mouth once daily. Take according to package instructions with indicated amount of water., Starting Mon 5/1/2023, Normal      testosterone cypionate (DEPOTESTOTERONE CYPIONATE) 200 mg/mL injection Inject 1 mL (200 mg total) into the muscle once a week., Starting Tue 4/18/2023, Until Sun 10/15/2023, Normal      LEVEMIR FLEXPEN 100 unit/mL (3 mL) InPn pen Inject into the skin., Starting Thu 3/16/2023, Historical Med      metoprolol succinate (TOPROL-XL) 25 MG 24 hr tablet Take 1 tablet (25 mg total) by mouth once daily., Starting Wed 4/26/2023, Until Thu 4/25/2024, Normal                 Discharge Diagnosis: Lumbosacral radiculopathy [M54.17]  Condition on Discharge: Stable with no complications to procedure   Diet on Discharge: Same as before.  Activity: as per instruction sheet.  Discharge to: Home with a responsible adult.  Follow up: 2-4 weeks       Please call the office at [phone] if you experience any weakness or loss of sensation, fever > 101.5, pain uncontrolled with oral medications, persistent nausea/vomiting/or diarrhea, redness or drainage from the incisions, or any other worrisome concerns. If physician on call was not reached or could not communicate with our office for any reason please go to the nearest emergency department        "

## 2023-06-07 NOTE — BRIEF OP NOTE
Discharge Note  Short Stay      SUMMARY     Admit Date: 6/7/2023    Attending Physician: Ashvin Garcia MD        Discharge Physician: Ashvin Garcia MD        Discharge Date: 6/7/2023 11:22 AM    Procedure(s) (LRB):  Bilateral C3-5 MBB with local (Bilateral)    Final Diagnosis: Cervical spondylosis [M47.812]  Chronic neck pain [M54.2, G89.29]    Disposition: Home or self care    Patient Instructions:   Discharge Medication List as of 6/7/2023  8:53 AM        CONTINUE these medications which have NOT CHANGED    Details   ACCU-CHEK GUIDE ME GLUCOSE MTR Misc CHECK BLOOD SUGAR TWICE A DAY, Historical Med      amitriptyline (ELAVIL) 25 MG tablet Take 1 tablet (25 mg total) by mouth nightly as needed for Insomnia., Starting Thu 5/25/2023, Until Fri 5/24/2024 at 2359, Normal      atorvastatin (LIPITOR) 20 MG tablet Take 1 tablet (20 mg total) by mouth once daily., Starting Tue 1/17/2023, Until Wed 1/17/2024, Normal      blood sugar diagnostic Strp To check BG 2 times daily, to use with insurance preferred meter, Normal      blood-glucose sensor (DEXCOM G6 SENSOR) Chen Use 1 sensor every 10 days, Normal      blood-glucose transmitter (DEXCOM G6 TRANSMITTER) Chen 1 transmitter every 3 months, Normal      folic acid (FOLVITE) 1 MG tablet Take 1 tablet (1 mg total) by mouth once daily., Starting Tue 3/14/2023, Until Wed 3/13/2024, Normal      glucagon (BAQSIMI) 3 mg/actuation Spry 3 mg (one actuation) into a single nostril; if no response, may repeat in 15 minutes using a new intranasal device., Normal      hydrOXYchloroQUINE (PLAQUENIL) 100 mg tablet Take 200 mg by mouth once daily., Historical Med      insulin degludec (TRESIBA FLEXTOUCH U-100) 100 unit/mL (3 mL) insulin pen Inject 24 Units into the skin every evening. Max TDD of 30 units, Starting Wed 3/22/2023, Until Thu 3/21/2024, Normal      insulin lispro-aabc (LYUMJEV KWIKPEN U-100 INSULIN) 100 unit/mL pen Inject 10 units 3 times per day before meals, 2-4 units for snack,  ". + correction scale. MAX TDD Of 50 units, Normal      lancets Misc To check BG 2 times daily, to use with insurance preferred meter, Normal      methotrexate 2.5 MG Tab Take 6 tablets (15 mg total) by mouth every 7 days., Starting Tue 3/14/2023, Until Wed 3/13/2024, Normal      metoprolol succinate (TOPROL-XL) 25 MG 24 hr tablet Take 1 tablet (25 mg total) by mouth once daily., Starting Thu 5/25/2023, Until Fri 5/24/2024, Normal      pen needle, diabetic 32 gauge x 5/32" Ndle To use 7 times per day with insulin injections- for meals, long-acting insulin and for snacks, Normal      pregabalin (LYRICA) 50 MG capsule Take 1 capsule (50 mg total) by mouth 3 (three) times daily., Starting Thu 5/4/2023, Until Thu 11/2/2023, Normal      sacubitriL-valsartan (ENTRESTO) 24-26 mg per tablet Take 1 tablet by mouth 2 (two) times daily., Starting Wed 4/26/2023, Normal      sod sulf-pot chloride-mag sulf (SUTAB) 1.479-0.188- 0.225 gram tablet Take 12 tablets by mouth once daily. Take according to package instructions with indicated amount of water., Starting Mon 5/1/2023, Normal      testosterone cypionate (DEPOTESTOTERONE CYPIONATE) 200 mg/mL injection Inject 1 mL (200 mg total) into the muscle once a week., Starting Tue 4/18/2023, Until Sun 10/15/2023, Normal                 Discharge Diagnosis: Cervical spondylosis [M47.812]  Chronic neck pain [M54.2, G89.29]  Condition on Discharge: Stable with no complications to procedure   Diet on Discharge: Same as before.  Activity: as per instruction sheet.  Discharge to: Home with a responsible adult.  Follow up: 2-4 weeks       Please call the office at [phone] if you experience any weakness or loss of sensation, fever > 101.5, pain uncontrolled with oral medications, persistent nausea/vomiting/or diarrhea, redness or drainage from the incisions, or any other worrisome concerns. If physician on call was not reached or could not communicate with our office for any reason please " go to the nearest emergency department

## 2023-06-07 NOTE — DISCHARGE INSTRUCTIONS

## 2023-06-09 ENCOUNTER — TELEPHONE (OUTPATIENT)
Dept: PAIN MEDICINE | Facility: CLINIC | Age: 48
End: 2023-06-09
Payer: COMMERCIAL

## 2023-06-09 DIAGNOSIS — L40.50 PSORIATIC ARTHRITIS: ICD-10-CM

## 2023-06-09 DIAGNOSIS — R21 BUTTERFLY RASH: ICD-10-CM

## 2023-06-09 DIAGNOSIS — R76.8 ANA POSITIVE: ICD-10-CM

## 2023-06-09 RX ORDER — FOLIC ACID 1 MG/1
TABLET ORAL
Qty: 90 TABLET | Refills: 1 | Status: SHIPPED | OUTPATIENT
Start: 2023-06-09

## 2023-06-16 ENCOUNTER — OFFICE VISIT (OUTPATIENT)
Dept: PAIN MEDICINE | Facility: CLINIC | Age: 48
End: 2023-06-16
Payer: COMMERCIAL

## 2023-06-16 DIAGNOSIS — M47.812 CERVICAL SPONDYLOSIS: Primary | ICD-10-CM

## 2023-06-16 PROCEDURE — 3066F NEPHROPATHY DOC TX: CPT | Mod: CPTII,95,, | Performed by: PHYSICIAN ASSISTANT

## 2023-06-16 PROCEDURE — 1159F MED LIST DOCD IN RCRD: CPT | Mod: CPTII,95,, | Performed by: PHYSICIAN ASSISTANT

## 2023-06-16 PROCEDURE — 3061F PR NEG MICROALBUMINURIA RESULT DOCUMENTED/REVIEW: ICD-10-PCS | Mod: CPTII,95,, | Performed by: PHYSICIAN ASSISTANT

## 2023-06-16 PROCEDURE — 1159F PR MEDICATION LIST DOCUMENTED IN MEDICAL RECORD: ICD-10-PCS | Mod: CPTII,95,, | Performed by: PHYSICIAN ASSISTANT

## 2023-06-16 PROCEDURE — 3052F HG A1C>EQUAL 8.0%<EQUAL 9.0%: CPT | Mod: CPTII,95,, | Performed by: PHYSICIAN ASSISTANT

## 2023-06-16 PROCEDURE — 1160F PR REVIEW ALL MEDS BY PRESCRIBER/CLIN PHARMACIST DOCUMENTED: ICD-10-PCS | Mod: CPTII,95,, | Performed by: PHYSICIAN ASSISTANT

## 2023-06-16 PROCEDURE — 99214 OFFICE O/P EST MOD 30 MIN: CPT | Mod: 95,,, | Performed by: PHYSICIAN ASSISTANT

## 2023-06-16 PROCEDURE — 4010F ACE/ARB THERAPY RXD/TAKEN: CPT | Mod: CPTII,95,, | Performed by: PHYSICIAN ASSISTANT

## 2023-06-16 PROCEDURE — 99214 PR OFFICE/OUTPT VISIT, EST, LEVL IV, 30-39 MIN: ICD-10-PCS | Mod: 95,,, | Performed by: PHYSICIAN ASSISTANT

## 2023-06-16 PROCEDURE — 3052F PR MOST RECENT HEMOGLOBIN A1C LEVEL 8.0 - < 9.0%: ICD-10-PCS | Mod: CPTII,95,, | Performed by: PHYSICIAN ASSISTANT

## 2023-06-16 PROCEDURE — 4010F PR ACE/ARB THEARPY RXD/TAKEN: ICD-10-PCS | Mod: CPTII,95,, | Performed by: PHYSICIAN ASSISTANT

## 2023-06-16 PROCEDURE — 3066F PR DOCUMENTATION OF TREATMENT FOR NEPHROPATHY: ICD-10-PCS | Mod: CPTII,95,, | Performed by: PHYSICIAN ASSISTANT

## 2023-06-16 PROCEDURE — 1160F RVW MEDS BY RX/DR IN RCRD: CPT | Mod: CPTII,95,, | Performed by: PHYSICIAN ASSISTANT

## 2023-06-16 PROCEDURE — 3061F NEG MICROALBUMINURIA REV: CPT | Mod: CPTII,95,, | Performed by: PHYSICIAN ASSISTANT

## 2023-06-16 NOTE — PROGRESS NOTES
Est Patient Chronic Pain Note (Follow up Visit)    Referring Physician: No ref. provider found    PCP: Luan Lazar MD    Chief Complaint:   No chief complaint on file.    Established Patient - TeleHealth Visit    The patient location is: [place]  The chief complaint leading to consultation is: chronic pain     Visit type: audiovisual    Face to Face time with patient: 10-15 minutes  20 minutes of total time spent on the encounter, which includes face to face time and non-face to face time preparing to see the patient (eg, review of tests), Obtaining and/or reviewing separately obtained history, Documenting clinical information in the electronic or other health record, Independently interpreting results (not separately reported) and communicating results to the patient/family/caregiver, or Care coordination (not separately reported).     Each patient to whom he or she provides medical services by telemedicine is:  (1) informed of the relationship between the physician and patient and the respective role of any other health care provider with respect to management of the patient; and (2) notified that he or she may decline to receive medical services by telemedicine and may withdraw from such care at any time.      SUBJECTIVE:  Interval History (6/16/2023): Jason Sandra presents today for follow-up visit.  he underwent bilateral L5/S1 TF GARY on 05/24/2023 followed 2 weeks later by bilateral C3-5 MBB on 6/7/23.  He reports 80% relief x 8 hours following the diagnostic C3-5 MBB. He is unsure how much relief he has gotten from the GARY, it has been 3 weeks so far. He reports his legs are not bothering him today, but hard to tell if it is just a good day or if the injection is beginning to work. Left leg usually worse than right, but today okay.  The changes have continued through this visit.  Patient reports pain as 6/10 today. Reports continued axial neck pain with associated tingling along left side. Neck pain  worse than back pain.      Initial HPI  Jason Sandra is a 48 y.o. male who presents to the clinic for the evaluation of neck and lower back pain that started 2-1/2 months ago without triggering events.  The lower back is worse than the neck.  The neck pain radiates to the suboccipital and interscapular areas.  There is no radiation to the arms.  The lower back pain radiates to the back of the thighs and calves worse on the left (L5 and S1 dermatomes) with intermittent tingling and numbness in the same areas.  The pain is worse with walking and daily ADLs.  Nothing makes it better.  He rates it at 10/10 at worst, 7/10 at best, and 7/10 today.  Reports weakness in the legs.  They give out.  No falls though.  Denies loss of bowel or bladder control.      Patient denies night fever/night sweats, urinary incontinence, and bowel incontinence.       report:  Reviewed and consistent with medication use as prescribed.    Injection History:    bilateral L5/S1 TF GARY on 05/24/2023   bilateral C3-5 MBB on 6/7/23 with 80% relief    Past Medical History:   Diagnosis Date    Diabetes mellitus     Hypogonadism in male      Past Surgical History:   Procedure Laterality Date    INJECTION OF ANESTHETIC AGENT AROUND MEDIAL BRANCH NERVES INNERVATING CERVICAL FACET JOINT Bilateral 6/7/2023    Procedure: Bilateral C3-5 MBB with local;  Surgeon: Tony Garcia MD;  Location: [facility] [place];  Service: Pain Management;  Laterality: Bilateral;    NOSE SURGERY      SELECTIVE INJECTION OF ANESTHETIC AGENT AROUND LUMBAR SPINAL NERVE ROOT BY TRANSFORAMINAL APPROACH Bilateral 5/24/2023    Procedure: Bilateral L5 TF GARY with local;  Surgeon: Tony Garcia MD;  Location: [facility] [place];  Service: Pain Management;  Laterality: Bilateral;     Social History     Socioeconomic History    Marital status: Legally    Tobacco Use    Smoking status: Never     Passive exposure: Never    Smokeless tobacco: Never   Substance and Sexual Activity     Alcohol use: Not Currently     Comment: socially    Drug use: No    Sexual activity: Yes     Partners: Female   Social History Narrative         Social Determinants of Health     Financial Resource Strain: Low Risk     Difficulty of Paying Living Expenses: Not hard at all   Food Insecurity: No Food Insecurity    Worried About Running Out of Food in the Last Year: Never true    Ran Out of Food in the Last Year: Never true   Transportation Needs: No Transportation Needs    Lack of Transportation (Medical): No    Lack of Transportation (Non-Medical): No   Stress: No Stress Concern Present    Feeling of Stress : Only a little   Social Connections: Moderately Isolated    Frequency of Communication with Friends and Family: More than three times a week    Frequency of Social Gatherings with Friends and Family: More than three times a week    Attends [employer] Services: Never    Active Member of Clubs or Organizations: No    Attends Club or Organization Meetings: Never    Marital Status:    Housing Stability: Low Risk     Unable to Pay for Housing in the Last Year: No    Number of Places Lived in the Last Year: 2    Unstable Housing in the Last Year: No     Family History   Problem Relation Age of Onset    Stroke Mother     Glaucoma Mother     Cataracts Mother     Alzheimer's disease Father        Review of patient's allergies indicates:  No Known Allergies    Current Outpatient Medications   Medication Sig    folic acid (FOLVITE) 1 MG tablet TAKE 1 TABLET BY MOUTH EVERY DAY    ACCU-CHEK GUIDE ME GLUCOSE MTR Misc CHECK BLOOD SUGAR TWICE A DAY    amitriptyline (ELAVIL) 25 MG tablet Take 1 tablet (25 mg total) by mouth nightly as needed for Insomnia.    atorvastatin (LIPITOR) 20 MG tablet Take 1 tablet (20 mg total) by mouth once daily.    blood sugar diagnostic Strp To check BG 2 times daily, to use with insurance preferred meter    blood-glucose sensor (DEXCOM G6 SENSOR) Chen Use 1 sensor every 10 days     "blood-glucose transmitter (DEXCOM G6 TRANSMITTER) Chen 1 transmitter every 3 months    glucagon (BAQSIMI) 3 mg/actuation Spry 3 mg (one actuation) into a single nostril; if no response, may repeat in 15 minutes using a new intranasal device.    hydrOXYchloroQUINE (PLAQUENIL) 100 mg tablet Take 200 mg by mouth once daily.    insulin degludec (TRESIBA FLEXTOUCH U-100) 100 unit/mL (3 mL) insulin pen Inject 24 Units into the skin every evening. Max TDD of 30 units    insulin lispro-aabc (LYUMJEV KWIKPEN U-100 INSULIN) 100 unit/mL pen Inject 10 units 3 times per day before meals, 2-4 units for snack, . + correction scale. MAX TDD Of 50 units    lancets Misc To check BG 2 times daily, to use with insurance preferred meter    methotrexate 2.5 MG Tab Take 6 tablets (15 mg total) by mouth every 7 days. (Patient not taking: Reported on 5/25/2023)    metoprolol succinate (TOPROL-XL) 25 MG 24 hr tablet Take 1 tablet (25 mg total) by mouth once daily.    pen needle, diabetic 32 gauge x 5/32" Ndle To use 7 times per day with insulin injections- for meals, long-acting insulin and for snacks (Patient not taking: Reported on 5/26/2023)    sacubitriL-valsartan (ENTRESTO) 24-26 mg per tablet Take 1 tablet by mouth 2 (two) times daily.    testosterone cypionate (DEPOTESTOTERONE CYPIONATE) 200 mg/mL injection Inject 1 mL (200 mg total) into the muscle once a week. (Patient not taking: Reported on 5/25/2023)     No current facility-administered medications for this visit.       Review of Systems     GENERAL:  No weight loss, malaise or fevers.  HEENT:   No recent changes in vision or hearing  NECK:  Negative for lumps, no difficulty with swallowing.  RESPIRATORY:  Negative for cough, wheezing or shortness of breath, patient denies any recent URI.  CARDIOVASCULAR:  Negative for chest pain, leg swelling or palpitations.  GI:  Negative for abdominal discomfort, blood in stools or black stools or change in bowel habits.  MUSCULOSKELETAL:  " See HPI.  SKIN:  Negative for lesions, rash, and itching.  PSYCH:  No mood disorder or recent psychosocial stressors.  Patients sleep is not disturbed secondary to pain.  HEMATOLOGY/LYMPHOLOGY:  Negative for prolonged bleeding, bruising easily or swollen nodes.  Patient is not currently taking any anti-coagulants  NEURO:   No history of headaches, syncope, paralysis, seizures or tremors.  All other reviewed and negative other than HPI.    OBJECTIVE:    There were no vitals taken for this visit.        PHYSICAL EXAM:  Telemedicine Exam  There were no vitals filed for this visit.  There is no height or weight on file to calculate BMI.   (reviewed on 6/16/2023)     GENERAL: Well appearing, in no acute distress, alert and oriented x3.  Cooperative.  PSYCH:  Mood and affect appropriate.  SKIN: Skin color & texture with no obvious abnormalities.    HEAD/FACE:  Normocephalic, atraumatic.    PULM:  No difficulty breathing. No nasal flaring. No obvious wheezing.  EXTREMITIES: No obvious deformities. Moving all extremities well, appears to have symmetric strength throughout.  MUSCULOSKELETAL: No obvious atrophy abnormalities are noted.   NEURO: No obvious neurologic deficit.   GAIT: sitting.     Physical Exam: last in clinic visit:    GENERAL: Well appearing, in no acute distress, alert and oriented x3.  PSYCH:  Mood and affect appropriate.  SKIN: Skin color, texture, turgor normal, no rashes or lesions.  HEAD/FACE:  Normocephalic, atraumatic.  CV: No edema.  PULM: No evidence of respiratory difficulty, symmetric chest rise.  GI:  Abdomen soft and non-tender.    GAIT: slow .    CERVICAL SPINE:  Palpation: Tenderness over bilateral facet joints and paraspinous muscles. No trigger points.  ROM: Pain with extension, rotation and lateral flexion. Spurling's negative.    LUMBAR SPINE:   Palpation: Tenderness over bilateral facet joints and paraspinous muscles.   ROM: Pain with flexion, extension and rotation, worse with  flexion.  Straight leg raising is positive for radicular pain on the.  SI JOINTS: bilateral SI joints, no tenderness, negative Go's   HIPS: normal and nonpainful ROM of both hip joints.    NEURO:   MOTOR: Bilateral upper and lower extremity muscle strength is normal. No atrophy or tone abnormalities are noted.  SENSORY: No loss of sensation in C4 through T1 dermatomes bilaterally, and in L3 through S1 dermatomes.  DTR's: Reflexes are physiologic and symmetric in upper extremities.    Deep Tendon Reflexes:      Reflex Scores:       Patellar reflexes are 1+ on the right side and 1+ on the left side.       Achilles reflexes are 1+ on the right side and 0 on the left side.   No clonus.  Negative Lewis's bilaterally.     EMG - 5/4/23 by Dr. Andrea Hamilton  INTERPRETATION  -Bilateral superficial peroneal sensorynerve conduction study showed normal peak latency and amplitude  -Bilateral sural sensory nerve conduction study showed normal peak latency and amplitude  -Bilateral peroneal motor nerve conduction study showed normal latency, dec amplitude on left, and dec conduction velocity  -Bilateral tibial motor nerve conduction study showed normal latency, amplitude, and conduction velocity  -Needle EMG examination performed to above mentioned muscles   IMPRESSION  ABNORMAL study  2. There is electrodiagnostic evidence of an acute on chronic radiculopathy of the left L5 nerve root and a chronic radiculopathy of the right L5 and bilateral S1 nerve roots    Imaging:   Results for orders placed during the hospital encounter of 05/09/23    MRI Lumbar Spine Without Contrast    Narrative  EXAMINATION:  MRI LUMBAR SPINE WITHOUT CONTRAST    CLINICAL HISTORY:  Lumbar radiculopathy, symptoms persist with conservative treatment; Radiculopathy, lumbosacral region    TECHNIQUE:  Multiplanar, multisequence MR images were acquired from the thoracolumbar junction to the sacrum without the administration of  contrast.    COMPARISON:  MRI dated 09/05/2018.  X-ray dated 09/25/2018    FINDINGS:  There are 5 non-rib-bearing lumbar vertebrae.  There is a rudimentary S1-S2 disc.  Alignment is unremarkable with no significant listhesis.  No acute fracture or compression deformity.  No aggressive focal signal abnormality mild Modic type 1 edema at the posterior aspect of the L5 inferior endplate.    Conus medullaris terminates at the L2 level.  Conus medullaris is normal in size and signal.    T12-L1: No significant disc pathology.  No significant spinal canal or neural foraminal stenosis.    L1-L2: No significant disc pathology.  No significant spinal canal or neural foraminal stenosis.    L2-L3: No significant disc pathology.  No significant spinal canal or neural foraminal stenosis.    L3-L4: No significant disc pathology.  Mild bilateral facet arthropathy.  No significant spinal canal or neural foraminal stenosis.    L4-L5: Small circumferential disc bulge.  Mild bilateral facet arthropathy.  No significant spinal canal stenosis.  Mild bilateral neural foraminal stenosis.    L5-S1: Disc desiccation and small circumferential disc bulge with central protrusion and annular fissure.  Mild bilateral facet arthropathy.  No significant spinal canal stenosis.  Mild to moderate bilateral neural foraminal stenosis.  (personal evaluation:  Disc is touching bilateral S1 nerve roots in the lateral recesses and posteriorly displacing the right)  Paraspinal soft tissues are unremarkable.  Visualized intra-abdominal and pelvic contents are also unremarkable.    Impression  Mild lower lumbar level predominant degenerative changes as detailed above including Modic type 1 edema at the posterior aspect of the L5 inferior endplate.    Small disc bulge with central protrusion annular fissure at L5-S1.    No significant spinal canal stenosis.  Mild bilateral neural foraminal stenosis at L4-L5.  Mild-to-moderate bilateral neural foraminal stenosis  at L5-S1.      Electronically signed by: Brian Santiago  Date:    05/10/2023  Time:    07:55        Results for orders placed during the hospital encounter of 04/10/23    MRI Cervical Spine Without Contrast    Narrative  EXAMINATION:  MRI CERVICAL SPINE WITHOUT CONTRAST    CLINICAL HISTORY:  Headache, unspecifiedNeck pain, acute, infection suspected;    TECHNIQUE:  MR Cervical spine without contrast. Sagittal T1, T2, STIR. Axial 3D, T2. Coronal T1.    COMPARISON:  None available.    FINDINGS:  Vertebral body height is normal and alignment is maintained. Marrow signal is within normal limits. The craniocervical junction is unremarkable. No abnormal cord signal or cord deformity. Intervertebral disc levels are as follows:    C2-C3 disc: Normal.    C3-C4 disc: Minimal disc bulge.  Mild left facet arthropathy.  No significant spinal or foraminal stenosis.  The dural canal measures 14 mm.    C4-C5 disc: Normal.    C5-C6 disc: Mild posterior disc bulge.  Normal uncovertebral joints and facet joints.  No spinal or foraminal stenosis.  The dural canal measures 12 mm.    C6-C7 disc: Normal.    C7-T1 disc: Normal.    Impression  Mild degenerative spondylosis without significant spinal canal or neural foraminal stenosis.  Normal cord signal and caliber.  No signs of spondylodiscitis.      Electronically signed by: Ronald Borges Jr., MD  Date:    04/11/2023  Time:    11:28        Results for orders placed during the hospital encounter of 09/25/18    X-Ray Lumbar Spine Flexion And Extension Only    Narrative  EXAMINATION:  XR LUMBAR SPINE FLEXION AND EXTENSION ONLY    CLINICAL HISTORY:  Other intervertebral disc degeneration, lumbar region    TECHNIQUE:  Lateral flexion and extension views of the lumbar spine were obtained with the patient standing.    COMPARISON:  Lumbar spine radiographs dated 14 August 2018    FINDINGS:  The lumbar vertebral bodies remain normal in height and are in lordotic alignment with no induced  listhesis demonstrated on these views.  There is stable and unremarkable radiographic appearance of the lumbar disc spaces.    IMPRESSION:    See above.      Electronically signed by: Tanner Givens MD  Date:    09/25/2018  Time:    11:46      Results for orders placed during the hospital encounter of 08/14/18    X-Ray Lumbar Spine AP And Lateral    Narrative  EXAMINATION:  XR LUMBAR SPINE AP AND LATERAL    CLINICAL HISTORY:  Low back pain, >6wks conservative tx, persistent-progressive sx, surgical candidate;Pain in left leg    TECHNIQUE:  AP, lateral and spot images were performed of the lumbar spine.    COMPARISON:  None    FINDINGS:  Multiple views of the lumbar spine demonstrate no acute fractures.    IMPRESSION:    No acute fractures.      Electronically signed by: Tyler York MD  Date:    08/14/2018  Time:    15:09        Results for orders placed during the hospital encounter of 03/06/23    X-Ray Cervical Spine AP And Lateral    Narrative  EXAM: XR CERVICAL SPINE AP LATERAL    CLINICAL HISTORY:  Cervicalgia.    TECHNIQUE:   Cervical spine x-ray, 5 views    FINDINGS:  Comparison study 03/21/2012.  No change.  There is no fracture or malalignment.  The disc spaces are well-preserved.  No degenerative sequela.    Impression  Unremarkable cervical spine x-ray.    Finalized on: 3/6/2023 4:47 PM By:  Aric Ash MD  [facility]# [id number]      2023-03-06 16:[id number]    [facility]          LABS:  Lab Results   Component Value Date    WBC 4.53 04/27/2023    HGB 15.7 04/27/2023    HCT 45.3 04/27/2023    MCV 85 04/27/2023     04/27/2023       CMP  Sodium   Date Value Ref Range Status   05/01/2023 137 136 - 145 mmol/L Final     Potassium   Date Value Ref Range Status   05/01/2023 4.2 3.5 - 5.1 mmol/L Final     Chloride   Date Value Ref Range Status   05/01/2023 104 95 - 110 mmol/L Final     CO2   Date Value Ref Range Status   05/01/2023 25 23 - 29 mmol/L Final     Glucose   Date Value Ref Range Status   05/01/2023 141 (H) 70  - 110 mg/dL Final     BUN   Date Value Ref Range Status   05/01/2023 17 6 - 20 mg/dL Final     Creatinine   Date Value Ref Range Status   05/01/2023 1.1 0.5 - 1.4 mg/dL Final     Calcium   Date Value Ref Range Status   05/01/2023 9.9 8.7 - 10.5 mg/dL Final     Total Protein   Date Value Ref Range Status   03/20/2023 7.8 6.0 - 8.4 g/dL Final     Albumin   Date Value Ref Range Status   03/20/2023 3.8 3.5 - 5.2 g/dL Final     Total Bilirubin   Date Value Ref Range Status   03/20/2023 0.6 0.1 - 1.0 mg/dL Final     Comment:     For infants and newborns, interpretation of results should be based  on gestational age, weight and in agreement with clinical  observations.    Premature Infant recommended reference ranges:  Up to 24 hours.............<8.0 mg/dL  Up to 48 hours............<12.0 mg/dL  3-5 days..................<15.0 mg/dL  6-29 days.................<15.0 mg/dL       Alkaline Phosphatase   Date Value Ref Range Status   03/20/2023 79 55 - 135 U/L Final     AST   Date Value Ref Range Status   03/20/2023 23 10 - 40 U/L Final     ALT   Date Value Ref Range Status   03/20/2023 30 10 - 44 U/L Final     Anion Gap   Date Value Ref Range Status   05/01/2023 8 8 - 16 mmol/L Final     eGFR if    Date Value Ref Range Status   02/24/2020 >60 >60 mL/min/1.73 m^2 Final     eGFR if non    Date Value Ref Range Status   02/24/2020 >60 >60 mL/min/1.73 m^2 Final     Comment:     Calculation used to obtain the estimated glomerular filtration  rate (eGFR) is the CKD-EPI equation.          Lab Results   Component Value Date    HGBA1C 8.7 (H) 03/20/2023             ASSESSMENT: 48 y.o. year old male with:    1. Cervical spondylosis  IR Facet Inj Cerv Thoracic 2nd Vert Bi    IR Facet Inj Cervical Thoracic 1st Vert Bi    Case Request-RAD/Other Procedure Area: Bilateral C3-5 MBB  second diagnostic block with RN IV sedation            Cervical spondylosis  -     IR Facet Inj Cerv Thoracic 2nd Vert Bi; Future;  Expected date: 06/16/2023  -     IR Facet Inj Cervical Thoracic 1st Vert Bi; Future; Expected date: 06/16/2023  -     Case Request-RAD/Other Procedure Area: Bilateral C3-5 MBB  second diagnostic block with RN IV sedation           PLAN: Schedule repeat C3-5 Diagnostic MBB, call next day to obtain % relief, if >/= 80%, schedule for RFA  S/p bilateral L5/S1 TF GARY on 05/24/2023  S/p bilateral C3-5 MBB on 6/7/23 with 80% relief    Previously taking Lyrica per Rheumatology, d/c'd due to CHF and ineffectiveness    - Counseled patient regarding the importance of activity modification  - This condition does not require this patient to take time off of work, and the primary goal of our Pain Management services is to improve the patient's functional capacity.  - Patient Questions: Answered all of the patient's questions regarding diagnosis, therapy, and treatment    The above plan and management options were discussed at length with patient. Patient is in agreement with the above and verbalized understanding.      Nita Escobar PA-C  Interventional Pain Management  [facility]    Disclaimer:  This note was prepared using voice recognition system and is likely to have sound alike errors that may have been overlooked even after proof reading.  Please call me with any questions

## 2023-06-20 ENCOUNTER — PATIENT MESSAGE (OUTPATIENT)
Dept: PAIN MEDICINE | Facility: CLINIC | Age: 48
End: 2023-06-20
Payer: COMMERCIAL

## 2023-06-21 ENCOUNTER — PATIENT MESSAGE (OUTPATIENT)
Dept: PAIN MEDICINE | Facility: CLINIC | Age: 48
End: 2023-06-21
Payer: COMMERCIAL

## 2023-07-03 ENCOUNTER — HOSPITAL ENCOUNTER (OUTPATIENT)
Dept: CARDIOLOGY | Facility: HOSPITAL | Age: 48
Discharge: HOME | End: 2023-07-03
Payer: COMMERCIAL

## 2023-07-03 ENCOUNTER — PATIENT MESSAGE (OUTPATIENT)
Dept: CARDIOLOGY | Facility: CLINIC | Age: 48
End: 2023-07-03
Payer: COMMERCIAL

## 2023-07-03 VITALS
HEIGHT: 68 IN | HEART RATE: 65 BPM | SYSTOLIC BLOOD PRESSURE: 131 MMHG | WEIGHT: 149 LBS | BODY MASS INDEX: 22.58 KG/M2 | DIASTOLIC BLOOD PRESSURE: 89 MMHG

## 2023-07-03 DIAGNOSIS — I50.9 CONGESTIVE HEART FAILURE, UNSPECIFIED HF CHRONICITY, UNSPECIFIED HEART FAILURE TYPE: ICD-10-CM

## 2023-07-03 LAB
AORTIC ROOT ANNULUS: 2.53 CM
ASCENDING AORTA: 2.54 CM
AV INDEX (PROSTH): 0.73
AV MEAN GRADIENT: 3 MMHG
AV PEAK GRADIENT: 6 MMHG
AV VALVE AREA: 2.1 CM2
AV VELOCITY RATIO: 0.72
BSA FOR ECHO PROCEDURE: 1.8 M2
CV ECHO LV RWT: 0.44 CM
DOP CALC AO PEAK VEL: 1.24 M/S
DOP CALC AO VTI: 25.2 CM
DOP CALC LVOT AREA: 2.9 CM2
DOP CALC LVOT DIAMETER: 1.91 CM
DOP CALC LVOT PEAK VEL: 0.89 M/S
DOP CALC LVOT STROKE VOLUME: 52.98 CM3
DOP CALC RVOT PEAK VEL: 0.74 M/S
DOP CALC RVOT VTI: 15.3 CM
DOP CALCLVOT PEAK VEL VTI: 18.5 CM
E WAVE DECELERATION TIME: 257.19 MSEC
E/A RATIO: 1.42
E/E' RATIO: 5.59 M/S
ECHO LV POSTERIOR WALL: 0.96 CM (ref 0.6–1.1)
EJECTION FRACTION: 55 %
FRACTIONAL SHORTENING: 27 % (ref 28–44)
INTERVENTRICULAR SEPTUM: 0.86 CM (ref 0.6–1.1)
IVRT: 79.92 MSEC
LA MAJOR: 4.14 CM
LA MINOR: 4.15 CM
LA WIDTH: 3.1 CM
LEFT ATRIUM SIZE: 3.07 CM
LEFT ATRIUM VOLUME INDEX MOD: 12.1 ML/M2
LEFT ATRIUM VOLUME INDEX: 18.6 ML/M2
LEFT ATRIUM VOLUME MOD: 21.79 CM3
LEFT ATRIUM VOLUME: 33.53 CM3
LEFT INTERNAL DIMENSION IN SYSTOLE: 3.23 CM (ref 2.1–4)
LEFT VENTRICLE DIASTOLIC VOLUME INDEX: 48.81 ML/M2
LEFT VENTRICLE DIASTOLIC VOLUME: 87.85 ML
LEFT VENTRICLE MASS INDEX: 72 G/M2
LEFT VENTRICLE SYSTOLIC VOLUME INDEX: 23.2 ML/M2
LEFT VENTRICLE SYSTOLIC VOLUME: 41.76 ML
LEFT VENTRICULAR INTERNAL DIMENSION IN DIASTOLE: 4.4 CM (ref 3.5–6)
LEFT VENTRICULAR MASS: 129.94 G
LV LATERAL E/E' RATIO: 5.06 M/S
LV SEPTAL E/E' RATIO: 6.23 M/S
LVOT MG: 1.96 MMHG
LVOT MV: 0.67 CM/S
MV PEAK A VEL: 0.57 M/S
MV PEAK E VEL: 0.81 M/S
MV STENOSIS PRESSURE HALF TIME: 74.58 MS
MV VALVE AREA P 1/2 METHOD: 2.95 CM2
PISA TR MAX VEL: 2.85 M/S
PULM VEIN S/D RATIO: 0.75
PV MEAN GRADIENT: 1.22 MMHG
PV PEAK D VEL: 0.61 M/S
PV PEAK S VEL: 0.46 M/S
PV PEAK VELOCITY: 1.11 CM/S
RA MAJOR: 3.51 CM
RA PRESSURE: 3 MMHG
RA WIDTH: 3.27 CM
RIGHT VENTRICULAR END-DIASTOLIC DIMENSION: 2.88 CM
SINUS: 2.44 CM
STJ: 2.25 CM
TDI LATERAL: 0.16 M/S
TDI SEPTAL: 0.13 M/S
TDI: 0.15 M/S
TR MAX PG: 32 MMHG
TV REST PULMONARY ARTERY PRESSURE: 35 MMHG

## 2023-07-03 PROCEDURE — 93306 ECHO (CUPID ONLY): ICD-10-PCS | Mod: 26,,, | Performed by: INTERNAL MEDICINE

## 2023-07-03 PROCEDURE — 93306 TTE W/DOPPLER COMPLETE: CPT

## 2023-07-03 PROCEDURE — 93306 TTE W/DOPPLER COMPLETE: CPT | Mod: 26,,, | Performed by: INTERNAL MEDICINE

## 2023-07-05 ENCOUNTER — PATIENT MESSAGE (OUTPATIENT)
Dept: CARDIOLOGY | Facility: CLINIC | Age: 48
End: 2023-07-05
Payer: COMMERCIAL

## 2023-07-06 ENCOUNTER — TELEPHONE (OUTPATIENT)
Dept: CARDIOLOGY | Facility: CLINIC | Age: 48
End: 2023-07-06
Payer: COMMERCIAL

## 2023-07-06 ENCOUNTER — PATIENT MESSAGE (OUTPATIENT)
Dept: CARDIOLOGY | Facility: CLINIC | Age: 48
End: 2023-07-06
Payer: COMMERCIAL

## 2023-07-06 NOTE — TELEPHONE ENCOUNTER
Called pt about an appointment. No voicemail set up. He already has a 1 month follow up appointment scheduled, so I sent the appointment details to the patient portal. SUDEEP

## 2023-07-06 NOTE — TELEPHONE ENCOUNTER
Contacted pt and informed him his Heart function recovered and is now in normal range at 55%. Continue current medications and follow up as scheduled in August with Dr. Payne. Pt vu w/o q/c      ----- Message from Amara Kraus NP sent at 7/6/2023  2:53 PM CDT -----  Heart function recovered and is now in normal range at 55%. Continue current medications and follow up as scheduled in August with Dr. Payne

## 2023-07-10 ENCOUNTER — PATIENT MESSAGE (OUTPATIENT)
Dept: PAIN MEDICINE | Facility: CLINIC | Age: 48
End: 2023-07-10
Payer: COMMERCIAL

## 2023-07-20 ENCOUNTER — PATIENT MESSAGE (OUTPATIENT)
Dept: CARDIOLOGY | Facility: CLINIC | Age: 48
End: 2023-07-20
Payer: COMMERCIAL

## 2023-08-04 ENCOUNTER — PATIENT MESSAGE (OUTPATIENT)
Dept: PAIN MEDICINE | Facility: CLINIC | Age: 48
End: 2023-08-04
Payer: COMMERCIAL

## 2023-08-07 DIAGNOSIS — I50.9 CONGESTIVE HEART FAILURE, UNSPECIFIED HF CHRONICITY, UNSPECIFIED HEART FAILURE TYPE: Primary | ICD-10-CM

## 2023-08-08 ENCOUNTER — HOSPITAL ENCOUNTER (OUTPATIENT)
Dept: CARDIOLOGY | Facility: HOSPITAL | Age: 48
Discharge: HOME | End: 2023-08-08
Attending: STUDENT IN AN ORGANIZED HEALTH CARE EDUCATION/TRAINING PROGRAM
Payer: COMMERCIAL

## 2023-08-08 DIAGNOSIS — I50.9 CONGESTIVE HEART FAILURE, UNSPECIFIED HF CHRONICITY, UNSPECIFIED HEART FAILURE TYPE: ICD-10-CM

## 2023-08-08 PROCEDURE — 93005 ELECTROCARDIOGRAM TRACING: CPT

## 2023-08-08 PROCEDURE — 93010 ELECTROCARDIOGRAM REPORT: CPT | Mod: ,,, | Performed by: INTERNAL MEDICINE

## 2023-08-08 PROCEDURE — 93010 EKG 12-LEAD: ICD-10-PCS | Mod: ,,, | Performed by: INTERNAL MEDICINE

## 2023-08-09 ENCOUNTER — OFFICE VISIT (OUTPATIENT)
Dept: CARDIOLOGY | Facility: CLINIC | Age: 48
End: 2023-08-09
Payer: COMMERCIAL

## 2023-08-09 VITALS
WEIGHT: 152.75 LBS | BODY MASS INDEX: 23.15 KG/M2 | HEIGHT: 68 IN | HEART RATE: 72 BPM | OXYGEN SATURATION: 99 % | DIASTOLIC BLOOD PRESSURE: 72 MMHG | SYSTOLIC BLOOD PRESSURE: 110 MMHG

## 2023-08-09 DIAGNOSIS — R07.9 CHEST PAIN, UNSPECIFIED TYPE: ICD-10-CM

## 2023-08-09 DIAGNOSIS — I50.9 CONGESTIVE HEART FAILURE, UNSPECIFIED HF CHRONICITY, UNSPECIFIED HEART FAILURE TYPE: Primary | ICD-10-CM

## 2023-08-09 DIAGNOSIS — Z82.49 FAMILY HISTORY OF HEART DISEASE: ICD-10-CM

## 2023-08-09 DIAGNOSIS — R03.0 ELEVATED BLOOD PRESSURE READING: ICD-10-CM

## 2023-08-09 DIAGNOSIS — R06.02 SOB (SHORTNESS OF BREATH): ICD-10-CM

## 2023-08-09 DIAGNOSIS — E11.69 HYPERLIPIDEMIA ASSOCIATED WITH TYPE 2 DIABETES MELLITUS: ICD-10-CM

## 2023-08-09 DIAGNOSIS — Z79.4 TYPE 2 DIABETES MELLITUS WITHOUT COMPLICATION, WITH LONG-TERM CURRENT USE OF INSULIN: ICD-10-CM

## 2023-08-09 DIAGNOSIS — E78.5 HYPERLIPIDEMIA ASSOCIATED WITH TYPE 2 DIABETES MELLITUS: ICD-10-CM

## 2023-08-09 DIAGNOSIS — E11.9 TYPE 2 DIABETES MELLITUS WITHOUT COMPLICATION, WITH LONG-TERM CURRENT USE OF INSULIN: ICD-10-CM

## 2023-08-09 PROCEDURE — 99214 OFFICE O/P EST MOD 30 MIN: CPT | Mod: S$GLB,,, | Performed by: STUDENT IN AN ORGANIZED HEALTH CARE EDUCATION/TRAINING PROGRAM

## 2023-08-09 PROCEDURE — 4010F ACE/ARB THERAPY RXD/TAKEN: CPT | Mod: CPTII,S$GLB,, | Performed by: STUDENT IN AN ORGANIZED HEALTH CARE EDUCATION/TRAINING PROGRAM

## 2023-08-09 PROCEDURE — 3052F HG A1C>EQUAL 8.0%<EQUAL 9.0%: CPT | Mod: CPTII,S$GLB,, | Performed by: STUDENT IN AN ORGANIZED HEALTH CARE EDUCATION/TRAINING PROGRAM

## 2023-08-09 PROCEDURE — 99999 PR PBB SHADOW E&M-EST. PATIENT-LVL IV: ICD-10-PCS | Mod: PBBFAC,,, | Performed by: STUDENT IN AN ORGANIZED HEALTH CARE EDUCATION/TRAINING PROGRAM

## 2023-08-09 PROCEDURE — 1159F PR MEDICATION LIST DOCUMENTED IN MEDICAL RECORD: ICD-10-PCS | Mod: CPTII,S$GLB,, | Performed by: STUDENT IN AN ORGANIZED HEALTH CARE EDUCATION/TRAINING PROGRAM

## 2023-08-09 PROCEDURE — 3066F PR DOCUMENTATION OF TREATMENT FOR NEPHROPATHY: ICD-10-PCS | Mod: CPTII,S$GLB,, | Performed by: STUDENT IN AN ORGANIZED HEALTH CARE EDUCATION/TRAINING PROGRAM

## 2023-08-09 PROCEDURE — 1159F MED LIST DOCD IN RCRD: CPT | Mod: CPTII,S$GLB,, | Performed by: STUDENT IN AN ORGANIZED HEALTH CARE EDUCATION/TRAINING PROGRAM

## 2023-08-09 PROCEDURE — 3078F PR MOST RECENT DIASTOLIC BLOOD PRESSURE < 80 MM HG: ICD-10-PCS | Mod: CPTII,S$GLB,, | Performed by: STUDENT IN AN ORGANIZED HEALTH CARE EDUCATION/TRAINING PROGRAM

## 2023-08-09 PROCEDURE — 3066F NEPHROPATHY DOC TX: CPT | Mod: CPTII,S$GLB,, | Performed by: STUDENT IN AN ORGANIZED HEALTH CARE EDUCATION/TRAINING PROGRAM

## 2023-08-09 PROCEDURE — 3008F BODY MASS INDEX DOCD: CPT | Mod: CPTII,S$GLB,, | Performed by: STUDENT IN AN ORGANIZED HEALTH CARE EDUCATION/TRAINING PROGRAM

## 2023-08-09 PROCEDURE — 3008F PR BODY MASS INDEX (BMI) DOCUMENTED: ICD-10-PCS | Mod: CPTII,S$GLB,, | Performed by: STUDENT IN AN ORGANIZED HEALTH CARE EDUCATION/TRAINING PROGRAM

## 2023-08-09 PROCEDURE — 99214 PR OFFICE/OUTPT VISIT, EST, LEVL IV, 30-39 MIN: ICD-10-PCS | Mod: S$GLB,,, | Performed by: STUDENT IN AN ORGANIZED HEALTH CARE EDUCATION/TRAINING PROGRAM

## 2023-08-09 PROCEDURE — 3061F NEG MICROALBUMINURIA REV: CPT | Mod: CPTII,S$GLB,, | Performed by: STUDENT IN AN ORGANIZED HEALTH CARE EDUCATION/TRAINING PROGRAM

## 2023-08-09 PROCEDURE — 3061F PR NEG MICROALBUMINURIA RESULT DOCUMENTED/REVIEW: ICD-10-PCS | Mod: CPTII,S$GLB,, | Performed by: STUDENT IN AN ORGANIZED HEALTH CARE EDUCATION/TRAINING PROGRAM

## 2023-08-09 PROCEDURE — 3052F PR MOST RECENT HEMOGLOBIN A1C LEVEL 8.0 - < 9.0%: ICD-10-PCS | Mod: CPTII,S$GLB,, | Performed by: STUDENT IN AN ORGANIZED HEALTH CARE EDUCATION/TRAINING PROGRAM

## 2023-08-09 PROCEDURE — 3074F PR MOST RECENT SYSTOLIC BLOOD PRESSURE < 130 MM HG: ICD-10-PCS | Mod: CPTII,S$GLB,, | Performed by: STUDENT IN AN ORGANIZED HEALTH CARE EDUCATION/TRAINING PROGRAM

## 2023-08-09 PROCEDURE — 3078F DIAST BP <80 MM HG: CPT | Mod: CPTII,S$GLB,, | Performed by: STUDENT IN AN ORGANIZED HEALTH CARE EDUCATION/TRAINING PROGRAM

## 2023-08-09 PROCEDURE — 4010F PR ACE/ARB THEARPY RXD/TAKEN: ICD-10-PCS | Mod: CPTII,S$GLB,, | Performed by: STUDENT IN AN ORGANIZED HEALTH CARE EDUCATION/TRAINING PROGRAM

## 2023-08-09 PROCEDURE — 99999 PR PBB SHADOW E&M-EST. PATIENT-LVL IV: CPT | Mod: PBBFAC,,, | Performed by: STUDENT IN AN ORGANIZED HEALTH CARE EDUCATION/TRAINING PROGRAM

## 2023-08-09 PROCEDURE — 3074F SYST BP LT 130 MM HG: CPT | Mod: CPTII,S$GLB,, | Performed by: STUDENT IN AN ORGANIZED HEALTH CARE EDUCATION/TRAINING PROGRAM

## 2023-08-09 NOTE — H&P (VIEW-ONLY)
Section of Cardiology                  Cardiac Clinic Note    Chief Complaint/Reason for consultation: chest pain      HPI:   Jason Sandra is a 48 y.o. male with h/o HLD, diabetes who was referred to cardiology clinic by PCP Dr. Mcconnell for evaluation.    3/14/23  Reports chest pain and SOB about 6 months, has been stable  Works in construction, walks up stairs, heavy lifting  SOB was a few days ago, had to stop his activity   Chest pain, intermittent throughout the day, stops his activity  Palpitations are separate 500 [employer] thank remain I can no  Has joint pain, neck pain, shoulder  (sister has lupus)  Saw rheum today, started on MTX as a trial   Exercise daily, with weight training, no cardio, no issues with this for 25 min    Family hx: dad- CABG at age 59 yo  Denies tobacco or ETOH abuse       4/26/23  Comes in with wife  Echo 4/23 with EF 35%  Stress test 4/23 negative   Has been fatigue and SOB  Started having pain in his joints (neck, knee)  LE swelling  Reports sharp and dull chest pain, not daily, does not last long   No orthopnea, PND, syncope        8/9/23  Still feels SOB, improved  Feels it mostly outside   EF has recovered 35%->55%   No LE swelling  No PND, orthopnea  Not exercising much at home, will start back  Good appetite   Lost around 10 lbs since last visit      EKG 8/8/23 NSR  EKG 1/1/23 ST, nonspec TW abn, LVH, 107 bpm      Echo 7/23  The left ventricle is normal in size with concentric remodeling and normal systolic function.  Normal left ventricular diastolic function.  The estimated PA systolic pressure is 35 mmHg.  Normal right ventricular size with normal right ventricular systolic function.  Normal central venous pressure (3 mmHg).  The estimated ejection fraction is 55%, recovered from prior study in april  Mild tricuspid regurgitation.    ECHO  4/23  The left ventricle is normal in size with moderately decreased systolic function.  The estimated ejection fraction  is 35%.  Grade I left ventricular diastolic dysfunction.  There is moderate left ventricular global hypokinesis.  Normal right ventricular size with normal right ventricular systolic function.  Intermediate central venous pressure (8 mmHg).    STRESS TEST    LHC      ROS: All 10 systems reviewed. Please refer to the HPI for pertinent positives. All other systems negative.     Past Medical History  Past Medical History:   Diagnosis Date    Diabetes mellitus     Hypogonadism in male        Surgical History  Past Surgical History:   Procedure Laterality Date    INJECTION OF ANESTHETIC AGENT AROUND MEDIAL BRANCH NERVES INNERVATING CERVICAL FACET JOINT Bilateral 6/7/2023    Procedure: Bilateral C3-5 MBB with local;  Surgeon: Tony Garcia MD;  Location: [facility];  Service: Pain Management;  Laterality: Bilateral;    NOSE SURGERY      SELECTIVE INJECTION OF ANESTHETIC AGENT AROUND LUMBAR SPINAL NERVE ROOT BY TRANSFORAMINAL APPROACH Bilateral 5/24/2023    Procedure: Bilateral L5 TF GARY with local;  Surgeon: Tony Garcia MD;  Location: [facility];  Service: Pain Management;  Laterality: Bilateral;          Allergies:   Review of patient's allergies indicates:  No Known Allergies    Social History:  Social History     Socioeconomic History    Marital status: Legally    Tobacco Use    Smoking status: Never     Passive exposure: Never    Smokeless tobacco: Never   Substance and Sexual Activity    Alcohol use: Not Currently     Comment: socially    Drug use: No    Sexual activity: Yes     Partners: Female   Social History Narrative    [place]     Social Determinants of Health     Financial Resource Strain: Low Risk  (1/3/2023)    Overall Financial Resource Strain (CARDIA)     Difficulty of Paying Living Expenses: Not hard at all   Food Insecurity: No Food Insecurity (1/3/2023)    Hunger Vital Sign     Worried About Running Out of Food in the Last Year: Never true     Ran Out of Food in the Last Year: Never  true   Transportation Needs: No Transportation Needs (1/3/2023)    PRAPARE - Transportation     Lack of Transportation (Medical): No     Lack of Transportation (Non-Medical): No   Stress: No Stress Concern Present (1/3/2023)    [employer] [place] [employer] - Occupational Stress Questionnaire     Feeling of Stress : Only a little   Social Connections: Moderately Isolated (1/3/2023)    Social Connection and Isolation Panel [NHANES]     Frequency of Communication with Friends and Family: More than three times a week     Frequency of Social Gatherings with Friends and Family: More than three times a week     Attends Zoroastrian Services: Never     Active Member of Clubs or Organizations: No     Attends Club or Organization Meetings: Never     Marital Status:    Housing Stability: Low Risk  (1/3/2023)    Housing Stability Vital Sign     Unable to Pay for Housing in the Last Year: No     Number of Places Lived in the Last Year: 2     Unstable Housing in the Last Year: No       Family History:  family history includes Alzheimer's disease in his father; Cataracts in his mother; Glaucoma in his mother; Stroke in his mother.    Home Medications:  Current Outpatient Medications on File Prior to Visit   Medication Sig Dispense Refill    ACCU-CHEK GUIDE ME GLUCOSE MTR Misc CHECK BLOOD SUGAR TWICE A DAY      amitriptyline (ELAVIL) 25 MG tablet Take 1 tablet (25 mg total) by mouth nightly as needed for Insomnia. 90 tablet 1    atorvastatin (LIPITOR) 20 MG tablet Take 1 tablet (20 mg total) by mouth once daily. 90 tablet 2    blood sugar diagnostic Strp To check BG 2 times daily, to use with insurance preferred meter 200 strip 2    blood-glucose sensor (DEXCOM G6 SENSOR) Chen Use 1 sensor every 10 days 3 each 11    blood-glucose transmitter (DEXCOM G6 TRANSMITTER) Chen 1 transmitter every 3 months 1 each 4    folic acid (FOLVITE) 1 MG tablet TAKE 1 TABLET BY MOUTH EVERY DAY 90 tablet 1    glucagon (BAQSIMI) 3  "mg/actuation Spry 3 mg (one actuation) into a single nostril; if no response, may repeat in 15 minutes using a new intranasal device. 2 each 1    hydrOXYchloroQUINE (PLAQUENIL) 100 mg tablet Take 200 mg by mouth once daily.      insulin degludec (TRESIBA FLEXTOUCH U-100) 100 unit/mL (3 mL) insulin pen Inject 24 Units into the skin every evening. Max TDD of 30 units 15 mL 1    insulin lispro-aabc (LYUMJEV KWIKPEN U-100 INSULIN) 100 unit/mL pen Inject 10 units 3 times per day before meals, 2-4 units for snack, . + correction scale. MAX TDD Of 50 units 5 pen 6    lancets Misc To check BG 2 times daily, to use with insurance preferred meter 200 each 2    metoprolol succinate (TOPROL-XL) 25 MG 24 hr tablet Take 1 tablet (25 mg total) by mouth once daily. 30 tablet 6    pen needle, diabetic 32 gauge x 5/32" Ndle To use 7 times per day with insulin injections- for meals, long-acting insulin and for snacks 200 each 11    sacubitriL-valsartan (ENTRESTO) 24-26 mg per tablet Take 1 tablet by mouth 2 (two) times daily. 60 tablet 11    methotrexate 2.5 MG Tab Take 6 tablets (15 mg total) by mouth every 7 days. (Patient not taking: Reported on 5/25/2023) 24 tablet 3    testosterone cypionate (DEPOTESTOTERONE CYPIONATE) 200 mg/mL injection Inject 1 mL (200 mg total) into the muscle once a week. (Patient not taking: Reported on 5/25/2023) 10 mL 1     No current facility-administered medications on file prior to visit.       Physical exam:  /72 (BP Location: Left arm, Patient Position: Sitting, BP Method: Medium (Manual))   Pulse 72   Ht 5' 8" (1.727 m)   Wt 69.3 kg (152 lb 12.5 oz)   SpO2 99%   BMI 23.23 kg/m²         General: Pt is a 48 y.o. year old male who is AAOx3, in NAD, is pleasant, well nourished, looks stated age  HEENT: PERRL, EOMI, Oral mucosa pink & moist  CVS  No abnormal cardiac pulsations noted on inspection. JVP not raised. The apical impulse is normal on palpation, and is located in the left 5th " "intercostal space in the mid - clavicular line. No palpable thrills or abnormal pulsations noted. RR, S1 - S2 heard, no murmurs, rubs or gallops appreciated.   PUL : CTA B/L. No wheezes/crackles heard   ABD : BS +, soft. No tenderness elicited   LE : No C/C/E. Distal Pulses palpable B/L         LABS:    Chemistry:   Lab Results   Component Value Date     05/01/2023    K 4.2 05/01/2023     05/01/2023    CO2 25 05/01/2023    BUN 17 05/01/2023    CREATININE 1.1 05/01/2023    CALCIUM 9.9 05/01/2023     Cardiac Markers:   Lab Results   Component Value Date    TROPONINI <0.006 01/01/2023     Cardiac Markers (Last 3):   Lab Results   Component Value Date    TROPONINI <0.006 01/01/2023     CBC:   Lab Results   Component Value Date    WBC 4.53 04/27/2023    HGB 15.7 04/27/2023    HCT 45.3 04/27/2023    HCT 47 01/01/2023    MCV 85 04/27/2023     04/27/2023     Lipids:   Lab Results   Component Value Date    CHOL 165 03/20/2023    TRIG 45 03/20/2023    HDL 55 03/20/2023     Coagulation: No results found for: "PT", "INR", "APTT"        Assessment      1. Congestive heart failure, unspecified HF chronicity, unspecified heart failure type    2. Hyperlipidemia associated with type 2 diabetes mellitus    3. Chest pain, unspecified type    4. Family history of heart disease    5. Elevated blood pressure reading    6. SOB (shortness of breath)    7. Type 2 diabetes mellitus without complication, with long-term current use of insulin             Plan:    CHF/Chest pain/shortness of breath - stable   Compensated   Family history of heart disease   Echo 4/23 with EF 35%, recovered to EF 55% 7/23  Stress test 4/23 negative   Continue Toprol-XL, Entresto, lasix 20 mg daily   Fluid restriction, reduce salt intake discussed  No SGLT2 inh- currently compensated and may get dehydrated     Diabetes   A1c 8.7  Continue therapy    HLD   as of 3/23  Currently not on tx    HTN  Stable   Meds as above     This note was " prepared using voice recognition system and is likely to have sound alike errors that may have been overlooked even after proofreading.     I have reviewed all pertinent chart information.  Plans and recommendations have been formulated under my direct supervision. All questions answered and patient voiced understanding.   If symptoms persist go to the ED.    RTC in 3 months         Karl Payne MD  Cardiology

## 2023-08-09 NOTE — PROGRESS NOTES
Section of Cardiology                  Cardiac Clinic Note    Chief Complaint/Reason for consultation: chest pain      HPI:   Jason Sandra is a 48 y.o. male with h/o HLD, diabetes who was referred to cardiology clinic by PCP Dr. Mcconnell for evaluation.    3/14/23  Reports chest pain and SOB about 6 months, has been stable  Works in construction, walks up stairs, heavy lifting  SOB was a few days ago, had to stop his activity   Chest pain, intermittent throughout the day, stops his activity  Palpitations are separate 500 [employer] thank remain I can no  Has joint pain, neck pain, shoulder  (sister has lupus)  Saw rheum today, started on MTX as a trial   Exercise daily, with weight training, no cardio, no issues with this for 25 min    Family hx: dad- CABG at age 61 yo  Denies tobacco or ETOH abuse       4/26/23  Comes in with wife  Echo 4/23 with EF 35%  Stress test 4/23 negative   Has been fatigue and SOB  Started having pain in his joints (neck, knee)  LE swelling  Reports sharp and dull chest pain, not daily, does not last long   No orthopnea, PND, syncope        8/9/23  Still feels SOB, improved  Feels it mostly outside   EF has recovered 35%->55%   No LE swelling  No PND, orthopnea  Not exercising much at home, will start back  Good appetite   Lost around 10 lbs since last visit      EKG 8/8/23 NSR  EKG 1/1/23 ST, nonspec TW abn, LVH, 107 bpm      Echo 7/23  The left ventricle is normal in size with concentric remodeling and normal systolic function.  Normal left ventricular diastolic function.  The estimated PA systolic pressure is 35 mmHg.  Normal right ventricular size with normal right ventricular systolic function.  Normal central venous pressure (3 mmHg).  The estimated ejection fraction is 55%, recovered from prior study in april  Mild tricuspid regurgitation.    ECHO  4/23  The left ventricle is normal in size with moderately decreased systolic function.  The estimated ejection fraction  is 35%.  Grade I left ventricular diastolic dysfunction.  There is moderate left ventricular global hypokinesis.  Normal right ventricular size with normal right ventricular systolic function.  Intermediate central venous pressure (8 mmHg).    STRESS TEST    LHC      ROS: All 10 systems reviewed. Please refer to the HPI for pertinent positives. All other systems negative.     Past Medical History  Past Medical History:   Diagnosis Date    Diabetes mellitus     Hypogonadism in male        Surgical History  Past Surgical History:   Procedure Laterality Date    INJECTION OF ANESTHETIC AGENT AROUND MEDIAL BRANCH NERVES INNERVATING CERVICAL FACET JOINT Bilateral 6/7/2023    Procedure: Bilateral C3-5 MBB with local;  Surgeon: Tony Garcia MD;  Location: [facility];  Service: Pain Management;  Laterality: Bilateral;    NOSE SURGERY      SELECTIVE INJECTION OF ANESTHETIC AGENT AROUND LUMBAR SPINAL NERVE ROOT BY TRANSFORAMINAL APPROACH Bilateral 5/24/2023    Procedure: Bilateral L5 TF GARY with local;  Surgeon: Tony Garcia MD;  Location: [facility];  Service: Pain Management;  Laterality: Bilateral;          Allergies:   Review of patient's allergies indicates:  No Known Allergies    Social History:  Social History     Socioeconomic History    Marital status: Legally    Tobacco Use    Smoking status: Never     Passive exposure: Never    Smokeless tobacco: Never   Substance and Sexual Activity    Alcohol use: Not Currently     Comment: socially    Drug use: No    Sexual activity: Yes     Partners: Female   Social History Narrative    [place]     Social Determinants of Health     Financial Resource Strain: Low Risk  (1/3/2023)    Overall Financial Resource Strain (CARDIA)     Difficulty of Paying Living Expenses: Not hard at all   Food Insecurity: No Food Insecurity (1/3/2023)    Hunger Vital Sign     Worried About Running Out of Food in the Last Year: Never true     Ran Out of Food in the Last Year: Never  true   Transportation Needs: No Transportation Needs (1/3/2023)    PRAPARE - Transportation     Lack of Transportation (Medical): No     Lack of Transportation (Non-Medical): No   Stress: No Stress Concern Present (1/3/2023)    [employer] [place] [employer] - Occupational Stress Questionnaire     Feeling of Stress : Only a little   Social Connections: Moderately Isolated (1/3/2023)    Social Connection and Isolation Panel [NHANES]     Frequency of Communication with Friends and Family: More than three times a week     Frequency of Social Gatherings with Friends and Family: More than three times a week     Attends [employer]beterian Services: Never     Active Member of Clubs or Organizations: No     Attends Club or Organization Meetings: Never     Marital Status:    Housing Stability: Low Risk  (1/3/2023)    Housing Stability Vital Sign     Unable to Pay for Housing in the Last Year: No     Number of Places Lived in the Last Year: 2     Unstable Housing in the Last Year: No       Family History:  family history includes Alzheimer's disease in his father; Cataracts in his mother; Glaucoma in his mother; Stroke in his mother.    Home Medications:  Current Outpatient Medications on File Prior to Visit   Medication Sig Dispense Refill    ACCU-CHEK GUIDE ME GLUCOSE MTR Misc CHECK BLOOD SUGAR TWICE A DAY      amitriptyline (ELAVIL) 25 MG tablet Take 1 tablet (25 mg total) by mouth nightly as needed for Insomnia. 90 tablet 1    atorvastatin (LIPITOR) 20 MG tablet Take 1 tablet (20 mg total) by mouth once daily. 90 tablet 2    blood sugar diagnostic Strp To check BG 2 times daily, to use with insurance preferred meter 200 strip 2    blood-glucose sensor (DEXCOM G6 SENSOR) Chen Use 1 sensor every 10 days 3 each 11    blood-glucose transmitter (DEXCOM G6 TRANSMITTER) Chen 1 transmitter every 3 months 1 each 4    folic acid (FOLVITE) 1 MG tablet TAKE 1 TABLET BY MOUTH EVERY DAY 90 tablet 1    glucagon (BAQSIMI) 3  "mg/actuation Spry 3 mg (one actuation) into a single nostril; if no response, may repeat in 15 minutes using a new intranasal device. 2 each 1    hydrOXYchloroQUINE (PLAQUENIL) 100 mg tablet Take 200 mg by mouth once daily.      insulin degludec (TRESIBA FLEXTOUCH U-100) 100 unit/mL (3 mL) insulin pen Inject 24 Units into the skin every evening. Max TDD of 30 units 15 mL 1    insulin lispro-aabc (LYUMJEV KWIKPEN U-100 INSULIN) 100 unit/mL pen Inject 10 units 3 times per day before meals, 2-4 units for snack, . + correction scale. MAX TDD Of 50 units 5 pen 6    lancets Misc To check BG 2 times daily, to use with insurance preferred meter 200 each 2    metoprolol succinate (TOPROL-XL) 25 MG 24 hr tablet Take 1 tablet (25 mg total) by mouth once daily. 30 tablet 6    pen needle, diabetic 32 gauge x 5/32" Ndle To use 7 times per day with insulin injections- for meals, long-acting insulin and for snacks 200 each 11    sacubitriL-valsartan (ENTRESTO) 24-26 mg per tablet Take 1 tablet by mouth 2 (two) times daily. 60 tablet 11    methotrexate 2.5 MG Tab Take 6 tablets (15 mg total) by mouth every 7 days. (Patient not taking: Reported on 5/25/2023) 24 tablet 3    testosterone cypionate (DEPOTESTOTERONE CYPIONATE) 200 mg/mL injection Inject 1 mL (200 mg total) into the muscle once a week. (Patient not taking: Reported on 5/25/2023) 10 mL 1     No current facility-administered medications on file prior to visit.       Physical exam:  /72 (BP Location: Left arm, Patient Position: Sitting, BP Method: Medium (Manual))   Pulse 72   Ht 5' 8" (1.727 m)   Wt 69.3 kg (152 lb 12.5 oz)   SpO2 99%   BMI 23.23 kg/m²         General: Pt is a 48 y.o. year old male who is AAOx3, in NAD, is pleasant, well nourished, looks stated age  HEENT: PERRL, EOMI, Oral mucosa pink & moist  CVS  No abnormal cardiac pulsations noted on inspection. JVP not raised. The apical impulse is normal on palpation, and is located in the left 5th " "intercostal space in the mid - clavicular line. No palpable thrills or abnormal pulsations noted. RR, S1 - S2 heard, no murmurs, rubs or gallops appreciated.   PUL : CTA B/L. No wheezes/crackles heard   ABD : BS +, soft. No tenderness elicited   LE : No C/C/E. Distal Pulses palpable B/L         LABS:    Chemistry:   Lab Results   Component Value Date     05/01/2023    K 4.2 05/01/2023     05/01/2023    CO2 25 05/01/2023    BUN 17 05/01/2023    CREATININE 1.1 05/01/2023    CALCIUM 9.9 05/01/2023     Cardiac Markers:   Lab Results   Component Value Date    TROPONINI <0.006 01/01/2023     Cardiac Markers (Last 3):   Lab Results   Component Value Date    TROPONINI <0.006 01/01/2023     CBC:   Lab Results   Component Value Date    WBC 4.53 04/27/2023    HGB 15.7 04/27/2023    HCT 45.3 04/27/2023    HCT 47 01/01/2023    MCV 85 04/27/2023     04/27/2023     Lipids:   Lab Results   Component Value Date    CHOL 165 03/20/2023    TRIG 45 03/20/2023    HDL 55 03/20/2023     Coagulation: No results found for: "PT", "INR", "APTT"        Assessment      1. Congestive heart failure, unspecified HF chronicity, unspecified heart failure type    2. Hyperlipidemia associated with type 2 diabetes mellitus    3. Chest pain, unspecified type    4. Family history of heart disease    5. Elevated blood pressure reading    6. SOB (shortness of breath)    7. Type 2 diabetes mellitus without complication, with long-term current use of insulin             Plan:    CHF/Chest pain/shortness of breath - stable   Compensated   Family history of heart disease   Echo 4/23 with EF 35%, recovered to EF 55% 7/23  Stress test 4/23 negative   Continue Toprol-XL, Entresto, lasix 20 mg daily   Fluid restriction, reduce salt intake discussed  No SGLT2 inh- currently compensated and may get dehydrated     Diabetes   A1c 8.7  Continue therapy    HLD   as of 3/23  Currently not on tx    HTN  Stable   Meds as above     This note was " prepared using voice recognition system and is likely to have sound alike errors that may have been overlooked even after proofreading.     I have reviewed all pertinent chart information.  Plans and recommendations have been formulated under my direct supervision. All questions answered and patient voiced understanding.   If symptoms persist go to the ED.    RTC in 3 months         Karl Payne MD  Cardiology

## 2023-08-09 NOTE — PRE-PROCEDURE INSTRUCTIONS
Spoke with patient regarding procedure scheduled on 8.23     Arrival time 0715     Has patient been sick with fever or on antibiotics within the last 7 days? No     Does the patient have any open wounds, sores or rashes? No     Does the patient have any recent fractures? no     Has patient received a vaccination within the last 7 days? No     Received the COVID vaccination?      Has the patient stopped all medications as directed? na     Does patient have a pacemaker and or defibrillator? no     Does the patient have a ride to and from procedure and someone reliable to remain with patient? unsure-educated      Is the patient diabetic? yes     Does the patient have sleep apnea? Or use O2 at home? no     Is the patient receiving sedation?      Is the patient instructed to remain NPO beginning at midnight the night before their procedure? yes     Procedure location confirmed with patient? Yes     Covid- Denies signs/symptoms. Instructed to notify PAT/MD if any changes.

## 2023-08-14 ENCOUNTER — LAB VISIT (OUTPATIENT)
Dept: LAB | Facility: OTHER | Age: 48
End: 2023-08-14
Attending: INTERNAL MEDICINE
Payer: COMMERCIAL

## 2023-08-14 DIAGNOSIS — R76.8 ANA POSITIVE: ICD-10-CM

## 2023-08-14 LAB
ALBUMIN SERPL BCP-MCNC: 3.7 G/DL (ref 3.5–5.2)
ALP SERPL-CCNC: 76 U/L (ref 55–135)
ALT SERPL W/O P-5'-P-CCNC: 18 U/L (ref 10–44)
ANION GAP SERPL CALC-SCNC: 12 MMOL/L (ref 8–16)
AST SERPL-CCNC: 19 U/L (ref 10–40)
BASOPHILS # BLD AUTO: 0.03 K/UL (ref 0–0.2)
BASOPHILS NFR BLD: 0.6 % (ref 0–1.9)
BILIRUB SERPL-MCNC: 0.3 MG/DL (ref 0.1–1)
BUN SERPL-MCNC: 10 MG/DL (ref 6–20)
CALCIUM SERPL-MCNC: 9.3 MG/DL (ref 8.7–10.5)
CHLORIDE SERPL-SCNC: 104 MMOL/L (ref 95–110)
CO2 SERPL-SCNC: 20 MMOL/L (ref 23–29)
CREAT SERPL-MCNC: 1.7 MG/DL (ref 0.5–1.4)
CREAT UR-MCNC: 172.8 MG/DL (ref 23–375)
CRP SERPL-MCNC: 1.9 MG/L (ref 0–8.2)
DIFFERENTIAL METHOD: ABNORMAL
EOSINOPHIL # BLD AUTO: 0.1 K/UL (ref 0–0.5)
EOSINOPHIL NFR BLD: 1.1 % (ref 0–8)
ERYTHROCYTE [DISTWIDTH] IN BLOOD BY AUTOMATED COUNT: 11.6 % (ref 11.5–14.5)
EST. GFR  (NO RACE VARIABLE): 49 ML/MIN/1.73 M^2
GLUCOSE SERPL-MCNC: 100 MG/DL (ref 70–110)
HCT VFR BLD AUTO: 39.6 % (ref 40–54)
HGB BLD-MCNC: 13.2 G/DL (ref 14–18)
IMM GRANULOCYTES # BLD AUTO: 0.02 K/UL (ref 0–0.04)
IMM GRANULOCYTES NFR BLD AUTO: 0.4 % (ref 0–0.5)
LYMPHOCYTES # BLD AUTO: 2.1 K/UL (ref 1–4.8)
LYMPHOCYTES NFR BLD: 40.1 % (ref 18–48)
MCH RBC QN AUTO: 30.3 PG (ref 27–31)
MCHC RBC AUTO-ENTMCNC: 33.3 G/DL (ref 32–36)
MCV RBC AUTO: 91 FL (ref 82–98)
MONOCYTES # BLD AUTO: 0.4 K/UL (ref 0.3–1)
MONOCYTES NFR BLD: 8.4 % (ref 4–15)
NEUTROPHILS # BLD AUTO: 2.6 K/UL (ref 1.8–7.7)
NEUTROPHILS NFR BLD: 49.4 % (ref 38–73)
NRBC BLD-RTO: 0 /100 WBC
PLATELET # BLD AUTO: 202 K/UL (ref 150–450)
PMV BLD AUTO: 9.8 FL (ref 9.2–12.9)
POTASSIUM SERPL-SCNC: 4 MMOL/L (ref 3.5–5.1)
PROT SERPL-MCNC: 7 G/DL (ref 6–8.4)
PROT UR-MCNC: 11 MG/DL (ref 0–15)
PROT/CREAT UR: 0.06 MG/G{CREAT} (ref 0–0.2)
RBC # BLD AUTO: 4.35 M/UL (ref 4.6–6.2)
SODIUM SERPL-SCNC: 136 MMOL/L (ref 136–145)
WBC # BLD AUTO: 5.24 K/UL (ref 3.9–12.7)

## 2023-08-14 PROCEDURE — 80053 COMPREHEN METABOLIC PANEL: CPT | Performed by: INTERNAL MEDICINE

## 2023-08-14 PROCEDURE — 83516 IMMUNOASSAY NONANTIBODY: CPT | Mod: 59 | Performed by: INTERNAL MEDICINE

## 2023-08-14 PROCEDURE — 86140 C-REACTIVE PROTEIN: CPT | Performed by: INTERNAL MEDICINE

## 2023-08-14 PROCEDURE — 84156 ASSAY OF PROTEIN URINE: CPT | Performed by: INTERNAL MEDICINE

## 2023-08-14 PROCEDURE — 85025 COMPLETE CBC W/AUTO DIFF WBC: CPT | Performed by: INTERNAL MEDICINE

## 2023-08-14 PROCEDURE — 36415 COLL VENOUS BLD VENIPUNCTURE: CPT | Performed by: INTERNAL MEDICINE

## 2023-08-14 PROCEDURE — 86235 NUCLEAR ANTIGEN ANTIBODY: CPT | Mod: 59 | Performed by: INTERNAL MEDICINE

## 2023-08-15 ENCOUNTER — PATIENT MESSAGE (OUTPATIENT)
Dept: RHEUMATOLOGY | Facility: CLINIC | Age: 48
End: 2023-08-15
Payer: COMMERCIAL

## 2023-08-23 ENCOUNTER — TELEPHONE (OUTPATIENT)
Dept: INTERNAL MEDICINE | Facility: CLINIC | Age: 48
End: 2023-08-23
Payer: COMMERCIAL

## 2023-08-23 ENCOUNTER — HOSPITAL ENCOUNTER (OUTPATIENT)
Facility: HOSPITAL | Age: 48
Discharge: HOME | End: 2023-08-23
Attending: PAIN MEDICINE | Admitting: PAIN MEDICINE
Payer: COMMERCIAL

## 2023-08-23 VITALS
TEMPERATURE: 97 F | HEIGHT: 68 IN | BODY MASS INDEX: 24.32 KG/M2 | WEIGHT: 160.5 LBS | OXYGEN SATURATION: 100 % | RESPIRATION RATE: 16 BRPM | HEART RATE: 82 BPM | SYSTOLIC BLOOD PRESSURE: 127 MMHG | DIASTOLIC BLOOD PRESSURE: 63 MMHG

## 2023-08-23 DIAGNOSIS — M47.812 CERVICAL SPONDYLOSIS: Primary | ICD-10-CM

## 2023-08-23 DIAGNOSIS — G89.29 CHRONIC NECK PAIN: ICD-10-CM

## 2023-08-23 DIAGNOSIS — M54.2 CHRONIC NECK PAIN: ICD-10-CM

## 2023-08-23 LAB — POCT GLUCOSE: 92 MG/DL (ref 70–110)

## 2023-08-23 PROCEDURE — 82962 GLUCOSE BLOOD TEST: CPT | Performed by: PAIN MEDICINE

## 2023-08-23 PROCEDURE — 64490 PR INJ DX/THER AGNT PARAVERT FACET JOINT,IMG GUIDE,CERV/THORAC, 1ST LEVEL: ICD-10-PCS | Mod: 50,,, | Performed by: PAIN MEDICINE

## 2023-08-23 PROCEDURE — 64490 INJ PARAVERT F JNT C/T 1 LEV: CPT | Mod: 50,,, | Performed by: PAIN MEDICINE

## 2023-08-23 PROCEDURE — 64491 INJ PARAVERT F JNT C/T 2 LEV: CPT | Mod: LT | Performed by: PAIN MEDICINE

## 2023-08-23 PROCEDURE — 64490 INJ PARAVERT F JNT C/T 1 LEV: CPT | Mod: LT | Performed by: PAIN MEDICINE

## 2023-08-23 PROCEDURE — 64491 INJ PARAVERT F JNT C/T 2 LEV: CPT | Mod: 50,,, | Performed by: PAIN MEDICINE

## 2023-08-23 PROCEDURE — 64491 PR INJ DX/THER AGNT PARAVERT FACET JOINT,IMG GUIDE,CERV/THORAC, 2ND LEVEL: ICD-10-PCS | Mod: 50,,, | Performed by: PAIN MEDICINE

## 2023-08-23 PROCEDURE — 99152 MOD SED SAME PHYS/QHP 5/>YRS: CPT | Performed by: PAIN MEDICINE

## 2023-08-23 PROCEDURE — 63600175 PHARM REV CODE 636 W HCPCS: Performed by: PAIN MEDICINE

## 2023-08-23 RX ORDER — BUPIVACAINE HYDROCHLORIDE 5 MG/ML
INJECTION, SOLUTION EPIDURAL; INTRACAUDAL
Status: DISCONTINUED | OUTPATIENT
Start: 2023-08-23 | End: 2023-08-23 | Stop reason: HOSPADM

## 2023-08-23 RX ORDER — MIDAZOLAM HYDROCHLORIDE 1 MG/ML
INJECTION, SOLUTION INTRAMUSCULAR; INTRAVENOUS
Status: DISCONTINUED | OUTPATIENT
Start: 2023-08-23 | End: 2023-08-23 | Stop reason: HOSPADM

## 2023-08-23 NOTE — DISCHARGE SUMMARY
Discharge Note  Short Stay      SUMMARY     Admit Date: 8/23/2023    Attending Physician: Ashvin Garcia MD        Discharge Physician: Ashvin Garcia MD        Discharge Date: 8/23/2023 8:07 AM    Procedure(s) (LRB):  Bilateral C3-5 MBB  second diagnostic block with RN IV sedation (Bilateral)    Final Diagnosis: Cervical spondylosis [M47.812]    Disposition: Home or self care    Patient Instructions:   Current Discharge Medication List        CONTINUE these medications which have NOT CHANGED    Details   atorvastatin (LIPITOR) 20 MG tablet Take 1 tablet (20 mg total) by mouth once daily.  Qty: 90 tablet, Refills: 2    Associated Diagnoses: Uncontrolled type 2 diabetes mellitus with hyperglycemia      blood-glucose sensor (DEXCOM G6 SENSOR) Chen Use 1 sensor every 10 days  Qty: 3 each, Refills: 11    Associated Diagnoses: Type 2 diabetes mellitus with hyperglycemia, with long-term current use of insulin      insulin degludec (TRESIBA FLEXTOUCH U-100) 100 unit/mL (3 mL) insulin pen Inject 24 Units into the skin every evening. Max TDD of 30 units  Qty: 15 mL, Refills: 1    Comments: [id number] Rx re-entered      insulin lispro-aabc (LYUMJEV KWIKPEN U-100 INSULIN) 100 unit/mL pen Inject 10 units 3 times per day before meals, 2-4 units for snack, . + correction scale. MAX TDD Of 50 units  Qty: 5 pen, Refills: 6    Associated Diagnoses: Type 2 diabetes mellitus with hyperglycemia, with long-term current use of insulin      metoprolol succinate (TOPROL-XL) 25 MG 24 hr tablet Take 1 tablet (25 mg total) by mouth once daily.  Qty: 30 tablet, Refills: 6    Comments: .      testosterone cypionate (DEPOTESTOTERONE CYPIONATE) 200 mg/mL injection Inject 1 mL (200 mg total) into the muscle once a week.  Qty: 10 mL, Refills: 1    Comments: This request is for a new prescription for a controlled substance as required by Federal/State law.  Associated Diagnoses: Low testosterone in male      ACCU-CHEK GUIDE ME GLUCOSE MTR Misc CHECK BLOOD  "SUGAR TWICE A DAY      amitriptyline (ELAVIL) 25 MG tablet Take 1 tablet (25 mg total) by mouth nightly as needed for Insomnia.  Qty: 90 tablet, Refills: 1      blood sugar diagnostic Strp To check BG 2 times daily, to use with insurance preferred meter  Qty: 200 strip, Refills: 2      blood-glucose transmitter (DEXCOM G6 TRANSMITTER) Chen 1 transmitter every 3 months  Qty: 1 each, Refills: 4    Associated Diagnoses: Type 2 diabetes mellitus with hyperglycemia, with long-term current use of insulin      folic acid (FOLVITE) 1 MG tablet TAKE 1 TABLET BY MOUTH EVERY DAY  Qty: 90 tablet, Refills: 1    Associated Diagnoses: Butterfly rash; KIRTI positive; Psoriatic arthritis      glucagon (BAQSIMI) 3 mg/actuation Spry 3 mg (one actuation) into a single nostril; if no response, may repeat in 15 minutes using a new intranasal device.  Qty: 2 each, Refills: 1    Associated Diagnoses: Uncontrolled type 2 diabetes mellitus with hyperglycemia      hydrOXYchloroQUINE (PLAQUENIL) 100 mg tablet Take 200 mg by mouth once daily.      lancets Misc To check BG 2 times daily, to use with insurance preferred meter  Qty: 200 each, Refills: 2      methotrexate 2.5 MG Tab Take 6 tablets (15 mg total) by mouth every 7 days.  Qty: 24 tablet, Refills: 3    Associated Diagnoses: Butterfly rash; KIRTI positive; Psoriatic arthritis      pen needle, diabetic 32 gauge x 5/32" Ndle To use 7 times per day with insulin injections- for meals, long-acting insulin and for snacks  Qty: 200 each, Refills: 11    Associated Diagnoses: Type 2 diabetes mellitus with hyperglycemia, with long-term current use of insulin      sacubitriL-valsartan (ENTRESTO) 24-26 mg per tablet Take 1 tablet by mouth 2 (two) times daily.  Qty: 60 tablet, Refills: 11                 Discharge Diagnosis: Cervical spondylosis [M47.812]  Condition on Discharge: Stable with no complications to procedure   Diet on Discharge: Same as before.  Activity: as per instruction sheet.  Discharge " to: Home with a responsible adult.  Follow up: 2-4 weeks       Please call the office at [phone] if you experience any weakness or loss of sensation, fever > 101.5, pain uncontrolled with oral medications, persistent nausea/vomiting/or diarrhea, redness or drainage from the incisions, or any other worrisome concerns. If physician on call was not reached or could not communicate with our office for any reason please go to the nearest emergency department

## 2023-08-23 NOTE — OP NOTE
CERVICAL Medial Branch Block Under Fluoroscopy    Time-out taken to identify patient and procedure side prior to starting the procedure.                                                               PROCEDURE:  bilateral medial branch block at C3/4 and C4/5    Pre Procedure Diagnosis:  Cervical spondylosis (M47.892) and chronic neck pain (M54.2)  Post Procedure Diagnosis: Same    PHYSICIAN: MORENITA Garcia MD    ASSISTANTS: None    ANESTHESIA:   Conscious sedation provided by M.D  The patient was monitored with continuous pulse oximetry, EKG, and intermittent blood pressure monitors.  The patient was hemodynamically stable throughout the entire process was responsive to voice, and breathing spontaneously.  Supplemental O2 was provided at 2L/min via nasal cannula.  Patient was comfortable for the duration of the procedure. (See nurse documentation and case log for sedation time)    SEDATION MEDICATIONS: 3 mg IV Midazolam     ESTIMATED BLOOD LOSS:  None.    COMPLICATIONS:  None.    TECHNIQUE:   Laying in the prone position, the patient was prepped and draped in the usual sterile fashion using ChloraPrep and fenestrated drape.  The level was determined under fluoroscopic guidance.   A 25-gauge spinal needle was introduced to the anatomic local of the medial branch at bilateral C3/4 and C4/5 using fluoroscopy guidance in the AP and lateral views. Then after negative aspiration, I injected 0.5 cc of 0.75% bupivacaine at each level. The needles were removed intact. The patient tolerated the procedure well and was taken to post-op unit in a stable condition.     The patient was monitored after the procedure.  Patient was given post procedure and discharge instructions to follow at home.  We will see the patient back in two weeks or the patient may call to inform of status. The patient was discharged in a stable condition.    MORENITA Garcia MD

## 2023-08-23 NOTE — DISCHARGE INSTRUCTIONS

## 2023-08-24 ENCOUNTER — PATIENT MESSAGE (OUTPATIENT)
Dept: PAIN MEDICINE | Facility: CLINIC | Age: 48
End: 2023-08-24
Payer: COMMERCIAL

## 2023-08-25 RX ORDER — FUROSEMIDE 20 MG/1
20 TABLET ORAL DAILY
Qty: 30 TABLET | Refills: 6 | Status: SHIPPED | OUTPATIENT
Start: 2023-08-25 | End: 2024-08-24

## 2023-08-29 ENCOUNTER — PATIENT OUTREACH (OUTPATIENT)
Dept: ADMINISTRATIVE | Facility: HOSPITAL | Age: 48
End: 2023-08-29
Payer: COMMERCIAL

## 2023-08-29 DIAGNOSIS — E10.9 INSULIN DEPENDENT DIABETES MELLITUS TYPE IA: Primary | ICD-10-CM

## 2023-08-29 NOTE — PROGRESS NOTES
Health Maintenance Due   Topic Date Due    Shingles Vaccine (1 of 2) Never done    Colorectal Cancer Screening  Never done    Pneumococcal Vaccines (Age 0-64) (2 - PCV) 07/09/2019    COVID-19 Vaccine (3 - Pfizer series) 10/22/2021    Hemoglobin A1c  06/20/2023     Chart reviewed.   Immunizations: Reconciled  Orders placed: Hemoglobin A1c  Upcoming appts to satisfy MAU topics: N/A

## 2023-09-08 LAB
ANTI-PM/SCL AB: <20 UNITS
ANTI-SS-A 52 KD AB, IGG: <20 UNITS
EJ AB SER QL: NEGATIVE
ENA JO1 AB SER IA-ACNC: <20 UNITS
ENA SM+RNP AB SER IA-ACNC: <20 UNITS
FIBRILLARIN (U3 RNP): NEGATIVE
KU AB SER QL: NEGATIVE
MDA-5 (P140): <20 UNITS
MI2 AB SER QL: NEGATIVE
NXP-2 (P140): <20 UNITS
OJ AB SER QL: NEGATIVE
PL12 AB SER QL: NEGATIVE
PL7 AB SER QL: NEGATIVE
SRP AB SERPL QL: NEGATIVE
TIF1 GAMMA (P155/140): <20 UNITS
U2 SNRNP: NEGATIVE

## 2023-09-12 ENCOUNTER — LAB VISIT (OUTPATIENT)
Dept: LAB | Facility: OTHER | Age: 48
End: 2023-09-12
Attending: INTERNAL MEDICINE
Payer: COMMERCIAL

## 2023-09-12 ENCOUNTER — OFFICE VISIT (OUTPATIENT)
Dept: INTERNAL MEDICINE | Facility: CLINIC | Age: 48
End: 2023-09-12
Payer: COMMERCIAL

## 2023-09-12 VITALS
SYSTOLIC BLOOD PRESSURE: 106 MMHG | OXYGEN SATURATION: 99 % | BODY MASS INDEX: 24.83 KG/M2 | HEART RATE: 66 BPM | RESPIRATION RATE: 18 BRPM | DIASTOLIC BLOOD PRESSURE: 64 MMHG | WEIGHT: 163.81 LBS | HEIGHT: 68 IN | TEMPERATURE: 98 F

## 2023-09-12 DIAGNOSIS — Z00.00 PHYSICAL EXAM, ANNUAL: Primary | ICD-10-CM

## 2023-09-12 DIAGNOSIS — R76.8 ANA POSITIVE: ICD-10-CM

## 2023-09-12 DIAGNOSIS — R21 BUTTERFLY RASH: ICD-10-CM

## 2023-09-12 LAB
ALBUMIN SERPL BCP-MCNC: 4.8 G/DL (ref 3.5–5.2)
ALP SERPL-CCNC: 83 U/L (ref 55–135)
ALT SERPL W/O P-5'-P-CCNC: 14 U/L (ref 10–44)
ANION GAP SERPL CALC-SCNC: 11 MMOL/L (ref 8–16)
AST SERPL-CCNC: 18 U/L (ref 10–40)
BILIRUB SERPL-MCNC: 0.4 MG/DL (ref 0.1–1)
BUN SERPL-MCNC: 10 MG/DL (ref 6–20)
CALCIUM SERPL-MCNC: 10.3 MG/DL (ref 8.7–10.5)
CHLORIDE SERPL-SCNC: 108 MMOL/L (ref 95–110)
CO2 SERPL-SCNC: 22 MMOL/L (ref 23–29)
CREAT SERPL-MCNC: 1 MG/DL (ref 0.5–1.4)
CREAT UR-MCNC: 53.5 MG/DL (ref 23–375)
EST. GFR  (NO RACE VARIABLE): >60 ML/MIN/1.73 M^2
GLUCOSE SERPL-MCNC: 102 MG/DL (ref 70–110)
POTASSIUM SERPL-SCNC: 3.9 MMOL/L (ref 3.5–5.1)
PROT SERPL-MCNC: 8.1 G/DL (ref 6–8.4)
PROT UR-MCNC: <7 MG/DL (ref 0–15)
PROT/CREAT UR: NORMAL MG/G{CREAT} (ref 0–0.2)
SODIUM SERPL-SCNC: 141 MMOL/L (ref 136–145)

## 2023-09-12 PROCEDURE — 90677 PNEUMOCOCCAL CONJUGATE VACCINE 20-VALENT: ICD-10-PCS | Mod: S$GLB,,, | Performed by: STUDENT IN AN ORGANIZED HEALTH CARE EDUCATION/TRAINING PROGRAM

## 2023-09-12 PROCEDURE — 90471 PNEUMOCOCCAL CONJUGATE VACCINE 20-VALENT: ICD-10-PCS | Mod: S$GLB,,, | Performed by: STUDENT IN AN ORGANIZED HEALTH CARE EDUCATION/TRAINING PROGRAM

## 2023-09-12 PROCEDURE — 3061F PR NEG MICROALBUMINURIA RESULT DOCUMENTED/REVIEW: ICD-10-PCS | Mod: CPTII,S$GLB,, | Performed by: STUDENT IN AN ORGANIZED HEALTH CARE EDUCATION/TRAINING PROGRAM

## 2023-09-12 PROCEDURE — 90677 PCV20 VACCINE IM: CPT | Mod: S$GLB,,, | Performed by: STUDENT IN AN ORGANIZED HEALTH CARE EDUCATION/TRAINING PROGRAM

## 2023-09-12 PROCEDURE — 99999 PR PBB SHADOW E&M-EST. PATIENT-LVL V: ICD-10-PCS | Mod: PBBFAC,,, | Performed by: STUDENT IN AN ORGANIZED HEALTH CARE EDUCATION/TRAINING PROGRAM

## 2023-09-12 PROCEDURE — 1159F MED LIST DOCD IN RCRD: CPT | Mod: CPTII,S$GLB,, | Performed by: STUDENT IN AN ORGANIZED HEALTH CARE EDUCATION/TRAINING PROGRAM

## 2023-09-12 PROCEDURE — 80053 COMPREHEN METABOLIC PANEL: CPT | Performed by: INTERNAL MEDICINE

## 2023-09-12 PROCEDURE — 1159F PR MEDICATION LIST DOCUMENTED IN MEDICAL RECORD: ICD-10-PCS | Mod: CPTII,S$GLB,, | Performed by: STUDENT IN AN ORGANIZED HEALTH CARE EDUCATION/TRAINING PROGRAM

## 2023-09-12 PROCEDURE — 3008F BODY MASS INDEX DOCD: CPT | Mod: CPTII,S$GLB,, | Performed by: STUDENT IN AN ORGANIZED HEALTH CARE EDUCATION/TRAINING PROGRAM

## 2023-09-12 PROCEDURE — 4010F PR ACE/ARB THEARPY RXD/TAKEN: ICD-10-PCS | Mod: CPTII,S$GLB,, | Performed by: STUDENT IN AN ORGANIZED HEALTH CARE EDUCATION/TRAINING PROGRAM

## 2023-09-12 PROCEDURE — 84156 ASSAY OF PROTEIN URINE: CPT | Performed by: INTERNAL MEDICINE

## 2023-09-12 PROCEDURE — 99999 PR PBB SHADOW E&M-EST. PATIENT-LVL V: CPT | Mod: PBBFAC,,, | Performed by: STUDENT IN AN ORGANIZED HEALTH CARE EDUCATION/TRAINING PROGRAM

## 2023-09-12 PROCEDURE — 3052F HG A1C>EQUAL 8.0%<EQUAL 9.0%: CPT | Mod: CPTII,S$GLB,, | Performed by: STUDENT IN AN ORGANIZED HEALTH CARE EDUCATION/TRAINING PROGRAM

## 2023-09-12 PROCEDURE — 3074F SYST BP LT 130 MM HG: CPT | Mod: CPTII,S$GLB,, | Performed by: STUDENT IN AN ORGANIZED HEALTH CARE EDUCATION/TRAINING PROGRAM

## 2023-09-12 PROCEDURE — 3074F PR MOST RECENT SYSTOLIC BLOOD PRESSURE < 130 MM HG: ICD-10-PCS | Mod: CPTII,S$GLB,, | Performed by: STUDENT IN AN ORGANIZED HEALTH CARE EDUCATION/TRAINING PROGRAM

## 2023-09-12 PROCEDURE — 99396 PREV VISIT EST AGE 40-64: CPT | Mod: 25,S$GLB,, | Performed by: STUDENT IN AN ORGANIZED HEALTH CARE EDUCATION/TRAINING PROGRAM

## 2023-09-12 PROCEDURE — 3052F PR MOST RECENT HEMOGLOBIN A1C LEVEL 8.0 - < 9.0%: ICD-10-PCS | Mod: CPTII,S$GLB,, | Performed by: STUDENT IN AN ORGANIZED HEALTH CARE EDUCATION/TRAINING PROGRAM

## 2023-09-12 PROCEDURE — 3066F PR DOCUMENTATION OF TREATMENT FOR NEPHROPATHY: ICD-10-PCS | Mod: CPTII,S$GLB,, | Performed by: STUDENT IN AN ORGANIZED HEALTH CARE EDUCATION/TRAINING PROGRAM

## 2023-09-12 PROCEDURE — 4010F ACE/ARB THERAPY RXD/TAKEN: CPT | Mod: CPTII,S$GLB,, | Performed by: STUDENT IN AN ORGANIZED HEALTH CARE EDUCATION/TRAINING PROGRAM

## 2023-09-12 PROCEDURE — 3078F DIAST BP <80 MM HG: CPT | Mod: CPTII,S$GLB,, | Performed by: STUDENT IN AN ORGANIZED HEALTH CARE EDUCATION/TRAINING PROGRAM

## 2023-09-12 PROCEDURE — 3066F NEPHROPATHY DOC TX: CPT | Mod: CPTII,S$GLB,, | Performed by: STUDENT IN AN ORGANIZED HEALTH CARE EDUCATION/TRAINING PROGRAM

## 2023-09-12 PROCEDURE — 3078F PR MOST RECENT DIASTOLIC BLOOD PRESSURE < 80 MM HG: ICD-10-PCS | Mod: CPTII,S$GLB,, | Performed by: STUDENT IN AN ORGANIZED HEALTH CARE EDUCATION/TRAINING PROGRAM

## 2023-09-12 PROCEDURE — 3061F NEG MICROALBUMINURIA REV: CPT | Mod: CPTII,S$GLB,, | Performed by: STUDENT IN AN ORGANIZED HEALTH CARE EDUCATION/TRAINING PROGRAM

## 2023-09-12 PROCEDURE — 99396 PR PREVENTIVE VISIT,EST,40-64: ICD-10-PCS | Mod: 25,S$GLB,, | Performed by: STUDENT IN AN ORGANIZED HEALTH CARE EDUCATION/TRAINING PROGRAM

## 2023-09-12 PROCEDURE — 36415 COLL VENOUS BLD VENIPUNCTURE: CPT | Performed by: INTERNAL MEDICINE

## 2023-09-12 PROCEDURE — 3008F PR BODY MASS INDEX (BMI) DOCUMENTED: ICD-10-PCS | Mod: CPTII,S$GLB,, | Performed by: STUDENT IN AN ORGANIZED HEALTH CARE EDUCATION/TRAINING PROGRAM

## 2023-09-12 PROCEDURE — 90471 IMMUNIZATION ADMIN: CPT | Mod: S$GLB,,, | Performed by: STUDENT IN AN ORGANIZED HEALTH CARE EDUCATION/TRAINING PROGRAM

## 2023-09-12 NOTE — PROGRESS NOTES
Subjective:      Chief Complaint: Annual Exam, Back Pain, and Leg Pain    HPI  Mr.Brian Sandra is a 48-year-old man with chronic prostatitis, type 2 diabetes (following with endocrinology; with mild nonproliferative diabetic retinopathy bilaterally), hyperlipidemia (atorvastatin 20), chronic pain (leg, neck, hip, etc.; following with Orthopedic surgery, PM&R, and pain management for injections), and idiopathic rash (status post during evaluations via Rheumatology; Sjogren's ruled out by ENT), systolic heart failure (establish with Cardiology; EF of 35%), narcolepsy without cataplexy/subjective cognitive concerns (reassuring elevation via neuropsych in the setting Alzheimer's family history), and maintained on testosterone replacement therapy via Urology presenting for annual physical:    Diabetes:  -overdue for follow-up with established diabetic nurse practitioner    Family, social, surgical Hx reviewed     Health Maintenance:  Due for screening labs colorectal cancer screening (Cologuard ordered 5/23 but never returned) and routine vaccinations.    Past Medical History:   Diagnosis Date    Diabetes mellitus     Hypogonadism in male      Past Surgical History:   Procedure Laterality Date    INJECTION OF ANESTHETIC AGENT AROUND MEDIAL BRANCH NERVES INNERVATING CERVICAL FACET JOINT Bilateral 6/7/2023    Procedure: Bilateral C3-5 MBB with local;  Surgeon: Tony Garcia MD;  Location: [facility];  Service: Pain Management;  Laterality: Bilateral;    INJECTION OF ANESTHETIC AGENT AROUND MEDIAL BRANCH NERVES INNERVATING CERVICAL FACET JOINT Bilateral 8/23/2023    Procedure: Bilateral C3-5 MBB  second diagnostic block with RN IV sedation;  Surgeon: Tony Garcia MD;  Location: [facility];  Service: Pain Management;  Laterality: Bilateral;    NOSE SURGERY      SELECTIVE INJECTION OF ANESTHETIC AGENT AROUND LUMBAR SPINAL NERVE ROOT BY TRANSFORAMINAL APPROACH Bilateral 5/24/2023    Procedure: Bilateral L5 TF GARY with  local;  Surgeon: Tony Garcia MD;  [facility];  Service: Pain Management;  Laterality: Bilateral;     Family History   Problem Relation Age of Onset    Stroke Mother     Glaucoma Mother     Cataracts Mother     Alzheimer's disease Father      Social History     Socioeconomic History    Marital status: Legally    Tobacco Use    Smoking status: Never     Passive exposure: Never    Smokeless tobacco: Never   Substance and Sexual Activity    Alcohol use: Not Currently     Comment: socially    Drug use: No    Sexual activity: Yes     Partners: Female   Social History Narrative         Social Determinants of Health     Financial Resource Strain: Low Risk  (1/3/2023)    Overall Financial Resource Strain (CARDIA)     Difficulty of Paying Living Expenses: Not hard at all   Food Insecurity: No Food Insecurity (1/3/2023)    Hunger Vital Sign     Worried About Running Out of Food in the Last Year: Never true     Ran Out of Food in the Last Year: Never true   Transportation Needs: No Transportation Needs (1/3/2023)    PRAPARE - Transportation     Lack of Transportation (Medical): No     Lack of Transportation (Non-Medical): No   Stress: No Stress Concern Present (1/3/2023)    [employer] - Occupational Stress Questionnaire     Feeling of Stress : Only a little   Social Connections: Moderately Isolated (1/3/2023)    Social Connection and Isolation Panel [NHANES]     Frequency of Communication with Friends and Family: More than three times a week     Frequency of Social Gatherings with Friends and Family: More than three times a week     Attends [employer]ology Services: Never     Active Member of Clubs or Organizations: No     Attends Club or Organization Meetings: Never     Marital Status:    Housing Stability: Low Risk  (1/3/2023)    Housing Stability Vital Sign     Unable to Pay for Housing in the Last Year: No     Number of Places Lived in the Last Year: 2      Unstable Housing in the Last Year: No     Review of patient's allergies indicates:  No Known Allergies  Jason Sandra had no medications administered during this visit.      Review of Systems   Constitutional:  Negative for appetite change, chills and fever.   HENT: Negative.     Respiratory:  Negative for cough, chest tightness and shortness of breath.    Cardiovascular:  Negative for chest pain, palpitations and leg swelling.   Gastrointestinal:  Negative for abdominal distention, abdominal pain, blood in stool, constipation, diarrhea, nausea and vomiting.   Endocrine: Negative.    Genitourinary:  Negative for difficulty urinating, dysuria, frequency and hematuria.   Musculoskeletal:  Positive for arthralgias, back pain and leg pain.   Integumentary:  Negative.   Neurological: Negative.    Psychiatric/Behavioral: Negative.           Objective:      Vitals:    09/12/23 1428   BP: 106/64   Pulse: 66   Resp: 18   Temp: 97.8 °F (36.6 °C)      Physical Exam  Vitals reviewed.   Constitutional:       General: He is not in acute distress.     Appearance: Normal appearance.   HENT:      Head: Normocephalic and atraumatic.      Comments: Facial features are symmetric      Nose: Nose normal. No congestion or rhinorrhea.      Mouth/Throat:      Mouth: Mucous membranes are moist.      Pharynx: Oropharynx is clear. No oropharyngeal exudate or posterior oropharyngeal erythema.   Eyes:      General: No scleral icterus.     Extraocular Movements: Extraocular movements intact.      Conjunctiva/sclera: Conjunctivae normal.   Cardiovascular:      Rate and Rhythm: Normal rate and regular rhythm.      Pulses: Normal pulses.      Heart sounds: Normal heart sounds.   Pulmonary:      Effort: Pulmonary effort is normal. No respiratory distress.      Breath sounds: Normal breath sounds.   Musculoskeletal:         General: No deformity or signs of injury. Normal range of motion.      Cervical back: Normal range of motion.      Comments:  Gait normal    Skin:     General: Skin is warm and dry.      Findings: No rash.   Neurological:      General: No focal deficit present.      Mental Status: He is alert and oriented to person, place, and time. Mental status is at baseline.   Psychiatric:         Mood and Affect: Mood normal.         Behavior: Behavior normal.         Thought Content: Thought content normal.       Current Outpatient Medications on File Prior to Visit   Medication Sig Dispense Refill    insulin degludec (TRESIBA FLEXTOUCH U-100) 100 unit/mL (3 mL) insulin pen Inject 24 Units into the skin every evening. Max TDD of 30 units 15 mL 1    insulin lispro-aabc (LYUMJEV KWIKPEN U-100 INSULIN) 100 unit/mL pen Inject 10 units 3 times per day before meals, 2-4 units for snack, . + correction scale. MAX TDD Of 50 units 5 pen 6    metoprolol succinate (TOPROL-XL) 25 MG 24 hr tablet Take 1 tablet (25 mg total) by mouth once daily. 30 tablet 6    sacubitriL-valsartan (ENTRESTO) 24-26 mg per tablet Take 1 tablet by mouth 2 (two) times daily. 60 tablet 11    testosterone cypionate (DEPOTESTOTERONE CYPIONATE) 200 mg/mL injection Inject 1 mL (200 mg total) into the muscle once a week. 10 mL 1    ACCU-CHEK GUIDE ME GLUCOSE MTR Misc CHECK BLOOD SUGAR TWICE A DAY      amitriptyline (ELAVIL) 25 MG tablet Take 1 tablet (25 mg total) by mouth nightly as needed for Insomnia. (Patient not taking: Reported on 9/12/2023) 90 tablet 1    atorvastatin (LIPITOR) 20 MG tablet Take 1 tablet (20 mg total) by mouth once daily. (Patient not taking: Reported on 9/12/2023) 90 tablet 2    blood sugar diagnostic Strp To check BG 2 times daily, to use with insurance preferred meter (Patient not taking: Reported on 9/12/2023) 200 strip 2    blood-glucose sensor (DEXCOM G6 SENSOR) Chen Use 1 sensor every 10 days (Patient not taking: Reported on 9/12/2023) 3 each 11    blood-glucose transmitter (DEXCOM G6 TRANSMITTER) Chen 1 transmitter every 3 months (Patient not taking: Reported  "on 9/12/2023) 1 each 4    folic acid (FOLVITE) 1 MG tablet TAKE 1 TABLET BY MOUTH EVERY DAY (Patient not taking: Reported on 9/12/2023) 90 tablet 1    furosemide (LASIX) 20 MG tablet Take 1 tablet (20 mg total) by mouth once daily. (Patient not taking: Reported on 9/12/2023) 30 tablet 6    glucagon (BAQSIMI) 3 mg/actuation Spry 3 mg (one actuation) into a single nostril; if no response, may repeat in 15 minutes using a new intranasal device. (Patient not taking: Reported on 9/12/2023) 2 each 1    hydrOXYchloroQUINE (PLAQUENIL) 100 mg tablet Take 200 mg by mouth once daily.      lancets Misc To check BG 2 times daily, to use with insurance preferred meter (Patient not taking: Reported on 9/12/2023) 200 each 2    methotrexate 2.5 MG Tab Take 6 tablets (15 mg total) by mouth every 7 days. (Patient not taking: Reported on 5/25/2023) 24 tablet 3    pen needle, diabetic 32 gauge x 5/32" Ndle To use 7 times per day with insulin injections- for meals, long-acting insulin and for snacks (Patient not taking: Reported on 9/12/2023) 200 each 11     No current facility-administered medications on file prior to visit.         Assessment:       1. Physical exam, annual    2. Butterfly rash        Plan:       Physical exam, annual  -     Lipid Panel; Future; Expected date: 09/12/2023  -     T4; Future; Expected date: 09/12/2023  -     TSH; Future; Expected date: 09/12/2023  -     Vitamin D; Future; Expected date: 09/12/2023  -     Ambulatory referral/consult to Endo Procedure ; Future; Expected date: 09/13/2023   - completion of annual screening labs ordered    - pneumococcal series completed    Butterfly rash  -     Ambulatory referral/consult to Dermatology; Future; Expected date: 09/19/2023   - requesting assistance rescheduling upcoming dermatology establishment to a more convenient time and location    Return to clinic in one year for annual exam or sooner if dictated by labs or illness.      Other orders  -     " Pneumococcal Conjugate Vaccine (20 Valent) (IM)

## 2023-09-15 ENCOUNTER — HOSPITAL ENCOUNTER (OUTPATIENT)
Dept: PREADMISSION TESTING | Facility: HOSPITAL | Age: 48
Discharge: HOME | End: 2023-09-15
Attending: INTERNAL MEDICINE
Payer: COMMERCIAL

## 2023-09-15 DIAGNOSIS — Z00.00 PHYSICAL EXAM, ANNUAL: Primary | ICD-10-CM

## 2023-09-18 ENCOUNTER — PATIENT MESSAGE (OUTPATIENT)
Dept: PRIMARY CARE CLINIC | Facility: CLINIC | Age: 48
End: 2023-09-18
Payer: COMMERCIAL

## 2023-09-18 ENCOUNTER — PATIENT MESSAGE (OUTPATIENT)
Dept: ADMINISTRATIVE | Facility: HOSPITAL | Age: 48
End: 2023-09-18
Payer: COMMERCIAL

## 2023-09-18 RX ORDER — SODIUM, POTASSIUM,MAG SULFATES 17.5-3.13G
1 SOLUTION, RECONSTITUTED, ORAL ORAL DAILY
Qty: 1 KIT | Refills: 0 | Status: SHIPPED | OUTPATIENT
Start: 2023-09-18 | End: 2023-09-20

## 2023-09-23 ENCOUNTER — PATIENT MESSAGE (OUTPATIENT)
Dept: DIABETES | Facility: CLINIC | Age: 48
End: 2023-09-23
Payer: COMMERCIAL

## 2023-10-05 ENCOUNTER — PATIENT MESSAGE (OUTPATIENT)
Dept: PAIN MEDICINE | Facility: CLINIC | Age: 48
End: 2023-10-05
Payer: COMMERCIAL

## 2023-10-09 DIAGNOSIS — G89.29 CHRONIC NECK PAIN: Primary | ICD-10-CM

## 2023-10-09 DIAGNOSIS — M54.2 CHRONIC NECK PAIN: Primary | ICD-10-CM

## 2023-10-09 DIAGNOSIS — M47.812 CERVICAL SPONDYLOSIS: ICD-10-CM

## 2023-10-10 NOTE — TELEPHONE ENCOUNTER
Called pt to set up their procedure. Pt answered and procedure has been made. Inform pt on the procedure instruction.Pt understood and all question answered.     Randolph Irizarry   Medical

## 2023-10-16 NOTE — PRE-PROCEDURE INSTRUCTIONS
Spoke with patient regarding procedure scheduled on 10.25     Arrival time 0600     Has patient been sick with fever or on antibiotics within the last 7 days? No     Does the patient have any open wounds, sores or rashes? No     Does the patient have any recent fractures? no     Has patient received a vaccination within the last 7 days? No     Received the COVID vaccination? yes     Has the patient stopped all medications as directed? Hold dm meds am of procedure.     Does patient have a pacemaker, defibrillator, or implantable stimulator? No     Does the patient have a ride to and from procedure and someone reliable to remain with patient?  George     Is the patient diabetic? yes     Does the patient have sleep apnea? Or use O2 at home? No and no     Is the patient receiving sedation? yes     Is the patient instructed to remain NPO beginning at midnight the night before their procedure? yes     Procedure location confirmed with patient? Yes     Covid- Denies signs/symptoms. Instructed to notify PAT/MD if any changes.

## 2023-10-18 ENCOUNTER — PATIENT MESSAGE (OUTPATIENT)
Dept: CARDIOLOGY | Facility: CLINIC | Age: 48
End: 2023-10-18
Payer: COMMERCIAL

## 2023-10-25 ENCOUNTER — HOSPITAL ENCOUNTER (OUTPATIENT)
Facility: HOSPITAL | Age: 48
Discharge: HOME | End: 2023-10-25
Attending: ANESTHESIOLOGY | Admitting: ANESTHESIOLOGY
Payer: COMMERCIAL

## 2023-10-25 VITALS
BODY MASS INDEX: 24.32 KG/M2 | HEIGHT: 68 IN | OXYGEN SATURATION: 99 % | DIASTOLIC BLOOD PRESSURE: 62 MMHG | RESPIRATION RATE: 16 BRPM | WEIGHT: 160.5 LBS | TEMPERATURE: 98 F | SYSTOLIC BLOOD PRESSURE: 118 MMHG | HEART RATE: 83 BPM

## 2023-10-25 DIAGNOSIS — M54.16 LUMBAR RADICULOPATHY: ICD-10-CM

## 2023-10-25 LAB — POCT GLUCOSE: 143 MG/DL (ref 70–110)

## 2023-10-25 PROCEDURE — 64483 NJX AA&/STRD TFRM EPI L/S 1: CPT | Mod: 50 | Performed by: ANESTHESIOLOGY

## 2023-10-25 PROCEDURE — 25500020 PHARM REV CODE 255: Performed by: ANESTHESIOLOGY

## 2023-10-25 PROCEDURE — 63600175 PHARM REV CODE 636 W HCPCS: Performed by: ANESTHESIOLOGY

## 2023-10-25 PROCEDURE — 64483 PR EPIDURAL INJ, ANES/STEROID, TRANSFORAMINAL, LUMB/SACR, SNGL LEVL: ICD-10-PCS | Mod: 50,,, | Performed by: ANESTHESIOLOGY

## 2023-10-25 PROCEDURE — 64483 NJX AA&/STRD TFRM EPI L/S 1: CPT | Mod: 50,,, | Performed by: ANESTHESIOLOGY

## 2023-10-25 RX ORDER — METHYLPREDNISOLONE ACETATE 40 MG/ML
INJECTION, SUSPENSION INTRA-ARTICULAR; INTRALESIONAL; INTRAMUSCULAR; SOFT TISSUE
Status: DISCONTINUED | OUTPATIENT
Start: 2023-10-25 | End: 2023-10-25 | Stop reason: HOSPADM

## 2023-10-25 RX ORDER — ONDANSETRON 2 MG/ML
4 INJECTION INTRAMUSCULAR; INTRAVENOUS ONCE AS NEEDED
Status: DISCONTINUED | OUTPATIENT
Start: 2023-10-25 | End: 2023-10-25 | Stop reason: HOSPADM

## 2023-10-25 RX ORDER — BUPIVACAINE HYDROCHLORIDE 2.5 MG/ML
INJECTION, SOLUTION EPIDURAL; INFILTRATION; INTRACAUDAL
Status: DISCONTINUED | OUTPATIENT
Start: 2023-10-25 | End: 2023-10-25 | Stop reason: HOSPADM

## 2023-10-25 RX ORDER — MIDAZOLAM HYDROCHLORIDE 1 MG/ML
INJECTION, SOLUTION INTRAMUSCULAR; INTRAVENOUS
Status: DISCONTINUED | OUTPATIENT
Start: 2023-10-25 | End: 2023-10-25 | Stop reason: HOSPADM

## 2023-10-25 RX ORDER — FENTANYL CITRATE 50 UG/ML
INJECTION, SOLUTION INTRAMUSCULAR; INTRAVENOUS
Status: DISCONTINUED | OUTPATIENT
Start: 2023-10-25 | End: 2023-10-25 | Stop reason: HOSPADM

## 2023-10-25 NOTE — DISCHARGE INSTRUCTIONS

## 2023-10-25 NOTE — DISCHARGE SUMMARY
Discharge Note  Short Stay      SUMMARY     Admit Date: 10/25/2023    Attending Physician: Aric Muro MD        Discharge Physician: Aric Muro MD        Discharge Date: 10/25/2023 9:43 AM    Procedure(s) (LRB):  Bilateral L5/S1 TF GARY (Bilateral)    Final Diagnosis: Lumbosacral radiculopathy [M54.17]    Disposition: Home or self care    Patient Instructions:   Current Discharge Medication List        CONTINUE these medications which have NOT CHANGED    Details   folic acid (FOLVITE) 1 MG tablet TAKE 1 TABLET BY MOUTH EVERY DAY  Qty: 90 tablet, Refills: 1    Associated Diagnoses: Butterfly rash; KIRTI positive; Psoriatic arthritis      hydrOXYchloroQUINE (PLAQUENIL) 100 mg tablet Take 200 mg by mouth once daily.      insulin degludec (TRESIBA FLEXTOUCH U-100) 100 unit/mL (3 mL) insulin pen Inject 24 Units into the skin every evening. Max TDD of 30 units  Qty: 15 mL, Refills: 1    Comments: [id number] Rx re-entered      insulin lispro-aabc (LYUMJEV KWIKPEN U-100 INSULIN) 100 unit/mL pen Inject 10 units 3 times per day before meals, 2-4 units for snack, . + correction scale. MAX TDD Of 50 units  Qty: 5 pen, Refills: 6    Associated Diagnoses: Type 2 diabetes mellitus with hyperglycemia, with long-term current use of insulin      metoprolol succinate (TOPROL-XL) 25 MG 24 hr tablet Take 1 tablet (25 mg total) by mouth once daily.  Qty: 30 tablet, Refills: 6    Comments: .      sacubitriL-valsartan (ENTRESTO) 24-26 mg per tablet Take 1 tablet by mouth 2 (two) times daily.  Qty: 60 tablet, Refills: 11      ACCU-CHEK GUIDE ME GLUCOSE MTR [facility] CHECK BLOOD SUGAR TWICE A DAY      amitriptyline (ELAVIL) 25 MG tablet Take 1 tablet (25 mg total) by mouth nightly as needed for Insomnia.  Qty: 90 tablet, Refills: 1      atorvastatin (LIPITOR) 20 MG tablet Take 1 tablet (20 mg total) by mouth once daily.  Qty: 90 tablet, Refills: 2    Associated Diagnoses: Uncontrolled type 2 diabetes mellitus with hyperglycemia      blood  "sugar diagnostic Strp To check BG 2 times daily, to use with insurance preferred meter  Qty: 200 strip, Refills: 2      blood-glucose sensor (DEXCOM G6 SENSOR) Chen Use 1 sensor every 10 days  Qty: 3 each, Refills: 11    Associated Diagnoses: Type 2 diabetes mellitus with hyperglycemia, with long-term current use of insulin      blood-glucose transmitter (DEXCOM G6 TRANSMITTER) Chen 1 transmitter every 3 months  Qty: 1 each, Refills: 4    Associated Diagnoses: Type 2 diabetes mellitus with hyperglycemia, with long-term current use of insulin      furosemide (LASIX) 20 MG tablet Take 1 tablet (20 mg total) by mouth once daily.  Qty: 30 tablet, Refills: 6      glucagon (BAQSIMI) 3 mg/actuation Spry 3 mg (one actuation) into a single nostril; if no response, may repeat in 15 minutes using a new intranasal device.  Qty: 2 each, Refills: 1    Associated Diagnoses: Uncontrolled type 2 diabetes mellitus with hyperglycemia      lancets Misc To check BG 2 times daily, to use with insurance preferred meter  Qty: 200 each, Refills: 2      methotrexate 2.5 MG Tab Take 6 tablets (15 mg total) by mouth every 7 days.  Qty: 24 tablet, Refills: 3    Associated Diagnoses: Butterfly rash; KIRTI positive; Psoriatic arthritis      pen needle, diabetic 32 gauge x 5/32" Ndle To use 7 times per day with insulin injections- for meals, long-acting insulin and for snacks  Qty: 200 each, Refills: 11    Associated Diagnoses: Type 2 diabetes mellitus with hyperglycemia, with long-term current use of insulin      testosterone cypionate (DEPOTESTOTERONE CYPIONATE) 200 mg/mL injection Inject 1 mL (200 mg total) into the muscle once a week.  Qty: 10 mL, Refills: 1    Comments: This request is for a new prescription for a controlled substance as required by Federal/State law.  Associated Diagnoses: Low testosterone in male                 Discharge Diagnosis: Lumbosacral radiculopathy [M54.17]  Condition on Discharge: Stable with no complications to " procedure   Diet on Discharge: Same as before.  Activity: as per instruction sheet.  Discharge to: Home with a responsible adult.  Follow up: 2-4 weeks       Please call the office at [phone] if you experience any weakness or loss of sensation, fever > 101.5, pain uncontrolled with oral medications, persistent nausea/vomiting/or diarrhea, redness or drainage from the incisions, or any other worrisome concerns. If physician on call was not reached or could not communicate with our office for any reason please go to the nearest emergency department

## 2023-10-25 NOTE — H&P
HPI  Patient presenting for Procedure(s) (LRB):  Bilateral L5/S1 TF GARY (Bilateral)     Patient on Anti-coagulation No    No health changes since previous encounter    Past Medical History:   Diagnosis Date    Diabetes mellitus     Hypogonadism in male      Past Surgical History:   Procedure Laterality Date    INJECTION OF ANESTHETIC AGENT AROUND MEDIAL BRANCH NERVES INNERVATING CERVICAL FACET JOINT Bilateral 6/7/2023    Procedure: Bilateral C3-5 MBB with local;  Surgeon: Tony Garcia MD;  Location: [facility];  Service: Pain Management;  Laterality: Bilateral;    INJECTION OF ANESTHETIC AGENT AROUND MEDIAL BRANCH NERVES INNERVATING CERVICAL FACET JOINT Bilateral 8/23/2023    Procedure: Bilateral C3-5 MBB  second diagnostic block with RN IV sedation;  Surgeon: Tony Garcia MD;  Location: [facility];  Service: Pain Management;  Laterality: Bilateral;    NOSE SURGERY      SELECTIVE INJECTION OF ANESTHETIC AGENT AROUND LUMBAR SPINAL NERVE ROOT BY TRANSFORAMINAL APPROACH Bilateral 5/24/2023    Procedure: Bilateral L5 TF GARY with local;  Surgeon: Tony Garcia MD;  Location: [facility];  Service: Pain Management;  Laterality: Bilateral;     Review of patient's allergies indicates:  No Known Allergies     No current facility-administered medications on file prior to encounter.     Current Outpatient Medications on File Prior to Encounter   Medication Sig Dispense Refill    folic acid (FOLVITE) 1 MG tablet TAKE 1 TABLET BY MOUTH EVERY DAY 90 tablet 1    hydrOXYchloroQUINE (PLAQUENIL) 100 mg tablet Take 200 mg by mouth once daily.      insulin degludec (TRESIBA FLEXTOUCH U-100) 100 unit/mL (3 mL) insulin pen Inject 24 Units into the skin every evening. Max TDD of 30 units 15 mL 1    insulin lispro-aabc (LYUMJEV KWIKPEN U-100 INSULIN) 100 unit/mL pen Inject 10 units 3 times per day before meals, 2-4 units for snack, . + correction scale. MAX TDD Of 50 units 5 pen 6    metoprolol succinate (TOPROL-XL) 25 MG 24 hr  "tablet Take 1 tablet (25 mg total) by mouth once daily. 30 tablet 6    sacubitriL-valsartan (ENTRESTO) 24-26 mg per tablet Take 1 tablet by mouth 2 (two) times daily. 60 tablet 11    ACCU-CHEK GUIDE ME GLUCOSE MTR Misc CHECK BLOOD SUGAR TWICE A DAY      amitriptyline (ELAVIL) 25 MG tablet Take 1 tablet (25 mg total) by mouth nightly as needed for Insomnia. (Patient not taking: Reported on 9/12/2023) 90 tablet 1    atorvastatin (LIPITOR) 20 MG tablet Take 1 tablet (20 mg total) by mouth once daily. (Patient not taking: Reported on 9/12/2023) 90 tablet 2    blood sugar diagnostic Strp To check BG 2 times daily, to use with insurance preferred meter (Patient not taking: Reported on 9/12/2023) 200 strip 2    blood-glucose sensor (DEXCOM G6 SENSOR) Chen Use 1 sensor every 10 days (Patient not taking: Reported on 9/12/2023) 3 each 11    blood-glucose transmitter (DEXCOM G6 TRANSMITTER) Chen 1 transmitter every 3 months (Patient not taking: Reported on 9/12/2023) 1 each 4    furosemide (LASIX) 20 MG tablet Take 1 tablet (20 mg total) by mouth once daily. (Patient not taking: Reported on 9/12/2023) 30 tablet 6    glucagon (BAQSIMI) 3 mg/actuation Spry 3 mg (one actuation) into a single nostril; if no response, may repeat in 15 minutes using a new intranasal device. (Patient not taking: Reported on 9/12/2023) 2 each 1    lancets Misc To check BG 2 times daily, to use with insurance preferred meter (Patient not taking: Reported on 9/12/2023) 200 each 2    methotrexate 2.5 MG Tab Take 6 tablets (15 mg total) by mouth every 7 days. (Patient not taking: Reported on 5/25/2023) 24 tablet 3    pen needle, diabetic 32 gauge x 5/32" Ndle To use 7 times per day with insulin injections- for meals, long-acting insulin and for snacks (Patient not taking: Reported on 9/12/2023) 200 each 11    testosterone cypionate (DEPOTESTOTERONE CYPIONATE) 200 mg/mL injection Inject 1 mL (200 mg total) into the muscle once a week. 10 mL 1        PMHx, " "PSHx, Allergies, Medications reviewed in epic    ROS negative except pain complaints in HPI    OBJECTIVE:    BP (!) 143/75 (BP Location: Right arm, Patient Position: Sitting)   Pulse 70   Temp 97.7 °F (36.5 °C) (Temporal)   Resp 18   Ht 5' 8" (1.727 m)   Wt 72.8 kg (160 lb 7.9 oz)   SpO2 99%   BMI 24.40 kg/m²     PHYSICAL EXAMINATION:    GENERAL: Well appearing, in no acute distress, alert and oriented x3.  PSYCH:  Mood and affect appropriate.  SKIN: Skin color, texture, turgor normal, no rashes or lesions which will impact the procedure.  CV: RRR with palpation of the radial artery.  PULM: No evidence of respiratory difficulty, symmetric chest rise. Clear to auscultation.  NEURO: Cranial nerves grossly intact.    Plan:    Proceed with procedure as planned Procedure(s) (LRB):  Bilateral L5/S1 TF GARY (Bilateral)    Aric Muro MD  10/25/2023            "

## 2023-10-25 NOTE — OP NOTE
Transforaminal Lumbar Epidural Steroid Injection    INFORMED CONSENT: The procedure, risks, benefits and options were discussed with patient. There are no contraindications to the procedure. The patient expressed understanding and agreed to proceed. The personnel performing the procedure was discussed.    Date of procedure 10/25/2023    Time-out taken to identify patient and procedure side prior to starting the procedure.                     PROCEDURE:    1)  Bilateral  L5/S1 TRANSFORAMINAL EPIDURAL STEROID INJECTION      Pre Procedure diagnosis:    Lumbosacral radiculopathy [M54.17]  1. Lumbar radiculopathy        Post-Procedure diagnosis:   same    Surgeon: Aric Muro MD    Assistants: None    Medication: 2ml Betamethasone PF 6mg/ml and 2ml Lidocaine PF 1%    Local: 3ml Lidocaine PF 1%    Sedation: Conscious sedation provided by M.D    SEDATION MEDICATIONS: local/IV sedation: Versed 2 mg and fentanyl 75 mcg IV.  Conscious sedation ordered by MD.  Patient reevaluated and sedation administered by MD and monitored by RN.  Total sedation time was less than 10 min.    Total sedation time was <10 min    Complications: None    Specimens: None        TECHNIQUE: The patient was brought to the procedure room. IV access was obtained prior to the procedure. The patient was positioned prone on the fluoroscopy table. Continuous hemodynamic monitoring was initiated including blood pressure, EKG, and pulse oximetry. . The skin was prepped with chlorhexidine and draped in a sterile fashion.   Local Xylocaine was injected by raising a wheel and going down to the periosteum using a 27-gauge hypodermic needle.      The bilateral L5/S1 transforaminal spaces were identified with fluoroscopy in the  AP, oblique, and lateral views.  A 22 gauge spinal quinke needle was then advanced into the area of the trans foraminal spaces at the above levels with confirmation of proper needle position using AP, oblique, and lateral fluoroscopic  views. Once the needle tip was in the area of the transforaminal space, and there was no blood, CSF or paraesthesias,  2 mL of Omnipaque 300mg/ml was injected at each level for a total of 4 mL.  Fluoroscopic imaging in the AP and lateral views revealed a clear outline of the spinal nerve with proximal spread of agent through the neural foramen into the epidural space. A total combination of 1 mL of Lidocaine PF 1% and 1ml of Betamethasone PF 6mg/ml was injected into each level for a total of 4mL of injected medications with displacement of the contrast dye confirming that the medication went into the area of the transforaminal spaces at each level. A sterile dressing was applied. Patient tolerated procedure well.        The patient was monitored for approximately 30 minutes after the procedure.  Patient was given post procedure and discharge instructions to follow at home.  The patient was discharged in a stable condition

## 2023-11-01 ENCOUNTER — OFFICE VISIT (OUTPATIENT)
Dept: RHEUMATOLOGY | Facility: CLINIC | Age: 48
End: 2023-11-01
Payer: COMMERCIAL

## 2023-11-01 VITALS
HEIGHT: 68 IN | DIASTOLIC BLOOD PRESSURE: 72 MMHG | SYSTOLIC BLOOD PRESSURE: 134 MMHG | BODY MASS INDEX: 24.32 KG/M2 | WEIGHT: 160.5 LBS | HEART RATE: 69 BPM

## 2023-11-01 DIAGNOSIS — M54.40 CHRONIC LOW BACK PAIN WITH SCIATICA, SCIATICA LATERALITY UNSPECIFIED, UNSPECIFIED BACK PAIN LATERALITY: ICD-10-CM

## 2023-11-01 DIAGNOSIS — M47.812 CERVICAL SPONDYLOSIS: ICD-10-CM

## 2023-11-01 DIAGNOSIS — R76.8 ANA POSITIVE: Primary | ICD-10-CM

## 2023-11-01 DIAGNOSIS — E11.69 HYPERLIPIDEMIA ASSOCIATED WITH TYPE 2 DIABETES MELLITUS: ICD-10-CM

## 2023-11-01 DIAGNOSIS — K11.20 SIALOADENITIS: ICD-10-CM

## 2023-11-01 DIAGNOSIS — G89.29 CHRONIC LOW BACK PAIN WITH SCIATICA, SCIATICA LATERALITY UNSPECIFIED, UNSPECIFIED BACK PAIN LATERALITY: ICD-10-CM

## 2023-11-01 DIAGNOSIS — I50.9 CONGESTIVE HEART FAILURE, UNSPECIFIED HF CHRONICITY, UNSPECIFIED HEART FAILURE TYPE: ICD-10-CM

## 2023-11-01 DIAGNOSIS — L21.9 SEBORRHEIC DERMATITIS: ICD-10-CM

## 2023-11-01 DIAGNOSIS — E78.5 HYPERLIPIDEMIA ASSOCIATED WITH TYPE 2 DIABETES MELLITUS: ICD-10-CM

## 2023-11-01 DIAGNOSIS — M54.17 LUMBOSACRAL RADICULOPATHY: ICD-10-CM

## 2023-11-01 PROCEDURE — 3052F PR MOST RECENT HEMOGLOBIN A1C LEVEL 8.0 - < 9.0%: ICD-10-PCS | Mod: CPTII,S$GLB,, | Performed by: INTERNAL MEDICINE

## 2023-11-01 PROCEDURE — 3008F PR BODY MASS INDEX (BMI) DOCUMENTED: ICD-10-PCS | Mod: CPTII,S$GLB,, | Performed by: INTERNAL MEDICINE

## 2023-11-01 PROCEDURE — 3061F PR NEG MICROALBUMINURIA RESULT DOCUMENTED/REVIEW: ICD-10-PCS | Mod: CPTII,S$GLB,, | Performed by: INTERNAL MEDICINE

## 2023-11-01 PROCEDURE — 3066F PR DOCUMENTATION OF TREATMENT FOR NEPHROPATHY: ICD-10-PCS | Mod: CPTII,S$GLB,, | Performed by: INTERNAL MEDICINE

## 2023-11-01 PROCEDURE — 1159F MED LIST DOCD IN RCRD: CPT | Mod: CPTII,S$GLB,, | Performed by: INTERNAL MEDICINE

## 2023-11-01 PROCEDURE — 99214 PR OFFICE/OUTPT VISIT, EST, LEVL IV, 30-39 MIN: ICD-10-PCS | Mod: S$GLB,,, | Performed by: INTERNAL MEDICINE

## 2023-11-01 PROCEDURE — 3075F SYST BP GE 130 - 139MM HG: CPT | Mod: CPTII,S$GLB,, | Performed by: INTERNAL MEDICINE

## 2023-11-01 PROCEDURE — 99999 PR PBB SHADOW E&M-EST. PATIENT-LVL IV: CPT | Mod: PBBFAC,,, | Performed by: INTERNAL MEDICINE

## 2023-11-01 PROCEDURE — 4010F PR ACE/ARB THEARPY RXD/TAKEN: ICD-10-PCS | Mod: CPTII,S$GLB,, | Performed by: INTERNAL MEDICINE

## 2023-11-01 PROCEDURE — 3075F PR MOST RECENT SYSTOLIC BLOOD PRESS GE 130-139MM HG: ICD-10-PCS | Mod: CPTII,S$GLB,, | Performed by: INTERNAL MEDICINE

## 2023-11-01 PROCEDURE — 3078F PR MOST RECENT DIASTOLIC BLOOD PRESSURE < 80 MM HG: ICD-10-PCS | Mod: CPTII,S$GLB,, | Performed by: INTERNAL MEDICINE

## 2023-11-01 PROCEDURE — 3052F HG A1C>EQUAL 8.0%<EQUAL 9.0%: CPT | Mod: CPTII,S$GLB,, | Performed by: INTERNAL MEDICINE

## 2023-11-01 PROCEDURE — 4010F ACE/ARB THERAPY RXD/TAKEN: CPT | Mod: CPTII,S$GLB,, | Performed by: INTERNAL MEDICINE

## 2023-11-01 PROCEDURE — 3066F NEPHROPATHY DOC TX: CPT | Mod: CPTII,S$GLB,, | Performed by: INTERNAL MEDICINE

## 2023-11-01 PROCEDURE — 3078F DIAST BP <80 MM HG: CPT | Mod: CPTII,S$GLB,, | Performed by: INTERNAL MEDICINE

## 2023-11-01 PROCEDURE — 99214 OFFICE O/P EST MOD 30 MIN: CPT | Mod: S$GLB,,, | Performed by: INTERNAL MEDICINE

## 2023-11-01 PROCEDURE — 99999 PR PBB SHADOW E&M-EST. PATIENT-LVL IV: ICD-10-PCS | Mod: PBBFAC,,, | Performed by: INTERNAL MEDICINE

## 2023-11-01 PROCEDURE — 1159F PR MEDICATION LIST DOCUMENTED IN MEDICAL RECORD: ICD-10-PCS | Mod: CPTII,S$GLB,, | Performed by: INTERNAL MEDICINE

## 2023-11-01 PROCEDURE — 3008F BODY MASS INDEX DOCD: CPT | Mod: CPTII,S$GLB,, | Performed by: INTERNAL MEDICINE

## 2023-11-01 PROCEDURE — 3061F NEG MICROALBUMINURIA REV: CPT | Mod: CPTII,S$GLB,, | Performed by: INTERNAL MEDICINE

## 2023-11-01 RX ORDER — KETOCONAZOLE 20 MG/ML
SHAMPOO, SUSPENSION TOPICAL
Qty: 240 ML | Refills: 0 | Status: SHIPPED | OUTPATIENT
Start: 2023-11-02 | End: 2023-11-21 | Stop reason: SDUPTHER

## 2023-11-01 RX ORDER — PREGABALIN 50 MG/1
50 CAPSULE ORAL 3 TIMES DAILY
Qty: 270 CAPSULE | Refills: 1 | Status: SHIPPED | OUTPATIENT
Start: 2023-11-01 | End: 2024-04-29

## 2023-11-01 NOTE — PROGRESS NOTES
RHEUMATOLOGY OUTPATIENT CLINIC NOTE    11/1/2023    Attending Rheumatologist: Juan R Sen  Primary Care Provider/Physician Requesting Consultation: Luan Lazar MD   Chief Complaint/Reason For Consultation:  Positive BRIE, Psoriatic Arthritis, Lupus, and Fibromyalgia      Subjective:     Jason Sandra is a 48 y.o. White male with +brie for f/u    Main concern of chronic back pain w/ mechanical characteristics and no alarm s/s    Review of Systems   Constitutional:  Negative for fever.   Eyes:  Negative for photophobia and pain.   Respiratory:  Negative for cough and shortness of breath.    Gastrointestinal:  Negative for blood in stool and melena.   Genitourinary:  Negative for dysuria, hematuria and urgency.   Musculoskeletal:  Positive for back pain. Negative for joint pain.   Skin:  Positive for rash.   Neurological:  Negative for focal weakness and weakness.       Chronic comorbid conditions affecting medical decision making today:  Past Medical History:   Diagnosis Date    Diabetes mellitus     Hypogonadism in male      Past Surgical History:   Procedure Laterality Date    INJECTION OF ANESTHETIC AGENT AROUND MEDIAL BRANCH NERVES INNERVATING CERVICAL FACET JOINT Bilateral 6/7/2023    Procedure: Bilateral C3-5 MBB with local;  Surgeon: Tony Garcia MD;  Location: [facility];  Service: Pain Management;  Laterality: Bilateral;    INJECTION OF ANESTHETIC AGENT AROUND MEDIAL BRANCH NERVES INNERVATING CERVICAL FACET JOINT Bilateral 8/23/2023    Procedure: Bilateral C3-5 MBB  second diagnostic block with RN IV sedation;  Surgeon: Tony Garcia MD;  Location: [facility];  Service: Pain Management;  Laterality: Bilateral;    NOSE SURGERY      SELECTIVE INJECTION OF ANESTHETIC AGENT AROUND LUMBAR SPINAL NERVE ROOT BY TRANSFORAMINAL APPROACH Bilateral 5/24/2023    Procedure: Bilateral L5 TF GARY with local;  Surgeon: Tony Garcia MD;  Location: [facility];  Service: Pain Management;  Laterality:  Bilateral;    SELECTIVE INJECTION OF ANESTHETIC AGENT AROUND LUMBAR SPINAL NERVE ROOT BY TRANSFORAMINAL APPROACH Bilateral 10/25/2023    Procedure: Bilateral L5/S1 TF GARY;  Surgeon: Aric Muro MD;  Location: [facility];  Service: Pain Management;  Laterality: Bilateral;     Family History   Problem Relation Age of Onset    Stroke Mother     Glaucoma Mother     Cataracts Mother     Alzheimer's disease Father      Social History     Tobacco Use   Smoking Status Never    Passive exposure: Never   Smokeless Tobacco Never       Current Outpatient Medications:     ACCU-CHEK GUIDE ME GLUCOSE MTR Misc, CHECK BLOOD SUGAR TWICE A DAY, Disp: , Rfl:     amitriptyline (ELAVIL) 25 MG tablet, Take 1 tablet (25 mg total) by mouth nightly as needed for Insomnia., Disp: 90 tablet, Rfl: 1    atorvastatin (LIPITOR) 20 MG tablet, Take 1 tablet (20 mg total) by mouth once daily., Disp: 90 tablet, Rfl: 2    blood sugar diagnostic Strp, To check BG 2 times daily, to use with insurance preferred meter, Disp: 200 strip, Rfl: 2    blood-glucose sensor (DEXCOM G6 SENSOR) Chen, Use 1 sensor every 10 days, Disp: 3 each, Rfl: 11    blood-glucose transmitter (DEXCOM G6 TRANSMITTER) Chen, 1 transmitter every 3 months, Disp: 1 each, Rfl: 4    folic acid (FOLVITE) 1 MG tablet, TAKE 1 TABLET BY MOUTH EVERY DAY, Disp: 90 tablet, Rfl: 1    furosemide (LASIX) 20 MG tablet, Take 1 tablet (20 mg total) by mouth once daily., Disp: 30 tablet, Rfl: 6    glucagon (BAQSIMI) 3 mg/actuation Spry, 3 mg (one actuation) into a single nostril; if no response, may repeat in 15 minutes using a new intranasal device., Disp: 2 each, Rfl: 1    hydrOXYchloroQUINE (PLAQUENIL) 100 mg tablet, Take 200 mg by mouth once daily., Disp: , Rfl:     insulin degludec (TRESIBA FLEXTOUCH U-100) 100 unit/mL (3 mL) insulin pen, Inject 24 Units into the skin every evening. Max TDD of 30 units, Disp: 15 mL, Rfl: 1    insulin lispro-aabc (LYUMJEV KWIKPEN U-100 INSULIN) 100  "unit/mL pen, Inject 10 units 3 times per day before meals, 2-4 units for snack, . + correction scale. MAX TDD Of 50 units, Disp: 5 pen, Rfl: 6    lancets Misc, To check BG 2 times daily, to use with insurance preferred meter, Disp: 200 each, Rfl: 2    methotrexate 2.5 MG Tab, Take 6 tablets (15 mg total) by mouth every 7 days., Disp: 24 tablet, Rfl: 3    metoprolol succinate (TOPROL-XL) 25 MG 24 hr tablet, Take 1 tablet (25 mg total) by mouth once daily., Disp: 30 tablet, Rfl: 6    pen needle, diabetic 32 gauge x 5/32" Ndle, To use 7 times per day with insulin injections- for meals, long-acting insulin and for snacks, Disp: 200 each, Rfl: 11    sacubitriL-valsartan (ENTRESTO) 24-26 mg per tablet, Take 1 tablet by mouth 2 (two) times daily., Disp: 60 tablet, Rfl: 11    testosterone cypionate (DEPOTESTOTERONE CYPIONATE) 200 mg/mL injection, Inject 1 mL (200 mg total) into the muscle once a week., Disp: 10 mL, Rfl: 1     Objective:     Vitals:    11/01/23 1435   BP: 134/72   Pulse: 69     Physical Exam   Eyes: Conjunctivae are normal.   Pulmonary/Chest: Effort normal. No respiratory distress.   Musculoskeletal:         General: No swelling or tenderness. Normal range of motion.   Neurological: He displays no weakness.   Skin: No rash noted.       Reviewed available old and all outside pertinent medical records available.    All lab results personally reviewed and interpreted by me.       ASSESSMENT      Encounter Diagnoses   Name Primary?    Seborrheic dermatitis     Chronic low back pain with sciatica, sciatica laterality unspecified, unspecified back pain laterality     KIRTI positive Yes    Lumbosacral radiculopathy     Cervical spondylosis     Sialoadenitis     Hyperlipidemia associated with type 2 diabetes mellitus     Congestive heart failure, unspecified HF chronicity, unspecified heart failure type       PLAN     Elevated KIRTI w/ rash w/ seborrheic dermatitis and inflamed characteristics.  No efficacy from MTX or " HCQ.  Exam w/ Heberden's, early Kiya's.  Denies squeeze tenderness.  No reproducible weakness.  No periungual erythema or trophic changes finger pads.  No concerning cytopenias.  Liver and kidney function stable.  Elevated KIRTI, negative SUMAN.  Negative RA serologies.  Negative HLAB27.  Negative myomarker panel.  DJD/DDD changes on imaging.  No ankylosis, marginal erosive changes, serositis, concerning LAD, or ILD reported on imaging on file.  Modic type 1 endplate changes reported on MRI LS.  Recommend 1 month trial of ketoconazole 2% shampoo on affected areas BID for 1 month.  Evaluation by Dermatology to clarify if PsO or CTD related rash.  Skin biopsy would be helpful for DDx and prognosis. Might obtain sx relief w/ antiseizure, antidepressant, or muscle relaxant.  Amenable for initial script of Lyrica.  Side effects discussed.  Further dose adjustments per primary care.  Repeat labs close to f/u.      Juan R Sen M.D.

## 2023-11-08 ENCOUNTER — OFFICE VISIT (OUTPATIENT)
Dept: PAIN MEDICINE | Facility: CLINIC | Age: 48
End: 2023-11-08
Payer: COMMERCIAL

## 2023-11-08 VITALS
WEIGHT: 162.25 LBS | SYSTOLIC BLOOD PRESSURE: 119 MMHG | HEART RATE: 80 BPM | DIASTOLIC BLOOD PRESSURE: 75 MMHG | RESPIRATION RATE: 18 BRPM | HEIGHT: 68 IN | BODY MASS INDEX: 24.59 KG/M2

## 2023-11-08 DIAGNOSIS — M47.812 CERVICAL SPONDYLOSIS: ICD-10-CM

## 2023-11-08 DIAGNOSIS — M54.2 CHRONIC NECK PAIN: Primary | ICD-10-CM

## 2023-11-08 DIAGNOSIS — M54.16 LUMBAR RADICULOPATHY: ICD-10-CM

## 2023-11-08 DIAGNOSIS — G89.29 CHRONIC NECK PAIN: Primary | ICD-10-CM

## 2023-11-08 DIAGNOSIS — M54.17 LUMBOSACRAL RADICULOPATHY: ICD-10-CM

## 2023-11-08 PROCEDURE — 99999 PR PBB SHADOW E&M-EST. PATIENT-LVL IV: ICD-10-PCS | Mod: PBBFAC,,, | Performed by: PAIN MEDICINE

## 2023-11-08 PROCEDURE — 3061F NEG MICROALBUMINURIA REV: CPT | Mod: CPTII,S$GLB,, | Performed by: PAIN MEDICINE

## 2023-11-08 PROCEDURE — 3061F PR NEG MICROALBUMINURIA RESULT DOCUMENTED/REVIEW: ICD-10-PCS | Mod: CPTII,S$GLB,, | Performed by: PAIN MEDICINE

## 2023-11-08 PROCEDURE — 99214 PR OFFICE/OUTPT VISIT, EST, LEVL IV, 30-39 MIN: ICD-10-PCS | Mod: S$GLB,,, | Performed by: PAIN MEDICINE

## 2023-11-08 PROCEDURE — 3078F DIAST BP <80 MM HG: CPT | Mod: CPTII,S$GLB,, | Performed by: PAIN MEDICINE

## 2023-11-08 PROCEDURE — 3074F PR MOST RECENT SYSTOLIC BLOOD PRESSURE < 130 MM HG: ICD-10-PCS | Mod: CPTII,S$GLB,, | Performed by: PAIN MEDICINE

## 2023-11-08 PROCEDURE — 3074F SYST BP LT 130 MM HG: CPT | Mod: CPTII,S$GLB,, | Performed by: PAIN MEDICINE

## 2023-11-08 PROCEDURE — 3008F PR BODY MASS INDEX (BMI) DOCUMENTED: ICD-10-PCS | Mod: CPTII,S$GLB,, | Performed by: PAIN MEDICINE

## 2023-11-08 PROCEDURE — 3066F PR DOCUMENTATION OF TREATMENT FOR NEPHROPATHY: ICD-10-PCS | Mod: CPTII,S$GLB,, | Performed by: PAIN MEDICINE

## 2023-11-08 PROCEDURE — 99999 PR PBB SHADOW E&M-EST. PATIENT-LVL IV: CPT | Mod: PBBFAC,,, | Performed by: PAIN MEDICINE

## 2023-11-08 PROCEDURE — 3066F NEPHROPATHY DOC TX: CPT | Mod: CPTII,S$GLB,, | Performed by: PAIN MEDICINE

## 2023-11-08 PROCEDURE — 3008F BODY MASS INDEX DOCD: CPT | Mod: CPTII,S$GLB,, | Performed by: PAIN MEDICINE

## 2023-11-08 PROCEDURE — 3052F HG A1C>EQUAL 8.0%<EQUAL 9.0%: CPT | Mod: CPTII,S$GLB,, | Performed by: PAIN MEDICINE

## 2023-11-08 PROCEDURE — 3078F PR MOST RECENT DIASTOLIC BLOOD PRESSURE < 80 MM HG: ICD-10-PCS | Mod: CPTII,S$GLB,, | Performed by: PAIN MEDICINE

## 2023-11-08 PROCEDURE — 99214 OFFICE O/P EST MOD 30 MIN: CPT | Mod: S$GLB,,, | Performed by: PAIN MEDICINE

## 2023-11-08 PROCEDURE — 3052F PR MOST RECENT HEMOGLOBIN A1C LEVEL 8.0 - < 9.0%: ICD-10-PCS | Mod: CPTII,S$GLB,, | Performed by: PAIN MEDICINE

## 2023-11-08 PROCEDURE — 4010F PR ACE/ARB THEARPY RXD/TAKEN: ICD-10-PCS | Mod: CPTII,S$GLB,, | Performed by: PAIN MEDICINE

## 2023-11-08 PROCEDURE — 4010F ACE/ARB THERAPY RXD/TAKEN: CPT | Mod: CPTII,S$GLB,, | Performed by: PAIN MEDICINE

## 2023-11-08 PROCEDURE — 1159F PR MEDICATION LIST DOCUMENTED IN MEDICAL RECORD: ICD-10-PCS | Mod: CPTII,S$GLB,, | Performed by: PAIN MEDICINE

## 2023-11-08 PROCEDURE — 1159F MED LIST DOCD IN RCRD: CPT | Mod: CPTII,S$GLB,, | Performed by: PAIN MEDICINE

## 2023-11-08 NOTE — H&P (VIEW-ONLY)
Est Patient Chronic Pain Note (Follow up Visit)    Referring Physician: No ref. provider found    PCP: Luan Lazar MD    Chief Complaint:   Chief Complaint   Patient presents with    Neck Pain    Low-back Pain     Patient has pain in neck/shoulder area and lower back that radiates down left leg.  Pain scale 6/10     Established Patient - TeleHealth Visit      SUBJECTIVE:    Interval History (11/8/2023): Jason presents today for follow-up visit.  He underwent his 2nd, confirmatory cervical facet block on 08/23/2023 which gave him 70-80% relief for more than 6 hours after which the pain gradually came back to baseline.  He was more functional during that time with much less pain.  He wants to proceed with a cervical facet neurotomy.  Interval history significant for bilateral L5 transforaminal GARY by Dr. Muro on 10/25/2023 for worsening lower back pain.  He reports noticeable improvement today, as of 2 weeks later.    Interval History (6/16/2023): Jason Sandra presents today for follow-up visit.  he underwent bilateral L5/S1 TF GARY on 05/24/2023 followed 2 weeks later by bilateral C3-5 MBB on 6/7/23.  He reports 80% relief x 8 hours following the diagnostic C3-5 MBB. He is unsure how much relief he has gotten from the GARY, it has been 3 weeks so far. He reports his legs are not bothering him today, but hard to tell if it is just a good day or if the injection is beginning to work. Left leg usually worse than right, but today okay.  The changes have continued through this visit.  Patient reports pain as 6/10 today. Reports continued axial neck pain with associated tingling along left side. Neck pain worse than back pain.      Initial HPI  Jason Sandra is a 48 y.o. male who presents to the clinic for the evaluation of neck and lower back pain that started 2-1/2 months ago without triggering events.  The lower back is worse than the neck.  The neck pain radiates to the suboccipital and  interscapular areas.  There is no radiation to the arms.  The lower back pain radiates to the back of the thighs and calves worse on the left (L5 and S1 dermatomes) with intermittent tingling and numbness in the same areas.  The pain is worse with walking and daily ADLs.  Nothing makes it better.  He rates it at 10/10 at worst, 7/10 at best, and 7/10 today.  Reports weakness in the legs.  They give out.  No falls though.  Denies loss of bowel or bladder control.      Patient denies night fever/night sweats, urinary incontinence, and bowel incontinence.       report:  Reviewed and consistent with medication use as prescribed.    Injection History:    bilateral L5/S1 TF GARY on 05/24/2023   bilateral C3-5 MBB on 6/7/23 with 80% relief    Past Medical History:   Diagnosis Date    Diabetes mellitus     Hypogonadism in male      Past Surgical History:   Procedure Laterality Date    INJECTION OF ANESTHETIC AGENT AROUND MEDIAL BRANCH NERVES INNERVATING CERVICAL FACET JOINT Bilateral 6/7/2023    Procedure: Bilateral C3-5 MBB with local;  Surgeon: Tony Garcia MD;  Location: [facility];  Service: Pain Management;  Laterality: Bilateral;    INJECTION OF ANESTHETIC AGENT AROUND MEDIAL BRANCH NERVES INNERVATING CERVICAL FACET JOINT Bilateral 8/23/2023    Procedure: Bilateral C3-5 MBB  second diagnostic block with RN IV sedation;  Surgeon: Tony Garcia MD;  Location: [facility];  Service: Pain Management;  Laterality: Bilateral;    NOSE SURGERY      SELECTIVE INJECTION OF ANESTHETIC AGENT AROUND LUMBAR SPINAL NERVE ROOT BY TRANSFORAMINAL APPROACH Bilateral 5/24/2023    Procedure: Bilateral L5 TF GARY with local;  Surgeon: Tony Garcia MD;  Location: [facility];  Service: Pain Management;  Laterality: Bilateral;    SELECTIVE INJECTION OF ANESTHETIC AGENT AROUND LUMBAR SPINAL NERVE ROOT BY TRANSFORAMINAL APPROACH Bilateral 10/25/2023    Procedure: Bilateral L5/S1 TF GARY;  Surgeon: Aric Muro MD;  Location:  [facility];  Service: Pain Management;  Laterality: Bilateral;     Social History     Socioeconomic History    Marital status: Legally    Tobacco Use    Smoking status: Never     Passive exposure: Never    Smokeless tobacco: Never   Substance and Sexual Activity    Alcohol use: Not Currently     Comment: socially    Drug use: No    Sexual activity: Yes     Partners: Female   Social History Narrative         Social Determinants of Health     Financial Resource Strain: Low Risk  (1/3/2023)    Overall Financial Resource Strain (CARDIA)     Difficulty of Paying Living Expenses: Not hard at all   Food Insecurity: No Food Insecurity (1/3/2023)    Hunger Vital Sign     Worried About Running Out of Food in the Last Year: Never true     Ran Out of Food in the Last Year: Never true   Transportation Needs: No Transportation Needs (1/3/2023)    PRAPARE - Transportation     Lack of Transportation (Medical): No     Lack of Transportation (Non-Medical): No   Stress: No Stress Concern Present (1/3/2023)    [employer] - Occupational Stress Questionnaire     Feeling of Stress : Only a little   Social Connections: Moderately Isolated (1/3/2023)    Social Connection and Isolation Panel [NHANES]     Frequency of Communication with Friends and Family: More than three times a week     Frequency of Social Gatherings with Friends and Family: More than three times a week     Attends Alevism Services: Never     Active Member of Clubs or Organizations: No     Attends Club or Organization Meetings: Never     Marital Status:    Housing Stability: Low Risk  (1/3/2023)    Housing Stability Vital Sign     Unable to Pay for Housing in the Last Year: No     Number of Places Lived in the Last Year: 2     Unstable Housing in the Last Year: No     Family History   Problem Relation Age of Onset    Stroke Mother     Glaucoma Mother     Cataracts Mother     Alzheimer's disease Father        Review  "of patient's allergies indicates:  No Known Allergies    Current Outpatient Medications   Medication Sig    ACCU-CHEK GUIDE ME GLUCOSE MTR Misc CHECK BLOOD SUGAR TWICE A DAY    amitriptyline (ELAVIL) 25 MG tablet Take 1 tablet (25 mg total) by mouth nightly as needed for Insomnia.    atorvastatin (LIPITOR) 20 MG tablet Take 1 tablet (20 mg total) by mouth once daily.    blood sugar diagnostic Strp To check BG 2 times daily, to use with insurance preferred meter    blood-glucose sensor (DEXCOM G6 SENSOR) Chen Use 1 sensor every 10 days    blood-glucose transmitter (DEXCOM G6 TRANSMITTER) Chen 1 transmitter every 3 months    folic acid (FOLVITE) 1 MG tablet TAKE 1 TABLET BY MOUTH EVERY DAY    furosemide (LASIX) 20 MG tablet Take 1 tablet (20 mg total) by mouth once daily.    glucagon (BAQSIMI) 3 mg/actuation Spry 3 mg (one actuation) into a single nostril; if no response, may repeat in 15 minutes using a new intranasal device.    hydrOXYchloroQUINE (PLAQUENIL) 100 mg tablet Take 200 mg by mouth once daily.    insulin degludec (TRESIBA FLEXTOUCH U-100) 100 unit/mL (3 mL) insulin pen Inject 24 Units into the skin every evening. Max TDD of 30 units    insulin lispro-aabc (LYUMJEV KWIKPEN U-100 INSULIN) 100 unit/mL pen Inject 10 units 3 times per day before meals, 2-4 units for snack, . + correction scale. MAX TDD Of 50 units    ketoconazole (NIZORAL) 2 % shampoo Apply topically twice a week.    lancets Misc To check BG 2 times daily, to use with insurance preferred meter    methotrexate 2.5 MG Tab Take 6 tablets (15 mg total) by mouth every 7 days.    metoprolol succinate (TOPROL-XL) 25 MG 24 hr tablet Take 1 tablet (25 mg total) by mouth once daily.    pen needle, diabetic 32 gauge x 5/32" Ndle To use 7 times per day with insulin injections- for meals, long-acting insulin and for snacks    pregabalin (LYRICA) 50 MG capsule Take 1 capsule (50 mg total) by mouth 3 (three) times daily.    sacubitriL-valsartan (ENTRESTO) " "24-26 mg per tablet Take 1 tablet by mouth 2 (two) times daily.    testosterone cypionate (DEPOTESTOTERONE CYPIONATE) 200 mg/mL injection Inject 1 mL (200 mg total) into the muscle once a week.     No current facility-administered medications for this visit.       Review of Systems     GENERAL:  No weight loss, malaise or fevers.  HEENT:   No recent changes in vision or hearing  NECK:  Negative for lumps, no difficulty with swallowing.  RESPIRATORY:  Negative for cough, wheezing or shortness of breath, patient denies any recent URI.  CARDIOVASCULAR:  Negative for chest pain, leg swelling or palpitations.  GI:  Negative for abdominal discomfort, blood in stools or black stools or change in bowel habits.  MUSCULOSKELETAL:  See HPI.  SKIN:  Negative for lesions, rash, and itching.  PSYCH:  No mood disorder or recent psychosocial stressors.  Patients sleep is not disturbed secondary to pain.  HEMATOLOGY/LYMPHOLOGY:  Negative for prolonged bleeding, bruising easily or swollen nodes.  Patient is not currently taking any anti-coagulants  NEURO:   No history of headaches, syncope, paralysis, seizures or tremors.  All other reviewed and negative other than HPI.    OBJECTIVE:    /75   Pulse 80   Resp 18   Ht 5' 8" (1.727 m)   Wt 73.6 kg (162 lb 4.1 oz)   BMI 24.67 kg/m²         PHYSICAL EXAM:  Telemedicine Exam  Vitals:    11/08/23 1546   BP: 119/75   Pulse: 80   Resp: 18   Weight: 73.6 kg (162 lb 4.1 oz)   Height: 5' 8" (1.727 m)     Body mass index is 24.67 kg/m².   (reviewed on 11/8/2023)     GENERAL: Well appearing, in no acute distress, alert and oriented x3.  Cooperative.  PSYCH:  Mood and affect appropriate.  SKIN: Skin color & texture with no obvious abnormalities.    HEAD/FACE:  Normocephalic, atraumatic.    PULM:  No difficulty breathing. No nasal flaring. No obvious wheezing.  EXTREMITIES: No obvious deformities. Moving all extremities well, appears to have symmetric strength " throughout.  MUSCULOSKELETAL: No obvious atrophy abnormalities are noted.   NEURO: No obvious neurologic deficit.   GAIT: sitting.     Physical Exam: last in clinic visit:    GENERAL: Well appearing, in no acute distress, alert and oriented x3.  PSYCH:  Mood and affect appropriate.  SKIN: Skin color, texture, turgor normal, no rashes or lesions.  HEAD/FACE:  Normocephalic, atraumatic.  CV: No edema.  PULM: No evidence of respiratory difficulty, symmetric chest rise.  GI:  Abdomen soft and non-tender.    GAIT: slow .    CERVICAL SPINE:  Palpation: Tenderness over bilateral facet joints and paraspinous muscles. No trigger points.  ROM: Pain with extension, rotation and lateral flexion. Spurling's negative.    LUMBAR SPINE:   Palpation: Tenderness over bilateral facet joints and paraspinous muscles.   ROM: Pain with flexion, extension and rotation, worse with flexion.  Straight leg raising is positive for radicular pain on the.  SI JOINTS: bilateral SI joints, no tenderness, negative Go's   HIPS: normal and nonpainful ROM of both hip joints.    NEURO:   MOTOR: Bilateral upper and lower extremity muscle strength is normal. No atrophy or tone abnormalities are noted.  SENSORY: No loss of sensation in C4 through T1 dermatomes bilaterally, and in L3 through S1 dermatomes.  DTR's: Reflexes are physiologic and symmetric in upper extremities.    Deep Tendon Reflexes:      Reflex Scores:       Patellar reflexes are 1+ on the right side and 1+ on the left side.       Achilles reflexes are 1+ on the right side and 0 on the left side.   No clonus.  Negative Lewis's bilaterally.     EMG - 5/4/23 by Dr. Andrea Hamilton  INTERPRETATION  -Bilateral superficial peroneal sensorynerve conduction study showed normal peak latency and amplitude  -Bilateral sural sensory nerve conduction study showed normal peak latency and amplitude  -Bilateral peroneal motor nerve conduction study showed normal latency, dec amplitude on left, and  dec conduction velocity  -Bilateral tibial motor nerve conduction study showed normal latency, amplitude, and conduction velocity  -Needle EMG examination performed to above mentioned muscles   IMPRESSION  ABNORMAL study  2. There is electrodiagnostic evidence of an acute on chronic radiculopathy of the left L5 nerve root and a chronic radiculopathy of the right L5 and bilateral S1 nerve roots    Imaging:   Results for orders placed during the hospital encounter of 05/09/23    MRI Lumbar Spine Without Contrast    Narrative  EXAMINATION:  MRI LUMBAR SPINE WITHOUT CONTRAST    CLINICAL HISTORY:  Lumbar radiculopathy, symptoms persist with conservative treatment; Radiculopathy, lumbosacral region    TECHNIQUE:  Multiplanar, multisequence MR images were acquired from the thoracolumbar junction to the sacrum without the administration of contrast.    COMPARISON:  MRI dated 09/05/2018.  X-ray dated 09/25/2018    FINDINGS:  There are 5 non-rib-bearing lumbar vertebrae.  There is a rudimentary S1-S2 disc.  Alignment is unremarkable with no significant listhesis.  No acute fracture or compression deformity.  No aggressive focal signal abnormality mild Modic type 1 edema at the posterior aspect of the L5 inferior endplate.    Conus medullaris terminates at the L2 level.  Conus medullaris is normal in size and signal.    T12-L1: No significant disc pathology.  No significant spinal canal or neural foraminal stenosis.    L1-L2: No significant disc pathology.  No significant spinal canal or neural foraminal stenosis.    L2-L3: No significant disc pathology.  No significant spinal canal or neural foraminal stenosis.    L3-L4: No significant disc pathology.  Mild bilateral facet arthropathy.  No significant spinal canal or neural foraminal stenosis.    L4-L5: Small circumferential disc bulge.  Mild bilateral facet arthropathy.  No significant spinal canal stenosis.  Mild bilateral neural foraminal stenosis.    L5-S1: Disc  desiccation and small circumferential disc bulge with central protrusion and annular fissure.  Mild bilateral facet arthropathy.  No significant spinal canal stenosis.  Mild to moderate bilateral neural foraminal stenosis.  (personal evaluation:  Disc is touching bilateral S1 nerve roots in the lateral recesses and posteriorly displacing the right)  Paraspinal soft tissues are unremarkable.  Visualized intra-abdominal and pelvic contents are also unremarkable.    Impression  Mild lower lumbar level predominant degenerative changes as detailed above including Modic type 1 edema at the posterior aspect of the L5 inferior endplate.    Small disc bulge with central protrusion annular fissure at L5-S1.    No significant spinal canal stenosis.  Mild bilateral neural foraminal stenosis at L4-L5.  Mild-to-moderate bilateral neural foraminal stenosis at L5-S1.      Electronically signed by: Brian Santiago  Date:    05/10/2023  Time:    07:55        Results for orders placed during the hospital encounter of 04/10/23    MRI Cervical Spine Without Contrast    Narrative  EXAMINATION:  MRI CERVICAL SPINE WITHOUT CONTRAST    CLINICAL HISTORY:  Headache, unspecifiedNeck pain, acute, infection suspected;    TECHNIQUE:  MR Cervical spine without contrast. Sagittal T1, T2, STIR. Axial 3D, T2. Coronal T1.    COMPARISON:  None available.    FINDINGS:  Vertebral body height is normal and alignment is maintained. Marrow signal is within normal limits. The craniocervical junction is unremarkable. No abnormal cord signal or cord deformity. Intervertebral disc levels are as follows:    C2-C3 disc: Normal.    C3-C4 disc: Minimal disc bulge.  Mild left facet arthropathy.  No significant spinal or foraminal stenosis.  The dural canal measures 14 mm.    C4-C5 disc: Normal.    C5-C6 disc: Mild posterior disc bulge.  Normal uncovertebral joints and facet joints.  No spinal or foraminal stenosis.  The dural canal measures 12 mm.    C6-C7 disc:  Normal.    C7-T1 disc: Normal.    Impression  Mild degenerative spondylosis without significant spinal canal or neural foraminal stenosis.  Normal cord signal and caliber.  No signs of spondylodiscitis.      Electronically signed by: Ronald Borges Jr., MD  Date:    04/11/2023  Time:    11:28        Results for orders placed during the hospital encounter of 09/25/18    X-Ray Lumbar Spine Flexion And Extension Only    Narrative  EXAMINATION:  XR LUMBAR SPINE FLEXION AND EXTENSION ONLY    CLINICAL HISTORY:  Other intervertebral disc degeneration, lumbar region    TECHNIQUE:  Lateral flexion and extension views of the lumbar spine were obtained with the patient standing.    COMPARISON:  Lumbar spine radiographs dated 14 August 2018    FINDINGS:  The lumbar vertebral bodies remain normal in height and are in lordotic alignment with no induced listhesis demonstrated on these views.  There is stable and unremarkable radiographic appearance of the lumbar disc spaces.    IMPRESSION:    See above.      Electronically signed by: Tanner Givens MD  Date:    09/25/2018  Time:    11:46      Results for orders placed during the hospital encounter of 08/14/18    X-Ray Lumbar Spine AP And Lateral    Narrative  EXAMINATION:  XR LUMBAR SPINE AP AND LATERAL    CLINICAL HISTORY:  Low back pain, >6wks conservative tx, persistent-progressive sx, surgical candidate;Pain in left leg    TECHNIQUE:  AP, lateral and spot images were performed of the lumbar spine.    COMPARISON:  None    FINDINGS:  Multiple views of the lumbar spine demonstrate no acute fractures.    IMPRESSION:    No acute fractures.      Electronically signed by: Tyler York MD  Date:    08/14/2018  Time:    15:09        Results for orders placed during the hospital encounter of 03/06/23    X-Ray Cervical Spine AP And Lateral    Narrative  EXAM: XR CERVICAL SPINE AP LATERAL    CLINICAL HISTORY:  Cervicalgia.    TECHNIQUE:   Cervical spine x-ray, 5 views    FINDINGS:   Comparison study 03/21/2012.  No change.  There is no fracture or malalignment.  The disc spaces are well-preserved.  No degenerative sequela.    Impression  Unremarkable cervical spine x-ray.    Finalized on: 3/6/2023 4:47 PM By:  Aric Ash MD  [facility]# [id number]      2023-03-06 16:[id number]    [facility]          LABS:  Lab Results   Component Value Date    WBC 5.24 08/14/2023    HGB 13.2 (L) 08/14/2023    HCT 39.6 (L) 08/14/2023    MCV 91 08/14/2023     08/14/2023       CMP  Sodium   Date Value Ref Range Status   09/12/2023 141 136 - 145 mmol/L Final     Potassium   Date Value Ref Range Status   09/12/2023 3.9 3.5 - 5.1 mmol/L Final     Chloride   Date Value Ref Range Status   09/12/2023 108 95 - 110 mmol/L Final     CO2   Date Value Ref Range Status   09/12/2023 22 (L) 23 - 29 mmol/L Final     Glucose   Date Value Ref Range Status   09/12/2023 102 70 - 110 mg/dL Final     BUN   Date Value Ref Range Status   09/12/2023 10 6 - 20 mg/dL Final     Creatinine   Date Value Ref Range Status   09/12/2023 1.0 0.5 - 1.4 mg/dL Final     Calcium   Date Value Ref Range Status   09/12/2023 10.3 8.7 - 10.5 mg/dL Final     Total Protein   Date Value Ref Range Status   09/12/2023 8.1 6.0 - 8.4 g/dL Final     Albumin   Date Value Ref Range Status   09/12/2023 4.8 3.5 - 5.2 g/dL Final     Total Bilirubin   Date Value Ref Range Status   09/12/2023 0.4 0.1 - 1.0 mg/dL Final     Comment:     For infants and newborns, interpretation of results should be based  on gestational age, weight and in agreement with clinical  observations.    Premature Infant recommended reference ranges:  Up to 24 hours.............<8.0 mg/dL  Up to 48 hours............<12.0 mg/dL  3-5 days..................<15.0 mg/dL  6-29 days.................<15.0 mg/dL       Alkaline Phosphatase   Date Value Ref Range Status   09/12/2023 83 55 - 135 U/L Final     AST   Date Value Ref Range Status   09/12/2023 18 10 - 40 U/L Final     ALT   Date Value Ref Range Status    09/12/2023 14 10 - 44 U/L Final     Anion Gap   Date Value Ref Range Status   09/12/2023 11 8 - 16 mmol/L Final     eGFR if    Date Value Ref Range Status   02/24/2020 >60 >60 mL/min/1.73 m^2 Final     eGFR if non    Date Value Ref Range Status   02/24/2020 >60 >60 mL/min/1.73 m^2 Final     Comment:     Calculation used to obtain the estimated glomerular filtration  rate (eGFR) is the CKD-EPI equation.          Lab Results   Component Value Date    HGBA1C 8.7 (H) 03/20/2023             ASSESSMENT: 48 y.o. year old male with:    1. Chronic neck pain        2. Cervical spondylosis        3. Lumbar radiculopathy        4. Lumbosacral radiculopathy                PLAN:     We will order C3-4 and C4-5 facet RFA.  He had positive facet block on 06/07/2023 and 08/23/2023 (70-80% relief).  We will do the left side 1st because it is more painful.  Follow-up 6 weeks after the RFA.  He is now 2 weeks after the repeat bilateral L5 transforaminal GARY on 10/25/2023.  He is feeling noticeable improvement.  Counseled him and advised him to allow more time for the procedure.  He had significant relief after bilateral L5 transforaminal GARY on 05/24/2023 that lasted for 3 months.  Instructed him to continue home exercise program.  Previously taking Lyrica per Rheumatology, d/c'd due to CHF and ineffectiveness    - Counseled patient regarding the importance of activity modification  - This condition does not require this patient to take time off of work, and the primary goal of our Pain Management services is to improve the patient's functional capacity.  - Patient Questions: Answered all of the patient's questions regarding diagnosis, therapy, and treatment    The above plan and management options were discussed at length with patient. Patient is in agreement with the above and verbalized understanding.      Ashvin Garcia MD  Interventional Pain Management  [facility]    Disclaimer:  This note  was prepared using voice recognition system and is likely to have sound alike errors that may have been overlooked even after proof reading.  Please call me with any questions

## 2023-11-08 NOTE — PROGRESS NOTES
Est Patient Chronic Pain Note (Follow up Visit)    Referring Physician: No ref. provider found    PCP: Luan Lazar MD    Chief Complaint:   Chief Complaint   Patient presents with    Neck Pain    Low-back Pain     Patient has pain in neck/shoulder area and lower back that radiates down left leg.  Pain scale 6/10     Established Patient - TeleHealth Visit      SUBJECTIVE:    Interval History (11/8/2023): Jason presents today for follow-up visit.  He underwent his 2nd, confirmatory cervical facet block on 08/23/2023 which gave him 70-80% relief for more than 6 hours after which the pain gradually came back to baseline.  He was more functional during that time with much less pain.  He wants to proceed with a cervical facet neurotomy.  Interval history significant for bilateral L5 transforaminal GARY by Dr. Muro on 10/25/2023 for worsening lower back pain.  He reports noticeable improvement today, as of 2 weeks later.    Interval History (6/16/2023): Jason Sandra presents today for follow-up visit.  he underwent bilateral L5/S1 TF GARY on 05/24/2023 followed 2 weeks later by bilateral C3-5 MBB on 6/7/23.  He reports 80% relief x 8 hours following the diagnostic C3-5 MBB. He is unsure how much relief he has gotten from the GARY, it has been 3 weeks so far. He reports his legs are not bothering him today, but hard to tell if it is just a good day or if the injection is beginning to work. Left leg usually worse than right, but today okay.  The changes have continued through this visit.  Patient reports pain as 6/10 today. Reports continued axial neck pain with associated tingling along left side. Neck pain worse than back pain.      Initial HPI  Jason Sandra is a 48 y.o. male who presents to the clinic for the evaluation of neck and lower back pain that started 2-1/2 months ago without triggering events.  The lower back is worse than the neck.  The neck pain radiates to the suboccipital and  interscapular areas.  There is no radiation to the arms.  The lower back pain radiates to the back of the thighs and calves worse on the left (L5 and S1 dermatomes) with intermittent tingling and numbness in the same areas.  The pain is worse with walking and daily ADLs.  Nothing makes it better.  He rates it at 10/10 at worst, 7/10 at best, and 7/10 today.  Reports weakness in the legs.  They give out.  No falls though.  Denies loss of bowel or bladder control.      Patient denies night fever/night sweats, urinary incontinence, and bowel incontinence.       report:  Reviewed and consistent with medication use as prescribed.    Injection History:    bilateral L5/S1 TF GARY on 05/24/2023   bilateral C3-5 MBB on 6/7/23 with 80% relief    Past Medical History:   Diagnosis Date    Diabetes mellitus     Hypogonadism in male      Past Surgical History:   Procedure Laterality Date    INJECTION OF ANESTHETIC AGENT AROUND MEDIAL BRANCH NERVES INNERVATING CERVICAL FACET JOINT Bilateral 6/7/2023    Procedure: Bilateral C3-5 MBB with local;  Surgeon: Tony Garcia MD;  Location: [facility];  Service: Pain Management;  Laterality: Bilateral;    INJECTION OF ANESTHETIC AGENT AROUND MEDIAL BRANCH NERVES INNERVATING CERVICAL FACET JOINT Bilateral 8/23/2023    Procedure: Bilateral C3-5 MBB  second diagnostic block with RN IV sedation;  Surgeon: Tony Garcia MD;  Location: [facility];  Service: Pain Management;  Laterality: Bilateral;    NOSE SURGERY      SELECTIVE INJECTION OF ANESTHETIC AGENT AROUND LUMBAR SPINAL NERVE ROOT BY TRANSFORAMINAL APPROACH Bilateral 5/24/2023    Procedure: Bilateral L5 TF GARY with local;  Surgeon: Tony Garcia MD;  Location: [facility];  Service: Pain Management;  Laterality: Bilateral;    SELECTIVE INJECTION OF ANESTHETIC AGENT AROUND LUMBAR SPINAL NERVE ROOT BY TRANSFORAMINAL APPROACH Bilateral 10/25/2023    Procedure: Bilateral L5/S1 TF GARY;  Surgeon: Aric Muro MD;  Location:  [facility];  Service: Pain Management;  Laterality: Bilateral;     Social History     Socioeconomic History    Marital status: Legally    Tobacco Use    Smoking status: Never     Passive exposure: Never    Smokeless tobacco: Never   Substance and Sexual Activity    Alcohol use: Not Currently     Comment: socially    Drug use: No    Sexual activity: Yes     Partners: Female   Social History Narrative         Social Determinants of Health     Financial Resource Strain: Low Risk  (1/3/2023)    Overall Financial Resource Strain (CARDIA)     Difficulty of Paying Living Expenses: Not hard at all   Food Insecurity: No Food Insecurity (1/3/2023)    Hunger Vital Sign     Worried About Running Out of Food in the Last Year: Never true     Ran Out of Food in the Last Year: Never true   Transportation Needs: No Transportation Needs (1/3/2023)    PRAPARE - Transportation     Lack of Transportation (Medical): No     Lack of Transportation (Non-Medical): No   Stress: No Stress Concern Present (1/3/2023)    [employer] [employer] - Occupational Stress Questionnaire     Feeling of Stress : Only a little   Social Connections: Moderately Isolated (1/3/2023)    Social Connection and Isolation Panel [NHANES]     Frequency of Communication with Friends and Family: More than three times a week     Frequency of Social Gatherings with Friends and Family: More than three times a week     Attends Yazidism Services: Never     Active Member of Clubs or Organizations: No     Attends Club or Organization Meetings: Never     Marital Status:    Housing Stability: Low Risk  (1/3/2023)    Housing Stability Vital Sign     Unable to Pay for Housing in the Last Year: No     Number of Places Lived in the Last Year: 2     Unstable Housing in the Last Year: No     Family History   Problem Relation Age of Onset    Stroke Mother     Glaucoma Mother     Cataracts Mother     Alzheimer's disease Father        Review  "of patient's allergies indicates:  No Known Allergies    Current Outpatient Medications   Medication Sig    ACCU-CHEK GUIDE ME GLUCOSE MTR Misc CHECK BLOOD SUGAR TWICE A DAY    amitriptyline (ELAVIL) 25 MG tablet Take 1 tablet (25 mg total) by mouth nightly as needed for Insomnia.    atorvastatin (LIPITOR) 20 MG tablet Take 1 tablet (20 mg total) by mouth once daily.    blood sugar diagnostic Strp To check BG 2 times daily, to use with insurance preferred meter    blood-glucose sensor (DEXCOM G6 SENSOR) Chen Use 1 sensor every 10 days    blood-glucose transmitter (DEXCOM G6 TRANSMITTER) Chen 1 transmitter every 3 months    folic acid (FOLVITE) 1 MG tablet TAKE 1 TABLET BY MOUTH EVERY DAY    furosemide (LASIX) 20 MG tablet Take 1 tablet (20 mg total) by mouth once daily.    glucagon (BAQSIMI) 3 mg/actuation Spry 3 mg (one actuation) into a single nostril; if no response, may repeat in 15 minutes using a new intranasal device.    hydrOXYchloroQUINE (PLAQUENIL) 100 mg tablet Take 200 mg by mouth once daily.    insulin degludec (TRESIBA FLEXTOUCH U-100) 100 unit/mL (3 mL) insulin pen Inject 24 Units into the skin every evening. Max TDD of 30 units    insulin lispro-aabc (LYUMJEV KWIKPEN U-100 INSULIN) 100 unit/mL pen Inject 10 units 3 times per day before meals, 2-4 units for snack, . + correction scale. MAX TDD Of 50 units    ketoconazole (NIZORAL) 2 % shampoo Apply topically twice a week.    lancets Misc To check BG 2 times daily, to use with insurance preferred meter    methotrexate 2.5 MG Tab Take 6 tablets (15 mg total) by mouth every 7 days.    metoprolol succinate (TOPROL-XL) 25 MG 24 hr tablet Take 1 tablet (25 mg total) by mouth once daily.    pen needle, diabetic 32 gauge x 5/32" Ndle To use 7 times per day with insulin injections- for meals, long-acting insulin and for snacks    pregabalin (LYRICA) 50 MG capsule Take 1 capsule (50 mg total) by mouth 3 (three) times daily.    sacubitriL-valsartan (ENTRESTO) " "24-26 mg per tablet Take 1 tablet by mouth 2 (two) times daily.    testosterone cypionate (DEPOTESTOTERONE CYPIONATE) 200 mg/mL injection Inject 1 mL (200 mg total) into the muscle once a week.     No current facility-administered medications for this visit.       Review of Systems     GENERAL:  No weight loss, malaise or fevers.  HEENT:   No recent changes in vision or hearing  NECK:  Negative for lumps, no difficulty with swallowing.  RESPIRATORY:  Negative for cough, wheezing or shortness of breath, patient denies any recent URI.  CARDIOVASCULAR:  Negative for chest pain, leg swelling or palpitations.  GI:  Negative for abdominal discomfort, blood in stools or black stools or change in bowel habits.  MUSCULOSKELETAL:  See HPI.  SKIN:  Negative for lesions, rash, and itching.  PSYCH:  No mood disorder or recent psychosocial stressors.  Patients sleep is not disturbed secondary to pain.  HEMATOLOGY/LYMPHOLOGY:  Negative for prolonged bleeding, bruising easily or swollen nodes.  Patient is not currently taking any anti-coagulants  NEURO:   No history of headaches, syncope, paralysis, seizures or tremors.  All other reviewed and negative other than HPI.    OBJECTIVE:    /75   Pulse 80   Resp 18   Ht 5' 8" (1.727 m)   Wt 73.6 kg (162 lb 4.1 oz)   BMI 24.67 kg/m²         PHYSICAL EXAM:  Telemedicine Exam  Vitals:    11/08/23 1546   BP: 119/75   Pulse: 80   Resp: 18   Weight: 73.6 kg (162 lb 4.1 oz)   Height: 5' 8" (1.727 m)     Body mass index is 24.67 kg/m².   (reviewed on 11/8/2023)     GENERAL: Well appearing, in no acute distress, alert and oriented x3.  Cooperative.  PSYCH:  Mood and affect appropriate.  SKIN: Skin color & texture with no obvious abnormalities.    HEAD/FACE:  Normocephalic, atraumatic.    PULM:  No difficulty breathing. No nasal flaring. No obvious wheezing.  EXTREMITIES: No obvious deformities. Moving all extremities well, appears to have symmetric strength " throughout.  MUSCULOSKELETAL: No obvious atrophy abnormalities are noted.   NEURO: No obvious neurologic deficit.   GAIT: sitting.     Physical Exam: last in clinic visit:    GENERAL: Well appearing, in no acute distress, alert and oriented x3.  PSYCH:  Mood and affect appropriate.  SKIN: Skin color, texture, turgor normal, no rashes or lesions.  HEAD/FACE:  Normocephalic, atraumatic.  CV: No edema.  PULM: No evidence of respiratory difficulty, symmetric chest rise.  GI:  Abdomen soft and non-tender.    GAIT: slow .    CERVICAL SPINE:  Palpation: Tenderness over bilateral facet joints and paraspinous muscles. No trigger points.  ROM: Pain with extension, rotation and lateral flexion. Spurling's negative.    LUMBAR SPINE:   Palpation: Tenderness over bilateral facet joints and paraspinous muscles.   ROM: Pain with flexion, extension and rotation, worse with flexion.  Straight leg raising is positive for radicular pain on the.  SI JOINTS: bilateral SI joints, no tenderness, negative Go's   HIPS: normal and nonpainful ROM of both hip joints.    NEURO:   MOTOR: Bilateral upper and lower extremity muscle strength is normal. No atrophy or tone abnormalities are noted.  SENSORY: No loss of sensation in C4 through T1 dermatomes bilaterally, and in L3 through S1 dermatomes.  DTR's: Reflexes are physiologic and symmetric in upper extremities.    Deep Tendon Reflexes:      Reflex Scores:       Patellar reflexes are 1+ on the right side and 1+ on the left side.       Achilles reflexes are 1+ on the right side and 0 on the left side.   No clonus.  Negative Lewis's bilaterally.     EMG - 5/4/23 by Dr. Andrea Hamilton  INTERPRETATION  -Bilateral superficial peroneal sensorynerve conduction study showed normal peak latency and amplitude  -Bilateral sural sensory nerve conduction study showed normal peak latency and amplitude  -Bilateral peroneal motor nerve conduction study showed normal latency, dec amplitude on left, and  dec conduction velocity  -Bilateral tibial motor nerve conduction study showed normal latency, amplitude, and conduction velocity  -Needle EMG examination performed to above mentioned muscles   IMPRESSION  ABNORMAL study  2. There is electrodiagnostic evidence of an acute on chronic radiculopathy of the left L5 nerve root and a chronic radiculopathy of the right L5 and bilateral S1 nerve roots    Imaging:   Results for orders placed during the hospital encounter of 05/09/23    MRI Lumbar Spine Without Contrast    Narrative  EXAMINATION:  MRI LUMBAR SPINE WITHOUT CONTRAST    CLINICAL HISTORY:  Lumbar radiculopathy, symptoms persist with conservative treatment; Radiculopathy, lumbosacral region    TECHNIQUE:  Multiplanar, multisequence MR images were acquired from the thoracolumbar junction to the sacrum without the administration of contrast.    COMPARISON:  MRI dated 09/05/2018.  X-ray dated 09/25/2018    FINDINGS:  There are 5 non-rib-bearing lumbar vertebrae.  There is a rudimentary S1-S2 disc.  Alignment is unremarkable with no significant listhesis.  No acute fracture or compression deformity.  No aggressive focal signal abnormality mild Modic type 1 edema at the posterior aspect of the L5 inferior endplate.    Conus medullaris terminates at the L2 level.  Conus medullaris is normal in size and signal.    T12-L1: No significant disc pathology.  No significant spinal canal or neural foraminal stenosis.    L1-L2: No significant disc pathology.  No significant spinal canal or neural foraminal stenosis.    L2-L3: No significant disc pathology.  No significant spinal canal or neural foraminal stenosis.    L3-L4: No significant disc pathology.  Mild bilateral facet arthropathy.  No significant spinal canal or neural foraminal stenosis.    L4-L5: Small circumferential disc bulge.  Mild bilateral facet arthropathy.  No significant spinal canal stenosis.  Mild bilateral neural foraminal stenosis.    L5-S1: Disc  desiccation and small circumferential disc bulge with central protrusion and annular fissure.  Mild bilateral facet arthropathy.  No significant spinal canal stenosis.  Mild to moderate bilateral neural foraminal stenosis.  (personal evaluation:  Disc is touching bilateral S1 nerve roots in the lateral recesses and posteriorly displacing the right)  Paraspinal soft tissues are unremarkable.  Visualized intra-abdominal and pelvic contents are also unremarkable.    Impression  Mild lower lumbar level predominant degenerative changes as detailed above including Modic type 1 edema at the posterior aspect of the L5 inferior endplate.    Small disc bulge with central protrusion annular fissure at L5-S1.    No significant spinal canal stenosis.  Mild bilateral neural foraminal stenosis at L4-L5.  Mild-to-moderate bilateral neural foraminal stenosis at L5-S1.      Electronically signed by: Brian Santiago  Date:    05/10/2023  Time:    07:55        Results for orders placed during the hospital encounter of 04/10/23    MRI Cervical Spine Without Contrast    Narrative  EXAMINATION:  MRI CERVICAL SPINE WITHOUT CONTRAST    CLINICAL HISTORY:  Headache, unspecifiedNeck pain, acute, infection suspected;    TECHNIQUE:  MR Cervical spine without contrast. Sagittal T1, T2, STIR. Axial 3D, T2. Coronal T1.    COMPARISON:  None available.    FINDINGS:  Vertebral body height is normal and alignment is maintained. Marrow signal is within normal limits. The craniocervical junction is unremarkable. No abnormal cord signal or cord deformity. Intervertebral disc levels are as follows:    C2-C3 disc: Normal.    C3-C4 disc: Minimal disc bulge.  Mild left facet arthropathy.  No significant spinal or foraminal stenosis.  The dural canal measures 14 mm.    C4-C5 disc: Normal.    C5-C6 disc: Mild posterior disc bulge.  Normal uncovertebral joints and facet joints.  No spinal or foraminal stenosis.  The dural canal measures 12 mm.    C6-C7 disc:  Normal.    C7-T1 disc: Normal.    Impression  Mild degenerative spondylosis without significant spinal canal or neural foraminal stenosis.  Normal cord signal and caliber.  No signs of spondylodiscitis.      Electronically signed by: Ronald Borges Jr., MD  Date:    04/11/2023  Time:    11:28        Results for orders placed during the hospital encounter of 09/25/18    X-Ray Lumbar Spine Flexion And Extension Only    Narrative  EXAMINATION:  XR LUMBAR SPINE FLEXION AND EXTENSION ONLY    CLINICAL HISTORY:  Other intervertebral disc degeneration, lumbar region    TECHNIQUE:  Lateral flexion and extension views of the lumbar spine were obtained with the patient standing.    COMPARISON:  Lumbar spine radiographs dated 14 August 2018    FINDINGS:  The lumbar vertebral bodies remain normal in height and are in lordotic alignment with no induced listhesis demonstrated on these views.  There is stable and unremarkable radiographic appearance of the lumbar disc spaces.    IMPRESSION:    See above.      Electronically signed by: Tanner Givens MD  Date:    09/25/2018  Time:    11:46      Results for orders placed during the hospital encounter of 08/14/18    X-Ray Lumbar Spine AP And Lateral    Narrative  EXAMINATION:  XR LUMBAR SPINE AP AND LATERAL    CLINICAL HISTORY:  Low back pain, >6wks conservative tx, persistent-progressive sx, surgical candidate;Pain in left leg    TECHNIQUE:  AP, lateral and spot images were performed of the lumbar spine.    COMPARISON:  None    FINDINGS:  Multiple views of the lumbar spine demonstrate no acute fractures.    IMPRESSION:    No acute fractures.      Electronically signed by: Tyler York MD  Date:    08/14/2018  Time:    15:09        Results for orders placed during the hospital encounter of 03/06/23    X-Ray Cervical Spine AP And Lateral    Narrative  EXAM: XR CERVICAL SPINE AP LATERAL    CLINICAL HISTORY:  Cervicalgia.    TECHNIQUE:   Cervical spine x-ray, 5 views    FINDINGS:   Comparison study 03/21/2012.  No change.  There is no fracture or malalignment.  The disc spaces are well-preserved.  No degenerative sequela.    Impression  Unremarkable cervical spine x-ray.    Finalized on: 3/6/2023 4:47 PM By:  Aric Ash MD  [facility]# [id number]      2023-03-06 16:49:[id number]    [facility]          LABS:  Lab Results   Component Value Date    WBC 5.24 08/14/2023    HGB 13.2 (L) 08/14/2023    HCT 39.6 (L) 08/14/2023    MCV 91 08/14/2023     08/14/2023       CMP  Sodium   Date Value Ref Range Status   09/12/2023 141 136 - 145 mmol/L Final     Potassium   Date Value Ref Range Status   09/12/2023 3.9 3.5 - 5.1 mmol/L Final     Chloride   Date Value Ref Range Status   09/12/2023 108 95 - 110 mmol/L Final     CO2   Date Value Ref Range Status   09/12/2023 22 (L) 23 - 29 mmol/L Final     Glucose   Date Value Ref Range Status   09/12/2023 102 70 - 110 mg/dL Final     BUN   Date Value Ref Range Status   09/12/2023 10 6 - 20 mg/dL Final     Creatinine   Date Value Ref Range Status   09/12/2023 1.0 0.5 - 1.4 mg/dL Final     Calcium   Date Value Ref Range Status   09/12/2023 10.3 8.7 - 10.5 mg/dL Final     Total Protein   Date Value Ref Range Status   09/12/2023 8.1 6.0 - 8.4 g/dL Final     Albumin   Date Value Ref Range Status   09/12/2023 4.8 3.5 - 5.2 g/dL Final     Total Bilirubin   Date Value Ref Range Status   09/12/2023 0.4 0.1 - 1.0 mg/dL Final     Comment:     For infants and newborns, interpretation of results should be based  on gestational age, weight and in agreement with clinical  observations.    Premature Infant recommended reference ranges:  Up to 24 hours.............<8.0 mg/dL  Up to 48 hours............<12.0 mg/dL  3-5 days..................<15.0 mg/dL  6-29 days.................<15.0 mg/dL       Alkaline Phosphatase   Date Value Ref Range Status   09/12/2023 83 55 - 135 U/L Final     AST   Date Value Ref Range Status   09/12/2023 18 10 - 40 U/L Final     ALT   Date Value Ref Range Status    09/12/2023 14 10 - 44 U/L Final     Anion Gap   Date Value Ref Range Status   09/12/2023 11 8 - 16 mmol/L Final     eGFR if    Date Value Ref Range Status   02/24/2020 >60 >60 mL/min/1.73 m^2 Final     eGFR if non    Date Value Ref Range Status   02/24/2020 >60 >60 mL/min/1.73 m^2 Final     Comment:     Calculation used to obtain the estimated glomerular filtration  rate (eGFR) is the CKD-EPI equation.          Lab Results   Component Value Date    HGBA1C 8.7 (H) 03/20/2023             ASSESSMENT: 48 y.o. year old male with:    1. Chronic neck pain        2. Cervical spondylosis        3. Lumbar radiculopathy        4. Lumbosacral radiculopathy                PLAN:     We will order C3-4 and C4-5 facet RFA.  He had positive facet block on 06/07/2023 and 08/23/2023 (70-80% relief).  We will do the left side 1st because it is more painful.  Follow-up 6 weeks after the RFA.  He is now 2 weeks after the repeat bilateral L5 transforaminal GARY on 10/25/2023.  He is feeling noticeable improvement.  Counseled him and advised him to allow more time for the procedure.  He had significant relief after bilateral L5 transforaminal GARY on 05/24/2023 that lasted for 3 months.  Instructed him to continue home exercise program.  Previously taking Lyrica per Rheumatology, d/c'd due to CHF and ineffectiveness    - Counseled patient regarding the importance of activity modification  - This condition does not require this patient to take time off of work, and the primary goal of our Pain Management services is to improve the patient's functional capacity.  - Patient Questions: Answered all of the patient's questions regarding diagnosis, therapy, and treatment    The above plan and management options were discussed at length with patient. Patient is in agreement with the above and verbalized understanding.      Ashvin Garcia MD  Interventional Pain Management  [facility]    Disclaimer:  This note  was prepared using voice recognition system and is likely to have sound alike errors that may have been overlooked even after proof reading.  Please call me with any questions

## 2023-11-09 ENCOUNTER — PATIENT MESSAGE (OUTPATIENT)
Dept: PAIN MEDICINE | Facility: CLINIC | Age: 48
End: 2023-11-09
Payer: COMMERCIAL

## 2023-11-12 ENCOUNTER — PATIENT MESSAGE (OUTPATIENT)
Dept: DIABETES | Facility: CLINIC | Age: 48
End: 2023-11-12
Payer: COMMERCIAL

## 2023-11-14 NOTE — PRE-PROCEDURE INSTRUCTIONS
poke with patient regarding procedure scheduled on 11.22     Arrival time 0830     Has patient been sick with fever or on antibiotics within the last 7 days? No     Does the patient have any open wounds, sores or rashes? No     Does the patient have any recent fractures? no     Has patient received a vaccination within the last 7 days? No     Received the COVID vaccination?      Has the patient stopped all medications as directed? na     Does patient have a pacemaker, defibrillator, or implantable stimulator? no     Does the patient have a ride to and from procedure and someone reliable to remain with patient?  unsure-educated     Is the patient diabetic? yes     Does the patient have sleep apnea? Or use O2 at home? no     Is the patient receiving sedation?      Is the patient instructed to remain NPO beginning at midnight the night before their procedure? yes     Procedure location confirmed with patient? Yes     Covid- Denies signs/symptoms. Instructed to notify PAT/MD if any changes.

## 2023-11-15 RX ORDER — AMITRIPTYLINE HYDROCHLORIDE 25 MG/1
25 TABLET, FILM COATED ORAL NIGHTLY
Qty: 90 TABLET | Refills: 3 | Status: SHIPPED | OUTPATIENT
Start: 2023-11-15

## 2023-11-15 NOTE — TELEPHONE ENCOUNTER
Refill Decision Note   Jason Sandra  is requesting a refill authorization.  Brief Assessment and Rationale for Refill:  Approve     Medication Therapy Plan:         Comments:     Note composed:2:31 AM 11/15/2023

## 2023-11-15 NOTE — TELEPHONE ENCOUNTER
No care due was identified.  [facility] Embedded Care Due Messages. Reference number: [id number].   11/15/2023 12:11:46 AM CST

## 2023-11-20 DIAGNOSIS — R79.89 LOW TESTOSTERONE IN MALE: Primary | ICD-10-CM

## 2023-11-22 ENCOUNTER — HOSPITAL ENCOUNTER (OUTPATIENT)
Facility: HOSPITAL | Age: 48
Discharge: HOME | End: 2023-11-22
Attending: PAIN MEDICINE | Admitting: PAIN MEDICINE
Payer: COMMERCIAL

## 2023-11-22 ENCOUNTER — OFFICE VISIT (OUTPATIENT)
Dept: CARDIOLOGY | Facility: CLINIC | Age: 48
End: 2023-11-22
Attending: PAIN MEDICINE
Payer: COMMERCIAL

## 2023-11-22 VITALS
TEMPERATURE: 98 F | WEIGHT: 162.56 LBS | DIASTOLIC BLOOD PRESSURE: 81 MMHG | OXYGEN SATURATION: 98 % | HEIGHT: 68 IN | HEART RATE: 77 BPM | BODY MASS INDEX: 24.64 KG/M2 | RESPIRATION RATE: 18 BRPM | SYSTOLIC BLOOD PRESSURE: 121 MMHG

## 2023-11-22 VITALS
HEART RATE: 78 BPM | SYSTOLIC BLOOD PRESSURE: 120 MMHG | OXYGEN SATURATION: 99 % | BODY MASS INDEX: 24.86 KG/M2 | HEIGHT: 68 IN | WEIGHT: 164 LBS | DIASTOLIC BLOOD PRESSURE: 70 MMHG

## 2023-11-22 DIAGNOSIS — E11.9 TYPE 2 DIABETES MELLITUS WITHOUT COMPLICATION, WITH LONG-TERM CURRENT USE OF INSULIN: ICD-10-CM

## 2023-11-22 DIAGNOSIS — R06.02 SOB (SHORTNESS OF BREATH): ICD-10-CM

## 2023-11-22 DIAGNOSIS — Z79.4 TYPE 2 DIABETES MELLITUS WITHOUT COMPLICATION, WITH LONG-TERM CURRENT USE OF INSULIN: ICD-10-CM

## 2023-11-22 DIAGNOSIS — E78.5 HYPERLIPIDEMIA ASSOCIATED WITH TYPE 2 DIABETES MELLITUS: ICD-10-CM

## 2023-11-22 DIAGNOSIS — E11.69 HYPERLIPIDEMIA ASSOCIATED WITH TYPE 2 DIABETES MELLITUS: ICD-10-CM

## 2023-11-22 DIAGNOSIS — E11.65 UNCONTROLLED TYPE 2 DIABETES MELLITUS WITH HYPERGLYCEMIA: ICD-10-CM

## 2023-11-22 DIAGNOSIS — G89.29 CHRONIC NECK PAIN: Primary | ICD-10-CM

## 2023-11-22 DIAGNOSIS — I50.9 CONGESTIVE HEART FAILURE, UNSPECIFIED HF CHRONICITY, UNSPECIFIED HEART FAILURE TYPE: Primary | ICD-10-CM

## 2023-11-22 DIAGNOSIS — M54.2 CHRONIC NECK PAIN: Primary | ICD-10-CM

## 2023-11-22 DIAGNOSIS — M47.812 CERVICAL SPONDYLOSIS: ICD-10-CM

## 2023-11-22 DIAGNOSIS — Z82.49 FAMILY HISTORY OF HEART DISEASE: ICD-10-CM

## 2023-11-22 LAB — POCT GLUCOSE: 79 MG/DL (ref 70–110)

## 2023-11-22 PROCEDURE — 25000003 PHARM REV CODE 250: Performed by: PAIN MEDICINE

## 2023-11-22 PROCEDURE — 3074F SYST BP LT 130 MM HG: CPT | Mod: CPTII,S$GLB,, | Performed by: STUDENT IN AN ORGANIZED HEALTH CARE EDUCATION/TRAINING PROGRAM

## 2023-11-22 PROCEDURE — 3078F PR MOST RECENT DIASTOLIC BLOOD PRESSURE < 80 MM HG: ICD-10-PCS | Mod: CPTII,S$GLB,, | Performed by: STUDENT IN AN ORGANIZED HEALTH CARE EDUCATION/TRAINING PROGRAM

## 2023-11-22 PROCEDURE — 1159F PR MEDICATION LIST DOCUMENTED IN MEDICAL RECORD: ICD-10-PCS | Mod: CPTII,S$GLB,, | Performed by: STUDENT IN AN ORGANIZED HEALTH CARE EDUCATION/TRAINING PROGRAM

## 2023-11-22 PROCEDURE — 99999 PR PBB SHADOW E&M-EST. PATIENT-LVL III: CPT | Mod: PBBFAC,,, | Performed by: STUDENT IN AN ORGANIZED HEALTH CARE EDUCATION/TRAINING PROGRAM

## 2023-11-22 PROCEDURE — 64633 PR DESTROY CERV/THOR FACET JNT: ICD-10-PCS | Mod: LT,,, | Performed by: PAIN MEDICINE

## 2023-11-22 PROCEDURE — 3008F PR BODY MASS INDEX (BMI) DOCUMENTED: ICD-10-PCS | Mod: CPTII,S$GLB,, | Performed by: STUDENT IN AN ORGANIZED HEALTH CARE EDUCATION/TRAINING PROGRAM

## 2023-11-22 PROCEDURE — 4010F PR ACE/ARB THEARPY RXD/TAKEN: ICD-10-PCS | Mod: CPTII,S$GLB,, | Performed by: STUDENT IN AN ORGANIZED HEALTH CARE EDUCATION/TRAINING PROGRAM

## 2023-11-22 PROCEDURE — 99214 OFFICE O/P EST MOD 30 MIN: CPT | Mod: S$GLB,,, | Performed by: STUDENT IN AN ORGANIZED HEALTH CARE EDUCATION/TRAINING PROGRAM

## 2023-11-22 PROCEDURE — 3008F BODY MASS INDEX DOCD: CPT | Mod: CPTII,S$GLB,, | Performed by: STUDENT IN AN ORGANIZED HEALTH CARE EDUCATION/TRAINING PROGRAM

## 2023-11-22 PROCEDURE — 3066F PR DOCUMENTATION OF TREATMENT FOR NEPHROPATHY: ICD-10-PCS | Mod: CPTII,S$GLB,, | Performed by: STUDENT IN AN ORGANIZED HEALTH CARE EDUCATION/TRAINING PROGRAM

## 2023-11-22 PROCEDURE — 99999 PR PBB SHADOW E&M-EST. PATIENT-LVL III: ICD-10-PCS | Mod: PBBFAC,,, | Performed by: STUDENT IN AN ORGANIZED HEALTH CARE EDUCATION/TRAINING PROGRAM

## 2023-11-22 PROCEDURE — 3061F PR NEG MICROALBUMINURIA RESULT DOCUMENTED/REVIEW: ICD-10-PCS | Mod: CPTII,S$GLB,, | Performed by: STUDENT IN AN ORGANIZED HEALTH CARE EDUCATION/TRAINING PROGRAM

## 2023-11-22 PROCEDURE — 3052F HG A1C>EQUAL 8.0%<EQUAL 9.0%: CPT | Mod: CPTII,S$GLB,, | Performed by: STUDENT IN AN ORGANIZED HEALTH CARE EDUCATION/TRAINING PROGRAM

## 2023-11-22 PROCEDURE — 3052F PR MOST RECENT HEMOGLOBIN A1C LEVEL 8.0 - < 9.0%: ICD-10-PCS | Mod: CPTII,S$GLB,, | Performed by: STUDENT IN AN ORGANIZED HEALTH CARE EDUCATION/TRAINING PROGRAM

## 2023-11-22 PROCEDURE — 64634 PR DESTROY C/TH FACET JNT ADDL: ICD-10-PCS | Mod: LT,,, | Performed by: PAIN MEDICINE

## 2023-11-22 PROCEDURE — 3061F NEG MICROALBUMINURIA REV: CPT | Mod: CPTII,S$GLB,, | Performed by: STUDENT IN AN ORGANIZED HEALTH CARE EDUCATION/TRAINING PROGRAM

## 2023-11-22 PROCEDURE — 3074F PR MOST RECENT SYSTOLIC BLOOD PRESSURE < 130 MM HG: ICD-10-PCS | Mod: CPTII,S$GLB,, | Performed by: STUDENT IN AN ORGANIZED HEALTH CARE EDUCATION/TRAINING PROGRAM

## 2023-11-22 PROCEDURE — 1159F MED LIST DOCD IN RCRD: CPT | Mod: CPTII,S$GLB,, | Performed by: STUDENT IN AN ORGANIZED HEALTH CARE EDUCATION/TRAINING PROGRAM

## 2023-11-22 PROCEDURE — 64633 DESTROY CERV/THOR FACET JNT: CPT | Mod: LT | Performed by: PAIN MEDICINE

## 2023-11-22 PROCEDURE — 99152 MOD SED SAME PHYS/QHP 5/>YRS: CPT | Performed by: PAIN MEDICINE

## 2023-11-22 PROCEDURE — 63600175 PHARM REV CODE 636 W HCPCS: Performed by: PAIN MEDICINE

## 2023-11-22 PROCEDURE — 64634 DESTROY C/TH FACET JNT ADDL: CPT | Mod: LT,,, | Performed by: PAIN MEDICINE

## 2023-11-22 PROCEDURE — 99214 PR OFFICE/OUTPT VISIT, EST, LEVL IV, 30-39 MIN: ICD-10-PCS | Mod: S$GLB,,, | Performed by: STUDENT IN AN ORGANIZED HEALTH CARE EDUCATION/TRAINING PROGRAM

## 2023-11-22 PROCEDURE — 64633 DESTROY CERV/THOR FACET JNT: CPT | Mod: LT,,, | Performed by: PAIN MEDICINE

## 2023-11-22 PROCEDURE — 3066F NEPHROPATHY DOC TX: CPT | Mod: CPTII,S$GLB,, | Performed by: STUDENT IN AN ORGANIZED HEALTH CARE EDUCATION/TRAINING PROGRAM

## 2023-11-22 PROCEDURE — 64634 DESTROY C/TH FACET JNT ADDL: CPT | Mod: LT | Performed by: PAIN MEDICINE

## 2023-11-22 PROCEDURE — 4010F ACE/ARB THERAPY RXD/TAKEN: CPT | Mod: CPTII,S$GLB,, | Performed by: STUDENT IN AN ORGANIZED HEALTH CARE EDUCATION/TRAINING PROGRAM

## 2023-11-22 PROCEDURE — 3078F DIAST BP <80 MM HG: CPT | Mod: CPTII,S$GLB,, | Performed by: STUDENT IN AN ORGANIZED HEALTH CARE EDUCATION/TRAINING PROGRAM

## 2023-11-22 RX ORDER — BUPIVACAINE HYDROCHLORIDE 2.5 MG/ML
INJECTION, SOLUTION EPIDURAL; INFILTRATION; INTRACAUDAL
Status: DISCONTINUED | OUTPATIENT
Start: 2023-11-22 | End: 2023-11-22 | Stop reason: HOSPADM

## 2023-11-22 RX ORDER — MIDAZOLAM HYDROCHLORIDE 1 MG/ML
INJECTION, SOLUTION INTRAMUSCULAR; INTRAVENOUS
Status: DISCONTINUED | OUTPATIENT
Start: 2023-11-22 | End: 2023-11-22 | Stop reason: HOSPADM

## 2023-11-22 RX ORDER — ATORVASTATIN CALCIUM 20 MG/1
20 TABLET, FILM COATED ORAL DAILY
Qty: 90 TABLET | Refills: 1 | Status: SHIPPED | OUTPATIENT
Start: 2023-11-22 | End: 2023-11-22 | Stop reason: SDUPTHER

## 2023-11-22 RX ORDER — ATORVASTATIN CALCIUM 20 MG/1
20 TABLET, FILM COATED ORAL DAILY
Qty: 90 TABLET | Refills: 1 | Status: SHIPPED | OUTPATIENT
Start: 2023-11-22 | End: 2024-02-28

## 2023-11-22 RX ORDER — LIDOCAINE HYDROCHLORIDE 20 MG/ML
INJECTION, SOLUTION EPIDURAL; INFILTRATION; INTRACAUDAL; PERINEURAL
Status: DISCONTINUED | OUTPATIENT
Start: 2023-11-22 | End: 2023-11-22 | Stop reason: HOSPADM

## 2023-11-22 RX ORDER — FENTANYL CITRATE 50 UG/ML
INJECTION, SOLUTION INTRAMUSCULAR; INTRAVENOUS
Status: DISCONTINUED | OUTPATIENT
Start: 2023-11-22 | End: 2023-11-22 | Stop reason: HOSPADM

## 2023-11-22 NOTE — DISCHARGE SUMMARY
[facility] - Pain Mgmt 1st Fl  Discharge Note  Short Stay    Procedure(s) (LRB):  C3-4 and C 4-5 Facet RFA (Left)      OUTCOME: Patient tolerated treatment/procedure well without complication and is now ready for discharge.    DISPOSITION: Home or Self Care    FINAL DIAGNOSIS:  Chronic neck pain    FOLLOWUP: In clinic    DISCHARGE INSTRUCTIONS:  No discharge procedures on file.     TIME SPENT ON DISCHARGE: 4 minutes

## 2023-11-22 NOTE — PROGRESS NOTES
Section of Cardiology                  Cardiac Clinic Note    Chief Complaint/Reason for consultation: chest pain      HPI:   Jason Sandra is a 48 y.o. male with h/o HLD, diabetes who was referred to cardiology clinic by PCP Dr. Mcconnell for evaluation.    3/14/23  Reports chest pain and SOB about 6 months, has been stable  Works in construction, walks up stairs, heavy lifting  SOB was a few days ago, had to stop his activity   Chest pain, intermittent throughout the day, stops his activity  Palpitations are separate [address] [employer] thank remain I can no  Has joint pain, neck pain, shoulder  (sister has lupus)  Saw rheum today, started on MTX as a trial   Exercise daily, with weight training, no cardio, no issues with this for 25 min    Family hx: dad- CABG at age 61 yo  Denies tobacco or ETOH abuse       4/26/23  Comes in with wife  Echo 4/23 with EF 35%  Stress test 4/23 negative   Has been fatigue and SOB  Started having pain in his joints (neck, knee)  LE swelling  Reports sharp and dull chest pain, not daily, does not last long   No orthopnea, PND, syncope        8/9/23  Still feels SOB, improved  Feels it mostly outside   EF has recovered 35%->55%   No LE swelling  No PND, orthopnea  Not exercising much at home, will start back  Good appetite   Lost around 10 lbs since last visit      11/22/23  Still with fatigue and dizziness- stable  SOB stable, not worse  No LE swelling   Stable in the 160s lbs (trying to gain weight)  Not exercising         EKG 8/8/23 NSR  EKG 1/1/23 ST, nonspec TW abn, LVH, 107 bpm      Echo 7/23  The left ventricle is normal in size with concentric remodeling and normal systolic function.  Normal left ventricular diastolic function.  The estimated PA systolic pressure is 35 mmHg.  Normal right ventricular size with normal right ventricular systolic function.  Normal central venous pressure (3 mmHg).  The estimated ejection fraction is 55%, recovered from prior study in  april  Mild tricuspid regurgitation.    ECHO  4/23  The left ventricle is normal in size with moderately decreased systolic function.  The estimated ejection fraction is 35%.  Grade I left ventricular diastolic dysfunction.  There is moderate left ventricular global hypokinesis.  Normal right ventricular size with normal right ventricular systolic function.  Intermediate central venous pressure (8 mmHg).    STRESS TEST    LHC      ROS: All 10 systems reviewed. Please refer to the HPI for pertinent positives. All other systems negative.     Past Medical History  Past Medical History:   Diagnosis Date    Diabetes mellitus     Hypogonadism in male        Surgical History  Past Surgical History:   Procedure Laterality Date    INJECTION OF ANESTHETIC AGENT AROUND MEDIAL BRANCH NERVES INNERVATING CERVICAL FACET JOINT Bilateral 6/7/2023    Procedure: Bilateral C3-5 MBB with local;  Surgeon: Tony Garcia MD;  Location: [facility];  Service: Pain Management;  Laterality: Bilateral;    INJECTION OF ANESTHETIC AGENT AROUND MEDIAL BRANCH NERVES INNERVATING CERVICAL FACET JOINT Bilateral 8/23/2023    Procedure: Bilateral C3-5 MBB  second diagnostic block with RN IV sedation;  Surgeon: Tony Garcia MD;  Location: [facility];  Service: Pain Management;  Laterality: Bilateral;    NOSE SURGERY      SELECTIVE INJECTION OF ANESTHETIC AGENT AROUND LUMBAR SPINAL NERVE ROOT BY TRANSFORAMINAL APPROACH Bilateral 5/24/2023    Procedure: Bilateral L5 TF GARY with local;  Surgeon: Tony Garcia MD;  Location: [facility];  Service: Pain Management;  Laterality: Bilateral;    SELECTIVE INJECTION OF ANESTHETIC AGENT AROUND LUMBAR SPINAL NERVE ROOT BY TRANSFORAMINAL APPROACH Bilateral 10/25/2023    Procedure: Bilateral L5/S1 TF GARY;  Surgeon: Aric Muro MD;  Location: [facility];  Service: Pain Management;  Laterality: Bilateral;          Allergies:   Review of patient's allergies indicates:  No Known Allergies    Social  History:  Social History     Socioeconomic History    Marital status: Legally    Tobacco Use    Smoking status: Never     Passive exposure: Never    Smokeless tobacco: Never   Substance and Sexual Activity    Alcohol use: Not Currently     Comment: socially    Drug use: No    Sexual activity: Yes     Partners: Female   Social History Narrative         Social Determinants of Health     Financial Resource Strain: Low Risk  (1/3/2023)    Overall Financial Resource Strain (CARDIA)     Difficulty of Paying Living Expenses: Not hard at all   Food Insecurity: No Food Insecurity (1/3/2023)    Hunger Vital Sign     Worried About Running Out of Food in the Last Year: Never true     Ran Out of Food in the Last Year: Never true   Transportation Needs: No Transportation Needs (1/3/2023)    PRAPARE - Transportation     Lack of Transportation (Medical): No     Lack of Transportation (Non-Medical): No   Stress: No Stress Concern Present (1/3/2023)    [place] [employer] - Occupational Stress Questionnaire     Feeling of Stress : Only a little   Social Connections: Moderately Isolated (1/3/2023)    Social Connection and Isolation Panel [NHANES]     Frequency of Communication with Friends and Family: More than three times a week     Frequency of Social Gatherings with Friends and Family: More than three times a week     Attends [employer] Services: Never     Active Member of Clubs or Organizations: No     Attends Club or Organization Meetings: Never     Marital Status:    Housing Stability: Low Risk  (1/3/2023)    Housing Stability Vital Sign     Unable to Pay for Housing in the Last Year: No     Number of Places Lived in the Last Year: 2     Unstable Housing in the Last Year: No       Family History:  family history includes Alzheimer's disease in his father; Cataracts in his mother; Glaucoma in his mother; Stroke in his mother.    Home Medications:  Current Outpatient Medications on File  "Prior to Visit   Medication Sig Dispense Refill    ACCU-CHEK GUIDE ME GLUCOSE MTR [facility] CHECK BLOOD SUGAR TWICE A DAY      blood sugar diagnostic Strp To check BG 2 times daily, to use with insurance preferred meter 200 strip 2    blood-glucose sensor (DEXCOM G6 SENSOR) Chen Use 1 sensor every 10 days 3 each 11    blood-glucose transmitter (DEXCOM G6 TRANSMITTER) Chen 1 transmitter every 3 months 1 each 4    furosemide (LASIX) 20 MG tablet Take 1 tablet (20 mg total) by mouth once daily. 30 tablet 6    glucagon (BAQSIMI) 3 mg/actuation Spry 3 mg (one actuation) into a single nostril; if no response, may repeat in 15 minutes using a new intranasal device. 2 each 1    hydrOXYchloroQUINE (PLAQUENIL) 100 mg tablet Take 200 mg by mouth once daily.      insulin degludec (TRESIBA FLEXTOUCH U-100) 100 unit/mL (3 mL) insulin pen Inject 24 Units into the skin every evening. Max TDD of 30 units 15 mL 1    insulin lispro-aabc (LYUMJEV KWIKPEN U-100 INSULIN) 100 unit/mL pen Inject 10 units 3 times per day before meals, 2-4 units for snack, . + correction scale. MAX TDD Of 50 units 5 pen 6    ketoconazole (NIZORAL) 2 % shampoo Apply to scalp in place of regular shampoo 2-3x per week 120 mL 11    lancets Misc To check BG 2 times daily, to use with insurance preferred meter 200 each 2    methotrexate 2.5 MG Tab Take 6 tablets (15 mg total) by mouth every 7 days. 24 tablet 3    metoprolol succinate (TOPROL-XL) 25 MG 24 hr tablet Take 1 tablet (25 mg total) by mouth once daily. 30 tablet 6    pen needle, diabetic 32 gauge x 5/32" Ndle To use 7 times per day with insulin injections- for meals, long-acting insulin and for snacks 200 each 11    pregabalin (LYRICA) 50 MG capsule Take 1 capsule (50 mg total) by mouth 3 (three) times daily. 270 capsule 1    sacubitriL-valsartan (ENTRESTO) 24-26 mg per tablet Take 1 tablet by mouth 2 (two) times daily. 60 tablet 11    amitriptyline (ELAVIL) 25 MG tablet TAKE 1 TABLET BY MOUTH NIGHTLY AS " "NEEDED FOR INSOMNIA. (Patient not taking: Reported on 11/22/2023) 90 tablet 3    atorvastatin (LIPITOR) 20 MG tablet Take 1 tablet (20 mg total) by mouth once daily. (Patient not taking: Reported on 11/22/2023) 90 tablet 2    folic acid (FOLVITE) 1 MG tablet TAKE 1 TABLET BY MOUTH EVERY DAY (Patient not taking: Reported on 11/22/2023) 90 tablet 1    testosterone cypionate (DEPOTESTOTERONE CYPIONATE) 200 mg/mL injection Inject 1 mL (200 mg total) into the muscle once a week. 10 mL 1     Current Facility-Administered Medications on File Prior to Visit   Medication Dose Route Frequency Provider Last Rate Last Admin    [DISCONTINUED] BUPivacaine (PF) 0.25% (2.5 mg/ml) injection    PRN Tony Garcia MD   1.5 mL at 11/22/23 1004    [DISCONTINUED] fentaNYL 50 mcg/mL injection    PRN Tony Garcia MD   100 mcg at 11/22/23 0951    [DISCONTINUED] LIDOcaine (PF) 20 mg/mL (2%) injection    PRN Tony Garcia MD   1.5 mL at 11/22/23 1004    [DISCONTINUED] midazolam injection    PRN Tony Garcia MD   2 mg at 11/22/23 0951       Physical exam:  /70 (BP Location: Right arm, Patient Position: Sitting, BP Method: Medium (Manual))   Pulse 78   Ht 5' 8" (1.727 m)   Wt 74.4 kg (164 lb 0.4 oz)   SpO2 99%   BMI 24.94 kg/m²         General: Pt is a 48 y.o. year old male who is AAOx3, in NAD, is pleasant, well nourished, looks stated age  HEENT: PERRL, EOMI, Oral mucosa pink & moist  CVS  No abnormal cardiac pulsations noted on inspection. JVP not raised. The apical impulse is normal on palpation, and is located in the left 5th intercostal space in the mid - clavicular line. No palpable thrills or abnormal pulsations noted. RR, S1 - S2 heard, no murmurs, rubs or gallops appreciated.   PUL : CTA B/L. No wheezes/crackles heard   ABD : BS +, soft. No tenderness elicited   LE : No C/C/E. Distal Pulses palpable B/L         LABS:    Chemistry:   Lab Results   Component Value Date     09/12/2023    K 3.9 09/12/2023     " "09/12/2023    CO2 22 (L) 09/12/2023    BUN 10 09/12/2023    CREATININE 1.0 09/12/2023    CALCIUM 10.3 09/12/2023     Cardiac Markers:   Lab Results   Component Value Date    TROPONINI <0.006 01/01/2023     Cardiac Markers (Last 3):   Lab Results   Component Value Date    TROPONINI <0.006 01/01/2023     CBC:   Lab Results   Component Value Date    WBC 5.24 08/14/2023    HGB 13.2 (L) 08/14/2023    HCT 39.6 (L) 08/14/2023    HCT 47 01/01/2023    MCV 91 08/14/2023     08/14/2023     Lipids:   Lab Results   Component Value Date    CHOL 180 09/12/2023    TRIG 75 09/12/2023    HDL 47 09/12/2023     Coagulation: No results found for: "PT", "INR", "APTT"        Assessment      1. Congestive heart failure, unspecified HF chronicity, unspecified heart failure type    2. Hyperlipidemia associated with type 2 diabetes mellitus    3. SOB (shortness of breath)    4. Type 2 diabetes mellitus without complication, with long-term current use of insulin    5. Family history of heart disease    6. Uncontrolled type 2 diabetes mellitus with hyperglycemia        Plan:    CHF/Chest pain/shortness of breath - stable   Compensated   Family history of heart disease   Echo 4/23 with EF 35%, recovered to EF 55% 7/23  Stress test 4/23 negative   Continue Toprol-XL, Entresto, lasix 20 mg daily   Fluid restriction, reduce salt intake discussed    Diabetes   A1c 8.7  Continue therapy    HLD   as of 9/23  Restart lipitor 20     HTN  Stable   Meds as above     This note was prepared using voice recognition system and is likely to have sound alike errors that may have been overlooked even after proofreading.     I have reviewed all pertinent chart information.  Plans and recommendations have been formulated under my direct supervision. All questions answered and patient voiced understanding.   If symptoms persist go to the ED.    RTC in 6 months         Karl Payne MD  Cardiology              "

## 2023-11-22 NOTE — OP NOTE
Jason Sandra  48 y.o. male    Procedure Date: 11/22/2023     PROCEDURE: LEFT C3/4 AND C4/5 FACET MEDIAL BRANCH NERVE RADIOFREQUENCY NEUROTOMY        Pre Procedure diagnosis: Chronic neck pain [M54.2, G89.29]  Cervical spondylosis [M47.812]  Post-Procedure diagnosis: Chronic neck pain [M54.2, G89.29]  Cervical spondylosis [M47.812]     Anesthesia:  Conscious sedation provided by M.D     INFORMED CONSENT: The procedure, risks, benefits and options were discussed with patient. There are no contraindications to the procedure. The patient expressed understanding and agreed to proceed. The personnel performing the procedure was discussed. I verify that I personally obtained the patient's consent prior to the start of the procedure and the signed consent can be found on the patient's chart     DESCRIPTION OF PROCEDURE: The patient was brought to the procedure room.  After performing time out IV access was obtained prior to the procedure. The patient was positioned prone on the fluoroscopy table. Continuous hemodynamic monitoring was initiated including blood pressure and pulse oximetry. IV sedation was administered incrementally to allow the patient to remain comfortable and conversant throughout the procedure. The area of the cervical spine was prepped chlorhexidine three times and draped into a sterile field.  Fluoroscopy was used to identify the location of the left cervical medial branch nerves at the waist of the articular pillars of C3, C4 and C5 vertebrae respectively.  Skin anesthesia was achieved using Lidocaine 1% over the injection sites. An 18 gauge, 10 cm (10mm active tip) NICOLE VENOM curved RF needle was slowly inserted at each level using AP, lateral and oblique fluoroscopic imaging. Fluoroscopy confirmed exit of the probes from the side holes of all needles. Negative aspiration for blood was confirmed. We had acceptable impedance in the range of 190-200 Ohms. Motor stimulation at 2Hz up to 3 V did  not cause any radicular symptoms or muscle stimulation in the lower extremities at any level. Each level was anesthetized with 0.5 cc of lidocaine 2% and 0.5 cc of bupivacaine 0.25%.  Radiofrequency lesioning was performed for 90 seconds at 80 degrees Celsius at each level.  The needles were removed intact. There was no bleeding from injection sites.  A sterile dressing was applied. Patient tolerated the procedure well and was taken to the recovery room in a stable condition and for observation and discharge.       Anesthesia:  Conscious sedation provided by M.D    The patient was monitored with continuous pulse oximetry, EKG, and intermittent blood pressure monitors.  The patient was hemodynamically stable throughout the entire process was responsive to voice, and breathing spontaneously.  Supplemental O2 was provided at 2L/min via nasal cannula.  Patient was comfortable for the duration of the procedure. (See nurse documentation and case log for sedation time)    There was a total of 2mg IV Midazolam and 100mcg Fentanyl titrated for the procedure       Conscious sedation ordered by MD.  Patient reevaluated and sedation administered by MD and monitored by RN.  Total sedation time was more than 15 min. (See nurse documentation and case log for sedation time)     MORENITA Garcia MD

## 2023-11-22 NOTE — DISCHARGE INSTRUCTIONS

## 2023-11-22 NOTE — H&P (VIEW-ONLY)
Section of Cardiology                  Cardiac Clinic Note    Chief Complaint/Reason for consultation: chest pain      HPI:   Jason Sandra is a 48 y.o. male with h/o HLD, diabetes who was referred to cardiology clinic by PCP Dr. Mcconnell for evaluation.    3/14/23  Reports chest pain and SOB about 6 months, has been stable  Works in construction, walks up stairs, heavy lifting  SOB was a few days ago, had to stop his activity   Chest pain, intermittent throughout the day, stops his activity  Palpitations are separate [address] [employer] thank remain I can no  Has joint pain, neck pain, shoulder  (sister has lupus)  Saw rheum today, started on MTX as a trial   Exercise daily, with weight training, no cardio, no issues with this for 25 min    Family hx: dad- CABG at age 59 yo  Denies tobacco or ETOH abuse       4/26/23  Comes in with wife  Echo 4/23 with EF 35%  Stress test 4/23 negative   Has been fatigue and SOB  Started having pain in his joints (neck, knee)  LE swelling  Reports sharp and dull chest pain, not daily, does not last long   No orthopnea, PND, syncope        8/9/23  Still feels SOB, improved  Feels it mostly outside   EF has recovered 35%->55%   No LE swelling  No PND, orthopnea  Not exercising much at home, will start back  Good appetite   Lost around 10 lbs since last visit      11/22/23  Still with fatigue and dizziness- stable  SOB stable, not worse  No LE swelling   Stable in the 160s lbs (trying to gain weight)  Not exercising         EKG 8/8/23 NSR  EKG 1/1/23 ST, nonspec TW abn, LVH, 107 bpm      Echo 7/23  The left ventricle is normal in size with concentric remodeling and normal systolic function.  Normal left ventricular diastolic function.  The estimated PA systolic pressure is 35 mmHg.  Normal right ventricular size with normal right ventricular systolic function.  Normal central venous pressure (3 mmHg).  The estimated ejection fraction is 55%, recovered from prior study in  april  Mild tricuspid regurgitation.    ECHO  4/23  The left ventricle is normal in size with moderately decreased systolic function.  The estimated ejection fraction is 35%.  Grade I left ventricular diastolic dysfunction.  There is moderate left ventricular global hypokinesis.  Normal right ventricular size with normal right ventricular systolic function.  Intermediate central venous pressure (8 mmHg).    STRESS TEST    LHC      ROS: All 10 systems reviewed. Please refer to the HPI for pertinent positives. All other systems negative.     Past Medical History  Past Medical History:   Diagnosis Date    Diabetes mellitus     Hypogonadism in male        Surgical History  Past Surgical History:   Procedure Laterality Date    INJECTION OF ANESTHETIC AGENT AROUND MEDIAL BRANCH NERVES INNERVATING CERVICAL FACET JOINT Bilateral 6/7/2023    Procedure: Bilateral C3-5 MBB with local;  Surgeon: Tony Garcia MD;  Location: [facility];  Service: Pain Management;  Laterality: Bilateral;    INJECTION OF ANESTHETIC AGENT AROUND MEDIAL BRANCH NERVES INNERVATING CERVICAL FACET JOINT Bilateral 8/23/2023    Procedure: Bilateral C3-5 MBB  second diagnostic block with RN IV sedation;  Surgeon: Tony Garcia MD;  Location: [facility];  Service: Pain Management;  Laterality: Bilateral;    NOSE SURGERY      SELECTIVE INJECTION OF ANESTHETIC AGENT AROUND LUMBAR SPINAL NERVE ROOT BY TRANSFORAMINAL APPROACH Bilateral 5/24/2023    Procedure: Bilateral L5 TF GARY with local;  Surgeon: Tony Garcia MD;  Location: [facility];  Service: Pain Management;  Laterality: Bilateral;    SELECTIVE INJECTION OF ANESTHETIC AGENT AROUND LUMBAR SPINAL NERVE ROOT BY TRANSFORAMINAL APPROACH Bilateral 10/25/2023    Procedure: Bilateral L5/S1 TF GARY;  Surgeon: Aric Muro MD;  Location: [facility];  Service: Pain Management;  Laterality: Bilateral;          Allergies:   Review of patient's allergies indicates:  No Known Allergies    Social  History:  Social History     Socioeconomic History    Marital status: Legally    Tobacco Use    Smoking status: Never     Passive exposure: Never    Smokeless tobacco: Never   Substance and Sexual Activity    Alcohol use: Not Currently     Comment: socially    Drug use: No    Sexual activity: Yes     Partners: Female   Social History Narrative         Social Determinants of Health     Financial Resource Strain: Low Risk  (1/3/2023)    Overall Financial Resource Strain (CARDIA)     Difficulty of Paying Living Expenses: Not hard at all   Food Insecurity: No Food Insecurity (1/3/2023)    Hunger Vital Sign     Worried About Running Out of Food in the Last Year: Never true     Ran Out of Food in the Last Year: Never true   Transportation Needs: No Transportation Needs (1/3/2023)    PRAPARE - Transportation     Lack of Transportation (Medical): No     Lack of Transportation (Non-Medical): No   Stress: No Stress Concern Present (1/3/2023)    [employer] - Occupational Stress Questionnaire     Feeling of Stress : Only a little   Social Connections: Moderately Isolated (1/3/2023)    Social Connection and Isolation Panel [NHANES]     Frequency of Communication with Friends and Family: More than three times a week     Frequency of Social Gatherings with Friends and Family: More than three times a week     Attends Baptism Services: Never     Active Member of Clubs or Organizations: No     Attends Club or Organization Meetings: Never     Marital Status:    Housing Stability: Low Risk  (1/3/2023)    Housing Stability Vital Sign     Unable to Pay for Housing in the Last Year: No     Number of Places Lived in the Last Year: 2     Unstable Housing in the Last Year: No       Family History:  family history includes Alzheimer's disease in his father; Cataracts in his mother; Glaucoma in his mother; Stroke in his mother.    Home Medications:  Current Outpatient Medications on File  "Prior to Visit   Medication Sig Dispense Refill    ACCU-CHEK GUIDE ME GLUCOSE MTR [facility] CHECK BLOOD SUGAR TWICE A DAY      blood sugar diagnostic Strp To check BG 2 times daily, to use with insurance preferred meter 200 strip 2    blood-glucose sensor (DEXCOM G6 SENSOR) Chen Use 1 sensor every 10 days 3 each 11    blood-glucose transmitter (DEXCOM G6 TRANSMITTER) Chen 1 transmitter every 3 months 1 each 4    furosemide (LASIX) 20 MG tablet Take 1 tablet (20 mg total) by mouth once daily. 30 tablet 6    glucagon (BAQSIMI) 3 mg/actuation Spry 3 mg (one actuation) into a single nostril; if no response, may repeat in 15 minutes using a new intranasal device. 2 each 1    hydrOXYchloroQUINE (PLAQUENIL) 100 mg tablet Take 200 mg by mouth once daily.      insulin degludec (TRESIBA FLEXTOUCH U-100) 100 unit/mL (3 mL) insulin pen Inject 24 Units into the skin every evening. Max TDD of 30 units 15 mL 1    insulin lispro-aabc (LYUMJEV KWIKPEN U-100 INSULIN) 100 unit/mL pen Inject 10 units 3 times per day before meals, 2-4 units for snack, . + correction scale. MAX TDD Of 50 units 5 pen 6    ketoconazole (NIZORAL) 2 % shampoo Apply to scalp in place of regular shampoo 2-3x per week 120 mL 11    lancets Misc To check BG 2 times daily, to use with insurance preferred meter 200 each 2    methotrexate 2.5 MG Tab Take 6 tablets (15 mg total) by mouth every 7 days. 24 tablet 3    metoprolol succinate (TOPROL-XL) 25 MG 24 hr tablet Take 1 tablet (25 mg total) by mouth once daily. 30 tablet 6    pen needle, diabetic 32 gauge x 5/32" Ndle To use 7 times per day with insulin injections- for meals, long-acting insulin and for snacks 200 each 11    pregabalin (LYRICA) 50 MG capsule Take 1 capsule (50 mg total) by mouth 3 (three) times daily. 270 capsule 1    sacubitriL-valsartan (ENTRESTO) 24-26 mg per tablet Take 1 tablet by mouth 2 (two) times daily. 60 tablet 11    amitriptyline (ELAVIL) 25 MG tablet TAKE 1 TABLET BY MOUTH NIGHTLY AS " "NEEDED FOR INSOMNIA. (Patient not taking: Reported on 11/22/2023) 90 tablet 3    atorvastatin (LIPITOR) 20 MG tablet Take 1 tablet (20 mg total) by mouth once daily. (Patient not taking: Reported on 11/22/2023) 90 tablet 2    folic acid (FOLVITE) 1 MG tablet TAKE 1 TABLET BY MOUTH EVERY DAY (Patient not taking: Reported on 11/22/2023) 90 tablet 1    testosterone cypionate (DEPOTESTOTERONE CYPIONATE) 200 mg/mL injection Inject 1 mL (200 mg total) into the muscle once a week. 10 mL 1     Current Facility-Administered Medications on File Prior to Visit   Medication Dose Route Frequency Provider Last Rate Last Admin    [DISCONTINUED] BUPivacaine (PF) 0.25% (2.5 mg/ml) injection    PRN Tony Garcia MD   1.5 mL at 11/22/23 1004    [DISCONTINUED] fentaNYL 50 mcg/mL injection    PRN Tony Garcia MD   100 mcg at 11/22/23 0951    [DISCONTINUED] LIDOcaine (PF) 20 mg/mL (2%) injection    PRN Tony Garcia MD   1.5 mL at 11/22/23 1004    [DISCONTINUED] midazolam injection    PRN Tony Garcia MD   2 mg at 11/22/23 0951       Physical exam:  /70 (BP Location: Right arm, Patient Position: Sitting, BP Method: Medium (Manual))   Pulse 78   Ht 5' 8" (1.727 m)   Wt 74.4 kg (164 lb 0.4 oz)   SpO2 99%   BMI 24.94 kg/m²         General: Pt is a 48 y.o. year old male who is AAOx3, in NAD, is pleasant, well nourished, looks stated age  HEENT: PERRL, EOMI, Oral mucosa pink & moist  CVS  No abnormal cardiac pulsations noted on inspection. JVP not raised. The apical impulse is normal on palpation, and is located in the left 5th intercostal space in the mid - clavicular line. No palpable thrills or abnormal pulsations noted. RR, S1 - S2 heard, no murmurs, rubs or gallops appreciated.   PUL : CTA B/L. No wheezes/crackles heard   ABD : BS +, soft. No tenderness elicited   LE : No C/C/E. Distal Pulses palpable B/L         LABS:    Chemistry:   Lab Results   Component Value Date     09/12/2023    K 3.9 09/12/2023     " "09/12/2023    CO2 22 (L) 09/12/2023    BUN 10 09/12/2023    CREATININE 1.0 09/12/2023    CALCIUM 10.3 09/12/2023     Cardiac Markers:   Lab Results   Component Value Date    TROPONINI <0.006 01/01/2023     Cardiac Markers (Last 3):   Lab Results   Component Value Date    TROPONINI <0.006 01/01/2023     CBC:   Lab Results   Component Value Date    WBC 5.24 08/14/2023    HGB 13.2 (L) 08/14/2023    HCT 39.6 (L) 08/14/2023    HCT 47 01/01/2023    MCV 91 08/14/2023     08/14/2023     Lipids:   Lab Results   Component Value Date    CHOL 180 09/12/2023    TRIG 75 09/12/2023    HDL 47 09/12/2023     Coagulation: No results found for: "PT", "INR", "APTT"        Assessment      1. Congestive heart failure, unspecified HF chronicity, unspecified heart failure type    2. Hyperlipidemia associated with type 2 diabetes mellitus    3. SOB (shortness of breath)    4. Type 2 diabetes mellitus without complication, with long-term current use of insulin    5. Family history of heart disease    6. Uncontrolled type 2 diabetes mellitus with hyperglycemia        Plan:    CHF/Chest pain/shortness of breath - stable   Compensated   Family history of heart disease   Echo 4/23 with EF 35%, recovered to EF 55% 7/23  Stress test 4/23 negative   Continue Toprol-XL, Entresto, lasix 20 mg daily   Fluid restriction, reduce salt intake discussed    Diabetes   A1c 8.7  Continue therapy    HLD   as of 9/23  Restart lipitor 20     HTN  Stable   Meds as above     This note was prepared using voice recognition system and is likely to have sound alike errors that may have been overlooked even after proofreading.     I have reviewed all pertinent chart information.  Plans and recommendations have been formulated under my direct supervision. All questions answered and patient voiced understanding.   If symptoms persist go to the ED.    RTC in 6 months         Karl Payne MD  Cardiology              "

## 2023-11-27 ENCOUNTER — TELEPHONE (OUTPATIENT)
Dept: DIABETES | Facility: CLINIC | Age: 48
End: 2023-11-27
Payer: COMMERCIAL

## 2023-11-28 ENCOUNTER — TELEPHONE (OUTPATIENT)
Dept: DIABETES | Facility: CLINIC | Age: 48
End: 2023-11-28

## 2023-11-28 ENCOUNTER — LAB VISIT (OUTPATIENT)
Dept: LAB | Facility: OTHER | Age: 48
End: 2023-11-28
Attending: NURSE PRACTITIONER
Payer: COMMERCIAL

## 2023-11-28 ENCOUNTER — OFFICE VISIT (OUTPATIENT)
Dept: DIABETES | Facility: CLINIC | Age: 48
End: 2023-11-28
Payer: COMMERCIAL

## 2023-11-28 VITALS — WEIGHT: 161 LBS | SYSTOLIC BLOOD PRESSURE: 120 MMHG | BODY MASS INDEX: 24.48 KG/M2 | DIASTOLIC BLOOD PRESSURE: 71 MMHG

## 2023-11-28 DIAGNOSIS — R79.89 LOW SERUM C-PEPTIDE: ICD-10-CM

## 2023-11-28 DIAGNOSIS — Z71.9 HEALTH EDUCATION/COUNSELING: ICD-10-CM

## 2023-11-28 DIAGNOSIS — Z91.199 NONCOMPLIANCE WITH DIABETES TREATMENT: ICD-10-CM

## 2023-11-28 DIAGNOSIS — E10.65 TYPE 1 DIABETES MELLITUS WITH HYPERGLYCEMIA: ICD-10-CM

## 2023-11-28 DIAGNOSIS — E10.9 INSULIN DEPENDENT DIABETES MELLITUS TYPE IA: Primary | ICD-10-CM

## 2023-11-28 DIAGNOSIS — E10.9 INSULIN DEPENDENT DIABETES MELLITUS TYPE IA: ICD-10-CM

## 2023-11-28 DIAGNOSIS — E78.5 HYPERLIPIDEMIA LDL GOAL <100: ICD-10-CM

## 2023-11-28 DIAGNOSIS — R79.89 LOW TESTOSTERONE IN MALE: ICD-10-CM

## 2023-11-28 DIAGNOSIS — E10.9 KETOSIS PRONE DIABETES: ICD-10-CM

## 2023-11-28 DIAGNOSIS — Z91.148 NONCOMPLIANCE W/MEDICATION TREATMENT DUE TO INTERMIT USE OF MEDICATION: ICD-10-CM

## 2023-11-28 DIAGNOSIS — Z79.4 TYPE 2 DIABETES MELLITUS WITH HYPERGLYCEMIA, WITH LONG-TERM CURRENT USE OF INSULIN: ICD-10-CM

## 2023-11-28 DIAGNOSIS — E11.65 TYPE 2 DIABETES MELLITUS WITH HYPERGLYCEMIA, WITH LONG-TERM CURRENT USE OF INSULIN: ICD-10-CM

## 2023-11-28 LAB
BASOPHILS # BLD AUTO: 0.03 K/UL (ref 0–0.2)
BASOPHILS NFR BLD: 0.5 % (ref 0–1.9)
COMPLEXED PSA SERPL-MCNC: 0.61 NG/ML (ref 0–4)
DIFFERENTIAL METHOD: NORMAL
EOSINOPHIL # BLD AUTO: 0.1 K/UL (ref 0–0.5)
EOSINOPHIL NFR BLD: 1.1 % (ref 0–8)
ERYTHROCYTE [DISTWIDTH] IN BLOOD BY AUTOMATED COUNT: 12.1 % (ref 11.5–14.5)
ESTIMATED AVG GLUCOSE: 278 MG/DL (ref 68–131)
HBA1C MFR BLD: 11.3 % (ref 4–5.6)
HCT VFR BLD AUTO: 43.7 % (ref 40–54)
HGB BLD-MCNC: 14.4 G/DL (ref 14–18)
IMM GRANULOCYTES # BLD AUTO: 0.02 K/UL (ref 0–0.04)
IMM GRANULOCYTES NFR BLD AUTO: 0.3 % (ref 0–0.5)
LYMPHOCYTES # BLD AUTO: 2 K/UL (ref 1–4.8)
LYMPHOCYTES NFR BLD: 29.9 % (ref 18–48)
MCH RBC QN AUTO: 28.2 PG (ref 27–31)
MCHC RBC AUTO-ENTMCNC: 33 G/DL (ref 32–36)
MCV RBC AUTO: 86 FL (ref 82–98)
MONOCYTES # BLD AUTO: 0.5 K/UL (ref 0.3–1)
MONOCYTES NFR BLD: 7.7 % (ref 4–15)
NEUTROPHILS # BLD AUTO: 4 K/UL (ref 1.8–7.7)
NEUTROPHILS NFR BLD: 60.5 % (ref 38–73)
NRBC BLD-RTO: 0 /100 WBC
PLATELET # BLD AUTO: 206 K/UL (ref 150–450)
PMV BLD AUTO: 9.7 FL (ref 9.2–12.9)
RBC # BLD AUTO: 5.1 M/UL (ref 4.6–6.2)
TESTOST SERPL-MCNC: 695 NG/DL (ref 304–1227)
WBC # BLD AUTO: 6.52 K/UL (ref 3.9–12.7)

## 2023-11-28 PROCEDURE — 3066F PR DOCUMENTATION OF TREATMENT FOR NEPHROPATHY: ICD-10-PCS | Mod: CPTII,95,, | Performed by: NURSE PRACTITIONER

## 2023-11-28 PROCEDURE — 1159F MED LIST DOCD IN RCRD: CPT | Mod: CPTII,95,, | Performed by: NURSE PRACTITIONER

## 2023-11-28 PROCEDURE — 3052F HG A1C>EQUAL 8.0%<EQUAL 9.0%: CPT | Mod: CPTII,95,, | Performed by: NURSE PRACTITIONER

## 2023-11-28 PROCEDURE — 36415 COLL VENOUS BLD VENIPUNCTURE: CPT | Performed by: NURSE PRACTITIONER

## 2023-11-28 PROCEDURE — 4010F ACE/ARB THERAPY RXD/TAKEN: CPT | Mod: CPTII,95,, | Performed by: NURSE PRACTITIONER

## 2023-11-28 PROCEDURE — 95251 PR GLUCOSE MONITOR, 72 HOUR, PHYS INTERP: ICD-10-PCS | Mod: NDTC,S$GLB,, | Performed by: NURSE PRACTITIONER

## 2023-11-28 PROCEDURE — 3008F BODY MASS INDEX DOCD: CPT | Mod: CPTII,95,, | Performed by: NURSE PRACTITIONER

## 2023-11-28 PROCEDURE — 83036 HEMOGLOBIN GLYCOSYLATED A1C: CPT | Performed by: NURSE PRACTITIONER

## 2023-11-28 PROCEDURE — 3061F PR NEG MICROALBUMINURIA RESULT DOCUMENTED/REVIEW: ICD-10-PCS | Mod: CPTII,95,, | Performed by: NURSE PRACTITIONER

## 2023-11-28 PROCEDURE — 99214 OFFICE O/P EST MOD 30 MIN: CPT | Mod: 95,,, | Performed by: NURSE PRACTITIONER

## 2023-11-28 PROCEDURE — 3074F PR MOST RECENT SYSTOLIC BLOOD PRESSURE < 130 MM HG: ICD-10-PCS | Mod: CPTII,95,, | Performed by: NURSE PRACTITIONER

## 2023-11-28 PROCEDURE — 84153 ASSAY OF PSA TOTAL: CPT | Performed by: NURSE PRACTITIONER

## 2023-11-28 PROCEDURE — 3066F NEPHROPATHY DOC TX: CPT | Mod: CPTII,95,, | Performed by: NURSE PRACTITIONER

## 2023-11-28 PROCEDURE — 1160F RVW MEDS BY RX/DR IN RCRD: CPT | Mod: CPTII,95,, | Performed by: NURSE PRACTITIONER

## 2023-11-28 PROCEDURE — 1159F PR MEDICATION LIST DOCUMENTED IN MEDICAL RECORD: ICD-10-PCS | Mod: CPTII,95,, | Performed by: NURSE PRACTITIONER

## 2023-11-28 PROCEDURE — 95251 CONT GLUC MNTR ANALYSIS I&R: CPT | Mod: NDTC,S$GLB,, | Performed by: NURSE PRACTITIONER

## 2023-11-28 PROCEDURE — 3074F SYST BP LT 130 MM HG: CPT | Mod: CPTII,95,, | Performed by: NURSE PRACTITIONER

## 2023-11-28 PROCEDURE — 3061F NEG MICROALBUMINURIA REV: CPT | Mod: CPTII,95,, | Performed by: NURSE PRACTITIONER

## 2023-11-28 PROCEDURE — 3052F PR MOST RECENT HEMOGLOBIN A1C LEVEL 8.0 - < 9.0%: ICD-10-PCS | Mod: CPTII,95,, | Performed by: NURSE PRACTITIONER

## 2023-11-28 PROCEDURE — 99214 PR OFFICE/OUTPT VISIT, EST, LEVL IV, 30-39 MIN: ICD-10-PCS | Mod: 95,,, | Performed by: NURSE PRACTITIONER

## 2023-11-28 PROCEDURE — 4010F PR ACE/ARB THEARPY RXD/TAKEN: ICD-10-PCS | Mod: CPTII,95,, | Performed by: NURSE PRACTITIONER

## 2023-11-28 PROCEDURE — 84403 ASSAY OF TOTAL TESTOSTERONE: CPT | Performed by: NURSE PRACTITIONER

## 2023-11-28 PROCEDURE — 3008F PR BODY MASS INDEX (BMI) DOCUMENTED: ICD-10-PCS | Mod: CPTII,95,, | Performed by: NURSE PRACTITIONER

## 2023-11-28 PROCEDURE — 3078F DIAST BP <80 MM HG: CPT | Mod: CPTII,95,, | Performed by: NURSE PRACTITIONER

## 2023-11-28 PROCEDURE — 85025 COMPLETE CBC W/AUTO DIFF WBC: CPT | Performed by: NURSE PRACTITIONER

## 2023-11-28 PROCEDURE — 1160F PR REVIEW ALL MEDS BY PRESCRIBER/CLIN PHARMACIST DOCUMENTED: ICD-10-PCS | Mod: CPTII,95,, | Performed by: NURSE PRACTITIONER

## 2023-11-28 PROCEDURE — 3078F PR MOST RECENT DIASTOLIC BLOOD PRESSURE < 80 MM HG: ICD-10-PCS | Mod: CPTII,95,, | Performed by: NURSE PRACTITIONER

## 2023-11-28 RX ORDER — INSULIN DEGLUDEC 100 U/ML
24 INJECTION, SOLUTION SUBCUTANEOUS
Qty: 15 ML | Refills: 6 | Status: SHIPPED | OUTPATIENT
Start: 2023-11-28

## 2023-11-28 RX ORDER — BLOOD-GLUCOSE SENSOR
EACH MISCELLANEOUS
Qty: 3 EACH | Refills: 11 | Status: SHIPPED | OUTPATIENT
Start: 2023-11-28

## 2023-11-28 RX ORDER — INSULIN DEGLUDEC 100 U/ML
24 INJECTION, SOLUTION SUBCUTANEOUS
Qty: 15 ML | Refills: 6 | Status: SHIPPED | OUTPATIENT
Start: 2023-11-28 | End: 2023-11-28 | Stop reason: SDUPTHER

## 2023-11-28 RX ORDER — INSULIN LISPRO-AABC 100 [IU]/ML
INJECTION, SOLUTION SUBCUTANEOUS
Qty: 5 PEN | Refills: 6 | Status: SHIPPED | OUTPATIENT
Start: 2023-11-28

## 2023-11-28 RX ORDER — BLOOD-GLUCOSE TRANSMITTER
EACH MISCELLANEOUS
Qty: 1 EACH | Refills: 4 | Status: SHIPPED | OUTPATIENT
Start: 2023-11-28

## 2023-11-28 RX ORDER — PEN NEEDLE, DIABETIC 30 GX3/16"
NEEDLE, DISPOSABLE MISCELLANEOUS
Qty: 200 EACH | Refills: 11 | Status: SHIPPED | OUTPATIENT
Start: 2023-11-28

## 2023-11-28 RX ORDER — INSULIN LISPRO-AABC 100 [IU]/ML
INJECTION, SOLUTION SUBCUTANEOUS
Qty: 5 PEN | Refills: 6 | Status: SHIPPED | OUTPATIENT
Start: 2023-11-28 | End: 2023-11-28 | Stop reason: SDUPTHER

## 2023-11-28 RX ORDER — BLOOD-GLUCOSE TRANSMITTER
EACH MISCELLANEOUS
Qty: 1 EACH | Refills: 4 | Status: SHIPPED | OUTPATIENT
Start: 2023-11-28 | End: 2023-11-28 | Stop reason: SDUPTHER

## 2023-11-28 RX ORDER — BLOOD-GLUCOSE SENSOR
EACH MISCELLANEOUS
Qty: 3 EACH | Refills: 11 | Status: SHIPPED | OUTPATIENT
Start: 2023-11-28 | End: 2023-11-28 | Stop reason: SDUPTHER

## 2023-11-28 RX ORDER — PEN NEEDLE, DIABETIC 30 GX3/16"
NEEDLE, DISPOSABLE MISCELLANEOUS
Qty: 200 EACH | Refills: 11 | Status: SHIPPED | OUTPATIENT
Start: 2023-11-28 | End: 2023-11-28 | Stop reason: SDUPTHER

## 2023-11-28 NOTE — PATIENT INSTRUCTIONS
Continue Tresiba to  24 units nighty   Same time every night   Do not need to eat to take      Adjust Lyumjev to 8-10 units with meals   If you skip a meal, skip the Lyumjev   10 units for larger meals-- more carbohydrates   2-4 units for snacks      Start doing sliding scale/correction scale as needed with meal time insulin   With using correction scale: before meals only -- before eating only   150-200: +1 unit  201-250: +2 units  251-300: +3 units  301-350: +4 units  >350: +5 units       Meet with education at [facility] for pump evaluation

## 2023-11-28 NOTE — Clinical Note
A1c when you can  Schedule with diabetes education at [facility] for pump evaluation  Follow up with me in 3 months

## 2023-11-28 NOTE — ASSESSMENT & PLAN NOTE
Antibodies have been negative but C-peptide level was low though x 2 episodes   Treated more as a type 1 diabetic  Ketosis prone  Uncontrolled -- missing doses of prandial insulin- hindering management   See attached dexcom -- he just put it back on last week.   He missed follow ups with me. Did not follow up with education.   Needs to meet with education for insulin pump evaluation. 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   Also discussed that pain and anxiety can increase readings       -- Medication Changes:      Continue Tresiba to  24 units nighty   Same time every night   Do not need to eat to take      Adjust Lyumjev to 8-10 units with meals   If you skip a meal, skip the Lyumjev   10 units for larger meals-- more carbohydrates   2-4 units for snacks      Start doing sliding scale/correction scale as needed with meal time insulin   With using correction scale: before meals only -- before eating only   150-200: +1 unit  201-250: +2 units  251-300: +3 units  301-350: +4 units  >350: +5 units       Meet with education at [facility] for pump evaluation      ADDENDUM 11/30/2023-   Omnipod 5 insulin pump settings:    Basal: 0.85 units/hr   ICR: 1:10-- set doses - 6 units - small meal /8 units - normal meal /10 units large meal   ISF: 1:40  Target: 120  IOB: 3.5 hours            -- Reviewed goals of therapy are to get the best control we can without hypoglycemia.  -- Reviewed patient's current insulin regimen. Clarified proper insulin dose and timing in relation to meals, etc. Insulin injection sites and proper rotation instructed.    -- Advised frequent self blood glucose monitoring.  Patient encouraged to document glucose results and bring them to every clinic visit. Continue dexcom personal CGM g6 -- supplies from  pharmacy - [facility].   -- Hypoglycemia precautions discussed. Instructed on precautions before driving.    -- Call for Bg repeatedly < 70 or > 200.   -- Close  adherence to lifestyle changes recommended.   -- Periodic follow ups for eye evaluations, foot care and dental care suggested.  -- Refer to diabetes education -- [facility] location for insulin pump evaluation       Patient has diabetes mellitus and manages diabetes with intensive insulin regimen and uses prandial and basal insulin daily  Patient requires a therapeutic CGM and is willing to use therapeutic CGM for the necessary frequent adjustments of insulin therapy.  I have completed an in-person visit during the previous 6 months and will continue to have in-person visits every 6 months to assess adherence to their CGM regimen and diabetes treatment plan.  Due to COVID pandemic and need for remote monitoring this tool is medically necessary

## 2023-11-28 NOTE — ASSESSMENT & PLAN NOTE
On statin per ADA recommendations  LDL goal < 100.  On Lipitor 20 mg nightly but has missed some doses-- just restarted   Continue with current statin at this time  Will adjust if next LDL is above goal  Discussed compliance

## 2023-11-28 NOTE — PROGRESS NOTES
The patient location is: [place]- [place]   The chief complaint leading to consultation is: diabetes management - follow up     Visit type: audiovisual    Face to Face time with patient: 25 minutes   30 minutes of total time spent on the encounter, which includes face to face time and non-face to face time preparing to see the patient (eg, review of tests), Obtaining and/or reviewing separately obtained history, Documenting clinical information in the electronic or other health record, Independently interpreting results (not separately reported) and communicating results to the patient/family/caregiver, or Care coordination (not separately reported).         Each patient to whom he or she provides medical services by telemedicine is:  (1) informed of the relationship between the physician and patient and the respective role of any other health care provider with respect to management of the patient; and (2) notified that he or she may decline to receive medical services by telemedicine and may withdraw from such care at any time.    Notes:       CC:   Chief Complaint   Patient presents with    Diabetes Mellitus       HPI: Jason Sandra is a 48 y.o. male presents for a follow up visit today for the management of diabetes     He was diagnosed with Type 2  diabetes at least since 2011 on routine labs   Pre diabetes prior to that   He was initially started on Metformin     Family hx of diabetes: maybe younger brother, father   Hospitalized for diabetes: yes- DKA in 01/2023  Insulin therapy: 2023  No personal or FH of thyroid cancer or personal of pancreatic cancer or pancreatitis.       hospitalized for DKA on January 1, 2023  On his arrival to the emergency room he reported that he had been on any medications for diabetes in over a year  His blood sugar was 316  + beta -6.2  A1c was >14% in October 2021   He was sent home on basal insulin    He works at [employer]      1/17/2023- DINA negative   Anti islet cell antibody  negative, insulin antibody negative  C-peptide level low at 0.59--with a blood sugar of 331  3/2023- repeat c-peptide level is low at 0.61 with fasting glucose of 224 -- repeat DINA negative and zinc transporter negative   Confirm - insulin deficiency - treated as T1DM         Our last visit was in March 2023  He has missed subsequent visits with me   At his last visit we adjusted his insulin doses.   Blood sugar readings are above goal on Dexcom download.  Please see attached              DIABETES COMPLICATIONS: cardiovascular disease-- CHF EF- 35% -- now back up to 50%       Diabetes Management Status    ASA:  No    Statin: Taking Lipitor 20 mg nightly -he just started back on this   ACE/ARB: Taking- Entresto       The 10-year ASCVD risk score (Darleen BARCENAS, et al., 2019) is: 5.4%    Values used to calculate the score:      Age: 48 years      Sex: Male      Is Non- : No      Diabetic: Yes      Tobacco smoker: No      Systolic Blood Pressure: 120 mmHg      Is BP treated: No      HDL Cholesterol: 47 mg/dL      Total Cholesterol: 180 mg/dL    Screening or Prevention Patient's value Goal Complete/Controlled?   HgA1C Testing and Control   Lab Results   Component Value Date    HGBA1C 11.3 (H) 11/28/2023      Annually/Less than 8% No     Lipid profile : 09/12/2023 Annually No     LDL control Lab Results   Component Value Date    LDLCALC 118.0 09/12/2023    Annually/Less than 100 mg/dl  No     Nephropathy screening Lab Results   Component Value Date    LABMICR 5.0 03/20/2023     Lab Results   Component Value Date    PROTEINUA Negative 05/01/2023    Annually Yes     Blood pressure BP Readings from Last 1 Encounters:   11/28/23 120/71    Less than 140/90 Yes     Dilated retinal exam : 04/12/2023 Annually Yes     Foot exam   : 01/17/2023 Annually No         CURRENT A1C:    Hemoglobin A1C   Date Value Ref Range Status   11/28/2023 11.3 (H) 4.0 - 5.6 % Final     Comment:     ADA Screening  Guidelines:  5.7-6.4%  Consistent with prediabetes  >or=6.5%  Consistent with diabetes    High levels of fetal hemoglobin interfere with the HbA1C  assay. Heterozygous hemoglobin variants (HbS, HgC, etc)do  not significantly interfere with this assay.   However, presence of multiple variants may affect accuracy.     2023 8.7 (H) 4.0 - 5.6 % Final     Comment:     ADA Screening Guidelines:  5.7-6.4%  Consistent with prediabetes  >or=6.5%  Consistent with diabetes    High levels of fetal hemoglobin interfere with the HbA1C  assay. Heterozygous hemoglobin variants (HbS, HgC, etc)do  not significantly interfere with this assay.   However, presence of multiple variants may affect accuracy.     2023 >14.0 (H) 4.0 - 5.6 % Final     Comment:     ADA Screening Guidelines:  5.7-6.4%  Consistent with prediabetes  >or=6.5%  Consistent with diabetes    High levels of fetal hemoglobin interfere with the HbA1C  assay. Heterozygous hemoglobin variants (HbS, HgC, etc)do  not significantly interfere with this assay.   However, presence of multiple variants may affect accuracy.         GOAL A1C: 6.5% without hypoglycemia       DM MEDICATIONS USED IN THE PAST: Levmeir    Metformin   Glipizide   Januvia   Glimepiride   Tresiba   dexcom        CURRENT DIABETES MEDICATIONS: Tresiba 24 units nightly at 8 -9 PM    Lyumjev 6-8 units TID AC   2-4 units for a snack   No correction scale   Insulin: pens.    Missed doses: missing doses of Lyumjev         BLOOD GLUCOSE MONITORING:    Sensor type: Dexcom G 6   Average BG readin  Time in range: 33%   21% high   45% very high   Estimated A1c of  N/A   Site change: q10 days      Supplies -- pharmacy     Sensor was downloaded in clinic today and reviewed with patient.   Please see attached document for download.       HYPOGLYCEMIA:  rarely   Glucagon kit: Baqsimi    Medic alert bracelet: denies       MEALS: eating 3 meals per day   BF: ham and cheese croissant   Lunch: cafeteria at  work   Dinner: home cooked or fast food - spaghetti   Snack: peanuts, chips  Drinks: water and milk   Skim milk - 2 glasses per day   Previously coke zeros -- 20 a day        CURRENT EXERCISE:  Yes  Home routine   Push ups/ dips/ sit ups       Review of Systems  Review of Systems   Constitutional:  Negative for appetite change, fatigue and unexpected weight change.   HENT:  Negative for trouble swallowing.    Eyes:  Negative for visual disturbance.   Respiratory:  Negative for shortness of breath.    Cardiovascular:  Negative for chest pain.   Gastrointestinal:  Negative for nausea.   Endocrine: Negative for polydipsia, polyphagia and polyuria.   Genitourinary:         No Nocturia    Musculoskeletal:  Positive for arthralgias, back pain, myalgias and neck pain.   Skin:  Negative for wound.   Neurological:  Negative for numbness.       Physical Exam   Physical Exam  Constitutional:       General: He is not in acute distress.     Appearance: Normal appearance. He is not ill-appearing.   HENT:      Head: Normocephalic and atraumatic.      Nose: Nose normal.   Pulmonary:      Effort: Pulmonary effort is normal.   Musculoskeletal:      Cervical back: Normal range of motion.   Neurological:      General: No focal deficit present.      Mental Status: He is alert and oriented to person, place, and time.   Psychiatric:         Mood and Affect: Mood normal.         Behavior: Behavior normal.         Thought Content: Thought content normal.         Judgment: Judgment normal.             FOOT EXAMINATION: deferred due to virtual visit         Lab Results   Component Value Date    TSH 2.393 09/12/2023             Insulin dependent diabetes mellitus type IA  Antibodies have been negative but C-peptide level was low though x 2 episodes   Treated more as a type 1 diabetic  Ketosis prone  Uncontrolled -- missing doses of prandial insulin- hindering management   See attached dexcom -- he just put it back on last week.   He missed  follow ups with me. Did not follow up with education.   Needs to meet with education for insulin pump evaluation. 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   Also discussed that pain and anxiety can increase readings       -- Medication Changes:      Continue Tresiba to  24 units nighty   Same time every night   Do not need to eat to take      Adjust Lyumjev to 8-10 units with meals   If you skip a meal, skip the Lyumjev   10 units for larger meals-- more carbohydrates   2-4 units for snacks      Start doing sliding scale/correction scale as needed with meal time insulin   With using correction scale: before meals only -- before eating only   150-200: +1 unit  201-250: +2 units  251-300: +3 units  301-350: +4 units  >350: +5 units       Meet with education at [facility] for pump evaluation      ADDENDUM 11/30/2023-   Omnipod 5 insulin pump settings:    Basal: 0.85 units/hr   ICR: 1:10-- set doses - 6 units - small meal /8 units - normal meal /10 units large meal   ISF: 1:40  Target: 120  IOB: 3.5 hours            -- Reviewed goals of therapy are to get the best control we can without hypoglycemia.  -- Reviewed patient's current insulin regimen. Clarified proper insulin dose and timing in relation to meals, etc. Insulin injection sites and proper rotation instructed.    -- Advised frequent self blood glucose monitoring.  Patient encouraged to document glucose results and bring them to every clinic visit. Continue dexcom personal CGM g6 -- supplies from  pharmacy - [facility].   -- Hypoglycemia precautions discussed. Instructed on precautions before driving.    -- Call for Bg repeatedly < 70 or > 200.   -- Close adherence to lifestyle changes recommended.   -- Periodic follow ups for eye evaluations, foot care and dental care suggested.  -- Refer to diabetes education -- [facility] location for insulin pump evaluation       Patient has diabetes mellitus and manages diabetes with intensive  insulin regimen and uses prandial and basal insulin daily  Patient requires a therapeutic CGM and is willing to use therapeutic CGM for the necessary frequent adjustments of insulin therapy.  I have completed an in-person visit during the previous 6 months and will continue to have in-person visits every 6 months to assess adherence to their CGM regimen and diabetes treatment plan.  Due to COVID pandemic and need for remote monitoring this tool is medically necessary          Low serum C-peptide  Remain on insulin     Type 1 diabetes mellitus with hyperglycemia  See above     Hyperlipidemia LDL goal <100  On statin per ADA recommendations  LDL goal < 100.  On Lipitor 20 mg nightly but has missed some doses-- just restarted   Continue with current statin at this time  Will adjust if next LDL is above goal  Discussed compliance      Ketosis prone diabetes  See above         This includes face to face time and non-face to face time preparing to see the patient (eg, review of tests), obtaining and/or reviewing separately obtained history, documenting clinical information in the electronic or other health record, independently interpreting results and communicating results to the patient/family/caregiver, or care coordinator.        Follow up in about 3 months (around 2/28/2024).  A1c when you can   Schedule with diabetes education at [facility] for pump evaluation   Follow up with me in 3 months       Orders Placed This Encounter   Procedures    Hemoglobin A1C     Standing Status:   Future     Number of Occurrences:   1     Standing Expiration Date:   5/28/2025    Ambulatory referral/consult to Diabetes Education     Standing Status:   Future     Number of Occurrences:   1     Standing Expiration Date:   5/28/2025     Referral Priority:   Routine     Referral Type:   Consultation     Referral Reason:   Specialty Services Required     Requested Specialty:   Diabetes     Number of Visits Requested:   1       Recommendations  were discussed with the patient in detail  The patient verbalized understanding and agrees with the plan outlined as above.     This note was partly generated with [employer] voice recognition software. I apologize for any possible typographical errors.

## 2023-11-29 DIAGNOSIS — E10.65 TYPE 1 DIABETES MELLITUS WITH HYPERGLYCEMIA: Primary | ICD-10-CM

## 2023-11-30 ENCOUNTER — TELEPHONE (OUTPATIENT)
Dept: DIABETES | Facility: CLINIC | Age: 48
End: 2023-11-30
Payer: COMMERCIAL

## 2023-11-30 ENCOUNTER — CLINICAL SUPPORT (OUTPATIENT)
Dept: DIABETES | Facility: CLINIC | Age: 48
End: 2023-11-30
Payer: COMMERCIAL

## 2023-11-30 DIAGNOSIS — E10.65 TYPE 1 DIABETES MELLITUS WITH HYPERGLYCEMIA: Primary | ICD-10-CM

## 2023-11-30 DIAGNOSIS — E10.9 INSULIN DEPENDENT DIABETES MELLITUS TYPE IA: ICD-10-CM

## 2023-11-30 PROCEDURE — G0108 DIAB MANAGE TRN  PER INDIV: HCPCS | Mod: S$GLB,,, | Performed by: DIETITIAN, REGISTERED

## 2023-11-30 PROCEDURE — G0108 PR DIAB MANAGE TRN  PER INDIV: ICD-10-PCS | Mod: S$GLB,,, | Performed by: DIETITIAN, REGISTERED

## 2023-11-30 PROCEDURE — 99999 PR PBB SHADOW E&M-EST. PATIENT-LVL I: ICD-10-PCS | Mod: PBBFAC,,, | Performed by: DIETITIAN, REGISTERED

## 2023-11-30 PROCEDURE — 99999 PR PBB SHADOW E&M-EST. PATIENT-LVL I: CPT | Mod: PBBFAC,,, | Performed by: DIETITIAN, REGISTERED

## 2023-11-30 RX ORDER — INSULIN PMP CART,AUT,G6/7,CNTR
1 EACH SUBCUTANEOUS
Qty: 2 EACH | Refills: 11 | Status: SHIPPED | OUTPATIENT
Start: 2023-11-30

## 2023-11-30 RX ORDER — INSULIN PMP CART,AUT,G6/7,CNTR
1 EACH SUBCUTANEOUS
Qty: 1 EACH | Refills: 0 | Status: SHIPPED | OUTPATIENT
Start: 2023-11-30

## 2023-11-30 RX ORDER — INSULIN LISPRO 100 [IU]/ML
INJECTION, SOLUTION INTRAVENOUS; SUBCUTANEOUS
Qty: 30 ML | Refills: 6 | Status: SHIPPED | OUTPATIENT
Start: 2023-11-30

## 2023-11-30 NOTE — TELEPHONE ENCOUNTER
Let patient know that I sent over the Omnipod 5 to [facility].   He needs to bring the omnipod and Humalog vial to his appointment with anel to start the pump   Please complete any PA that may be needed

## 2023-11-30 NOTE — TELEPHONE ENCOUNTER
----- Message from Maddison Bowman RD, DIGNAE sent at 11/30/2023  9:53 AM CST -----  Good morning!    Mr. Sandra prefers the Omnipod 5. We talked about the possibility of bolusing with set carb amounts for small, medium and large meals and went over some examples. He seemed okay with that. He is eager to get started, so I went ahead and scheduled his start training for Dec 11th.     Many thanks!  Maddison  ----- Message -----  From: Luly Pacheco NP  Sent: 11/30/2023   8:58 AM CST  To: Maddison Bowman RD, YOBANY    Thank you   ----- Message -----  From: Maddison Bowman RD, YOBANY  Sent: 11/29/2023   3:25 PM CST  To: Luly Pacheco NP    Thank you for the information! Will keep you posted!  ----- Message -----  From: Luly Pacheco NP  Sent: 11/29/2023   2:32 PM CST  To: Maddison Bowman RD, YOBANY [place]!     Hope all is well. I am sending Mr. Sandra to you for insulin pump evaluation.   He is being treated as a type 1 as his C-peptide level has been low with elevated blood sugar readings x2  His antibodies were negative but he is had other autoimmune things going on which makes me think that this may be autoimmune in nature  I have been trying to explain to him that he is more like a type 1 diabetic.  He has very poor understanding it insight on this  He is interested in learning about insulin pump options as he has been struggling with noncompliance and his A1c is up to 11%  Please let me know if he is interested in moving forward with an insulin pump!   I do not think he will be carbohydrate counting so it would be set doses    Thanks  Luly

## 2023-11-30 NOTE — PROGRESS NOTES
Diabetes Care Specialist Progress Note  Author: Maddison Bowman RD, CDE  Date: 2023    Program Intake  Reason for Diabetes Program Visit:: Intervention  Type of Intervention:: Individual  Individual: Education  Education: Insulin Pump Evaluation  Current diabetes risk level:: high  In the last 12 months, have you:: none    Lab Results   Component Value Date    HGBA1C 11.3 (H) 2023       Clinical    Current DM meds:  Tresiba 24 units HS  Lyumjev 8-10 units TID AC and 2-4 units with a snack      Today's interventions were provided through individual discussion, instruction, and written materials were provided.      Patient verbalized understanding of instruction and written materials.  Pt was able to return back demonstration of instructions today. Patient understood key points, needs reinforcement and further instruction.     Diabetes Self-Management Care Plan:    Today's Diabetes Self-Management Care Plan was developed with Jason's input. Jason has agreed to work toward the following goal(s) to improve his/her overall diabetes control.      Care Plan: Diabetes Management   Updates made since 10/31/2023 12:00 AM        Problem: Medications         Goal: Pt will take MDI as prescribed.    Start Date: 2023   Expected End Date: 3/11/2023   This Visit's Progress: On track   Priority: High   Barriers: No Barriers Identified   Note:    23 -   In depth review of pt meals/snacks and insulin dosing. Confirmed meals = ~60 grams of carb, and take 3-8u full meal doses >4 hours apart. Reviewed snack dosinu for ~30gram snack for right now. Did discuss ways to reduce carbs in snacks and help require less insulin for these low carb snacks.     23 - Pt is interested in making change to insulin pump therapy and here today for pump eval. Per Luly Pacheco, MICHAELLE, will likely use set doses for bolusing d/t pt not carb counting.     Patient is interested in an insulin pump and seen today to discuss insulin  pump therapy.     -Covered expectations for insulin pump approval by provider and insurance company such as: SMBG a min of qid, additional labs, insurance verification, possible costs associated with monthly pump supplies, and overall cost.     --Discussed basic details of pump therapy with patient.     -Reviewed current insulin pumps available: Medtronic 630G/770G/780G, Tandem T-slim X2, OmniPod Classic/Dash/5, and iLet    -Reviewed hybrid closed-loop insulin pump systems.  Hybrid closed-loop systems have some hands-off (automated) actions. These capabilities can vary among systems. That said, basic communication happens between the CGM and pump, which can trigger some automatic adjustments. This helps to keep your glucose within a preset range. Pumps with this capability include: Medtronic 770G/780G with Guardian 3 or 4 CGM, Tandem T-Slim X2 integrated with Dexcom G6 CGM (Control IQ technology), and OmniPod 5 integrated with Dexcom G6 CGM    -Reviewed terms such as insulin sensitivity factor (ISF), carbohydrate ratio, target glucose, bolus, basal, active insulin and insulin on board.  Explained importance of learning to carb count at meals to effectively enter carbs (grams) when bolusing at meals.      -Explained each insulin pumps allow for different max volumes of insulin in reservoir chamber. Medtronic and Tandem max volume is 300 units and Omnipod max volume is 200 units.     -Patient verbalized understanding of pump therapy and able to see and touch all demonstration pumps during visit today.      Patient's is interested in Omnipod5.    Patient's major concern is insurance coverage and would like to move forward with checking insurance coverage. Communicated with Luly for Rx to be sent to [facility] pharmacy.        Problem: Acute Complications         Goal: Pt will only drink sweet drink when BG <70; when symptomatic in 100's, pt will eat small snack w/ protein and carbs and monitor BG.    Start Date: 2/8/2023    Expected End Date: 3/11/2023   This Visit's Progress: Met   Priority: High   Barriers: No Barriers Identified   Note:    11/30/23 - Has avoided treating over-treating for s/s of hypoglycemia.          Problem: Healthy Eating         Goal: Pt will make 2 snacks per day <15 grams of carb per snack.    Start Date: 2/8/2023   Expected End Date: 3/11/2023   This Visit's Progress: Deferred   Priority: High   Barriers: No Barriers Identified   Note:    2/8/23 -   Pt with +polyphagia, although reports having large appetite since began working ~17yo. 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 Reviewed pt meal patterns, has long commute and eating fast food breakfast. Discussed importance of swapping out for higher quality breakfast. Pt agreeable to working on packing healthier snacks that include veggies (like veggie + hummus). Reviewed insulin dosing for snacks.          Follow Up Plan     Follow up in about 2 weeks (around 12/14/2023) for possible omnipod start.    Today's care plan and follow up schedule was discussed with patient.  Jason verbalized understanding of the care plan, goals, and agrees to follow up plan.        The patient was encouraged to communicate with his/her health care provider/physician and care team regarding his/her condition(s) and treatment.  I provided the patient with my contact information today and encouraged to contact me via phone or [facility] as needed.     Length of Visit   Total Time: 60 Minutes

## 2023-12-01 ENCOUNTER — HOSPITAL ENCOUNTER (OUTPATIENT)
Facility: HOSPITAL | Age: 48
Discharge: HOME | End: 2023-12-01
Attending: INTERNAL MEDICINE | Admitting: INTERNAL MEDICINE
Payer: COMMERCIAL

## 2023-12-01 ENCOUNTER — ANESTHESIA EVENT (OUTPATIENT)
Dept: ENDOSCOPY | Facility: HOSPITAL | Age: 48
End: 2023-12-01
Payer: COMMERCIAL

## 2023-12-01 ENCOUNTER — ANESTHESIA (OUTPATIENT)
Dept: ENDOSCOPY | Facility: HOSPITAL | Age: 48
End: 2023-12-01
Payer: COMMERCIAL

## 2023-12-01 DIAGNOSIS — Z12.11 ENCOUNTER FOR SCREENING COLONOSCOPY: Primary | ICD-10-CM

## 2023-12-01 LAB
GLUCOSE SERPL-MCNC: 231 MG/DL (ref 70–110)
POCT GLUCOSE: 224 MG/DL (ref 70–110)

## 2023-12-01 PROCEDURE — 27201012 HC FORCEPS, HOT/COLD, DISP: Performed by: INTERNAL MEDICINE

## 2023-12-01 PROCEDURE — 45380 PR COLONOSCOPY,BIOPSY: ICD-10-PCS | Mod: 33,,, | Performed by: INTERNAL MEDICINE

## 2023-12-01 PROCEDURE — 88305 TISSUE EXAM BY PATHOLOGIST: ICD-10-PCS | Mod: 26,,, | Performed by: PATHOLOGY

## 2023-12-01 PROCEDURE — 88305 TISSUE EXAM BY PATHOLOGIST: CPT | Performed by: PATHOLOGY

## 2023-12-01 PROCEDURE — 63600175 PHARM REV CODE 636 W HCPCS: Performed by: NURSE ANESTHETIST, CERTIFIED REGISTERED

## 2023-12-01 PROCEDURE — 45380 COLONOSCOPY AND BIOPSY: CPT | Mod: PT | Performed by: INTERNAL MEDICINE

## 2023-12-01 PROCEDURE — 25000003 PHARM REV CODE 250: Performed by: NURSE ANESTHETIST, CERTIFIED REGISTERED

## 2023-12-01 PROCEDURE — 88305 TISSUE EXAM BY PATHOLOGIST: CPT | Mod: 26,,, | Performed by: PATHOLOGY

## 2023-12-01 PROCEDURE — 45380 COLONOSCOPY AND BIOPSY: CPT | Mod: 33,,, | Performed by: INTERNAL MEDICINE

## 2023-12-01 PROCEDURE — 37000009 HC ANESTHESIA EA ADD 15 MINS: Performed by: INTERNAL MEDICINE

## 2023-12-01 PROCEDURE — 37000008 HC ANESTHESIA 1ST 15 MINUTES: Performed by: INTERNAL MEDICINE

## 2023-12-01 RX ORDER — PROPOFOL 10 MG/ML
VIAL (ML) INTRAVENOUS
Status: DISCONTINUED | OUTPATIENT
Start: 2023-12-01 | End: 2023-12-01

## 2023-12-01 RX ORDER — LIDOCAINE HYDROCHLORIDE 10 MG/ML
INJECTION, SOLUTION EPIDURAL; INFILTRATION; INTRACAUDAL; PERINEURAL
Status: DISCONTINUED | OUTPATIENT
Start: 2023-12-01 | End: 2023-12-01

## 2023-12-01 RX ADMIN — PROPOFOL 30 MG: 10 INJECTION, EMULSION INTRAVENOUS at 07:12

## 2023-12-01 RX ADMIN — PROPOFOL 40 MG: 10 INJECTION, EMULSION INTRAVENOUS at 07:12

## 2023-12-01 RX ADMIN — LIDOCAINE HYDROCHLORIDE 50 MG: 10 SOLUTION INTRAVENOUS at 06:12

## 2023-12-01 RX ADMIN — SODIUM CHLORIDE, SODIUM LACTATE, POTASSIUM CHLORIDE, AND CALCIUM CHLORIDE: .6; .31; .03; .02 INJECTION, SOLUTION INTRAVENOUS at 06:12

## 2023-12-01 RX ADMIN — PROPOFOL 100 MG: 10 INJECTION, EMULSION INTRAVENOUS at 06:12

## 2023-12-01 NOTE — PROVATION PATIENT INSTRUCTIONS
Discharge Summary/Instructions after an Endoscopic Procedure  Patient Name: Jason Sandra  Patient MRN: [id number]  Patient YOB: 1975  Friday, December 1, 2023 Coral White MD  Dear patient,  As a result of recent federal legislation (The Federal Cures Act), you may   receive lab or pathology results from your procedure in your [facility]   account before your physician is able to contact you. Your physician or   their representative will relay the results to you with their   recommendations at their soonest availability.  Thank you,  RESTRICTIONS:  During your procedure today, you received medications for sedation.  These   medications may affect your judgment, balance and coordination.  Therefore,   for 24 hours, you have the following restrictions:   - DO NOT drive a car, operate machinery, make legal/financial decisions,   sign important papers or drink alcohol.    ACTIVITY:  Today: no heavy lifting, straining or running due to procedural   sedation/anesthesia.  The following day: return to full activity including work.  DIET:  Eat and drink normally unless instructed otherwise.     TREATMENT FOR COMMON SIDE EFFECTS:  - Mild abdominal pain, nausea, belching, bloating or excessive gas:  rest,   eat lightly and use a heating pad.  - Sore Throat: treat with throat lozenges and/or gargle with warm salt   water.  - Because air was used during the procedure, expelling large amounts of air   from your rectum or belching is normal.  - If a bowel prep was taken, you may not have a bowel movement for 1-3 days.    This is normal.  SYMPTOMS TO WATCH FOR AND REPORT TO YOUR PHYSICIAN:  1. Abdominal pain or bloating, other than gas cramps.  2. Chest pain.  3. Back pain.  4. Signs of infection such as: chills or fever occurring within 24 hours   after the procedure.  5. Rectal bleeding, which would show as bright red, maroon, or black stools.   (A tablespoon of blood from the rectum is not serious, especially if    hemorrhoids are present.)  6. Vomiting.  7. Weakness or dizziness.  GO DIRECTLY TO THE NEAREST EMERGENCY ROOM IF YOU HAVE ANY OF THE FOLLOWING:      Difficulty breathing              Chills and/or fever over 101 F   Persistent vomiting and/or vomiting blood   Severe abdominal pain   Severe chest pain   Black, tarry stools   Bleeding- more than one tablespoon   Any other symptom or condition that you feel may need urgent attention  Your doctor recommends these additional instructions:  If any biopsies were taken, your doctors clinic will contact you in 1 to 2   weeks with any results.  - Discharge patient to home (via wheelchair).   - Resume previous diet.   - Continue present medications.   - Await pathology results.   - Patient has a contact number available for emergencies.  The signs and   symptoms of potential delayed complications were discussed with the   patient.  Return to normal activities tomorrow.  Written discharge   instructions were provided to the patient.   - Repeat colonoscopy in 5 years for surveillance.  For questions, problems or results please call your physician Coral White MD at Work:  [phone]  If you have any questions about the above instructions, call the GI   department at [phone] or call the endoscopy unit at [phone]   from 7am until 3 pm.  [facility], EMERGENCY ROOM PHONE NUMBER:   [phone]  IF A COMPLICATION OR EMERGENCY SITUATION ARISES AND YOU ARE UNABLE TO REACH   YOUR PHYSICIAN - GO DIRECTLY TO THE EMERGENCY ROOM.  I have read or have had read to me these discharge instructions for my   procedure and have received a written copy.  I understand these   instructions and will follow-up with my physician if I have any questions.     __________________________________       _____________________________________  Nurse Signature                                          Patient/Designated   Responsible Party Signature  Coral  MD Coral White MD  12/1/2023 7:12:20 AM  PROVATION

## 2023-12-01 NOTE — ANESTHESIA PREPROCEDURE EVALUATION
12/01/2023  Jason Sandra is a 48 y.o., male.      Pre-op Assessment    I have reviewed the Patient Summary Reports.     I have reviewed the Nursing Notes. I have reviewed the NPO Status.   I have reviewed the Medications.     Review of Systems  Anesthesia Hx:  No problems with previous Anesthesia                Social:  Non-Smoker       Hematology/Oncology:  Hematology Normal   Oncology Normal                                   EENT/Dental:  EENT/Dental Normal           Cardiovascular:     Hypertension, well controlled       CHF    hyperlipidemia                             Pulmonary:      Shortness of breath                  Renal/:  Renal/ Normal                 Hepatic/GI:  Bowel Prep.                Musculoskeletal:  Arthritis          Spine Disorders:  Disc disease and Degenerative disease           Neurological:    Neuromuscular Disease,  Headaches                                 Endocrine:  Diabetes, well controlled, type 1           Dermatological:  Skin Normal    Psych:  Psychiatric Normal                    Physical Exam  General: Well nourished    Airway:  Mallampati: II   Mouth Opening: Normal  TM Distance: Normal  Tongue: Normal  Neck ROM: Normal ROM    Dental:  Intact    Chest/Lungs:  Clear to auscultation    Heart:  Rate: Normal        Anesthesia Plan  Type of Anesthesia, risks & benefits discussed:    Anesthesia Type: MAC  Intra-op Monitoring Plan: Standard ASA Monitors  Induction:  IV  Informed Consent: Informed consent signed with the Patient and all parties understand the risks and agree with anesthesia plan.  All questions answered. Patient consented to blood products? Yes  ASA Score: 3    Ready For Surgery From Anesthesia Perspective.     .

## 2023-12-01 NOTE — ANESTHESIA POSTPROCEDURE EVALUATION
Anesthesia Post Evaluation    Patient: Jason Sandra    Procedure(s) Performed: Procedure(s) (LRB):  COLONOSCOPY (N/A)    Final Anesthesia Type: MAC      Patient location during evaluation: PACU  Patient participation: Yes- Able to Participate  Level of consciousness: awake and alert  Post-procedure vital signs: reviewed and stable  Pain management: adequate  Airway patency: patent    PONV status at discharge: No PONV  Anesthetic complications: no      Cardiovascular status: blood pressure returned to baseline  Respiratory status: unassisted  Hydration status: euvolemic  Follow-up not needed.              Vitals Value Taken Time   BP 1126/66 12/01/23 0715   Temp 98 12/01/23 0715   Pulse 66 12/01/23 0715   Resp 12 12/01/23 0715   SpO2 98 12/01/23 0715         No case tracking events are documented in the log.      Pain/Gaby Score: No data recorded

## 2023-12-01 NOTE — ANESTHESIA RELEASE NOTE
"Anesthesia Release from PACU Note    Patient: Jason Sandra    Procedure(s) Performed: Procedure(s) (LRB):  COLONOSCOPY (N/A)    Anesthesia type: MAC    Post pain: Adequate analgesia    Post assessment: no apparent anesthetic complications    Last Vitals: Visit Vitals  /63 (BP Location: Left arm, Patient Position: Lying)   Pulse 95   Temp 37 °C (98.6 °F) (Temporal)   Resp 17   Ht 5' 8" (1.727 m)   Wt 72.6 kg (160 lb)   SpO2 100%   BMI 24.33 kg/m²       Post vital signs: stable    Level of consciousness: awake    Nausea/Vomiting: no nausea/no vomiting    Complications: none    Airway Patency: patent    Respiratory: unassisted    Cardiovascular: stable and blood pressure at baseline    Hydration: euvolemic  "

## 2023-12-01 NOTE — H&P
Short Stay Endoscopy History and Physical    PCP - Luan Lazar MD    Procedure - Colonoscopy  ASA - 2  Mallampati - per anesthesia  History of Anesthesia problems - no  Family history Anesthesia problems -  no     HPI:  This is a 48 y.o. male here for evaluation of :   Active Hospital Problems    Diagnosis  POA    *Encounter for screening colonoscopy [Z12.11]  Not Applicable      Resolved Hospital Problems   No resolved problems to display.         Health Maintenance         Date Due Completion Date    Colorectal Cancer Screening Never done ---    Shingles Vaccine (1 of 2) Never done ---    COVID-19 Vaccine (3 - 2023-24 season) 09/01/2023 8/27/2021    Foot Exam 01/17/2024 1/17/2023    Hemoglobin A1c 02/28/2024 11/28/2023    Diabetes Urine Screening 03/20/2024 3/20/2023    Eye Exam 04/12/2024 4/12/2023    Lipid Panel 09/12/2024 9/12/2023    TETANUS VACCINE 12/20/2026 12/20/2016              ROS:  CONSTITUTIONAL: Denies weight change,  fatigue, fevers, chills, night sweats.  CARDIOVASCULAR: Denies chest pain, shortness of breath, orthopnea and edema.  RESPIRATORY: Denies cough, hemoptysis, dyspnea, and wheezing.  GI: See HPI.    Medical History:   Past Medical History:   Diagnosis Date    Diabetes mellitus     Hypogonadism in male        Surgical History:   Past Surgical History:   Procedure Laterality Date    INJECTION OF ANESTHETIC AGENT AROUND MEDIAL BRANCH NERVES INNERVATING CERVICAL FACET JOINT Bilateral 6/7/2023    Procedure: Bilateral C3-5 MBB with local;  Surgeon: Tony Garcia MD;  Location: [facility];  Service: Pain Management;  Laterality: Bilateral;    INJECTION OF ANESTHETIC AGENT AROUND MEDIAL BRANCH NERVES INNERVATING CERVICAL FACET JOINT Bilateral 8/23/2023    Procedure: Bilateral C3-5 MBB  second diagnostic block with RN IV sedation;  Surgeon: Tony Garcia MD;  Location: [facility];  Service: Pain Management;  Laterality: Bilateral;    NOSE SURGERY      RADIOFREQUENCY ABLATION, FACET  JOINT, CERVICOTHORACIC Left 11/22/2023    Procedure: C3-4 and C 4-5 Facet RFA;  Surgeon: Tony Garcia MD;  Location: [facility];  Service: Pain Management;  Laterality: Left;  To be done 2-3 weeks after the left side.    SELECTIVE INJECTION OF ANESTHETIC AGENT AROUND LUMBAR SPINAL NERVE ROOT BY TRANSFORAMINAL APPROACH Bilateral 5/24/2023    Procedure: Bilateral L5 TF GARY with local;  Surgeon: Tony Garcia MD;  Location: [facility];  Service: Pain Management;  Laterality: Bilateral;    SELECTIVE INJECTION OF ANESTHETIC AGENT AROUND LUMBAR SPINAL NERVE ROOT BY TRANSFORAMINAL APPROACH Bilateral 10/25/2023    Procedure: Bilateral L5/S1 TF GARY;  Surgeon: Aric Muro MD;  Location: [facility];  Service: Pain Management;  Laterality: Bilateral;       Family History:   Family History   Problem Relation Age of Onset    Stroke Mother     Glaucoma Mother     Cataracts Mother     Alzheimer's disease Father        Social History:   Social History     Tobacco Use    Smoking status: Never     Passive exposure: Never    Smokeless tobacco: Never   Substance Use Topics    Alcohol use: Not Currently     Comment: socially    Drug use: No       Allergies:   Review of patient's allergies indicates:  No Known Allergies    Medications:   No current facility-administered medications on file prior to encounter.     Current Outpatient Medications on File Prior to Encounter   Medication Sig Dispense Refill    metoprolol succinate (TOPROL-XL) 25 MG 24 hr tablet Take 1 tablet (25 mg total) by mouth once daily. 30 tablet 6    sacubitriL-valsartan (ENTRESTO) 24-26 mg per tablet Take 1 tablet by mouth 2 (two) times daily. 60 tablet 11    ACCU-CHEK GUIDE ME GLUCOSE MTR Misc CHECK BLOOD SUGAR TWICE A DAY      blood sugar diagnostic Strp To check BG 2 times daily, to use with insurance preferred meter (Patient not taking: Reported on 11/28/2023) 200 strip 2    folic acid (FOLVITE) 1 MG tablet TAKE 1 TABLET BY MOUTH EVERY DAY  (Patient not taking: Reported on 11/22/2023) 90 tablet 1    furosemide (LASIX) 20 MG tablet Take 1 tablet (20 mg total) by mouth once daily. 30 tablet 6    glucagon (BAQSIMI) 3 mg/actuation Spry 3 mg (one actuation) into a single nostril; if no response, may repeat in 15 minutes using a new intranasal device. 2 each 1    hydrOXYchloroQUINE (PLAQUENIL) 100 mg tablet Take 200 mg by mouth once daily.      lancets Misc To check BG 2 times daily, to use with insurance preferred meter (Patient not taking: Reported on 11/28/2023) 200 each 2    methotrexate 2.5 MG Tab Take 6 tablets (15 mg total) by mouth every 7 days. (Patient not taking: Reported on 11/28/2023) 24 tablet 3    testosterone cypionate (DEPOTESTOTERONE CYPIONATE) 200 mg/mL injection Inject 1 mL (200 mg total) into the muscle once a week. 10 mL 1       Physical Exam:  Vital Signs:   Vitals:    12/01/23 0609   BP: 136/63   Pulse: 95   Resp: 17   Temp: 98.6 °F (37 °C)     General Appearance: Well appearing in no acute distress  ENT: OP clear  Chest: CTA B  CV: RRR, no m/r/g  Abd: s/nt/nd/nabs  Ext: no edema    Labs:Reviewed    IMP:  Active Hospital Problems    Diagnosis  POA    *Encounter for screening colonoscopy [Z12.11]  Not Applicable      Resolved Hospital Problems   No resolved problems to display.         Plan:   I have explained the risks and benefits of colonoscopy to the patient including but not limited to bleeding, perforation, infection, and death. The patient wishes to proceed.

## 2023-12-01 NOTE — DISCHARGE SUMMARY
[facility] - Endoscopy (Hospital)  Discharge Note  Short Stay    Procedure(s) (LRB):  COLONOSCOPY (N/A)      OUTCOME: Patient tolerated treatment/procedure well without complication and is now ready for discharge.    DISPOSITION: Home or Self Care    FINAL DIAGNOSIS:  Encounter for screening colonoscopy    FOLLOWUP: With primary care provider    DISCHARGE INSTRUCTIONS:  No discharge procedures on file.      Clinical Reference Documents Added to Patient Instructions         Document    COLON POLYPECTOMY (ENGLISH)    COLON POLYPS (ENGLISH)    COLONOSCOPY (ENGLISH)

## 2023-12-01 NOTE — TRANSFER OF CARE
"Anesthesia Transfer of Care Note    Patient: Jason Sandra    Procedure(s) Performed: Procedure(s) (LRB):  COLONOSCOPY (N/A)    Patient location: PACU    Anesthesia Type: MAC    Transport from OR: Transported from OR on room air with adequate spontaneous ventilation    Post pain: adequate analgesia    Post assessment: no apparent anesthetic complications    Post vital signs: stable    Level of consciousness: awake    Nausea/Vomiting: no nausea/vomiting    Complications: none    Transfer of care protocol was followed    Last vitals: Visit Vitals  /63 (BP Location: Left arm, Patient Position: Lying)   Pulse 95   Temp 37 °C (98.6 °F) (Temporal)   Resp 17   Ht 5' 8" (1.727 m)   Wt 72.6 kg (160 lb)   SpO2 100%   BMI 24.33 kg/m²     "

## 2023-12-04 VITALS
WEIGHT: 160 LBS | HEART RATE: 95 BPM | RESPIRATION RATE: 16 BRPM | OXYGEN SATURATION: 98 % | HEIGHT: 68 IN | SYSTOLIC BLOOD PRESSURE: 112 MMHG | BODY MASS INDEX: 24.25 KG/M2 | TEMPERATURE: 99 F | DIASTOLIC BLOOD PRESSURE: 70 MMHG

## 2023-12-04 DIAGNOSIS — R79.89 LOW TESTOSTERONE IN MALE: ICD-10-CM

## 2023-12-04 LAB
FINAL PATHOLOGIC DIAGNOSIS: NORMAL
GROSS: NORMAL
Lab: NORMAL

## 2023-12-04 RX ORDER — TESTOSTERONE CYPIONATE 200 MG/ML
200 INJECTION, SOLUTION INTRAMUSCULAR WEEKLY
Qty: 10 ML | Refills: 1 | Status: SHIPPED | OUTPATIENT
Start: 2023-12-04 | End: 2024-06-01

## 2023-12-05 ENCOUNTER — LAB VISIT (OUTPATIENT)
Dept: LAB | Facility: OTHER | Age: 48
End: 2023-12-05
Attending: PAIN MEDICINE
Payer: COMMERCIAL

## 2023-12-05 ENCOUNTER — TELEPHONE (OUTPATIENT)
Dept: PAIN MEDICINE | Facility: CLINIC | Age: 48
End: 2023-12-05
Payer: COMMERCIAL

## 2023-12-05 ENCOUNTER — PATIENT MESSAGE (OUTPATIENT)
Dept: PAIN MEDICINE | Facility: CLINIC | Age: 48
End: 2023-12-05
Payer: COMMERCIAL

## 2023-12-05 DIAGNOSIS — E10.9 INSULIN DEPENDENT DIABETES MELLITUS TYPE IA: ICD-10-CM

## 2023-12-05 LAB
ESTIMATED AVG GLUCOSE: 263 MG/DL (ref 68–131)
HBA1C MFR BLD: 10.8 % (ref 4–5.6)

## 2023-12-05 PROCEDURE — 83036 HEMOGLOBIN GLYCOSYLATED A1C: CPT | Performed by: STUDENT IN AN ORGANIZED HEALTH CARE EDUCATION/TRAINING PROGRAM

## 2023-12-05 PROCEDURE — 36415 COLL VENOUS BLD VENIPUNCTURE: CPT | Performed by: STUDENT IN AN ORGANIZED HEALTH CARE EDUCATION/TRAINING PROGRAM

## 2023-12-05 NOTE — TELEPHONE ENCOUNTER
Spoke with the pt to let him know that he needs to draw another A1C before we can proceed with his procedure.

## 2023-12-06 NOTE — PRE-PROCEDURE INSTRUCTIONS
poke with patient regarding procedure scheduled on 12/13     Arrival time 0630     Has patient been sick with fever or on antibiotics within the last 7 days? No     Does the patient have any open wounds, sores or rashes? No     Does the patient have any recent fractures? no     Has patient received a vaccination within the last 7 days? No     Received the COVID vaccination?      Has the patient stopped all medications as directed? na     Does patient have a pacemaker, defibrillator, or implantable stimulator? No     Does the patient have a ride to and from procedure and someone reliable to remain with patient? daughter     Is the patient diabetic? yes     Does the patient have sleep apnea? Or use O2 at home? no     Is the patient receiving sedation?      Is the patient instructed to remain NPO beginning at midnight the night before their procedure? yes     Procedure location confirmed with patient? Yes     Covid- Denies signs/symptoms. Instructed to notify PAT/MD if any changes.

## 2023-12-11 ENCOUNTER — CLINICAL SUPPORT (OUTPATIENT)
Dept: DIABETES | Facility: CLINIC | Age: 48
End: 2023-12-11
Payer: COMMERCIAL

## 2023-12-11 DIAGNOSIS — E10.65 TYPE 1 DIABETES MELLITUS WITH HYPERGLYCEMIA: ICD-10-CM

## 2023-12-11 PROCEDURE — G0108 PR DIAB MANAGE TRN  PER INDIV: ICD-10-PCS | Mod: S$GLB,,, | Performed by: DIETITIAN, REGISTERED

## 2023-12-11 PROCEDURE — 99999 PR PBB SHADOW E&M-EST. PATIENT-LVL I: ICD-10-PCS | Mod: PBBFAC,,, | Performed by: DIETITIAN, REGISTERED

## 2023-12-11 PROCEDURE — 99999 PR PBB SHADOW E&M-EST. PATIENT-LVL I: CPT | Mod: PBBFAC,,, | Performed by: DIETITIAN, REGISTERED

## 2023-12-11 PROCEDURE — G0108 DIAB MANAGE TRN  PER INDIV: HCPCS | Mod: S$GLB,,, | Performed by: DIETITIAN, REGISTERED

## 2023-12-11 NOTE — PROGRESS NOTES
Diabetes Care Specialist Progress Note  Author: Maddison Bowman RD, CDE  Date: 2023    Program Intake  Reason for Diabetes Program Visit:: Intervention  Type of Intervention:: Individual  Individual: Education, Device Training  Device Training: Insulin Pump Start  Current diabetes risk level:: high  In the last 12 months, have you:: none    Lab Results   Component Value Date    HGBA1C 10.8 (H) 2023         Today's interventions were provided through individual discussion, instruction, and written materials were provided.      Patient verbalized understanding of instruction and written materials.  Pt was able to return back demonstration of instructions today. Patient understood key points, needs reinforcement and further instruction.     Diabetes Self-Management Care Plan:    Today's Diabetes Self-Management Care Plan was developed with Jason's input. Jason has agreed to work toward the following goal(s) to improve his/her overall diabetes control.      Care Plan: Diabetes Management   Updates made since 2023 12:00 AM        Problem: Medications         Goal: Pt will take MDI as prescribed.    Start Date: 2023   Expected End Date: 3/11/2023   This Visit's Progress: Met   Recent Progress: On track   Priority: High   Barriers: No Barriers Identified   Note:    23 -   In depth review of pt meals/snacks and insulin dosing. Confirmed meals = ~60 grams of carb, and take 3-8u full meal doses >4 hours apart. Reviewed snack dosinu for ~30gram snack for right now. Did discuss ways to reduce carbs in snacks and help require less insulin for these low carb snacks.     23 - Pt is interested in making change to insulin pump therapy and here today for pump eval. Per Luly Pacheco NP, will likely use set doses for bolusing d/t pt not carb counting.     Patient is interested in an insulin pump and seen today to discuss insulin pump therapy.     -Covered expectations for insulin pump approval by  provider and insurance company such as: SMBG a min of qid, additional labs, insurance verification, possible costs associated with monthly pump supplies, and overall cost.     --Discussed basic details of pump therapy with patient.     -Reviewed current insulin pumps available: Medtronic 630G/770G/780G, Tandem T-slim X2, OmniPod Classic/Dash/5, and iLet    -Reviewed hybrid closed-loop insulin pump systems.  Hybrid closed-loop systems have some hands-off (automated) actions. These capabilities can vary among systems. That said, basic communication happens between the CGM and pump, which can trigger some automatic adjustments. This helps to keep your glucose within a preset range. Pumps with this capability include: Medtronic 770G/780G with Guardian 3 or 4 CGM, Tandem T-Slim X2 integrated with Dexcom G6 CGM (Control IQ technology), and OmniPod 5 integrated with Dexcom G6 CGM    -Reviewed terms such as insulin sensitivity factor (ISF), carbohydrate ratio, target glucose, bolus, basal, active insulin and insulin on board.  Explained importance of learning to carb count at meals to effectively enter carbs (grams) when bolusing at meals.      -Explained each insulin pumps allow for different max volumes of insulin in reservoir chamber. Medtronic and Tandem max volume is 300 units and Omnipod max volume is 200 units.     -Patient verbalized understanding of pump therapy and able to see and touch all demonstration pumps during visit today.      Patient's is interested in Omnipod5.    Patient's major concern is insurance coverage and would like to move forward with checking insurance coverage. Communicated with Luly for Rx to be sent to [facility] pharmacy.        Goal: Pt will use Omnipod 5 for insulin delivery.    Start Date: 12/11/2023   Expected End Date: 1/10/2024   Priority: High   Barriers: No Barriers Identified   Note:    12/11/23 -   OMNI POD 5 INSULIN PUMP START    Pump training was provided per Omni Pod  protocol.  Patient is new to insulin pump therapy.     Basal:  12AM: 0.85 units/hr     Max basal rate: 3 units/hr     Bolus:  CHO ratio: 1:10 - set doses 6 units for small meal, 8 units for medium meal and 10 units for large meal  ISF: 1:35  Glucose target : 120 mg/dL  Correct  over: 120 mg/dl  Active insulin time: 3.5 hours  Max bolus: 12 units     Low reservoir insulin alarm: 10 units  Change pod alarm: every 3 days       Details of pump therapy were covered included following controller features and programming, pod activation, pod site selection and rotation, automatic pod priming and insertion, setting & editing basal rates in manual mode, giving bolus and other features in the set-up menu.  The following regarding the Omnipod 5 was covered:  During your first Pod wear, since no recent history is available, the Omnipod 5 System uses only your active Basal Program from Manual Mode as a starting point to adjust your insulin. After 48 hours of history is collected, which usually happens with the first Pod wear, SmartDnevnik technology stops adjusting insulin against your active Basal Program and starts using the Adaptive Basal Rate for your automated insulin delivery with your next Pod change. With each Pod change, insulin delivery information is sent and saved in the Omnipod 5 Shan so that the next Pod that is started is updated with the new Adaptive Basal Rate. With each new Pod activation, the system adapts insulin delivery based on physiological needs and total daily insulin (TDI) delivered. After 2-3 Pod changes, adaptation generally stabilizes and automated insulin delivery is based on this adaptation.  Patient demonstrated ability to program controller, activate and insert pod using aseptic technique.  Patient demonstrated ability to program Dexcom transmitter into controller and start automated limited mode.    Instructed pt on use of basic pump features ie...give a bolus, pause insulin, switch from manual  to automated mode.  Reviewed features available in manual mode verses automated mode.   Reviewed when and how to use activity function in automated mode.  Reviewed site selection of pods, rotation of sites and hard stop to change pod every 72 hrs.   Instructed that insulin vial is good out of refrigeration for up to 28-30 days.   Reviewed treatment of hypoglycemia, hyperglycemia; sick day care, DKA, and troubleshooting of pump. Advised to change site if issue is suspected.   Omni Pod 24-hour support line provided.    Advised the last 4 digits of pump serial number will unlock the PDM in the event PIN is forgotten     Podder username: [email or web address]  Podder password: [email or web address]     Glooko username: [email or web address]  Glooko password: [email or web address]           Task: Reviewed with patient all current diabetes medications and provided basic review of the purpose, dosage, frequency, side effects, and storage of both oral and injectable diabetes medications. Completed 12/11/2023        Task: Discussed guidelines for preventing, detecting and treating hypoglycemia and hyperglycemia and reviewed the importance of meal and medication timing with diabetes mediations for prevention of hypoglycemia and maximum drug benefit. Completed 12/11/2023        Problem: Acute Complications         Goal: Pt will only drink sweet drink when BG <70; when symptomatic in 100's, pt will eat small snack w/ protein and carbs and monitor BG.    Start Date: 2/8/2023   Expected End Date: 3/11/2023   This Visit's Progress: Met   Priority: High   Barriers: No Barriers Identified   Note:    11/30/23 - Has avoided treating over-treating for s/s of hypoglycemia.          Problem: Healthy Eating         Goal: Pt will make 2 snacks per day <15 grams of carb per snack.    Start Date: 2/8/2023   Expected End Date: 3/11/2023   This Visit's Progress: Deferred   Priority: High   Barriers: No Barriers Identified   Note:    2/8/23 -   Pt with +polyphagia,  although reports having large appetite since began working ~17yo. 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 Reviewed pt meal patterns, has long commute and eating fast food breakfast. Discussed importance of swapping out for higher quality breakfast. Pt agreeable to working on packing healthier snacks that include veggies (like veggie + hummus). Reviewed insulin dosing for snacks.          Follow Up Plan     Follow up in about 8 days (around 12/19/2023) for omnipod follow-up.    Today's care plan and follow up schedule was discussed with patient.  Jason verbalized understanding of the care plan, goals, and agrees to follow up plan.        The patient was encouraged to communicate with his/her health care provider/physician and care team regarding his/her condition(s) and treatment.  I provided the patient with my contact information today and encouraged to contact me via phone or [facility]'s Patient Portal as needed.     Length of Visit   Total Time: 60 Minutes

## 2023-12-13 ENCOUNTER — HOSPITAL ENCOUNTER (OUTPATIENT)
Facility: HOSPITAL | Age: 48
Discharge: HOME | End: 2023-12-13
Attending: PAIN MEDICINE | Admitting: PAIN MEDICINE
Payer: COMMERCIAL

## 2023-12-13 VITALS
HEART RATE: 80 BPM | DIASTOLIC BLOOD PRESSURE: 63 MMHG | SYSTOLIC BLOOD PRESSURE: 136 MMHG | RESPIRATION RATE: 14 BRPM | WEIGHT: 171.94 LBS | BODY MASS INDEX: 26.06 KG/M2 | OXYGEN SATURATION: 99 % | HEIGHT: 68 IN | TEMPERATURE: 97 F

## 2023-12-13 DIAGNOSIS — G89.29 CHRONIC NECK PAIN: Primary | ICD-10-CM

## 2023-12-13 DIAGNOSIS — M47.812 CERVICAL SPONDYLOSIS: ICD-10-CM

## 2023-12-13 DIAGNOSIS — M54.2 CHRONIC NECK PAIN: Primary | ICD-10-CM

## 2023-12-13 LAB — POCT GLUCOSE: 124 MG/DL (ref 70–110)

## 2023-12-13 PROCEDURE — 64634 DESTROY C/TH FACET JNT ADDL: CPT | Mod: RT,,, | Performed by: PAIN MEDICINE

## 2023-12-13 PROCEDURE — 64633 DESTROY CERV/THOR FACET JNT: CPT | Mod: RT,,, | Performed by: PAIN MEDICINE

## 2023-12-13 PROCEDURE — 25000003 PHARM REV CODE 250: Performed by: PAIN MEDICINE

## 2023-12-13 PROCEDURE — 63600175 PHARM REV CODE 636 W HCPCS: Performed by: PAIN MEDICINE

## 2023-12-13 PROCEDURE — 64634 PR DESTROY C/TH FACET JNT ADDL: ICD-10-PCS | Mod: RT,,, | Performed by: PAIN MEDICINE

## 2023-12-13 PROCEDURE — 64633 DESTROY CERV/THOR FACET JNT: CPT | Mod: RT | Performed by: PAIN MEDICINE

## 2023-12-13 PROCEDURE — 82962 GLUCOSE BLOOD TEST: CPT | Performed by: PAIN MEDICINE

## 2023-12-13 PROCEDURE — 64634 DESTROY C/TH FACET JNT ADDL: CPT | Mod: RT | Performed by: PAIN MEDICINE

## 2023-12-13 PROCEDURE — 64633 PR DESTROY CERV/THOR FACET JNT: ICD-10-PCS | Mod: RT,,, | Performed by: PAIN MEDICINE

## 2023-12-13 PROCEDURE — 99152 MOD SED SAME PHYS/QHP 5/>YRS: CPT | Performed by: PAIN MEDICINE

## 2023-12-13 RX ORDER — FENTANYL CITRATE 50 UG/ML
INJECTION, SOLUTION INTRAMUSCULAR; INTRAVENOUS
Status: DISCONTINUED | OUTPATIENT
Start: 2023-12-13 | End: 2023-12-13 | Stop reason: HOSPADM

## 2023-12-13 RX ORDER — MIDAZOLAM HYDROCHLORIDE 1 MG/ML
INJECTION, SOLUTION INTRAMUSCULAR; INTRAVENOUS
Status: DISCONTINUED | OUTPATIENT
Start: 2023-12-13 | End: 2023-12-13 | Stop reason: HOSPADM

## 2023-12-13 RX ORDER — LIDOCAINE HYDROCHLORIDE 20 MG/ML
INJECTION, SOLUTION EPIDURAL; INFILTRATION; INTRACAUDAL; PERINEURAL
Status: DISCONTINUED | OUTPATIENT
Start: 2023-12-13 | End: 2023-12-13 | Stop reason: HOSPADM

## 2023-12-13 RX ORDER — BUPIVACAINE HYDROCHLORIDE 2.5 MG/ML
INJECTION, SOLUTION EPIDURAL; INFILTRATION; INTRACAUDAL
Status: DISCONTINUED | OUTPATIENT
Start: 2023-12-13 | End: 2023-12-13 | Stop reason: HOSPADM

## 2023-12-13 NOTE — DISCHARGE SUMMARY
[facility] - Pain Mgmt 1st Fl  Discharge Note  Short Stay    Procedure(s) (LRB):  C3-4 and C 4-5 Facet RFA (Right)      OUTCOME: Patient tolerated treatment/procedure well without complication and is now ready for discharge.    DISPOSITION: Home or Self Care    FINAL DIAGNOSIS:  Chronic neck pain    FOLLOWUP: In clinic    DISCHARGE INSTRUCTIONS:  No discharge procedures on file.     TIME SPENT ON DISCHARGE: 5 minutes

## 2023-12-13 NOTE — DISCHARGE INSTRUCTIONS

## 2023-12-13 NOTE — OP NOTE
Jason Sandra  48 y.o. male    Procedure Date: 12/13/2023     PROCEDURE: RIGHT C3/4 and C4/5  FACET MEDIAL BRANCH NERVE RADIOFREQUENCY NEUROTOMY        Pre Procedure diagnosis: Chronic neck pain [M54.2, G89.29]  Cervical spondylosis [M47.812]  Post-Procedure diagnosis: Chronic neck pain [M54.2, G89.29]  Cervical spondylosis [M47.812]     Anesthesia:  Conscious sedation provided by MDELTA     INFORMED CONSENT: The procedure, risks, benefits and options were discussed with patient. There are no contraindications to the procedure. The patient expressed understanding and agreed to proceed. The personnel performing the procedure was discussed. I verify that I personally obtained the patient's consent prior to the start of the procedure and the signed consent can be found on the patient's chart     DESCRIPTION OF PROCEDURE: The patient was brought to the procedure room.  After performing time out IV access was obtained prior to the procedure. The patient was positioned prone on the fluoroscopy table. Continuous hemodynamic monitoring was initiated including blood pressure and pulse oximetry. IV sedation was administered incrementally to allow the patient to remain comfortable and conversant throughout the procedure. The area of the cervical spine was prepped chlorhexidine three times and draped into a sterile field.  Fluoroscopy was used to identify the location of the right C3/4 and C4/5 medial branch nerves at the waists of the right articular pillars of C3, C4 and C5 vertebrae respectively.  Skin anesthesia was achieved using Lidocaine 1% over the injection sites. An 18 gauge, 10 cm (10mm active tip) NICOLE VENOM curved RF needle was slowly inserted at each level using AP, lateral and oblique fluoroscopic imaging. Fluoroscopy confirmed exit of the probes from the side holes of all needles. Negative aspiration for blood was confirmed. We had acceptable impedance in the range of 200-240 Ohms. Motor stimulation at 2Hz  up to 3 V did not cause any radicular symptoms or muscle stimulation in the lower extremities at any level. Each level was anesthetized with 0.5 cc of lidocaine 2% and 0.5 cc of bupivacaine 0.25%.  Radiofrequency lesioning was performed for 90 seconds at 80 degrees Celsius at each level.  The needles were removed intact. There was no bleeding from injection sites.  A sterile dressing was applied. Patient tolerated the procedure well and was taken to the recovery room in a stable condition and for observation and discharge.       Anesthesia:  Conscious sedation provided by M.D    The patient was monitored with continuous pulse oximetry, EKG, and intermittent blood pressure monitors.  The patient was hemodynamically stable throughout the entire process was responsive to voice, and breathing spontaneously.  Supplemental O2 was provided at 2L/min via nasal cannula.  Patient was comfortable for the duration of the procedure. (See nurse documentation and case log for sedation time)    There was a total of 2mg IV Midazolam and 100mcg Fentanyl titrated for the procedure       Conscious sedation ordered by MD.  Patient reevaluated and sedation administered by MD and monitored by RN.  Total sedation time was more than 15 min. (See nurse documentation and case log for sedation time)     MORENITA Garcia MD

## 2024-01-11 ENCOUNTER — TELEPHONE (OUTPATIENT)
Dept: PAIN MEDICINE | Facility: CLINIC | Age: 49
End: 2024-01-11
Payer: COMMERCIAL

## 2024-01-12 ENCOUNTER — TELEPHONE (OUTPATIENT)
Dept: PAIN MEDICINE | Facility: CLINIC | Age: 49
End: 2024-01-12

## 2024-01-12 ENCOUNTER — OFFICE VISIT (OUTPATIENT)
Dept: PAIN MEDICINE | Facility: CLINIC | Age: 49
End: 2024-01-12
Payer: COMMERCIAL

## 2024-01-12 DIAGNOSIS — M54.2 CERVICALGIA: Primary | ICD-10-CM

## 2024-01-12 DIAGNOSIS — M47.812 CERVICAL SPONDYLOSIS: ICD-10-CM

## 2024-01-12 PROCEDURE — 99213 OFFICE O/P EST LOW 20 MIN: CPT | Mod: 95,,, | Performed by: PHYSICIAN ASSISTANT

## 2024-01-12 RX ORDER — METHOCARBAMOL 500 MG/1
500 TABLET, FILM COATED ORAL 3 TIMES DAILY PRN
Qty: 90 TABLET | Refills: 0 | Status: SHIPPED | OUTPATIENT
Start: 2024-01-12

## 2024-01-12 RX ORDER — DICLOFENAC SODIUM 75 MG/1
75 TABLET, DELAYED RELEASE ORAL 2 TIMES DAILY
Qty: 60 TABLET | Refills: 1 | Status: SHIPPED | OUTPATIENT
Start: 2024-01-12

## 2024-02-08 ENCOUNTER — TELEPHONE (OUTPATIENT)
Dept: PAIN MEDICINE | Facility: CLINIC | Age: 49
End: 2024-02-08
Payer: COMMERCIAL

## 2024-02-21 ENCOUNTER — LAB VISIT (OUTPATIENT)
Dept: LAB | Facility: OTHER | Age: 49
End: 2024-02-21
Attending: NURSE PRACTITIONER
Payer: COMMERCIAL

## 2024-02-21 DIAGNOSIS — E10.65 TYPE 1 DIABETES MELLITUS WITH HYPERGLYCEMIA: ICD-10-CM

## 2024-02-21 LAB
ALBUMIN SERPL BCP-MCNC: 4 G/DL (ref 3.5–5.2)
ALBUMIN/CREAT UR: 8.2 UG/MG (ref 0–30)
ALP SERPL-CCNC: 80 U/L (ref 55–135)
ALT SERPL W/O P-5'-P-CCNC: 17 U/L (ref 10–44)
ANION GAP SERPL CALC-SCNC: 7 MMOL/L (ref 8–16)
AST SERPL-CCNC: 20 U/L (ref 10–40)
BILIRUB SERPL-MCNC: 0.4 MG/DL (ref 0.1–1)
BUN SERPL-MCNC: 17 MG/DL (ref 6–20)
CALCIUM SERPL-MCNC: 9.3 MG/DL (ref 8.7–10.5)
CHLORIDE SERPL-SCNC: 104 MMOL/L (ref 95–110)
CHOLEST SERPL-MCNC: 165 MG/DL (ref 120–199)
CHOLEST/HDLC SERPL: 3.1 {RATIO} (ref 2–5)
CO2 SERPL-SCNC: 25 MMOL/L (ref 23–29)
CREAT SERPL-MCNC: 1 MG/DL (ref 0.5–1.4)
CREAT UR-MCNC: 122 MG/DL (ref 23–375)
EST. GFR  (NO RACE VARIABLE): >60 ML/MIN/1.73 M^2
ESTIMATED AVG GLUCOSE: 212 MG/DL (ref 68–131)
GLUCOSE SERPL-MCNC: 157 MG/DL (ref 70–110)
HBA1C MFR BLD: 9 % (ref 4–5.6)
HDLC SERPL-MCNC: 54 MG/DL (ref 40–75)
HDLC SERPL: 32.7 % (ref 20–50)
LDLC SERPL CALC-MCNC: 102.6 MG/DL (ref 63–159)
MICROALBUMIN UR DL<=1MG/L-MCNC: 10 UG/ML
NONHDLC SERPL-MCNC: 111 MG/DL
POTASSIUM SERPL-SCNC: 4.1 MMOL/L (ref 3.5–5.1)
PROT SERPL-MCNC: 7.3 G/DL (ref 6–8.4)
SODIUM SERPL-SCNC: 136 MMOL/L (ref 136–145)
TRIGL SERPL-MCNC: 42 MG/DL (ref 30–150)

## 2024-02-21 PROCEDURE — 36415 COLL VENOUS BLD VENIPUNCTURE: CPT | Performed by: NURSE PRACTITIONER

## 2024-02-21 PROCEDURE — 83036 HEMOGLOBIN GLYCOSYLATED A1C: CPT | Performed by: NURSE PRACTITIONER

## 2024-02-21 PROCEDURE — 80053 COMPREHEN METABOLIC PANEL: CPT | Performed by: NURSE PRACTITIONER

## 2024-02-21 PROCEDURE — 80061 LIPID PANEL: CPT | Performed by: NURSE PRACTITIONER

## 2024-02-21 PROCEDURE — 82043 UR ALBUMIN QUANTITATIVE: CPT | Performed by: NURSE PRACTITIONER

## 2024-02-27 ENCOUNTER — TELEPHONE (OUTPATIENT)
Dept: DIABETES | Facility: CLINIC | Age: 49
End: 2024-02-27
Payer: COMMERCIAL

## 2024-02-28 ENCOUNTER — OFFICE VISIT (OUTPATIENT)
Dept: DIABETES | Facility: CLINIC | Age: 49
End: 2024-02-28
Payer: COMMERCIAL

## 2024-02-28 ENCOUNTER — TELEPHONE (OUTPATIENT)
Dept: DIABETES | Facility: CLINIC | Age: 49
End: 2024-02-28
Payer: COMMERCIAL

## 2024-02-28 VITALS — HEIGHT: 68 IN | BODY MASS INDEX: 25.31 KG/M2 | WEIGHT: 167 LBS

## 2024-02-28 DIAGNOSIS — E10.65 TYPE 1 DIABETES MELLITUS WITH HYPERGLYCEMIA: ICD-10-CM

## 2024-02-28 DIAGNOSIS — E10.9 INSULIN DEPENDENT DIABETES MELLITUS TYPE IA: Primary | ICD-10-CM

## 2024-02-28 DIAGNOSIS — E78.5 HYPERLIPIDEMIA LDL GOAL <100: ICD-10-CM

## 2024-02-28 DIAGNOSIS — E10.9 KETOSIS PRONE DIABETES: ICD-10-CM

## 2024-02-28 DIAGNOSIS — R79.89 LOW SERUM C-PEPTIDE: ICD-10-CM

## 2024-02-28 PROCEDURE — 3066F NEPHROPATHY DOC TX: CPT | Mod: CPTII,95,, | Performed by: NURSE PRACTITIONER

## 2024-02-28 PROCEDURE — 99214 OFFICE O/P EST MOD 30 MIN: CPT | Mod: 95,,, | Performed by: NURSE PRACTITIONER

## 2024-02-28 PROCEDURE — 1160F RVW MEDS BY RX/DR IN RCRD: CPT | Mod: CPTII,95,, | Performed by: NURSE PRACTITIONER

## 2024-02-28 PROCEDURE — 3061F NEG MICROALBUMINURIA REV: CPT | Mod: CPTII,95,, | Performed by: NURSE PRACTITIONER

## 2024-02-28 PROCEDURE — 95251 CONT GLUC MNTR ANALYSIS I&R: CPT | Mod: NDTC,S$GLB,, | Performed by: NURSE PRACTITIONER

## 2024-02-28 PROCEDURE — 3008F BODY MASS INDEX DOCD: CPT | Mod: CPTII,95,, | Performed by: NURSE PRACTITIONER

## 2024-02-28 PROCEDURE — 1159F MED LIST DOCD IN RCRD: CPT | Mod: CPTII,95,, | Performed by: NURSE PRACTITIONER

## 2024-02-28 PROCEDURE — 3052F HG A1C>EQUAL 8.0%<EQUAL 9.0%: CPT | Mod: CPTII,95,, | Performed by: NURSE PRACTITIONER

## 2024-02-28 RX ORDER — ATORVASTATIN CALCIUM 40 MG/1
40 TABLET, FILM COATED ORAL DAILY
Qty: 90 TABLET | Refills: 3 | Status: SHIPPED | OUTPATIENT
Start: 2024-02-28 | End: 2025-02-27

## 2024-02-28 NOTE — PROGRESS NOTES
The patient location is: [place]   The chief complaint leading to consultation is: diabetes management - follow up     Visit type: audiovisual    Face to Face time with patient: 25 minutes   30 minutes of total time spent on the encounter, which includes face to face time and non-face to face time preparing to see the patient (eg, review of tests), Obtaining and/or reviewing separately obtained history, Documenting clinical information in the electronic or other health record, Independently interpreting results (not separately reported) and communicating results to the patient/family/caregiver, or Care coordination (not separately reported).         Each patient to whom he or she provides medical services by telemedicine is:  (1) informed of the relationship between the physician and patient and the respective role of any other health care provider with respect to management of the patient; and (2) notified that he or she may decline to receive medical services by telemedicine and may withdraw from such care at any time.    Notes:       CC:   Chief Complaint   Patient presents with    Diabetes Mellitus       HPI: Jason Sandra is a 48 y.o. male presents for a follow up visit today for the management of diabetes     He was diagnosed with Type 2  diabetes at least since 2011 on routine labs   Pre diabetes prior to that   He was initially started on Metformin     Family hx of diabetes: maybe younger brother, father   Hospitalized for diabetes: yes- DKA in 01/2023  Insulin therapy: 2023  No personal or FH of thyroid cancer or personal of pancreatic cancer or pancreatitis.       hospitalized for DKA on January 1, 2023  On his arrival to the emergency room he reported that he had been on any medications for diabetes in over a year  His blood sugar was 316  + beta -6.2  A1c was >14% in October 2021   He was sent home on basal insulin    He works at [employer], [place], operated by [employer]      1/17/2023- DINA negative   Anti islet cell antibody  negative, insulin antibody negative  C-peptide level low at 0.59--with a blood sugar of 331  3/2023- repeat c-peptide level is low at 0.61 with fasting glucose of 224 -- repeat DINA negative and zinc transporter negative   Confirm - insulin deficiency - treated as T1DM         Our last visit was in November of 2023  At that visit we discussed his diabetes was uncontrolled due to him missing prandial insulin doses which was hindering overall management  We discussed that he needs to meet with diabetes education for an insulin pump evaluation  He met with diabetes education at the end of November--for an insulin pump evaluation and I sent over the Omnipod 5 to [facility]  He met with diabetes education at the beginning of December 2023 to start the Omnipod 5  His recent A1c is 9% which is better than 10.8% in December 2023 and better than 11.3% in November 2023  He only wore the pump once and then never again. He is not using the pump now.    See attached download  He says that he doesn't think that the pod is going to work for him. The price of the pump is also a concern-   He does not want to revisit the pump at this time.   He is concerned about cost                   DIABETES COMPLICATIONS: cardiovascular disease-- CHF EF- 35% -- now back up to 50%       Diabetes Management Status    ASA:  No    Statin: Taking Lipitor 20 mg nightly -  ACE/ARB: Taking- Entresto       The 10-year ASCVD risk score (Darleen BARCENAS, et al., 2019) is: 5.2%    Values used to calculate the score:      Age: 48 years      Sex: Male      Is Non- : No      Diabetic: Yes      Tobacco smoker: No      Systolic Blood Pressure: 136 mmHg      Is BP treated: No      HDL Cholesterol: 54 mg/dL      Total Cholesterol: 165 mg/dL    Screening or Prevention Patient's value Goal Complete/Controlled?   HgA1C Testing and Control   Lab Results   Component Value Date    HGBA1C 9.0 (H) 02/21/2024      Annually/Less than 8% No     Lipid  profile : 02/21/2024 Annually No     LDL control Lab Results   Component Value Date    LDLCALC 102.6 02/21/2024    Annually/Less than 100 mg/dl  No     Nephropathy screening Lab Results   Component Value Date    LABMICR 10.0 02/21/2024     Lab Results   Component Value Date    PROTEINUA Negative 05/01/2023    Annually Yes     Blood pressure BP Readings from Last 1 Encounters:   12/13/23 136/63    Less than 140/90 Yes     Dilated retinal exam : 04/12/2023 Annually Yes     Foot exam   : 01/17/2023 Annually No         CURRENT A1C:    Hemoglobin A1C   Date Value Ref Range Status   02/21/2024 9.0 (H) 4.0 - 5.6 % Final     Comment:     ADA Screening Guidelines:  5.7-6.4%  Consistent with prediabetes  >or=6.5%  Consistent with diabetes    High levels of fetal hemoglobin interfere with the HbA1C  assay. Heterozygous hemoglobin variants (HbS, HgC, etc)do  not significantly interfere with this assay.   However, presence of multiple variants may affect accuracy.     12/05/2023 10.8 (H) 4.0 - 5.6 % Final     Comment:     ADA Screening Guidelines:  5.7-6.4%  Consistent with prediabetes  >or=6.5%  Consistent with diabetes    High levels of fetal hemoglobin interfere with the HbA1C  assay. Heterozygous hemoglobin variants (HbS, HgC, etc)do  not significantly interfere with this assay.   However, presence of multiple variants may affect accuracy.     11/28/2023 11.3 (H) 4.0 - 5.6 % Final     Comment:     ADA Screening Guidelines:  5.7-6.4%  Consistent with prediabetes  >or=6.5%  Consistent with diabetes    High levels of fetal hemoglobin interfere with the HbA1C  assay. Heterozygous hemoglobin variants (HbS, HgC, etc)do  not significantly interfere with this assay.   However, presence of multiple variants may affect accuracy.         GOAL A1C: 6.5% without hypoglycemia       DM MEDICATIONS USED IN THE PAST: Levmeir    Metformin   Glipizide   Januvia   Glimepiride   Tresiba   dexcom  Omnipod 5  Humalog       CURRENT DIABETES  MEDICATIONS: Tresiba 26 units nightly at 8 -9 PM    Lyumjev 4-8 units TID AC   2-4 units for a snack   No correction scale   Insulin: pens.    Missed doses: missing doses of Lyumjev         BLOOD GLUCOSE MONITORING:    Sensor type: Dexcom G 6   Average BG readin  Time in range: 33%   21% high   45% very high   Estimated A1c of  N/A   Site change: q10 days    2 weeks prior -- 2 weeks average 183. 52% in range       Supplies -- pharmacy       Sensor was downloaded in clinic today and reviewed with patient.   Please see attached document for download.       HYPOGLYCEMIA:  rarely   Glucagon kit: Baqsimi    Medic alert bracelet: denies       MEALS: eating 3 meals per day   BF: ham and cheese croissant   Lunch: cafeteria at work or skips   Dinner: home cooked or fast food - spaghetti   Snack: peanuts, chips  Drinks: water and milk   Skim milk - 2 glasses per day   Previously coke zeros -- 20 a day        CURRENT EXERCISE:  Yes  Home routine   Push ups/ dips/ sit ups       Review of Systems  Review of Systems   Constitutional:  Negative for appetite change, fatigue and unexpected weight change.   HENT:  Negative for trouble swallowing.    Eyes:  Negative for visual disturbance.   Respiratory:  Negative for shortness of breath.    Cardiovascular:  Negative for chest pain.   Gastrointestinal:  Negative for nausea.   Endocrine: Negative for polydipsia, polyphagia and polyuria.   Genitourinary:         No Nocturia    Musculoskeletal:  Positive for arthralgias, back pain, myalgias and neck pain.   Skin:  Negative for wound.   Neurological:  Negative for numbness.       Physical Exam   Physical Exam  Constitutional:       General: He is not in acute distress.     Appearance: Normal appearance. He is not ill-appearing.   HENT:      Head: Normocephalic and atraumatic.      Nose: Nose normal.   Pulmonary:      Effort: Pulmonary effort is normal.   Musculoskeletal:      Cervical back: Normal range of motion.   Neurological:       General: No focal deficit present.      Mental Status: He is alert and oriented to person, place, and time.   Psychiatric:         Mood and Affect: Mood normal.         Behavior: Behavior normal.         Thought Content: Thought content normal.         Judgment: Judgment normal.             FOOT EXAMINATION: deferred due to virtual visit         Lab Results   Component Value Date    TSH 2.393 09/12/2023             Insulin dependent diabetes mellitus type IA  Antibodies have been negative but C-peptide level was low though x 2 episodes   Treated more as a type 1 diabetic  Ketosis prone  Uncontrolled   He says that he has not missing doses of prandial insulin  Sounds like he only wore the Omnipod 5 for about a week---I am encouraging him to give it another chance and to follow up with diabetes education.  He seems to be concerned about the cost of the Omnipod 5.  After the visit I called the pharmacy and they are telling me the Omnipod 5 pods will be 25 dollars for a 30 day supply and the Humalog insulin in the vial will be 9 dollars   discussed that pain and anxiety can increase readings   Encouraged him to follow up with diabetes education      -- Medication Changes:      Continue Tresiba 26 units nighty   Same time every night   Do not need to eat to take      Adjust Lyumjev to 8-10 units with meals   If you skip a meal, skip the Lyumjev   10 units for larger meals-- more carbohydrates   2-4 units for snacks      Start doing sliding scale/correction scale as needed with meal time insulin   With using correction scale: before meals only -- before eating only   150-200: +1 unit  201-250: +2 units  251-300: +3 units  301-350: +4 units  >350: +5 units       Meet with education at [facility] for pump re discussion and to discuss insulin           Omnipod 5 insulin pump settings:    Basal: 0.85 units/hr   ICR: 1:10-- set doses - 6 units - small meal /8 units - normal meal /10 units large meal   ISF: 1:40  Target:  120  IOB: 3.5 hours            -- Reviewed goals of therapy are to get the best control we can without hypoglycemia.  -- Reviewed patient's current insulin regimen. Clarified proper insulin dose and timing in relation to meals, etc. Insulin injection sites and proper rotation instructed.    -- Advised frequent self blood glucose monitoring.  Patient encouraged to document glucose results and bring them to every clinic visit. Continue dexcom personal CGM g6 -- supplies from  pharmacy - [facility].   -- Hypoglycemia precautions discussed. Instructed on precautions before driving.    -- Call for Bg repeatedly < 70 or > 200.   -- Close adherence to lifestyle changes recommended.   -- Periodic follow ups for eye evaluations, foot care and dental care suggested.  -- Refer to diabetes education -- [facility] location for insulin pump re discussion and insulin review       Patient has diabetes mellitus and manages diabetes with intensive insulin regimen and uses prandial and basal insulin daily  Patient requires a therapeutic CGM and is willing to use therapeutic CGM for the necessary frequent adjustments of insulin therapy.  I have completed an in-person visit during the previous 6 months and will continue to have in-person visits every 6 months to assess adherence to their CGM regimen and diabetes treatment plan.  Due to COVID pandemic and need for remote monitoring this tool is medically necessary          Low serum C-peptide  Remain on insulin     Type 1 diabetes mellitus with hyperglycemia  See above     Hyperlipidemia LDL goal <100  On statin per ADA recommendations  LDL goal < 100. LDL above goal   Adjust Lipitor to 40 mg nightly   Lipids with RTC     Ketosis prone diabetes  See above         This includes face to face time and non-face to face time preparing to see the patient (eg, review of tests), obtaining and/or reviewing separately obtained history, documenting clinical information in the electronic or other  health record, independently interpreting results and communicating results to the patient/family/caregiver, or care coordinator.        Follow up in about 3 months (around 5/28/2024).  Needs to see diabetes education Maddison Bowman for a follow up   See me in 3 months with labs prior         Orders Placed This Encounter   Procedures    Comprehensive Metabolic Panel     Standing Status:   Future     Standing Expiration Date:   8/28/2025    Lipid Panel     Standing Status:   Future     Standing Expiration Date:   8/28/2025    Hemoglobin A1C     Standing Status:   Future     Standing Expiration Date:   8/28/2025    Ambulatory referral/consult to Diabetes Education     Standing Status:   Future     Standing Expiration Date:   8/28/2025     Referral Priority:   Routine     Referral Type:   Consultation     Referral Reason:   Specialty Services Required     Referred to Provider:   Christina Vanegas RD, CDE     Requested Specialty:   Diabetes     Number of Visits Requested:   1       Recommendations were discussed with the patient in detail  The patient verbalized understanding and agrees with the plan outlined as above.     This note was partly generated with [employer] voice recognition software. I apologize for any possible typographical errors.

## 2024-02-28 NOTE — Clinical Note
Needs to see diabetes education Maddison Bowman for a follow up  See me in 3 months with labs prior

## 2024-02-29 ENCOUNTER — PATIENT MESSAGE (OUTPATIENT)
Dept: DIABETES | Facility: CLINIC | Age: 49
End: 2024-02-29
Payer: COMMERCIAL

## 2024-02-29 NOTE — ASSESSMENT & PLAN NOTE
On statin per ADA recommendations  LDL goal < 100. LDL above goal   Adjust Lipitor to 40 mg nightly   Lipids with RTC

## 2024-02-29 NOTE — ASSESSMENT & PLAN NOTE
Antibodies have been negative but C-peptide level was low though x 2 episodes   Treated more as a type 1 diabetic  Ketosis prone  Uncontrolled   He says that he has not missing doses of prandial insulin  Sounds like he only wore the Omnipod 5 for about a week---I am encouraging him to give it another chance and to follow up with diabetes education.  He seems to be concerned about the cost of the Omnipod 5.  After the visit I called the pharmacy and they are telling me the Omnipod 5 pods will be 25 dollars for a 30 day supply and the Humalog insulin in the vial will be 9 dollars   discussed that pain and anxiety can increase readings   Encouraged him to follow up with diabetes education      -- Medication Changes:      Continue Tresiba 26 units nighty   Same time every night   Do not need to eat to take      Adjust Lyumjev to 8-10 units with meals   If you skip a meal, skip the Lyumjev   10 units for larger meals-- more carbohydrates   2-4 units for snacks      Start doing sliding scale/correction scale as needed with meal time insulin   With using correction scale: before meals only -- before eating only   150-200: +1 unit  201-250: +2 units  251-300: +3 units  301-350: +4 units  >350: +5 units       Meet with education at [facility] for pump re discussion and to discuss insulin           Omnipod 5 insulin pump settings:    Basal: 0.85 units/hr   ICR: 1:10-- set doses - 6 units - small meal /8 units - normal meal /10 units large meal   ISF: 1:40  Target: 120  IOB: 3.5 hours            -- Reviewed goals of therapy are to get the best control we can without hypoglycemia.  -- Reviewed patient's current insulin regimen. Clarified proper insulin dose and timing in relation to meals, etc. Insulin injection sites and proper rotation instructed.    -- Advised frequent self blood glucose monitoring.  Patient encouraged to document glucose results and bring them to every clinic visit. Continue dexcom personal CGM g6 -- supplies  from  pharmacy - [facility].   -- Hypoglycemia precautions discussed. Instructed on precautions before driving.    -- Call for Bg repeatedly < 70 or > 200.   -- Close adherence to lifestyle changes recommended.   -- Periodic follow ups for eye evaluations, foot care and dental care suggested.  -- Refer to diabetes education -- [facility] location for insulin pump re discussion and insulin review       Patient has diabetes mellitus and manages diabetes with intensive insulin regimen and uses prandial and basal insulin daily  Patient requires a therapeutic CGM and is willing to use therapeutic CGM for the necessary frequent adjustments of insulin therapy.  I have completed an in-person visit during the previous 6 months and will continue to have in-person visits every 6 months to assess adherence to their CGM regimen and diabetes treatment plan.  Due to COVID pandemic and need for remote monitoring this tool is medically necessary

## 2024-03-15 ENCOUNTER — CLINICAL SUPPORT (OUTPATIENT)
Dept: DIABETES | Facility: CLINIC | Age: 49
End: 2024-03-15
Payer: COMMERCIAL

## 2024-03-15 DIAGNOSIS — E10.9 INSULIN DEPENDENT DIABETES MELLITUS TYPE IA: ICD-10-CM

## 2024-03-15 PROCEDURE — G0108 DIAB MANAGE TRN  PER INDIV: HCPCS | Mod: S$GLB,,, | Performed by: DIETITIAN, REGISTERED

## 2024-03-15 PROCEDURE — 99999 PR PBB SHADOW E&M-EST. PATIENT-LVL II: CPT | Mod: PBBFAC,,, | Performed by: DIETITIAN, REGISTERED

## 2024-03-15 NOTE — PROGRESS NOTES
Diabetes Care Specialist Progress Note  Author: Maddison Bowman RD, CDE  Date: 3/15/2024    Program Intake  Reason for Diabetes Program Visit:: Intervention  Type of Intervention:: Individual  Individual: Education  Education: Self-Management Skill Review  Device Training: Insulin Pump Start  Current diabetes risk level:: moderate  In the last 12 months, have you:: none  Continuous Glucose Monitoring  Patient has CGM: Yes  Personal CGM type:: Dexcom G6  GMI Value:  (n/a on report)    Lab Results   Component Value Date    HGBA1C 9.0 (H) 2024       Clinical    Current DM meds:  Tresiba 28 units HS  Lyumjev 6-10 units + scale  150-200 +1  201-250 +2  251-300 +3  301-350 +4  >350 +5        Today's interventions were provided through individual discussion, instruction, and written materials were provided.      Patient verbalized understanding of instruction and written materials.  Pt was able to return back demonstration of instructions today. Patient understood key points, needs reinforcement and further instruction.     Diabetes Self-Management Care Plan:    Today's Diabetes Self-Management Care Plan was developed with Jason's input. Jason has agreed to work toward the following goal(s) to improve his/her overall diabetes control.      Care Plan: Diabetes Management   Updates made since 2024 12:00 AM        Problem: Medications         Goal: Pt will take MDI as prescribed.    Start Date: 2023   Expected End Date: 3/11/2023   This Visit's Progress: On track   Recent Progress: Met   Priority: High   Barriers: No Barriers Identified   Note:    23 -   In depth review of pt meals/snacks and insulin dosing. Confirmed meals = ~60 grams of carb, and take 3-8u full meal doses >4 hours apart. Reviewed snack dosinu for ~30gram snack for right now. Did discuss ways to reduce carbs in snacks and help require less insulin for these low carb snacks.     23 - Pt is interested in making change to insulin  pump therapy and here today for pump eval. Per Luly Pacheco NP, will likely use set doses for bolusing d/t pt not carb counting.     Patient is interested in an insulin pump and seen today to discuss insulin pump therapy.     -Covered expectations for insulin pump approval by provider and insurance company such as: SMBG a min of qid, additional labs, insurance verification, possible costs associated with monthly pump supplies, and overall cost.     --Discussed basic details of pump therapy with patient.     -Reviewed current insulin pumps available: Medtronic 630G/770G/780G, Tandem T-slim X2, OmniPod Classic/Dash/5, and iLet    -Reviewed hybrid closed-loop insulin pump systems.  Hybrid closed-loop systems have some hands-off (automated) actions. These capabilities can vary among systems. That said, basic communication happens between the CGM and pump, which can trigger some automatic adjustments. This helps to keep your glucose within a preset range. Pumps with this capability include: Medtronic 770G/780G with Guardian 3 or 4 CGM, Tandem T-Slim X2 integrated with Dexcom G6 CGM (Control IQ technology), and OmniPod 5 integrated with Dexcom G6 CGM    -Reviewed terms such as insulin sensitivity factor (ISF), carbohydrate ratio, target glucose, bolus, basal, active insulin and insulin on board.  Explained importance of learning to carb count at meals to effectively enter carbs (grams) when bolusing at meals.      -Explained each insulin pumps allow for different max volumes of insulin in reservoir chamber. Medtronic and Tandem max volume is 300 units and Omnipod max volume is 200 units.     -Patient verbalized understanding of pump therapy and able to see and touch all demonstration pumps during visit today.      Patient's is interested in Omnipod5.    Patient's major concern is insurance coverage and would like to move forward with checking insurance coverage. Communicated with Luly for Rx to be sent to [facility]  pharmacy.     3/15/24 - Pt reports has been taking Tresiba 28 units HS and Lyumjev consistently before each meal and taking with correction scale as prescribed. Adjusting base dose 6 units with small meal, 8 units with avg meal and 10 units with large meal. Reviewed what can count as small/avg/large meals. Had question about taking Lyumjev before eating protein only (eg. Sometimes only has tuna or sardines for lunch) - explained if only eating protein/no carbs and taking base dose as above, can be causing occasional daytime lows seen on report - discussed only take correction scale if eating just tuna or sardines for lunch.        Goal: Pt will use Omnipod 5 for insulin delivery.    Start Date: 12/11/2023   Expected End Date: 1/10/2024   This Visit's Progress: Not met   Priority: High   Barriers: No Barriers Identified   Note:    12/11/23 -   OMNI POD 5 INSULIN PUMP START    Pump training was provided per Omni Pod protocol.  Patient is new to insulin pump therapy.     Basal:  12AM: 0.85 units/hr     Max basal rate: 3 units/hr     Bolus:  CHO ratio: 1:10 - set doses 6 units for small meal, 8 units for medium meal and 10 units for large meal  ISF: 1:35  Glucose target : 120 mg/dL  Correct  over: 120 mg/dl  Active insulin time: 3.5 hours  Max bolus: 12 units     Low reservoir insulin alarm: 10 units  Change pod alarm: every 3 days       Details of pump therapy were covered included following controller features and programming, pod activation, pod site selection and rotation, automatic pod priming and insertion, setting & editing basal rates in manual mode, giving bolus and other features in the set-up menu.  The following regarding the Omnipod 5 was covered:  During your first Pod wear, since no recent history is available, the Omnipod 5 System uses only your active Basal Program from Manual Mode as a starting point to adjust your insulin. After 48 hours of history is collected, which usually happens with the first  Pod wear, SmartAdjust technology stops adjusting insulin against your active Basal Program and starts using the Adaptive Basal Rate for your automated insulin delivery with your next Pod change. With each Pod change, insulin delivery information is sent and saved in the Omnipod 5 Shan so that the next Pod that is started is updated with the new Adaptive Basal Rate. With each new Pod activation, the system adapts insulin delivery based on physiological needs and total daily insulin (TDI) delivered. After 2-3 Pod changes, adaptation generally stabilizes and automated insulin delivery is based on this adaptation.  Patient demonstrated ability to program controller, activate and insert pod using aseptic technique.  Patient demonstrated ability to program Dexcom transmitter into controller and start automated limited mode.    Instructed pt on use of basic pump features ie...give a bolus, pause insulin, switch from manual to automated mode.  Reviewed features available in manual mode verses automated mode.   Reviewed when and how to use activity function in automated mode.  Reviewed site selection of pods, rotation of sites and hard stop to change pod every 72 hrs.   Instructed that insulin vial is good out of refrigeration for up to 28-30 days.   Reviewed treatment of hypoglycemia, hyperglycemia; sick day care, DKA, and troubleshooting of pump. Advised to change site if issue is suspected.   Omni Pod 24-hour support line provided.    Advised the last 4 digits of pump serial number will unlock the PDM in the event PIN is forgotten     Podder username: [email or web address]  Podder password: [email or web address]     Glooko username: [email or web address]  Glooko password: [email or web address]      3/15/24 - Stopped using omnipod shortly after started. States part of the reason is cost - reviewed today pod + vial of 1 insulin is actually less expensive compared to pen needles and 2 insulin pens. Other reason is transitioning between 2 houses at  the moment ([place] and [place]) and having trouble with adding something new to routine. Reviewed dexcom report with pt - continues to have large variability and pattern of nighttime highs. He associates highs with pain particularly while lying down. Explained automated system like Omnipod5 would greatly benefit him with both variability with work as well as adjusting to highs r/t pain. He states he thinks he could potentially be ready to restart in a couple of months. Scheduled appt in early May.        Problem: Acute Complications         Goal: Pt will only drink sweet drink when BG <70; when symptomatic in 100's, pt will eat small snack w/ protein and carbs and monitor BG. Completed 3/15/2024   Start Date: 2/8/2023   Expected End Date: 3/11/2023   Recent Progress: Met   Priority: High   Barriers: No Barriers Identified   Note:    11/30/23 - Has avoided treating over-treating for s/s of hypoglycemia.          Problem: Healthy Eating         Goal: Pt will make 2 snacks per day <15 grams of carb per snack.    Start Date: 2/8/2023   Expected End Date: 3/11/2023   This Visit's Progress: Deferred   Recent Progress: Deferred   Priority: High   Barriers: No Barriers Identified   Note:    2/8/23 -   Pt with +polyphagia, although reports having large appetite since began working ~19yo. 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 Reviewed pt meal patterns, has long commute and eating fast food breakfast. Discussed importance of swapping out for higher quality breakfast. Pt agreeable to working on packing healthier snacks that include veggies (like veggie + hummus). Reviewed insulin dosing for snacks.          Follow Up Plan     Follow up in about 8 weeks (around 5/10/2024) for possible omnipod restart.    Today's care plan and follow up schedule was discussed with patient.  Jason verbalized understanding of the care plan, goals, and agrees to follow up plan.        The patient was encouraged to  communicate with his/her health care provider/physician and care team regarding his/her condition(s) and treatment.  I provided the patient with my contact information today and encouraged to contact me via phone or [facility] Patient Portal as needed.     Length of Visit   Total Time: 60 Minutes

## 2024-04-17 ENCOUNTER — OFFICE VISIT (OUTPATIENT)
Dept: URGENT CARE | Facility: CLINIC | Age: 49
End: 2024-04-17
Payer: COMMERCIAL

## 2024-04-17 VITALS
HEART RATE: 104 BPM | OXYGEN SATURATION: 98 % | TEMPERATURE: 99 F | DIASTOLIC BLOOD PRESSURE: 77 MMHG | SYSTOLIC BLOOD PRESSURE: 159 MMHG | RESPIRATION RATE: 18 BRPM

## 2024-04-17 DIAGNOSIS — R05.9 COUGH, UNSPECIFIED TYPE: ICD-10-CM

## 2024-04-17 DIAGNOSIS — J02.9 SORE THROAT: ICD-10-CM

## 2024-04-17 DIAGNOSIS — J40 BRONCHITIS: Primary | ICD-10-CM

## 2024-04-17 LAB
CTP QC/QA: YES
MOLECULAR STREP A: NEGATIVE
POC MOLECULAR INFLUENZA A AGN: NEGATIVE
POC MOLECULAR INFLUENZA B AGN: NEGATIVE
SARS-COV-2 AG RESP QL IA.RAPID: NEGATIVE

## 2024-04-17 PROCEDURE — 87811 SARS-COV-2 COVID19 W/OPTIC: CPT | Mod: QW,S$GLB,, | Performed by: PHYSICIAN ASSISTANT

## 2024-04-17 PROCEDURE — 71046 X-RAY EXAM CHEST 2 VIEWS: CPT | Mod: S$GLB,,, | Performed by: RADIOLOGY

## 2024-04-17 PROCEDURE — 96372 THER/PROPH/DIAG INJ SC/IM: CPT | Mod: S$GLB,,, | Performed by: EMERGENCY MEDICINE

## 2024-04-17 PROCEDURE — 87502 INFLUENZA DNA AMP PROBE: CPT | Mod: QW,S$GLB,, | Performed by: PHYSICIAN ASSISTANT

## 2024-04-17 PROCEDURE — 87651 STREP A DNA AMP PROBE: CPT | Mod: QW,S$GLB,, | Performed by: PHYSICIAN ASSISTANT

## 2024-04-17 PROCEDURE — 99214 OFFICE O/P EST MOD 30 MIN: CPT | Mod: 25,S$GLB,, | Performed by: PHYSICIAN ASSISTANT

## 2024-04-17 RX ORDER — BENZONATATE 100 MG/1
100 CAPSULE ORAL 3 TIMES DAILY PRN
Qty: 30 CAPSULE | Refills: 0 | Status: SHIPPED | OUTPATIENT
Start: 2024-04-17 | End: 2024-04-27

## 2024-04-17 RX ORDER — PROMETHAZINE HYDROCHLORIDE AND DEXTROMETHORPHAN HYDROBROMIDE 6.25; 15 MG/5ML; MG/5ML
5 SYRUP ORAL EVERY 4 HOURS PRN
Qty: 118 ML | Refills: 0 | Status: SHIPPED | OUTPATIENT
Start: 2024-04-17 | End: 2024-04-27

## 2024-04-17 NOTE — PROGRESS NOTES
Subjective:      Patient ID: Jason Sandra is a 49 y.o. male.    Vitals:  oral temperature is 99.3 °F (37.4 °C). His blood pressure is 159/77 (abnormal) and his pulse is 104. His respiration is 18 and oxygen saturation is 98%.     Chief Complaint: Sore Throat    Pt presents with sore throat today x's 1 week. Pt has been exposed to wife who had COVID x's 3 weeks ago. Pt says his cough lasted about 3 weeks but symptoms worsened this previous week. Pt has not taken any medication for symptoms with no relief.    Sore Throat   This is a new problem. The current episode started 1 to 4 weeks ago. The problem has been unchanged. Neither side of throat is experiencing more pain than the other. There has been no fever. The pain is at a severity of 7/10. The pain is moderate. Associated symptoms include congestion, coughing and a hoarse voice. He has tried nothing for the symptoms. The treatment provided no relief.       HENT:  Positive for congestion and sore throat.    Respiratory:  Positive for cough.       Objective:     Physical Exam   HENT:   Head: Normocephalic.   Ears:   Right Ear: Tympanic membrane normal.   Left Ear: Tympanic membrane normal.   Nose: Rhinorrhea present. No congestion.   Mouth/Throat: Mucous membranes are moist. No oropharyngeal exudate or posterior oropharyngeal erythema.   Cardiovascular: Normal rate and normal pulses.   Pulmonary/Chest: No stridor. No respiratory distress. He has no wheezes. He has rhonchi.   Abdominal: Normal appearance and bowel sounds are normal. He exhibits no distension. There is no abdominal tenderness. There is no guarding.   Neurological: He is alert.   Nursing note and vitals reviewed.      Assessment:     1. Bronchitis    2. Sore throat    3. Cough, unspecified type      Here with cough and congestion for the last few weeks. Cough got worse the last 3 days. He works at the hospital. He would like to be tested for covid, flu and strep. He is negative for all three.  Lungs sound congested. I will order chest xray. There is no sign of acute infiltrate on chest xray. He is suffering from bronchitis. He was given solumedrol in the clinic and send home with cough medication. He was instructed to hydrate and return to the clinic if new or worsening symptoms occur.    Plan:       Bronchitis  -     methylPREDNISolone sod suc(PF) injection 125 mg  -     benzonatate (TESSALON) 100 MG capsule; Take 1 capsule (100 mg total) by mouth 3 (three) times daily as needed for Cough.  Dispense: 30 capsule; Refill: 0  -     promethazine-dextromethorphan (PROMETHAZINE-DM) 6.25-15 mg/5 mL Syrp; Take 5 mLs by mouth every 4 (four) hours as needed (cough).  Dispense: 118 mL; Refill: 0    Sore throat  -     SARS Coronavirus 2 Antigen, POCT Manual Read  -     POCT Strep A, Molecular  -     POCT Influenza A/B MOLECULAR    Cough, unspecified type  -     XR CHEST PA AND LATERAL; Future; Expected date: 04/17/2024  -     benzonatate (TESSALON) 100 MG capsule; Take 1 capsule (100 mg total) by mouth 3 (three) times daily as needed for Cough.  Dispense: 30 capsule; Refill: 0  -     promethazine-dextromethorphan (PROMETHAZINE-DM) 6.25-15 mg/5 mL Syrp; Take 5 mLs by mouth every 4 (four) hours as needed (cough).  Dispense: 118 mL; Refill: 0

## 2024-05-05 ENCOUNTER — PATIENT MESSAGE (OUTPATIENT)
Dept: CARDIOLOGY | Facility: CLINIC | Age: 49
End: 2024-05-05
Payer: COMMERCIAL

## 2024-05-14 ENCOUNTER — HOSPITAL ENCOUNTER (OUTPATIENT)
Dept: CARDIOLOGY | Facility: HOSPITAL | Age: 49
Discharge: HOME | End: 2024-05-14
Attending: STUDENT IN AN ORGANIZED HEALTH CARE EDUCATION/TRAINING PROGRAM
Payer: COMMERCIAL

## 2024-05-14 ENCOUNTER — OFFICE VISIT (OUTPATIENT)
Dept: CARDIOLOGY | Facility: CLINIC | Age: 49
End: 2024-05-14
Payer: COMMERCIAL

## 2024-05-14 VITALS
WEIGHT: 162.06 LBS | SYSTOLIC BLOOD PRESSURE: 112 MMHG | HEART RATE: 73 BPM | DIASTOLIC BLOOD PRESSURE: 76 MMHG | BODY MASS INDEX: 24.56 KG/M2 | OXYGEN SATURATION: 99 % | HEIGHT: 68 IN

## 2024-05-14 DIAGNOSIS — I50.41 ACUTE COMBINED SYSTOLIC AND DIASTOLIC CONGESTIVE HEART FAILURE: ICD-10-CM

## 2024-05-14 DIAGNOSIS — I50.9 CONGESTIVE HEART FAILURE, UNSPECIFIED HF CHRONICITY, UNSPECIFIED HEART FAILURE TYPE: Primary | ICD-10-CM

## 2024-05-14 DIAGNOSIS — I50.9 CONGESTIVE HEART FAILURE, UNSPECIFIED HF CHRONICITY, UNSPECIFIED HEART FAILURE TYPE: ICD-10-CM

## 2024-05-14 DIAGNOSIS — Z82.49 FAMILY HISTORY OF HEART DISEASE: ICD-10-CM

## 2024-05-14 DIAGNOSIS — E11.69 HYPERLIPIDEMIA ASSOCIATED WITH TYPE 2 DIABETES MELLITUS: Primary | ICD-10-CM

## 2024-05-14 DIAGNOSIS — E11.9 TYPE 2 DIABETES MELLITUS WITHOUT COMPLICATION, WITH LONG-TERM CURRENT USE OF INSULIN: ICD-10-CM

## 2024-05-14 DIAGNOSIS — E78.5 HYPERLIPIDEMIA ASSOCIATED WITH TYPE 2 DIABETES MELLITUS: Primary | ICD-10-CM

## 2024-05-14 DIAGNOSIS — R06.02 SOB (SHORTNESS OF BREATH): ICD-10-CM

## 2024-05-14 DIAGNOSIS — R07.9 CHEST PAIN, UNSPECIFIED TYPE: ICD-10-CM

## 2024-05-14 DIAGNOSIS — Z79.4 TYPE 2 DIABETES MELLITUS WITHOUT COMPLICATION, WITH LONG-TERM CURRENT USE OF INSULIN: ICD-10-CM

## 2024-05-14 PROCEDURE — 3008F BODY MASS INDEX DOCD: CPT | Mod: CPTII,S$GLB,, | Performed by: STUDENT IN AN ORGANIZED HEALTH CARE EDUCATION/TRAINING PROGRAM

## 2024-05-14 PROCEDURE — 93005 ELECTROCARDIOGRAM TRACING: CPT | Mod: PO

## 2024-05-14 PROCEDURE — 3052F HG A1C>EQUAL 8.0%<EQUAL 9.0%: CPT | Mod: CPTII,S$GLB,, | Performed by: STUDENT IN AN ORGANIZED HEALTH CARE EDUCATION/TRAINING PROGRAM

## 2024-05-14 PROCEDURE — 99999 PR PBB SHADOW E&M-EST. PATIENT-LVL IV: CPT | Mod: PBBFAC,,, | Performed by: STUDENT IN AN ORGANIZED HEALTH CARE EDUCATION/TRAINING PROGRAM

## 2024-05-14 PROCEDURE — 1159F MED LIST DOCD IN RCRD: CPT | Mod: CPTII,S$GLB,, | Performed by: STUDENT IN AN ORGANIZED HEALTH CARE EDUCATION/TRAINING PROGRAM

## 2024-05-14 PROCEDURE — 3061F NEG MICROALBUMINURIA REV: CPT | Mod: CPTII,S$GLB,, | Performed by: STUDENT IN AN ORGANIZED HEALTH CARE EDUCATION/TRAINING PROGRAM

## 2024-05-14 PROCEDURE — 3074F SYST BP LT 130 MM HG: CPT | Mod: CPTII,S$GLB,, | Performed by: STUDENT IN AN ORGANIZED HEALTH CARE EDUCATION/TRAINING PROGRAM

## 2024-05-14 PROCEDURE — 93010 ELECTROCARDIOGRAM REPORT: CPT | Mod: ,,, | Performed by: INTERNAL MEDICINE

## 2024-05-14 PROCEDURE — 99214 OFFICE O/P EST MOD 30 MIN: CPT | Mod: S$GLB,,, | Performed by: STUDENT IN AN ORGANIZED HEALTH CARE EDUCATION/TRAINING PROGRAM

## 2024-05-14 PROCEDURE — 3066F NEPHROPATHY DOC TX: CPT | Mod: CPTII,S$GLB,, | Performed by: STUDENT IN AN ORGANIZED HEALTH CARE EDUCATION/TRAINING PROGRAM

## 2024-05-14 PROCEDURE — 3078F DIAST BP <80 MM HG: CPT | Mod: CPTII,S$GLB,, | Performed by: STUDENT IN AN ORGANIZED HEALTH CARE EDUCATION/TRAINING PROGRAM

## 2024-05-14 NOTE — PROGRESS NOTES
Section of Cardiology                  Cardiac Clinic Note    Chief Complaint/Reason for consultation: chest pain      HPI:   Jason Sandra is a 49 y.o. male with h/o HLD, diabetes who was referred to cardiology clinic by PCP Dr. Mcconnell for evaluation.    3/14/23  Reports chest pain and SOB about 6 months, has been stable  Works in construction, walks up stairs, heavy lifting  SOB was a few days ago, had to stop his activity   Chest pain, intermittent throughout the day, stops his activity  Palpitations are separate [address] [employer] thank remain I can no  Has joint pain, neck pain, shoulder  (sister has lupus)  Saw rheum today, started on MTX as a trial   Exercise daily, with weight training, no cardio, no issues with this for 25 min    Family hx: dad- CABG at age 61 yo  Denies tobacco or ETOH abuse       4/26/23  Comes in with wife  Echo 4/23 with EF 35%  Stress test 4/23 negative   Has been fatigue and SOB  Started having pain in his joints (neck, knee)  LE swelling  Reports sharp and dull chest pain, not daily, does not last long   No orthopnea, PND, syncope        8/9/23  Still feels SOB, improved  Feels it mostly outside   EF has recovered 35%->55%   No LE swelling  No PND, orthopnea  Not exercising much at home, will start back  Good appetite   Lost around 10 lbs since last visit      11/22/23  Still with fatigue and dizziness- stable  SOB stable, not worse  No LE swelling   Stable in the 160s lbs (trying to gain weight)  Not exercising       5/14/24  Reports cough, dry mostly  Feeling lethargic   No PND  Went to urgent care, no COVID/flu  Reports chest pain after eating, sometimes worse with moving around  Some leg swelling  RENYA with activity  Not taking entresto- thinks due to low BP      EKG 5/14/24 NSR  EKG 8/8/23 NSR  EKG 1/1/23 ST, nonspec TW abn, LVH, 107 bpm      Echo 7/23  The left ventricle is normal in size with concentric remodeling and normal systolic function.  Normal left  ventricular diastolic function.  The estimated PA systolic pressure is 35 mmHg.  Normal right ventricular size with normal right ventricular systolic function.  Normal central venous pressure (3 mmHg).  The estimated ejection fraction is 55%, recovered from prior study in april  Mild tricuspid regurgitation.    ECHO  4/23  The left ventricle is normal in size with moderately decreased systolic function.  The estimated ejection fraction is 35%.  Grade I left ventricular diastolic dysfunction.  There is moderate left ventricular global hypokinesis.  Normal right ventricular size with normal right ventricular systolic function.  Intermediate central venous pressure (8 mmHg).    STRESS TEST    [facility]      ROS: All 10 systems reviewed. Please refer to the HPI for pertinent positives. All other systems negative.     Past Medical History  Past Medical History:   Diagnosis Date    Diabetes mellitus     Hypogonadism in male        Surgical History  Past Surgical History:   Procedure Laterality Date    COLONOSCOPY N/A 12/1/2023    Procedure: COLONOSCOPY;  Surgeon: Coral White MD;  Location: [facility] ENDO;  Service: Endoscopy;  Laterality: N/A;    INJECTION OF ANESTHETIC AGENT AROUND MEDIAL BRANCH NERVES INNERVATING CERVICAL FACET JOINT Bilateral 6/7/2023    Procedure: Bilateral C3-5 MBB with local;  Surgeon: Tony Garcia MD;  Location: [facility] PAIN MGT;  Service: Pain Management;  Laterality: Bilateral;    INJECTION OF ANESTHETIC AGENT AROUND MEDIAL BRANCH NERVES INNERVATING CERVICAL FACET JOINT Bilateral 8/23/2023    Procedure: Bilateral C3-5 MBB  second diagnostic block with RN IV sedation;  Surgeon: Tony Garcia MD;  Location: [facility] PAIN MGT;  Service: Pain Management;  Laterality: Bilateral;    NOSE SURGERY      RADIOFREQUENCY ABLATION, FACET JOINT, CERVICOTHORACIC Left 11/22/2023    Procedure: C3-4 and C 4-5 Facet RFA;  Surgeon: Tony Garcia MD;  Location: [facility] PAIN MGT;  Service: Pain Management;  Laterality: Left;   To be done 2-3 weeks after the left side.    RADIOFREQUENCY ABLATION, FACET JOINT, CERVICOTHORACIC Right 12/13/2023    Procedure: C3-4 and C 4-5 Facet RFA;  Surgeon: Tony Garcia MD;  Location: [facility] PAIN MGT;  Service: Pain Management;  Laterality: Right;    SELECTIVE INJECTION OF ANESTHETIC AGENT AROUND LUMBAR SPINAL NERVE ROOT BY TRANSFORAMINAL APPROACH Bilateral 5/24/2023    Procedure: Bilateral L5 TF GARY with local;  Surgeon: Tony Garcia MD;  Location: [facility] PAIN MGT;  Service: Pain Management;  Laterality: Bilateral;    SELECTIVE INJECTION OF ANESTHETIC AGENT AROUND LUMBAR SPINAL NERVE ROOT BY TRANSFORAMINAL APPROACH Bilateral 10/25/2023    Procedure: Bilateral L5/S1 TF GARY;  Surgeon: Aric Muro MD;  Location: [facility] PAIN MGT;  Service: Pain Management;  Laterality: Bilateral;          Allergies:   Review of patient's allergies indicates:  No Known Allergies    Social History:  Social History     Socioeconomic History    Marital status: Legally    Tobacco Use    Smoking status: Never     Passive exposure: Never    Smokeless tobacco: Never   Substance and Sexual Activity    Alcohol use: Not Currently     Comment: socially    Drug use: No    Sexual activity: Yes     Partners: Female   Social History Narrative         Social Determinants of Health     Financial Resource Strain: Low Risk  (1/3/2023)    Overall Financial Resource Strain (CARDIA)     Difficulty of Paying Living Expenses: Not hard at all   Food Insecurity: No Food Insecurity (1/3/2023)    Hunger Vital Sign     Worried About Running Out of Food in the Last Year: Never true     Ran Out of Food in the Last Year: Never true   Transportation Needs: No Transportation Needs (1/3/2023)    PRAPARE - Transportation     Lack of Transportation (Medical): No     Lack of Transportation (Non-Medical): No   Stress: No Stress Concern Present (1/3/2023)    [employer] - Occupational Stress Questionnaire     Feeling  of Stress : Only a little   Housing Stability: Low Risk  (1/3/2023)    Housing Stability Vital Sign     Unable to Pay for Housing in the Last Year: No     Number of Places Lived in the Last Year: 2     Unstable Housing in the Last Year: No       Family History:  family history includes Alzheimer's disease in his father; Cataracts in his mother; Glaucoma in his mother; Stroke in his mother.    Home Medications:  Current Outpatient Medications on File Prior to Visit   Medication Sig Dispense Refill    ACCU-CHEK GUIDE ME GLUCOSE MTR Misc CHECK BLOOD SUGAR TWICE A DAY      atorvastatin (LIPITOR) 40 MG tablet Take 1 tablet (40 mg total) by mouth once daily. 90 tablet 3    blood sugar diagnostic Strp To check BG 2 times daily, to use with insurance preferred meter 200 strip 2    blood-glucose sensor (DEXCOM G6 SENSOR) Chen Use 1 sensor every 10 days 3 each 11    blood-glucose transmitter (DEXCOM G6 TRANSMITTER) Chen 1 transmitter every 3 months 1 each 4    folic acid (FOLVITE) 1 MG tablet TAKE 1 TABLET BY MOUTH EVERY DAY 90 tablet 1    furosemide (LASIX) 20 MG tablet Take 1 tablet (20 mg total) by mouth once daily. 30 tablet 6    insulin degludec (TRESIBA FLEXTOUCH U-100) 100 unit/mL (3 mL) insulin pen Inject 24 Units into the skin every evening. Max TDD of 30 units (Patient taking differently: Inject 26 Units into the skin.) 15 mL 6    insulin lispro-aabc (LYUMJEV KWIKPEN U-100 INSULIN) 100 unit/mL pen Inject 10 units 3 times per day before meals, 2-4 units for snack, . + correction scale. MAX TDD Of 50 units (Patient taking differently: Inject 4-8 Units into the skin 3 (three) times daily with meals. Inject 10 units 3 times per day before meals, 2-4 units for snack, . + correction scale. MAX TDD Of 50 units) 5 pen 6    insulin pump cart,auto,BT-cntr (OMNIPOD 5 G6 INTRO KIT, GEN 5,) Crtg Inject 1 Device into the skin Every 3 (three) days. 1 each 0    insulin pump cart,automated,BT (OMNIPOD 5 G6 PODS, GEN 5,) Crtg Inject  "1 Device into the skin Every 3 (three) days. 2 each 11    ketoconazole (NIZORAL) 2 % shampoo Apply to scalp in place of regular shampoo 2-3x per week 120 mL 11    lancets Misc To check BG 2 times daily, to use with insurance preferred meter 200 each 2    metoprolol succinate (TOPROL-XL) 25 MG 24 hr tablet Take 1 tablet (25 mg total) by mouth once daily. 30 tablet 6    pen needle, diabetic 32 gauge x 5/32" Ndle To use 7 times per day with insulin injections- for meals, long-acting insulin and for snacks 200 each 11    testosterone cypionate (DEPOTESTOTERONE CYPIONATE) 200 mg/mL injection Inject 1 mL (200 mg total) into the muscle once a week. 10 mL 1    [DISCONTINUED] ketoconazole (NIZORAL) 2 % shampoo Apply to scalp in place of regular shampoo 2-3x per week 120 mL 11    finasteride (PROPECIA) 1 mg tablet Take 1 tablet by mouth  three times per week (MWF) (Patient not taking: Reported on 5/14/2024) 90 tablet 3    [DISCONTINUED] amitriptyline (ELAVIL) 25 MG tablet TAKE 1 TABLET BY MOUTH NIGHTLY AS NEEDED FOR INSOMNIA. (Patient not taking: Reported on 5/14/2024) 90 tablet 3    [DISCONTINUED] diclofenac (VOLTAREN) 75 MG EC tablet Take 1 tablet (75 mg total) by mouth 2 (two) times daily. (Patient not taking: Reported on 5/14/2024) 60 tablet 1    [DISCONTINUED] dutasteride (AVODART) 0.5 mg capsule Take 1 capsule by mouth daily (Patient not taking: Reported on 5/14/2024) 90 capsule 3    [DISCONTINUED] glucagon (BAQSIMI) 3 mg/actuation Spry 3 mg (one actuation) into a single nostril; if no response, may repeat in 15 minutes using a new intranasal device. (Patient not taking: Reported on 2/28/2024) 2 each 1    [DISCONTINUED] hydrOXYchloroQUINE (PLAQUENIL) 100 mg tablet Take 200 mg by mouth once daily. (Patient not taking: Reported on 5/14/2024)      [DISCONTINUED] insulin lispro (HUMALOG U-100 INSULIN) 100 unit/mL injection To use continuously with omnipod 5. MAX TDD of 100 units (Patient not taking: Reported on 2/28/2024) " "30 mL 6    [DISCONTINUED] methocarbamoL (ROBAXIN) 500 MG Tab Take 1 tablet (500 mg total) by mouth 3 (three) times daily as needed (muscle spasms). May cause drowsiness. (Patient not taking: Reported on 5/14/2024) 90 tablet 0    [DISCONTINUED] methotrexate 2.5 MG Tab Take 6 tablets (15 mg total) by mouth every 7 days. (Patient not taking: Reported on 11/28/2023) 24 tablet 3    [DISCONTINUED] minoxidiL (LONITEN) 2.5 MG tablet Take 1/2 tablet by mouth daily x 2 weeks then increase to 1 tab nightly as tolerated (Patient not taking: Reported on 5/14/2024) 90 tablet 3    [DISCONTINUED] minoxidiL (LONITEN) 2.5 MG tablet Take 2 tabs by mouth nightly as tolerated (Patient not taking: Reported on 5/14/2024) 180 tablet 3    [DISCONTINUED] minoxidiL (LONITEN) 2.5 MG tablet Take 1/2 tablet by mouth daily x 2 weeks then increase to 1 tab nightly as tolerated (Patient not taking: Reported on 5/14/2024) 90 tablet 3    [DISCONTINUED] pregabalin (LYRICA) 50 MG capsule Take 1 capsule (50 mg total) by mouth 3 (three) times daily. (Patient not taking: Reported on 11/28/2023) 270 capsule 1    [DISCONTINUED] sacubitriL-valsartan (ENTRESTO) 24-26 mg per tablet Take 1 tablet by mouth 2 (two) times daily. (Patient not taking: Reported on 5/14/2024) 60 tablet 11     No current facility-administered medications on file prior to visit.       Physical exam:  /76 (BP Location: Right arm, Patient Position: Sitting)   Pulse 73   Ht 5' 8" (1.727 m)   Wt 73.5 kg (162 lb 0.6 oz)   SpO2 99%   BMI 24.64 kg/m²         General: Pt is a 49 y.o. year old male who is AAOx3, in NAD, is pleasant, well nourished, looks stated age  HEENT: PERRL, EOMI, Oral mucosa pink & moist  CVS  No abnormal cardiac pulsations noted on inspection. JVP not raised. The apical impulse is normal on palpation, and is located in the left 5th intercostal space in the mid - clavicular line. No palpable thrills or abnormal pulsations noted. RR, S1 - S2 heard, no murmurs, " "rubs or gallops appreciated.   PUL : CTA B/L. No wheezes/crackles heard   ABD : BS +, soft. No tenderness elicited   LE : No C/C/E. Distal Pulses palpable B/L         LABS:    Chemistry:   Lab Results   Component Value Date     02/21/2024    K 4.1 02/21/2024     02/21/2024    CO2 25 02/21/2024    BUN 17 02/21/2024    CREATININE 1.0 02/21/2024    CALCIUM 9.3 02/21/2024     Cardiac Markers:   Lab Results   Component Value Date    TROPONINI <0.006 01/01/2023     Cardiac Markers (Last 3):   Lab Results   Component Value Date    TROPONINI <0.006 01/01/2023     CBC:   Lab Results   Component Value Date    WBC 6.52 11/28/2023    HGB 14.4 11/28/2023    HCT 43.7 11/28/2023    HCT 47 01/01/2023    MCV 86 11/28/2023     11/28/2023     Lipids:   Lab Results   Component Value Date    CHOL 165 02/21/2024    TRIG 42 02/21/2024    HDL 54 02/21/2024     Coagulation: No results found for: "PT", "INR", "APTT"        Assessment      1. Hyperlipidemia associated with type 2 diabetes mellitus    2. Congestive heart failure, unspecified HF chronicity, unspecified heart failure type    3. SOB (shortness of breath)    4. Type 2 diabetes mellitus without complication, with long-term current use of insulin    5. Family history of heart disease    6. Chest pain, unspecified type    7. Acute combined systolic and diastolic congestive heart failure          Plan:    CHF/Chest pain/shortness of breath   Reports cough and SOB  Family history of heart disease   Echo 4/23 with EF 35%, recovered to EF 55% 7/23  Stress test 4/23 negative   Continue Toprol-XL, lasix 20 mg daily   Fluid restriction, reduce salt intake discussed  Check BNP and CMP  Stopped taking entresto- thinks due to BP running low     Diabetes   A1c 8.7  Continue therapy    HLD   as of 2/24  continue lipitor 40     HTN  Stable   Meds as above     This note was prepared using voice recognition system and is likely to have sound alike errors that may have been " overlooked even after proofreading.     I have reviewed all pertinent chart information.  Plans and recommendations have been formulated under my direct supervision. All questions answered and patient voiced understanding.   If symptoms persist go to the ED.    RTC in 2-3 months         aKrl Payne MD  Cardiology

## 2024-05-15 ENCOUNTER — PATIENT MESSAGE (OUTPATIENT)
Dept: CARDIOLOGY | Facility: CLINIC | Age: 49
End: 2024-05-15
Payer: COMMERCIAL

## 2024-05-15 LAB
OHS QRS DURATION: 90 MS
OHS QTC CALCULATION: 431 MS

## 2024-05-16 RX ORDER — SACUBITRIL AND VALSARTAN 24; 26 MG/1; MG/1
1 TABLET, FILM COATED ORAL 2 TIMES DAILY
Qty: 60 TABLET | Refills: 6 | Status: SHIPPED | OUTPATIENT
Start: 2024-05-16

## 2024-05-17 ENCOUNTER — HOSPITAL ENCOUNTER (OUTPATIENT)
Dept: CARDIOLOGY | Facility: HOSPITAL | Age: 49
Discharge: HOME | End: 2024-05-17
Attending: STUDENT IN AN ORGANIZED HEALTH CARE EDUCATION/TRAINING PROGRAM
Payer: COMMERCIAL

## 2024-05-17 VITALS
DIASTOLIC BLOOD PRESSURE: 76 MMHG | WEIGHT: 162 LBS | HEIGHT: 68 IN | SYSTOLIC BLOOD PRESSURE: 112 MMHG | BODY MASS INDEX: 24.55 KG/M2

## 2024-05-17 DIAGNOSIS — I50.9 CONGESTIVE HEART FAILURE, UNSPECIFIED HF CHRONICITY, UNSPECIFIED HEART FAILURE TYPE: ICD-10-CM

## 2024-05-17 DIAGNOSIS — R06.02 SOB (SHORTNESS OF BREATH): ICD-10-CM

## 2024-05-17 LAB
AORTIC ROOT ANNULUS: 3.23 CM
AV INDEX (PROSTH): 0.76
AV MEAN GRADIENT: 3 MMHG
AV PEAK GRADIENT: 5 MMHG
AV VALVE AREA BY VELOCITY RATIO: 2.28 CM²
AV VALVE AREA: 2.12 CM²
AV VELOCITY RATIO: 0.82
BSA FOR ECHO PROCEDURE: 1.88 M2
CV ECHO LV RWT: 0.46 CM
DOP CALC AO PEAK VEL: 1.13 M/S
DOP CALC AO VTI: 26.6 CM
DOP CALC LVOT AREA: 2.8 CM2
DOP CALC LVOT DIAMETER: 1.88 CM
DOP CALC LVOT PEAK VEL: 0.93 M/S
DOP CALC LVOT STROKE VOLUME: 56.32 CM3
DOP CALC RVOT PEAK VEL: 0.73 M/S
DOP CALC RVOT VTI: 16.7 CM
DOP CALCLVOT PEAK VEL VTI: 20.3 CM
E WAVE DECELERATION TIME: 197.53 MSEC
E/A RATIO: 0.86
E/E' RATIO: 7.67 M/S
ECHO LV POSTERIOR WALL: 1.08 CM (ref 0.6–1.1)
FRACTIONAL SHORTENING: 29 % (ref 28–44)
INTERVENTRICULAR SEPTUM: 1.04 CM (ref 0.6–1.1)
IVC DIAMETER: 1.38 CM
IVRT: 105.61 MSEC
LA MAJOR: 4.74 CM
LA MINOR: 4.53 CM
LA WIDTH: 3.9 CM
LEFT ATRIUM SIZE: 3.22 CM
LEFT ATRIUM VOLUME INDEX MOD: 19.5 ML/M2
LEFT ATRIUM VOLUME INDEX: 26.4 ML/M2
LEFT ATRIUM VOLUME MOD: 36.47 CM3
LEFT ATRIUM VOLUME: 49.45 CM3
LEFT INTERNAL DIMENSION IN SYSTOLE: 3.34 CM (ref 2.1–4)
LEFT VENTRICLE DIASTOLIC VOLUME INDEX: 55.5 ML/M2
LEFT VENTRICLE DIASTOLIC VOLUME: 103.78 ML
LEFT VENTRICLE MASS INDEX: 96 G/M2
LEFT VENTRICLE SYSTOLIC VOLUME INDEX: 24.3 ML/M2
LEFT VENTRICLE SYSTOLIC VOLUME: 45.46 ML
LEFT VENTRICULAR INTERNAL DIMENSION IN DIASTOLE: 4.73 CM (ref 3.5–6)
LEFT VENTRICULAR MASS: 179.98 G
LV LATERAL E/E' RATIO: 7.67 M/S
LV SEPTAL E/E' RATIO: 7.67 M/S
LVOT MG: 1.89 MMHG
LVOT MV: 0.64 CM/S
MV PEAK A VEL: 0.8 M/S
MV PEAK E VEL: 0.69 M/S
MV STENOSIS PRESSURE HALF TIME: 57.28 MS
MV VALVE AREA P 1/2 METHOD: 3.84 CM2
OHS CV RV/LV RATIO: 0.69 CM
PV MEAN GRADIENT: 1 MMHG
PV MV: 0.58 M/S
PV PEAK GRADIENT: 3 MMHG
PV PEAK VELOCITY: 0.85 M/S
RA MAJOR: 4.59 CM
RA PRESSURE ESTIMATED: 3 MMHG
RA WIDTH: 3.76 CM
RIGHT VENTRICULAR END-DIASTOLIC DIMENSION: 3.25 CM
SINUS: 2.92 CM
STJ: 2.93 CM
TDI LATERAL: 0.09 M/S
TDI SEPTAL: 0.09 M/S
TDI: 0.09 M/S
TRICUSPID ANNULAR PLANE SYSTOLIC EXCURSION: 1.49 CM
Z-SCORE OF LEFT VENTRICULAR DIMENSION IN END DIASTOLE: -0.97
Z-SCORE OF LEFT VENTRICULAR DIMENSION IN END SYSTOLE: 0.3

## 2024-05-17 PROCEDURE — 93306 TTE W/DOPPLER COMPLETE: CPT | Mod: 26,,, | Performed by: INTERNAL MEDICINE

## 2024-05-17 PROCEDURE — 93306 TTE W/DOPPLER COMPLETE: CPT

## 2024-05-20 ENCOUNTER — CLINICAL SUPPORT (OUTPATIENT)
Dept: PRIMARY CARE CLINIC | Facility: CLINIC | Age: 49
End: 2024-05-20
Attending: INTERNAL MEDICINE
Payer: COMMERCIAL

## 2024-05-20 ENCOUNTER — OFFICE VISIT (OUTPATIENT)
Dept: PRIMARY CARE CLINIC | Facility: CLINIC | Age: 49
End: 2024-05-20
Payer: COMMERCIAL

## 2024-05-20 VITALS
HEART RATE: 72 BPM | RESPIRATION RATE: 18 BRPM | OXYGEN SATURATION: 99 % | WEIGHT: 162.81 LBS | BODY MASS INDEX: 24.68 KG/M2 | HEIGHT: 68 IN | DIASTOLIC BLOOD PRESSURE: 76 MMHG | SYSTOLIC BLOOD PRESSURE: 122 MMHG

## 2024-05-20 DIAGNOSIS — E11.65 UNCONTROLLED TYPE 2 DIABETES MELLITUS WITH HYPERGLYCEMIA: ICD-10-CM

## 2024-05-20 DIAGNOSIS — E10.3293 MILD NONPROLIFERATIVE DIABETIC RETINOPATHY OF BOTH EYES WITHOUT MACULAR EDEMA ASSOCIATED WITH TYPE 1 DIABETES MELLITUS: Primary | ICD-10-CM

## 2024-05-20 DIAGNOSIS — M54.50 CHRONIC BILATERAL LOW BACK PAIN WITHOUT SCIATICA: ICD-10-CM

## 2024-05-20 DIAGNOSIS — G89.29 CHRONIC BILATERAL LOW BACK PAIN WITHOUT SCIATICA: ICD-10-CM

## 2024-05-20 DIAGNOSIS — R10.13 EPIGASTRIC PAIN: ICD-10-CM

## 2024-05-20 DIAGNOSIS — R05.9 COUGH, UNSPECIFIED TYPE: ICD-10-CM

## 2024-05-20 DIAGNOSIS — E11.9 ENCOUNTER FOR DIABETIC FOOT EXAM: ICD-10-CM

## 2024-05-20 DIAGNOSIS — R10.9 ABDOMINAL PAIN, UNSPECIFIED ABDOMINAL LOCATION: Primary | ICD-10-CM

## 2024-05-20 DIAGNOSIS — R10.31 RIGHT LOWER QUADRANT PAIN: ICD-10-CM

## 2024-05-20 PROCEDURE — 3052F HG A1C>EQUAL 8.0%<EQUAL 9.0%: CPT | Mod: CPTII,S$GLB,, | Performed by: INTERNAL MEDICINE

## 2024-05-20 PROCEDURE — 3061F NEG MICROALBUMINURIA REV: CPT | Mod: CPTII,S$GLB,, | Performed by: INTERNAL MEDICINE

## 2024-05-20 PROCEDURE — 99214 OFFICE O/P EST MOD 30 MIN: CPT | Mod: S$GLB,,, | Performed by: INTERNAL MEDICINE

## 2024-05-20 PROCEDURE — 1159F MED LIST DOCD IN RCRD: CPT | Mod: CPTII,S$GLB,, | Performed by: INTERNAL MEDICINE

## 2024-05-20 PROCEDURE — 3008F BODY MASS INDEX DOCD: CPT | Mod: CPTII,S$GLB,, | Performed by: INTERNAL MEDICINE

## 2024-05-20 PROCEDURE — 3066F NEPHROPATHY DOC TX: CPT | Mod: CPTII,S$GLB,, | Performed by: INTERNAL MEDICINE

## 2024-05-20 PROCEDURE — 4010F ACE/ARB THERAPY RXD/TAKEN: CPT | Mod: CPTII,S$GLB,, | Performed by: INTERNAL MEDICINE

## 2024-05-20 PROCEDURE — 2024F 7 FLD RTA PHOTO EVC RTNOPTHY: CPT | Mod: CPTII,S$GLB,, | Performed by: OPHTHALMOLOGY

## 2024-05-20 PROCEDURE — 92228 IMG RTA DETC/MNTR DS PHY/QHP: CPT | Mod: 26,S$GLB,, | Performed by: OPHTHALMOLOGY

## 2024-05-20 PROCEDURE — 3078F DIAST BP <80 MM HG: CPT | Mod: CPTII,S$GLB,, | Performed by: INTERNAL MEDICINE

## 2024-05-20 PROCEDURE — 99999 PR PBB SHADOW E&M-EST. PATIENT-LVL V: CPT | Mod: PBBFAC,,, | Performed by: INTERNAL MEDICINE

## 2024-05-20 PROCEDURE — 92228 IMG RTA DETC/MNTR DS PHY/QHP: CPT | Mod: TC,S$GLB,, | Performed by: INTERNAL MEDICINE

## 2024-05-20 PROCEDURE — 3074F SYST BP LT 130 MM HG: CPT | Mod: CPTII,S$GLB,, | Performed by: INTERNAL MEDICINE

## 2024-05-22 ENCOUNTER — PATIENT MESSAGE (OUTPATIENT)
Dept: INTERNAL MEDICINE | Facility: CLINIC | Age: 49
End: 2024-05-22
Payer: COMMERCIAL

## 2024-05-22 ENCOUNTER — PATIENT MESSAGE (OUTPATIENT)
Dept: PRIMARY CARE CLINIC | Facility: CLINIC | Age: 49
End: 2024-05-22
Payer: COMMERCIAL

## 2024-05-22 ENCOUNTER — LAB VISIT (OUTPATIENT)
Dept: LAB | Facility: OTHER | Age: 49
End: 2024-05-22
Attending: NURSE PRACTITIONER
Payer: COMMERCIAL

## 2024-05-22 DIAGNOSIS — K22.89 ESOPHAGEAL THICKENING: ICD-10-CM

## 2024-05-22 DIAGNOSIS — E10.9 INSULIN DEPENDENT DIABETES MELLITUS TYPE IA: ICD-10-CM

## 2024-05-22 DIAGNOSIS — R10.13 EPIGASTRIC PAIN: Primary | ICD-10-CM

## 2024-05-22 LAB
ALBUMIN SERPL BCP-MCNC: 4 G/DL (ref 3.5–5.2)
ALP SERPL-CCNC: 119 U/L (ref 55–135)
ALT SERPL W/O P-5'-P-CCNC: 19 U/L (ref 10–44)
ANION GAP SERPL CALC-SCNC: 9 MMOL/L (ref 8–16)
AST SERPL-CCNC: 18 U/L (ref 10–40)
BILIRUB SERPL-MCNC: 0.6 MG/DL (ref 0.1–1)
BUN SERPL-MCNC: 15 MG/DL (ref 6–20)
CALCIUM SERPL-MCNC: 9.6 MG/DL (ref 8.7–10.5)
CHLORIDE SERPL-SCNC: 103 MMOL/L (ref 95–110)
CHOLEST SERPL-MCNC: 134 MG/DL (ref 120–199)
CHOLEST/HDLC SERPL: 3 {RATIO} (ref 2–5)
CO2 SERPL-SCNC: 23 MMOL/L (ref 23–29)
CREAT SERPL-MCNC: 1.3 MG/DL (ref 0.5–1.4)
EST. GFR  (NO RACE VARIABLE): >60 ML/MIN/1.73 M^2
ESTIMATED AVG GLUCOSE: 266 MG/DL (ref 68–131)
GLUCOSE SERPL-MCNC: 401 MG/DL (ref 70–110)
HBA1C MFR BLD: 10.9 % (ref 4–5.6)
HDLC SERPL-MCNC: 45 MG/DL (ref 40–75)
HDLC SERPL: 33.6 % (ref 20–50)
LDLC SERPL CALC-MCNC: 75 MG/DL (ref 63–159)
NONHDLC SERPL-MCNC: 89 MG/DL
POTASSIUM SERPL-SCNC: 4.4 MMOL/L (ref 3.5–5.1)
PROT SERPL-MCNC: 7.6 G/DL (ref 6–8.4)
SODIUM SERPL-SCNC: 135 MMOL/L (ref 136–145)
TRIGL SERPL-MCNC: 70 MG/DL (ref 30–150)

## 2024-05-22 PROCEDURE — 80053 COMPREHEN METABOLIC PANEL: CPT | Performed by: NURSE PRACTITIONER

## 2024-05-22 PROCEDURE — 83036 HEMOGLOBIN GLYCOSYLATED A1C: CPT | Performed by: NURSE PRACTITIONER

## 2024-05-22 PROCEDURE — 36415 COLL VENOUS BLD VENIPUNCTURE: CPT | Performed by: NURSE PRACTITIONER

## 2024-05-22 PROCEDURE — 80061 LIPID PANEL: CPT | Performed by: NURSE PRACTITIONER

## 2024-05-22 NOTE — PROGRESS NOTES
"Subjective:      Jason Sandra is a 49 y.o. male who returns today regarding his abnormal CT.    Established patient.  Previous patient of Dr. Bentley.    Currently on TRT; self injecting 200 mg every week; due for labs  Presents today at request of PCP for possible urinary retention.  Patient had CT showing distended bladder yesterday.  He denies bothersome LUTS today; denies gh, flank pain.     The following portions of the patient's history were reviewed and updated as appropriate: allergies, current medications, past family history, past medical history, past social history, past surgical history and problem list.    Review of Systems  A comprehensive multipoint review of systems was negative except as otherwise stated in the HPI.     Objective:   Vitals: /67 (BP Location: Right arm, Patient Position: Sitting, BP Method: Large (Automatic))   Pulse 69   Ht 5' 8" (1.727 m)   Wt 74.3 kg (163 lb 14.6 oz)   BMI 24.92 kg/m²     Physical Exam   General: alert and oriented, no acute distress  Respiratory: Symmetric expansion, non-labored breathing  Cardiovascular: regular rate and rhythm, no peripheral edema  Abdomen: soft, non distended  Skin: normal coloration and turgor, no rashes, no suspicious skin lesions noted  Neuro: no gross deficits  Psych: normal judgment and insight, normal mood/affect, and non-anxious    Lab Review   Urinalysis demonstrates   Lab Results   Component Value Date    WBC 6.09 05/20/2024    HGB 12.7 (L) 05/20/2024    HCT 36.9 (L) 05/20/2024    HCT 47 01/01/2023    MCV 85 05/20/2024     05/20/2024     Lab Results   Component Value Date    CREATININE 1.3 05/22/2024    BUN 15 05/22/2024     Lab Results   Component Value Date    PSA 0.74 05/12/2011    PSADIAG 0.61 11/28/2023       Imaging   CT ABDOMEN PELVIS WITHOUT CONTRAST     CLINICAL HISTORY:  Abdominal pain, acute, nonlocalized;Epigastric pain;RLQ abdominal pain (Age >= 14y); Epigastric pain     TECHNIQUE:  Low dose axial " images, sagittal and coronal reformations were obtained from the lung bases to the pubic symphysis.  Oral contrast was not administered.     COMPARISON:  None     FINDINGS:  Images of the lower thorax are remarkable for minimal dependent atelectasis.     There is thickening of the distal esophagus, correlation with any history of reflux esophagitis. Direct visualization as warranted.  The liver, spleen, pancreas, gallbladder and adrenal glands have a grossly unremarkable noncontrast appearance. There is no biliary dilation or ascites. No significant abdominal lymphadenopathy.     There is right nonobstructive nephrolithiasis, no left nephrolithiasis. No hydronephrosis. The bilateral ureters are unremarkable without calculi seen. The urinary bladder is distended without wall thickening. The prostate is not enlarged.     There is moderate stool throughout the colon. The terminal ileum is unremarkable. The appendix is unremarkable. The small bowel is grossly unremarkable. There are a few scattered shotty periaortic, pericaval, and mesenteric lymph nodes. There is atherosclerotic calcification of the aorta and its branches. No focal organized pelvic fluid collection.     There are degenerative changes of the spine. No significant inguinal lymphadenopathy.     Impression:     1. Right nonobstructive nephrolithiasis, no findings to suggest obstructive uropathy.  Please note, the urinary bladder is distended, correlation with any history of urinary retention or outlet obstruction.  2. Thickening of the distal esophagus, correlation with any history of reflux esophagitis.  Direct visualization as warranted.  3. Please see above for several additional findings.        Electronically signed by:Aric Stevens MD  Date:                                            05/21/2024  Time:                                           09:18         Bladder Scan PVR: 0 cc        Assessment and Plan:   1. Low testosterone in male  --continue   mg every week   --due for labs; orders in       2. Abnormal CT scan, bladder  --pt did not empty his bladder before CT; denies DENITA, UR; bladder scan today 0 cc      --rtc in 1 year  --repeat labs q 6 months for TRT       This note is dictated on M*Modal word recognition program.  There are word recognition mistakes that are occasionally missed on review.

## 2024-05-22 NOTE — PROGRESS NOTES
Subjective:       Patient ID: Jason Sandra is a 49 y.o. male.    Chief Complaint: Cough, Fatigue, and Headache    Cough  Associated symptoms include chest pain, headaches and shortness of breath.   Fatigue  Associated symptoms include abdominal pain, chest pain, coughing, fatigue and headaches.   Headache   Associated symptoms include abdominal pain, back pain and coughing.     Patient visit today with history of lumbosacral radiculopathy cervical spondylosis restrictive lung disease hyperlipidemia CHF chronic prostatitis positive KIRTI hypogonadism in male insulin-dependent diabetes mellitus type 1 a patient visit today with complaint of severe bad cough for 2 months cough is so severe causing left chest pain and epigastric pain when he cough when not even coughing he also having lower back pain radiates to the front and chronic fatigue tired no energy he had echocardiogram that show CHF ejection fraction 35% last year but now up to 50% patient went to urgent care had the fluid COVID test negative even over-the-counter medication but his symptom persists he denies short of breath chest pain dyspnea with exertion no weight gain weight loss no change in bowel habit or urination  Review of Systems   Constitutional:  Positive for fatigue. Negative for unexpected weight change.   Respiratory:  Positive for cough and shortness of breath.    Cardiovascular:  Positive for chest pain. Negative for palpitations.   Gastrointestinal:  Positive for abdominal pain.   Musculoskeletal:  Positive for back pain.   Neurological:  Positive for headaches.   Psychiatric/Behavioral:  Negative for dysphoric mood.        Objective:      Physical Exam  Vitals and nursing note reviewed.   Constitutional:       General: He is not in acute distress.     Appearance: He is well-developed.   HENT:      Head: Normocephalic and atraumatic.      Right Ear: External ear normal.      Left Ear: External ear normal.      Nose: Nose normal.   Eyes:       Extraocular Movements: Extraocular movements intact.      Conjunctiva/sclera: Conjunctivae normal.      Pupils: Pupils are equal, round, and reactive to light.   Neck:      Thyroid: No thyromegaly.   Cardiovascular:      Rate and Rhythm: Normal rate and regular rhythm.      Heart sounds: Normal heart sounds. No murmur heard.     No friction rub. No gallop.   Pulmonary:      Effort: Pulmonary effort is normal. No respiratory distress.      Breath sounds: Normal breath sounds. No wheezing.   Abdominal:      General: Bowel sounds are normal. There is no distension.      Palpations: Abdomen is soft.      Tenderness: There is no abdominal tenderness.   Musculoskeletal:         General: No tenderness or deformity. Normal range of motion.      Cervical back: Normal range of motion and neck supple.   Lymphadenopathy:      Cervical: No cervical adenopathy.   Skin:     General: Skin is warm and dry.      Findings: No erythema or rash.   Neurological:      Mental Status: He is alert and oriented to person, place, and time.   Psychiatric:         Thought Content: Thought content normal.         Judgment: Judgment normal.         Assessment:       1. Abdominal pain, unspecified abdominal location    2. Uncontrolled type 2 diabetes mellitus with hyperglycemia    3. Cough, unspecified type    4. Chronic bilateral low back pain without sciatica    5. Epigastric pain    6. Right lower quadrant pain    7. Encounter for diabetic foot exam        Plan:       Abdominal pain, unspecified abdominal location  -     CT Abdomen Pelvis  Without Contrast; Future; Expected date: 05/20/2024    Uncontrolled type 2 diabetes mellitus with hyperglycemia  -     Diabetic Eye Screening Photo; Future  -     Foot Exam Performed    Cough, unspecified type  -     Troponin I; Future; Expected date: 05/20/2024  -     BNP; Future; Expected date: 05/20/2024  -     Sedimentation rate; Future; Expected date: 05/20/2024    Chronic bilateral low back pain  without sciatica  Comments:  Continue with symptomatic treatment and physical therapy    Epigastric pain  -     CBC Auto Differential; Future; Expected date: 05/20/2024  -     Comprehensive Metabolic Panel; Future; Expected date: 05/20/2024  -     Lipase; Future; Expected date: 05/20/2024  -     CT Abdomen Pelvis  Without Contrast; Future; Expected date: 05/20/2024  -     CT Abdomen Pelvis  Without Contrast; Future; Expected date: 05/20/2024  -     Urinalysis; Future; Expected date: 05/20/2024    Right lower quadrant pain  -     CT Abdomen Pelvis  Without Contrast; Future; Expected date: 05/20/2024    Encounter for diabetic foot exam  Comments:  Normal feet exam bilaterally        Medication List with Changes/Refills   Current Medications    ACCU-CHEK GUIDE ME GLUCOSE MTR MISC    CHECK BLOOD SUGAR TWICE A DAY    ATORVASTATIN (LIPITOR) 40 MG TABLET    Take 1 tablet (40 mg total) by mouth once daily.    BLOOD SUGAR DIAGNOSTIC STRP    To check BG 2 times daily, to use with insurance preferred meter    BLOOD-GLUCOSE SENSOR (DEXCOM G6 SENSOR) RADHA    Use 1 sensor every 10 days    BLOOD-GLUCOSE TRANSMITTER (DEXCOM G6 TRANSMITTER) RADHA    1 transmitter every 3 months    FINASTERIDE (PROPECIA) 1 MG TABLET    Take 1 tablet by mouth  three times per week (MWF)    FOLIC ACID (FOLVITE) 1 MG TABLET    TAKE 1 TABLET BY MOUTH EVERY DAY    FUROSEMIDE (LASIX) 20 MG TABLET    Take 1 tablet (20 mg total) by mouth once daily.    INSULIN DEGLUDEC (TRESIBA FLEXTOUCH U-100) 100 UNIT/ML (3 ML) INSULIN PEN    Inject 24 Units into the skin every evening. Max TDD of 30 units    INSULIN LISPRO-AABC (LYUMJEV KWIKPEN U-100 INSULIN) 100 UNIT/ML PEN    Inject 10 units 3 times per day before meals, 2-4 units for snack, . + correction scale. MAX TDD Of 50 units    INSULIN PUMP CART,AUTO,BT-CNTR (OMNIPOD 5 G6 INTRO KIT, GEN 5,) CRTG    Inject 1 Device into the skin Every 3 (three) days.    INSULIN PUMP CART,AUTOMATED,BT (OMNIPOD 5 G6 PODS, GEN 5,)  "CRTG    Inject 1 Device into the skin Every 3 (three) days.    KETOCONAZOLE (NIZORAL) 2 % SHAMPOO    Apply to scalp in place of regular shampoo 2-3x per week    LANCETS MISC    To check BG 2 times daily, to use with insurance preferred meter    METOPROLOL SUCCINATE (TOPROL-XL) 25 MG 24 HR TABLET    Take 1 tablet (25 mg total) by mouth once daily.    PEN NEEDLE, DIABETIC 32 GAUGE X 5/32" NDLE    To use 7 times per day with insulin injections- for meals, long-acting insulin and for snacks    SACUBITRIL-VALSARTAN (ENTRESTO) 24-26 MG PER TABLET    Take 1 tablet by mouth 2 (two) times daily.    TESTOSTERONE CYPIONATE (DEPOTESTOTERONE CYPIONATE) 200 MG/ML INJECTION    Inject 1 mL (200 mg total) into the muscle once a week.        "

## 2024-05-23 ENCOUNTER — PATIENT MESSAGE (OUTPATIENT)
Dept: CARDIOLOGY | Facility: CLINIC | Age: 49
End: 2024-05-23
Payer: COMMERCIAL

## 2024-05-23 ENCOUNTER — OFFICE VISIT (OUTPATIENT)
Dept: UROLOGY | Facility: CLINIC | Age: 49
End: 2024-05-23
Payer: COMMERCIAL

## 2024-05-23 VITALS
DIASTOLIC BLOOD PRESSURE: 67 MMHG | HEIGHT: 68 IN | WEIGHT: 163.94 LBS | BODY MASS INDEX: 24.85 KG/M2 | SYSTOLIC BLOOD PRESSURE: 112 MMHG | HEART RATE: 69 BPM

## 2024-05-23 DIAGNOSIS — N32.89 DISTENDED BLADDER: ICD-10-CM

## 2024-05-23 DIAGNOSIS — R79.89 LOW TESTOSTERONE IN MALE: Primary | ICD-10-CM

## 2024-05-23 DIAGNOSIS — R93.41 ABNORMAL CT SCAN, BLADDER: ICD-10-CM

## 2024-05-23 PROBLEM — E10.3293 MILD NONPROLIFERATIVE DIABETIC RETINOPATHY OF BOTH EYES WITHOUT MACULAR EDEMA ASSOCIATED WITH TYPE 1 DIABETES MELLITUS: Status: ACTIVE | Noted: 2024-05-23

## 2024-05-23 LAB
BILIRUB SERPL-MCNC: NORMAL MG/DL
BLOOD URINE, POC: NORMAL
CLARITY, POC UA: CLEAR
COLOR, POC UA: YELLOW
GLUCOSE UR QL STRIP: 250
KETONES UR QL STRIP: NORMAL
LEUKOCYTE ESTERASE URINE, POC: NORMAL
NITRITE, POC UA: NORMAL
PH, POC UA: 5
POC RESIDUAL URINE VOLUME: 0 ML (ref 0–100)
PROTEIN, POC: NORMAL
SPECIFIC GRAVITY, POC UA: 1.02
UROBILINOGEN, POC UA: NORMAL

## 2024-05-23 PROCEDURE — 87086 URINE CULTURE/COLONY COUNT: CPT | Performed by: NURSE PRACTITIONER

## 2024-05-23 PROCEDURE — 81002 URINALYSIS NONAUTO W/O SCOPE: CPT | Mod: S$GLB,,, | Performed by: NURSE PRACTITIONER

## 2024-05-23 PROCEDURE — 4010F ACE/ARB THERAPY RXD/TAKEN: CPT | Mod: CPTII,S$GLB,, | Performed by: NURSE PRACTITIONER

## 2024-05-23 PROCEDURE — 1160F RVW MEDS BY RX/DR IN RCRD: CPT | Mod: CPTII,S$GLB,, | Performed by: NURSE PRACTITIONER

## 2024-05-23 PROCEDURE — 51798 US URINE CAPACITY MEASURE: CPT | Mod: S$GLB,,, | Performed by: NURSE PRACTITIONER

## 2024-05-23 PROCEDURE — 3074F SYST BP LT 130 MM HG: CPT | Mod: CPTII,S$GLB,, | Performed by: NURSE PRACTITIONER

## 2024-05-23 PROCEDURE — 99999 PR PBB SHADOW E&M-EST. PATIENT-LVL IV: CPT | Mod: PBBFAC,,, | Performed by: NURSE PRACTITIONER

## 2024-05-23 PROCEDURE — 1159F MED LIST DOCD IN RCRD: CPT | Mod: CPTII,S$GLB,, | Performed by: NURSE PRACTITIONER

## 2024-05-23 PROCEDURE — 3066F NEPHROPATHY DOC TX: CPT | Mod: CPTII,S$GLB,, | Performed by: NURSE PRACTITIONER

## 2024-05-23 PROCEDURE — 3046F HEMOGLOBIN A1C LEVEL >9.0%: CPT | Mod: CPTII,S$GLB,, | Performed by: NURSE PRACTITIONER

## 2024-05-23 PROCEDURE — 3008F BODY MASS INDEX DOCD: CPT | Mod: CPTII,S$GLB,, | Performed by: NURSE PRACTITIONER

## 2024-05-23 PROCEDURE — 99214 OFFICE O/P EST MOD 30 MIN: CPT | Mod: S$GLB,,, | Performed by: NURSE PRACTITIONER

## 2024-05-23 PROCEDURE — 3078F DIAST BP <80 MM HG: CPT | Mod: CPTII,S$GLB,, | Performed by: NURSE PRACTITIONER

## 2024-05-23 PROCEDURE — 3061F NEG MICROALBUMINURIA REV: CPT | Mod: CPTII,S$GLB,, | Performed by: NURSE PRACTITIONER

## 2024-05-24 ENCOUNTER — TELEPHONE (OUTPATIENT)
Dept: PRIMARY CARE CLINIC | Facility: CLINIC | Age: 49
End: 2024-05-24
Payer: COMMERCIAL

## 2024-05-25 LAB — BACTERIA UR CULT: NO GROWTH

## 2024-05-27 ENCOUNTER — TELEPHONE (OUTPATIENT)
Dept: PRIMARY CARE CLINIC | Facility: CLINIC | Age: 49
End: 2024-05-27
Payer: COMMERCIAL

## 2024-05-27 DIAGNOSIS — K20.90 ESOPHAGITIS: ICD-10-CM

## 2024-05-27 DIAGNOSIS — R10.13 EPIGASTRIC PAIN: Primary | ICD-10-CM

## 2024-05-27 RX ORDER — PANTOPRAZOLE SODIUM 40 MG/1
40 TABLET, DELAYED RELEASE ORAL DAILY
Qty: 30 TABLET | Refills: 1 | Status: SHIPPED | OUTPATIENT
Start: 2024-05-27 | End: 2025-05-27

## 2024-05-28 ENCOUNTER — TELEPHONE (OUTPATIENT)
Dept: DIABETES | Facility: CLINIC | Age: 49
End: 2024-05-28
Payer: COMMERCIAL

## 2024-05-29 ENCOUNTER — PATIENT MESSAGE (OUTPATIENT)
Dept: DIABETES | Facility: CLINIC | Age: 49
End: 2024-05-29

## 2024-05-29 ENCOUNTER — OFFICE VISIT (OUTPATIENT)
Dept: DIABETES | Facility: CLINIC | Age: 49
End: 2024-05-29
Payer: COMMERCIAL

## 2024-05-29 VITALS
HEIGHT: 68 IN | BODY MASS INDEX: 24.25 KG/M2 | WEIGHT: 160 LBS | DIASTOLIC BLOOD PRESSURE: 78 MMHG | SYSTOLIC BLOOD PRESSURE: 119 MMHG

## 2024-05-29 DIAGNOSIS — E10.9 INSULIN DEPENDENT DIABETES MELLITUS TYPE IA: Primary | ICD-10-CM

## 2024-05-29 DIAGNOSIS — E78.5 HYPERLIPIDEMIA LDL GOAL <100: ICD-10-CM

## 2024-05-29 DIAGNOSIS — E10.9 KETOSIS PRONE DIABETES: ICD-10-CM

## 2024-05-29 DIAGNOSIS — E10.65 TYPE 1 DIABETES MELLITUS WITH HYPERGLYCEMIA: ICD-10-CM

## 2024-05-29 DIAGNOSIS — Z71.9 HEALTH EDUCATION/COUNSELING: ICD-10-CM

## 2024-05-29 DIAGNOSIS — R79.89 LOW SERUM C-PEPTIDE: ICD-10-CM

## 2024-05-29 PROCEDURE — 3008F BODY MASS INDEX DOCD: CPT | Mod: CPTII,95,, | Performed by: NURSE PRACTITIONER

## 2024-05-29 PROCEDURE — 1160F RVW MEDS BY RX/DR IN RCRD: CPT | Mod: CPTII,95,, | Performed by: NURSE PRACTITIONER

## 2024-05-29 PROCEDURE — 3066F NEPHROPATHY DOC TX: CPT | Mod: CPTII,95,, | Performed by: NURSE PRACTITIONER

## 2024-05-29 PROCEDURE — 3074F SYST BP LT 130 MM HG: CPT | Mod: CPTII,95,, | Performed by: NURSE PRACTITIONER

## 2024-05-29 PROCEDURE — 95251 CONT GLUC MNTR ANALYSIS I&R: CPT | Mod: NDTC,,, | Performed by: NURSE PRACTITIONER

## 2024-05-29 PROCEDURE — 1159F MED LIST DOCD IN RCRD: CPT | Mod: CPTII,95,, | Performed by: NURSE PRACTITIONER

## 2024-05-29 PROCEDURE — 3046F HEMOGLOBIN A1C LEVEL >9.0%: CPT | Mod: CPTII,95,, | Performed by: NURSE PRACTITIONER

## 2024-05-29 PROCEDURE — 3078F DIAST BP <80 MM HG: CPT | Mod: CPTII,95,, | Performed by: NURSE PRACTITIONER

## 2024-05-29 PROCEDURE — 3061F NEG MICROALBUMINURIA REV: CPT | Mod: CPTII,95,, | Performed by: NURSE PRACTITIONER

## 2024-05-29 PROCEDURE — 4010F ACE/ARB THERAPY RXD/TAKEN: CPT | Mod: CPTII,95,, | Performed by: NURSE PRACTITIONER

## 2024-05-29 PROCEDURE — 99214 OFFICE O/P EST MOD 30 MIN: CPT | Mod: 95,,, | Performed by: NURSE PRACTITIONER

## 2024-05-29 RX ORDER — INSULIN LISPRO-AABC 100 [IU]/ML
INJECTION, SOLUTION SUBCUTANEOUS
Qty: 5 PEN | Refills: 6 | Status: SHIPPED | OUTPATIENT
Start: 2024-05-29

## 2024-05-29 RX ORDER — PEN NEEDLE, DIABETIC 30 GX3/16"
NEEDLE, DISPOSABLE MISCELLANEOUS
Qty: 200 EACH | Refills: 11 | Status: SHIPPED | OUTPATIENT
Start: 2024-05-29

## 2024-05-29 RX ORDER — BLOOD-GLUCOSE TRANSMITTER
EACH MISCELLANEOUS
Qty: 1 EACH | Refills: 4 | Status: SHIPPED | OUTPATIENT
Start: 2024-05-29

## 2024-05-29 RX ORDER — INSULIN DEGLUDEC 100 U/ML
34 INJECTION, SOLUTION SUBCUTANEOUS NIGHTLY
Qty: 15 ML | Refills: 6 | Status: SHIPPED | OUTPATIENT
Start: 2024-05-29

## 2024-05-29 RX ORDER — BLOOD-GLUCOSE SENSOR
EACH MISCELLANEOUS
Qty: 3 EACH | Refills: 11 | Status: SHIPPED | OUTPATIENT
Start: 2024-05-29

## 2024-05-29 NOTE — PATIENT INSTRUCTIONS
Increase Tresiba to 34 units nighty   Same time every night   Do not need to eat to take      Adjust Lyumjev to 10 units with meals   If you skip a meal, skip the Lyumjev   2-4 units for snacks      Start doing sliding scale/correction scale as needed with meal time insulin   With using correction scale: before meals only -- before eating only   150-200: +1 unit  201-250: +2 units  251-300: +3 units  301-350: +4 units  >350: +5 units

## 2024-05-29 NOTE — Clinical Note
Hey!  His readings look terrible!!!  He is adamant that he is taking his insulin and reports that it has not  and that he is taking it correctly Something is just not adding up I increased his insulin and encouraged him to follow-up with you as scheduled on  so he can get back on his pump I am hoping that his numbers look much better in that he stays on the pump a little bit longer.  He may need to see you a week after starting the pump just to make sure everything is going smoothly and that he staying on it.  I think we may have to hold his hand  Thanks Luly

## 2024-05-29 NOTE — ASSESSMENT & PLAN NOTE
Antibodies have been negative but C-peptide level was low though x 2 episodes   Treated more as a type 1 diabetic  Ketosis prone  Uncontrolled   He says that he has not missing doses of prandial insulin--he is pretty adamant about this  We discussed that his blood sugars are extremely high and if he has not missing any doses of insulin, the insulin is not , insulin is not leaking out, there is no problems with injection sites---then we need to increase his insulin doses    Sounds like he only wore the Omnipod 5 for about a week---I am encouraging him to give it another chance and to follow up with diabetes education.  He seems to be concerned about the cost of the Omnipod 5.  After the visit I called the pharmacy and they are telling me the Omnipod 5 pods will be 25 dollars for a 30 day supply and the Humalog insulin in the vial will be 9 dollars   discussed that pain and anxiety can increase readings     Encouraged him to follow up with diabetes education to restart his Omnipod 5 on  --we discussed that he will have much better glycemic control on an insulin pump with AID    He reports he has been having issues with his Dexcom.  He has not been performing fingersticks though and his Dexcom reading high and his A1c in blood sugars on labs are high so we discussed that it is likely accurate  I encouraged him to perform a fingerstick if he feels the Dexcom is reading inaccurately  I also encouraged him to report inaccurate sees to Dexcom to get replacements  He is going to come by our office to  a Dexcom replacement this week      -- Medication Changes:      Increase Tresiba to 34 units nighty   Same time every night   Do not need to eat to take      Adjust Lyumjev to 10 units with meals   If you skip a meal, skip the Lyumjev   2-4 units for snacks      Start doing sliding scale/correction scale as needed with meal time insulin   With using correction scale: before meals only -- before eating only    150-200: +1 unit  201-250: +2 units  251-300: +3 units  301-350: +4 units  >350: +5 units       Meet with education at [facility] for pump - omnipod 5 - restart     Omnipod 5 insulin pump settings:    Basal: 0.9 units/hr   ICR: 1:10-- set doses - 8-10 units with meals   ISF: 1:40  Target: 120  IOB: 3.5 hours            -- Reviewed goals of therapy are to get the best control we can without hypoglycemia.  -- Reviewed patient's current insulin regimen. Clarified proper insulin dose and timing in relation to meals, etc. Insulin injection sites and proper rotation instructed.    -- Advised frequent self blood glucose monitoring.  Patient encouraged to document glucose results and bring them to every clinic visit. Continue dexcom personal CGM g6 -- supplies from  pharmacy - [facility].   -- Hypoglycemia precautions discussed. Instructed on precautions before driving.    -- Call for Bg repeatedly < 70 or > 200.   -- Close adherence to lifestyle changes recommended.   -- Periodic follow ups for eye evaluations, foot care and dental care suggested.  -- Refer to diabetes education -- [facility] location for insulin pump restart       Patient has diabetes mellitus and manages diabetes with intensive insulin regimen and uses prandial and basal insulin daily  Patient requires a therapeutic CGM and is willing to use therapeutic CGM for the necessary frequent adjustments of insulin therapy.  I have completed an in-person visit during the previous 6 months and will continue to have in-person visits every 6 months to assess adherence to their CGM regimen and diabetes treatment plan.  Due to COVID pandemic and need for remote monitoring this tool is medically necessary

## 2024-05-29 NOTE — PROGRESS NOTES
The patient location is: [place]- [place]   The chief complaint leading to consultation is: diabetes management - follow up     Visit type: audiovisual    Face to Face time with patient: 25 minutes   30 minutes of total time spent on the encounter, which includes face to face time and non-face to face time preparing to see the patient (eg, review of tests), Obtaining and/or reviewing separately obtained history, Documenting clinical information in the electronic or other health record, Independently interpreting results (not separately reported) and communicating results to the patient/family/caregiver, or Care coordination (not separately reported).         Each patient to whom he or she provides medical services by telemedicine is:  (1) informed of the relationship between the physician and patient and the respective role of any other health care provider with respect to management of the patient; and (2) notified that he or she may decline to receive medical services by telemedicine and may withdraw from such care at any time.    Notes:       CC:   Chief Complaint   Patient presents with    Diabetes Mellitus       HPI: Jason Sandra is a 49 y.o. male presents for a follow up visit today for the management of diabetes     He was diagnosed with Type 2  diabetes at least since 2011 on routine labs   Pre diabetes prior to that   He was initially started on Metformin     Family hx of diabetes: maybe younger brother, father   Hospitalized for diabetes: yes- DKA in 01/2023  Insulin therapy: 2023  No personal or FH of thyroid cancer or personal of pancreatic cancer or pancreatitis.       hospitalized for DKA on January 1, 2023  On his arrival to the emergency room he reported that he had been on any medications for diabetes in over a year  His blood sugar was 316  + beta -6.2  A1c was >14% in October 2021   He was sent home on basal insulin    He works at [employer]      1/17/2023- DINA negative   Anti islet cell antibody  negative, insulin antibody negative  C-peptide level low at 0.59--with a blood sugar of 331  3/2023- repeat c-peptide level is low at 0.61 with fasting glucose of 224 -- repeat DINA negative and zinc transporter negative   Confirm - insulin deficiency - treated as T1DM         Our last visit was in February of 2024  At that visit we continued the Tresiba nightly  Adjusted the Lyumjev with meals   Encouraged him to follow up with education at [facility] for pump rediscussion and to discuss insulin administration  It looks like he met with Maddison from diabetes education in March of 2024  It looks like he was supposed to follow-up 8 weeks later for a possible Omnipod restart  His A1c resulted at 10.9%  His blood sugars are markedly above goal on Dexcom download  Please see attached  Blood sugar was 401 on recent labs  He reports that he has been in a lot of pain for the last month    He believes this is why his numbers are high     He is scheduled to restart his omnipod on 6/5 with maddison Bowman with education                   DIABETES COMPLICATIONS: cardiovascular disease-- CHF EF- 35% -- now back up to 50%       Diabetes Management Status    ASA:  No    Statin: Taking Lipitor 40 mg nightly -  ACE/ARB: Taking- Entresto       The 10-year ASCVD risk score (Darleen DK, et al., 2019) is: 4.2%    Values used to calculate the score:      Age: 49 years      Sex: Male      Is Non- : No      Diabetic: Yes      Tobacco smoker: No      Systolic Blood Pressure: 119 mmHg      Is BP treated: No      HDL Cholesterol: 45 mg/dL      Total Cholesterol: 134 mg/dL    Screening or Prevention Patient's value Goal Complete/Controlled?   HgA1C Testing and Control   Lab Results   Component Value Date    HGBA1C 10.9 (H) 05/22/2024      Annually/Less than 8% No     Lipid profile : 05/22/2024 Annually No     LDL control Lab Results   Component Value Date    LDLCALC 75.0 05/22/2024    Annually/Less than 100 mg/dl  No      Nephropathy screening Lab Results   Component Value Date    LABMICR 10.0 02/21/2024     Lab Results   Component Value Date    PROTEINUA Negative 05/20/2024    Annually Yes     Blood pressure BP Readings from Last 1 Encounters:   05/29/24 119/78    Less than 140/90 Yes     Dilated retinal exam : 05/23/2024 Annually Yes     Foot exam   : 05/20/2024 Annually No         CURRENT A1C:    Hemoglobin A1C   Date Value Ref Range Status   05/22/2024 10.9 (H) 4.0 - 5.6 % Final     Comment:     ADA Screening Guidelines:  5.7-6.4%  Consistent with prediabetes  >or=6.5%  Consistent with diabetes    High levels of fetal hemoglobin interfere with the HbA1C  assay. Heterozygous hemoglobin variants (HbS, HgC, etc)do  not significantly interfere with this assay.   However, presence of multiple variants may affect accuracy.     02/21/2024 9.0 (H) 4.0 - 5.6 % Final     Comment:     ADA Screening Guidelines:  5.7-6.4%  Consistent with prediabetes  >or=6.5%  Consistent with diabetes    High levels of fetal hemoglobin interfere with the HbA1C  assay. Heterozygous hemoglobin variants (HbS, HgC, etc)do  not significantly interfere with this assay.   However, presence of multiple variants may affect accuracy.     12/05/2023 10.8 (H) 4.0 - 5.6 % Final     Comment:     ADA Screening Guidelines:  5.7-6.4%  Consistent with prediabetes  >or=6.5%  Consistent with diabetes    High levels of fetal hemoglobin interfere with the HbA1C  assay. Heterozygous hemoglobin variants (HbS, HgC, etc)do  not significantly interfere with this assay.   However, presence of multiple variants may affect accuracy.         GOAL A1C: 6.5% without hypoglycemia       DM MEDICATIONS USED IN THE PAST: Levmeir    Metformin   Glipizide   Januvia   Glimepiride   Tresiba   dexcom  Omnipod 5  Humalog       CURRENT DIABETES MEDICATIONS: Tresiba 28-30 units nightly at 8 -9 PM    Lyumjev 6-10 units TID AC   2-4 units for a snack   No correction scale   Insulin: pens.    Missed  doses: denies- reports that he has only missed 4 doses of insulin since last visit         BLOOD GLUCOSE MONITORING:    Sensor type: Dexcom G 6   Average BG readin  Time in range: 33%   21% high   45% very high   Estimated A1c of  N/A   Site change: q10 days    2 weeks prior -- 2 weeks average 183. 52% in range       Supplies -- pharmacy       Sensor was downloaded in clinic today and reviewed with patient.   Please see attached document for download.       HYPOGLYCEMIA:  rarely   Glucagon kit: Baqsimi    Medic alert bracelet: denies       MEALS: eating 3 meals per day   BF: ham and cheese croissant   Lunch: cafeteria at work or skips   Dinner: home cooked or fast food - spaghetti   Snack: peanuts, chips  Drinks: water and milk   Skim milk - 2 glasses per day   Previously coke zeros -- 20 a day        CURRENT EXERCISE:  Yes  Home routine   Push ups/ dips/ sit ups       Review of Systems  Review of Systems   Constitutional:  Negative for appetite change, fatigue and unexpected weight change.   HENT:  Negative for trouble swallowing.    Eyes:  Negative for visual disturbance.   Respiratory:  Negative for shortness of breath.    Cardiovascular:  Negative for chest pain.   Gastrointestinal:  Negative for nausea.   Endocrine: Negative for polydipsia, polyphagia and polyuria.   Genitourinary:         No Nocturia    Musculoskeletal:  Positive for arthralgias, back pain, myalgias and neck pain.   Skin:  Negative for wound.   Neurological:  Negative for numbness.       Physical Exam   Physical Exam  Constitutional:       General: He is not in acute distress.     Appearance: Normal appearance. He is not ill-appearing.   HENT:      Head: Normocephalic and atraumatic.      Nose: Nose normal.   Pulmonary:      Effort: Pulmonary effort is normal.   Musculoskeletal:      Cervical back: Normal range of motion.   Neurological:      General: No focal deficit present.      Mental Status: He is alert and oriented to person,  place, and time.   Psychiatric:         Mood and Affect: Mood normal.         Behavior: Behavior normal.         Thought Content: Thought content normal.         Judgment: Judgment normal.             FOOT EXAMINATION: deferred due to virtual visit         Lab Results   Component Value Date    TSH 2.393 2023             Insulin dependent diabetes mellitus type IA  Antibodies have been negative but C-peptide level was low though x 2 episodes   Treated more as a type 1 diabetic  Ketosis prone  Uncontrolled   He says that he has not missing doses of prandial insulin--he is pretty adamant about this  We discussed that his blood sugars are extremely high and if he has not missing any doses of insulin, the insulin is not , insulin is not leaking out, there is no problems with injection sites---then we need to increase his insulin doses    Sounds like he only wore the Omnipod 5 for about a week---I am encouraging him to give it another chance and to follow up with diabetes education.  He seems to be concerned about the cost of the Omnipod 5.  After the visit I called the pharmacy and they are telling me the Omnipod 5 pods will be 25 dollars for a 30 day supply and the Humalog insulin in the vial will be 9 dollars   discussed that pain and anxiety can increase readings     Encouraged him to follow up with diabetes education to restart his Omnipod 5 on  --we discussed that he will have much better glycemic control on an insulin pump with AID    He reports he has been having issues with his Dexcom.  He has not been performing fingersticks though and his Dexcom reading high and his A1c in blood sugars on labs are high so we discussed that it is likely accurate  I encouraged him to perform a fingerstick if he feels the Dexcom is reading inaccurately  I also encouraged him to report inaccurate sees to Dexcom to get replacements  He is going to come by our office to  a Dexcom replacement this week      --  Medication Changes:      Increase Tresiba to 34 units nighty   Same time every night   Do not need to eat to take      Adjust Lyumjev to 10 units with meals   If you skip a meal, skip the Lyumjev   2-4 units for snacks      Start doing sliding scale/correction scale as needed with meal time insulin   With using correction scale: before meals only -- before eating only   150-200: +1 unit  201-250: +2 units  251-300: +3 units  301-350: +4 units  >350: +5 units       Meet with education at [facility] for pump - omnipod 5 - restart     Omnipod 5 insulin pump settings:    Basal: 0.9 units/hr   ICR: 1:10-- set doses - 8-10 units with meals   ISF: 1:40  Target: 120  IOB: 3.5 hours            -- Reviewed goals of therapy are to get the best control we can without hypoglycemia.  -- Reviewed patient's current insulin regimen. Clarified proper insulin dose and timing in relation to meals, etc. Insulin injection sites and proper rotation instructed.    -- Advised frequent self blood glucose monitoring.  Patient encouraged to document glucose results and bring them to every clinic visit. Continue dexcom personal CGM g6 -- supplies from  pharmacy - [facility].   -- Hypoglycemia precautions discussed. Instructed on precautions before driving.    -- Call for Bg repeatedly < 70 or > 200.   -- Close adherence to lifestyle changes recommended.   -- Periodic follow ups for eye evaluations, foot care and dental care suggested.  -- Refer to diabetes education -- [facility] location for insulin pump restart       Patient has diabetes mellitus and manages diabetes with intensive insulin regimen and uses prandial and basal insulin daily  Patient requires a therapeutic CGM and is willing to use therapeutic CGM for the necessary frequent adjustments of insulin therapy.  I have completed an in-person visit during the previous 6 months and will continue to have in-person visits every 6 months to assess adherence to their CGM regimen and diabetes  treatment plan.  Due to COVID pandemic and need for remote monitoring this tool is medically necessary          Low serum C-peptide  Remain on insulin     Type 1 diabetes mellitus with hyperglycemia  See above     Hyperlipidemia LDL goal <100  On statin per ADA recommendations  LDL goal < 100. LDL at goal. LFTs WNL. Continue statin.       Ketosis prone diabetes  See above           This includes face to face time and non-face to face time preparing to see the patient (eg, review of tests), obtaining and/or reviewing separately obtained history, documenting clinical information in the electronic or other health record, independently interpreting results and communicating results to the patient/family/caregiver, or care coordinator.        Follow up in about 6 weeks (around 7/10/2024).  Schedule with GI please-- Dr. Castillo put in a referral   Follow up with me in 6 week for dexcom and Omnipod 5 -- can be virtual       Orders Placed This Encounter   Procedures    Ambulatory referral/consult to Diabetes Education     Standing Status:   Future     Standing Expiration Date:   11/29/2025     Referral Priority:   Routine     Referral Type:   Consultation     Referral Reason:   Specialty Services Required     Referred to Provider:   Maddison Bowman RD, CDE     Requested Specialty:   Diabetes     Number of Visits Requested:   1       Recommendations were discussed with the patient in detail  The patient verbalized understanding and agrees with the plan outlined as above.     This note was partly generated with [employer] [place] [employer] voice recognition software. I apologize for any possible typographical errors.

## 2024-05-29 NOTE — Clinical Note
Schedule with GI please-- Dr. Castillo put in a referral  Follow up with me in 6 week for dexcom and Omnipod 5 -- can be virtual

## 2024-05-30 ENCOUNTER — CLINICAL SUPPORT (OUTPATIENT)
Dept: DIABETES | Facility: CLINIC | Age: 49
End: 2024-05-30
Payer: COMMERCIAL

## 2024-05-30 ENCOUNTER — PATIENT MESSAGE (OUTPATIENT)
Dept: PRIMARY CARE CLINIC | Facility: CLINIC | Age: 49
End: 2024-05-30
Payer: COMMERCIAL

## 2024-05-30 ENCOUNTER — TELEPHONE (OUTPATIENT)
Dept: GASTROENTEROLOGY | Facility: CLINIC | Age: 49
End: 2024-05-30
Payer: COMMERCIAL

## 2024-05-30 DIAGNOSIS — E10.65 HYPERGLYCEMIA DUE TO TYPE 1 DIABETES MELLITUS: Primary | ICD-10-CM

## 2024-05-30 NOTE — TELEPHONE ENCOUNTER
I left a message for the patient to call me back if he wants to see the NP Radha Hawley in Sept or I can send myself a remind me for the doctors when the schedule opens in August. I will wait to hear from the patient.      ----- Message from Katharina Suarez LPN sent at 5/30/2024  8:23 AM CDT -----  Yes he stated he does not want to do a virtual he rather do an in clinic visit  ----- Message -----  From: Mathew Honeycutt MA  Sent: 5/30/2024   8:12 AM CDT  To: Katharina Suarez LPN    Does this mean he does not want the virtual appt.. ??  ----- Message -----  From: Katharina Suarez LPN  Sent: 5/29/2024   4:45 PM CDT  To: Mathew Honeycutt MA    He said he rather be seen in clinic , but thank you though

## 2024-05-30 NOTE — TELEPHONE ENCOUNTER
Patient scheduled an appt with Radha Hawley NP on July 19th as a virtual.      ----- Message from Paulina Snow sent at 5/30/2024  8:47 AM CDT -----  Regarding: returning call to sched  Contact:  [phone]  The patient called returning call to schedule appt. Please reach back at your convenience   No further information provided      Patient can be contacted @# [phone]

## 2024-05-30 NOTE — TELEPHONE ENCOUNTER
----- Message from Katharina Suarez LPN sent at 5/30/2024 11:04 AM CDT -----  Yes that is ok, thankyou  ----- Message -----  From: Blessing Quintero MA  Sent: 5/30/2024  10:56 AM CDT  To: Katharina Suarez LPN    Hi, I went to schedule the patient but I see he already has a virtual visit scheduled with Radha Hawley NP.   Is that ok?   Rosa  ----- Message -----  From: Katharina Suarez LPN  Sent: 5/29/2024   4:04 PM CDT  To: [facility] Staff    Jose F can you all schedule pt to be seen, referral has been placed

## 2024-05-31 NOTE — TELEPHONE ENCOUNTER
Can you help this pt to get EGD done or appt with GI sooner than 7/22 pt is having epigastric pain and thickening of esophagus on CT scan prob esophagitis or infiltrative process

## 2024-06-03 ENCOUNTER — OFFICE VISIT (OUTPATIENT)
Dept: PAIN MEDICINE | Facility: CLINIC | Age: 49
End: 2024-06-03
Payer: COMMERCIAL

## 2024-06-03 ENCOUNTER — TELEPHONE (OUTPATIENT)
Dept: DIABETES | Facility: CLINIC | Age: 49
End: 2024-06-03
Payer: COMMERCIAL

## 2024-06-03 ENCOUNTER — PATIENT MESSAGE (OUTPATIENT)
Dept: PAIN MEDICINE | Facility: CLINIC | Age: 49
End: 2024-06-03

## 2024-06-03 DIAGNOSIS — M54.12 CERVICAL RADICULOPATHY: ICD-10-CM

## 2024-06-03 DIAGNOSIS — M47.812 CERVICAL SPONDYLOSIS: Primary | ICD-10-CM

## 2024-06-03 DIAGNOSIS — M54.2 CERVICALGIA: ICD-10-CM

## 2024-06-03 PROCEDURE — 3061F NEG MICROALBUMINURIA REV: CPT | Mod: CPTII,95,, | Performed by: PHYSICIAN ASSISTANT

## 2024-06-03 PROCEDURE — 3046F HEMOGLOBIN A1C LEVEL >9.0%: CPT | Mod: CPTII,95,, | Performed by: PHYSICIAN ASSISTANT

## 2024-06-03 PROCEDURE — 3066F NEPHROPATHY DOC TX: CPT | Mod: CPTII,95,, | Performed by: PHYSICIAN ASSISTANT

## 2024-06-03 PROCEDURE — 4010F ACE/ARB THERAPY RXD/TAKEN: CPT | Mod: CPTII,95,, | Performed by: PHYSICIAN ASSISTANT

## 2024-06-03 PROCEDURE — 99442 PR PHYSICIAN TELEPHONE EVALUATION 11-20 MIN: CPT | Mod: 95,,, | Performed by: PHYSICIAN ASSISTANT

## 2024-06-03 NOTE — PROGRESS NOTES
Est Patient Chronic Pain Note (Follow up Visit)    Referring Physician: No ref. provider found    PCP: Luan Lazar MD    Established Patient - TeleHealth Visit  The patient location is: work  The chief complaint leading to consultation is: increased neck pain, radicular symptoms  Visit type: audiovisual    Face to Face time with patient: 11-15 minutes of total time spent on the encounter, which includes face to face time and non-face to face time preparing to see the patient (eg, review of tests), Obtaining and/or reviewing separately obtained history, Documenting clinical information in the electronic or other health record, Independently interpreting results (not separately reported) and communicating results to the patient/family/caregiver, or Care coordination (not separately reported).     Each patient to whom he or she provides medical services by telemedicine is:  (1) informed of the relationship between the physician and patient and the respective role of any other health care provider with respect to management of the patient; and (2) notified that he or she may decline to receive medical services by telemedicine and may withdraw from such care at any time.      SUBJECTIVE:    Interval History (6/3/2024): Jason Sandra presents today for follow-up visit.  Patient was last seen on 1/12/2024.  Patient reports pain as 7/10 today.The patient reports an increase in neck pain. He also c/o occipital headaches. Has seen neurology last spring.   Pain does radiate to both shoulders. He also reports intermittent numbness and tingling. Patient is interested in trying a different procedure to help with his symptoms.   He was previously taking Robaxin 500 mg TID and Diclofenac 75 mg BID but discontinued recently. States medications were not effective for his pain.    Interval History (1/12/2024): Jason Sandra presents today for follow-up visit.  he underwent a Right Cervical C3/4, C4/5 Facet RFA on  11/22/23 with 60-70% relief so far and then a Left Cervical C3/4, C4/5 Facet RFA on 12/13/23 with 50% relief so far. The patient reports he is still having some pain, but it is more on the Left side. He feels this is related to the procedure and he just needs to give it more time in order to improve.   No new symptoms to report. Patient reports pain as 5/10 today.    Interval History (11/8/2023): Jason presents today for follow-up visit.  He underwent his 2nd, confirmatory cervical facet block on 08/23/2023 which gave him 70-80% relief for more than 6 hours after which the pain gradually came back to baseline.  He was more functional during that time with much less pain.  He wants to proceed with a cervical facet neurotomy.  Interval history significant for bilateral L5 transforaminal GARY by Dr. Muro on 10/25/2023 for worsening lower back pain.  He reports noticeable improvement today, as of 2 weeks later.    Interval History (6/16/2023): Jason Sandra presents today for follow-up visit.  he underwent bilateral L5/S1 TF GARY on 05/24/2023 followed 2 weeks later by bilateral C3-5 MBB on 6/7/23.  He reports 80% relief x 8 hours following the diagnostic C3-5 MBB. He is unsure how much relief he has gotten from the GARY, it has been 3 weeks so far. He reports his legs are not bothering him today, but hard to tell if it is just a good day or if the injection is beginning to work. Left leg usually worse than right, but today okay.  The changes have continued through this visit.  Patient reports pain as 6/10 today. Reports continued axial neck pain with associated tingling along left side. Neck pain worse than back pain.      Initial HPI  Jason Sandra is a 49 y.o. male who presents to the clinic for the evaluation of neck and lower back pain that started 2-1/2 months ago without triggering events.  The lower back is worse than the neck.  The neck pain radiates to the suboccipital and interscapular areas.   There is no radiation to the arms.  The lower back pain radiates to the back of the thighs and calves worse on the left (L5 and S1 dermatomes) with intermittent tingling and numbness in the same areas.  The pain is worse with walking and daily ADLs.  Nothing makes it better.  He rates it at 10/10 at worst, 7/10 at best, and 7/10 today.  Reports weakness in the legs.  They give out.  No falls though.  Denies loss of bowel or bladder control.      Patient denies night fever/night sweats, urinary incontinence, and bowel incontinence.    Injection History:    bilateral L5/S1 TF GARY on 05/24/2023   bilateral C3-5 MBB on 6/7/23 with 80% relief    Right Cervical C3/4, C4/5 Facet RFA on 11/22/23 with 60-70% relief so far  Left Cervical C3/4, C4/5 Facet RFA on 12/13/23 with 50% relief so far    Past Medical History:   Diagnosis Date    Diabetes mellitus     Hypogonadism in male      Past Surgical History:   Procedure Laterality Date    COLONOSCOPY N/A 12/1/2023    Procedure: COLONOSCOPY;  Surgeon: Coral White MD;  Location: [facility] ENDO;  Service: Endoscopy;  Laterality: N/A;    INJECTION OF ANESTHETIC AGENT AROUND MEDIAL BRANCH NERVES INNERVATING CERVICAL FACET JOINT Bilateral 6/7/2023    Procedure: Bilateral C3-5 MBB with local;  Surgeon: Tony Garcia MD;  Location: [facility] PAIN MGT;  Service: Pain Management;  Laterality: Bilateral;    INJECTION OF ANESTHETIC AGENT AROUND MEDIAL BRANCH NERVES INNERVATING CERVICAL FACET JOINT Bilateral 8/23/2023    Procedure: Bilateral C3-5 MBB  second diagnostic block with RN IV sedation;  Surgeon: Tony Garcia MD;  Location: [facility] PAIN MGT;  Service: Pain Management;  Laterality: Bilateral;    NOSE SURGERY      RADIOFREQUENCY ABLATION, FACET JOINT, CERVICOTHORACIC Left 11/22/2023    Procedure: C3-4 and C 4-5 Facet RFA;  Surgeon: Tony Garcia MD;  Location: [facility] PAIN MGT;  Service: Pain Management;  Laterality: Left;  To be done 2-3 weeks after the left side.    RADIOFREQUENCY  ABLATION, FACET JOINT, CERVICOTHORACIC Right 12/13/2023    Procedure: C3-4 and C 4-5 Facet RFA;  Surgeon: Tony Garcia MD;  Location: [facility] PAIN MGT;  Service: Pain Management;  Laterality: Right;    SELECTIVE INJECTION OF ANESTHETIC AGENT AROUND LUMBAR SPINAL NERVE ROOT BY TRANSFORAMINAL APPROACH Bilateral 5/24/2023    Procedure: Bilateral L5 TF GARY with local;  Surgeon: Tony Garcia MD;  Location: [facility] PAIN MGT;  Service: Pain Management;  Laterality: Bilateral;    SELECTIVE INJECTION OF ANESTHETIC AGENT AROUND LUMBAR SPINAL NERVE ROOT BY TRANSFORAMINAL APPROACH Bilateral 10/25/2023    Procedure: Bilateral L5/S1 TF GARY;  Surgeon: Aric Muro MD;  Location: [facility] PAIN MGT;  Service: Pain Management;  Laterality: Bilateral;     Social History     Socioeconomic History    Marital status: Legally    Tobacco Use    Smoking status: Never     Passive exposure: Never    Smokeless tobacco: Never   Substance and Sexual Activity    Alcohol use: Not Currently     Comment: socially    Drug use: No    Sexual activity: Yes     Partners: Female   Social History Narrative    [place]     Social Determinants of Health     Financial Resource Strain: Low Risk  (1/3/2023)    Overall Financial Resource Strain (CARDIA)     Difficulty of Paying Living Expenses: Not hard at all   Food Insecurity: No Food Insecurity (1/3/2023)    Hunger Vital Sign     Worried About Running Out of Food in the Last Year: Never true     Ran Out of Food in the Last Year: Never true   Transportation Needs: No Transportation Needs (1/3/2023)    PRAPARE - Transportation     Lack of Transportation (Medical): No     Lack of Transportation (Non-Medical): No   Stress: No Stress Concern Present (1/3/2023)    [employer] - Occupational Stress Questionnaire     Feeling of Stress : Only a little   Housing Stability: Low Risk  (1/3/2023)    Housing Stability Vital Sign     Unable to Pay for Housing in the Last Year: No      "Number of Places Lived in the Last Year: 2     Unstable Housing in the Last Year: No     Family History   Problem Relation Name Age of Onset    Stroke Mother      Glaucoma Mother      Cataracts Mother      Alzheimer's disease Father         Review of patient's allergies indicates:  No Known Allergies    Current Outpatient Medications   Medication Sig    ACCU-CHEK GUIDE ME GLUCOSE MTR Misc CHECK BLOOD SUGAR TWICE A DAY    atorvastatin (LIPITOR) 40 MG tablet Take 1 tablet (40 mg total) by mouth once daily.    blood sugar diagnostic Strp To check BG 2 times daily, to use with insurance preferred meter    blood-glucose sensor (DEXCOM G6 SENSOR) Chen Use 1 sensor every 10 days    blood-glucose transmitter (DEXCOM G6 TRANSMITTER) Chen 1 transmitter every 3 months    furosemide (LASIX) 20 MG tablet Take 1 tablet (20 mg total) by mouth once daily.    insulin degludec (TRESIBA FLEXTOUCH U-100) 100 unit/mL (3 mL) insulin pen Inject 34 Units into the skin every evening.    insulin lispro-aabc (LYUMJEV KWIKPEN U-100 INSULIN) 100 unit/mL pen Inject 10 units 3 times per day before meals, 2-4 units for snack, . + correction scale. MAX TDD Of 50 units    insulin pump cart,auto,BT-cntr (OMNIPOD 5 G6 INTRO KIT, GEN 5,) Crtg Inject 1 Device into the skin Every 3 (three) days. (Patient not taking: Reported on 5/29/2024)    insulin pump cart,automated,BT (OMNIPOD 5 G6 PODS, GEN 5,) Crtg Inject 1 Device into the skin Every 3 (three) days. (Patient not taking: Reported on 5/29/2024)    ketoconazole (NIZORAL) 2 % shampoo Apply to scalp in place of regular shampoo 2-3x per week    lancets Misc To check BG 2 times daily, to use with insurance preferred meter    metoprolol succinate (TOPROL-XL) 25 MG 24 hr tablet Take 1 tablet (25 mg total) by mouth once daily.    pantoprazole (PROTONIX) 40 MG tablet Take 1 tablet (40 mg total) by mouth once daily.    pen needle, diabetic 32 gauge x 5/32" Ndle To use 7 times per day with insulin injections- " for meals, long-acting insulin and for snacks    sacubitriL-valsartan (ENTRESTO) 24-26 mg per tablet Take 1 tablet by mouth 2 (two) times daily.    testosterone cypionate (DEPOTESTOTERONE CYPIONATE) 200 mg/mL injection Inject 1 mL (200 mg total) into the muscle once a week.     No current facility-administered medications for this visit.       Review of Systems     GENERAL:  No weight loss, malaise or fevers.  HEENT:   No recent changes in vision or hearing  NECK:  Negative for lumps, no difficulty with swallowing.  RESPIRATORY:  Negative for cough, wheezing or shortness of breath, patient denies any recent URI.  CARDIOVASCULAR:  Negative for chest pain, leg swelling or palpitations.  GI:  Negative for abdominal discomfort, blood in stools or black stools or change in bowel habits.  MUSCULOSKELETAL:  See HPI.  SKIN:  Negative for lesions, rash, and itching.  PSYCH:  No mood disorder or recent psychosocial stressors.  Patients sleep is not disturbed secondary to pain.  HEMATOLOGY/LYMPHOLOGY:  Negative for prolonged bleeding, bruising easily or swollen nodes.  Patient is not currently taking any anti-coagulants  NEURO:   No history of headaches, syncope, paralysis, seizures or tremors.  All other reviewed and negative other than HPI.    OBJECTIVE:    There were no vitals taken for this visit.        PHYSICAL EXAM:  Telemedicine Exam  There were no vitals filed for this visit.    There is no height or weight on file to calculate BMI.   (reviewed on 6/3/2024)     GENERAL: Well appearing, in no acute distress, alert and oriented x3.  Cooperative.  PSYCH:  Mood and affect appropriate.  SKIN: Skin color & texture with no obvious abnormalities.    HEAD/FACE:  Normocephalic, atraumatic.    PULM:  No difficulty breathing. No nasal flaring. No obvious wheezing.  EXTREMITIES: No obvious deformities. Moving all extremities well, appears to have symmetric strength throughout.  MUSCULOSKELETAL: No obvious atrophy abnormalities are  noted.   NEURO: No obvious neurologic deficit.   GAIT: sitting.     Physical Exam: last in clinic visit:    GENERAL: Well appearing, in no acute distress, alert and oriented x3.  PSYCH:  Mood and affect appropriate.  SKIN: Skin color, texture, turgor normal, no rashes or lesions.  HEAD/FACE:  Normocephalic, atraumatic.  CV: No edema.  PULM: No evidence of respiratory difficulty, symmetric chest rise.  GI:  Abdomen soft and non-tender.    GAIT: slow .    CERVICAL SPINE:  Palpation: Tenderness over bilateral facet joints and paraspinous muscles. No trigger points.  ROM: Pain with extension, rotation and lateral flexion. Spurling's negative.    LUMBAR SPINE:   Palpation: Tenderness over bilateral facet joints and paraspinous muscles.   ROM: Pain with flexion, extension and rotation, worse with flexion.  Straight leg raising is positive for radicular pain on the.  SI JOINTS: bilateral SI joints, no tenderness, negative Go's   HIPS: normal and nonpainful ROM of both hip joints.    NEURO:   MOTOR: Bilateral upper and lower extremity muscle strength is normal. No atrophy or tone abnormalities are noted.  SENSORY: No loss of sensation in C4 through T1 dermatomes bilaterally, and in L3 through S1 dermatomes.  DTR's: Reflexes are physiologic and symmetric in upper extremities.    Deep Tendon Reflexes:      Reflex Scores:       Patellar reflexes are 1+ on the right side and 1+ on the left side.       Achilles reflexes are 1+ on the right side and 0 on the left side.   No clonus.  Negative Lewis's bilaterally.     EMG - 5/4/23 by Dr. Andrea Hamilton  INTERPRETATION  -Bilateral superficial peroneal sensorynerve conduction study showed normal peak latency and amplitude  -Bilateral sural sensory nerve conduction study showed normal peak latency and amplitude  -Bilateral peroneal motor nerve conduction study showed normal latency, dec amplitude on left, and dec conduction velocity  -Bilateral tibial motor nerve conduction  study showed normal latency, amplitude, and conduction velocity  -Needle EMG examination performed to above mentioned muscles   IMPRESSION  ABNORMAL study  2. There is electrodiagnostic evidence of an acute on chronic radiculopathy of the left L5 nerve root and a chronic radiculopathy of the right L5 and bilateral S1 nerve roots    Imaging:   Results for orders placed during the hospital encounter of 05/09/23    MRI Lumbar Spine Without Contrast    Narrative  EXAMINATION:  MRI LUMBAR SPINE WITHOUT CONTRAST    CLINICAL HISTORY:  Lumbar radiculopathy, symptoms persist with conservative treatment; Radiculopathy, lumbosacral region    TECHNIQUE:  Multiplanar, multisequence MR images were acquired from the thoracolumbar junction to the sacrum without the administration of contrast.    COMPARISON:  MRI dated 09/05/2018.  X-ray dated 09/25/2018    FINDINGS:  There are 5 non-rib-bearing lumbar vertebrae.  There is a rudimentary S1-S2 disc.  Alignment is unremarkable with no significant listhesis.  No acute fracture or compression deformity.  No aggressive focal signal abnormality mild Modic type 1 edema at the posterior aspect of the L5 inferior endplate.    Conus medullaris terminates at the L2 level.  Conus medullaris is normal in size and signal.    T12-L1: No significant disc pathology.  No significant spinal canal or neural foraminal stenosis.    L1-L2: No significant disc pathology.  No significant spinal canal or neural foraminal stenosis.    L2-L3: No significant disc pathology.  No significant spinal canal or neural foraminal stenosis.    L3-L4: No significant disc pathology.  Mild bilateral facet arthropathy.  No significant spinal canal or neural foraminal stenosis.    L4-L5: Small circumferential disc bulge.  Mild bilateral facet arthropathy.  No significant spinal canal stenosis.  Mild bilateral neural foraminal stenosis.    L5-S1: Disc desiccation and small circumferential disc bulge with central protrusion  and annular fissure.  Mild bilateral facet arthropathy.  No significant spinal canal stenosis.  Mild to moderate bilateral neural foraminal stenosis.  (personal evaluation:  Disc is touching bilateral S1 nerve roots in the lateral recesses and posteriorly displacing the right)  Paraspinal soft tissues are unremarkable.  Visualized intra-abdominal and pelvic contents are also unremarkable.    Impression  Mild lower lumbar level predominant degenerative changes as detailed above including Modic type 1 edema at the posterior aspect of the L5 inferior endplate.    Small disc bulge with central protrusion annular fissure at L5-S1.    No significant spinal canal stenosis.  Mild bilateral neural foraminal stenosis at L4-L5.  Mild-to-moderate bilateral neural foraminal stenosis at L5-S1.      Electronically signed by: Brian Santiago  Date:    05/10/2023  Time:    07:55        Results for orders placed during the hospital encounter of 04/10/23    MRI Cervical Spine Without Contrast    Narrative  EXAMINATION:  MRI CERVICAL SPINE WITHOUT CONTRAST    CLINICAL HISTORY:  Headache, unspecifiedNeck pain, acute, infection suspected;    TECHNIQUE:  MR Cervical spine without contrast. Sagittal T1, T2, STIR. Axial 3D, T2. Coronal T1.    COMPARISON:  None available.    FINDINGS:  Vertebral body height is normal and alignment is maintained. Marrow signal is within normal limits. The craniocervical junction is unremarkable. No abnormal cord signal or cord deformity. Intervertebral disc levels are as follows:    C2-C3 disc: Normal.    C3-C4 disc: Minimal disc bulge.  Mild left facet arthropathy.  No significant spinal or foraminal stenosis.  The dural canal measures 14 mm.    C4-C5 disc: Normal.    C5-C6 disc: Mild posterior disc bulge.  Normal uncovertebral joints and facet joints.  No spinal or foraminal stenosis.  The dural canal measures 12 mm.    C6-C7 disc: Normal.    C7-T1 disc: Normal.    Impression  Mild degenerative  spondylosis without significant spinal canal or neural foraminal stenosis.  Normal cord signal and caliber.  No signs of spondylodiscitis.      Electronically signed by: Ronald Borges Jr., MD  Date:    04/11/2023  Time:    11:28        Results for orders placed during the hospital encounter of 09/25/18    X-Ray Lumbar Spine Flexion And Extension Only    Narrative  EXAMINATION:  XR LUMBAR SPINE FLEXION AND EXTENSION ONLY    CLINICAL HISTORY:  Other intervertebral disc degeneration, lumbar region    TECHNIQUE:  Lateral flexion and extension views of the lumbar spine were obtained with the patient standing.    COMPARISON:  Lumbar spine radiographs dated 14 August 2018    FINDINGS:  The lumbar vertebral bodies remain normal in height and are in lordotic alignment with no induced listhesis demonstrated on these views.  There is stable and unremarkable radiographic appearance of the lumbar disc spaces.    IMPRESSION:    See above.      Electronically signed by: Tanner Givens MD  Date:    09/25/2018  Time:    11:46      Results for orders placed during the hospital encounter of 08/14/18    X-Ray Lumbar Spine AP And Lateral    Narrative  EXAMINATION:  XR LUMBAR SPINE AP AND LATERAL    CLINICAL HISTORY:  Low back pain, >6wks conservative tx, persistent-progressive sx, surgical candidate;Pain in left leg    TECHNIQUE:  AP, lateral and spot images were performed of the lumbar spine.    COMPARISON:  None    FINDINGS:  Multiple views of the lumbar spine demonstrate no acute fractures.    IMPRESSION:    No acute fractures.      Electronically signed by: Tyler York MD  Date:    08/14/2018  Time:    15:09        Results for orders placed during the hospital encounter of 03/06/23    X-Ray Cervical Spine AP And Lateral    Narrative  EXAM: XR CERVICAL SPINE AP LATERAL    CLINICAL HISTORY:  Cervicalgia.    TECHNIQUE:   Cervical spine x-ray, 5 views    FINDINGS:  Comparison study 03/21/2012.  No change.  There is no fracture or  malalignment.  The disc spaces are well-preserved.  No degenerative sequela.    Impression  Unremarkable cervical spine x-ray.    Finalized on: 3/6/2023 4:47 PM By:  Aric Ash MD  [facility]# [id number]      2023-03-06 16:49:[id number]    [facility]    LABS:  Lab Results   Component Value Date    WBC 6.09 05/20/2024    HGB 12.7 (L) 05/20/2024    HCT 36.9 (L) 05/20/2024    MCV 85 05/20/2024     05/20/2024       CMP  Sodium   Date Value Ref Range Status   05/22/2024 135 (L) 136 - 145 mmol/L Final     Potassium   Date Value Ref Range Status   05/22/2024 4.4 3.5 - 5.1 mmol/L Final     Chloride   Date Value Ref Range Status   05/22/2024 103 95 - 110 mmol/L Final     CO2   Date Value Ref Range Status   05/22/2024 23 23 - 29 mmol/L Final     Glucose   Date Value Ref Range Status   05/22/2024 401 (H) 70 - 110 mg/dL Final     BUN   Date Value Ref Range Status   05/22/2024 15 6 - 20 mg/dL Final     Creatinine   Date Value Ref Range Status   05/22/2024 1.3 0.5 - 1.4 mg/dL Final     Calcium   Date Value Ref Range Status   05/22/2024 9.6 8.7 - 10.5 mg/dL Final     Total Protein   Date Value Ref Range Status   05/22/2024 7.6 6.0 - 8.4 g/dL Final     Albumin   Date Value Ref Range Status   05/22/2024 4.0 3.5 - 5.2 g/dL Final     Total Bilirubin   Date Value Ref Range Status   05/22/2024 0.6 0.1 - 1.0 mg/dL Final     Comment:     For infants and newborns, interpretation of results should be based  on gestational age, weight and in agreement with clinical  observations.    Premature Infant recommended reference ranges:  Up to 24 hours.............<8.0 mg/dL  Up to 48 hours............<12.0 mg/dL  3-5 days..................<15.0 mg/dL  6-29 days.................<15.0 mg/dL       Alkaline Phosphatase   Date Value Ref Range Status   05/22/2024 119 55 - 135 U/L Final     AST   Date Value Ref Range Status   05/22/2024 18 10 - 40 U/L Final     ALT   Date Value Ref Range Status   05/22/2024 19 10 - 44 U/L Final     Anion Gap   Date Value Ref Range  Status   05/22/2024 9 8 - 16 mmol/L Final     eGFR if    Date Value Ref Range Status   02/24/2020 >60 >60 mL/min/1.73 m^2 Final     eGFR if non    Date Value Ref Range Status   02/24/2020 >60 >60 mL/min/1.73 m^2 Final     Comment:     Calculation used to obtain the estimated glomerular filtration  rate (eGFR) is the CKD-EPI equation.          Lab Results   Component Value Date    HGBA1C 10.9 (H) 05/22/2024             ASSESSMENT: 49 y.o. year old male with:    1. Cervical spondylosis  Case Request-RAD/Other Procedure Area: C6/7 IL C-GARY      2. Cervicalgia  Case Request-RAD/Other Procedure Area: C6/7 IL C-GARY      3. Cervical radiculopathy  Case Request-RAD/Other Procedure Area: C6/7 IL C-GARY          PLAN:     Schedule patient for Interlaminar Cervical GARY , IL NINA C6/7, with Dr. Muro (previous Dr. Garcia patient) for better relief of neck pain + radicular symptoms. He is s/p C3-4 and C4-5 facet RFAs.  He had positive facet block on 06/07/2023 and 08/23/2023 (70-80% relief).    Offered to prescribe different medications since Robaxin and Diclofenac not effective per patient. He declined at this time.  Previously taking Lyrica per Rheumatology, d/c'd due to CHF and ineffectiveness  Imaging reviewed today- MRI cervical spine.   5.   Follow up in 4 weeks to reassess pain and determine if he experienced more relief with Cervical IL GARY.    - Consider EMG upper extremities if patient experiences increased numbness/tingling in upper ext.  - Instructed patient to reach out to Neurology to discuss Headaches and dizziness (separate issues).    - Counseled patient regarding the importance of activity modification  - This condition does not require this patient to take time off of work, and the primary goal of our Pain Management services is to improve the patient's functional capacity.  - Patient Questions: Answered all of the patient's questions regarding diagnosis, therapy, and  treatment    The above plan and management options were discussed at length with patient. Patient is in agreement with the above and verbalized understanding.      Solo Huston PA-C  Interventional Pain Management  [facility]

## 2024-06-03 NOTE — TELEPHONE ENCOUNTER
----- Message from Maddison Bowman RD, CDE sent at 6/3/2024  9:02 AM CDT -----  Yikes! He has been so resistant to changes. Hopefully he will be ready to give it a chance this time and show up. Will keep you posted.     Many thanks!  Maddison  ----- Message -----  From: Luly Pacheco NP  Sent: 2024  11:52 AM CDT  To: Maddison Bowman RD, DIGNAE    Hey!   His readings look terrible!!!  He is adamant that he is taking his insulin and reports that it has not  and that he is taking it correctly  Something is just not adding up  I increased his insulin and encouraged him to follow-up with you as scheduled on  so he can get back on his pump  I am hoping that his numbers look much better in that he stays on the pump a little bit longer.  He may need to see you a week after starting the pump just to make sure everything is going smoothly and that he staying on it.  I think we may have to hold his hand    Thanks  Luly

## 2024-06-04 ENCOUNTER — PATIENT MESSAGE (OUTPATIENT)
Dept: PRIMARY CARE CLINIC | Facility: CLINIC | Age: 49
End: 2024-06-04
Payer: COMMERCIAL

## 2024-06-04 DIAGNOSIS — K20.90 ESOPHAGITIS: Primary | ICD-10-CM

## 2024-06-05 ENCOUNTER — PATIENT MESSAGE (OUTPATIENT)
Dept: SURGERY | Facility: CLINIC | Age: 49
End: 2024-06-05
Payer: COMMERCIAL

## 2024-06-05 ENCOUNTER — TELEPHONE (OUTPATIENT)
Dept: SURGERY | Facility: CLINIC | Age: 49
End: 2024-06-05
Payer: COMMERCIAL

## 2024-06-05 ENCOUNTER — CLINICAL SUPPORT (OUTPATIENT)
Dept: DIABETES | Facility: CLINIC | Age: 49
End: 2024-06-05
Payer: COMMERCIAL

## 2024-06-05 ENCOUNTER — DOCUMENTATION ONLY (OUTPATIENT)
Dept: SURGERY | Facility: CLINIC | Age: 49
End: 2024-06-05
Payer: COMMERCIAL

## 2024-06-05 ENCOUNTER — TELEPHONE (OUTPATIENT)
Dept: PULMONOLOGY | Facility: CLINIC | Age: 49
End: 2024-06-05
Payer: COMMERCIAL

## 2024-06-05 DIAGNOSIS — E10.9 INSULIN DEPENDENT DIABETES MELLITUS TYPE IA: ICD-10-CM

## 2024-06-05 PROCEDURE — G0108 DIAB MANAGE TRN  PER INDIV: HCPCS | Mod: S$GLB,,, | Performed by: DIETITIAN, REGISTERED

## 2024-06-05 PROCEDURE — 99999 PR PBB SHADOW E&M-EST. PATIENT-LVL I: CPT | Mod: PBBFAC,,, | Performed by: DIETITIAN, REGISTERED

## 2024-06-05 NOTE — TELEPHONE ENCOUNTER
A call was placed to this patient re:a case request to schedule this patient for an EGD. The patient verbally consented to be scheduled to have an EGD, and was scheduled to have the EGD on the date of request 016/18/2024. The patient was instructed as follows below:    EGD Prep Instructions    [facility]  [address] [facility] [address]    You are scheduled for an EGD with Dr. Tereza Mcguire MD  on 06/18/2024 at [facility], located [address] [facility]  Check in at the admit desk, first floor of the hospital (which is the building on the left).   You will receive a call 2-3 days before your EGD to tell you the time to arrive.  If you have not received a call by the day before your procedure, call the Endoscopy Lab at [phone]    Nothing to eat or drink after midnight before the procedure.  You MAY brush your teeth.    You MAY take your blood pressure, heart, and seizure medication on the morning of the procedure, with a SIP of water.  Hold ALL other medications until after the procedure.    If you are on blood thinners THAT YOU HAVE BEEN INSTRUCTED TO HOLD BY YOUR DOCTOR FOR THIS PROCEDURE, then do NOT take this the morning of your EGD.  Do NOT stop these medications on your own, they must be approved to be held by your doctor.  Your EGD can NOT be done if you are on these medications.  Examples of blood thinners include: Coumadin, Aggrenox, Plavix, Pradaxa, Reapro, Pletal, Xarelto, Ticagrelor, Brilinta, Eliquis, and high dose aspirin (325 mg).  You do not have to stop baby aspirin 81 mg.    Please make sure that you have a  home because you will receive an IV sedation. You may NOT take an uber, lyft, taxi, or bus home unless you have a responsible adult with you.     You will receive a call 2-3 days before your EGD to tell you the time to arrive.  If you have not received a call by the day before your procedure, call the Endoscopy department at [phone].

## 2024-06-05 NOTE — PROGRESS NOTES
Diabetes Care Specialist Progress Note  Author: Maddison Bowman RD, CDE  Date: 2024    Program Intake  Reason for Diabetes Program Visit:: Intervention  Type of Intervention:: Individual  Individual: Education  Education: Self-Management Skill Review  Device Training: Insulin Pump Start  Current diabetes risk level:: moderate  In the last 12 months, have you:: none  Continuous Glucose Monitoring  Patient has CGM: Yes  Personal CGM type:: Dexcom G6    Lab Results   Component Value Date    HGBA1C 10.9 (H) 2024       Clinical    Current DM meds:  Tresiba to 34 units nighty   Lyumjev 10 units AC + scale  150-200: +1 unit  201-250: +2 units  251-300: +3 units  301-350: +4 units  >350: +5 units    Today's interventions were provided through individual discussion, instruction, and written materials were provided.      Patient verbalized understanding of instruction and written materials.  Pt was able to return back demonstration of instructions today. Patient understood key points, needs reinforcement and further instruction.     Diabetes Self-Management Care Plan:    Today's Diabetes Self-Management Care Plan was developed with Jason's input. Jason has agreed to work toward the following goal(s) to improve his/her overall diabetes control.      Care Plan: Diabetes Management   Updates made since 2024 12:00 AM        Problem: Medications         Goal: Pt will take MDI as prescribed. Completed 2024   Start Date: 2023   Expected End Date: 3/11/2023   Recent Progress: On track   Priority: High   Barriers: No Barriers Identified   Note:    23 -   In depth review of pt meals/snacks and insulin dosing. Confirmed meals = ~60 grams of carb, and take 3-8u full meal doses >4 hours apart. Reviewed snack dosinu for ~30gram snack for right now. Did discuss ways to reduce carbs in snacks and help require less insulin for these low carb snacks.     23 - Pt is interested in making change to insulin  pump therapy and here today for pump eval. Per Luly Pacheco NP, will likely use set doses for bolusing d/t pt not carb counting.     Patient is interested in an insulin pump and seen today to discuss insulin pump therapy.     -Covered expectations for insulin pump approval by provider and insurance company such as: SMBG a min of qid, additional labs, insurance verification, possible costs associated with monthly pump supplies, and overall cost.     --Discussed basic details of pump therapy with patient.     -Reviewed current insulin pumps available: Medtronic 630G/770G/780G, Tandem T-slim X2, OmniPod Classic/Dash/5, and iLet    -Reviewed hybrid closed-loop insulin pump systems.  Hybrid closed-loop systems have some hands-off (automated) actions. These capabilities can vary among systems. That said, basic communication happens between the CGM and pump, which can trigger some automatic adjustments. This helps to keep your glucose within a preset range. Pumps with this capability include: Medtronic 770G/780G with Guardian 3 or 4 CGM, Tandem T-Slim X2 integrated with Dexcom G6 CGM (Control IQ technology), and OmniPod 5 integrated with Dexcom G6 CGM    -Reviewed terms such as insulin sensitivity factor (ISF), carbohydrate ratio, target glucose, bolus, basal, active insulin and insulin on board.  Explained importance of learning to carb count at meals to effectively enter carbs (grams) when bolusing at meals.      -Explained each insulin pumps allow for different max volumes of insulin in reservoir chamber. Medtronic and Tandem max volume is 300 units and Omnipod max volume is 200 units.     -Patient verbalized understanding of pump therapy and able to see and touch all demonstration pumps during visit today.      Patient's is interested in Omnipod5.    Patient's major concern is insurance coverage and would like to move forward with checking insurance coverage. Communicated with Luly for Rx to be sent to [facility]  pharmacy.     3/15/24 - Pt reports has been taking Tresiba 28 units HS and Lyumjev consistently before each meal and taking with correction scale as prescribed. Adjusting base dose 6 units with small meal, 8 units with avg meal and 10 units with large meal. Reviewed what can count as small/avg/large meals. Had question about taking Lyumjev before eating protein only (eg. Sometimes only has tuna or sardines for lunch) - explained if only eating protein/no carbs and taking base dose as above, can be causing occasional daytime lows seen on report - discussed only take correction scale if eating just tuna or sardines for lunch.        Goal: Pt will use Omnipod 5 for insulin delivery.    Start Date: 12/11/2023   Expected End Date: 1/10/2024   This Visit's Progress: On track   Recent Progress: Not met   Priority: High   Barriers: No Barriers Identified   Note:    12/11/23 -   OMNI POD 5 INSULIN PUMP START    Pump training was provided per Omni Pod protocol.  Patient is new to insulin pump therapy.     Basal:  12AM: 0.85 units/hr     Max basal rate: 3 units/hr     Bolus:  CHO ratio: 1:10 - set doses 6 units for small meal, 8 units for medium meal and 10 units for large meal  ISF: 1:35  Glucose target : 120 mg/dL  Correct  over: 120 mg/dl  Active insulin time: 3.5 hours  Max bolus: 12 units     Low reservoir insulin alarm: 10 units  Change pod alarm: every 3 days       Details of pump therapy were covered included following controller features and programming, pod activation, pod site selection and rotation, automatic pod priming and insertion, setting & editing basal rates in manual mode, giving bolus and other features in the set-up menu.  The following regarding the Omnipod 5 was covered:  During your first Pod wear, since no recent history is available, the Omnipod 5 System uses only your active Basal Program from Manual Mode as a starting point to adjust your insulin. After 48 hours of history is collected, which  usually happens with the first Pod wear, SmartRitani technology stops adjusting insulin against your active Basal Program and starts using the Adaptive Basal Rate for your automated insulin delivery with your next Pod change. With each Pod change, insulin delivery information is sent and saved in the Omnipod 5 Shan so that the next Pod that is started is updated with the new Adaptive Basal Rate. With each new Pod activation, the system adapts insulin delivery based on physiological needs and total daily insulin (TDI) delivered. After 2-3 Pod changes, adaptation generally stabilizes and automated insulin delivery is based on this adaptation.  Patient demonstrated ability to program controller, activate and insert pod using aseptic technique.  Patient demonstrated ability to program Dexcom transmitter into controller and start automated limited mode.    Instructed pt on use of basic pump features ie...give a bolus, pause insulin, switch from manual to automated mode.  Reviewed features available in manual mode verses automated mode.   Reviewed when and how to use activity function in automated mode.  Reviewed site selection of pods, rotation of sites and hard stop to change pod every 72 hrs.   Instructed that insulin vial is good out of refrigeration for up to 28-30 days.   Reviewed treatment of hypoglycemia, hyperglycemia; sick day care, DKA, and troubleshooting of pump. Advised to change site if issue is suspected.   Omni Pod 24-hour support line provided.    Advised the last 4 digits of pump serial number will unlock the PDM in the event PIN is forgotten     Podder username: [email or web address]  Podder password: [email or web address]     Glooko username: [email or web address]  Glooktigre password: [email or web address]      3/15/24 - Stopped using omnipod shortly after started. States part of the reason is cost - reviewed today pod + vial of 1 insulin is actually less expensive compared to pen needles and 2 insulin pens. Other reason is  transitioning between 2 houses at the moment ([place] and [place]) and having trouble with adding something new to routine. Reviewed dexcom report with pt - continues to have large variability and pattern of nighttime highs. He associates highs with pain particularly while lying down. Explained automated system like Omnipod5 would greatly benefit him with both variability with work as well as adjusting to highs r/t pain. He states he thinks he could potentially be ready to restart in a couple of months. Scheduled appt in early May.     6/5/24 - Pt brought Omnipod 5 and supplies today and verbalized readiness to try it again. Reviewed and started pump today.     OMNI POD 5 INSULIN PUMP START    Pump training was provided per Omni Pod protocol.  Patient has used an insulin pump in the past.   Settings for pump obtained from Luly Pacheco NP last office visit note.      Basal:  12AM: 0.9 units/hr     Max basal rate: 3 units/hr     Bolus:  CHO ratio: 1:10  ISF: 1:40  Glucose target : 120 mg/dL  Correct  over: 120 mg/dl  Active insulin time: 3.5 hours  Max bolus: 12 units     Low reservoir insulin alarm: 10 units  Change pod alarm: every 3 days       Details of pump therapy were covered included following controller features and programming, pod activation, pod site selection and rotation, automatic pod priming and insertion, setting & editing basal rates in manual mode, giving bolus and other features in the set-up menu.  The following regarding the Omnipod 5 was covered:  During your first Pod wear, since no recent history is available, the Omnipod 5 System uses only your active Basal Program from Manual Mode as a starting point to adjust your insulin. After 48 hours of history is collected, which usually happens with the first Pod wear, SmartAdjust technology stops adjusting insulin against your active Basal Program and starts using the Adaptive Basal Rate for your automated insulin delivery with your next  Pod change. With each Pod change, insulin delivery information is sent and saved in the Omnipod 5 Shan so that the next Pod that is started is updated with the new Adaptive Basal Rate. With each new Pod activation, the system adapts insulin delivery based on physiological needs and total daily insulin (TDI) delivered. After 2-3 Pod changes, adaptation generally stabilizes and automated insulin delivery is based on this adaptation.  Patient demonstrated ability to program controller, activate and insert pod using aseptic technique.  Patient demonstrated ability to program Dexcom transmitter into controller and start automated limited mode.    Instructed pt on use of basic pump features ie...give a bolus, pause insulin, switch from manual to automated mode.  Reviewed features available in manual mode verses automated mode.   Reviewed when and how to use activity function in automated mode.  Reviewed site selection of pods, rotation of sites and hard stop to change pod every 72 hrs.   Instructed that insulin vial is good out of refrigeration for up to 28-30 days.   Reviewed treatment of hypoglycemia, hyperglycemia; sick day care, DKA, and troubleshooting of pump. Advised to change site if issue is suspected.   Omni Pod 24-hour support line provided.           Follow Up Plan     Follow up in about 2 days (around 6/7/2024) for omnipod pod change.    Today's care plan and follow up schedule was discussed with patient.  Jason verbalized understanding of the care plan, goals, and agrees to follow up plan.        The patient was encouraged to communicate with his/her health care provider/physician and care team regarding his/her condition(s) and treatment.  I provided the patient with my contact information today and encouraged to contact me via phone or [facility] Portal as needed.     Length of Visit   Total Time: 60 Minutes

## 2024-06-05 NOTE — TELEPHONE ENCOUNTER
Patient did not answer when called. Left a voicemail for patient to give us a call back,if ready or interested in getting colonoscopy/egd schedule.

## 2024-06-05 NOTE — TELEPHONE ENCOUNTER
A call was placed to this patient re: a case request received from this patient's PCP  to schedule this patient for an EGD The patient did not answer, left a voice message, asking the patient to please call back to ([phone]

## 2024-06-05 NOTE — PROGRESS NOTES
Darrle Gomez, we have been trying to contact you regarding a case request to schedule you for an EGD. Please call [phone] or [phone] to get scheduled for the EGD.   ThanksChuck

## 2024-06-07 ENCOUNTER — TELEPHONE (OUTPATIENT)
Dept: DIABETES | Facility: CLINIC | Age: 49
End: 2024-06-07
Payer: COMMERCIAL

## 2024-06-07 ENCOUNTER — PATIENT MESSAGE (OUTPATIENT)
Dept: DIABETES | Facility: CLINIC | Age: 49
End: 2024-06-07
Payer: COMMERCIAL

## 2024-06-07 ENCOUNTER — CLINICAL SUPPORT (OUTPATIENT)
Dept: DIABETES | Facility: CLINIC | Age: 49
End: 2024-06-07
Payer: COMMERCIAL

## 2024-06-07 DIAGNOSIS — E10.65 TYPE 1 DIABETES MELLITUS WITH HYPERGLYCEMIA: Primary | ICD-10-CM

## 2024-06-07 PROCEDURE — 99999 PR PBB SHADOW E&M-EST. PATIENT-LVL I: CPT | Mod: PBBFAC,,, | Performed by: DIETITIAN, REGISTERED

## 2024-06-07 PROCEDURE — G0108 DIAB MANAGE TRN  PER INDIV: HCPCS | Mod: S$GLB,,, | Performed by: DIETITIAN, REGISTERED

## 2024-06-07 NOTE — PROGRESS NOTES
Diabetes Care Specialist Progress Note  Author: Maddison Bowman RD, CDE  Date: 2024    Program Intake  Reason for Diabetes Program Visit:: Intervention  Type of Intervention:: Individual  Individual: Education  Education: Advanced Pump  Current diabetes risk level:: moderate  In the last 12 months, have you:: none  Continuous Glucose Monitoring  Patient has CGM: Yes  Personal CGM type:: Dexcom G6    Lab Results   Component Value Date    HGBA1C 10.9 (H) 2024       Clinical    Current DM meds:  Omnipod5        Today's interventions were provided through individual discussion, instruction, and written materials were provided.      Patient verbalized understanding of instruction and written materials.  Pt was able to return back demonstration of instructions today. Patient understood key points, needs reinforcement and further instruction.     Diabetes Self-Management Care Plan:    Today's Diabetes Self-Management Care Plan was developed with Jason's input. Jason has agreed to work toward the following goal(s) to improve his/her overall diabetes control.      Care Plan: Diabetes Management   Updates made since 2024 12:00 AM        Problem: Medications         Goal: Pt will take MDI as prescribed. Completed 2024   Start Date: 2023   Expected End Date: 3/11/2023   Recent Progress: On track   Priority: High   Barriers: No Barriers Identified   Note:    23 -   In depth review of pt meals/snacks and insulin dosing. Confirmed meals = ~60 grams of carb, and take 3-8u full meal doses >4 hours apart. Reviewed snack dosinu for ~30gram snack for right now. Did discuss ways to reduce carbs in snacks and help require less insulin for these low carb snacks.     23 - Pt is interested in making change to insulin pump therapy and here today for pump eval. Per Luly Pacheco, MICHAELLE, will likely use set doses for bolusing d/t pt not carb counting.     Patient is interested in an insulin pump and seen today  to discuss insulin pump therapy.     -Covered expectations for insulin pump approval by provider and insurance company such as: SMBG a min of qid, additional labs, insurance verification, possible costs associated with monthly pump supplies, and overall cost.     --Discussed basic details of pump therapy with patient.     -Reviewed current insulin pumps available: Medtronic 630G/770G/780G, Tandem T-slim X2, OmniPod Classic/Dash/5, and iLet    -Reviewed hybrid closed-loop insulin pump systems.  Hybrid closed-loop systems have some hands-off (automated) actions. These capabilities can vary among systems. That said, basic communication happens between the CGM and pump, which can trigger some automatic adjustments. This helps to keep your glucose within a preset range. Pumps with this capability include: Medtronic 770G/780G with Guardian 3 or 4 CGM, Tandem T-Slim X2 integrated with Dexcom G6 CGM (Control IQ technology), and OmniPod 5 integrated with Dexcom G6 CGM    -Reviewed terms such as insulin sensitivity factor (ISF), carbohydrate ratio, target glucose, bolus, basal, active insulin and insulin on board.  Explained importance of learning to carb count at meals to effectively enter carbs (grams) when bolusing at meals.      -Explained each insulin pumps allow for different max volumes of insulin in reservoir chamber. Medtronic and Tandem max volume is 300 units and Omnipod max volume is 200 units.     -Patient verbalized understanding of pump therapy and able to see and touch all demonstration pumps during visit today.      Patient's is interested in Omnipod5.    Patient's major concern is insurance coverage and would like to move forward with checking insurance coverage. Communicated with Luly for Rx to be sent to [facility] pharmacy.     3/15/24 - Pt reports has been taking Tresiba 28 units HS and Lyumjev consistently before each meal and taking with correction scale as prescribed. Adjusting base dose 6 units with  small meal, 8 units with avg meal and 10 units with large meal. Reviewed what can count as small/avg/large meals. Had question about taking Lyumjev before eating protein only (eg. Sometimes only has tuna or sardines for lunch) - explained if only eating protein/no carbs and taking base dose as above, can be causing occasional daytime lows seen on report - discussed only take correction scale if eating just tuna or sardines for lunch.        Goal: Pt will use Omnipod 5 for insulin delivery.    Start Date: 12/11/2023   Expected End Date: 1/10/2024   This Visit's Progress: On track   Recent Progress: On track   Priority: High   Barriers: No Barriers Identified   Note:    12/11/23 -   OMNI POD 5 INSULIN PUMP START    Pump training was provided per Omni Pod protocol.  Patient is new to insulin pump therapy.     Basal:  12AM: 0.85 units/hr     Max basal rate: 3 units/hr     Bolus:  CHO ratio: 1:10 - set doses 6 units for small meal, 8 units for medium meal and 10 units for large meal  ISF: 1:35  Glucose target : 120 mg/dL  Correct  over: 120 mg/dl  Active insulin time: 3.5 hours  Max bolus: 12 units     Low reservoir insulin alarm: 10 units  Change pod alarm: every 3 days       Details of pump therapy were covered included following controller features and programming, pod activation, pod site selection and rotation, automatic pod priming and insertion, setting & editing basal rates in manual mode, giving bolus and other features in the set-up menu.  The following regarding the Omnipod 5 was covered:  During your first Pod wear, since no recent history is available, the Omnipod 5 System uses only your active Basal Program from Manual Mode as a starting point to adjust your insulin. After 48 hours of history is collected, which usually happens with the first Pod wear, SmartAdjVipVenta technology stops adjusting insulin against your active Basal Program and starts using the Adaptive Basal Rate for your automated insulin delivery  with your next Pod change. With each Pod change, insulin delivery information is sent and saved in the Omnipod 5 Shan so that the next Pod that is started is updated with the new Adaptive Basal Rate. With each new Pod activation, the system adapts insulin delivery based on physiological needs and total daily insulin (TDI) delivered. After 2-3 Pod changes, adaptation generally stabilizes and automated insulin delivery is based on this adaptation.  Patient demonstrated ability to program controller, activate and insert pod using aseptic technique.  Patient demonstrated ability to program Dexcom transmitter into controller and start automated limited mode.    Instructed pt on use of basic pump features ie...give a bolus, pause insulin, switch from manual to automated mode.  Reviewed features available in manual mode verses automated mode.   Reviewed when and how to use activity function in automated mode.  Reviewed site selection of pods, rotation of sites and hard stop to change pod every 72 hrs.   Instructed that insulin vial is good out of refrigeration for up to 28-30 days.   Reviewed treatment of hypoglycemia, hyperglycemia; sick day care, DKA, and troubleshooting of pump. Advised to change site if issue is suspected.   Omni Pod 24-hour support line provided.    Advised the last 4 digits of pump serial number will unlock the PDM in the event PIN is forgotten     Podder username: [email or web address]  Podder password: [email or web address]     Caylao username: [email or web address]  [place] password: [email or web address]      3/15/24 - Stopped using omnipod shortly after started. States part of the reason is cost - reviewed today pod + vial of 1 insulin is actually less expensive compared to pen needles and 2 insulin pens. Other reason is transitioning between 2 [facility] at the moment ([facility]) and having trouble with adding something new to routine. Reviewed dexcom report with pt - continues to have large variability  and pattern of nighttime highs. He associates highs with pain particularly while lying down. Explained automated system like Omnipod5 would greatly benefit him with both variability with work as well as adjusting to highs r/t pain. He states he thinks he could potentially be ready to restart in a couple of months. Scheduled appt in early May.     6/5/24 - Pt brought Omnipod 5 and supplies today and verbalized readiness to try it again. Reviewed and started pump today.     OMNI POD 5 INSULIN PUMP START    Pump training was provided per Omni Pod protocol.  Patient has used an insulin pump in the past.   Settings for pump obtained from Luly Pacheco NP last office visit note.      Basal:  12AM: 0.9 units/hr     Max basal rate: 3 units/hr     Bolus:  CHO ratio: 1:10  ISF: 1:40  Glucose target : 120 mg/dL  Correct  over: 120 mg/dl  Active insulin time: 3.5 hours  Max bolus: 12 units     Low reservoir insulin alarm: 10 units  Change pod alarm: every 3 days       Details of pump therapy were covered included following controller features and programming, pod activation, pod site selection and rotation, automatic pod priming and insertion, setting & editing basal rates in manual mode, giving bolus and other features in the set-up menu.  The following regarding the Omnipod 5 was covered:  During your first Pod wear, since no recent history is available, the Omnipod 5 System uses only your active Basal Program from Manual Mode as a starting point to adjust your insulin. After 48 hours of history is collected, which usually happens with the first Pod wear, SmartFaculteust technology stops adjusting insulin against your active Basal Program and starts using the Adaptive Basal Rate for your automated insulin delivery with your next Pod change. With each Pod change, insulin delivery information is sent and saved in the Omnipod 5 Shan so that the next Pod that is started is updated with the new Adaptive Basal Rate. With each new Pod  activation, the system adapts insulin delivery based on physiological needs and total daily insulin (TDI) delivered. After 2-3 Pod changes, adaptation generally stabilizes and automated insulin delivery is based on this adaptation.  Patient demonstrated ability to program controller, activate and insert pod using aseptic technique.  Patient demonstrated ability to program Dexcom transmitter into controller and start automated limited mode.    Instructed pt on use of basic pump features ie...give a bolus, pause insulin, switch from manual to automated mode.  Reviewed features available in manual mode verses automated mode.   Reviewed when and how to use activity function in automated mode.  Reviewed site selection of pods, rotation of sites and hard stop to change pod every 72 hrs.   Instructed that insulin vial is good out of refrigeration for up to 28-30 days.   Reviewed treatment of hypoglycemia, hyperglycemia; sick day care, DKA, and troubleshooting of pump. Advised to change site if issue is suspected.   Omni Pod 24-hour support line provided.      6/7/24 - Pt here for follow-up ~2.5 days after initial pod re-start for 1st pod change. He successfully changed pod on his own with some prompting through navigating menus. He feels comfortable with trying to change next pod on his own. Reviewed Omnipod report with him. Overall, good improvement in TIR - last visit with Luly, TIR was 13% on MDI and now at 54% on pump. He bolused initially on first day of pod wear but no boluses since. He reports had misplaced the controller and only just found it in his truck this morning. Reviewed and encouraged keeping controller with him at all times and bolusing with set boluses as prescribed before each meal. Also reviewed troubleshooting lows and highs, changing pod early if suspects kinked cannula/cannula came out/pod malfunction, back up plan (go back to pens) if pod removed and will be without pod for extended period, and  24/7 omnipod customer support number.          Follow Up Plan     Follow up in about 6 days (around 6/13/2024) for omnipod follow-up.    Today's care plan and follow up schedule was discussed with patient.  Jason verbalized understanding of the care plan, goals, and agrees to follow up plan.        The patient was encouraged to communicate with his/her health care provider/physician and care team regarding his/her condition(s) and treatment.  I provided the patient with my contact information today and encouraged to contact me via phone or [facility] Patient Portal as needed.     Length of Visit   Total Time: 45 Minutes

## 2024-06-07 NOTE — TELEPHONE ENCOUNTER
----- Message from Maddison Bowman RD, CDE sent at 6/7/2024  2:52 PM CDT -----  Hi! Wanted to give you an update. We are doing some frequent follow-up, so this is only a couple days worth of data but BIG improvement in TIR! And that's with misplacing the controller for an entire day. He's going to try to do the next pod change on his own, and we will meet up again next week on Thursday. So far looks promising, and he says he is willing to stick with it.

## 2024-06-11 ENCOUNTER — TELEPHONE (OUTPATIENT)
Dept: GASTROENTEROLOGY | Facility: CLINIC | Age: 49
End: 2024-06-11
Payer: COMMERCIAL

## 2024-06-11 NOTE — TELEPHONE ENCOUNTER
Pt cancelled for 6/18/2024 and R/S for 6/28/2024      ----- Message from Melinda Milligan LPN sent at 6/11/2024  3:23 PM CDT -----  Regarding: FW: self    ----- Message -----  From: Tyler Chandler  Sent: 6/11/2024   1:07 PM CDT  To: [facility] Staff  Subject: self                                             Type: Patient Call Back    Who called:self    What is the request in detail:pt is calling to get his procedure rescheduled, would like a callback to get that done     Can the clinic reply by [facility]?no    Would the patient rather a call back or a response via [facility]? callback    Best call back number:[phone]    Additional Information:

## 2024-06-18 ENCOUNTER — TELEPHONE (OUTPATIENT)
Dept: PAIN MEDICINE | Facility: CLINIC | Age: 49
End: 2024-06-18
Payer: COMMERCIAL

## 2024-06-25 ENCOUNTER — TELEPHONE (OUTPATIENT)
Dept: PAIN MEDICINE | Facility: CLINIC | Age: 49
End: 2024-06-25
Payer: COMMERCIAL

## 2024-06-25 NOTE — TELEPHONE ENCOUNTER
Spoke with the pt to inform him that he will need to follow up with his PCP to get his A1C under 10 before we can proceed with his pain injection.

## 2024-07-09 ENCOUNTER — PATIENT OUTREACH (OUTPATIENT)
Dept: ADMINISTRATIVE | Facility: HOSPITAL | Age: 49
End: 2024-07-09
Payer: COMMERCIAL

## 2024-07-09 NOTE — PROGRESS NOTES
There are no preventive care reminders to display for this patient.     Chart review done.   HM updated.   Immunizations reviewed & updated.   Care Everywhere updated.

## 2024-07-15 ENCOUNTER — PATIENT MESSAGE (OUTPATIENT)
Dept: UROLOGY | Facility: CLINIC | Age: 49
End: 2024-07-15
Payer: COMMERCIAL

## 2024-07-15 ENCOUNTER — LAB VISIT (OUTPATIENT)
Dept: LAB | Facility: OTHER | Age: 49
End: 2024-07-15
Attending: NURSE PRACTITIONER
Payer: COMMERCIAL

## 2024-07-15 DIAGNOSIS — R79.89 LOW TESTOSTERONE IN MALE: ICD-10-CM

## 2024-07-15 LAB
BASOPHILS # BLD AUTO: 0.04 K/UL (ref 0–0.2)
BASOPHILS NFR BLD: 0.5 % (ref 0–1.9)
DIFFERENTIAL METHOD BLD: ABNORMAL
EOSINOPHIL # BLD AUTO: 0.1 K/UL (ref 0–0.5)
EOSINOPHIL NFR BLD: 1.1 % (ref 0–8)
ERYTHROCYTE [DISTWIDTH] IN BLOOD BY AUTOMATED COUNT: 13 % (ref 11.5–14.5)
HCT VFR BLD AUTO: 42.3 % (ref 40–54)
HGB BLD-MCNC: 13.7 G/DL (ref 14–18)
IMM GRANULOCYTES # BLD AUTO: 0.02 K/UL (ref 0–0.04)
IMM GRANULOCYTES NFR BLD AUTO: 0.2 % (ref 0–0.5)
LYMPHOCYTES # BLD AUTO: 2.7 K/UL (ref 1–4.8)
LYMPHOCYTES NFR BLD: 33.3 % (ref 18–48)
MCH RBC QN AUTO: 28.5 PG (ref 27–31)
MCHC RBC AUTO-ENTMCNC: 32.4 G/DL (ref 32–36)
MCV RBC AUTO: 88 FL (ref 82–98)
MONOCYTES # BLD AUTO: 0.7 K/UL (ref 0.3–1)
MONOCYTES NFR BLD: 8 % (ref 4–15)
NEUTROPHILS # BLD AUTO: 4.6 K/UL (ref 1.8–7.7)
NEUTROPHILS NFR BLD: 56.9 % (ref 38–73)
NRBC BLD-RTO: 0 /100 WBC
PLATELET # BLD AUTO: 228 K/UL (ref 150–450)
PMV BLD AUTO: 9.9 FL (ref 9.2–12.9)
RBC # BLD AUTO: 4.8 M/UL (ref 4.6–6.2)
WBC # BLD AUTO: 8.1 K/UL (ref 3.9–12.7)

## 2024-07-15 PROCEDURE — 85025 COMPLETE CBC W/AUTO DIFF WBC: CPT | Performed by: NURSE PRACTITIONER

## 2024-07-15 PROCEDURE — 84403 ASSAY OF TOTAL TESTOSTERONE: CPT | Performed by: NURSE PRACTITIONER

## 2024-07-15 PROCEDURE — 36415 COLL VENOUS BLD VENIPUNCTURE: CPT | Performed by: NURSE PRACTITIONER

## 2024-07-16 ENCOUNTER — PATIENT MESSAGE (OUTPATIENT)
Dept: UROLOGY | Facility: CLINIC | Age: 49
End: 2024-07-16
Payer: COMMERCIAL

## 2024-07-16 DIAGNOSIS — R79.89 LOW TESTOSTERONE IN MALE: ICD-10-CM

## 2024-07-16 LAB — TESTOST SERPL-MCNC: >1500 NG/DL (ref 304–1227)

## 2024-07-16 RX ORDER — TESTOSTERONE CYPIONATE 200 MG/ML
200 INJECTION, SOLUTION INTRAMUSCULAR WEEKLY
Qty: 4 ML | Refills: 5 | Status: SHIPPED | OUTPATIENT
Start: 2024-07-16 | End: 2025-01-12

## 2024-08-20 ENCOUNTER — PATIENT OUTREACH (OUTPATIENT)
Dept: ADMINISTRATIVE | Facility: HOSPITAL | Age: 49
End: 2024-08-20
Payer: COMMERCIAL

## 2024-08-20 NOTE — PROGRESS NOTES
Health Maintenance Due   Topic Date Due    Hemoglobin A1c  08/22/2024        Chart review done.   HM updated.   Immunizations reviewed & updated.   Care Everywhere updated.

## 2024-08-21 ENCOUNTER — OFFICE VISIT (OUTPATIENT)
Dept: CARDIOLOGY | Facility: CLINIC | Age: 49
End: 2024-08-21
Payer: COMMERCIAL

## 2024-08-21 VITALS
WEIGHT: 166.25 LBS | SYSTOLIC BLOOD PRESSURE: 119 MMHG | RESPIRATION RATE: 16 BRPM | DIASTOLIC BLOOD PRESSURE: 70 MMHG | OXYGEN SATURATION: 98 % | BODY MASS INDEX: 25.2 KG/M2 | HEIGHT: 68 IN | HEART RATE: 75 BPM

## 2024-08-21 DIAGNOSIS — E11.9 TYPE 2 DIABETES MELLITUS WITHOUT COMPLICATION, WITH LONG-TERM CURRENT USE OF INSULIN: ICD-10-CM

## 2024-08-21 DIAGNOSIS — G47.33 OSA (OBSTRUCTIVE SLEEP APNEA): ICD-10-CM

## 2024-08-21 DIAGNOSIS — R06.02 SOB (SHORTNESS OF BREATH): ICD-10-CM

## 2024-08-21 DIAGNOSIS — I50.9 CONGESTIVE HEART FAILURE, UNSPECIFIED HF CHRONICITY, UNSPECIFIED HEART FAILURE TYPE: Primary | ICD-10-CM

## 2024-08-21 DIAGNOSIS — E11.65 UNCONTROLLED TYPE 2 DIABETES MELLITUS WITH HYPERGLYCEMIA: ICD-10-CM

## 2024-08-21 DIAGNOSIS — I50.41 ACUTE COMBINED SYSTOLIC AND DIASTOLIC CONGESTIVE HEART FAILURE: ICD-10-CM

## 2024-08-21 DIAGNOSIS — R07.9 CHEST PAIN, UNSPECIFIED TYPE: ICD-10-CM

## 2024-08-21 DIAGNOSIS — E78.5 HYPERLIPIDEMIA ASSOCIATED WITH TYPE 2 DIABETES MELLITUS: ICD-10-CM

## 2024-08-21 DIAGNOSIS — Z79.4 TYPE 2 DIABETES MELLITUS WITHOUT COMPLICATION, WITH LONG-TERM CURRENT USE OF INSULIN: ICD-10-CM

## 2024-08-21 DIAGNOSIS — Z82.49 FAMILY HISTORY OF HEART DISEASE: ICD-10-CM

## 2024-08-21 DIAGNOSIS — E11.69 HYPERLIPIDEMIA ASSOCIATED WITH TYPE 2 DIABETES MELLITUS: ICD-10-CM

## 2024-08-21 PROCEDURE — 3046F HEMOGLOBIN A1C LEVEL >9.0%: CPT | Mod: CPTII,S$GLB,, | Performed by: STUDENT IN AN ORGANIZED HEALTH CARE EDUCATION/TRAINING PROGRAM

## 2024-08-21 PROCEDURE — 1159F MED LIST DOCD IN RCRD: CPT | Mod: CPTII,S$GLB,, | Performed by: STUDENT IN AN ORGANIZED HEALTH CARE EDUCATION/TRAINING PROGRAM

## 2024-08-21 PROCEDURE — 3061F NEG MICROALBUMINURIA REV: CPT | Mod: CPTII,S$GLB,, | Performed by: STUDENT IN AN ORGANIZED HEALTH CARE EDUCATION/TRAINING PROGRAM

## 2024-08-21 PROCEDURE — 3066F NEPHROPATHY DOC TX: CPT | Mod: CPTII,S$GLB,, | Performed by: STUDENT IN AN ORGANIZED HEALTH CARE EDUCATION/TRAINING PROGRAM

## 2024-08-21 PROCEDURE — 3008F BODY MASS INDEX DOCD: CPT | Mod: CPTII,S$GLB,, | Performed by: STUDENT IN AN ORGANIZED HEALTH CARE EDUCATION/TRAINING PROGRAM

## 2024-08-21 PROCEDURE — 3074F SYST BP LT 130 MM HG: CPT | Mod: CPTII,S$GLB,, | Performed by: STUDENT IN AN ORGANIZED HEALTH CARE EDUCATION/TRAINING PROGRAM

## 2024-08-21 PROCEDURE — 99214 OFFICE O/P EST MOD 30 MIN: CPT | Mod: S$GLB,,, | Performed by: STUDENT IN AN ORGANIZED HEALTH CARE EDUCATION/TRAINING PROGRAM

## 2024-08-21 PROCEDURE — 99999 PR PBB SHADOW E&M-EST. PATIENT-LVL V: CPT | Mod: PBBFAC,,, | Performed by: STUDENT IN AN ORGANIZED HEALTH CARE EDUCATION/TRAINING PROGRAM

## 2024-08-21 PROCEDURE — 4010F ACE/ARB THERAPY RXD/TAKEN: CPT | Mod: CPTII,S$GLB,, | Performed by: STUDENT IN AN ORGANIZED HEALTH CARE EDUCATION/TRAINING PROGRAM

## 2024-08-21 PROCEDURE — 3078F DIAST BP <80 MM HG: CPT | Mod: CPTII,S$GLB,, | Performed by: STUDENT IN AN ORGANIZED HEALTH CARE EDUCATION/TRAINING PROGRAM

## 2024-08-21 NOTE — PROGRESS NOTES
Section of Cardiology                  Cardiac Clinic Note    Chief Complaint/Reason for consultation: chest pain      HPI:   Jason Sandra is a 49 y.o. male with h/o HLD, diabetes who was referred to cardiology clinic by PCP Dr. Mcconnell for evaluation.    3/14/23  Reports chest pain and SOB about 6 months, has been stable  Works in construction, walks up stairs, heavy lifting  SOB was a few days ago, had to stop his activity   Chest pain, intermittent throughout the day, stops his activity  Palpitations are separate [address] [employer] thank remain I can no  Has joint pain, neck pain, shoulder  (sister has lupus)  Saw rheum today, started on MTX as a trial   Exercise daily, with weight training, no cardio, no issues with this for 25 min    Family hx: dad- CABG at age 61 yo  Denies tobacco or ETOH abuse       4/26/23  Comes in with wife  Echo 4/23 with EF 35%  Stress test 4/23 negative   Has been fatigue and SOB  Started having pain in his joints (neck, knee)  LE swelling  Reports sharp and dull chest pain, not daily, does not last long   No orthopnea, PND, syncope        8/9/23  Still feels SOB, improved  Feels it mostly outside   EF has recovered 35%->55%   No LE swelling  No PND, orthopnea  Not exercising much at home, will start back  Good appetite   Lost around 10 lbs since last visit      11/22/23  Still with fatigue and dizziness- stable  SOB stable, not worse  No LE swelling   Stable in the 160s lbs (trying to gain weight)  Not exercising       5/14/24  Reports cough, dry mostly  Feeling lethargic   No PND  Went to urgent care, no COVID/flu  Reports chest pain after eating, sometimes worse with moving around  Some leg swelling  REYNA with activity  Not taking entresto- thinks due to low BP      8/21/24  Feels more fatigue (chronic) more than usual and SOB with playing basketball  Was told he had MARIANNE, not wearing cpap  Thinks SOB is about the same - not as frequent   Will be having EGD   Had EGD 2  months ago, no issues  Weight stable in 160s lbs   Taking entresto   Thinks toprol xl is causing ED     No PND, chest pain, LE swelling           EKG 5/14/24 NSR  EKG 8/8/23 NSR  EKG 1/1/23 ST, nonspec TW abn, LVH, 107 bpm      Echo 7/23  The left ventricle is normal in size with concentric remodeling and normal systolic function.  Normal left ventricular diastolic function.  The estimated PA systolic pressure is 35 mmHg.  Normal right ventricular size with normal right ventricular systolic function.  Normal central venous pressure (3 mmHg).  The estimated ejection fraction is 55%, recovered from prior study in april  Mild tricuspid regurgitation.    ECHO  4/23  The left ventricle is normal in size with moderately decreased systolic function.  The estimated ejection fraction is 35%.  Grade I left ventricular diastolic dysfunction.  There is moderate left ventricular global hypokinesis.  Normal right ventricular size with normal right ventricular systolic function.  Intermediate central venous pressure (8 mmHg).    STRESS TEST    [facility]      ROS: All 10 systems reviewed. Please refer to the HPI for pertinent positives. All other systems negative.     Past Medical History  Past Medical History:   Diagnosis Date    CHF (congestive heart failure)     Diabetes mellitus     Essential (primary) hypertension     Hypogonadism in male        Surgical History  Past Surgical History:   Procedure Laterality Date    COLONOSCOPY N/A 12/01/2023    Procedure: COLONOSCOPY;  Surgeon: Coral White MD;  Location: [facility];  Service: Endoscopy;  Laterality: N/A;    EGD, WITH CLOSED BIOPSY N/A 06/28/2024    ESOPHAGOGASTRODUODENOSCOPY N/A 6/28/2024    Procedure: EGD (ESOPHAGOGASTRODUODENOSCOPY);  Surgeon: Tereza Mcguire MD;  Location: [facility];  Service: Endoscopy;  Laterality: N/A;    INJECTION OF ANESTHETIC AGENT AROUND MEDIAL BRANCH NERVES INNERVATING CERVICAL FACET JOINT Bilateral 06/07/2023    Procedure: Bilateral C3-5 MBB  with local;  Surgeon: Tony Garcia MD;  Location: [facility];  Service: Pain Management;  Laterality: Bilateral;    INJECTION OF ANESTHETIC AGENT AROUND MEDIAL BRANCH NERVES INNERVATING CERVICAL FACET JOINT Bilateral 08/23/2023    Procedure: Bilateral C3-5 MBB  second diagnostic block with RN IV sedation;  Surgeon: Tony Garcia MD;  Location: [facility];  Service: Pain Management;  Laterality: Bilateral;    NOSE SURGERY      RADIOFREQUENCY ABLATION, FACET JOINT, CERVICOTHORACIC Left 11/22/2023    Procedure: C3-4 and C 4-5 Facet RFA;  Surgeon: Tony Garcia MD;  Location: [facility];  Service: Pain Management;  Laterality: Left;  To be done 2-3 weeks after the left side.    RADIOFREQUENCY ABLATION, FACET JOINT, CERVICOTHORACIC Right 12/13/2023    Procedure: C3-4 and C 4-5 Facet RFA;  Surgeon: Tony Garcia MD;  Location: [facility];  Service: Pain Management;  Laterality: Right;    SELECTIVE INJECTION OF ANESTHETIC AGENT AROUND LUMBAR SPINAL NERVE ROOT BY TRANSFORAMINAL APPROACH Bilateral 05/24/2023    Procedure: Bilateral L5 TF GARY with local;  Surgeon: Tony Garcia MD;  Location: [facility];  Service: Pain Management;  Laterality: Bilateral;    SELECTIVE INJECTION OF ANESTHETIC AGENT AROUND LUMBAR SPINAL NERVE ROOT BY TRANSFORAMINAL APPROACH Bilateral 10/25/2023    Procedure: Bilateral L5/S1 TF GARY;  Surgeon: Aric Muro MD;  Location: [facility];  Service: Pain Management;  Laterality: Bilateral;          Allergies:   Review of patient's allergies indicates:  No Known Allergies    Social History:  Social History     Socioeconomic History    Marital status: Legally    Tobacco Use    Smoking status: Never     Passive exposure: Never    Smokeless tobacco: Never   Substance and Sexual Activity    Alcohol use: Not Currently     Comment: socially    Drug use: No    Sexual activity: Yes     Partners: Female   Social History Narrative    [place]     Social Determinants of Health      Financial Resource Strain: Low Risk  (1/3/2023)    Overall Financial Resource Strain (CARDIA)     Difficulty of Paying Living Expenses: Not hard at all   Food Insecurity: No Food Insecurity (1/3/2023)    Hunger Vital Sign     Worried About Running Out of Food in the Last Year: Never true     Ran Out of Food in the Last Year: Never true   Transportation Needs: No Transportation Needs (1/3/2023)    PRAPARE - Transportation     Lack of Transportation (Medical): No     Lack of Transportation (Non-Medical): No   Stress: No Stress Concern Present (1/3/2023)    [place] [employer] - Occupational Stress Questionnaire     Feeling of Stress : Only a little   Housing Stability: Low Risk  (1/3/2023)    Housing Stability Vital Sign     Unable to Pay for Housing in the Last Year: No     Number of Places Lived in the Last Year: 2     Unstable Housing in the Last Year: No       Family History:  family history includes Alzheimer's disease in his father; Cataracts in his mother; Glaucoma in his mother; Stroke in his mother.    Home Medications:  Current Outpatient Medications on File Prior to Visit   Medication Sig Dispense Refill    ACCU-CHEK GUIDE ME GLUCOSE MTR Misc CHECK BLOOD SUGAR TWICE A DAY      atorvastatin (LIPITOR) 40 MG tablet Take 1 tablet (40 mg total) by mouth once daily. 90 tablet 3    blood sugar diagnostic Strp To check BG 2 times daily, to use with insurance preferred meter 200 strip 2    blood-glucose sensor (DEXCOM G6 SENSOR) Chen Use 1 sensor every 10 days 3 each 11    blood-glucose transmitter (DEXCOM G6 TRANSMITTER) Chen 1 transmitter every 3 months 1 each 4    insulin degludec (TRESIBA FLEXTOUCH U-100) 100 unit/mL (3 mL) insulin pen Inject 34 Units into the skin every evening. 15 mL 6    insulin lispro-aabc (LYUMJEV KWIKPEN U-100 INSULIN) 100 unit/mL pen Inject 10 units 3 times per day before meals, 2-4 units for snack, . + correction scale. MAX TDD Of 50 units 5 Pen 6    insulin pump  "cart,auto,BT-cntr (OMNIPOD 5 G6 INTRO KIT, GEN 5,) Crtg Inject 1 Device into the skin Every 3 (three) days. 1 each 0    insulin pump cart,automated,BT (OMNIPOD 5 G6 PODS, GEN 5,) Crtg Inject 1 Device into the skin Every 3 (three) days. 2 each 11    ketoconazole (NIZORAL) 2 % shampoo Apply to scalp in place of regular shampoo 2-3x per week 120 mL 11    lancets Misc To check BG 2 times daily, to use with insurance preferred meter 200 each 2    omeprazole (PRILOSEC) 40 MG capsule Take 1 capsule (40 mg total) by mouth 2 (two) times daily before meals for 90 days, THEN 1 capsule (40 mg total) every morning. 240 capsule 0    pen needle, diabetic 32 gauge x 5/32" Ndle To use 7 times per day with insulin injections- for meals, long-acting insulin and for snacks 200 each 11    sacubitriL-valsartan (ENTRESTO) 24-26 mg per tablet Take 1 tablet by mouth 2 (two) times daily. 60 tablet 6    testosterone cypionate (DEPOTESTOTERONE CYPIONATE) 200 mg/mL injection Inject 1 mL (200 mg total) into the muscle once a week. 4 mL 5    metoprolol succinate (TOPROL-XL) 25 MG 24 hr tablet Take 1 tablet (25 mg total) by mouth once daily. 30 tablet 6     No current facility-administered medications on file prior to visit.       Physical exam:  /70   Pulse 75   Resp 16   Ht 5' 8" (1.727 m)   Wt 75.4 kg (166 lb 3.6 oz)   SpO2 98%   BMI 25.27 kg/m²         General: Pt is a 49 y.o. year old male who is AAOx3, in NAD, is pleasant, well nourished, looks stated age  HEENT: PERRL, EOMI, Oral mucosa pink & moist  CVS  No abnormal cardiac pulsations noted on inspection. JVP not raised. The apical impulse is normal on palpation, and is located in the left 5th intercostal space in the mid - clavicular line. No palpable thrills or abnormal pulsations noted. RR, S1 - S2 heard, no murmurs, rubs or gallops appreciated.   PUL : CTA B/L. No wheezes/crackles heard   ABD : BS +, soft. No tenderness elicited   LE : No C/C/E. Distal Pulses palpable B/L " "        LABS:    Chemistry:   Lab Results   Component Value Date     (L) 05/22/2024    K 4.4 05/22/2024     05/22/2024    CO2 23 05/22/2024    BUN 15 05/22/2024    CREATININE 1.3 05/22/2024    CALCIUM 9.6 05/22/2024     Cardiac Markers:   Lab Results   Component Value Date    TROPONINI <0.006 05/20/2024     Cardiac Markers (Last 3):   Lab Results   Component Value Date    TROPONINI <0.006 05/20/2024    TROPONINI <0.006 01/01/2023     CBC:   Lab Results   Component Value Date    WBC 8.10 07/15/2024    HGB 13.7 (L) 07/15/2024    HCT 42.3 07/15/2024    HCT 47 01/01/2023    MCV 88 07/15/2024     07/15/2024     Lipids:   Lab Results   Component Value Date    CHOL 134 05/22/2024    TRIG 70 05/22/2024    HDL 45 05/22/2024     Coagulation: No results found for: "PT", "INR", "APTT"        Assessment      1. Congestive heart failure, unspecified HF chronicity, unspecified heart failure type    2. SOB (shortness of breath)    3. Hyperlipidemia associated with type 2 diabetes mellitus    4. Type 2 diabetes mellitus without complication, with long-term current use of insulin    5. Family history of heart disease    6. Chest pain, unspecified type    7. Acute combined systolic and diastolic congestive heart failure    8. Uncontrolled type 2 diabetes mellitus with hyperglycemia            Plan:    CHF/Chest pain/shortness of breath   Says SOB intermittent worse, less frequent   Family history of heart disease   Echo 4/23 with EF 35%, recovered to EF 55% 7/23  Stress test 4/23 negative   Continue Toprol-XL, lasix 20 mg daily   Fluid restriction, reduce salt intake discussed  Stopped taking entresto- thinks due to BP running low -> but looks to be taking it again   Recheck echo     MARIANNE  Would like to discuss with sleep clinic about surgery for sleep apnea  Cannot tolerate cpap    Diabetes   A1c 8.7->10.9  Continue therapy    HLD  ->75 as of 5/24  continue lipitor 40     HTN  Stable   Meds as above     This " note was prepared using voice recognition system and is likely to have sound alike errors that may have been overlooked even after proofreading.     I have reviewed all pertinent chart information.  Plans and recommendations have been formulated under my direct supervision. All questions answered and patient voiced understanding.   If symptoms persist go to the ED.    RTC in 2 months         Karl Payne MD  Cardiology

## 2024-08-22 ENCOUNTER — HOSPITAL ENCOUNTER (OUTPATIENT)
Dept: CARDIOLOGY | Facility: HOSPITAL | Age: 49
Discharge: HOME | End: 2024-08-22
Attending: STUDENT IN AN ORGANIZED HEALTH CARE EDUCATION/TRAINING PROGRAM
Payer: COMMERCIAL

## 2024-08-22 VITALS
WEIGHT: 166 LBS | SYSTOLIC BLOOD PRESSURE: 119 MMHG | BODY MASS INDEX: 25.16 KG/M2 | DIASTOLIC BLOOD PRESSURE: 70 MMHG | HEIGHT: 68 IN

## 2024-08-22 DIAGNOSIS — E78.5 HYPERLIPIDEMIA ASSOCIATED WITH TYPE 2 DIABETES MELLITUS: ICD-10-CM

## 2024-08-22 DIAGNOSIS — E11.9 TYPE 2 DIABETES MELLITUS WITHOUT COMPLICATION, WITH LONG-TERM CURRENT USE OF INSULIN: ICD-10-CM

## 2024-08-22 DIAGNOSIS — I50.9 CONGESTIVE HEART FAILURE, UNSPECIFIED HF CHRONICITY, UNSPECIFIED HEART FAILURE TYPE: ICD-10-CM

## 2024-08-22 DIAGNOSIS — E11.69 HYPERLIPIDEMIA ASSOCIATED WITH TYPE 2 DIABETES MELLITUS: ICD-10-CM

## 2024-08-22 DIAGNOSIS — I50.41 ACUTE COMBINED SYSTOLIC AND DIASTOLIC CONGESTIVE HEART FAILURE: ICD-10-CM

## 2024-08-22 DIAGNOSIS — Z79.4 TYPE 2 DIABETES MELLITUS WITHOUT COMPLICATION, WITH LONG-TERM CURRENT USE OF INSULIN: ICD-10-CM

## 2024-08-22 DIAGNOSIS — R06.02 SOB (SHORTNESS OF BREATH): ICD-10-CM

## 2024-08-22 LAB
AORTIC ROOT ANNULUS: 2.45 CM
ASCENDING AORTA: 2.91 CM
AV INDEX (PROSTH): 0.69
AV MEAN GRADIENT: 4 MMHG
AV PEAK GRADIENT: 6 MMHG
AV VALVE AREA BY VELOCITY RATIO: 2.15 CM²
AV VALVE AREA: 2.04 CM²
AV VELOCITY RATIO: 0.73
BSA FOR ECHO PROCEDURE: 1.9 M2
CV ECHO LV RWT: 0.45 CM
DOP CALC AO PEAK VEL: 1.25 M/S
DOP CALC AO VTI: 26.2 CM
DOP CALC LVOT AREA: 3 CM2
DOP CALC LVOT DIAMETER: 1.94 CM
DOP CALC LVOT PEAK VEL: 0.91 M/S
DOP CALC LVOT STROKE VOLUME: 53.48 CM3
DOP CALC RVOT PEAK VEL: 0.73 M/S
DOP CALC RVOT VTI: 16 CM
DOP CALCLVOT PEAK VEL VTI: 18.1 CM
E WAVE DECELERATION TIME: 210.57 MSEC
E/A RATIO: 0.92
E/E' RATIO: 5.26 M/S
ECHO LV POSTERIOR WALL: 1.07 CM (ref 0.6–1.1)
FRACTIONAL SHORTENING: 38 % (ref 28–44)
INTERVENTRICULAR SEPTUM: 0.93 CM (ref 0.6–1.1)
IVC DIAMETER: 1.64 CM
IVRT: 91.34 MSEC
LA MAJOR: 4.24 CM
LA MINOR: 4.73 CM
LA WIDTH: 3.9 CM
LEFT ATRIUM AREA SYSTOLIC (APICAL 2 CHAMBER): 15.48 CM2
LEFT ATRIUM AREA SYSTOLIC (APICAL 4 CHAMBER): 12.46 CM2
LEFT ATRIUM SIZE: 3.55 CM
LEFT ATRIUM VOLUME INDEX MOD: 16.8 ML/M2
LEFT ATRIUM VOLUME INDEX: 27.8 ML/M2
LEFT ATRIUM VOLUME MOD: 31.72 CM3
LEFT ATRIUM VOLUME: 52.62 CM3
LEFT INTERNAL DIMENSION IN SYSTOLE: 2.94 CM (ref 2.1–4)
LEFT VENTRICLE DIASTOLIC VOLUME INDEX: 56.34 ML/M2
LEFT VENTRICLE DIASTOLIC VOLUME: 106.49 ML
LEFT VENTRICLE END SYSTOLIC VOLUME APICAL 2 CHAMBER: 38.99 ML
LEFT VENTRICLE END SYSTOLIC VOLUME APICAL 4 CHAMBER: 26.45 ML
LEFT VENTRICLE MASS INDEX: 89 G/M2
LEFT VENTRICLE SYSTOLIC VOLUME INDEX: 17.7 ML/M2
LEFT VENTRICLE SYSTOLIC VOLUME: 33.44 ML
LEFT VENTRICULAR INTERNAL DIMENSION IN DIASTOLE: 4.78 CM (ref 3.5–6)
LEFT VENTRICULAR MASS: 169.04 G
LV LATERAL E/E' RATIO: 4.73 M/S
LV SEPTAL E/E' RATIO: 5.92 M/S
LVED V (TEICH): 106.49 ML
LVES V (TEICH): 33.44 ML
LVOT MG: 1.75 MMHG
LVOT MV: 0.63 CM/S
MV PEAK A VEL: 0.77 M/S
MV PEAK E VEL: 0.71 M/S
MV STENOSIS PRESSURE HALF TIME: 61.07 MS
MV VALVE AREA P 1/2 METHOD: 3.6 CM2
OHS CV RV/LV RATIO: 0.67 CM
PISA TR MAX VEL: 1.98 M/S
PV MEAN GRADIENT: 1 MMHG
PV MV: 0.76 M/S
PV PEAK GRADIENT: 6 MMHG
PV PEAK VELOCITY: 1.18 M/S
RA MAJOR: 4.43 CM
RA PRESSURE ESTIMATED: 3 MMHG
RA WIDTH: 3.49 CM
RIGHT VENTRICULAR END-DIASTOLIC DIMENSION: 3.22 CM
RV TB RVSP: 5 MMHG
SINUS: 3 CM
STJ: 3.05 CM
TDI LATERAL: 0.15 M/S
TDI SEPTAL: 0.12 M/S
TDI: 0.14 M/S
TR MAX PG: 16 MMHG
TV REST PULMONARY ARTERY PRESSURE: 19 MMHG
Z-SCORE OF LEFT VENTRICULAR DIMENSION IN END DIASTOLE: -1.01
Z-SCORE OF LEFT VENTRICULAR DIMENSION IN END SYSTOLE: -0.82

## 2024-08-22 PROCEDURE — 93306 TTE W/DOPPLER COMPLETE: CPT | Mod: 26,,, | Performed by: INTERNAL MEDICINE

## 2024-08-22 PROCEDURE — 93306 TTE W/DOPPLER COMPLETE: CPT

## 2024-08-28 ENCOUNTER — PATIENT MESSAGE (OUTPATIENT)
Dept: DIABETES | Facility: CLINIC | Age: 49
End: 2024-08-28
Payer: COMMERCIAL

## 2024-08-30 ENCOUNTER — OFFICE VISIT (OUTPATIENT)
Dept: PULMONOLOGY | Facility: CLINIC | Age: 49
End: 2024-08-30
Payer: COMMERCIAL

## 2024-08-30 VITALS
SYSTOLIC BLOOD PRESSURE: 116 MMHG | HEIGHT: 68 IN | WEIGHT: 162.25 LBS | BODY MASS INDEX: 24.59 KG/M2 | RESPIRATION RATE: 17 BRPM | HEART RATE: 87 BPM | DIASTOLIC BLOOD PRESSURE: 74 MMHG | OXYGEN SATURATION: 97 %

## 2024-08-30 DIAGNOSIS — Z72.820 LACK OF ADEQUATE SLEEP: ICD-10-CM

## 2024-08-30 DIAGNOSIS — I50.9 CONGESTIVE HEART FAILURE, UNSPECIFIED HF CHRONICITY, UNSPECIFIED HEART FAILURE TYPE: Primary | ICD-10-CM

## 2024-08-30 DIAGNOSIS — G47.33 OSA (OBSTRUCTIVE SLEEP APNEA): ICD-10-CM

## 2024-08-30 DIAGNOSIS — G47.10 HYPERSOMNIA: ICD-10-CM

## 2024-08-30 PROCEDURE — 99999 PR PBB SHADOW E&M-EST. PATIENT-LVL V: CPT | Mod: PBBFAC,,, | Performed by: NURSE PRACTITIONER

## 2024-08-30 NOTE — PROGRESS NOTES
Subjective:      Patient ID: Jason Sandra is a 49 y.o. male.    Chief Complaint: Sleep Apnea    HPI    Patient presents today for evaluation of sleepiness. He was dx with borderline MARIANNE 2001. He is intolerant to CPAP. Not desiring to try again. He has dx of CHF, HTN, DM.   He has extreme sleepiness during the day.  Patient with snoring. Patient not having problems falling asleep, but wakes up frequently throughout the night.  Patient does not wake up feeling refreshed in the morning.  Patient with daytime hypersomnolence.  Patient has had symptoms for years.   Bedtime: 9PM  Wake time: 3-4AM  No improvement if sleeps 8 hours. No cataplexy, hypnagogic hallucinations. Sleep paralysis once or twice. Very vivid dreams when he falls asleep during the day. Naps are NOT refreshing.       STOP - BANG Questionnaire:     1. Snoring : Do you snore loudly ?    Yes    2. Tired : Do you often feel tired, fatigued, or sleepy during daytime?   Yes    3. Observed: Has anyone observed you stop breathing during your sleep?   No    4. Blood pressure : Do you have or are you being treated for high blood pressure?   Yes    5. BMI :BMI more than 35 kg/m2?   No    6. Age : Age over 50 yr old?   Yes    7. Neck circumference: Neck circumference greater than 40 cm?   No    8. Gender: Gender male?   Yes    High risk of MARIANNE: Yes 5 - 8  Intermediate risk of MARIANNE: Yes 3 - 4  Low risk of MARIANNE: Yes 0 - 2      References:   STOP Questionnaire   A Tool to Screen Patients for Obstructive Sleep Apnea: SHELLEY Rhodes.R.C.P.C., LINCOLN Baca.B.B.S., Hayde Casanova M.D.,Tianna Shi, Ph.D., LINCOLN Mishra.B.B.S.,_ Rigo Berumen.,_ Eliseo Brady M.D., SHELLEY Gay.R.C.P.C.; Anesthesiology 2008; 108:812-21 Copyright © 2008, the American Society of Anesthesiologists, Inc. [employer]       [place] Questionnaire (validated MARIANNE screening questionnaire)    Yes -- Snoring/apnea    Yes -- Fatigue    Body  mass index is Body mass index is 24.67 kg/m²..  (>25 is overweight, >30 is obese)    Blood Pressure = Hypertension  (PreHTN 120-139/80-89, Stg1 140-159/90-99, Stg2 >160/>100)  [place] = three of three MARIANNE categories are positive (high risk is 2-3 positive categories)         8/30/2024    10:00 AM   EPWORTH SLEEPINESS SCALE   Sitting and reading 3   Watching TV 3   Sitting, inactive in a public place (e.g. a theatre or a meeting) 2   As a passenger in a car for an hour without a break 2   Lying down to rest in the afternoon when circumstances permit 3   Sitting and talking to someone 2   Sitting quietly after a lunch without alcohol 3   In a car, while stopped for a few minutes in traffic 0   Total score 18      (validated sleepiness questionnaire with a higher score indicating greater sleepiness; range 0-24)    Patient Active Problem List   Diagnosis    SOB (shortness of breath)    Hyperlipidemia LDL goal <100    Restrictive lung disease    Hypogonadism male    Chronic prostatitis    Narcolepsy due to underlying condition without cataplexy    Insulin dependent diabetes mellitus type IA    Ketosis prone diabetes    Periauricular adenopathy    Butterfly rash    Chest pain    Memory deficits    KIRTI positive    Psychosocial stressors    Family history of Alzheimer's disease    Cognitive complaints with normal exam    Family history of heart disease    Elevated blood pressure reading    Rule out Dural venous sinus thrombosis    Persistent headaches    Sialoadenitis    Lumbosacral radiculopathy    CHF (congestive heart failure)    Chronic bilateral low back pain with bilateral sciatica    Decreased functional mobility and endurance    Chronic neck pain    Cervical spondylosis    Type 1 diabetes mellitus with hyperglycemia    Low serum C-peptide    Encounter for screening colonoscopy    Mild nonproliferative diabetic retinopathy of both eyes without macular edema associated with type 1 diabetes mellitus    MARIANNE (obstructive  "sleep apnea)         /74   Pulse 87   Resp 17   Ht 5' 8" (1.727 m)   Wt 73.6 kg (162 lb 4.1 oz)   SpO2 97%   BMI 24.67 kg/m²   Body mass index is 24.67 kg/m².    Review of Systems      Objective:      Physical Exam  Personal Diagnostic Review      Assessment:     1. Congestive heart failure, unspecified HF chronicity, unspecified heart failure type    2. MARIANNE (obstructive sleep apnea)    3. Hypersomnia    4. Lack of adequate sleep       Outpatient Encounter Medications as of 8/30/2024   Medication Sig Dispense Refill    ACCU-CHEK GUIDE ME GLUCOSE MTR Misc CHECK BLOOD SUGAR TWICE A DAY      atorvastatin (LIPITOR) 40 MG tablet Take 1 tablet (40 mg total) by mouth once daily. 90 tablet 3    blood sugar diagnostic Strp To check BG 2 times daily, to use with insurance preferred meter 200 strip 2    blood-glucose sensor (DEXCOM G6 SENSOR) Chen Use 1 sensor every 10 days 3 each 11    blood-glucose transmitter (DEXCOM G6 TRANSMITTER) Chen 1 transmitter every 3 months 1 each 4    insulin degludec (TRESIBA FLEXTOUCH U-100) 100 unit/mL (3 mL) insulin pen Inject 34 Units into the skin every evening. 15 mL 6    insulin lispro-aabc (LYUMJEV KWIKPEN U-100 INSULIN) 100 unit/mL pen Inject 10 units 3 times per day before meals, 2-4 units for snack, . + correction scale. MAX TDD Of 50 units 5 Pen 6    insulin pump cart,auto,BT-cntr (OMNIPOD 5 G6 INTRO KIT, GEN 5,) Crtg Inject 1 Device into the skin Every 3 (three) days. 1 each 0    insulin pump cart,automated,BT (OMNIPOD 5 G6 PODS, GEN 5,) Crtg Inject 1 Device into the skin Every 3 (three) days. 2 each 11    ketoconazole (NIZORAL) 2 % shampoo Apply to scalp in place of regular shampoo 2-3x per week 120 mL 11    lancets Misc To check BG 2 times daily, to use with insurance preferred meter 200 each 2    omeprazole (PRILOSEC) 40 MG capsule Take 1 capsule (40 mg total) by mouth 2 (two) times daily before meals for 90 days, THEN 1 capsule (40 mg total) every morning. 240 capsule " "0    pen needle, diabetic 32 gauge x 5/32" Ndle To use 7 times per day with insulin injections- for meals, long-acting insulin and for snacks 200 each 11    sacubitriL-valsartan (ENTRESTO) 24-26 mg per tablet Take 1 tablet by mouth 2 (two) times daily. 60 tablet 6    testosterone cypionate (DEPOTESTOTERONE CYPIONATE) 200 mg/mL injection Inject 1 mL (200 mg total) into the muscle once a week. 4 mL 5    metoprolol succinate (TOPROL-XL) 25 MG 24 hr tablet Take 1 tablet (25 mg total) by mouth once daily. 30 tablet 6     No facility-administered encounter medications on file as of 8/30/2024.     Orders Placed This Encounter   Procedures    Polysomnogram (CPAP will be added if patient meets diagnostic criteria.)     Standing Status:   Future     Standing Expiration Date:   8/30/2025     Plan:   He is interested in Inspire implant.   Need polysomnogram to evaluate MARIANNE to see if he would be a good candidate.  Health risk of untreated MARIANNE discussed.   Increase sleep time.      1. Congestive heart failure, unspecified HF chronicity, unspecified heart failure type    2. MARIANNE (obstructive sleep apnea)  -     Polysomnogram (CPAP will be added if patient meets diagnostic criteria.); Future    3. Hypersomnia    4. Lack of adequate sleep         "

## 2024-09-15 ENCOUNTER — HOSPITAL ENCOUNTER (OUTPATIENT)
Dept: SLEEP MEDICINE | Facility: HOSPITAL | Age: 49
Discharge: HOME | End: 2024-09-15
Attending: NURSE PRACTITIONER
Payer: COMMERCIAL

## 2024-09-15 DIAGNOSIS — F51.12 INSUFFICIENT SLEEP SYNDROME: ICD-10-CM

## 2024-09-15 DIAGNOSIS — Z72.821 INADEQUATE SLEEP HYGIENE: ICD-10-CM

## 2024-09-15 DIAGNOSIS — G47.61 PERIODIC LIMB MOVEMENT DISORDER (PLMD): Primary | ICD-10-CM

## 2024-09-15 DIAGNOSIS — R06.83 PRIMARY SNORING: ICD-10-CM

## 2024-09-15 PROCEDURE — 95810 POLYSOM 6/> YRS 4/> PARAM: CPT

## 2024-09-20 ENCOUNTER — PATIENT MESSAGE (OUTPATIENT)
Dept: PAIN MEDICINE | Facility: CLINIC | Age: 49
End: 2024-09-20
Payer: COMMERCIAL

## 2024-09-20 PROBLEM — G47.61 PERIODIC LIMB MOVEMENT DISORDER (PLMD): Status: ACTIVE | Noted: 2024-09-20

## 2024-09-20 NOTE — PROCEDURES
"[facility]    Sleep Disorder Center    DIAGNOSTIC POLYSOMNOGRAPHY REPORT     Patient Name: Jason Sandra                                   Date of Report: 24     Study Date:  9/15/2024  [facility] No.: [id number]                                   : 1975      Time of Study:  09:31:04 PM - 05:00:08 AM   Sex:  Male   Age:  49 year   Weight:  167.0 lbs         Height:  5' 8"       Type of Study:  Diagnostic    REASONS FOR REFERRAL: Mr. Sandra is a 49 year year old male, referred for diagnostic polysomnography by Elizabeth LeJeune, ACNP, based on the patient's reported loud snoring, observed respiratory pauses in sleep, frequent nocturnal awakenings, unrefreshing sleep and daytime hypersomnolence. His [place] Sleepiness Scale score was 20, clinically significant, and his STOP-BANG score was 5, high risk of obstructive sleep apnea (OAS).  He has had recent difficulty tolerating CPAP, and is now interested in treatment with [facility], which would require a confirmation of his original sleep apnea diagnosis, the reason for the present study.  Dr. Luan Lazar is the patient's primary care physician.    STUDY PARAMETERS: This diagnostic study involved analysis of the patient's sleep pattern while breathing unassisted. The study was performed with a sleep technologist in attendance for the entire test period, with video monitoring throughout the study, and routine laboratory clinical parameters recorded:  NOTE:  This polysomnographic sleep study was reviewed epoch-by-epoch, interpreted and signed below by an American Academy of Sleep Medicine Board Certified Sleep Specialist    SUMMARY STATEMENTS  DIAGNOSTIC IMPRESSIONS  G47.61  /  327.51 Mild Periodic Limb Movement (PLM) Disorder   R06.83  /  780.53-1 Primary Snoring (sporadic, infrequent to frequent, mild snoring during polysomnography)  F51.12   /  307.44 Insufficient Sleep Syndrome  Z72.821 /  V69.4 Inadequate Sleep Hygiene  G47.41  /  " 347.00 r/o Narcolepsy without Cataplexy    PRIMARY TREATMENT RECOMMENDATIONS  Treat, or refer to Sleep Disorders Center.  The diagnostic polysomnography revealed no diagnosable obstructive sleep apnea / hypopnea syndrome (Criteria: Apneas with 3 % or more desaturation, and  hypopneas with 4 % or more desaturation, either with or without arousal) A + H Index = 1.0 events / hr asleep with no respiratory event - related arousals / hr asleep and no RERAs (respiratory effort -  related arousals) for the study.  The mean SpO2 value was 96.1 %, mild, the minimum oxygen saturation during sleep was 91.0 %, and the maximum waking baseline SpO2 was 99.0 %.  Sporadic, infrequent to frequent, mild snoring was noted.  A CPAP titration polysomnography is not recommended.    If treatment of snoring (sporadic, infrequent to frequent, mild during the PSG) is desired, consider a reversible treatment such as a dental oral device.  If a permanent procedure such as UPPP is preferred, periodic polysomnography may be needed because signs of worsening apnea could be missed (silent apneas) if MARIANNE develops or becomes more severe.  The following changes in sleep hygiene / sleep - related behavior are recommended after medical treatments are successful  Set time for sleep (now 6.5 hrs / night) to of hours of sleep needed for adequate daytime functioning (7.5 to 9.0 hrs / night).  Regular bedtimes and wake times, including weekends: Total sleep time / night should not be more than one hour more than   usual, and bedtime or wake time should not be more than one hour earlier or later than usual.     Do not attempt to make up lost sleep by extending sleep periods.    Avoid naps; none longer than 20 min or later than mid - afternoon.  Avoid caffeinated beverages after 6:00 pm or within 4 hours of bedtime.    SECONDARY TREATMENT RECOMMENDATIONS  Treat, or refer to SDC if problems are not satisfactorily resolved by the above.  A mild PLM disorder was  observed (PLMS Index = 13.0 / hr asleep, but treatment might not be optimal because the PLMS were not disruptive of sleep (only 1.7 arousals / hr asleep), and there were no signs of restless legs syndrome in the SDI, H & P or PSG (RLS is often seen with PLMS and treated with the same medications). Consider treatment of PLM disorder if PLMS symptoms are sufficiently bothersome to the patient.  Note that the benzodiazepine medications sometimes used to treat PLM disorder (e.g., clonazepam / Klonopin) may exacerbate some sleep - related respiratory disorders, and that dopaminergic medications such as   Mirapex and Requip can be used in such instances.   Follow - up inquiry regarding frequency, duration and nature of reported sleep loss (insufficient sleep disorder / voluntary sleep   restriction   versus   insomnia).  Please see SDI.  Follow - up inquiry regarding possible narcolepsy ((brief and refreshing sleep attacks, conscious cataplectic collapse or near collapse, realistic hypnagogic hallucinations, transitional wake / sleep / wake sleep paralysis); if positive, consider a nocturnal polysomnography followed by multiple sleep latency testing the next day to rule out narcolepsy (please see SDI).    See below for a complete interpretation of data from the polysomnography and Sleep Disorders Inventory.     Thank you for referring this patient to the [facility].      Del Arnold, Ph.D., ABPP; Diplomate, American Board of Sleep Medicine

## 2024-09-23 ENCOUNTER — TELEPHONE (OUTPATIENT)
Dept: GASTROENTEROLOGY | Facility: CLINIC | Age: 49
End: 2024-09-23
Payer: COMMERCIAL

## 2024-09-23 NOTE — TELEPHONE ENCOUNTER
"Patient rescheduled for 10/29/2024 for his EGD      ----- Message from Corinne Bright MA sent at 9/23/2024  1:02 PM CDT -----  Regarding: req to r/s proc 9/24    ----- Message -----  From: Philipp Dinh  Sent: 9/23/2024  12:53 PM CDT  To: Maynor ROSA Staff  Subject: call back                                              Reschedule Existing Appointment        Appt Date:09/24     Type of appt: All Appts     Physician:     Reason for rescheduling?  Requesting to r/s for soonest available in November      Caller: Self     Contact Preference:[phone]        Additional Information:  "Thank you for all that you do for our patients"  "

## 2024-09-25 ENCOUNTER — PATIENT MESSAGE (OUTPATIENT)
Dept: DIABETES | Facility: CLINIC | Age: 49
End: 2024-09-25
Payer: COMMERCIAL

## 2024-09-26 ENCOUNTER — PATIENT MESSAGE (OUTPATIENT)
Dept: PULMONOLOGY | Facility: CLINIC | Age: 49
End: 2024-09-26
Payer: COMMERCIAL

## 2024-10-02 ENCOUNTER — DOCUMENTATION ONLY (OUTPATIENT)
Dept: PAIN MEDICINE | Facility: CLINIC | Age: 49
End: 2024-10-02
Payer: COMMERCIAL

## 2024-10-03 ENCOUNTER — PATIENT MESSAGE (OUTPATIENT)
Dept: PULMONOLOGY | Facility: CLINIC | Age: 49
End: 2024-10-03

## 2024-10-03 ENCOUNTER — OFFICE VISIT (OUTPATIENT)
Dept: PULMONOLOGY | Facility: CLINIC | Age: 49
End: 2024-10-03
Payer: COMMERCIAL

## 2024-10-03 VITALS — HEIGHT: 68 IN | OXYGEN SATURATION: 96 % | WEIGHT: 162.25 LBS | BODY MASS INDEX: 24.59 KG/M2

## 2024-10-03 DIAGNOSIS — G47.10 HYPERSOMNIA: Primary | ICD-10-CM

## 2024-10-03 DIAGNOSIS — G47.33 OSA (OBSTRUCTIVE SLEEP APNEA): ICD-10-CM

## 2024-10-03 DIAGNOSIS — Z91.89 AT RISK FOR NARCOLEPSY: ICD-10-CM

## 2024-10-03 PROCEDURE — 3061F NEG MICROALBUMINURIA REV: CPT | Mod: CPTII,95,, | Performed by: NURSE PRACTITIONER

## 2024-10-03 PROCEDURE — 1160F RVW MEDS BY RX/DR IN RCRD: CPT | Mod: CPTII,95,, | Performed by: NURSE PRACTITIONER

## 2024-10-03 PROCEDURE — 99214 OFFICE O/P EST MOD 30 MIN: CPT | Mod: 95,,, | Performed by: NURSE PRACTITIONER

## 2024-10-03 PROCEDURE — 3046F HEMOGLOBIN A1C LEVEL >9.0%: CPT | Mod: CPTII,95,, | Performed by: NURSE PRACTITIONER

## 2024-10-03 PROCEDURE — 3008F BODY MASS INDEX DOCD: CPT | Mod: CPTII,95,, | Performed by: NURSE PRACTITIONER

## 2024-10-03 PROCEDURE — 4010F ACE/ARB THERAPY RXD/TAKEN: CPT | Mod: CPTII,95,, | Performed by: NURSE PRACTITIONER

## 2024-10-03 PROCEDURE — 3066F NEPHROPATHY DOC TX: CPT | Mod: CPTII,95,, | Performed by: NURSE PRACTITIONER

## 2024-10-03 PROCEDURE — 1159F MED LIST DOCD IN RCRD: CPT | Mod: CPTII,95,, | Performed by: NURSE PRACTITIONER

## 2024-10-03 NOTE — PROGRESS NOTES
Subjective:      Patient ID: Jason Sandra is a 49 y.o. male.    Chief Complaint: Sleep Apnea  The patient location is: [place]  The chief complaint leading to consultation is: sleepiness  Visit type: Virtual visit with synchronous audio and video  Total time spent with patient: 25 min   Each patient to whom he or she provides medical services by telemedicine is:  (1) informed of the relationship between the physician and patient and the respective role of any other health care provider with respect to management of the patient; and (2) notified that he or she may decline to receive medical services by telemedicine and may withdraw from such care at any time.    HPI    Patient presents today for evaluation of sleepiness. Symptoms for years. He is concerned with his sleep attacks that come suddenly and often. He states he feels fine one minute and the next he fell asleep.  Bedtime: 9 PM   Wake time: 3:30 AM   No improvement if sleeps 8 hours. No cataplexy, hypnagogic hallucinations when falling asleep, but he has a vision of smoke after he wakes up quickly from a sleep attack. It takes a minute or so to clear up. This has happened a few times. Sleep paralysis once or twice. Very vivid dreams when he falls asleep during the day but not refreshing.   2021 with borderline MARIANNE on HST. CPAP tried. Intolerant and no improvement    STOP - BANG Questionnaire:     1. Snoring : Do you snore loudly ?    Yes    2. Tired : Do you often feel tired, fatigued, or sleepy during daytime?   Yes    3. Observed: Has anyone observed you stop breathing during your sleep?   No    4. Blood pressure : Do you have or are you being treated for high blood pressure?   Yes    5. BMI :BMI more than 35 kg/m2?   No    6. Age : Age over 50 yr old?   Yes    7. Neck circumference: Neck circumference greater than 40 cm?   No    8. Gender: Gender male?   No    High risk of MARIANNE: Yes 5 - 8  Intermediate risk of MARIANNE: Yes 3 - 4  Low risk of MARIANNE: Yes 0 -  2      References:   STOP Questionnaire   A Tool to Screen Patients for Obstructive Sleep Apnea: SANTO Rhodes.C.P.C., LINCOLN Baca.B.B.S., Hyade Casanova M.D.,Tianna Shi, Ph.D., LARISA Mishra.B.S.,_ Rigo Berumen.,_ Eliseo Brady M.D., LIVIER GayRSylviaC.P.C.; Anesthesiology 2008; 108:812-21 Copyright © 2008, the American Society of Anesthesiologists, Inc. [employer]       Hungerford Questionnaire (validated MARIANNE screening questionnaire)    Yes -- Snoring/apnea    Yes -- Fatigue    Body mass index is Body mass index is 24.67 kg/m²..  (>25 is overweight, >30 is obese)    Blood Pressure = Hypertension  (PreHTN 120-139/80-89, Stg1 140-159/90-99, Stg2 >160/>100)  Hungerford = three of three MARIANNE categories are positive (high risk is 2-3 positive categories)         10/3/2024     9:06 AM   EPWORTH SLEEPINESS SCALE   Sitting and reading 3   Watching TV 3   Sitting, inactive in a public place (e.g. a theatre or a meeting) 2   As a passenger in a car for an hour without a break 3   Lying down to rest in the afternoon when circumstances permit 3   Sitting and talking to someone 2   Sitting quietly after a lunch without alcohol 3   In a car, while stopped for a few minutes in traffic 0   Total score 19      (validated sleepiness questionnaire with a higher score indicating greater sleepiness; range 0-24)        Patient Active Problem List   Diagnosis    SOB (shortness of breath)    Hyperlipidemia LDL goal <100    Restrictive lung disease    Hypogonadism male    Chronic prostatitis    Narcolepsy due to underlying condition without cataplexy    Insulin dependent diabetes mellitus type IA    Ketosis prone diabetes    Periauricular adenopathy    Butterfly rash    Chest pain    Memory deficits    KIRTI positive    Psychosocial stressors    Family history of Alzheimer's disease    Cognitive complaints with normal exam    Family history of heart disease    Elevated blood  "pressure reading    Rule out Dural venous sinus thrombosis    Persistent headaches    Sialoadenitis    Lumbosacral radiculopathy    CHF (congestive heart failure)    Chronic bilateral low back pain with bilateral sciatica    Decreased functional mobility and endurance    Chronic neck pain    Cervical spondylosis    Type 1 diabetes mellitus with hyperglycemia    Low serum C-peptide    Encounter for screening colonoscopy    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    Review of Systems   Constitutional:  Positive for fatigue.   Respiratory:  Positive for snoring and somnolence.    Psychiatric/Behavioral:  Positive for sleep disturbance.          Objective:      Physical Exam  Constitutional:       Appearance: Normal appearance. He is well-developed.   HENT:      Head: Normocephalic and atraumatic.      Nose: Nose normal.   Eyes:      General: Lids are normal.   Pulmonary:      Effort: Pulmonary effort is normal. No tachypnea, bradypnea, accessory muscle usage or respiratory distress.   Musculoskeletal:      Cervical back: Normal range of motion.   Skin:     Findings: No rash.   Neurological:      Mental Status: He is alert and oriented to person, place, and time.   Psychiatric:         Behavior: Behavior normal.         Thought Content: Thought content normal.         Judgment: Judgment normal.       Personal Diagnostic Review        10/3/2024     9:06 AM   EPWORTH SLEEPINESS SCALE   Sitting and reading 3   Watching TV 3   Sitting, inactive in a public place (e.g. a theatre or a meeting) 2   As a passenger in a car for an hour without a break 3   Lying down to rest in the afternoon when circumstances permit 3   Sitting and talking to someone 2 " "  Sitting quietly after a lunch without alcohol 3   In a car, while stopped for a few minutes in traffic 0   Total score 19         Assessment:     1. Hypersomnia    2. MARIANNE (obstructive sleep apnea)    3. At risk for narcolepsy       Outpatient Encounter Medications as of 10/3/2024   Medication Sig Dispense Refill    ACCU-CHEK GUIDE ME GLUCOSE MTR Misc CHECK BLOOD SUGAR TWICE A DAY      atorvastatin (LIPITOR) 40 MG tablet Take 1 tablet (40 mg total) by mouth once daily. 90 tablet 3    blood sugar diagnostic Strp To check BG 2 times daily, to use with insurance preferred meter 200 strip 2    blood-glucose sensor (DEXCOM G6 SENSOR) Chen Use 1 sensor every 10 days 3 each 11    blood-glucose transmitter (DEXCOM G6 TRANSMITTER) Chen 1 transmitter every 3 months 1 each 4    insulin degludec (TRESIBA FLEXTOUCH U-100) 100 unit/mL (3 mL) insulin pen Inject 34 Units into the skin every evening. 15 mL 6    insulin lispro-aabc (LYUMJEV KWIKPEN U-100 INSULIN) 100 unit/mL pen Inject 10 units 3 times per day before meals, 2-4 units for snack, . + correction scale. MAX TDD Of 50 units 5 Pen 6    ketoconazole (NIZORAL) 2 % shampoo Apply to scalp in place of regular shampoo 2-3x per week 120 mL 11    lancets Misc To check BG 2 times daily, to use with insurance preferred meter 200 each 2    omeprazole (PRILOSEC) 40 MG capsule Take 1 capsule (40 mg total) by mouth 2 (two) times daily before meals for 90 days, THEN 1 capsule (40 mg total) every morning. 240 capsule 0    pen needle, diabetic 32 gauge x 5/32" Ndle To use 7 times per day with insulin injections- for meals, long-acting insulin and for snacks 200 each 11    sacubitriL-valsartan (ENTRESTO) 24-26 mg per tablet Take 1 tablet by mouth 2 (two) times daily. 60 tablet 6    testosterone cypionate (DEPOTESTOTERONE CYPIONATE) 200 mg/mL injection Inject 1 mL (200 mg total) into the muscle once a week. 4 mL 5     No facility-administered encounter medications on file as of 10/3/2024. "     Orders Placed This Encounter   Procedures    Polysomnography with mslt     Standing Status:   Future     Standing Expiration Date:   10/3/2025    Toxicology screen, urine     For MSLT     Standing Status:   Future     Standing Expiration Date:   12/2/2025     Order Specific Question:   Specimen Source     Answer:   Urine     Order Specific Question:   Send normal result to authorizing provider's In Basket if patient is active on MyChart:     Answer:   Yes     Plan:     Poly with MSLT to evaluate narcolepsy type II  Sleep diary. Will modify schedule to get at least 8 hr sleep nightly.        1. Hypersomnia    2. MARIANNE (obstructive sleep apnea)  Comments:  Formal polysomnogram negative for sleep apnea.  Orders:  -     Ambulatory referral/consult to Sleep Disorders  -     Polysomnography with mslt; Future  -     Toxicology screen, urine; Future; Expected date: 10/03/2024    3. At risk for narcolepsy       I spent a total of 37 minutes on the day of the visit.  This includes face to face time and non-face to face time preparing to see the patient (eg, review of tests), obtaining and/or reviewing separately obtained history, documenting clinical information in the electronic or other health record, independently interpreting results and communicating results to the patient/family/caregiver, or care coordinator.

## 2024-10-04 ENCOUNTER — PATIENT MESSAGE (OUTPATIENT)
Dept: PAIN MEDICINE | Facility: HOSPITAL | Age: 49
End: 2024-10-04
Payer: COMMERCIAL

## 2024-10-15 ENCOUNTER — PATIENT MESSAGE (OUTPATIENT)
Dept: DIABETES | Facility: CLINIC | Age: 49
End: 2024-10-15
Payer: COMMERCIAL

## 2024-10-28 ENCOUNTER — PATIENT MESSAGE (OUTPATIENT)
Dept: PRIMARY CARE CLINIC | Facility: CLINIC | Age: 49
End: 2024-10-28

## 2024-10-29 ENCOUNTER — HOSPITAL ENCOUNTER (OUTPATIENT)
Dept: SLEEP MEDICINE | Facility: HOSPITAL | Age: 49
Discharge: HOME | End: 2024-10-29
Attending: NURSE PRACTITIONER

## 2024-10-29 DIAGNOSIS — Z91.89 AT RISK FOR NARCOLEPSY: ICD-10-CM

## 2024-10-29 DIAGNOSIS — G47.10 HYPERSOMNIA: ICD-10-CM

## 2024-10-29 PROCEDURE — 95810 POLYSOM 6/> YRS 4/> PARAM: CPT

## 2024-10-29 PROCEDURE — 95805 MULTIPLE SLEEP LATENCY TEST: CPT

## 2024-10-30 ENCOUNTER — LAB VISIT (OUTPATIENT)
Dept: LAB | Facility: HOSPITAL | Age: 49
End: 2024-10-30

## 2024-10-30 DIAGNOSIS — G47.33 OSA (OBSTRUCTIVE SLEEP APNEA): ICD-10-CM

## 2024-10-30 PROBLEM — G47.10 HYPERSOMNIA: Status: ACTIVE | Noted: 2024-10-30

## 2024-10-30 LAB
AMPHET+METHAMPHET UR QL: NEGATIVE
BARBITURATES UR QL SCN>200 NG/ML: NEGATIVE
BENZODIAZ UR QL SCN>200 NG/ML: NEGATIVE
BZE UR QL SCN: NEGATIVE
CANNABINOIDS UR QL SCN: NEGATIVE
CREAT UR-MCNC: 23 MG/DL (ref 23–375)
ETHANOL UR-MCNC: <10 MG/DL
METHADONE UR QL SCN>300 NG/ML: NEGATIVE
OPIATES UR QL SCN: NEGATIVE
PCP UR QL SCN>25 NG/ML: NEGATIVE
TOXICOLOGY INFORMATION: NORMAL

## 2024-10-30 PROCEDURE — 80307 DRUG TEST PRSMV CHEM ANLYZR: CPT | Performed by: NURSE PRACTITIONER

## 2024-11-05 ENCOUNTER — PATIENT MESSAGE (OUTPATIENT)
Dept: DIABETES | Facility: CLINIC | Age: 49
End: 2024-11-05

## 2024-11-12 ENCOUNTER — PATIENT MESSAGE (OUTPATIENT)
Dept: PULMONOLOGY | Facility: CLINIC | Age: 49
End: 2024-11-12
Payer: COMMERCIAL

## 2024-11-13 ENCOUNTER — OFFICE VISIT (OUTPATIENT)
Dept: SLEEP MEDICINE | Facility: CLINIC | Age: 49
End: 2024-11-13
Payer: COMMERCIAL

## 2024-11-13 ENCOUNTER — PATIENT MESSAGE (OUTPATIENT)
Dept: SLEEP MEDICINE | Facility: CLINIC | Age: 49
End: 2024-11-13

## 2024-11-13 VITALS — HEIGHT: 68 IN | WEIGHT: 155 LBS | BODY MASS INDEX: 23.49 KG/M2

## 2024-11-13 DIAGNOSIS — G47.11 IDIOPATHIC HYPERSOMNOLENCE: Primary | ICD-10-CM

## 2024-11-13 DIAGNOSIS — G47.419 SLEEP ATTACK: ICD-10-CM

## 2024-11-13 PROBLEM — G47.33 OSA (OBSTRUCTIVE SLEEP APNEA): Status: RESOLVED | Noted: 2024-08-21 | Resolved: 2024-11-13

## 2024-11-13 PROBLEM — G47.10 HYPERSOMNIA: Status: RESOLVED | Noted: 2024-10-30 | Resolved: 2024-11-13

## 2024-11-13 RX ORDER — MODAFINIL 200 MG/1
200 TABLET ORAL DAILY
Qty: 30 TABLET | Refills: 3 | Status: SHIPPED | OUTPATIENT
Start: 2024-11-13

## 2024-11-13 NOTE — PROGRESS NOTES
Subjective:      Patient ID: Jason Sandra is a 49 y.o. male.    Chief Complaint: Hyoersomnia   The patient location is: [place]  The chief complaint leading to consultation is: sleepiness  Visit type: Virtual visit with synchronous audio and video  Total time spent with patient: 15 min   Each patient to whom he or she provides medical services by telemedicine is:  (1) informed of the relationship between the physician and patient and the respective role of any other health care provider with respect to management of the patient; and (2) notified that he or she may decline to receive medical services by telemedicine and may withdraw from such care at any time.    HPI  Patient presents today for evaluation of sleepiness. Symptoms for years. He is concerned with his sleep attacks that come suddenly and often. He states he feels fine one minute and the next he fell asleep.  Bedtime: 9 PM   Wake time: 3:30 AM   No improvement if sleeps 8 hours. No cataplexy, hypnagogic hallucinations when falling asleep, but he has a vision of smoke after he wakes up quickly from a sleep attack. It takes a minute or so to clear up. This has happened a few times. Sleep paralysis once or twice. Very vivid dreams when he falls asleep during the day but not refreshing.   2021 with borderline MARIANNE on HST. CPAP tried. Intolerant and no improvement.  Repeat polysomnogram 9/15/24 without MARIANNE.   MSLT ordered.       Patient Active Problem List   Diagnosis    SOB (shortness of breath)    Hyperlipidemia LDL goal <100    Restrictive lung disease    Hypogonadism male    Chronic prostatitis    Insulin dependent diabetes mellitus type IA    Ketosis prone diabetes    Periauricular adenopathy    Butterfly rash    Chest pain    Memory deficits    KIRTI positive    Psychosocial stressors    Family history of Alzheimer's disease    Cognitive complaints with normal exam    Family history of heart disease    Elevated blood pressure reading    Rule out  "Dural venous sinus thrombosis    Persistent headaches    Sialoadenitis    Lumbosacral radiculopathy    CHF (congestive heart failure)    Chronic bilateral low back pain with bilateral sciatica    Decreased functional mobility and endurance    Chronic neck pain    Cervical spondylosis    Type 1 diabetes mellitus with hyperglycemia    Low serum C-peptide    Encounter for screening colonoscopy    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    Review of Systems   Constitutional:  Positive for fatigue.   Respiratory:  Positive for snoring and somnolence.    Psychiatric/Behavioral:  Positive for sleep disturbance.      Objective:      Physical Exam  Constitutional:       Appearance: Normal appearance. He is well-developed.   HENT:      Head: Normocephalic and atraumatic.      Nose: Nose normal.   Eyes:      General: Lids are normal.   Pulmonary:      Effort: Pulmonary effort is normal. No tachypnea, bradypnea, accessory muscle usage or respiratory distress.   Musculoskeletal:      Cervical back: Normal range of motion.   Skin:     Findings: No rash.   Neurological:      Mental Status: He is alert and oriented to person, place, and time.   Psychiatric:         Behavior: Behavior normal.         Thought Content: Thought content normal.         Judgment: Judgment normal.       Personal Diagnostic Review      11/13/2024    12:35 PM   EPWORTH SLEEPINESS SCALE   Sitting and reading 3   Watching TV 3   Sitting, inactive in a public place (e.g. a theatre or a meeting) 3   As a passenger in a car for an hour without a break 3   Lying down to rest in the afternoon when circumstances permit 3   Sitting and talking to someone 2   Sitting quietly after a lunch without alcohol 3   In a car, " "while stopped for a few minutes in traffic 0   Total score 20        Polysomnography with mslt  Order: [id number]  Status: Final result       Visible to patient: No (not released)       Next appt: 11/19/2024 at 03:00 PM in [facility] (Tyler Castillo MD)       Dx: Hypersomnia; At risk for narcolepsy    0 Result Notes        Procedure Note    Patient Name: Jason Sandra Study Date: 10/29/2024   YOB: 1975 Study Type: PSG   Age: 49 year Patient #: [id number]   Sex: Male Billing ID: -   Height: 5' 8" Interpreting Physician: Justo Valente MD   Weight: 162.0 lbs Recording Tech: Jace Cool   BMI: 24.8 Scoring Tech: Georgia Hartman   [place]: 20 Neck Circumference: 15.25      Indications for Polysomnography  The patient is a 49 year-old Male who is 5' 8" and weighs 162.0 lbs. His BMI equals 24.8.  A full night polysomnogram was performed to evaluate for Hypersomnia, unspecified.     Referring Provider: Elizabeth Lejeune NP     Medical History: Previous study 9/2024 AHI 1, wilma SpO2 91%, and mild PLMS.        Medication   Testosterone Cypionate   Prilosec   Dexcom G6   Tresiba Flextouch U-100   Lyumjev Kwikpen U-100   Entresto   Lipitor   Nizoral      Test Description  Patient was connected to a full set of leads with a sleep technician in attendance.  Parameters used were EEG derivations (F4-A1, C4-A1, O2-A1), EOG derivations (LOC-A2, PEYTON-A2), EKG, EMG - extremity (leg) & chin, oxygen saturation, effort parameters + abdominal/thoracic (RIP), and airflow parameters - nasal pressure/thermal.  Body position was visually monitored and noted.  Audio & video monitoring was performed during study.     Scoring is done in accordance with the American Academy of Sleep Medicine Manual for the Scoring of Sleep and Associated Events version 2.6.  Hypopneas are scored using the 4% desaturation rule.     Sleep Architecture  The total recording time of the polysomnogram was 436.9 minutes.  The total sleep time was 425.0 " minutes.  The patient spent 2.1% of total sleep time in Stage N1, 52.1% in Stage N2, 19.6% in Stages N3, and 26.1% in REM.  Sleep latency was 2.5 minutes.  REM latency was 46.5 minutes.  Sleep Efficiency was 97.3%.  Wake after Sleep Onset time was 9.5 minutes.     Respiratory Events  The polysomnogram revealed a presence of - obstructive, 1 central, and 1 mixed apneas resulting in an Apnea index of 0.3 events per hour.  There were 6 hypopneas (>=3% desat and/or Ar.) resulting in an Apnea\Hypopnea Index (AHI >=3% desat and/or Ar.) of 1.1 events per hour.  There were 6 hypopneas (>=4% desat) resulting in an Apnea\Hypopnea Index (AHI >=4% desat) of 1.1 events per hour.  There were - Respiratory Effort Related Arousals resulting in a RERA index of - events per hour. The Respiratory Disturbance Index is 1.1 events per hour.      Mean oxygen saturation was 96.1%.  The lowest oxygen saturation during sleep was 88.0%.  Time spent <=88% oxygen saturation was 0.3 minutes (0.1%).     Limb Activity  There were 86 total limb movements recorded, of this total, 86 were classified as PLMs.  PLM index was 12.1 per hour and PLM associated with Arousals index was 1.0 per hour.     Cardiac Summary  The average pulse rate was 73.9 bpm.  The minimum pulse rate was 57.0 bpm while the maximum pulse rate was 101.0 bpm.  Cardiac rhythm was normal , occasional PVC     Conclusion:      Overall AHI was 1.1 events per hour with 80 minutes   No criteria for obstructive sleep apnea   Total sleep time was: 425 minutes: 7 hours  PLM index 1.0 events per hour : Not significant  REM latency was 46.5 minutes   Sleep efficiency was high 97.3%         Diagnosis:      Hypersomnia, unspecified / G47.10              Hypersomnia, unspecified / G47.10                    Recommendations:       Normal sleep study with high sleep efficiency REM latency was rather short   Proceed with MSLT      Patient Name: Jason Sandra Study Date: 10/30/2024   Date of Birth:  "1975 Study Type: MSLT   Age: 49 year Patient #: [id number]   Sex: Male Billing ID: -   Height: 5' 8" Interpreting Physician: Justo Valente MD   Weight: 162.0 lbs Recording Tech: Nadya Jiménez   BMI: 24.8 Scoring Tech: Georgia Hartman   [place]: 20 Neck Circumference: -         Referring provider: Elizabeth Lejeune NP     Component      Latest Ref Rng 10/30/2024   Alcohol, Urine      <10 mg/dL <10    Benzodiazepines      Negative  Negative    Methadone metabolites      Negative  Negative    Cocaine, Urine      Negative  Negative    Opiates, Urine      Negative  Negative    Barbituates, Urine      Negative  Negative    Amphetamines, Urine      Negative  Negative    Marijuana (THC) Metabolite      Negative  Negative    Phencyclidine      Negative  Negative    Urine Creatinine      23.0 - 375.0 mg/dL 23.0    Toxicology Information SEE COMMENT      Conclusion:      Overall AHI was 1.1 events per hour with 80 minutes   No criteria for obstructive sleep apnea   Total sleep time was: 425 minutes: 7 hours  PLM index 1.0 events per hour : Not significant  REM latency was 46.5 minutes   Sleep efficiency was high 97.3%         Diagnosis:            Hypersomnia, unspecified / G47.10        Hypersomnia, unspecified / G47.10              Recommendations:       Normal sleep study with high sleep efficiency REM latency was rather short   Proceed with MSLT         Medical History: -              Medication       No Data.   MSLT Nap Summary       Nap 1 Nap 2 Nap 3 Nap 4 Nap 5 Average   Lights Off 06:58:57 AM 08:59:27 AM 11:00:10 AM 01:00:53 PM 02:59:44 PM -   Lights On 07:15:09 AM 09:17:18 AM 11:16:09 AM 01:19:20 PM 03:18:17 PM -   Time In Bed 16.2 17.9 16.0 18.4 18.6 17.4   Sleep Time 14.9 16.6 14.9 14.6 17.1 15.6   Sleep Latency 0.8 0.8 1.1 2.3 1.0 1.2   REM Sleep Onset Latency - - - - - -           Comments     Nap 1:  Lights out- 6:58am  Sleep onset- 7:00am  Sleep latency- 2 minutes  No REM recorded     Nap 2:  Lights out- " "8:59am  Sleep onset- 9:01am  Sleep latency- 2 minutes  No REM recorded     Nap 3:  Lights out- 11:00am  Sleep onset- 11:01am  Sleep latency- 1 minute  No REM recorded     Nap 4:  Lights out- 1:00pm  Sleep onset- 1:04pm  Sleep latency- 4 minutes  No REM recorded     Nap 5:  Lights out- 2:59pm  Sleep onset- 3:02pm  Sleep latency- 3 minutes  No REM recorded     Conclusion:      Sleep was detected on 5/5 naps   Mean sleep latency was 1.2 minutes.    No REM detected on any of nap opportunities.    Urine toxicology test was negative         Diagnosis:            Idiopathic Hypersomnia With Long Sleep Time / 327.11        - / -        - / -     Recommendations:      Return to [facility] for discussion about therapy alternatives interventions.    Sleep hygiene   Strategies napping   Avoid operating machinery when sleepy              [place]  [address] [facility]  [place] [id number]/Luan Lazar MD           The sleep study that you ordered is complete.  You have ordered sleep LAB services to perform the sleep study for Jason Sadnra .      Please find Sleep Study result in  the "Media tab" of Chart Review menu.        You can look  for the report in the  Media by the document type "Sleep Study Documents". Alphabetizing  "Document type" column helps to find the SLEEP STUDY report  Faster.       As the ordering provider, you are responsible for reviewing the results and implementing a treatment plan with your patient.    If you need a Sleep Medicine provider to explain the sleep study findings and arrange treatment for the patient, please refer patient for consultation to our Sleep Clinic via [facility] with Ambulatory Consult Sleep.     To do that please place an order for an  "Ambulatory Consult Sleep" -  order , it will go to our clinic work queue for our staff  to contact the patient for an appointment.      For any questions, please contact our sleep lab  staff at " "[phone] to talk to clinical staff           Justo Valente MD                  Assessment:       1. Idiopathic hypersomnolence    2. Sleep attack        Outpatient Encounter Medications as of 11/13/2024   Medication Sig Dispense Refill    ACCU-CHEK GUIDE ME GLUCOSE MTR Misc CHECK BLOOD SUGAR TWICE A DAY      atorvastatin (LIPITOR) 40 MG tablet Take 1 tablet (40 mg total) by mouth once daily. 90 tablet 3    blood sugar diagnostic Strp To check BG 2 times daily, to use with insurance preferred meter 200 strip 2    blood-glucose sensor (DEXCOM G6 SENSOR) Chen Use 1 sensor every 10 days 3 each 11    blood-glucose transmitter (DEXCOM G6 TRANSMITTER) Chen 1 transmitter every 3 months 1 each 4    insulin degludec (TRESIBA FLEXTOUCH U-100) 100 unit/mL (3 mL) insulin pen Inject 34 Units into the skin every evening. 15 mL 6    insulin lispro-aabc (LYUMJEV KWIKPEN U-100 INSULIN) 100 unit/mL pen Inject 10 units 3 times per day before meals, 2-4 units for snack, . + correction scale. MAX TDD Of 50 units 5 Pen 6    ketoconazole (NIZORAL) 2 % shampoo Apply to scalp in place of regular shampoo 2-3x per week 120 mL 11    lancets Misc To check BG 2 times daily, to use with insurance preferred meter 200 each 2    omeprazole (PRILOSEC) 40 MG capsule Take 1 capsule (40 mg total) by mouth 2 (two) times daily before meals for 90 days, THEN 1 capsule (40 mg total) every morning. 240 capsule 0    pen needle, diabetic 32 gauge x 5/32" Ndle To use 7 times per day with insulin injections- for meals, long-acting insulin and for snacks 200 each 11    sacubitriL-valsartan (ENTRESTO) 24-26 mg per tablet Take 1 tablet by mouth 2 (two) times daily. 60 tablet 6    testosterone cypionate (DEPOTESTOTERONE CYPIONATE) 200 mg/mL injection Inject 1 mL (200 mg total) into the muscle once a week. 4 mL 5     No facility-administered encounter medications on file as of 11/13/2024.     No orders of the defined types were placed in this " encounter.    Plan:     Idiopathic hypersomnia with long sleep time. [place] Sleepiness scale score: 20.   Start Provigil. Discussed safety. No drowsy driving  Discussed starting Provigil with cardiologist.   Follow up 3 months.      1. Idiopathic hypersomnolence    2. Sleep attack               I spent a total of 34 minutes on the day of the visit.  This includes face to face time and non-face to face time preparing to see the patient (eg, review of tests), obtaining and/or reviewing separately obtained history, documenting clinical information in the electronic or other health record, independently interpreting results and communicating results to the patient/family/caregiver, or care coordinator.           Elizabeth LeJeune, ACNP, ANP

## 2024-11-14 ENCOUNTER — PATIENT MESSAGE (OUTPATIENT)
Dept: SLEEP MEDICINE | Facility: CLINIC | Age: 49
End: 2024-11-14
Payer: COMMERCIAL

## 2024-12-04 ENCOUNTER — OFFICE VISIT (OUTPATIENT)
Dept: OPTOMETRY | Facility: CLINIC | Age: 49
End: 2024-12-04
Payer: COMMERCIAL

## 2024-12-04 DIAGNOSIS — H52.13 MYOPIA OF BOTH EYES WITH REGULAR ASTIGMATISM: ICD-10-CM

## 2024-12-04 DIAGNOSIS — E10.3293 MILD NONPROLIFERATIVE DIABETIC RETINOPATHY OF BOTH EYES WITHOUT MACULAR EDEMA ASSOCIATED WITH TYPE 1 DIABETES MELLITUS: Primary | ICD-10-CM

## 2024-12-04 DIAGNOSIS — H52.223 MYOPIA OF BOTH EYES WITH REGULAR ASTIGMATISM: ICD-10-CM

## 2024-12-04 PROCEDURE — 3046F HEMOGLOBIN A1C LEVEL >9.0%: CPT | Mod: CPTII,S$GLB,, | Performed by: OPTOMETRIST

## 2024-12-04 PROCEDURE — 92015 DETERMINE REFRACTIVE STATE: CPT | Mod: S$GLB,,, | Performed by: OPTOMETRIST

## 2024-12-04 PROCEDURE — 92014 COMPRE OPH EXAM EST PT 1/>: CPT | Mod: S$GLB,,, | Performed by: OPTOMETRIST

## 2024-12-04 PROCEDURE — 4010F ACE/ARB THERAPY RXD/TAKEN: CPT | Mod: CPTII,S$GLB,, | Performed by: OPTOMETRIST

## 2024-12-04 PROCEDURE — 99999 PR PBB SHADOW E&M-EST. PATIENT-LVL III: CPT | Mod: PBBFAC,,, | Performed by: OPTOMETRIST

## 2024-12-04 PROCEDURE — 3066F NEPHROPATHY DOC TX: CPT | Mod: CPTII,S$GLB,, | Performed by: OPTOMETRIST

## 2024-12-04 PROCEDURE — 3061F NEG MICROALBUMINURIA REV: CPT | Mod: CPTII,S$GLB,, | Performed by: OPTOMETRIST

## 2024-12-04 PROCEDURE — 2022F DILAT RTA XM EVC RTNOPTHY: CPT | Mod: CPTII,S$GLB,, | Performed by: OPTOMETRIST

## 2024-12-04 PROCEDURE — 1159F MED LIST DOCD IN RCRD: CPT | Mod: CPTII,S$GLB,, | Performed by: OPTOMETRIST

## 2024-12-04 NOTE — PROGRESS NOTES
HPI    Annual Diabetic Eye  Exam   Pt states vision could be better   Stopped Plaquenil    Pt denies F/F     Pt denies Dry/ Itchy/ Burning  Gtt: No    DM Dx'ed   BS: Unknown   Hemoglobin A1C       Date                     Value               Ref Range             Status                05/22/2024               10.9 (H)            4.0 - 5.6 %           Final                   02/21/2024               9.0 (H)             4.0 - 5.6 %           Final                                12/05/2023               10.8 (H)              4.0 - 5.6 %         Final                 Last edited by Alex Maier, OD on 12/4/2024 10:11 AM.            Assessment /Plan     For exam results, see Encounter Report.    Mild nonproliferative diabetic retinopathy of both eyes without macular edema associated with type 1 diabetes mellitus  -Stable with previous    Myopia of both eyes with regular astigmatism  Eyeglass Final Rx       Eyeglass Final Rx         Sphere Cylinder Axis Dist VA Add    Right -1.00 +1.75 005 20/20 +1.75    Left -1.25 +2.00 175 20/20 +1.75      Type: PAL vs DVO/NVO    Expiration Date: 12/4/2025                      RTC 1 yr

## 2025-01-13 DIAGNOSIS — R79.89 LOW TESTOSTERONE IN MALE: ICD-10-CM

## 2025-01-13 RX ORDER — TESTOSTERONE CYPIONATE 200 MG/ML
200 INJECTION, SOLUTION INTRAMUSCULAR WEEKLY
Qty: 4 ML | Refills: 5 | OUTPATIENT
Start: 2025-01-13 | End: 2025-07-12

## 2025-01-14 DIAGNOSIS — R79.89 LOW TESTOSTERONE IN MALE: Primary | ICD-10-CM

## 2025-01-14 NOTE — TELEPHONE ENCOUNTER
----- Message from Georgia Sepulveda NP sent at 1/14/2025  7:08 AM CST -----  Regarding: RE: Refill  Contact: pt [phone]  He still has an active standing order for both- I put in new standing orders in case you can't see them     M  ----- Message -----  From: Divine Resendiz LPN  Sent: 1/13/2025   2:58 PM CST  To: Georgia Sepulveda NP  Subject: FW: Refill                                       Patient needs his orders put in for his Testosterone and CBC. I didn't see any standing orders for him. Thank you  ----- Message -----  From: Wilbur Okeefe  Sent: 1/13/2025   2:11 PM CST  To: [facility] Staff  Subject: Refill                                           Rx Refill/Request    Is this a Refill or New Rx:  refill    Rx Name and Strength:      - testosterone cypionate (DEPOTESTOTERONE CYPIONATE) 200 mg/mL injection 4 mL    Preferred Pharmacy with phone number:    [facility]  [address] [address]  Phone: [phone] Fax: [phone]     Communication Preference: please call pt @[phone]    Additional Information: pt requesting refill

## 2025-01-16 ENCOUNTER — LAB VISIT (OUTPATIENT)
Dept: LAB | Facility: OTHER | Age: 50
End: 2025-01-16
Attending: NURSE PRACTITIONER
Payer: COMMERCIAL

## 2025-01-16 DIAGNOSIS — R79.89 LOW TESTOSTERONE IN MALE: ICD-10-CM

## 2025-01-16 LAB
BASOPHILS # BLD AUTO: 0.03 K/UL (ref 0–0.2)
BASOPHILS NFR BLD: 0.6 % (ref 0–1.9)
COMPLEXED PSA SERPL-MCNC: 0.5 NG/ML (ref 0–4)
DIFFERENTIAL METHOD BLD: ABNORMAL
EOSINOPHIL # BLD AUTO: 0.1 K/UL (ref 0–0.5)
EOSINOPHIL NFR BLD: 1.9 % (ref 0–8)
ERYTHROCYTE [DISTWIDTH] IN BLOOD BY AUTOMATED COUNT: 13.2 % (ref 11.5–14.5)
HCT VFR BLD AUTO: 37.1 % (ref 40–54)
HGB BLD-MCNC: 12 G/DL (ref 14–18)
IMM GRANULOCYTES # BLD AUTO: 0.04 K/UL (ref 0–0.04)
IMM GRANULOCYTES NFR BLD AUTO: 0.8 % (ref 0–0.5)
LYMPHOCYTES # BLD AUTO: 1.8 K/UL (ref 1–4.8)
LYMPHOCYTES NFR BLD: 37.5 % (ref 18–48)
MCH RBC QN AUTO: 29.6 PG (ref 27–31)
MCHC RBC AUTO-ENTMCNC: 32.3 G/DL (ref 32–36)
MCV RBC AUTO: 92 FL (ref 82–98)
MONOCYTES # BLD AUTO: 0.5 K/UL (ref 0.3–1)
MONOCYTES NFR BLD: 10.8 % (ref 4–15)
NEUTROPHILS # BLD AUTO: 2.3 K/UL (ref 1.8–7.7)
NEUTROPHILS NFR BLD: 48.4 % (ref 38–73)
NRBC BLD-RTO: 0 /100 WBC
PLATELET # BLD AUTO: 217 K/UL (ref 150–450)
PMV BLD AUTO: 9.7 FL (ref 9.2–12.9)
RBC # BLD AUTO: 4.05 M/UL (ref 4.6–6.2)
TESTOST SERPL-MCNC: 608 NG/DL (ref 304–1227)
WBC # BLD AUTO: 4.83 K/UL (ref 3.9–12.7)

## 2025-01-16 PROCEDURE — 84153 ASSAY OF PSA TOTAL: CPT | Performed by: NURSE PRACTITIONER

## 2025-01-16 PROCEDURE — 85025 COMPLETE CBC W/AUTO DIFF WBC: CPT | Performed by: NURSE PRACTITIONER

## 2025-01-16 PROCEDURE — 84403 ASSAY OF TOTAL TESTOSTERONE: CPT | Performed by: NURSE PRACTITIONER

## 2025-01-16 PROCEDURE — 36415 COLL VENOUS BLD VENIPUNCTURE: CPT | Performed by: NURSE PRACTITIONER

## 2025-01-16 RX ORDER — TESTOSTERONE CYPIONATE 200 MG/ML
200 INJECTION, SOLUTION INTRAMUSCULAR WEEKLY
Qty: 4 ML | Refills: 5 | Status: SHIPPED | OUTPATIENT
Start: 2025-01-16 | End: 2025-07-15

## 2025-02-03 ENCOUNTER — OFFICE VISIT (OUTPATIENT)
Dept: PRIMARY CARE CLINIC | Facility: CLINIC | Age: 50
End: 2025-02-03
Payer: COMMERCIAL

## 2025-02-03 VITALS
DIASTOLIC BLOOD PRESSURE: 58 MMHG | SYSTOLIC BLOOD PRESSURE: 118 MMHG | HEART RATE: 54 BPM | WEIGHT: 174.69 LBS | OXYGEN SATURATION: 99 % | HEIGHT: 68 IN | BODY MASS INDEX: 26.48 KG/M2

## 2025-02-03 DIAGNOSIS — D64.9 ANEMIA, UNSPECIFIED TYPE: Primary | ICD-10-CM

## 2025-02-03 DIAGNOSIS — I50.9 CONGESTIVE HEART FAILURE, UNSPECIFIED HF CHRONICITY, UNSPECIFIED HEART FAILURE TYPE: ICD-10-CM

## 2025-02-03 DIAGNOSIS — E10.9 INSULIN DEPENDENT DIABETES MELLITUS TYPE IA: ICD-10-CM

## 2025-02-03 PROCEDURE — 3078F DIAST BP <80 MM HG: CPT | Mod: CPTII,S$GLB,,

## 2025-02-03 PROCEDURE — 99214 OFFICE O/P EST MOD 30 MIN: CPT | Mod: S$GLB,,,

## 2025-02-03 PROCEDURE — 99999 PR PBB SHADOW E&M-EST. PATIENT-LVL V: CPT | Mod: PBBFAC,,,

## 2025-02-03 PROCEDURE — G2211 COMPLEX E/M VISIT ADD ON: HCPCS | Mod: S$GLB,,,

## 2025-02-03 PROCEDURE — 4010F ACE/ARB THERAPY RXD/TAKEN: CPT | Mod: CPTII,S$GLB,,

## 2025-02-03 PROCEDURE — 1159F MED LIST DOCD IN RCRD: CPT | Mod: CPTII,S$GLB,,

## 2025-02-03 PROCEDURE — 1160F RVW MEDS BY RX/DR IN RCRD: CPT | Mod: CPTII,S$GLB,,

## 2025-02-03 PROCEDURE — 3008F BODY MASS INDEX DOCD: CPT | Mod: CPTII,S$GLB,,

## 2025-02-03 PROCEDURE — 3074F SYST BP LT 130 MM HG: CPT | Mod: CPTII,S$GLB,,

## 2025-02-03 NOTE — PROGRESS NOTES
Assessment:       1. Anemia, unspecified type    2. Insulin dependent diabetes mellitus type IA    3. Hyperlipidemia associated with type 2 diabetes mellitus    4. Congestive heart failure, unspecified HF chronicity, unspecified heart failure type       Plan:       Anemia, unspecified type  -     Ferritin; Future; Expected date: 02/03/2025  -     Iron and TIBC; Future; Expected date: 02/03/2025  -     Vitamin B12; Future; Expected date: 02/03/2025  -     Folate; Future; Expected date: 02/03/2025  -     CBC Auto Differential; Future; Expected date: 02/03/2025  -     BNP; Future; Expected date: 02/03/2025  -     Comprehensive Metabolic Panel; Future; Expected date: 02/03/2025  -     TSH; Future; Expected date: 02/03/2025    Insulin dependent diabetes mellitus type IA  -     HEMOGLOBIN A1C; Future; Expected date: 02/03/2025    Hyperlipidemia associated with type 2 diabetes mellitus   -  Congestive heart failure, unspecified HF chronicity, unspecified heart failure type   -BNP ordered    Assessment & Plan    - Investigating cause of hemoglobin drop from 13.7 to 12.0 over 6 months  - Considering anemia as potential cause, ruling out GI bleed and other blood loss, possible dehydration. Plan to repeat labs.   - Evaluating shortness of breath in context of previous congestive heart failure, now resolved  - Reviewing sleep study results indicating possible narcolepsy-like symptoms without meeting full diagnostic criteria  - Monitoring diabetes management, acknowledging likely elevated HbA1c    TYPE 2 DIABETES MELLITUS WITH OTHER SPECIFIED COMPLICATION:  - Ordered hemoglobin A1C and lipid panel (non-fasting).  - Reviewed last hemoglobin A1C of 11.3, expecting it to be higher now.  - Mr. Sandra is aware that current numbers are likely to be high.  - Advised patient to work on taking doses consistently.    CONGESTIVE HEART FAILURE, UNSPECIFIED HF CHRONICITY, UNSPECIFIED HEART FAILURE TYPE:  - Ordered BNP test.  - Noted patient's  congestive heart failure diagnosis in May 2023 with EF dropping to 35%, now recovered to 55%.  - Recent echocardiogram shows normal left ventricle and normal estimated EF of 55%.  - Previous BNP result from May was within normal limits at 10.    ANEMIA:  - Ordered CBC with anemia panel and ferritin level.  - Explained the relationship between iron, ferritin, and hemoglobin in RBC function   - Noted patient's hemoglobin level drop from 13.7 to 12 in 6 months.  - May recommend iron supplementation based on test results.  - Plan to recheck levels in 2-4 weeks.  - Considering referral to hematology if hemoglobin remains at 12 or lower after repeat testing.    LUPUS:  - Documented patient's history of lupus diagnosis by a rheumatologist years ago.  - Acknowledged previous treatment.    SHORTNESS OF BREATH:  - Measured oxygen level at 99%, which is considered good.  - Considering potential causes including anemia and previous heart issues.    FOLLOW UP:  - Follow up in 6 months, unless need to return sooner.  - Contact the office if any concerns arise before next appointment.  - Provider will communicate lab results via patient portal.        Subjective:    Patient ID: Jason Sandra is a 49 y.o. male.  Chief Complaint: Results (Lab results.)    HPI  History of Present Illness    Mr. Sandra presents today to discuss results of recent labs, particularly concerning a drop in hemoglobin levels.    Mr. Sandra had a blood test yesterday, revealing a significant drop in hemoglobin levels from 13.7 to 12 over the past 6 months. He reports increasing fatigue, shortness of breath, and occasional lightheadedness or mental fog. These symptoms have been present for years but have recently worsened. Mr. Sandra also has sensations of near-syncope with a feeling of cervical constriction.    Mr. Sandra has a history of congestive heart failure, diagnosed on May 26, 2023, when his EF dropped to 35%. It has since recovered to a normal level  "of 55%. Prior to this diagnosis, in early January 2023, the patient had an episode of DKA without coma, requiring ICU admission.    A sleep study 3-4 months ago ruled out sleep apnea but suggested a possible sleep disorder similar to narcolepsy, though not clinically diagnosable. During the Multiple Sleep Latency Test (MSLT), the patient consistently fell asleep within 1-2 minutes of lights out for each nap opportunity.    Mr. Sandra denies chest pain but confirms ongoing shortness of breath. Mr. Sandra also denies productive cough with green sputum, melena, hematuria, or hematemesis.      Review of Systems   Constitutional:  Positive for fatigue. Negative for activity change and appetite change.   HENT: Negative.     Respiratory:  Positive for shortness of breath.    Cardiovascular:  Negative for chest pain, palpitations and leg swelling.   Gastrointestinal: Negative.    Genitourinary: Negative.    Neurological: Negative.    Psychiatric/Behavioral: Negative.         Objective:      Vitals:    02/03/25 1301   BP: (!) 118/58   BP Location: Right arm   Patient Position: Sitting   Pulse: (!) 54   SpO2: 99%   Weight: 79.2 kg (174 lb 11.4 oz)   Height: 5' 8" (1.727 m)     BP Readings from Last 5 Encounters:   02/03/25 (!) 118/58   08/30/24 116/74   08/22/24 119/70   08/21/24 119/70   06/28/24 126/67     Wt Readings from Last 5 Encounters:   02/03/25 79.2 kg (174 lb 11.4 oz)   11/13/24 70.3 kg (155 lb)   10/03/24 73.6 kg (162 lb 4.1 oz)   08/30/24 73.6 kg (162 lb 4.1 oz)   08/22/24 75.3 kg (166 lb)     Physical Exam  Constitutional:       Appearance: Normal appearance.   HENT:      Head: Normocephalic.   Cardiovascular:      Rate and Rhythm: Normal rate and regular rhythm.      Pulses: Normal pulses.      Heart sounds: No murmur heard.     No friction rub. No gallop.   Pulmonary:      Effort: Pulmonary effort is normal.      Breath sounds: Normal breath sounds.   Abdominal:      General: Abdomen is flat. There is no " distension.      Palpations: Abdomen is soft.   Musculoskeletal:         General: Normal range of motion.      Cervical back: Normal range of motion.   Skin:     General: Skin is warm and dry.      Capillary Refill: Capillary refill takes less than 2 seconds.   Neurological:      General: No focal deficit present.      Mental Status: He is alert.   Psychiatric:         Mood and Affect: Mood normal.       Physical Exam    Vitals: Blood pressure: 118/58.         Lab Results   Component Value Date    WBC 5.00 02/03/2025    HGB 12.3 (L) 02/03/2025    HCT 36.0 (L) 02/03/2025     02/03/2025    CHOL 134 05/22/2024    TRIG 70 05/22/2024    HDL 45 05/22/2024    ALT 19 05/22/2024    AST 18 05/22/2024     (L) 05/22/2024    K 4.4 05/22/2024     05/22/2024    CREATININE 1.3 05/22/2024    BUN 15 05/22/2024    CO2 23 05/22/2024    TSH 2.393 09/12/2023    PSA 0.74 05/12/2011    GLUF 224 (H) 03/28/2023    HGBA1C 10.9 (H) 05/22/2024      This note was generated with the assistance of ambient listening technology. Verbal consent was obtained by the patient and accompanying visitor(s) for the recording of patient appointment to facilitate this note. I attest to having reviewed and edited the generated note for accuracy, though some syntax or spelling errors may persist. Please contact the author of this note for any clarification.    GUERO Aranda, BERNARDOC

## 2025-02-14 ENCOUNTER — TELEPHONE (OUTPATIENT)
Dept: DIABETES | Facility: CLINIC | Age: 50
End: 2025-02-14
Payer: COMMERCIAL

## 2025-02-14 NOTE — TELEPHONE ENCOUNTER
Call patient to discuss canceled appointment today  He needs to see me as he has not seen me since May of 2024 his A1c is greater than 10%  He is not wearing his Dexcom due to cost  He wanted to be seen for elevated ferritin levels  I explained to him that I only manage diabetes and that I do not do hematology  He is very concerned about the elevated ferritin levels and thinks that is why his A1c is high  I strongly encouraged him to follow-up with me.  He does not want to come in today  We rescheduled for Monday 2/17 at 130 pm   Discussed the importance of getting his diabetes under control and preventing long-term complications  He verbalized understanding.  All questions answered

## 2025-02-18 ENCOUNTER — TELEPHONE (OUTPATIENT)
Dept: PAIN MEDICINE | Facility: CLINIC | Age: 50
End: 2025-02-18
Payer: COMMERCIAL

## 2025-02-18 NOTE — TELEPHONE ENCOUNTER
Called patient to see if he needed his appt tomorrow being that it is an injection follow up patient stated he didn't need to come in because the injections were too costly. He said he would reschedule when he could.    Vijay CONTRERAS

## 2025-02-26 ENCOUNTER — LAB VISIT (OUTPATIENT)
Dept: LAB | Facility: HOSPITAL | Age: 50
End: 2025-02-26
Attending: PHYSICIAN ASSISTANT
Payer: COMMERCIAL

## 2025-02-26 ENCOUNTER — OFFICE VISIT (OUTPATIENT)
Dept: HEMATOLOGY/ONCOLOGY | Facility: CLINIC | Age: 50
End: 2025-02-26
Payer: COMMERCIAL

## 2025-02-26 VITALS
RESPIRATION RATE: 20 BRPM | WEIGHT: 154.13 LBS | BODY MASS INDEX: 23.36 KG/M2 | DIASTOLIC BLOOD PRESSURE: 72 MMHG | TEMPERATURE: 99 F | SYSTOLIC BLOOD PRESSURE: 121 MMHG | HEART RATE: 82 BPM | HEIGHT: 68 IN | OXYGEN SATURATION: 98 %

## 2025-02-26 DIAGNOSIS — R53.83 OTHER FATIGUE: ICD-10-CM

## 2025-02-26 DIAGNOSIS — E10.9 INSULIN DEPENDENT DIABETES MELLITUS TYPE IA: ICD-10-CM

## 2025-02-26 DIAGNOSIS — D64.9 ANEMIA, UNSPECIFIED TYPE: Primary | ICD-10-CM

## 2025-02-26 DIAGNOSIS — D64.9 ANEMIA, UNSPECIFIED TYPE: ICD-10-CM

## 2025-02-26 LAB
IGA SERPL-MCNC: 327 MG/DL (ref 40–350)
IGG SERPL-MCNC: 1260 MG/DL (ref 650–1600)
IGM SERPL-MCNC: 186 MG/DL (ref 50–300)

## 2025-02-26 PROCEDURE — 82784 ASSAY IGA/IGD/IGG/IGM EACH: CPT | Mod: 59 | Performed by: PHYSICIAN ASSISTANT

## 2025-02-26 PROCEDURE — 36415 COLL VENOUS BLD VENIPUNCTURE: CPT | Performed by: PHYSICIAN ASSISTANT

## 2025-02-26 PROCEDURE — 4010F ACE/ARB THERAPY RXD/TAKEN: CPT | Mod: CPTII,S$GLB,, | Performed by: PHYSICIAN ASSISTANT

## 2025-02-26 PROCEDURE — 86334 IMMUNOFIX E-PHORESIS SERUM: CPT | Mod: 26,,, | Performed by: PATHOLOGY

## 2025-02-26 PROCEDURE — 3078F DIAST BP <80 MM HG: CPT | Mod: CPTII,S$GLB,, | Performed by: PHYSICIAN ASSISTANT

## 2025-02-26 PROCEDURE — 84165 PROTEIN E-PHORESIS SERUM: CPT | Mod: 26,,, | Performed by: PATHOLOGY

## 2025-02-26 PROCEDURE — 84165 PROTEIN E-PHORESIS SERUM: CPT | Performed by: PHYSICIAN ASSISTANT

## 2025-02-26 PROCEDURE — 83521 IG LIGHT CHAINS FREE EACH: CPT | Mod: 59 | Performed by: PHYSICIAN ASSISTANT

## 2025-02-26 PROCEDURE — 3074F SYST BP LT 130 MM HG: CPT | Mod: CPTII,S$GLB,, | Performed by: PHYSICIAN ASSISTANT

## 2025-02-26 PROCEDURE — 3046F HEMOGLOBIN A1C LEVEL >9.0%: CPT | Mod: CPTII,S$GLB,, | Performed by: PHYSICIAN ASSISTANT

## 2025-02-26 PROCEDURE — 3008F BODY MASS INDEX DOCD: CPT | Mod: CPTII,S$GLB,, | Performed by: PHYSICIAN ASSISTANT

## 2025-02-26 PROCEDURE — 99999 PR PBB SHADOW E&M-EST. PATIENT-LVL IV: CPT | Mod: PBBFAC,,, | Performed by: PHYSICIAN ASSISTANT

## 2025-02-26 PROCEDURE — 1159F MED LIST DOCD IN RCRD: CPT | Mod: CPTII,S$GLB,, | Performed by: PHYSICIAN ASSISTANT

## 2025-02-26 PROCEDURE — 99213 OFFICE O/P EST LOW 20 MIN: CPT | Mod: S$GLB,,, | Performed by: PHYSICIAN ASSISTANT

## 2025-02-26 PROCEDURE — 86334 IMMUNOFIX E-PHORESIS SERUM: CPT | Performed by: PHYSICIAN ASSISTANT

## 2025-02-26 RX ORDER — LANOLIN ALCOHOL/MO/W.PET/CERES
1000 CREAM (GRAM) TOPICAL DAILY
Qty: 30 TABLET | Refills: 3 | Status: SHIPPED | OUTPATIENT
Start: 2025-02-26 | End: 2025-06-26

## 2025-02-26 NOTE — PROGRESS NOTES
Hematology/Oncology Clinic Visit Note    Patient ID: Jason Sandra is a 49 y.o. male.    Chief Complaint: elevated ferritin    History     Mr. Sandra is a 49 y.o. male referred to hematology for the evaluation and management of elevated ferritin. Other medical diagnoses include T2DM, congestive heart failure, HLD, and chronic back pain (lumbosacral radiculopathy and cerival spondylosis).     He was seen previously by hematology Dr. Monte for LLE pain (likely from radiculopathy). Seen by PCP recently with h/o anemia who ordered nutritional studies which were normal. Vitamin b12 was in 300s.    Feeling extremely fatigued for years. He reports falling asleep during the day and very easily at night. He did have a sleep study done recently, no MARIANNE. He reports intermittent headaches (in the back of his head and neck - likely tension) with intermittent dizziness. Denies recurrent fevers/infections, vomiting, nausea, bleeding, or chest pain. He endorses some shortness of breath with exertion.     Social: nonsmoker; does not drink alcohol; no dietary restrictions    Fam hx: no fam hx of cancer that he can recall     Colonoscopy: 2023    Review of patient's allergies indicates:  No Known Allergies    Past medical, surgical, and medication history, past family history reviewed and updated with patient today as noted.      Past Medical History:   Diagnosis Date    CHF (congestive heart failure)     Diabetes mellitus     Essential (primary) hypertension     Hypogonadism in male        Past Surgical History:   Procedure Laterality Date    COLONOSCOPY N/A 12/01/2023    Procedure: COLONOSCOPY;  Surgeon: Coral White MD;  Location: [facility];  Service: Endoscopy;  Laterality: N/A;    EGD, WITH CLOSED BIOPSY N/A 06/28/2024    ESOPHAGOGASTRODUODENOSCOPY N/A 6/28/2024    Procedure: EGD (ESOPHAGOGASTRODUODENOSCOPY);  Surgeon: Tereza Mcguire MD;  Location: [facility];  Service: Endoscopy;  Laterality: N/A;    INJECTION OF  ANESTHETIC AGENT AROUND MEDIAL BRANCH NERVES INNERVATING CERVICAL FACET JOINT Bilateral 06/07/2023    Procedure: Bilateral C3-5 MBB with local;  Surgeon: Tony Garcia MD;  Location: [facility];  Service: Pain Management;  Laterality: Bilateral;    INJECTION OF ANESTHETIC AGENT AROUND MEDIAL BRANCH NERVES INNERVATING CERVICAL FACET JOINT Bilateral 08/23/2023    Procedure: Bilateral C3-5 MBB  second diagnostic block with RN IV sedation;  Surgeon: Tony Garcia MD;  Location: [facility];  Service: Pain Management;  Laterality: Bilateral;    NOSE SURGERY      RADIOFREQUENCY ABLATION, FACET JOINT, CERVICOTHORACIC Left 11/22/2023    Procedure: C3-4 and C 4-5 Facet RFA;  Surgeon: Tony Garcia MD;  Location: [facility];  Service: Pain Management;  Laterality: Left;  To be done 2-3 weeks after the left side.    RADIOFREQUENCY ABLATION, FACET JOINT, CERVICOTHORACIC Right 12/13/2023    Procedure: C3-4 and C 4-5 Facet RFA;  Surgeon: Tony Garcia MD;  Location: [facility];  Service: Pain Management;  Laterality: Right;    SELECTIVE INJECTION OF ANESTHETIC AGENT AROUND LUMBAR SPINAL NERVE ROOT BY TRANSFORAMINAL APPROACH Bilateral 05/24/2023    Procedure: Bilateral L5 TF GARY with local;  Surgeon: Tony Garcia MD;  Location: [facility];  Service: Pain Management;  Laterality: Bilateral;    SELECTIVE INJECTION OF ANESTHETIC AGENT AROUND LUMBAR SPINAL NERVE ROOT BY TRANSFORAMINAL APPROACH Bilateral 10/25/2023    Procedure: Bilateral L5/S1 TF GARY;  Surgeon: Aric Muro MD;  Location: [facility];  Service: Pain Management;  Laterality: Bilateral;       Review of Systems:  Review of Systems   Constitutional:  Positive for malaise/fatigue.   HENT:  Negative for nosebleeds.    Respiratory:  Positive for shortness of breath.    Cardiovascular:  Negative for chest pain.   Gastrointestinal:  Negative for abdominal pain, blood in stool, constipation, diarrhea and vomiting.   Genitourinary:  Negative for  hematuria.   Skin:  Negative for rash.   Neurological:  Positive for dizziness, weakness and headaches.          Physical Exam   Vitals:  There were no vitals taken for this visit.    Labs:  No visits with results within 2 Day(s) from this visit.   Latest known visit with results is:   Lab Visit on 02/03/2025   Component Date Value Ref Range Status    Ferritin 02/03/2025 749 (H)  20.0 - 300.0 ng/mL Final    Iron 02/03/2025 66  45 - 160 ug/dL Final    Transferrin 02/03/2025 217  200 - 375 mg/dL Final    TIBC 02/03/2025 321  250 - 450 ug/dL Final    Saturated Iron 02/03/2025 21  20 - 50 % Final    Vitamin B-12 02/03/2025 398  210 - 950 pg/mL Final    Folate 02/03/2025 13.7  4.0 - 24.0 ng/mL Final    WBC 02/03/2025 5.00  3.90 - 12.70 K/uL Final    RBC 02/03/2025 3.98 (L)  4.60 - 6.20 M/uL Final    Hemoglobin 02/03/2025 12.3 (L)  14.0 - 18.0 g/dL Final    Hematocrit 02/03/2025 36.0 (L)  40.0 - 54.0 % Final    MCV 02/03/2025 91  82 - 98 fL Final    MCH 02/03/2025 30.9  27.0 - 31.0 pg Final    MCHC 02/03/2025 34.2  32.0 - 36.0 g/dL Final    RDW 02/03/2025 11.7  11.5 - 14.5 % Final    Platelets 02/03/2025 212  150 - 450 K/uL Final    MPV 02/03/2025 9.7  9.2 - 12.9 fL Final    Immature Granulocytes 02/03/2025 0.4  0.0 - 0.5 % Final    Gran # (ANC) 02/03/2025 2.7  1.8 - 7.7 K/uL Final    Immature Grans (Abs) 02/03/2025 0.02  0.00 - 0.04 K/uL Final    Comment: Mild elevation in immature granulocytes is non specific and   can be seen in a variety of conditions including stress response,   acute inflammation, trauma and pregnancy. Correlation with other   laboratory and clinical findings is essential.      Lymph # 02/03/2025 1.8  1.0 - 4.8 K/uL Final    Mono # 02/03/2025 0.4  0.3 - 1.0 K/uL Final    Eos # 02/03/2025 0.1  0.0 - 0.5 K/uL Final    Baso # 02/03/2025 0.02  0.00 - 0.20 K/uL Final    nRBC 02/03/2025 0  0 /100 WBC Final    Gran % 02/03/2025 53.6  38.0 - 73.0 % Final    Lymph % 02/03/2025 36.6  18.0 - 48.0 % Final     Mono % 02/03/2025 7.6  4.0 - 15.0 % Final    Eosinophil % 02/03/2025 1.4  0.0 - 8.0 % Final    Basophil % 02/03/2025 0.4  0.0 - 1.9 % Final    Differential Method 02/03/2025 Automated   Final    BNP 02/03/2025 14  0 - 99 pg/mL Final    Values of less than 100 pg/ml are consistent with non-CHF populations.    Sodium 02/03/2025 133 (L)  136 - 145 mmol/L Final    Potassium 02/03/2025 4.2  3.5 - 5.1 mmol/L Final    Chloride 02/03/2025 99  95 - 110 mmol/L Final    CO2 02/03/2025 26  23 - 29 mmol/L Final    Glucose 02/03/2025 378 (H)  70 - 110 mg/dL Final    BUN 02/03/2025 14  6 - 20 mg/dL Final    Creatinine 02/03/2025 1.4  0.5 - 1.4 mg/dL Final    Calcium 02/03/2025 9.4  8.7 - 10.5 mg/dL Final    Total Protein 02/03/2025 7.6  6.0 - 8.4 g/dL Final    Albumin 02/03/2025 3.9  3.5 - 5.2 g/dL Final    Total Bilirubin 02/03/2025 0.3  0.1 - 1.0 mg/dL Final    Comment: For infants and newborns, interpretation of results should be based  on gestational age, weight and in agreement with clinical  observations.    Premature Infant recommended reference ranges:  Up to 24 hours.............<8.0 mg/dL  Up to 48 hours............<12.0 mg/dL  3-5 days..................<15.0 mg/dL  6-29 days.................<15.0 mg/dL      Alkaline Phosphatase 02/03/2025 103  40 - 150 U/L Final    AST 02/03/2025 24  10 - 40 U/L Final    ALT 02/03/2025 26  10 - 44 U/L Final    eGFR 02/03/2025 >60.0  >60 mL/min/1.73 m^2 Final    Anion Gap 02/03/2025 8  8 - 16 mmol/L Final    TSH 02/03/2025 1.820  0.400 - 4.000 uIU/mL Final    Hemoglobin A1C 02/03/2025 10.9 (H)  4.0 - 5.6 % Final    Comment: ADA Screening Guidelines:  5.7-6.4%  Consistent with prediabetes  >or=6.5%  Consistent with diabetes    High levels of fetal hemoglobin interfere with the HbA1C  assay. Heterozygous hemoglobin variants (HbS, HgC, etc)do  not significantly interfere with this assay.   However, presence of multiple variants may affect accuracy.      Estimated Avg Glucose 02/03/2025 266  (H)  68 - 131 mg/dL Final        Physical Exam:  Physical Exam  Constitutional:       General: He is not in acute distress.  HENT:      Nose: Nose normal. No congestion.   Eyes:      General: No scleral icterus.  Pulmonary:      Effort: Pulmonary effort is normal. No respiratory distress.   Musculoskeletal:      Right lower leg: No edema.      Left lower leg: No edema.   Skin:     Findings: No bruising.   Neurological:      Mental Status: He is alert and oriented to person, place, and time.          ECOG:   ECOG SCORE    0 - Fully active-able to carry on all pre-disease performance without restriction        PROCEDURES/IMAGING  Echo    Left Ventricle: The left ventricle is normal in size. Normal wall   thickness. There is normal systolic function with a visually estimated   ejection fraction of 60 - 65%. There is normal diastolic function.    Right Ventricle: Normal right ventricular cavity size. Wall thickness   is normal. Systolic function is normal.    IVC/SVC: Normal venous pressure at 3 mmHg.         Discussion     Problem List:  Problem List Items Addressed This Visit       Insulin dependent diabetes mellitus type IA    Overview   Followed by Endocrinology, Luly Pacheco NP, continue current treatment plan           Other Visit Diagnoses         Anemia, unspecified type    -  Primary    Relevant Medications    cyanocobalamin (VITAMIN B-12) 1000 MCG tablet    Other Relevant Orders    CBC Auto Differential    CMP    Vitamin B12    FERRITIN    Iron and TIBC    IMMUNOGLOBULINS (IGG, IGA, IGM) QUANTITATIVE    SPEP - Protein electrophoresis, serum    FREE LT CHAIN ANAL    KATT      Other fatigue        Relevant Orders    CBC Auto Differential    CMP    Vitamin B12    FERRITIN    Iron and TIBC    IMMUNOGLOBULINS (IGG, IGA, IGM) QUANTITATIVE    SPEP - Protein electrophoresis, serum    FREE LT CHAIN ANAL    KATT             Anemia/extreme fatigue   - Most recent labs with hgb 12.3 g/dL  - He does NOT have iron overload.  Iron levels were normal (<100). I explained to him that ferritin can also be a marker of inflammation. He certainly has inflammatory conditions such as his uncontrolled diabetes that could contribute to this.   - Will run SPEP, KATT, FLC, and immunoglobulins (although suspicion for myeloproliferative disorder is low)  - TSH normal  - Kidney and liver function wnl  - he also reports multiple social and financial stressors that could contribute to his fatigue   - Vitamin b12 level normal, but can try to raise level to 400-600 range with vitamin b12 daily OTC  - Repeat labs in 3-4 months     Diabetes  - Hgb A1c 10.9%  - He has not been compliant with medications and follow up for his diabetes  - I explained that this can certainly make him feel fatigued and just not well.   - needs to follow up with endocrinology     I spent a total of 29 minutes on the day of the visit.This includes face to face time and non-face to face time preparing to see the patient (eg, review of tests), obtaining and/or reviewing separately obtained history, documenting clinical information in the electronic or other health record, independently interpreting results and communicating results to the patient/family/caregiver, or care coordinator.     Ning Best PA-C  2/26/2025 11:24 AM   Hematology/Oncology  [facility] - [address], [address]  [place] [address]  Phone: [phone]  Fax: [phone]

## 2025-02-27 LAB
ALBUMIN SERPL ELPH-MCNC: 4.49 G/DL (ref 3.35–5.55)
ALPHA1 GLOB SERPL ELPH-MCNC: 0.21 G/DL (ref 0.17–0.41)
ALPHA2 GLOB SERPL ELPH-MCNC: 0.62 G/DL (ref 0.43–0.99)
B-GLOBULIN SERPL ELPH-MCNC: 0.84 G/DL (ref 0.5–1.1)
GAMMA GLOB SERPL ELPH-MCNC: 1.24 G/DL (ref 0.67–1.58)
INTERPRETATION SERPL IFE-IMP: NORMAL
KAPPA LC SER QL IA: 1.42 MG/DL (ref 0.33–1.94)
KAPPA LC/LAMBDA SER IA: 1.33 (ref 0.26–1.65)
LAMBDA LC SER QL IA: 1.07 MG/DL (ref 0.57–2.63)
PROT SERPL-MCNC: 7.4 G/DL (ref 6–8.4)

## 2025-03-03 ENCOUNTER — RESULTS FOLLOW-UP (OUTPATIENT)
Dept: HEMATOLOGY/ONCOLOGY | Facility: CLINIC | Age: 50
End: 2025-03-03

## 2025-03-03 LAB
PATHOLOGIST INTERPRETATION IFE: NORMAL
PATHOLOGIST INTERPRETATION SPE: NORMAL

## 2025-05-07 ENCOUNTER — TELEPHONE (OUTPATIENT)
Dept: DIABETES | Facility: CLINIC | Age: 50
End: 2025-05-07
Payer: COMMERCIAL

## 2025-05-29 ENCOUNTER — PATIENT OUTREACH (OUTPATIENT)
Dept: ADMINISTRATIVE | Facility: HOSPITAL | Age: 50
End: 2025-05-29
Payer: COMMERCIAL

## 2025-05-29 ENCOUNTER — PATIENT MESSAGE (OUTPATIENT)
Dept: ADMINISTRATIVE | Facility: HOSPITAL | Age: 50
End: 2025-05-29
Payer: COMMERCIAL

## 2025-05-29 DIAGNOSIS — E10.65 TYPE 1 DIABETES MELLITUS WITH HYPERGLYCEMIA: Primary | ICD-10-CM

## 2025-05-29 DIAGNOSIS — Z13.6 ENCOUNTER FOR SCREENING FOR CARDIOVASCULAR DISORDERS: ICD-10-CM

## 2025-06-16 ENCOUNTER — OFFICE VISIT (OUTPATIENT)
Dept: UROLOGY | Facility: CLINIC | Age: 50
End: 2025-06-16
Payer: COMMERCIAL

## 2025-06-16 VITALS
RESPIRATION RATE: 14 BRPM | SYSTOLIC BLOOD PRESSURE: 134 MMHG | BODY MASS INDEX: 23.15 KG/M2 | WEIGHT: 152.75 LBS | TEMPERATURE: 98 F | DIASTOLIC BLOOD PRESSURE: 78 MMHG | HEART RATE: 99 BPM | HEIGHT: 68 IN

## 2025-06-16 DIAGNOSIS — N52.01 ERECTILE DYSFUNCTION DUE TO ARTERIAL INSUFFICIENCY: ICD-10-CM

## 2025-06-16 DIAGNOSIS — N41.1 CHRONIC PROSTATITIS: Primary | ICD-10-CM

## 2025-06-16 PROBLEM — M54.2 CHRONIC NECK PAIN: Status: RESOLVED | Noted: 2023-06-07 | Resolved: 2025-06-16

## 2025-06-16 PROBLEM — R21 BUTTERFLY RASH: Status: RESOLVED | Noted: 2023-03-09 | Resolved: 2025-06-16

## 2025-06-16 PROBLEM — R07.9 CHEST PAIN: Status: RESOLVED | Noted: 2023-03-09 | Resolved: 2025-06-16

## 2025-06-16 PROBLEM — G89.29 CHRONIC NECK PAIN: Status: RESOLVED | Noted: 2023-06-07 | Resolved: 2025-06-16

## 2025-06-16 PROBLEM — Z12.11 ENCOUNTER FOR SCREENING COLONOSCOPY: Status: RESOLVED | Noted: 2023-12-01 | Resolved: 2025-06-16

## 2025-06-16 PROCEDURE — 3008F BODY MASS INDEX DOCD: CPT | Mod: CPTII,S$GLB,, | Performed by: NURSE PRACTITIONER

## 2025-06-16 PROCEDURE — 1159F MED LIST DOCD IN RCRD: CPT | Mod: CPTII,S$GLB,, | Performed by: NURSE PRACTITIONER

## 2025-06-16 PROCEDURE — 3075F SYST BP GE 130 - 139MM HG: CPT | Mod: CPTII,S$GLB,, | Performed by: NURSE PRACTITIONER

## 2025-06-16 PROCEDURE — 99999 PR PBB SHADOW E&M-EST. PATIENT-LVL V: CPT | Mod: PBBFAC,,, | Performed by: NURSE PRACTITIONER

## 2025-06-16 PROCEDURE — 99214 OFFICE O/P EST MOD 30 MIN: CPT | Mod: S$GLB,,, | Performed by: NURSE PRACTITIONER

## 2025-06-16 PROCEDURE — 3046F HEMOGLOBIN A1C LEVEL >9.0%: CPT | Mod: CPTII,S$GLB,, | Performed by: NURSE PRACTITIONER

## 2025-06-16 PROCEDURE — 3078F DIAST BP <80 MM HG: CPT | Mod: CPTII,S$GLB,, | Performed by: NURSE PRACTITIONER

## 2025-06-16 PROCEDURE — 4010F ACE/ARB THERAPY RXD/TAKEN: CPT | Mod: CPTII,S$GLB,, | Performed by: NURSE PRACTITIONER

## 2025-06-16 RX ORDER — TADALAFIL 10 MG/1
10 TABLET ORAL DAILY PRN
Qty: 30 TABLET | Refills: 11 | Status: SHIPPED | OUTPATIENT
Start: 2025-06-16 | End: 2026-06-16

## 2025-06-16 RX ORDER — CIPROFLOXACIN 500 MG/1
500 TABLET, FILM COATED ORAL 2 TIMES DAILY
Qty: 60 TABLET | Refills: 0 | Status: SHIPPED | OUTPATIENT
Start: 2025-06-16 | End: 2025-07-16

## 2025-06-16 NOTE — PROGRESS NOTES
Chief Complaint:   Chronic prostatitis  Erectile dysfunction    HPI:   Patient is a 50-year-old male that is presenting with symptoms of chronic prostatitis.  Patient states that he has dull perineal pain that radiates to his hips.  Denies gross hematuria or pain with ejaculation.  Urine in clinic indicates large amount of glucose, protein and bilirubin.  PVR is 7 mL.  Patient would also like to discuss treatment options for erectile dysfunction.  05/23/2024  Derick BRASWELL  Jason Sandra is a 49 y.o. male who returns today regarding his abnormal CT.     Established patient.  Previous patient of Dr. Bentley.    Currently on TRT; self injecting 200 mg every week; due for labs  Presents today at request of PCP for possible urinary retention.  Patient had CT showing distended bladder yesterday.  He denies bothersome LUTS today; denies gh, flank pain.   Allergies:  Patient has no known allergies.    Medications:  has a current medication list which includes the following prescription(s): accu-chek guide me glucose mtr, atorvastatin, blood sugar diagnostic, dexcom g6 sensor, dexcom g6 transmitter, cyanocobalamin, finasteride, insulin degludec, lyumjev kwikpen u-100 insulin, ketoconazole, lancets, minoxidil, modafinil, omeprazole, pen needle, diabetic, entresto, and testosterone cypionate.    Review of Systems:  General: No fever, chills, fatigability, or weight loss.  Skin: No rashes, itching, or changes in color or texture of skin.  Chest: Denies REYNA, cyanosis, wheezing, cough, and sputum production.  Abdomen: Appetite fine. No weight loss. Denies diarrhea, abdominal pain, hematemesis, or blood in stool.  Musculoskeletal: No joint stiffness or swelling. Denies back pain.  : As above.  All other review of systems negative.    PMH:   has a past medical history of CHF (congestive heart failure), Diabetes mellitus, Essential (primary) hypertension, and Hypogonadism in male.    PSH:   has a past surgical history that  includes Nose surgery; Selective injection of anesthetic agent around lumbar spinal nerve root by transforaminal approach (Bilateral, 05/24/2023); Injection of anesthetic agent around medial branch nerves innervating cervical facet joint (Bilateral, 06/07/2023); Injection of anesthetic agent around medial branch nerves innervating cervical facet joint (Bilateral, 08/23/2023); Selective injection of anesthetic agent around lumbar spinal nerve root by transforaminal approach (Bilateral, 10/25/2023); radiofrequency ablation, facet joint, cervicothoracic (Left, 11/22/2023); Colonoscopy (N/A, 12/01/2023); radiofrequency ablation, facet joint, cervicothoracic (Right, 12/13/2023); egd, with closed biopsy (N/A, 06/28/2024); and Esophagogastroduodenoscopy (N/A, 6/28/2024).    FamHx: family history includes Alzheimer's disease in his father; Cataracts in his mother; Glaucoma in his mother; Stroke in his mother.    SocHx:  reports that he has never smoked. He has never been exposed to tobacco smoke. He has never used smokeless tobacco. He reports that he does not currently use alcohol. He reports that he does not use drugs.      Physical Exam:  General: A&Ox3, no apparent distress, no deformities  Neck: No masses, normal thyroid  Lungs: normal inspiration, no use of accessory muscles  Heart: normal pulse, no arrhythmias  Abdomen: Soft, NT, ND, no masses, no hernias, no hepatosplenomegaly  Lymphatic: Neck and groin nodes negative  Skin: The skin is warm and dry. No jaundice.  Ext: No c/c/e.  : Test desc jef, no abnormalities of epididymus. Penis with normal penile and scrotal skin. Meatus normal. Normal rectal tone, no hemorrhoids. Prost 30 gm no nodules, slightly boggy consistent with prostatitis.  No masses appreciated. SV not palpable. Perineum and anus normal.    Labs/Studies:    Latest Reference Range & Units 02/10/21 11:17 10/21/21 07:56 04/18/23 12:46 11/28/23 09:41 01/16/25 08:07   PSA Diagnostic 0.00 - 4.00 ng/mL  0.53 0.74 0.72 0.61 0.50     Impression/Plan:   1. Chronic prostatitis  Cipro prescription was sent to his pharmacy.  Return to clinic in 5 weeks for re-evaluation.    2.ED - I reviewed the etiology and management of ED.  Management options include oral medications, vacuum erectile devices, MUSE urethral suppositories, intracavernosal injections, and surgical placement of a penile prosthesis.  I reviewed the risks and benefits of each.  For medications, I noted that they are safe and effective, but noted that side effects include flushing of the face, headaches, color vision change, blurry vision, and congestion/runny nose. They should not be used by anyone currently taking nitrates for chest pain, and I reviewed the patient's current medications in the chart and verbally with the patient and he is not. For vacuum erectile devices, I noted that they also are safe but that they require the use of a restrictive ring at the base of the penis in order to prevent a detumescence, which can be uncomfortable for some patients. They also have the added benefits of being able to be combined with medical therapy for added effect.  For MUSE, I noted that these are reasonably effective, but that patients usually experience urethral burning, which can also be experienced by the partner, often requiring the use of condoms.  For intracavernosal injections I explained that this is usually the most efficacious medication to achieve a natural erection, but requires injecting the penis, which is not a suitable option for everybody.  For surgical options I reviewed a that there is the surgical risks which include pain, bleeding, and infection.  I noted that an infection of the device would require it to be removed, which can make replacement at a later date very difficult.  I explained that once a prosthesis is implanted the patient will never achieve a natural erection ever again. Rx for daily Cialis sent to pharmacy and instructed  patient on obtaining special pricing with [facility]. He knows that this may cause blue-green vision changes, reflux, flushing, headaches, and to not take this with nitro pills.  He understands this may cause chest pain and if so to report to the emergency department immediately.  Patient understands that this medication can cause death of taken with nitro pills for his heart.  He is understanding of all the side effects, and would still like to proceed.

## 2025-07-09 ENCOUNTER — OFFICE VISIT (OUTPATIENT)
Dept: INTERNAL MEDICINE | Facility: CLINIC | Age: 50
End: 2025-07-09
Payer: COMMERCIAL

## 2025-07-09 VITALS
HEIGHT: 68 IN | SYSTOLIC BLOOD PRESSURE: 132 MMHG | DIASTOLIC BLOOD PRESSURE: 64 MMHG | BODY MASS INDEX: 22.99 KG/M2 | OXYGEN SATURATION: 98 % | WEIGHT: 151.69 LBS | HEART RATE: 72 BPM

## 2025-07-09 DIAGNOSIS — E10.65 TYPE 1 DIABETES MELLITUS WITH HYPERGLYCEMIA: ICD-10-CM

## 2025-07-09 DIAGNOSIS — L23.7 POISON IVY DERMATITIS: Primary | ICD-10-CM

## 2025-07-09 PROCEDURE — 1159F MED LIST DOCD IN RCRD: CPT | Mod: CPTII,S$GLB,, | Performed by: COUNSELOR

## 2025-07-09 PROCEDURE — 3078F DIAST BP <80 MM HG: CPT | Mod: CPTII,S$GLB,, | Performed by: COUNSELOR

## 2025-07-09 PROCEDURE — 4010F ACE/ARB THERAPY RXD/TAKEN: CPT | Mod: CPTII,S$GLB,, | Performed by: COUNSELOR

## 2025-07-09 PROCEDURE — 3075F SYST BP GE 130 - 139MM HG: CPT | Mod: CPTII,S$GLB,, | Performed by: COUNSELOR

## 2025-07-09 PROCEDURE — 3046F HEMOGLOBIN A1C LEVEL >9.0%: CPT | Mod: CPTII,S$GLB,, | Performed by: COUNSELOR

## 2025-07-09 PROCEDURE — 3008F BODY MASS INDEX DOCD: CPT | Mod: CPTII,S$GLB,, | Performed by: COUNSELOR

## 2025-07-09 PROCEDURE — 99999 PR PBB SHADOW E&M-EST. PATIENT-LVL IV: CPT | Mod: PBBFAC,,, | Performed by: COUNSELOR

## 2025-07-09 PROCEDURE — 99213 OFFICE O/P EST LOW 20 MIN: CPT | Mod: S$GLB,,, | Performed by: COUNSELOR

## 2025-07-09 RX ORDER — TRIAMCINOLONE ACETONIDE 1 MG/G
CREAM TOPICAL 2 TIMES DAILY
Qty: 30 G | Refills: 0 | Status: SHIPPED | OUTPATIENT
Start: 2025-07-09

## 2025-07-09 RX ORDER — PREDNISONE 10 MG/1
TABLET ORAL
Qty: 29 TABLET | Refills: 0 | Status: CANCELLED | OUTPATIENT
Start: 2025-07-09

## 2025-07-09 NOTE — PROGRESS NOTES
Subjective:       Patient ID: Jason Sandra is a 50 y.o. male with history of headaches, lumbosacral radiculopathy, cervical spondylosis, type 1 diabetes with diabetic retinopathy, sialadenitis, restrictive lung disease, CHF, chronic prostatitis, hypogonadism, PLMD.    Chief Complaint: Poison ivy dermatitis [L23.7]    Patient is new to me, PCP is Dr. Lazar. Here today for the following:    History of Present Illness    CHIEF COMPLAINT:  Patient presents today with poison ivy rash.    HISTORY OF PRESENT ILLNESS:  He reports a poison ivy rash currently present on his abdomen and face with mild associated itching. He notes recent mild throat involvement with the rash, though not severe. He has a history of recurrent poison ivy exposures over the years, including previous episodes in childhood.    DIABETES:  He has type 1 diabetes. He currently manages diabetes with insulin, taking 30 units in the morning and using a sliding scale for additional dosing based on glucose levels and meal intake, typically ranging from 6 to 10 units. His diabetes is currently not well-controlled. He uses glucose monitoring. He was counseled about potential blood sugar elevation risks with steroid treatment and the importance of vigilant blood sugar monitoring to prevent DKA.    ROS:  General: -fever, -chills, -fatigue, -weight gain, -weight loss  Eyes: -vision changes, -redness, -discharge  ENT: -ear pain, -nasal congestion, -sore throat, -difficulty swallowing  Cardiovascular: -chest pain, -palpitations, -lower extremity edema  Respiratory: -cough, -shortness of breath  Musculoskeletal: -joint pain, -muscle pain  Skin: +rash, -lesion, +itching         Current Outpatient Medications   Medication Instructions    ACCU-CHEK GUIDE ME GLUCOSE MTR Misc     atorvastatin (LIPITOR) 40 mg, Oral, Daily    blood sugar diagnostic Strp To check BG 2 times daily, to use with insurance preferred meter    blood-glucose sensor (DEXCOM G6 SENSOR) Chen  "Use 1 sensor every 10 days    blood-glucose transmitter (DEXCOM G6 TRANSMITTER) Chen 1 transmitter every 3 months    ciprofloxacin HCl (CIPRO) 500 mg, Oral, 2 times daily    cyanocobalamin (VITAMIN B-12) 1,000 mcg, Oral, Daily    finasteride (PROPECIA) 1 mg tablet Take 1 tablet by mouth daily. Optional to take 3 times a week    insulin degludec (TRESIBA FLEXTOUCH U-100) 34 Units, Subcutaneous, Nightly    insulin lispro-aabc (LYUMJEV KWIKPEN U-100 INSULIN) 100 unit/mL pen Inject 10 units 3 times per day before meals, 2-4 units for snack, . + correction scale. MAX TDD Of 50 units    ketoconazole (NIZORAL) 2 % shampoo Apply to scalp in place of regular shampoo 2-3x per week    lancets Misc To check BG 2 times daily, to use with insurance preferred meter    minoxidiL (LONITEN) 2.5 MG tablet Take 1 tablet by mouth nightly as tolerated    modafiniL (PROVIGIL) 200 mg, Oral, Daily    omeprazole (PRILOSEC) 40 MG capsule Take 1 capsule (40 mg total) by mouth 2 (two) times daily before meals for 90 days, THEN 1 capsule (40 mg total) every morning.    pen needle, diabetic 32 gauge x 5/32" Ndle To use 7 times per day with insulin injections- for meals, long-acting insulin and for snacks    sacubitriL-valsartan (ENTRESTO) 24-26 mg per tablet 1 tablet, Oral, 2 times daily    tadalafiL (CIALIS) 10 mg, Oral, Daily PRN    testosterone cypionate (DEPOTESTOTERONE CYPIONATE) 200 mg, Intramuscular, Weekly    triamcinolone acetonide 0.1% (KENALOG) 0.1 % cream Topical (Top), 2 times daily     Objective:      Vitals:    07/09/25 1111   BP: 132/64   Pulse: 72   SpO2: 98%   Weight: 68.8 kg (151 lb 10.8 oz)   Height: 5' 8" (1.727 m)   PainSc:   7   PainLoc: Generalized     Body mass index is 23.06 kg/m².    Physical Exam  Constitutional:       General: He is not in acute distress.     Appearance: He is well-developed. He is not diaphoretic.   HENT:      Head: Normocephalic and atraumatic.      Nose: Nose normal.      Mouth/Throat:      " Pharynx: No oropharyngeal exudate.   Eyes:      General: No scleral icterus.        Right eye: No discharge.         Left eye: No discharge.   Neck:      Thyroid: No thyromegaly.      Trachea: No tracheal deviation.   Cardiovascular:      Rate and Rhythm: Normal rate and regular rhythm.      Heart sounds: Normal heart sounds. No murmur heard.  Pulmonary:      Effort: Pulmonary effort is normal. No respiratory distress.      Breath sounds: Normal breath sounds. No wheezing.   Abdominal:      General: Bowel sounds are normal. There is no distension.      Palpations: Abdomen is soft.      Tenderness: There is no abdominal tenderness.   Musculoskeletal:         General: No deformity.      Cervical back: Neck supple.      Right lower leg: No edema.      Left lower leg: No edema.   Lymphadenopathy:      Cervical: No cervical adenopathy.   Skin:     General: Skin is warm and dry.      Findings: Erythema and rash present.      Comments: Papulovesicular rash noted across all four extremities in small patches and on abdomen.   Face had erythema on cheeks with minor involvement of left eyebrow. Some erythema noted on posterior neck as well.    Neurological:      Mental Status: He is alert.      Gait: Gait normal.   Psychiatric:         Mood and Affect: Mood normal.         Behavior: Behavior normal.         Thought Content: Thought content normal.         Judgment: Judgment normal.         Assessment:       1. Poison ivy dermatitis        IMPRESSION:  - Considered oral steroids for widespread poison ivy rash, including facial involvement, but decided against due to poorly controlled diabetes.  - Consulted with supervising physician regarding steroid injection, who advised against it due to unfamiliarity with medical history and risk of exacerbating diabetes.  - Opted for topical steroid cream as primary treatment to manage rash while minimizing systemic effects on glucose.    Plan:     PLAN SUMMARY:  - Prescribed triamcinolone  cream for poison ivy rash  - Prescribed OTC hydrocortisone cream for facial rash with extreme caution of prolonged use.   - Contact office if symptoms worsen, especially if itching intensifies or rash progresses  - Follow up with PCP (Dr. Campo) for ongoing diabetes management    Poison ivy dermatitis  - Monitored patient's rash on stomach, face, and other areas; itching is not severe. Although there is facial involvement, opted to avoid oral or IM corticosteroid treatment at this time due to uncontrolled diabetes. I have not met this patient before and am concerned of acute worsening of hyperglycemia and DKA.   - After assessment with supervising physician, prescribed OTC hydrocortisone cream for facial rash and triamcinalone cream for other affected areas.   - Facial application should be limited to 3-5 days due to skin sensitivity.  - Educated patient on potential complications requiring immediate medical evaluation, including difficulty breathing or swallowing.  - Advised to contact office if symptoms worsen, especially if itching intensifies or rash continues to progress.  -     triamcinolone acetonide 0.1% (KENALOG) 0.1 % cream; Apply topically 2 (two) times daily.  Dispense: 30 g; Refill: 0    Type 1 diabetes with hyperglycemia  - Monitored patient's diabetes which based on last labs are uncontrolled. Patient does not use prescribed dexcom but monitors blood sugar levels before meals.   - Current management includes insulin with sliding scale (30 units in morning with 6-10 unit adjustments based on meals).  - Recent lab results indicate poor glycemic control.  - Due to risk of hyperglycemia and potential DKA (DKA), decided against steroid injection and prescribed triamcinolone cream instead.  - Educated patient about steroid effects on glucose levels and instructed to monitor glucose closely after starting the cream.  - Recommend follow-up with PCP for ongoing diabetes management.        This note was  generated with the assistance of ambient listening technology. Verbal consent was obtained by the patient and accompanying visitor(s) for the recording of patient appointment to facilitate this note. I attest to having reviewed and edited the generated note for accuracy, though some syntax or spelling errors may persist. Please contact the author of this note for any clarification.    Jonnathan Malcolm PA-C    7/9/2025

## 2025-07-24 ENCOUNTER — OFFICE VISIT (OUTPATIENT)
Dept: UROLOGY | Facility: CLINIC | Age: 50
End: 2025-07-24
Payer: COMMERCIAL

## 2025-07-24 VITALS
SYSTOLIC BLOOD PRESSURE: 128 MMHG | HEIGHT: 68 IN | HEART RATE: 88 BPM | DIASTOLIC BLOOD PRESSURE: 78 MMHG | BODY MASS INDEX: 22.99 KG/M2 | WEIGHT: 151.69 LBS

## 2025-07-24 DIAGNOSIS — N41.1 CHRONIC PROSTATITIS: Primary | ICD-10-CM

## 2025-07-24 DIAGNOSIS — N52.01 ERECTILE DYSFUNCTION DUE TO ARTERIAL INSUFFICIENCY: ICD-10-CM

## 2025-07-24 DIAGNOSIS — L23.7 POISON IVY DERMATITIS: ICD-10-CM

## 2025-07-24 PROCEDURE — 99214 OFFICE O/P EST MOD 30 MIN: CPT | Mod: S$GLB,,, | Performed by: NURSE PRACTITIONER

## 2025-07-24 PROCEDURE — 3046F HEMOGLOBIN A1C LEVEL >9.0%: CPT | Mod: CPTII,S$GLB,, | Performed by: NURSE PRACTITIONER

## 2025-07-24 PROCEDURE — 3074F SYST BP LT 130 MM HG: CPT | Mod: CPTII,S$GLB,, | Performed by: NURSE PRACTITIONER

## 2025-07-24 PROCEDURE — 3008F BODY MASS INDEX DOCD: CPT | Mod: CPTII,S$GLB,, | Performed by: NURSE PRACTITIONER

## 2025-07-24 PROCEDURE — 1160F RVW MEDS BY RX/DR IN RCRD: CPT | Mod: CPTII,S$GLB,, | Performed by: NURSE PRACTITIONER

## 2025-07-24 PROCEDURE — 3078F DIAST BP <80 MM HG: CPT | Mod: CPTII,S$GLB,, | Performed by: NURSE PRACTITIONER

## 2025-07-24 PROCEDURE — 4010F ACE/ARB THERAPY RXD/TAKEN: CPT | Mod: CPTII,S$GLB,, | Performed by: NURSE PRACTITIONER

## 2025-07-24 PROCEDURE — 1159F MED LIST DOCD IN RCRD: CPT | Mod: CPTII,S$GLB,, | Performed by: NURSE PRACTITIONER

## 2025-07-24 PROCEDURE — 99999 PR PBB SHADOW E&M-EST. PATIENT-LVL IV: CPT | Mod: PBBFAC,,, | Performed by: NURSE PRACTITIONER

## 2025-07-24 NOTE — PROGRESS NOTES
Chief Complaint:   Prostatitis  Erectile dysfunction    HPI:   Patient is a 50-year-old male that is presenting as a follow-up to prostatitis.  States that he has completed antibiotics and symptoms have completely resolved.  Urine in clinic is negative and for the exception of glucose.  Was prescribed Cialis 10 mg to be taken once daily.  Patient reports that daily Cialis has not resolved his ED issues.  07/24/2025  Patient is a 50-year-old male that is presenting with symptoms of chronic prostatitis.  Patient states that he has dull perineal pain that radiates to his hips.  Denies gross hematuria or pain with ejaculation.  Urine in clinic indicates large amount of glucose, protein and bilirubin.  PVR is 7 mL.  Patient would also like to discuss treatment options for erectile dysfunction.  05/23/2024  Derick Armentafrancine Sandra is a 49 y.o. male who returns today regarding his abnormal CT.     Established patient.  Previous patient of Dr. Bentley.    Currently on TRT; self injecting 200 mg every week; due for labs  Presents today at request of PCP for possible urinary retention.  Patient had CT showing distended bladder yesterday.  He denies bothersome LUTS today; denies gh, flank pain.   Allergies:  Patient has no known allergies.    Medications:  has a current medication list which includes the following prescription(s): accu-chek guide me glucose mtr, blood sugar diagnostic, dexcom g6 sensor, dexcom g6 transmitter, cyanocobalamin, finasteride, insulin degludec, lyumjev kwikpen u-100 insulin, ketoconazole, lancets, minoxidil, modafinil, pen needle, diabetic, entresto, tadalafil, testosterone cypionate, triamcinolone acetonide 0.1%, atorvastatin, and omeprazole.    Review of Systems:  General: No fever, chills, fatigability, or weight loss.  Skin: No rashes, itching, or changes in color or texture of skin.  Chest: Denies REYNA, cyanosis, wheezing, cough, and sputum production.  Abdomen: Appetite fine. No weight  loss. Denies diarrhea, abdominal pain, hematemesis, or blood in stool.  Musculoskeletal: No joint stiffness or swelling. Denies back pain.  : As above.  All other review of systems negative.    PMH:   has a past medical history of CHF (congestive heart failure), Diabetes mellitus, Essential (primary) hypertension, and Hypogonadism in male.    PSH:   has a past surgical history that includes Nose surgery; Selective injection of anesthetic agent around lumbar spinal nerve root by transforaminal approach (Bilateral, 05/24/2023); Injection of anesthetic agent around medial branch nerves innervating cervical facet joint (Bilateral, 06/07/2023); Injection of anesthetic agent around medial branch nerves innervating cervical facet joint (Bilateral, 08/23/2023); Selective injection of anesthetic agent around lumbar spinal nerve root by transforaminal approach (Bilateral, 10/25/2023); radiofrequency ablation, facet joint, cervicothoracic (Left, 11/22/2023); Colonoscopy (N/A, 12/01/2023); radiofrequency ablation, facet joint, cervicothoracic (Right, 12/13/2023); egd, with closed biopsy (N/A, 06/28/2024); and Esophagogastroduodenoscopy (N/A, 6/28/2024).    FamHx: family history includes Alzheimer's disease in his father; Cataracts in his mother; Glaucoma in his mother; Stroke in his mother.    SocHx:  reports that he has never smoked. He has never been exposed to tobacco smoke. He has never used smokeless tobacco. He reports that he does not currently use alcohol. He reports that he does not use drugs.      Physical Exam:  Vitals:    07/24/25 1616   BP: 128/78   Pulse: 88     General: A&Ox3, no apparent distress, no deformities  Neck: No masses, normal thyroid  Lungs: normal inspiration, no use of accessory muscles  Heart: normal pulse, no arrhythmias  Abdomen: Soft, NT, ND, no masses, no hernias, no hepatosplenomegaly  Lymphatic: Neck and groin nodes negative  Skin: The skin is warm and dry. No jaundice.    Impression/Plan:    Erectile dysfunction  Reports a daily Cialis has not resolved his erectile dysfunction issues.  Was prescribed TriMix and given a follow-up appointment for teaching.    Chronic prostatitis  Complete resolution to all symptoms.

## 2025-07-25 RX ORDER — TRIAMCINOLONE ACETONIDE 1 MG/G
CREAM TOPICAL 2 TIMES DAILY
Qty: 30 G | Refills: 0 | Status: SHIPPED | OUTPATIENT
Start: 2025-07-25

## 2025-07-25 NOTE — TELEPHONE ENCOUNTER
Refill Encounter    PCP Visits: Recent Visits  Date Type Provider Dept   07/09/25 Office Visit Justo Malcolm PA-C [facility]   Showing recent visits within past 360 days and meeting all other requirements  Future Appointments  No visits were found meeting these conditions.  Showing future appointments within next 720 days and meeting all other requirements      Last 3 Blood Pressure:   BP Readings from Last 3 Encounters:   07/24/25 128/78   07/09/25 132/64   06/16/25 134/78     Preferred Pharmacy:   [facility] [address] [place] [address]  Phone: [phone] Fax: [phone]    Requested RX:  Requested Prescriptions     Pending Prescriptions Disp Refills    triamcinolone acetonide 0.1% (KENALOG) 0.1 % cream 30 g 0     Sig: Apply topically 2 (two) times daily.      RX Route: Normal

## 2025-08-01 ENCOUNTER — HOSPITAL ENCOUNTER (INPATIENT)
Facility: HOSPITAL | Age: 50
LOS: 2 days | Discharge: HOME | DRG: 638 | End: 2025-08-03
Attending: EMERGENCY MEDICINE | Admitting: INTERNAL MEDICINE
Payer: COMMERCIAL

## 2025-08-01 DIAGNOSIS — N20.0 NEPHROLITHIASIS: ICD-10-CM

## 2025-08-01 DIAGNOSIS — R06.02 SOB (SHORTNESS OF BREATH): ICD-10-CM

## 2025-08-01 DIAGNOSIS — E11.10 DIABETIC KETOACIDOSIS WITHOUT COMA ASSOCIATED WITH TYPE 2 DIABETES MELLITUS: Primary | ICD-10-CM

## 2025-08-01 DIAGNOSIS — K31.1 GASTRIC OUTLET OBSTRUCTION: ICD-10-CM

## 2025-08-01 DIAGNOSIS — N17.9 ACUTE KIDNEY INJURY: ICD-10-CM

## 2025-08-01 DIAGNOSIS — E11.10 DKA (DIABETIC KETOACIDOSIS): ICD-10-CM

## 2025-08-01 DIAGNOSIS — R73.9 HYPERGLYCEMIA: ICD-10-CM

## 2025-08-01 PROBLEM — I10 ESSENTIAL HYPERTENSION: Status: ACTIVE | Noted: 2025-08-01

## 2025-08-01 LAB
ACETONE SERPL QL SCN: NORMAL
ALBUMIN SERPL BCP-MCNC: 4.1 G/DL (ref 3.5–5)
ALBUMIN/GLOB SERPL: 0.9 {RATIO}
ALP SERPL-CCNC: 110 U/L (ref 40–150)
ALT SERPL W P-5'-P-CCNC: 15 U/L
ANION GAP SERPL CALCULATED.3IONS-SCNC: 14 MMOL/L (ref 7–16)
ANION GAP SERPL CALCULATED.3IONS-SCNC: 23 MMOL/L (ref 7–16)
ANION GAP SERPL CALCULATED.3IONS-SCNC: 24 MMOL/L (ref 7–16)
ANION GAP SERPL CALCULATED.3IONS-SCNC: 28 MMOL/L (ref 7–16)
AST SERPL W P-5'-P-CCNC: 15 U/L (ref 11–45)
BASOPHILS # BLD AUTO: 0.04 K/UL (ref 0–0.2)
BASOPHILS NFR BLD AUTO: 0.3 % (ref 0–1)
BILIRUB SERPL-MCNC: 0.3 MG/DL
BILIRUB UR QL STRIP: NEGATIVE
BUN SERPL-MCNC: 19 MG/DL (ref 8–26)
BUN SERPL-MCNC: 25 MG/DL (ref 8–26)
BUN SERPL-MCNC: 25 MG/DL (ref 8–26)
BUN SERPL-MCNC: 26 MG/DL (ref 8–26)
BUN/CREAT SERPL: 12 (ref 6–20)
BUN/CREAT SERPL: 14 (ref 6–20)
BUN/CREAT SERPL: 14 (ref 6–20)
BUN/CREAT SERPL: 16 (ref 6–20)
CALCIUM SERPL-MCNC: 8.7 MG/DL (ref 8.4–10.2)
CALCIUM SERPL-MCNC: 8.8 MG/DL (ref 8.4–10.2)
CALCIUM SERPL-MCNC: 8.9 MG/DL (ref 8.4–10.2)
CALCIUM SERPL-MCNC: 9.3 MG/DL (ref 8.4–10.2)
CHLORIDE SERPL-SCNC: 100 MMOL/L (ref 98–107)
CHLORIDE SERPL-SCNC: 107 MMOL/L (ref 98–107)
CHLORIDE SERPL-SCNC: 108 MMOL/L (ref 98–107)
CHLORIDE SERPL-SCNC: 113 MMOL/L (ref 98–107)
CLARITY UR: CLEAR
CO2 SERPL-SCNC: 12 MMOL/L (ref 22–29)
CO2 SERPL-SCNC: 5 MMOL/L (ref 22–29)
CO2 SERPL-SCNC: 7 MMOL/L (ref 22–29)
CO2 SERPL-SCNC: 7 MMOL/L (ref 22–29)
COLOR UR: COLORLESS
CREAT SERPL-MCNC: 1.36 MG/DL (ref 0.72–1.25)
CREAT SERPL-MCNC: 1.52 MG/DL (ref 0.72–1.25)
CREAT SERPL-MCNC: 1.75 MG/DL (ref 0.72–1.25)
CREAT SERPL-MCNC: 2.16 MG/DL (ref 0.72–1.25)
DIFFERENTIAL METHOD BLD: ABNORMAL
EGFR (NO RACE VARIABLE) (RUSH/TITUS): 36 ML/MIN/1.73M2
EGFR (NO RACE VARIABLE) (RUSH/TITUS): 46 ML/MIN/1.73M2
EGFR (NO RACE VARIABLE) (RUSH/TITUS): 55 ML/MIN/1.73M2
EGFR (NO RACE VARIABLE) (RUSH/TITUS): 63 ML/MIN/1.73M2
EOSINOPHIL # BLD AUTO: 0 K/UL (ref 0–0.5)
EOSINOPHIL NFR BLD AUTO: 0 % (ref 1–4)
ERYTHROCYTE [DISTWIDTH] IN BLOOD BY AUTOMATED COUNT: 12.4 % (ref 11.5–14.5)
GLOBULIN SER-MCNC: 4.6 G/DL (ref 2–4)
GLUCOSE SERPL-MCNC: 202 MG/DL (ref 74–100)
GLUCOSE SERPL-MCNC: 353 MG/DL (ref 74–100)
GLUCOSE SERPL-MCNC: 370 MG/DL (ref 70–105)
GLUCOSE SERPL-MCNC: 554 MG/DL (ref 74–100)
GLUCOSE SERPL-MCNC: 756 MG/DL (ref 74–100)
GLUCOSE UR STRIP-MCNC: >1000 MG/DL
HCO3 UR-SCNC: 3.9 MMOL/L (ref 24–28)
HCT VFR BLD AUTO: 46.2 % (ref 40–54)
HCT VFR BLD CALC: 49 % (ref 35–51)
HGB BLD-MCNC: 14.1 G/DL (ref 13.5–18)
IMM GRANULOCYTES # BLD AUTO: 0.14 K/UL (ref 0–0.04)
IMM GRANULOCYTES NFR BLD: 0.9 % (ref 0–0.4)
KETONES UR STRIP-SCNC: 150 MG/DL
LACTATE SERPL-SCNC: 1.3 MMOL/L (ref 0.5–2.2)
LACTATE SERPL-SCNC: 2.2 MMOL/L (ref 0.5–2.2)
LACTATE SERPL-SCNC: 3.2 MMOL/L (ref 0.5–2.2)
LACTATE SERPL-SCNC: 3.6 MMOL/L (ref 0.5–2.2)
LDH SERPL L TO P-CCNC: 4.3 MMOL/L (ref 0.3–1.2)
LEUKOCYTE ESTERASE UR QL STRIP: NEGATIVE
LYMPHOCYTES # BLD AUTO: 1.1 K/UL (ref 1–4.8)
LYMPHOCYTES NFR BLD AUTO: 7.2 % (ref 27–41)
MAGNESIUM SERPL-MCNC: 2.5 MG/DL (ref 1.6–2.6)
MCH RBC QN AUTO: 29.1 PG (ref 27–31)
MCHC RBC AUTO-ENTMCNC: 30.5 G/DL (ref 32–36)
MCV RBC AUTO: 95.3 FL (ref 80–96)
MONOCYTES # BLD AUTO: 0.64 K/UL (ref 0–0.8)
MONOCYTES NFR BLD AUTO: 4.2 % (ref 2–6)
MPC BLD CALC-MCNC: 10.8 FL (ref 9.4–12.4)
MUCOUS, UA: ABNORMAL /LPF
NEUTROPHILS # BLD AUTO: 13.27 K/UL (ref 1.8–7.7)
NEUTROPHILS NFR BLD AUTO: 87.4 % (ref 53–65)
NITRITE UR QL STRIP: NEGATIVE
NRBC # BLD AUTO: 0 X10E3/UL
NRBC, AUTO (.00): 0 %
NT-PROBNP SERPL-MCNC: 117 PG/ML (ref 1–125)
PCO2 BLDA: 21 MMHG (ref 41–51)
PH SMN: 6.88 [PH] (ref 7.32–7.42)
PH UR STRIP: 5.5 PH UNITS
PHOSPHATE SERPL-MCNC: 6.8 MG/DL (ref 2.3–4.7)
PLATELET # BLD AUTO: 289 K/UL (ref 150–400)
PO2 BLDA: 54 MMHG (ref 25–40)
POC BASE EXCESS: -28.5 MMOL/L (ref -2–3)
POC CO2: 4.5 MMOL/L
POC IONIZED CALCIUM: 1.33 MMOL/L (ref 1.15–1.35)
POC SATURATED O2: 57 % (ref 40–70)
POCT GLUCOSE: >500 MG/DL (ref 60–95)
POTASSIUM BLD-SCNC: 5.7 MMOL/L (ref 3.4–4.5)
POTASSIUM SERPL-SCNC: 3.6 MMOL/L (ref 3.5–5.1)
POTASSIUM SERPL-SCNC: 4.5 MMOL/L (ref 3.5–5.1)
POTASSIUM SERPL-SCNC: 5.5 MMOL/L (ref 3.5–5.1)
POTASSIUM SERPL-SCNC: 5.6 MMOL/L (ref 3.5–5.1)
PROT SERPL-MCNC: 8.7 G/DL (ref 6.4–8.3)
PROT UR QL STRIP: 50
RBC # BLD AUTO: 4.85 M/UL (ref 4.6–6.2)
RBC # UR STRIP: ABNORMAL /UL
RBC #/AREA URNS HPF: 1 /HPF
SODIUM BLD-SCNC: 126 MMOL/L (ref 136–145)
SODIUM SERPL-SCNC: 127 MMOL/L (ref 136–145)
SODIUM SERPL-SCNC: 131 MMOL/L (ref 136–145)
SODIUM SERPL-SCNC: 134 MMOL/L (ref 136–145)
SODIUM SERPL-SCNC: 135 MMOL/L (ref 136–145)
SP GR UR STRIP: 1.02
TROPONIN I SERPL HS-MCNC: 9.4 NG/L
TROPONIN I SERPL HS-MCNC: <2.7 NG/L
UROBILINOGEN UR STRIP-ACNC: NORMAL MG/DL
WBC # BLD AUTO: 15.19 K/UL (ref 4.5–11)
WBC #/AREA URNS HPF: <1 /HPF

## 2025-08-01 PROCEDURE — 82947 ASSAY GLUCOSE BLOOD QUANT: CPT

## 2025-08-01 PROCEDURE — 82803 BLOOD GASES ANY COMBINATION: CPT

## 2025-08-01 PROCEDURE — 63600175 PHARM REV CODE 636 W HCPCS

## 2025-08-01 PROCEDURE — 96374 THER/PROPH/DIAG INJ IV PUSH: CPT

## 2025-08-01 PROCEDURE — 96361 HYDRATE IV INFUSION ADD-ON: CPT

## 2025-08-01 PROCEDURE — 84484 ASSAY OF TROPONIN QUANT: CPT

## 2025-08-01 PROCEDURE — 84100 ASSAY OF PHOSPHORUS: CPT | Performed by: EMERGENCY MEDICINE

## 2025-08-01 PROCEDURE — 81003 URINALYSIS AUTO W/O SCOPE: CPT | Performed by: EMERGENCY MEDICINE

## 2025-08-01 PROCEDURE — 25000003 PHARM REV CODE 250: Performed by: EMERGENCY MEDICINE

## 2025-08-01 PROCEDURE — 96376 TX/PRO/DX INJ SAME DRUG ADON: CPT

## 2025-08-01 PROCEDURE — 83605 ASSAY OF LACTIC ACID: CPT | Performed by: EMERGENCY MEDICINE

## 2025-08-01 PROCEDURE — S5010 5% DEXTROSE AND 0.45% SALINE: HCPCS

## 2025-08-01 PROCEDURE — 82330 ASSAY OF CALCIUM: CPT

## 2025-08-01 PROCEDURE — 63600175 PHARM REV CODE 636 W HCPCS: Performed by: STUDENT IN AN ORGANIZED HEALTH CARE EDUCATION/TRAINING PROGRAM

## 2025-08-01 PROCEDURE — 20000000 HC ICU ROOM

## 2025-08-01 PROCEDURE — 83605 ASSAY OF LACTIC ACID: CPT

## 2025-08-01 PROCEDURE — 96375 TX/PRO/DX INJ NEW DRUG ADDON: CPT

## 2025-08-01 PROCEDURE — 25000003 PHARM REV CODE 250

## 2025-08-01 PROCEDURE — 94799 UNLISTED PULMONARY SVC/PX: CPT

## 2025-08-01 PROCEDURE — 87040 BLOOD CULTURE FOR BACTERIA: CPT

## 2025-08-01 PROCEDURE — 85025 COMPLETE CBC W/AUTO DIFF WBC: CPT | Performed by: EMERGENCY MEDICINE

## 2025-08-01 PROCEDURE — 82009 KETONE BODYS QUAL: CPT | Performed by: EMERGENCY MEDICINE

## 2025-08-01 PROCEDURE — 84132 ASSAY OF SERUM POTASSIUM: CPT

## 2025-08-01 PROCEDURE — 99291 CRITICAL CARE FIRST HOUR: CPT

## 2025-08-01 PROCEDURE — 84295 ASSAY OF SERUM SODIUM: CPT

## 2025-08-01 PROCEDURE — 83735 ASSAY OF MAGNESIUM: CPT | Performed by: EMERGENCY MEDICINE

## 2025-08-01 PROCEDURE — 99900035 HC TECH TIME PER 15 MIN (STAT)

## 2025-08-01 PROCEDURE — 99222 1ST HOSP IP/OBS MODERATE 55: CPT | Mod: ,,,

## 2025-08-01 PROCEDURE — 82962 GLUCOSE BLOOD TEST: CPT

## 2025-08-01 PROCEDURE — 63600175 PHARM REV CODE 636 W HCPCS: Performed by: EMERGENCY MEDICINE

## 2025-08-01 PROCEDURE — 36415 COLL VENOUS BLD VENIPUNCTURE: CPT

## 2025-08-01 PROCEDURE — 25000003 PHARM REV CODE 250: Performed by: INTERNAL MEDICINE

## 2025-08-01 PROCEDURE — 80053 COMPREHEN METABOLIC PANEL: CPT | Performed by: EMERGENCY MEDICINE

## 2025-08-01 PROCEDURE — 83880 ASSAY OF NATRIURETIC PEPTIDE: CPT | Performed by: EMERGENCY MEDICINE

## 2025-08-01 PROCEDURE — 85014 HEMATOCRIT: CPT

## 2025-08-01 PROCEDURE — 93005 ELECTROCARDIOGRAM TRACING: CPT

## 2025-08-01 RX ORDER — ACETAMINOPHEN 325 MG/1
650 TABLET ORAL EVERY 4 HOURS PRN
Status: DISCONTINUED | OUTPATIENT
Start: 2025-08-01 | End: 2025-08-03 | Stop reason: HOSPADM

## 2025-08-01 RX ORDER — MORPHINE SULFATE 4 MG/ML
4 INJECTION, SOLUTION INTRAMUSCULAR; INTRAVENOUS
Refills: 0 | Status: COMPLETED | OUTPATIENT
Start: 2025-08-01 | End: 2025-08-01

## 2025-08-01 RX ORDER — ONDANSETRON HYDROCHLORIDE 2 MG/ML
4 INJECTION, SOLUTION INTRAVENOUS EVERY 6 HOURS PRN
Status: DISCONTINUED | OUTPATIENT
Start: 2025-08-01 | End: 2025-08-03 | Stop reason: HOSPADM

## 2025-08-01 RX ORDER — DEXTROSE MONOHYDRATE AND SODIUM CHLORIDE 5; .45 G/100ML; G/100ML
125 INJECTION, SOLUTION INTRAVENOUS CONTINUOUS PRN
Status: DISCONTINUED | OUTPATIENT
Start: 2025-08-01 | End: 2025-08-03 | Stop reason: HOSPADM

## 2025-08-01 RX ORDER — INDOMETHACIN 25 MG/1
50 CAPSULE ORAL ONCE
Status: COMPLETED | OUTPATIENT
Start: 2025-08-01 | End: 2025-08-01

## 2025-08-01 RX ORDER — CEFTRIAXONE 1 G/1
1 INJECTION, POWDER, FOR SOLUTION INTRAMUSCULAR; INTRAVENOUS
Status: DISCONTINUED | OUTPATIENT
Start: 2025-08-01 | End: 2025-08-03 | Stop reason: HOSPADM

## 2025-08-01 RX ORDER — MORPHINE SULFATE 2 MG/ML
2 INJECTION, SOLUTION INTRAMUSCULAR; INTRAVENOUS
Refills: 0 | Status: COMPLETED | OUTPATIENT
Start: 2025-08-01 | End: 2025-08-01

## 2025-08-01 RX ORDER — HYDROMORPHONE HYDROCHLORIDE 2 MG/ML
0.5 INJECTION, SOLUTION INTRAMUSCULAR; INTRAVENOUS; SUBCUTANEOUS
Status: DISCONTINUED | OUTPATIENT
Start: 2025-08-01 | End: 2025-08-01

## 2025-08-01 RX ORDER — SODIUM CHLORIDE 0.9 % (FLUSH) 0.9 %
10 SYRINGE (ML) INJECTION
Status: DISCONTINUED | OUTPATIENT
Start: 2025-08-01 | End: 2025-08-03 | Stop reason: HOSPADM

## 2025-08-01 RX ORDER — METOCLOPRAMIDE HYDROCHLORIDE 5 MG/ML
10 INJECTION INTRAMUSCULAR; INTRAVENOUS EVERY 6 HOURS
Status: DISCONTINUED | OUTPATIENT
Start: 2025-08-01 | End: 2025-08-03 | Stop reason: HOSPADM

## 2025-08-01 RX ORDER — SODIUM CHLORIDE 9 MG/ML
125 INJECTION, SOLUTION INTRAVENOUS CONTINUOUS
Status: DISCONTINUED | OUTPATIENT
Start: 2025-08-01 | End: 2025-08-03

## 2025-08-01 RX ORDER — HEPARIN SODIUM 5000 [USP'U]/ML
5000 INJECTION, SOLUTION INTRAVENOUS; SUBCUTANEOUS
Status: DISCONTINUED | OUTPATIENT
Start: 2025-08-01 | End: 2025-08-03 | Stop reason: HOSPADM

## 2025-08-01 RX ORDER — MUPIROCIN 20 MG/G
OINTMENT TOPICAL 2 TIMES DAILY
Status: DISCONTINUED | OUTPATIENT
Start: 2025-08-01 | End: 2025-08-03 | Stop reason: HOSPADM

## 2025-08-01 RX ORDER — PANTOPRAZOLE SODIUM 40 MG/10ML
40 INJECTION, POWDER, LYOPHILIZED, FOR SOLUTION INTRAVENOUS DAILY
Status: DISCONTINUED | OUTPATIENT
Start: 2025-08-01 | End: 2025-08-03

## 2025-08-01 RX ORDER — ONDANSETRON HYDROCHLORIDE 2 MG/ML
4 INJECTION, SOLUTION INTRAVENOUS
Status: COMPLETED | OUTPATIENT
Start: 2025-08-01 | End: 2025-08-01

## 2025-08-01 RX ADMIN — MUPIROCIN: 20 OINTMENT TOPICAL at 08:08

## 2025-08-01 RX ADMIN — MORPHINE SULFATE 4 MG: 4 INJECTION INTRAVENOUS at 02:08

## 2025-08-01 RX ADMIN — ONDANSETRON 4 MG: 2 INJECTION INTRAMUSCULAR; INTRAVENOUS at 02:08

## 2025-08-01 RX ADMIN — PANTOPRAZOLE SODIUM 40 MG: 40 INJECTION, POWDER, FOR SOLUTION INTRAVENOUS at 06:08

## 2025-08-01 RX ADMIN — SODIUM CHLORIDE 125 ML/HR: 9 INJECTION, SOLUTION INTRAVENOUS at 05:08

## 2025-08-01 RX ADMIN — METOCLOPRAMIDE 10 MG: 5 INJECTION, SOLUTION INTRAMUSCULAR; INTRAVENOUS at 11:08

## 2025-08-01 RX ADMIN — HEPARIN SODIUM 5000 UNITS: 5000 INJECTION, SOLUTION INTRAVENOUS; SUBCUTANEOUS at 05:08

## 2025-08-01 RX ADMIN — SODIUM CHLORIDE 2000 ML: 9 INJECTION, SOLUTION INTRAVENOUS at 02:08

## 2025-08-01 RX ADMIN — CEFTRIAXONE SODIUM 1 G: 1 INJECTION, POWDER, FOR SOLUTION INTRAMUSCULAR; INTRAVENOUS at 06:08

## 2025-08-01 RX ADMIN — MORPHINE SULFATE 2 MG: 2 INJECTION, SOLUTION INTRAMUSCULAR; INTRAVENOUS at 02:08

## 2025-08-01 RX ADMIN — METOCLOPRAMIDE 10 MG: 5 INJECTION, SOLUTION INTRAMUSCULAR; INTRAVENOUS at 06:08

## 2025-08-01 RX ADMIN — DEXTROSE AND SODIUM CHLORIDE 125 ML/HR: 5; 450 INJECTION, SOLUTION INTRAVENOUS at 09:08

## 2025-08-01 RX ADMIN — INSULIN HUMAN 0.1 UNITS/KG/HR: 1 INJECTION, SOLUTION INTRAVENOUS at 05:08

## 2025-08-01 RX ADMIN — SODIUM BICARBONATE 50 MEQ: 84 INJECTION, SOLUTION INTRAVENOUS at 04:08

## 2025-08-01 RX ADMIN — HUMAN INSULIN 10 UNITS: 100 INJECTION, SOLUTION SUBCUTANEOUS at 02:08

## 2025-08-01 RX ADMIN — ONDANSETRON 4 MG: 2 INJECTION INTRAMUSCULAR; INTRAVENOUS at 08:08

## 2025-08-01 NOTE — ASSESSMENT & PLAN NOTE
"Patient has Systolic (HFrEF) heart failure that is Chronic. On presentation their CHF was well compensated. Most recent BNP and echo results are listed below.  No results for input(s): "BNP" in the last 72 hours.  Latest ECHO  Results for orders placed during the hospital encounter of 08/22/24    Echo    Interpretation Summary    Left Ventricle: The left ventricle is normal in size. Normal wall thickness. There is normal systolic function with a visually estimated ejection fraction of 60 - 65%. There is normal diastolic function.    Right Ventricle: Normal right ventricular cavity size. Wall thickness is normal. Systolic function is normal.    IVC/SVC: Normal venous pressure at 3 mmHg.    Current Heart Failure Medications       Plan  - Monitor strict I&Os and daily weights.    - Place on telemetry  - Low sodium diet  - Place on fluid restriction of none for now, being volume resuscitated for dehydration.   - Cardiology has not been consulted  - The patient's volume status is at their baseline  - received 2 L NS, now on maintenance IVF           "

## 2025-08-01 NOTE — CONSULTS
Consult received and appreciated. Consult for carbohydrate controlled diet education. Patient is currently NPO in ED w/ limited medical history available. Will provide on follow-up as appropriate.

## 2025-08-01 NOTE — ASSESSMENT & PLAN NOTE
Likely 2/2 dehydration in the setting of DKA-- S Cr 2.16 with a baseline in 2/2025 of 1.4  - NS x 2 L  - continue to hydrate with IVF per DKA protocol  - serial BMP to monitor renal function & electrolytes  - avoid nephrotoxic agents & renally dose medications  - strict I&O

## 2025-08-01 NOTE — ED PROVIDER NOTES
Encounter Date: 8/1/2025    SCRIBE #1 NOTE: IDaily, am scribing for, and in the presence of,  Go Cruz MD.       History     Chief Complaint   Patient presents with    Shortness of Breath     Patient presents to er with complaint of shortness of breath and bilateral flank pain. Hx of chf     This 50 y.o. male pt presents to the ED with c/o SOB and Abdominal pain. The pt reports having abdominal cramping and severe SOB. Pt and his family has been traveling for the past week. Pt said he has not been taking his Insulin for the past week for his traveling out of the [facility]. Pt has Mhx of CHF, DM and HTN. Pt is having flank pain and trouble breathing while here in the ED.     The history is provided by the patient and the spouse. No  was used.     Review of patient's allergies indicates:  No Known Allergies  Past Medical History:   Diagnosis Date    CHF (congestive heart failure)     Diabetes mellitus     Essential (primary) hypertension     Hypogonadism in male      Past Surgical History:   Procedure Laterality Date    COLONOSCOPY N/A 12/01/2023    Procedure: COLONOSCOPY;  Surgeon: Coral White MD;  Location: [facility];  Service: Endoscopy;  Laterality: N/A;    EGD, WITH CLOSED BIOPSY N/A 06/28/2024    ESOPHAGOGASTRODUODENOSCOPY N/A 6/28/2024    Procedure: EGD (ESOPHAGOGASTRODUODENOSCOPY);  Surgeon: Tereza Mcguire MD;  Location: [facility];  Service: Endoscopy;  Laterality: N/A;    INJECTION OF ANESTHETIC AGENT AROUND MEDIAL BRANCH NERVES INNERVATING CERVICAL FACET JOINT Bilateral 06/07/2023    Procedure: Bilateral C3-5 MBB with local;  Surgeon: Tony Garcia MD;  Location: [facility];  Service: Pain Management;  Laterality: Bilateral;    INJECTION OF ANESTHETIC AGENT AROUND MEDIAL BRANCH NERVES INNERVATING CERVICAL FACET JOINT Bilateral 08/23/2023    Procedure: Bilateral C3-5 MBB  second diagnostic block with RN IV sedation;  Surgeon: Tony Garcia MD;  Location:  [facility];  Service: Pain Management;  Laterality: Bilateral;    NOSE SURGERY      RADIOFREQUENCY ABLATION, FACET JOINT, CERVICOTHORACIC Left 11/22/2023    Procedure: C3-4 and C 4-5 Facet RFA;  Surgeon: Tony Garcia MD;  Location: [facility];  Service: Pain Management;  Laterality: Left;  To be done 2-3 weeks after the left side.    RADIOFREQUENCY ABLATION, FACET JOINT, CERVICOTHORACIC Right 12/13/2023    Procedure: C3-4 and C 4-5 Facet RFA;  Surgeon: Tony Garcia MD;  Location: [facility];  Service: Pain Management;  Laterality: Right;    SELECTIVE INJECTION OF ANESTHETIC AGENT AROUND LUMBAR SPINAL NERVE ROOT BY TRANSFORAMINAL APPROACH Bilateral 05/24/2023    Procedure: Bilateral L5 TF GARY with local;  Surgeon: Tony Garcia MD;  Location: [facility];  Service: Pain Management;  Laterality: Bilateral;    SELECTIVE INJECTION OF ANESTHETIC AGENT AROUND LUMBAR SPINAL NERVE ROOT BY TRANSFORAMINAL APPROACH Bilateral 10/25/2023    Procedure: Bilateral L5/S1 TF GARY;  Surgeon: Aric Muro MD;  Location: [facility];  Service: Pain Management;  Laterality: Bilateral;     Family History   Problem Relation Name Age of Onset    Stroke Mother      Glaucoma Mother      Cataracts Mother      Alzheimer's disease Father       Social History[1]  Review of Systems   Constitutional:  Negative for activity change, chills, diaphoresis and fever.   HENT:  Negative for congestion, drooling, ear pain, rhinorrhea, sneezing, sore throat and trouble swallowing.    Eyes:  Negative for pain.   Respiratory:  Positive for shortness of breath. Negative for cough, chest tightness, wheezing and stridor.    Cardiovascular:  Negative for chest pain, palpitations and leg swelling.   Gastrointestinal:  Positive for abdominal pain. Negative for abdominal distention, constipation, diarrhea, nausea and vomiting.   Genitourinary:  Positive for flank pain. Negative for difficulty urinating, dysuria, frequency and urgency.    Musculoskeletal:  Negative for arthralgias, back pain, myalgias, neck pain and neck stiffness.   Skin:  Negative for pallor, rash and wound.   Neurological:  Negative for dizziness, syncope, weakness, light-headedness, numbness and headaches.   All other systems reviewed and are negative.      Physical Exam     Initial Vitals   BP Pulse Resp Temp SpO2   08/01/25 1345 08/01/25 1345 08/01/25 1345 08/01/25 1403 08/01/25 1345   (!) 165/75 (!) 130 (!) 34 98.1 °F (36.7 °C) (!) 93 %      MAP       --                Physical Exam    Nursing note and vitals reviewed.  Constitutional: He appears well-developed. He is diaphoretic. He appears distressed.   HENT:   Head: Normocephalic and atraumatic.   Eyes: EOM are normal. Pupils are equal, round, and reactive to light.   Neck: Neck supple. No thyromegaly present. No JVD present.   Normal range of motion.  Cardiovascular:  Regular rhythm, normal heart sounds and intact distal pulses.   Tachycardia present.         No murmur heard.  Pulmonary/Chest: Breath sounds normal. No stridor. No respiratory distress. He has no wheezes.   Abdominal: Abdomen is soft. Bowel sounds are normal. He exhibits no distension. There is abdominal tenderness.   Musculoskeletal:         General: No tenderness or edema. Normal range of motion.      Cervical back: Normal range of motion and neck supple.     Lymphadenopathy:     He has no cervical adenopathy.   Neurological: He is alert and oriented to person, place, and time. He has normal strength. No cranial nerve deficit or sensory deficit. GCS score is 15. GCS eye subscore is 4. GCS verbal subscore is 5. GCS motor subscore is 6.   Skin: Skin is warm. Capillary refill takes less than 2 seconds. No rash noted.   Psychiatric: He has a normal mood and affect.         ED Course   Procedures  Labs Reviewed   COMPREHENSIVE METABOLIC PANEL - Abnormal       Result Value    Sodium 127 (*)     Potassium 5.6 (*)     Chloride 100      CO2 5 (*)     Anion Gap 28  (*)     Glucose 756 (*)     BUN 26      Creatinine 2.16 (*)     BUN/Creatinine Ratio 12      Calcium 9.3      Total Protein 8.7 (*)     Albumin 4.1      Globulin 4.6 (*)     A/G Ratio 0.9      Bilirubin, Total 0.3      Alk Phos 110      ALT 15      AST 15      eGFR 36 (*)    URINALYSIS, REFLEX TO URINE CULTURE - Abnormal    Color, UA Colorless      Clarity, UA Clear      pH, UA 5.5      Leukocytes, UA Negative      Nitrites, UA Negative      Protein, UA 50 (*)     Glucose, UA >1000 (*)     Ketones,  (*)     Urobilinogen, UA Normal      Bilirubin, UA Negative      Blood, UA Trace (*)     Specific [place], UA 1.025     PHOSPHORUS - Abnormal    Phosphorus 6.8 (*)    LACTIC ACID, PLASMA - Abnormal    Lactic Acid 3.6 (*)    CBC WITH DIFFERENTIAL - Abnormal    WBC 15.19 (*)     RBC 4.85      Hemoglobin 14.1      Hematocrit 46.2      MCV 95.3      MCH 29.1      MCHC 30.5 (*)     RDW 12.4      Platelet Count 289      MPV 10.8      Neutrophils % 87.4 (*)     Lymphocytes % 7.2 (*)     Monocytes % 4.2      Eosinophils % 0.0 (*)     Basophils % 0.3      Immature Granulocytes % 0.9 (*)     nRBC, Auto 0.0      Neutrophils, Abs 13.27 (*)     Lymphocytes, Absolute 1.10      Monocytes, Absolute 0.64      Eosinophils, Absolute 0.00      Basophils, Absolute 0.04      Immature Granulocytes, Absolute 0.14 (*)     nRBC, Absolute 0.00      Diff Type Auto     URINALYSIS, MICROSCOPIC - Abnormal    WBC, UA <1      RBC, UA 1      Mucous Occasional (*)    NT-PRO NATRIURETIC PEPTIDE - Normal    ProBNP 117     MAGNESIUM - Normal    Magnesium 2.5     LACTIC ACID, PLASMA - Normal    Lactic Acid 2.2     CULTURE, BLOOD   CULTURE, BLOOD   ACETONE    Acetone Positive - Moderate     CBC W/ AUTO DIFFERENTIAL    Narrative:     The following orders were created for panel order CBC auto differential.  Procedure                               Abnormality         Status                     ---------                               -----------          ------                     CBC with Differential[[id number]]       Abnormal            Final result                 Please view results for these tests on the individual orders.   POCT GLUCOSE MONITORING CONTINUOUS     EKG Readings: (Independently Interpreted)   Heart Rate: 128 bpm.     Sinus tachycardia  Possible right atrial abnormality  Inferior T wave abnormality is nonspecific  Borderline ECG       Imaging Results              CT Renal Stone Study ABD Pelvis WO (Final result)  Result time 08/01/25 16:59:46      Final result by Sixto Hamilton MD (08/01/25 16:59:46)                   Impression:      Right-sided nonobstructing nephrolithiasis.  No evidence of obstructive uropathy.    Marked distention of the stomach.  The findings may reflect evolving gastric outlet obstruction.  No obstructing lesion identified.    Marked distention of the urinary bladder.    Additional findings as above.      Electronically signed by: Sixto Hamilton MD  Date:    08/01/2025  Time:    16:59               Narrative:    EXAMINATION:  CT RENAL STONE STUDY ABD PELVIS WO    CLINICAL HISTORY:  Flank pain, kidney stone suspected;    TECHNIQUE:  Axial CT scan of the abdomen and pelvis was obtained from the level of the hemidiaphragms to the pubic symphysis without intravenous contrast. Coronal and sagittal reformats were obtained.    COMPARISON:  5/2/24.    FINDINGS:  There are no pleural effusions.  There is no evidence of a pneumothorax.  No airspace opacity is present.  No pulmonary nodules identified.    The heart is unremarkable.  There is normal tapering of the abdominal aorta.  There are minimal calcifications along the course of the abdominal aorta and its branch vessels.    There is no evidence of lymphadenopathy in the abdomen or pelvis.    There is fluid within the esophagus.  There is marked distention of the stomach.  The duodenum is within normal limits.  The small bowel loops are unremarkable.  The appendix is within normal  "limits.  There is large colonic stool burden.    The liver is unremarkable.  The gallbladder is within normal limits.  The biliary tree is unremarkable.  The spleen is unremarkable.  The pancreas is within normal limits.  The adrenal glands are unremarkable.    There is mild bilateral perinephric stranding.  There is a punctate nonobstructing calcification in the right kidney.  No additional nephrolithiasis identified.  The ureters are unremarkable.  The urinary bladder is distended.  The prostate gland is within normal limits.    There is no evidence of free fluid in the abdomen or pelvis.  There is no evidence of free air.  There is no evidence of pneumatosis.  No portal venous air is identified.    The psoas margins are unremarkable.  There is an umbilical hernia containing omental fat.  There are bilateral fat containing inguinal hernias.    The osseous structures are unremarkable.                                       X-Ray Chest AP (Final result)  Result time 08/01/25 14:17:23      Final result by Ashvin Jones MD (08/01/25 14:17:23)                   Impression:      No acute cardiopulmonary finding identified on this single view.      Electronically signed by: Ashvin Jones MD  Date:    08/01/2025  Time:    14:17               Narrative:    EXAMINATION:  XR CHEST AP PORTABLE    CLINICAL HISTORY:  Provided history is "sob;  ".    TECHNIQUE:  One view of the chest.    COMPARISON:  04/17/2024.    FINDINGS:  Cardiac wires overlie the chest.  Cardiac silhouette is not enlarged.  No focal consolidation.  No sizable pleural effusion.  No pneumothorax.  Nonspecific punctate calcifications overlying the lateral aspect of the right proximal humerus.                                       Medications   sodium chloride 0.9% flush 10 mL (has no administration in time range)   0.9% NaCl infusion (0 mL/hr Intravenous Stopped 8/1/25 2113)   dextrose 5 % and 0.45 % NaCl infusion ( Intravenous Verify Only 8/2/25 0608) "   dextrose 50% injection 25 g (has no administration in time range)   dextrose 50% injection 12.5 g (has no administration in time range)   ondansetron injection 4 mg (4 mg Intravenous Given 8/1/25 2039)   acetaminophen tablet 650 mg (has no administration in time range)   heparin (porcine) injection 5,000 Units (5,000 Units Subcutaneous Given 8/2/25 0210)   insulin regular in 0.9 % NaCl 100 unit/100 mL (1 unit/mL) infusion (0.05 Units/kg/hr × 62.6 kg Intravenous Verify Only 8/2/25 0608)   metoclopramide injection 10 mg (10 mg Intravenous Given 8/2/25 0603)   mupirocin 2 % ointment ( Nasal Given 8/1/25 2034)   cefTRIAXone injection 1 g (1 g Intravenous Given 8/1/25 1830)   pantoprazole injection 40 mg (40 mg Intravenous Given 8/1/25 1841)   morphine injection 2 mg (2 mg Intravenous Given 8/1/25 1416)   ondansetron injection 4 mg (4 mg Intravenous Given 8/1/25 1417)   sodium chloride 0.9% bolus 2,000 mL 2,000 mL (0 mLs Intravenous Stopped 8/1/25 1518)   insulin regular injection 10 Units 0.1 mL (10 Units Intravenous Given 8/1/25 1435)   morphine injection 4 mg (4 mg Intravenous Given 8/1/25 1438)   sodium bicarbonate solution 50 mEq (50 mEq Intravenous Given 8/1/25 1619)     Medical Decision Making  Pt presents to er with complaint of shortness of breath and bilateral flank pain. Hx of CHF    Amount and/or Complexity of Data Reviewed  Labs: ordered. Decision-making details documented in ED Course.  Radiology: ordered.    Risk  OTC drugs.  Prescription drug management.  Decision regarding hospitalization.  Risk Details: Severe DKA.    Critical Care  Total time providing critical care: 45 minutes              Attending Attestation:           Physician Attestation for Scribe:  Physician Attestation Statement for Scribe #1: I, Go Cruz MD, reviewed documentation, as scribed by Daily Milligan in my presence, and it is both accurate and complete.             ED Course as of 08/02/25 0617   Fri Aug 01, 2025   0444  POCT Glucose(!!): >500 [CM]   1601 Patient is not taking his insulin has been outside exerting himself following vacation in [place].  Am not thinking patient has not infection.  No associated fever.  Has had some abdominal pain and cramping.  CT stone protocol pending.   [PK]   1812 Discussed case with general surgeon and critical care.  Patient with enlarged stomach on CT.  Patient was tolerating p.o. well earlier in the ED course but complaining of generalized abdominal pain.  Lactic acid improving with IV fluids attributing to dehydration and DKA [PK]      ED Course User Index  [CM] Daily Milligan  [PK] Go Cruz MD                               Clinical Impression:  Final diagnoses:  [R06.02] SOB (shortness of breath)  [R73.9] Hyperglycemia  [E11.10] DKA (diabetic ketoacidosis)  [K31.1] Gastric outlet obstruction  [N17.9] Acute kidney injury  [N20.0] Nephrolithiasis          ED Disposition Condition    Admit                       [1]   Social History  Tobacco Use    Smoking status: Never     Passive exposure: Never    Smokeless tobacco: Never   Substance Use Topics    Alcohol use: Not Currently     Comment: socially    Drug use: No        Go Cruz MD  08/02/25 0641

## 2025-08-01 NOTE — H&P
[facility] - Emergency Department  Critical Care Medicine  History & Physical    Patient Name: Jason Sandra  MRN: [id number]  Admission Date: 8/1/2025  Hospital Length of Stay: 0 days  Code Status: Full Code  Attending Physician: Brad Padilla MD   Primary Care Provider: Luan Lazar MD   Principal Problem: Diabetic ketoacidosis without coma associated with type 2 diabetes mellitus    Subjective:     HPI:  50 y.o. male with PMHx of CHF, type 2 DM, & essential HTN presented to [facility] ED with c/o SOB, abdominal pain, & bilateral flank pain. The pt reports having abdominal cramping and severe SOB. Pt and his family has been traveling for the past week. Pt said he has not been taking his Insulin for the past week for his traveling out of the state. Pt is having flank pain and trouble breathing while here in the ED. Critical care service was asked to admit patient for DKA that is requiring an insulin infusion.    In the ED, patient received Humulin R 10 units IV, NS x 2 liters, CXR showed no acute cardiopulmonary findings, CT Renal Stone Study & abd/pelvis showed Right-sided nonobstructing nephrolithiasis.  No evidence of obstructive uropathy. Marked distention of the stomach.  The findings may reflect evolving gastric outlet obstruction.  No obstructing lesion identified.   Marked distention of the urinary bladder. Bladder scan done & showing about 800 ml of urine, mohr will be placed if patient unable to void. Urinalysis collected. Labs reviewed, CO2 was noted to be 5, sodium bicarbonate 1 amp given IVP.     Hospital/ICU Course:  No notes on file     Past Medical History:   Diagnosis Date    CHF (congestive heart failure)     Diabetes mellitus     Essential (primary) hypertension     Hypogonadism in male        Past Surgical History:   Procedure Laterality Date    COLONOSCOPY N/A 12/01/2023    Procedure: COLONOSCOPY;  Surgeon: Coral White MD;  Location: [facility];  Service:  Endoscopy;  Laterality: N/A;    EGD, WITH CLOSED BIOPSY N/A 06/28/2024    ESOPHAGOGASTRODUODENOSCOPY N/A 6/28/2024    Procedure: EGD (ESOPHAGOGASTRODUODENOSCOPY);  Surgeon: Tereza Mcguire MD;  Location: [facility] ENDO;  Service: Endoscopy;  Laterality: N/A;    INJECTION OF ANESTHETIC AGENT AROUND MEDIAL BRANCH NERVES INNERVATING CERVICAL FACET JOINT Bilateral 06/07/2023    Procedure: Bilateral C3-5 MBB with local;  Surgeon: Tony Garcia MD;  Location: [facility] PAIN MGT;  Service: Pain Management;  Laterality: Bilateral;    INJECTION OF ANESTHETIC AGENT AROUND MEDIAL BRANCH NERVES INNERVATING CERVICAL FACET JOINT Bilateral 08/23/2023    Procedure: Bilateral C3-5 MBB  second diagnostic block with RN IV sedation;  Surgeon: Toyn Garcia MD;  Location: [facility] PAIN MGT;  Service: Pain Management;  Laterality: Bilateral;    NOSE SURGERY      RADIOFREQUENCY ABLATION, FACET JOINT, CERVICOTHORACIC Left 11/22/2023    Procedure: C3-4 and C 4-5 Facet RFA;  Surgeon: Tony Garcia MD;  Location: [facility] PAIN MGT;  Service: Pain Management;  Laterality: Left;  To be done 2-3 weeks after the left side.    RADIOFREQUENCY ABLATION, FACET JOINT, CERVICOTHORACIC Right 12/13/2023    Procedure: C3-4 and C 4-5 Facet RFA;  Surgeon: Tony Garcia MD;  Location: [facility] PAIN MGT;  Service: Pain Management;  Laterality: Right;    SELECTIVE INJECTION OF ANESTHETIC AGENT AROUND LUMBAR SPINAL NERVE ROOT BY TRANSFORAMINAL APPROACH Bilateral 05/24/2023    Procedure: Bilateral L5 TF GARY with local;  Surgeon: Tony Garcia MD;  Location: [facility] PAIN MGT;  Service: Pain Management;  Laterality: Bilateral;    SELECTIVE INJECTION OF ANESTHETIC AGENT AROUND LUMBAR SPINAL NERVE ROOT BY TRANSFORAMINAL APPROACH Bilateral 10/25/2023    Procedure: Bilateral L5/S1 TF GARY;  Surgeon: Aric Muro MD;  Location: [facility] PAIN [facility]GT;  Service: Pain Management;  Laterality: Bilateral;       Review of patient's allergies indicates:  No Known Allergies    Family History        Problem Relation (Age of Onset)    Alzheimer's disease Father    Cataracts Mother    Glaucoma Mother    Stroke Mother          Tobacco Use    Smoking status: Never     Passive exposure: Never    Smokeless tobacco: Never   Substance and Sexual Activity    Alcohol use: Not Currently     Comment: socially    Drug use: No    Sexual activity: Yes     Partners: Female      Review of Systems   Respiratory:  Positive for shortness of breath. Negative for chest tightness.    Cardiovascular:  Negative for chest pain and leg swelling.   Gastrointestinal:  Positive for abdominal pain. Negative for nausea.     Objective:     Vital Signs (Most Recent):  Temp: 98.1 °F (36.7 °C) (08/01/25 1403)  Pulse: 107 (08/01/25 1710)  Resp: 14 (08/01/25 1710)  BP: 127/65 (08/01/25 1615)  SpO2: 100 % (08/01/25 1710) Vital Signs (24h Range):  Temp:  [98.1 °F (36.7 °C)] 98.1 °F (36.7 °C)  Pulse:  [107-138] 107  Resp:  [10-42] 14  SpO2:  [93 %-100 %] 100 %  BP: (124-165)/(52-80) 127/65   Weight: 62.6 kg (138 lb)  Body mass index is 20.98 kg/m².      Intake/Output Summary (Last 24 hours) at 8/1/2025 1717  Last data filed at 8/1/2025 1405  Gross per 24 hour   Intake --   Output 800 ml   Net -800 ml          Physical Exam  Vitals reviewed.   Constitutional:       General: He is sleeping.      Appearance: He is well-developed. He is ill-appearing.   HENT:      Head: Normocephalic and atraumatic.      Mouth/Throat:      Mouth: Mucous membranes are dry.   Eyes:      General: No scleral icterus.     Pupils: Pupils are equal, round, and reactive to light.   Cardiovascular:      Rate and Rhythm: Regular rhythm. Tachycardia present.      Pulses: Normal pulses.   Pulmonary:      Effort: Tachypnea present.      Breath sounds: Normal breath sounds.   Abdominal:      General: Abdomen is flat. Bowel sounds are normal.      Palpations: Abdomen is soft.      Tenderness: There is generalized abdominal tenderness.   Musculoskeletal:      Right lower leg: No edema.  "     Left lower leg: No edema.   Skin:     General: Skin is warm and dry.      Capillary Refill: Capillary refill takes 2 to 3 seconds.   Neurological:      Mental Status: He is easily aroused. He is lethargic.   Psychiatric:         Speech: Speech normal.         Behavior: Behavior is cooperative.            Vents:     Lines/Drains/Airways       Airway  Duration                  Airway - Non-Surgical 06/28/24 1114 Nasal Cannula 399 days              Peripheral Intravenous Line  Duration             Peripheral IV Single Lumen 08/01/25 1357 18 G Anterior;Distal;Left Upper Arm <1 day    Peripheral IV Single Lumen 08/01/25 1438 20 G Anterior;Distal;Right Upper Arm <1 day                  Significant Labs:    CBC/Anemia Profile:  Recent Labs   Lab 08/01/25  1358 08/01/25  1424   WBC  --  15.19*   HGB  --  14.1   HCT 49 46.2   PLT  --  289   MCV  --  95.3   RDW  --  12.4        Chemistries:  Recent Labs   Lab 08/01/25  1418 08/01/25  1424   *  --    K 5.6*  --      --    CO2 5*  --    BUN 26  --    CREATININE 2.16*  --    CALCIUM 9.3  --    ALBUMIN 4.1  --    PROT 8.7*  --    BILITOT 0.3  --    ALKPHOS 110  --    ALT 15  --    AST 15  --    MG  --  2.5   PHOS  --  6.8*       All pertinent labs within the past 24 hours have been reviewed.    Significant Imaging: I have reviewed all pertinent imaging results/findings within the past 24 hours.  Assessment/Plan:     Cardiac/Vascular  Essential hypertension  Noted  - BP stable at present    CHF (congestive heart failure)  Patient has Systolic (HFrEF) heart failure that is Chronic. On presentation their CHF was well compensated. Most recent BNP and echo results are listed below.  No results for input(s): "BNP" in the last 72 hours.  Latest ECHO  Results for orders placed during the hospital encounter of 08/22/24    Echo    Interpretation Summary    Left Ventricle: The left ventricle is normal in size. Normal wall thickness. There is normal systolic function with a " visually estimated ejection fraction of 60 - 65%. There is normal diastolic function.    Right Ventricle: Normal right ventricular cavity size. Wall thickness is normal. Systolic function is normal.    IVC/SVC: Normal venous pressure at 3 mmHg.    Current Heart Failure Medications       Plan  - Monitor strict I&Os and daily weights.    - Place on telemetry  - Low sodium diet  - Place on fluid restriction of none for now, being volume resuscitated for dehydration.   - Cardiology has not been consulted  - The patient's volume status is at their baseline  - received 2 L NS, now on maintenance IVF             Renal/  PARVEEN (acute kidney injury)  Likely 2/2 dehydration in the setting of DKA-- S Cr 2.16 with a baseline in 2/2025 of 1.4  - NS x 2 L  - continue to hydrate with IVF per DKA protocol  - serial BMP to monitor renal function & electrolytes  - avoid nephrotoxic agents & renally dose medications  - strict I&O    Endocrine  * Diabetic ketoacidosis without coma associated with type 2 diabetes mellitus  2/2 to not taking his insulin for roughly the last week while traveling  - insulin infusion per DKA protocol  - BMP every 4 hours to monitor anion gap, CO2, & glucoses  - wean off DKA protocol as tolerated & start long acting insulin with SSI PRN when appropriate          MAR HERNANDEZ NP  Critical Care Medicine  [facility] - Emergency Department

## 2025-08-01 NOTE — SUBJECTIVE & OBJECTIVE
Past Medical History:   Diagnosis Date    CHF (congestive heart failure)     Diabetes mellitus     Essential (primary) hypertension     Hypogonadism in male        Past Surgical History:   Procedure Laterality Date    COLONOSCOPY N/A 12/01/2023    Procedure: COLONOSCOPY;  Surgeon: Coral White MD;  Location: [facility] ENDO;  Service: Endoscopy;  Laterality: N/A;    EGD, WITH CLOSED BIOPSY N/A 06/28/2024    ESOPHAGOGASTRODUODENOSCOPY N/A 6/28/2024    Procedure: EGD (ESOPHAGOGASTRODUODENOSCOPY);  Surgeon: Tereza Mcguire MD;  [facility];  Service: Endoscopy;  Laterality: N/A;    INJECTION OF ANESTHETIC AGENT AROUND MEDIAL BRANCH NERVES INNERVATING CERVICAL FACET JOINT Bilateral 06/07/2023    Procedure: Bilateral C3-5 MBB with local;  Surgeon: Tony Garcia MD;  Location: [facility] PAIN MGT;  Service: Pain Management;  Laterality: Bilateral;    INJECTION OF ANESTHETIC AGENT AROUND MEDIAL BRANCH NERVES INNERVATING CERVICAL FACET JOINT Bilateral 08/23/2023    Procedure: Bilateral C3-5 MBB  second diagnostic block with RN IV sedation;  Surgeon: Tony Garcia MD;  Location: [facility] PAIN MGT;  Service: Pain Management;  Laterality: Bilateral;    NOSE SURGERY      RADIOFREQUENCY ABLATION, FACET JOINT, CERVICOTHORACIC Left 11/22/2023    Procedure: C3-4 and C 4-5 Facet RFA;  Surgeon: Tony Garcia MD;  [facility];  Service: Pain Management;  Laterality: Left;  To be done 2-3 weeks after the left side.    RADIOFREQUENCY ABLATION, FACET JOINT, CERVICOTHORACIC Right 12/13/2023    Procedure: C3-4 and C 4-5 Facet RFA;  Surgeon: Tony Garcia MD;  Location: [facility] PAIN [facility]GT;  Service: Pain Management;  Laterality: Right;    SELECTIVE INJECTION OF ANESTHETIC AGENT AROUND LUMBAR SPINAL NERVE ROOT BY TRANSFORAMINAL APPROACH Bilateral 05/24/2023    Procedure: Bilateral L5 TF GARY with local;  Surgeon: Tony Garcia MD;  Location: [facility] PAIN [facility]GT;  Service: Pain Management;  Laterality: Bilateral;    SELECTIVE INJECTION  OF ANESTHETIC AGENT AROUND LUMBAR SPINAL NERVE ROOT BY TRANSFORAMINAL APPROACH Bilateral 10/25/2023    Procedure: Bilateral L5/S1 TF GARY;  Surgeon: Aric Muro MD;  Location: [facility];  Service: Pain Management;  Laterality: Bilateral;       Review of patient's allergies indicates:  No Known Allergies    Family History       Problem Relation (Age of Onset)    Alzheimer's disease Father    Cataracts Mother    Glaucoma Mother    Stroke Mother          Tobacco Use    Smoking status: Never     Passive exposure: Never    Smokeless tobacco: Never   Substance and Sexual Activity    Alcohol use: Not Currently     Comment: socially    Drug use: No    Sexual activity: Yes     Partners: Female      Review of Systems   Respiratory:  Positive for shortness of breath. Negative for chest tightness.    Cardiovascular:  Negative for chest pain and leg swelling.   Gastrointestinal:  Positive for abdominal pain. Negative for nausea.     Objective:     Vital Signs (Most Recent):  Temp: 98.1 °F (36.7 °C) (08/01/25 1403)  Pulse: 107 (08/01/25 1710)  Resp: 14 (08/01/25 1710)  BP: 127/65 (08/01/25 1615)  SpO2: 100 % (08/01/25 1710) Vital Signs (24h Range):  Temp:  [98.1 °F (36.7 °C)] 98.1 °F (36.7 °C)  Pulse:  [107-138] 107  Resp:  [10-42] 14  SpO2:  [93 %-100 %] 100 %  BP: (124-165)/(52-80) 127/65   Weight: 62.6 kg (138 lb)  Body mass index is 20.98 kg/m².      Intake/Output Summary (Last 24 hours) at 8/1/2025 1717  Last data filed at 8/1/2025 1405  Gross per 24 hour   Intake --   Output 800 ml   Net -800 ml          Physical Exam  Vitals reviewed.   Constitutional:       General: He is sleeping.      Appearance: He is well-developed. He is ill-appearing.   HENT:      Head: Normocephalic and atraumatic.      Mouth/Throat:      Mouth: Mucous membranes are dry.   Eyes:      General: No scleral icterus.     Pupils: Pupils are equal, round, and reactive to light.   Cardiovascular:      Rate and Rhythm: Regular rhythm. Tachycardia  present.      Pulses: Normal pulses.   Pulmonary:      Effort: Tachypnea present.      Breath sounds: Normal breath sounds.   Abdominal:      General: Abdomen is flat. Bowel sounds are normal.      Palpations: Abdomen is soft.      Tenderness: There is generalized abdominal tenderness.   Musculoskeletal:      Right lower leg: No edema.      Left lower leg: No edema.   Skin:     General: Skin is warm and dry.      Capillary Refill: Capillary refill takes 2 to 3 seconds.   Neurological:      Mental Status: He is easily aroused. He is lethargic.   Psychiatric:         Speech: Speech normal.         Behavior: Behavior is cooperative.            Vents:     Lines/Drains/Airways       Airway  Duration                  Airway - Non-Surgical 06/28/24 1114 Nasal Cannula 399 days              Peripheral Intravenous Line  Duration             Peripheral IV Single Lumen 08/01/25 1357 18 G Anterior;Distal;Left Upper Arm <1 day    Peripheral IV Single Lumen 08/01/25 1438 20 G Anterior;Distal;Right Upper Arm <1 day                  Significant Labs:    CBC/Anemia Profile:  Recent Labs   Lab 08/01/25  1358 08/01/25  1424   WBC  --  15.19*   HGB  --  14.1   HCT 49 46.2   PLT  --  289   MCV  --  95.3   RDW  --  12.4        Chemistries:  Recent Labs   Lab 08/01/25  1418 08/01/25  1424   *  --    K 5.6*  --      --    CO2 5*  --    BUN 26  --    CREATININE 2.16*  --    CALCIUM 9.3  --    ALBUMIN 4.1  --    PROT 8.7*  --    BILITOT 0.3  --    ALKPHOS 110  --    ALT 15  --    AST 15  --    MG  --  2.5   PHOS  --  6.8*       All pertinent labs within the past 24 hours have been reviewed.    Significant Imaging: I have reviewed all pertinent imaging results/findings within the past 24 hours.

## 2025-08-01 NOTE — HPI
50 y.o. male with PMHx of CHF, type 2 DM, & essential HTN presented to [facility] ED with c/o SOB, abdominal pain, & bilateral flank pain. The pt reports having abdominal cramping and severe SOB. Pt and his family has been traveling for the past week. Pt said he has not been taking his Insulin for the past week for his traveling out of the [place]. Pt is having flank pain and trouble breathing while here in the ED. Critical care service was asked to admit patient for DKA that is requiring an insulin infusion.    In the ED, patient received Humulin R 10 units IV, NS x 2 liters, CXR showed no acute cardiopulmonary findings, CT Renal Stone Study & abd/pelvis showed Right-sided nonobstructing nephrolithiasis.  No evidence of obstructive uropathy. Marked distention of the stomach.  The findings may reflect evolving gastric outlet obstruction.  No obstructing lesion identified.   Marked distention of the urinary bladder. Bladder scan done & showing about 800 ml of urine, mohr will be placed if patient unable to void. Urinalysis collected. Labs reviewed, CO2 was noted to be 5, sodium bicarbonate 1 amp given IVP.

## 2025-08-01 NOTE — ASSESSMENT & PLAN NOTE
2/2 to not taking his insulin for roughly the last week while traveling  - insulin infusion per DKA protocol  - BMP every 4 hours to monitor anion gap, CO2, & glucoses  - wean off DKA protocol as tolerated & start long acting insulin with SSI PRN when appropriate

## 2025-08-01 NOTE — LETTER
August 3, 2025         [address] [place] [id number]  Phone: [phone]  Fax: [phone]       Patient: Jason Sandra   YOB: 1975  Date of Visit: 08/03/2025    To Whom It May Concern:    Nicky Sandra  was at [facility] on 08/01/2025-08/03/2025. The patient may return to work/school on 08/11/2025 with no restrictions. If you have any questions or concerns, or if I can be of further assistance, please do not hesitate to contact me.    Sincerely,    SHALONDA Le

## 2025-08-02 PROBLEM — E83.39 HYPOPHOSPHATEMIA: Status: ACTIVE | Noted: 2025-08-02

## 2025-08-02 PROBLEM — K31.1 GASTRIC OUTLET OBSTRUCTION: Status: ACTIVE | Noted: 2025-08-02

## 2025-08-02 PROBLEM — E87.6 HYPOKALEMIA: Status: ACTIVE | Noted: 2025-08-02

## 2025-08-02 LAB
ANION GAP SERPL CALCULATED.3IONS-SCNC: 10 MMOL/L (ref 7–16)
ANION GAP SERPL CALCULATED.3IONS-SCNC: 10 MMOL/L (ref 7–16)
ANION GAP SERPL CALCULATED.3IONS-SCNC: 12 MMOL/L (ref 7–16)
ANION GAP SERPL CALCULATED.3IONS-SCNC: 8 MMOL/L (ref 7–16)
BASOPHILS # BLD AUTO: 0.01 K/UL (ref 0–0.2)
BASOPHILS NFR BLD AUTO: 0.1 % (ref 0–1)
BUN SERPL-MCNC: 11 MG/DL (ref 8–26)
BUN SERPL-MCNC: 14 MG/DL (ref 8–26)
BUN SERPL-MCNC: 15 MG/DL (ref 8–26)
BUN SERPL-MCNC: 16 MG/DL (ref 8–26)
BUN/CREAT SERPL: 12 (ref 6–20)
BUN/CREAT SERPL: 13 (ref 6–20)
BUN/CREAT SERPL: 14 (ref 6–20)
BUN/CREAT SERPL: 14 (ref 6–20)
CALCIUM SERPL-MCNC: 8.5 MG/DL (ref 8.4–10.2)
CALCIUM SERPL-MCNC: 8.6 MG/DL (ref 8.4–10.2)
CALCIUM SERPL-MCNC: 8.6 MG/DL (ref 8.4–10.2)
CALCIUM SERPL-MCNC: 8.7 MG/DL (ref 8.4–10.2)
CHLORIDE SERPL-SCNC: 113 MMOL/L (ref 98–107)
CHLORIDE SERPL-SCNC: 115 MMOL/L (ref 98–107)
CHLORIDE SERPL-SCNC: 115 MMOL/L (ref 98–107)
CHLORIDE SERPL-SCNC: 116 MMOL/L (ref 98–107)
CO2 SERPL-SCNC: 15 MMOL/L (ref 22–29)
CO2 SERPL-SCNC: 15 MMOL/L (ref 22–29)
CO2 SERPL-SCNC: 16 MMOL/L (ref 22–29)
CO2 SERPL-SCNC: 17 MMOL/L (ref 22–29)
CREAT SERPL-MCNC: 0.94 MG/DL (ref 0.72–1.25)
CREAT SERPL-MCNC: 1.09 MG/DL (ref 0.72–1.25)
CREAT SERPL-MCNC: 1.09 MG/DL (ref 0.72–1.25)
CREAT SERPL-MCNC: 1.12 MG/DL (ref 0.72–1.25)
DIFFERENTIAL METHOD BLD: ABNORMAL
EGFR (NO RACE VARIABLE) (RUSH/TITUS): 79 ML/MIN/1.73M2
EGFR (NO RACE VARIABLE) (RUSH/TITUS): 82 ML/MIN/1.73M2
EGFR (NO RACE VARIABLE) (RUSH/TITUS): 82 ML/MIN/1.73M2
EGFR (NO RACE VARIABLE) (RUSH/TITUS): 98 ML/MIN/1.73M2
EOSINOPHIL # BLD AUTO: 0 K/UL (ref 0–0.5)
EOSINOPHIL NFR BLD AUTO: 0 % (ref 1–4)
ERYTHROCYTE [DISTWIDTH] IN BLOOD BY AUTOMATED COUNT: 12 % (ref 11.5–14.5)
EST. AVERAGE GLUCOSE BLD GHB EST-MCNC: 318 MG/DL
GLUCOSE SERPL-MCNC: 113 MG/DL (ref 70–105)
GLUCOSE SERPL-MCNC: 121 MG/DL (ref 70–105)
GLUCOSE SERPL-MCNC: 131 MG/DL (ref 70–105)
GLUCOSE SERPL-MCNC: 136 MG/DL (ref 70–105)
GLUCOSE SERPL-MCNC: 136 MG/DL (ref 70–105)
GLUCOSE SERPL-MCNC: 136 MG/DL (ref 74–100)
GLUCOSE SERPL-MCNC: 137 MG/DL (ref 70–105)
GLUCOSE SERPL-MCNC: 139 MG/DL (ref 70–105)
GLUCOSE SERPL-MCNC: 140 MG/DL (ref 74–100)
GLUCOSE SERPL-MCNC: 143 MG/DL (ref 70–105)
GLUCOSE SERPL-MCNC: 144 MG/DL (ref 70–105)
GLUCOSE SERPL-MCNC: 151 MG/DL (ref 70–105)
GLUCOSE SERPL-MCNC: 152 MG/DL (ref 70–105)
GLUCOSE SERPL-MCNC: 155 MG/DL (ref 74–100)
GLUCOSE SERPL-MCNC: 157 MG/DL (ref 70–105)
GLUCOSE SERPL-MCNC: 162 MG/DL (ref 70–105)
GLUCOSE SERPL-MCNC: 166 MG/DL (ref 70–105)
GLUCOSE SERPL-MCNC: 171 MG/DL (ref 70–105)
GLUCOSE SERPL-MCNC: 173 MG/DL (ref 74–100)
GLUCOSE SERPL-MCNC: 177 MG/DL (ref 70–105)
GLUCOSE SERPL-MCNC: 182 MG/DL (ref 70–105)
GLUCOSE SERPL-MCNC: 185 MG/DL (ref 70–105)
GLUCOSE SERPL-MCNC: 194 MG/DL (ref 70–105)
GLUCOSE SERPL-MCNC: 194 MG/DL (ref 70–105)
GLUCOSE SERPL-MCNC: 294 MG/DL (ref 70–105)
HBA1C MFR BLD HPLC: 12.7 %
HCT VFR BLD AUTO: 35 % (ref 40–54)
HGB BLD-MCNC: 12.1 G/DL (ref 13.5–18)
IMM GRANULOCYTES # BLD AUTO: 0.03 K/UL (ref 0–0.04)
IMM GRANULOCYTES NFR BLD: 0.4 % (ref 0–0.4)
LYMPHOCYTES # BLD AUTO: 0.84 K/UL (ref 1–4.8)
LYMPHOCYTES NFR BLD AUTO: 10.1 % (ref 27–41)
MAGNESIUM SERPL-MCNC: 2.1 MG/DL (ref 1.6–2.6)
MCH RBC QN AUTO: 29.7 PG (ref 27–31)
MCHC RBC AUTO-ENTMCNC: 34.6 G/DL (ref 32–36)
MCV RBC AUTO: 86 FL (ref 80–96)
MONOCYTES # BLD AUTO: 0.75 K/UL (ref 0–0.8)
MONOCYTES NFR BLD AUTO: 9 % (ref 2–6)
MPC BLD CALC-MCNC: 10.2 FL (ref 9.4–12.4)
NEUTROPHILS # BLD AUTO: 6.67 K/UL (ref 1.8–7.7)
NEUTROPHILS NFR BLD AUTO: 80.4 % (ref 53–65)
NRBC # BLD AUTO: 0 X10E3/UL
NRBC, AUTO (.00): 0 %
PHOSPHATE SERPL-MCNC: 1.1 MG/DL (ref 2.3–4.7)
PLATELET # BLD AUTO: 178 K/UL (ref 150–400)
POTASSIUM SERPL-SCNC: 3.3 MMOL/L (ref 3.5–5.1)
POTASSIUM SERPL-SCNC: 3.4 MMOL/L (ref 3.5–5.1)
POTASSIUM SERPL-SCNC: 3.5 MMOL/L (ref 3.5–5.1)
POTASSIUM SERPL-SCNC: 3.6 MMOL/L (ref 3.5–5.1)
RBC # BLD AUTO: 4.07 M/UL (ref 4.6–6.2)
SODIUM SERPL-SCNC: 136 MMOL/L (ref 136–145)
SODIUM SERPL-SCNC: 137 MMOL/L (ref 136–145)
SODIUM SERPL-SCNC: 137 MMOL/L (ref 136–145)
SODIUM SERPL-SCNC: 138 MMOL/L (ref 136–145)
TROPONIN I SERPL HS-MCNC: 9.3 NG/L
WBC # BLD AUTO: 8.3 K/UL (ref 4.5–11)

## 2025-08-02 PROCEDURE — 63600175 PHARM REV CODE 636 W HCPCS

## 2025-08-02 PROCEDURE — 83735 ASSAY OF MAGNESIUM: CPT

## 2025-08-02 PROCEDURE — 85025 COMPLETE CBC W/AUTO DIFF WBC: CPT

## 2025-08-02 PROCEDURE — 84484 ASSAY OF TROPONIN QUANT: CPT

## 2025-08-02 PROCEDURE — 82962 GLUCOSE BLOOD TEST: CPT

## 2025-08-02 PROCEDURE — 83036 HEMOGLOBIN GLYCOSYLATED A1C: CPT

## 2025-08-02 PROCEDURE — S5010 5% DEXTROSE AND 0.45% SALINE: HCPCS

## 2025-08-02 PROCEDURE — 20000000 HC ICU ROOM

## 2025-08-02 PROCEDURE — 80048 BASIC METABOLIC PNL TOTAL CA: CPT

## 2025-08-02 PROCEDURE — 99232 SBSQ HOSP IP/OBS MODERATE 35: CPT | Mod: ,,,

## 2025-08-02 PROCEDURE — 63600175 PHARM REV CODE 636 W HCPCS: Performed by: STUDENT IN AN ORGANIZED HEALTH CARE EDUCATION/TRAINING PROGRAM

## 2025-08-02 PROCEDURE — 36415 COLL VENOUS BLD VENIPUNCTURE: CPT

## 2025-08-02 PROCEDURE — 25000003 PHARM REV CODE 250

## 2025-08-02 PROCEDURE — 84100 ASSAY OF PHOSPHORUS: CPT

## 2025-08-02 RX ORDER — IBUPROFEN 200 MG
16 TABLET ORAL
Status: DISCONTINUED | OUTPATIENT
Start: 2025-08-02 | End: 2025-08-03 | Stop reason: HOSPADM

## 2025-08-02 RX ORDER — GLUCAGON 1 MG
1 KIT INJECTION
Status: DISCONTINUED | OUTPATIENT
Start: 2025-08-02 | End: 2025-08-03 | Stop reason: HOSPADM

## 2025-08-02 RX ORDER — IBUPROFEN 200 MG
24 TABLET ORAL
Status: DISCONTINUED | OUTPATIENT
Start: 2025-08-02 | End: 2025-08-03 | Stop reason: HOSPADM

## 2025-08-02 RX ORDER — INSULIN GLARGINE 100 [IU]/ML
34 INJECTION, SOLUTION SUBCUTANEOUS NIGHTLY
Status: DISCONTINUED | OUTPATIENT
Start: 2025-08-02 | End: 2025-08-03 | Stop reason: HOSPADM

## 2025-08-02 RX ORDER — INSULIN ASPART 100 [IU]/ML
0-10 INJECTION, SOLUTION INTRAVENOUS; SUBCUTANEOUS
Status: DISCONTINUED | OUTPATIENT
Start: 2025-08-02 | End: 2025-08-03 | Stop reason: HOSPADM

## 2025-08-02 RX ADMIN — METOCLOPRAMIDE 10 MG: 5 INJECTION, SOLUTION INTRAMUSCULAR; INTRAVENOUS at 05:08

## 2025-08-02 RX ADMIN — POTASSIUM PHOSPHATE, MONOBASIC POTASSIUM PHOSPHATE, DIBASIC 30 MMOL: 224; 236 INJECTION, SOLUTION, CONCENTRATE INTRAVENOUS at 11:08

## 2025-08-02 RX ADMIN — DEXTROSE AND SODIUM CHLORIDE 125 ML/HR: 5; 450 INJECTION, SOLUTION INTRAVENOUS at 12:08

## 2025-08-02 RX ADMIN — HEPARIN SODIUM 5000 UNITS: 5000 INJECTION, SOLUTION INTRAVENOUS; SUBCUTANEOUS at 05:08

## 2025-08-02 RX ADMIN — METOCLOPRAMIDE 10 MG: 5 INJECTION, SOLUTION INTRAMUSCULAR; INTRAVENOUS at 11:08

## 2025-08-02 RX ADMIN — CEFTRIAXONE SODIUM 1 G: 1 INJECTION, POWDER, FOR SOLUTION INTRAMUSCULAR; INTRAVENOUS at 06:08

## 2025-08-02 RX ADMIN — INSULIN GLARGINE 34 UNITS: 100 INJECTION, SOLUTION SUBCUTANEOUS at 06:08

## 2025-08-02 RX ADMIN — PANTOPRAZOLE SODIUM 40 MG: 40 INJECTION, POWDER, FOR SOLUTION INTRAVENOUS at 09:08

## 2025-08-02 RX ADMIN — METOCLOPRAMIDE 10 MG: 5 INJECTION, SOLUTION INTRAMUSCULAR; INTRAVENOUS at 06:08

## 2025-08-02 RX ADMIN — INSULIN ASPART 1 UNITS: 100 INJECTION, SOLUTION INTRAVENOUS; SUBCUTANEOUS at 09:08

## 2025-08-02 RX ADMIN — HEPARIN SODIUM 5000 UNITS: 5000 INJECTION, SOLUTION INTRAVENOUS; SUBCUTANEOUS at 02:08

## 2025-08-02 RX ADMIN — DEXTROSE AND SODIUM CHLORIDE 125 ML/HR: 5; 450 INJECTION, SOLUTION INTRAVENOUS at 04:08

## 2025-08-02 RX ADMIN — MUPIROCIN: 20 OINTMENT TOPICAL at 09:08

## 2025-08-02 RX ADMIN — HEPARIN SODIUM 5000 UNITS: 5000 INJECTION, SOLUTION INTRAVENOUS; SUBCUTANEOUS at 09:08

## 2025-08-02 NOTE — ASSESSMENT & PLAN NOTE
Significant gastric distention on imaging; no bowel obstruction seen    Could be some gastroparesis in the setting of DKA; other differential possible gastric ulcer causing partial gastric outlet obstruction    Patient has started on Reglan; Protonix IV    NG tube for gastric decompression; patient states stomach is somewhat feeling better, although he did have some nausea overnight.  550 cc out of NG tube.  Positive bowel movement yesterday, currently passing gas.  We will monitor NG tube output today, possible clamping trial tomorrow.    If does not tolerate or nausea/vomiting worsens and/or high volumes out of NG tube, recommend consulting GI for EGD to evaluate for gastric outlet obstruction    8/3:  NG tube out; tolerating clear liquids and now tolerating regular diet.  No nausea or vomiting.  Positive gas and stool.  Gastric outlet obstruction resolved, likely due to gastroparesis during DKA.  No surgical intervention needed.  General surgery will sign off.  Patient is stable for discharge from a surgical standpoint; medicine still to clear    I spent 31 minutes of critical care time with the patient

## 2025-08-02 NOTE — CONSULTS
[facility] ICU  General Surgery  Consult Note    Patient Name: Jason Sandra  MRN: [id number]  Code Status: Full Code  Admission Date: 8/1/2025  Hospital Length of Stay: 1 days  Attending Physician: Brad Padilla MD  Primary Care Provider: Luan Lazar MD    Patient information was obtained from patient and ER records.     Consults  Subjective:     Principal Problem: Diabetic ketoacidosis without coma associated with type 2 diabetes mellitus    History of Present Illness: 50-year-old  male presented to the ER with complaints of abdominal pain, abdominal cramping, some shortness of breath, nausea and vomiting.  History of CHF, type 2 diabetes, hypertension.  Found to be in DKA and admitted to ICU for insulin infusion.  CT scan of the abdomen pelvis showed some right-sided kidney stones, but showed marked distention of the stomach with possible evolving gastric outlet obstruction; no other small bowel obstruction identified; Mills catheter placed and patient admitted to ICU.  General surgery consulted about gastric distention.  NG tube placed for decompression.  History of EGD, colonoscopy, spinal radiofrequency ablation; no abdominal surgeries    No current facility-administered medications on file prior to encounter.     Current Outpatient Medications on File Prior to Encounter   Medication Sig    cyanocobalamin (VITAMIN B-12) 1000 MCG tablet Take 1 tablet (1,000 mcg total) by mouth once daily.    finasteride (PROPECIA) 1 mg tablet Take 1 tablet by mouth daily. Optional to take 3 times a week    tadalafiL (CIALIS) 10 MG tablet Take 1 tablet (10 mg total) by mouth daily as needed (ED).    testosterone cypionate (DEPOTESTOTERONE CYPIONATE) 200 mg/mL injection Inject 1 mL (200 mg total) into the muscle once a week.    blood-glucose sensor (DEXCOM G6 SENSOR) Chen Use 1 sensor every 10 days    blood-glucose transmitter (DEXCOM G6 TRANSMITTER) Chen 1 transmitter every 3 months     "insulin degludec (TRESIBA FLEXTOUCH U-100) 100 unit/mL (3 mL) insulin pen Inject 34 Units into the skin every evening.    insulin lispro-aabc (LYUMJEV KWIKPEN U-100 INSULIN) 100 unit/mL pen Inject 10 units 3 times per day before meals, 2-4 units for snack, . + correction scale. MAX TDD Of 50 units    papaverine-phentolamin-alprost 150 mg-5 mg- 50 mcg SolR 0.5 mLs by Intracavernosal route as needed (ED).    pen needle, diabetic 32 gauge x 5/32" Ndle To use 7 times per day with insulin injections- for meals, long-acting insulin and for snacks    sacubitriL-valsartan (ENTRESTO) 24-26 mg per tablet Take 1 tablet by mouth 2 (two) times daily.    triamcinolone acetonide 0.1% (KENALOG) 0.1 % cream Apply topically 2 (two) times daily.       Review of patient's allergies indicates:  No Known Allergies    Past Medical History:   Diagnosis Date    CHF (congestive heart failure)     Diabetes mellitus     Essential (primary) hypertension     Hypogonadism in male      Past Surgical History:   Procedure Laterality Date    COLONOSCOPY N/A 12/01/2023    Procedure: COLONOSCOPY;  Surgeon: Coral White MD;  Location: [facility];  Service: Endoscopy;  Laterality: N/A;    EGD, WITH CLOSED BIOPSY N/A 06/28/2024    ESOPHAGOGASTRODUODENOSCOPY N/A 6/28/2024    Procedure: EGD (ESOPHAGOGASTRODUODENOSCOPY);  Surgeon: Tereza Mcguire MD;  Location: [facility];  Service: Endoscopy;  Laterality: N/A;    INJECTION OF ANESTHETIC AGENT AROUND MEDIAL BRANCH NERVES INNERVATING CERVICAL FACET JOINT Bilateral 06/07/2023    Procedure: Bilateral C3-5 MBB with local;  Surgeon: Tony Garcia MD;  Location: [facility] PAIN MGT;  Service: Pain Management;  Laterality: Bilateral;    INJECTION OF ANESTHETIC AGENT AROUND MEDIAL BRANCH NERVES INNERVATING CERVICAL FACET JOINT Bilateral 08/23/2023    Procedure: Bilateral C3-5 MBB  second diagnostic block with RN IV sedation;  Surgeon: Tony Garcia MD;  Location: [facility] PAIN MGT;  Service: Pain Management;  " Laterality: Bilateral;    NOSE SURGERY      RADIOFREQUENCY ABLATION, FACET JOINT, CERVICOTHORACIC Left 11/22/2023    Procedure: C3-4 and C 4-5 Facet RFA;  Surgeon: Tony Garcia MD;  Location: [facility] PAIN MGT;  Service: Pain Management;  Laterality: Left;  To be done 2-3 weeks after the left side.    RADIOFREQUENCY ABLATION, FACET JOINT, CERVICOTHORACIC Right 12/13/2023    Procedure: C3-4 and C 4-5 Facet RFA;  Surgeon: Tony Garcia MD;  Location: [facility] PAIN MGT;  Service: Pain Management;  Laterality: Right;    SELECTIVE INJECTION OF ANESTHETIC AGENT AROUND LUMBAR SPINAL NERVE ROOT BY TRANSFORAMINAL APPROACH Bilateral 05/24/2023    Procedure: Bilateral L5 TF GARY with local;  Surgeon: Tony Garcia MD;  Location: [facility] PAIN MGT;  Service: Pain Management;  Laterality: Bilateral;    SELECTIVE INJECTION OF ANESTHETIC AGENT AROUND LUMBAR SPINAL NERVE ROOT BY TRANSFORAMINAL APPROACH Bilateral 10/25/2023    Procedure: Bilateral L5/S1 TF GARY;  Surgeon: Aric Muro MD;  Location: [facility] PAIN MGT;  Service: Pain Management;  Laterality: Bilateral;     Family History       Problem Relation (Age of Onset)    Alzheimer's disease Father    Cataracts Mother    Glaucoma Mother    Stroke Mother          Tobacco Use    Smoking status: Never     Passive exposure: Never    Smokeless tobacco: Never   Substance and Sexual Activity    Alcohol use: Not Currently     Comment: socially    Drug use: No    Sexual activity: Yes     Partners: Female     Review of Systems   Constitutional: Negative.  Negative for appetite change, chills, fever and unexpected weight change.   HENT: Negative.  Negative for trouble swallowing.    Eyes: Negative.    Respiratory: Negative.  Negative for chest tightness and shortness of breath.    Cardiovascular: Negative.  Negative for chest pain.   Gastrointestinal:  Positive for abdominal pain, nausea and vomiting. Negative for abdominal distention and blood in stool.   Endocrine: Negative.    Genitourinary:  "Negative.  Negative for hematuria.   Musculoskeletal: Negative.  Negative for back pain and myalgias.   Skin: Negative.  Negative for color change.   Allergic/Immunologic: Negative.    Neurological: Negative.  Negative for dizziness, syncope, weakness and light-headedness.   Psychiatric/Behavioral: Negative.  Negative for agitation.      Objective:     Vital Signs (Most Recent):  Temp: 98.5 °F (36.9 °C) (08/02/25 0530)  Pulse: 92 (08/02/25 0615)  Resp: 14 (08/02/25 0615)  BP: 117/63 (08/02/25 0600)  SpO2: 98 % (08/02/25 0615) Vital Signs (24h Range):  Temp:  [98.1 °F (36.7 °C)-99.6 °F (37.6 °C)] 98.5 °F (36.9 °C)  Pulse:  [] 92  Resp:  [10-42] 14  SpO2:  [93 %-100 %] 98 %  BP: (105-165)/(52-96) 117/63     Weight: 65.9 kg (145 lb 4.5 oz)  Body mass index is 22.75 kg/m².     Physical Exam  Constitutional:       General: He is not in acute distress.     Appearance: Normal appearance.   HENT:      Head: Normocephalic.   Cardiovascular:      Rate and Rhythm: Normal rate.   Pulmonary:      Effort: Pulmonary effort is normal. No respiratory distress.   Abdominal:      General: There is no distension.      Tenderness: There is no abdominal tenderness.   Musculoskeletal:         General: Normal range of motion.   Skin:     General: Skin is warm.      Coloration: Skin is not jaundiced.   Neurological:      General: No focal deficit present.      Mental Status: He is alert and oriented to person, place, and time.      Cranial Nerves: No cranial nerve deficit.            I have reviewed all pertinent lab results within the past 24 hours.  CBC:   Recent Labs   Lab 08/02/25 0348   WBC 8.30   RBC 4.07*   HGB 12.1*   HCT 35.0*      MCV 86.0   MCH 29.7   MCHC 34.6     BMP:   Recent Labs   Lab 08/02/25 0348 08/02/25 0815   * 136*    137   K 3.6 3.3*   * 116*   CO2 15* 16*   BUN 16 15   CREATININE 1.12 1.09   CALCIUM 8.7 8.6   MG 2.1  --      Coagulation: No results for input(s): "LABPROT", "INR", " ""APTT" in the last 168 hours.  Cardiac markers: No results for input(s): "CKMB", "CPKMB", "TROPONINT", "TROPONINI", "MYOGLOBIN" in the last 168 hours.  ABGs:   Recent Labs   Lab 08/01/25  1358   PH 6.88*   PCO2 21*   PO2 54*   HCO3 3.9*   POCSATURATED 57       Significant Diagnostics:  I have reviewed all pertinent imaging results/findings within the past 24 hours.    Assessment/Plan:     Gastric outlet obstruction  Significant gastric distention on imaging; no bowel obstruction seen    Could be some gastroparesis in the setting of DKA; other differential possible gastric ulcer causing partial gastric outlet obstruction    Patient has started on Reglan; Protonix IV    NG tube for gastric decompression; patient states stomach is somewhat feeling better, although he did have some nausea overnight.  550 cc out of NG tube.  Positive bowel movement yesterday, currently passing gas.  We will monitor NG tube output today, possible clamping trial tomorrow.    If does not tolerate or nausea/vomiting worsens and/or high volumes out of NG tube, recommend consulting GI for EGD to evaluate for gastric outlet obstruction    I spent 31 minutes of critical care time with the patient      VTE Risk Mitigation (From admission, onward)           Ordered     heparin (porcine) injection 5,000 Units  Every 8 hours (non-standard times)         08/01/25 1620     IP VTE HIGH RISK PATIENT  Once         08/01/25 1620     Place sequential compression device  Until discontinued         08/01/25 1620                    Thank you for your consult. I will follow-up with patient. Please contact us if you have any additional questions.    Aric Cuevas, DO  General Surgery  [facility]  "

## 2025-08-02 NOTE — HPI
50-year-old  male presented to the ER with complaints of abdominal pain, abdominal cramping, some shortness of breath, nausea and vomiting.  History of CHF, type 2 diabetes, hypertension.  Found to be in DKA and admitted to ICU for insulin infusion.  CT scan of the abdomen pelvis showed some right-sided kidney stones, but showed marked distention of the stomach with possible evolving gastric outlet obstruction; no other small bowel obstruction identified; Mills catheter placed and patient admitted to ICU.  General surgery consulted about gastric distention.  NG tube placed for decompression.  History of EGD, colonoscopy, spinal radiofrequency ablation; no abdominal surgeries

## 2025-08-02 NOTE — ASSESSMENT & PLAN NOTE
"Patient has Systolic (HFrEF) heart failure that is Chronic. On presentation their CHF was well compensated. Most recent BNP and echo results are listed below.  No results for input(s): "BNP" in the last 72 hours.  Latest ECHO  Results for orders placed during the hospital encounter of 08/22/24    Echo    Interpretation Summary    Left Ventricle: The left ventricle is normal in size. Normal wall thickness. There is normal systolic function with a visually estimated ejection fraction of 60 - 65%. There is normal diastolic function.    Right Ventricle: Normal right ventricular cavity size. Wall thickness is normal. Systolic function is normal.    IVC/SVC: Normal venous pressure at 3 mmHg.    Current Heart Failure Medications       Plan  - Monitor strict I&Os and daily weights.    - Place on telemetry  - Low sodium diet  - Place on fluid restriction of none for now, being volume resuscitated for dehydration.   - Cardiology has not been consulted  - The patient's volume status is at their baseline  - received 2 L NS, now on maintenance IVF           "

## 2025-08-02 NOTE — SUBJECTIVE & OBJECTIVE
Past Medical History:   Diagnosis Date    CHF (congestive heart failure)     Diabetes mellitus     Essential (primary) hypertension     Hypogonadism in male        Past Surgical History:   Procedure Laterality Date    COLONOSCOPY N/A 12/01/2023    Procedure: COLONOSCOPY;  Surgeon: Coral White MD;  Location: [facility] ENDO;  Service: Endoscopy;  Laterality: N/A;    EGD, WITH CLOSED BIOPSY N/A 06/28/2024    ESOPHAGOGASTRODUODENOSCOPY N/A 6/28/2024    Procedure: EGD (ESOPHAGOGASTRODUODENOSCOPY);  Surgeon: Tereza Mcguire MD;  Location: [facility] ENDO;  Service: Endoscopy;  Laterality: N/A;    INJECTION OF ANESTHETIC AGENT AROUND MEDIAL BRANCH NERVES INNERVATING CERVICAL FACET JOINT Bilateral 06/07/2023    Procedure: Bilateral C3-5 MBB with local;  Surgeon: Tony Garcia MD;  Location: [facility] PAIN MGT;  Service: Pain Management;  Laterality: Bilateral;    INJECTION OF ANESTHETIC AGENT AROUND MEDIAL BRANCH NERVES INNERVATING CERVICAL FACET JOINT Bilateral 08/23/2023    Procedure: Bilateral C3-5 MBB  second diagnostic block with RN IV sedation;  Surgeon: Tony Garcia MD;  Location: [facility] PAIN MGT;  Service: Pain Management;  Laterality: Bilateral;    NOSE SURGERY      RADIOFREQUENCY ABLATION, FACET JOINT, CERVICOTHORACIC Left 11/22/2023    Procedure: C3-4 and C 4-5 Facet RFA;  Surgeon: Tony Garcia MD;  [facility];  Service: Pain Management;  Laterality: Left;  To be done 2-3 weeks after the left side.    RADIOFREQUENCY ABLATION, FACET JOINT, CERVICOTHORACIC Right 12/13/2023    Procedure: C3-4 and C 4-5 Facet RFA;  Surgeon: Tony Garcia MD;  Location: [facility] PAIN MGT;  Service: Pain Management;  Laterality: Right;    SELECTIVE INJECTION OF ANESTHETIC AGENT AROUND LUMBAR SPINAL NERVE ROOT BY TRANSFORAMINAL APPROACH Bilateral 05/24/2023    Procedure: Bilateral L5 TF GARY with local;  Surgeon: Tony Garcia MD;  Location: [facility] PAIN MGT;  Service: Pain Management;  Laterality: Bilateral;    SELECTIVE INJECTION  OF ANESTHETIC AGENT AROUND LUMBAR SPINAL NERVE ROOT BY TRANSFORAMINAL APPROACH Bilateral 10/25/2023    Procedure: Bilateral L5/S1 TF GARY;  Surgeon: Aric Muro MD;  Location: [facility];  Service: Pain Management;  Laterality: Bilateral;       Review of patient's allergies indicates:  No Known Allergies    Family History       Problem Relation (Age of Onset)    Alzheimer's disease Father    Cataracts Mother    Glaucoma Mother    Stroke Mother          Tobacco Use    Smoking status: Never     Passive exposure: Never    Smokeless tobacco: Never   Substance and Sexual Activity    Alcohol use: Not Currently     Comment: socially    Drug use: No    Sexual activity: Yes     Partners: Female      Review of Systems   Respiratory:  Negative for chest tightness and shortness of breath.    Cardiovascular:  Negative for chest pain and leg swelling.   Gastrointestinal:  Negative for abdominal pain and nausea.     Objective:     Vital Signs (Most Recent):  Temp: 98.5 °F (36.9 °C) (08/02/25 0530)  Pulse: 92 (08/02/25 0615)  Resp: 14 (08/02/25 0615)  BP: 117/63 (08/02/25 0600)  SpO2: 98 % (08/02/25 0615) Vital Signs (24h Range):  Temp:  [98.1 °F (36.7 °C)-99.6 °F (37.6 °C)] 98.5 °F (36.9 °C)  Pulse:  [] 92  Resp:  [10-42] 14  SpO2:  [93 %-100 %] 98 %  BP: (105-165)/(52-96) 117/63   Weight: 65.9 kg (145 lb 4.5 oz)  Body mass index is 22.75 kg/m².      Intake/Output Summary (Last 24 hours) at 8/2/2025 1244  Last data filed at 8/2/2025 0608  Gross per 24 hour   Intake 3706.35 ml   Output 3420 ml   Net 286.35 ml          Physical Exam  Vitals reviewed.   Constitutional:       General: He is awake. He is not in acute distress.     Appearance: He is well-developed.   HENT:      Head: Normocephalic and atraumatic.      Mouth/Throat:      Mouth: Mucous membranes are moist.   Eyes:      General: No scleral icterus.     Pupils: Pupils are equal, round, and reactive to light.   Cardiovascular:      Rate and Rhythm: Normal rate  and regular rhythm.      Pulses: Normal pulses.      Heart sounds: Normal heart sounds.   Pulmonary:      Effort: Pulmonary effort is normal.      Breath sounds: Normal breath sounds.   Abdominal:      General: Abdomen is flat. Bowel sounds are normal.      Palpations: Abdomen is soft.      Comments: Nasogastric tube to suction   Musculoskeletal:      Cervical back: Normal range of motion.      Right lower leg: No edema.      Left lower leg: No edema.   Skin:     General: Skin is warm and dry.      Capillary Refill: Capillary refill takes 2 to 3 seconds.   Neurological:      Mental Status: He is alert and oriented to person, place, and time.      Sensory: Sensation is intact.   Psychiatric:         Attention and Perception: Attention and perception normal.         Mood and Affect: Mood and affect normal.         Speech: Speech normal.         Behavior: Behavior is cooperative.            Vents:     Lines/Drains/Airways       Drain  Duration                  NG/OG Tube 08/01/25 [place] sump Right nostril 1 day              Airway  Duration                  Airway - Non-Surgical 06/28/24 1114 Nasal Cannula 400 days              Peripheral Intravenous Line  Duration             Peripheral IV Single Lumen 08/01/25 1357 18 G Anterior;Distal;Left Upper Arm <1 day    Peripheral IV Single Lumen 08/01/25 1438 20 G Anterior;Distal;Right Upper Arm <1 day                  Significant Labs:    CBC/Anemia Profile:  Recent Labs   Lab 08/01/25  1358 08/01/25  1424 08/02/25  0348   WBC  --  15.19* 8.30   HGB  --  14.1 12.1*   HCT 49 46.2 35.0*   PLT  --  289 178   MCV  --  95.3 86.0   RDW  --  12.4 12.0        Chemistries:  Recent Labs   Lab 08/01/25  1418 08/01/25  1424 08/01/25  1649 08/01/25  2252 08/02/25  0348 08/02/25  0815   *  --    < > 135* 136 137   K 5.6*  --    < > 3.6 3.6 3.3*     --    < > 113* 115* 116*   CO2 5*  --    < > 12* 15* 16*   BUN 26  --    < > 19 16 15   CREATININE 2.16*  --    < > 1.36* 1.12 1.09    CALCIUM 9.3  --    < > 8.9 8.7 8.6   ALBUMIN 4.1  --   --   --   --   --    PROT 8.7*  --   --   --   --   --    BILITOT 0.3  --   --   --   --   --    ALKPHOS 110  --   --   --   --   --    ALT 15  --   --   --   --   --    AST 15  --   --   --   --   --    MG  --  2.5  --   --  2.1  --    PHOS  --  6.8*  --   --  1.1*  --     < > = values in this interval not displayed.       All pertinent labs within the past 24 hours have been reviewed.    Significant Imaging: I have reviewed all pertinent imaging results/findings within the past 24 hours.

## 2025-08-02 NOTE — ASSESSMENT & PLAN NOTE
Significant gastric distention on imaging; no bowel obstruction seen    Could be some gastroparesis in the setting of DKA; other differential possible gastric ulcer causing partial gastric outlet obstruction    Patient has started on Reglan; Protonix IV    NG tube for gastric decompression; patient states stomach is somewhat feeling better, although he did have some nausea overnight.  550 cc out of NG tube.  Positive bowel movement yesterday, currently passing gas.  We will monitor NG tube output today, possible clamping trial tomorrow.    If does not tolerate or nausea/vomiting worsens and/or high volumes out of NG tube, recommend consulting GI for EGD to evaluate for gastric outlet obstruction   wheezing. Will contact [facility] for ED to ED transfer. Discussed with mother using . She agrees with [facility] ED to ED transfer    4:42 PM EDT   Consult: Spoke to Dr. Shanthi Ruiz (on call [facility] ED attending). Discussed case, hx, ED tx. Will accept transfer. Agrees with continuous albuterol. [facility] transport to come to ED.     5:38 PM EDT   [facility] transport in ED    FINAL IMPRESSION     1. Severe persistent asthma with exacerbation (HCC)    2. Allergy to dog dander    3. Acute respiratory failure with hypoxia (HCC)            DISPOSITION/PLAN   DISPOSITION Decision To Transfer 03/16/2025 04:18:33 PM   DISPOSITION CONDITION Stable                PATIENT REFERRED TO:  No follow-up provider specified.       DISCHARGE MEDICATIONS:     Medication List        ASK your doctor about these medications      acetaminophen 160 MG/5ML solution  Commonly known as: TYLENOL     ibuprofen 100 MG/5ML suspension  Commonly known as: ADVIL;MOTRIN                     I am the Primary Clinician of Record.       (Please note that parts of this dictation were completed with voice recognition software. Quite often unanticipated grammatical, syntax, homophones, and other interpretive errors are inadvertently transcribed by the computer software. Please disregards these errors. Please excuse any errors that have escaped final proofreading.)       Azucena Hernandez PA-C  03/16/25 [id number]

## 2025-08-02 NOTE — HOSPITAL COURSE
50 year old male with PMHx of CHF, type 2 DM, & essential HTN, presented to [facility] ED on 8/1/25 with c/o SOB, abdominal pain, & bilateral flank pain. The pt reports having abdominal cramping and severe SOB. Pt and his family has been traveling for the past week. Pt said he has not been taking his Insulin for the past week for his traveling out of the [place]. Pt is having flank pain and trouble breathing while in the ED. After obtained labs & other diagnostic testing, he was found to be in DKA. Critical care service was asked to admit patient to the ICU for DKA that is requiring an insulin infusion.     Over the course of the ICU stay, acidosis was corrected & patient was transitioned off the DKA protocol. Long acting insulin with SSI PRN was restarted. NGT was able to be discontinued after it was confirmed he could tolerate PO intake. K & Phos have been replaced. It is felt that he has reached the maximum benefit of the ICU & will be discharged home today. Patient understands to follow up with his PCP in 1 week & to continue taking his home medications, including his insulin, as ordered.

## 2025-08-02 NOTE — ASSESSMENT & PLAN NOTE
2/2 to not taking his insulin for roughly the last week while traveling  - insulin infusion per DKA protocol  - BMP every 4 hours to monitor anion gap, CO2, & glucoses  - anion gap is now closed, but CO2 remains low. Remains slightly nauseated with NGT to suction. Continue infusion for now  - wean off DKA protocol as tolerated & start long acting insulin with SSI PRN when appropriate

## 2025-08-02 NOTE — SUBJECTIVE & OBJECTIVE
"No current facility-administered medications on file prior to encounter.     Current Outpatient Medications on File Prior to Encounter   Medication Sig    cyanocobalamin (VITAMIN B-12) 1000 MCG tablet Take 1 tablet (1,000 mcg total) by mouth once daily.    finasteride (PROPECIA) 1 mg tablet Take 1 tablet by mouth daily. Optional to take 3 times a week    tadalafiL (CIALIS) 10 MG tablet Take 1 tablet (10 mg total) by mouth daily as needed (ED).    testosterone cypionate (DEPOTESTOTERONE CYPIONATE) 200 mg/mL injection Inject 1 mL (200 mg total) into the muscle once a week.    blood-glucose sensor (DEXCOM G6 SENSOR) Chen Use 1 sensor every 10 days    blood-glucose transmitter (DEXCOM G6 TRANSMITTER) Chen 1 transmitter every 3 months    insulin degludec (TRESIBA FLEXTOUCH U-100) 100 unit/mL (3 mL) insulin pen Inject 34 Units into the skin every evening.    insulin lispro-aabc (LYUMJEV KWIKPEN U-100 INSULIN) 100 unit/mL pen Inject 10 units 3 times per day before meals, 2-4 units for snack, . + correction scale. MAX TDD Of 50 units    papaverine-phentolamin-alprost 150 mg-5 mg- 50 mcg SolR 0.5 mLs by Intracavernosal route as needed (ED).    pen needle, diabetic 32 gauge x 5/32" Ndle To use 7 times per day with insulin injections- for meals, long-acting insulin and for snacks    sacubitriL-valsartan (ENTRESTO) 24-26 mg per tablet Take 1 tablet by mouth 2 (two) times daily.    triamcinolone acetonide 0.1% (KENALOG) 0.1 % cream Apply topically 2 (two) times daily.       Review of patient's allergies indicates:  No Known Allergies    Past Medical History:   Diagnosis Date    CHF (congestive heart failure)     Diabetes mellitus     Essential (primary) hypertension     Hypogonadism in male      Past Surgical History:   Procedure Laterality Date    COLONOSCOPY N/A 12/01/2023    Procedure: COLONOSCOPY;  Surgeon: Coral White MD;  Location: [facility];  Service: Endoscopy;  Laterality: N/A;    EGD, WITH CLOSED BIOPSY N/A " 06/28/2024    ESOPHAGOGASTRODUODENOSCOPY N/A 6/28/2024    Procedure: EGD (ESOPHAGOGASTRODUODENOSCOPY);  Surgeon: Tereza Mcguire MD;  Location: [facility];  Service: Endoscopy;  Laterality: N/A;    INJECTION OF ANESTHETIC AGENT AROUND MEDIAL BRANCH NERVES INNERVATING CERVICAL FACET JOINT Bilateral 06/07/2023    Procedure: Bilateral C3-5 MBB with local;  Surgeon: Tony Garcia MD;  Location: [facility];  Service: Pain Management;  Laterality: Bilateral;    INJECTION OF ANESTHETIC AGENT AROUND MEDIAL BRANCH NERVES INNERVATING CERVICAL FACET JOINT Bilateral 08/23/2023    Procedure: Bilateral C3-5 MBB  second diagnostic block with RN IV sedation;  Surgeon: Tony Garcia MD;  Location: [facility];  Service: Pain Management;  Laterality: Bilateral;    NOSE SURGERY      RADIOFREQUENCY ABLATION, FACET JOINT, CERVICOTHORACIC Left 11/22/2023    Procedure: C3-4 and C 4-5 Facet RFA;  Surgeon: Tony Garcia MD;  Location: [facility];  Service: Pain Management;  Laterality: Left;  To be done 2-3 weeks after the left side.    RADIOFREQUENCY ABLATION, FACET JOINT, CERVICOTHORACIC Right 12/13/2023    Procedure: C3-4 and C 4-5 Facet RFA;  Surgeon: Tony Garcia MD;  Location: [facility];  Service: Pain Management;  Laterality: Right;    SELECTIVE INJECTION OF ANESTHETIC AGENT AROUND LUMBAR SPINAL NERVE ROOT BY TRANSFORAMINAL APPROACH Bilateral 05/24/2023    Procedure: Bilateral L5 TF GARY with local;  Surgeon: Tony Garcia MD;  Location: [facility];  Service: Pain Management;  Laterality: Bilateral;    SELECTIVE INJECTION OF ANESTHETIC AGENT AROUND LUMBAR SPINAL NERVE ROOT BY TRANSFORAMINAL APPROACH Bilateral 10/25/2023    Procedure: Bilateral L5/S1 TF GARY;  Surgeon: Aric Muro MD;  Location: [facility];  Service: Pain Management;  Laterality: Bilateral;     Family History       Problem Relation (Age of Onset)    Alzheimer's disease Father    Cataracts Mother    Glaucoma Mother    Stroke Mother           Tobacco Use    Smoking status: Never     Passive exposure: Never    Smokeless tobacco: Never   Substance and Sexual Activity    Alcohol use: Not Currently     Comment: socially    Drug use: No    Sexual activity: Yes     Partners: Female     Review of Systems   Constitutional: Negative.  Negative for appetite change, chills, fever and unexpected weight change.   HENT: Negative.  Negative for trouble swallowing.    Eyes: Negative.    Respiratory: Negative.  Negative for chest tightness and shortness of breath.    Cardiovascular: Negative.  Negative for chest pain.   Gastrointestinal:  Positive for abdominal pain, nausea and vomiting. Negative for abdominal distention and blood in stool.   Endocrine: Negative.    Genitourinary: Negative.  Negative for hematuria.   Musculoskeletal: Negative.  Negative for back pain and myalgias.   Skin: Negative.  Negative for color change.   Allergic/Immunologic: Negative.    Neurological: Negative.  Negative for dizziness, syncope, weakness and light-headedness.   Psychiatric/Behavioral: Negative.  Negative for agitation.      Objective:     Vital Signs (Most Recent):  Temp: 98.5 °F (36.9 °C) (08/02/25 0530)  Pulse: 92 (08/02/25 0615)  Resp: 14 (08/02/25 0615)  BP: 117/63 (08/02/25 0600)  SpO2: 98 % (08/02/25 0615) Vital Signs (24h Range):  Temp:  [98.1 °F (36.7 °C)-99.6 °F (37.6 °C)] 98.5 °F (36.9 °C)  Pulse:  [] 92  Resp:  [10-42] 14  SpO2:  [93 %-100 %] 98 %  BP: (105-165)/(52-96) 117/63     Weight: 65.9 kg (145 lb 4.5 oz)  Body mass index is 22.75 kg/m².     Physical Exam  Constitutional:       General: He is not in acute distress.     Appearance: Normal appearance.   HENT:      Head: Normocephalic.   Cardiovascular:      Rate and Rhythm: Normal rate.   Pulmonary:      Effort: Pulmonary effort is normal. No respiratory distress.   Abdominal:      General: There is no distension.      Tenderness: There is no abdominal tenderness.   Musculoskeletal:         General:  "Normal range of motion.   Skin:     General: Skin is warm.      Coloration: Skin is not jaundiced.   Neurological:      General: No focal deficit present.      Mental Status: He is alert and oriented to person, place, and time.      Cranial Nerves: No cranial nerve deficit.            I have reviewed all pertinent lab results within the past 24 hours.  CBC:   Recent Labs   Lab 08/02/25  0348   WBC 8.30   RBC 4.07*   HGB 12.1*   HCT 35.0*      MCV 86.0   MCH 29.7   MCHC 34.6     BMP:   Recent Labs   Lab 08/02/25  0348 08/02/25  0815   * 136*    137   K 3.6 3.3*   * 116*   CO2 15* 16*   BUN 16 15   CREATININE 1.12 1.09   CALCIUM 8.7 8.6   MG 2.1  --      Coagulation: No results for input(s): "LABPROT", "INR", "APTT" in the last 168 hours.  Cardiac markers: No results for input(s): "CKMB", "CPKMB", "TROPONINT", "TROPONINI", "MYOGLOBIN" in the last 168 hours.  ABGs:   Recent Labs   Lab 08/01/25  1358   PH 6.88*   PCO2 21*   PO2 54*   HCO3 3.9*   POCSATURATED 57       Significant Diagnostics:  I have reviewed all pertinent imaging results/findings within the past 24 hours.    "

## 2025-08-02 NOTE — PLAN OF CARE
Problem: Diabetic Ketoacidosis  Goal: Optimal Coping  Outcome: Progressing  Goal: Fluid and Electrolyte Balance with Absence of Ketosis  Outcome: Progressing     Problem: Adult Inpatient Plan of Care  Goal: Plan of Care Review  Outcome: Progressing  Goal: Patient-Specific Goal (Individualized)  Outcome: Progressing  Goal: Absence of Hospital-Acquired Illness or Injury  Outcome: Progressing  Goal: Optimal Comfort and Wellbeing  Outcome: Progressing  Goal: Readiness for Transition of Care  Outcome: Progressing     Problem: Diabetes Comorbidity  Goal: Blood Glucose Level Within Targeted Range  Outcome: Progressing     Problem: Acute Kidney Injury/Impairment  Goal: Fluid and Electrolyte Balance  Outcome: Progressing  Goal: Improved Oral Intake  Outcome: Progressing  Goal: Effective Renal Function  Outcome: Progressing

## 2025-08-02 NOTE — ASSESSMENT & PLAN NOTE
Likely 2/2 dehydration in the setting of DKA-- S Cr 1.1 with a baseline in 2/2025 of 1.4  - continue to hydrate with IVF per DKA protocol  - serial BMP to monitor renal function & electrolytes  - avoid nephrotoxic agents & renally dose medications  - strict I&O  RESOLVED

## 2025-08-02 NOTE — ASSESSMENT & PLAN NOTE
NGT to suction   - consider GI consult for EGD to evaluate gastric outlet  - general surgery following- appreciate their recommendations

## 2025-08-03 VITALS
TEMPERATURE: 98 F | HEART RATE: 102 BPM | OXYGEN SATURATION: 96 % | HEIGHT: 67 IN | RESPIRATION RATE: 24 BRPM | BODY MASS INDEX: 22.81 KG/M2 | WEIGHT: 145.31 LBS | DIASTOLIC BLOOD PRESSURE: 71 MMHG | SYSTOLIC BLOOD PRESSURE: 125 MMHG

## 2025-08-03 PROBLEM — K31.1 GASTRIC OUTLET OBSTRUCTION: Status: RESOLVED | Noted: 2025-08-02 | Resolved: 2025-08-03

## 2025-08-03 PROBLEM — N17.9 AKI (ACUTE KIDNEY INJURY): Status: RESOLVED | Noted: 2023-01-03 | Resolved: 2025-08-03

## 2025-08-03 PROBLEM — E83.39 HYPOPHOSPHATEMIA: Status: RESOLVED | Noted: 2025-08-02 | Resolved: 2025-08-03

## 2025-08-03 PROBLEM — E87.6 HYPOKALEMIA: Status: RESOLVED | Noted: 2025-08-02 | Resolved: 2025-08-03

## 2025-08-03 LAB
ANION GAP SERPL CALCULATED.3IONS-SCNC: 9 MMOL/L (ref 7–16)
BASOPHILS # BLD AUTO: 0.04 K/UL (ref 0–0.2)
BASOPHILS NFR BLD AUTO: 0.5 % (ref 0–1)
BUN SERPL-MCNC: 9 MG/DL (ref 8–26)
BUN/CREAT SERPL: 12 (ref 6–20)
CALCIUM SERPL-MCNC: 8.9 MG/DL (ref 8.4–10.2)
CHLORIDE SERPL-SCNC: 112 MMOL/L (ref 98–107)
CO2 SERPL-SCNC: 20 MMOL/L (ref 22–29)
CREAT SERPL-MCNC: 0.73 MG/DL (ref 0.72–1.25)
DIFFERENTIAL METHOD BLD: ABNORMAL
EGFR (NO RACE VARIABLE) (RUSH/TITUS): 109 ML/MIN/1.73M2
EOSINOPHIL # BLD AUTO: 0.06 K/UL (ref 0–0.5)
EOSINOPHIL NFR BLD AUTO: 0.7 % (ref 1–4)
ERYTHROCYTE [DISTWIDTH] IN BLOOD BY AUTOMATED COUNT: 12.3 % (ref 11.5–14.5)
GLUCOSE SERPL-MCNC: 206 MG/DL (ref 70–105)
GLUCOSE SERPL-MCNC: 74 MG/DL (ref 70–105)
GLUCOSE SERPL-MCNC: 95 MG/DL (ref 74–100)
HCT VFR BLD AUTO: 34.1 % (ref 40–54)
HGB BLD-MCNC: 11.9 G/DL (ref 13.5–18)
IMM GRANULOCYTES # BLD AUTO: 0.05 K/UL (ref 0–0.04)
IMM GRANULOCYTES NFR BLD: 0.6 % (ref 0–0.4)
LYMPHOCYTES # BLD AUTO: 1.25 K/UL (ref 1–4.8)
LYMPHOCYTES NFR BLD AUTO: 15 % (ref 27–41)
MAGNESIUM SERPL-MCNC: 2.1 MG/DL (ref 1.6–2.6)
MCH RBC QN AUTO: 29.5 PG (ref 27–31)
MCHC RBC AUTO-ENTMCNC: 34.9 G/DL (ref 32–36)
MCV RBC AUTO: 84.6 FL (ref 80–96)
MONOCYTES # BLD AUTO: 0.63 K/UL (ref 0–0.8)
MONOCYTES NFR BLD AUTO: 7.6 % (ref 2–6)
MPC BLD CALC-MCNC: 10.4 FL (ref 9.4–12.4)
NEUTROPHILS # BLD AUTO: 6.31 K/UL (ref 1.8–7.7)
NEUTROPHILS NFR BLD AUTO: 75.6 % (ref 53–65)
NRBC # BLD AUTO: 0 X10E3/UL
NRBC, AUTO (.00): 0 %
PHOSPHATE SERPL-MCNC: 1.4 MG/DL (ref 2.3–4.7)
PLATELET # BLD AUTO: 153 K/UL (ref 150–400)
POTASSIUM SERPL-SCNC: 3 MMOL/L (ref 3.5–5.1)
RBC # BLD AUTO: 4.03 M/UL (ref 4.6–6.2)
SODIUM SERPL-SCNC: 138 MMOL/L (ref 136–145)
WBC # BLD AUTO: 8.34 K/UL (ref 4.5–11)

## 2025-08-03 PROCEDURE — 80048 BASIC METABOLIC PNL TOTAL CA: CPT

## 2025-08-03 PROCEDURE — 25000003 PHARM REV CODE 250

## 2025-08-03 PROCEDURE — 83735 ASSAY OF MAGNESIUM: CPT

## 2025-08-03 PROCEDURE — 63600175 PHARM REV CODE 636 W HCPCS: Performed by: STUDENT IN AN ORGANIZED HEALTH CARE EDUCATION/TRAINING PROGRAM

## 2025-08-03 PROCEDURE — 36415 COLL VENOUS BLD VENIPUNCTURE: CPT

## 2025-08-03 PROCEDURE — 85025 COMPLETE CBC W/AUTO DIFF WBC: CPT

## 2025-08-03 PROCEDURE — 84100 ASSAY OF PHOSPHORUS: CPT

## 2025-08-03 PROCEDURE — 63600175 PHARM REV CODE 636 W HCPCS

## 2025-08-03 PROCEDURE — 82962 GLUCOSE BLOOD TEST: CPT

## 2025-08-03 PROCEDURE — 25000003 PHARM REV CODE 250: Performed by: INTERNAL MEDICINE

## 2025-08-03 PROCEDURE — 99238 HOSP IP/OBS DSCHRG MGMT 30/<: CPT | Mod: ,,,

## 2025-08-03 RX ORDER — PANTOPRAZOLE SODIUM 40 MG/1
40 TABLET, DELAYED RELEASE ORAL DAILY
Status: DISCONTINUED | OUTPATIENT
Start: 2025-08-04 | End: 2025-08-03 | Stop reason: HOSPADM

## 2025-08-03 RX ADMIN — METOCLOPRAMIDE 10 MG: 5 INJECTION, SOLUTION INTRAMUSCULAR; INTRAVENOUS at 11:08

## 2025-08-03 RX ADMIN — POTASSIUM PHOSPHATE, MONOBASIC POTASSIUM PHOSPHATE, DIBASIC 15 MMOL: 224; 236 INJECTION, SOLUTION, CONCENTRATE INTRAVENOUS at 07:08

## 2025-08-03 RX ADMIN — PANTOPRAZOLE SODIUM 40 MG: 40 INJECTION, POWDER, FOR SOLUTION INTRAVENOUS at 08:08

## 2025-08-03 RX ADMIN — POTASSIUM BICARBONATE 40 MEQ: 782 TABLET, EFFERVESCENT ORAL at 11:08

## 2025-08-03 RX ADMIN — METOCLOPRAMIDE 10 MG: 5 INJECTION, SOLUTION INTRAMUSCULAR; INTRAVENOUS at 06:08

## 2025-08-03 RX ADMIN — METOCLOPRAMIDE 10 MG: 5 INJECTION, SOLUTION INTRAMUSCULAR; INTRAVENOUS at 12:08

## 2025-08-03 RX ADMIN — HEPARIN SODIUM 5000 UNITS: 5000 INJECTION, SOLUTION INTRAVENOUS; SUBCUTANEOUS at 08:08

## 2025-08-03 RX ADMIN — INSULIN ASPART 4 UNITS: 100 INJECTION, SOLUTION INTRAVENOUS; SUBCUTANEOUS at 11:08

## 2025-08-03 RX ADMIN — HEPARIN SODIUM 5000 UNITS: 5000 INJECTION, SOLUTION INTRAVENOUS; SUBCUTANEOUS at 01:08

## 2025-08-03 RX ADMIN — MUPIROCIN: 20 OINTMENT TOPICAL at 08:08

## 2025-08-03 NOTE — DISCHARGE INSTRUCTIONS
Please follow up with your Primary Care Physician one week from discharge (8/11/25)  Continue taking insulin as ordered as well as other medications for chronic conditions

## 2025-08-03 NOTE — NURSING
Pt  transported downstairs in wheelchair, discharged to private vehicle. Pt had cell phone and clothing he arrived with. No distress noted upon discharge.

## 2025-08-03 NOTE — PLAN OF CARE
Problem: Diabetic Ketoacidosis  Goal: Optimal Coping  Outcome: Progressing  Goal: Fluid and Electrolyte Balance with Absence of Ketosis  Outcome: Progressing     Problem: Adult Inpatient Plan of Care  Goal: Plan of Care Review  Outcome: Progressing     Problem: Diabetes Comorbidity  Goal: Blood Glucose Level Within Targeted Range  Outcome: Progressing

## 2025-08-03 NOTE — SUBJECTIVE & OBJECTIVE
Interval History:  Stable, no acute events overnight.  NG tube out, patient tolerating clear liquid diet and then now tolerating regular diet with the eating a sandwich yesterday.  States he feels much better.  Positive gas and bowel movement    Medications:  Continuous Infusions:   D5 and 0.45% NaCl  125 mL/hr Intravenous Continuous PRN   Stopped at 08/02/25 1856     Scheduled Meds:   cefTRIAXone (Rocephin) IV (PEDS and ADULTS)  1 g Intravenous Q24H    heparin (porcine)  5,000 Units Subcutaneous Q8H    insulin glargine U-100  34 Units Subcutaneous QHS    metoclopramide  10 mg Intravenous Q6H    mupirocin   Nasal BID    pantoprazole  40 mg Intravenous Daily    potassium phosphate IVPB  15 mmol Intravenous Once     PRN Meds:  Current Facility-Administered Medications:     acetaminophen, 650 mg, Oral, Q4H PRN    D5 and 0.45% NaCl, 125 mL/hr, Intravenous, Continuous PRN    dextrose 50%, 12.5 g, Intravenous, PRN    dextrose 50%, 25 g, Intravenous, PRN    glucagon (human recombinant), 1 mg, Intramuscular, PRN    glucose, 16 g, Oral, PRN    glucose, 24 g, Oral, PRN    insulin aspart U-100, 0-10 Units, Subcutaneous, QID (AC + HS) PRN    ondansetron, 4 mg, Intravenous, Q6H PRN    sodium chloride 0.9%, 10 mL, Intravenous, PRN     Review of patient's allergies indicates:  No Known Allergies  Objective:     Vital Signs (Most Recent):  Temp: 98.4 °F (36.9 °C) (08/03/25 0745)  Pulse: 89 (08/03/25 0745)  Resp: 18 (08/03/25 0745)  BP: 131/72 (08/03/25 0745)  SpO2: 96 % (08/03/25 0745) Vital Signs (24h Range):  Temp:  [98 °F (36.7 °C)-98.8 °F (37.1 °C)] 98.4 °F (36.9 °C)  Pulse:  [] 89  Resp:  [12-28] 18  SpO2:  [95 %-99 %] 96 %  BP: ()/(48-73) 131/72     Weight: 65.9 kg (145 lb 4.5 oz)  Body mass index is 22.75 kg/m².    Intake/Output - Last 3 Shifts         08/01 0700 08/02 0659 08/02 0700 08/03 0659 08/03 0700 08/04 0659    I.V. (mL/kg) 1673 (25.4) 1625.8 (24.7)     IV Piggyback 2033.3 456     Total Intake(mL/kg)  3706.4 (56.2) 2081.8 (31.6)     Urine (mL/kg/hr) 2870 1250 (0.8)     Emesis/NG output 0      Drains 550 0     Total Output 3420 1250     Net +286.4 +831.8            Unmeasured Emesis Occurrence 1 x               Physical Exam  Constitutional:       General: He is not in acute distress.     Appearance: Normal appearance.   HENT:      Head: Normocephalic.   Cardiovascular:      Rate and Rhythm: Normal rate.   Pulmonary:      Effort: Pulmonary effort is normal. No respiratory distress.   Abdominal:      General: There is no distension.      Tenderness: There is no abdominal tenderness.   Musculoskeletal:         General: Normal range of motion.   Skin:     General: Skin is warm.      Coloration: Skin is not jaundiced.   Neurological:      General: No focal deficit present.      Mental Status: He is alert and oriented to person, place, and time.      Cranial Nerves: No cranial nerve deficit.          Significant Labs:  I have reviewed all pertinent lab results within the past 24 hours.  CBC:   Recent Labs   Lab 08/03/25  0327   WBC 8.34   RBC 4.03*   HGB 11.9*   HCT 34.1*      MCV 84.6   MCH 29.5   MCHC 34.9     BMP:   Recent Labs   Lab 08/03/25  0327   GLU 95      K 3.0*   *   CO2 20*   BUN 9   CREATININE 0.73   CALCIUM 8.9   MG 2.1       Significant Diagnostics:  I have reviewed all pertinent imaging results/findings within the past 24 hours.

## 2025-08-03 NOTE — ASSESSMENT & PLAN NOTE
2/2 to not taking his insulin for roughly the last week while traveling  - Insulin infusion now off & long acting has been restarted  - Glucose is controlled & anion gap has remained closed

## 2025-08-03 NOTE — PROGRESS NOTES
Pharmacist Intervention IV to PO Note    Jason Sandra is a 50 y.o. male being treated with IV medication pantoprazole    Patient Data:    Vital Signs (Most Recent):  Temp: 98.4 °F (36.9 °C) (08/03/25 0745)  Pulse: 90 (08/03/25 0859)  Resp: 19 (08/03/25 0902)  BP: 125/71 (08/03/25 0902)  SpO2: 96 % (08/03/25 0859) Vital Signs (72h Range):  Temp:  [98 °F (36.7 °C)-99.6 °F (37.6 °C)]   Pulse:  []   Resp:  [10-42]   BP: ()/(48-96)   SpO2:  [93 %-100 %]      CBC:  Recent Labs   Lab 08/01/25  1424 08/02/25  0348 08/03/25  0327   WBC 15.19* 8.30 8.34   RBC 4.85 4.07* 4.03*   HGB 14.1 12.1* 11.9*   HCT 46.2 35.0* 34.1*    178 153   MCV 95.3 86.0 84.6   MCH 29.1 29.7 29.5   MCHC 30.5* 34.6 34.9     CMP:     Recent Labs   Lab 08/01/25  1418 08/01/25  1649 08/02/25  1211 08/02/25  1616 08/03/25  0327   *   < > 173* 155* 95   CALCIUM 9.3   < > 8.6 8.5 8.9   ALBUMIN 4.1  --   --   --   --    PROT 8.7*  --   --   --   --    *   < > 138 137 138   K 5.6*   < > 3.5 3.4* 3.0*   CO2 5*   < > 15* 17* 20*      < > 115* 113* 112*   BUN 26   < > 14 11 9   CREATININE 2.16*   < > 1.09 0.94 0.73   ALKPHOS 110  --   --   --   --    ALT 15  --   --   --   --    AST 15  --   --   --   --    BILITOT 0.3  --   --   --   --     < > = values in this interval not displayed.       Dietary Orders:  Diet Orders            Diet Consistent Carbohydrate 2000 Calories (up to 75 gm per meal); Standard Tray: Carb Control starting at 08/03 0218            Based on the following criteria, this patient qualifies for intravenous to oral conversion:  [x] The patients gastrointestinal tract is functioning (tolerating medications via oral or enteral route for 24 hours and tolerating food or enteral feeds for 24 hours).    IV medication pantoprazole will be changed to oral medication pantoprazole    Pharmacist's Name: Hermelinda Crowell  Pharmacist's Extension: [phone]

## 2025-08-03 NOTE — PROGRESS NOTES
[facility] ICU  General Surgery  Progress Note    Subjective:     History of Present Illness:  50-year-old  male presented to the ER with complaints of abdominal pain, abdominal cramping, some shortness of breath, nausea and vomiting.  History of CHF, type 2 diabetes, hypertension.  Found to be in DKA and admitted to ICU for insulin infusion.  CT scan of the abdomen pelvis showed some right-sided kidney stones, but showed marked distention of the stomach with possible evolving gastric outlet obstruction; no other small bowel obstruction identified; Mills catheter placed and patient admitted to ICU.  General surgery consulted about gastric distention.  NG tube placed for decompression.  History of EGD, colonoscopy, spinal radiofrequency ablation; no abdominal surgeries    Post-Op Info:  * No surgery found *         Interval History:  Stable, no acute events overnight.  NG tube out, patient tolerating clear liquid diet and then now tolerating regular diet with the eating a sandwich yesterday.  States he feels much better.  Positive gas and bowel movement    Medications:  Continuous Infusions:   D5 and 0.45% NaCl  125 mL/hr Intravenous Continuous PRN   Stopped at 08/02/25 1856     Scheduled Meds:   cefTRIAXone (Rocephin) IV (PEDS and ADULTS)  1 g Intravenous Q24H    heparin (porcine)  5,000 Units Subcutaneous Q8H    insulin glargine U-100  34 Units Subcutaneous QHS    metoclopramide  10 mg Intravenous Q6H    mupirocin   Nasal BID    pantoprazole  40 mg Intravenous Daily    potassium phosphate IVPB  15 mmol Intravenous Once     PRN Meds:  Current Facility-Administered Medications:     acetaminophen, 650 mg, Oral, Q4H PRN    D5 and 0.45% NaCl, 125 mL/hr, Intravenous, Continuous PRN    dextrose 50%, 12.5 g, Intravenous, PRN    dextrose 50%, 25 g, Intravenous, PRN    glucagon (human recombinant), 1 mg, Intramuscular, PRN    glucose, 16 g, Oral, PRN    glucose, 24 g, Oral, PRN    insulin aspart  U-100, 0-10 Units, Subcutaneous, QID (AC + HS) PRN    ondansetron, 4 mg, Intravenous, Q6H PRN    sodium chloride 0.9%, 10 mL, Intravenous, PRN     Review of patient's allergies indicates:  No Known Allergies  Objective:     Vital Signs (Most Recent):  Temp: 98.4 °F (36.9 °C) (08/03/25 0745)  Pulse: 89 (08/03/25 0745)  Resp: 18 (08/03/25 0745)  BP: 131/72 (08/03/25 0745)  SpO2: 96 % (08/03/25 0745) Vital Signs (24h Range):  Temp:  [98 °F (36.7 °C)-98.8 °F (37.1 °C)] 98.4 °F (36.9 °C)  Pulse:  [] 89  Resp:  [12-28] 18  SpO2:  [95 %-99 %] 96 %  BP: ()/(48-73) 131/72     Weight: 65.9 kg (145 lb 4.5 oz)  Body mass index is 22.75 kg/m².    Intake/Output - Last 3 Shifts         08/01 0700  08/02 0659 08/02 0700  08/03 0659 08/03 0700  08/04 0659    I.V. (mL/kg) 1673 (25.4) 1625.8 (24.7)     IV Piggyback 2033.3 456     Total Intake(mL/kg) 3706.4 (56.2) 2081.8 (31.6)     Urine (mL/kg/hr) 2870 1250 (0.8)     Emesis/NG output 0      Drains 550 0     Total Output 3420 1250     Net +286.4 +831.8            Unmeasured Emesis Occurrence 1 x               Physical Exam  Constitutional:       General: He is not in acute distress.     Appearance: Normal appearance.   HENT:      Head: Normocephalic.   Cardiovascular:      Rate and Rhythm: Normal rate.   Pulmonary:      Effort: Pulmonary effort is normal. No respiratory distress.   Abdominal:      General: There is no distension.      Tenderness: There is no abdominal tenderness.   Musculoskeletal:         General: Normal range of motion.   Skin:     General: Skin is warm.      Coloration: Skin is not jaundiced.   Neurological:      General: No focal deficit present.      Mental Status: He is alert and oriented to person, place, and time.      Cranial Nerves: No cranial nerve deficit.          Significant Labs:  I have reviewed all pertinent lab results within the past 24 hours.  CBC:   Recent Labs   Lab 08/03/25  0327   WBC 8.34   RBC 4.03*   HGB 11.9*   HCT 34.1*   PLT  153   MCV 84.6   MCH 29.5   MCHC 34.9     BMP:   Recent Labs   Lab 08/03/25  0327   GLU 95      K 3.0*   *   CO2 20*   BUN 9   CREATININE 0.73   CALCIUM 8.9   MG 2.1       Significant Diagnostics:  I have reviewed all pertinent imaging results/findings within the past 24 hours.  Assessment/Plan:     Gastric outlet obstruction  Significant gastric distention on imaging; no bowel obstruction seen    Could be some gastroparesis in the setting of DKA; other differential possible gastric ulcer causing partial gastric outlet obstruction    Patient has started on Reglan; Protonix IV    NG tube for gastric decompression; patient states stomach is somewhat feeling better, although he did have some nausea overnight.  550 cc out of NG tube.  Positive bowel movement yesterday, currently passing gas.  We will monitor NG tube output today, possible clamping trial tomorrow.    If does not tolerate or nausea/vomiting worsens and/or high volumes out of NG tube, recommend consulting GI for EGD to evaluate for gastric outlet obstruction    8/3:  NG tube out; tolerating clear liquids and now tolerating regular diet.  No nausea or vomiting.  Positive gas and stool.  Gastric outlet obstruction resolved, likely due to gastroparesis during DKA.  No surgical intervention needed.  General surgery will sign off.  Patient is stable for discharge from a surgical standpoint; medicine still to clear    I spent 31 minutes of critical care time with the patient        Airc Cuevas,   General Surgery  [facility] ICU

## 2025-08-03 NOTE — PHARMACY MED REC
"Admission Medication History     The home medication history was taken by Pooja Huizar.    You may go to "Admission" then "Reconcile Home Medications" tabs to review and/or act upon these items.     The home medication list has been updated by the Pharmacy department.   Please read ALL comments highlighted in yellow.   Please address this information as you see fit.    Feel free to contact us if you have any questions or require assistance.      The medications listed below were removed from the home medication list. Please reorder if appropriate:  Atorvastatin 40 mg  Ketoconazole 2% shampoo  Modafinil 200 mg  Omeprazole 40 mg    Medications listed below were obtained from: Analytic software- [employer] and Medical records  PTA Medications   Medication Sig    cyanocobalamin (VITAMIN B-12) 1000 MCG tablet Take 1 tablet (1,000 mcg total) by mouth once daily.    finasteride (PROPECIA) 1 mg tablet Take 1 tablet by mouth daily. Optional to take 3 times a week    insulin degludec (TRESIBA FLEXTOUCH U-100) 100 unit/mL (3 mL) insulin pen Inject 34 Units into the skin every evening.    insulin lispro-aabc (LYUMJEV KWIKPEN U-100 INSULIN) 100 unit/mL pen Inject 10 units 3 times per day before meals, 2-4 units for snack, . + correction scale. MAX TDD Of 50 units    tadalafiL (CIALIS) 10 MG tablet Take 1 tablet (10 mg total) by mouth daily as needed (ED).    testosterone cypionate (DEPOTESTOTERONE CYPIONATE) 200 mg/mL injection Inject 1 mL (200 mg total) into the muscle once a week.    blood-glucose sensor (DEXCOM G6 SENSOR) Chen Use 1 sensor every 10 days    blood-glucose transmitter (DEXCOM G6 TRANSMITTER) Chen 1 transmitter every 3 months    papaverine-phentolamin-alprost 150 mg-5 mg- 50 mcg SolR 0.5 mLs by Intracavernosal route as needed (ED).    pen needle, diabetic 32 gauge x 5/32" Ndle To use 7 times per day with insulin injections- for meals, long-acting insulin and for snacks    sacubitriL-valsartan (ENTRESTO) 24-26 mg " "per tablet Take 1 tablet by mouth 2 (two) times daily.    triamcinolone acetonide 0.1% (KENALOG) 0.1 % cream Apply topically 2 (two) times daily.         Current Outpatient Medications on File Prior to Encounter   Medication Sig Note Dispense Refill Last Dose/Taking    cyanocobalamin (VITAMIN B-12) 1000 MCG tablet Take 1 tablet (1,000 mcg total) by mouth once daily.  30 tablet 3 Taking    finasteride (PROPECIA) 1 mg tablet Take 1 tablet by mouth daily. Optional to take 3 times a week  90 tablet 3 Taking    insulin degludec (TRESIBA FLEXTOUCH U-100) 100 unit/mL (3 mL) insulin pen Inject 34 Units into the skin every evening.  15 mL 6 Taking    insulin lispro-aabc (LYUMJEV KWIKPEN U-100 INSULIN) 100 unit/mL pen Inject 10 units 3 times per day before meals, 2-4 units for snack, . + correction scale. MAX TDD Of 50 units  5 Pen 6 Taking    tadalafiL (CIALIS) 10 MG tablet Take 1 tablet (10 mg total) by mouth daily as needed (ED).  30 tablet 11 Taking As Needed    testosterone cypionate (DEPOTESTOTERONE CYPIONATE) 200 mg/mL injection Inject 1 mL (200 mg total) into the muscle once a week.  4 mL 5 Taking    blood-glucose sensor (DEXCOM G6 SENSOR) Chen Use 1 sensor every 10 days  3 each 11     blood-glucose transmitter (DEXCOM G6 TRANSMITTER) Chen 1 transmitter every 3 months  1 each 4     papaverine-phentolamin-alprost 150 mg-5 mg- 50 mcg SolR 0.5 mLs by Intracavernosal route as needed (ED).  10 each ml     pen needle, diabetic 32 gauge x 5/32" Ndle To use 7 times per day with insulin injections- for meals, long-acting insulin and for snacks  200 each 11     sacubitriL-valsartan (ENTRESTO) 24-26 mg per tablet Take 1 tablet by mouth 2 (two) times daily. 8/2/2025: Last filled 01/16/25 60 tablet 6 Unknown    triamcinolone acetonide 0.1% (KENALOG) 0.1 % cream Apply topically 2 (two) times daily.  30 g 0        Potential issues to be addressed PRIOR TO DISCHARGE  Please discuss with the patient barriers to adherence with " medication treatment plans  Patient requires education regarding drug therapies     Pooja Huizar  EXT [id number]                 .

## 2025-08-03 NOTE — DISCHARGE SUMMARY
[facility]  Critical Care Medicine  Discharge Summary      Patient Name: Jason Sandra  MRN: [id number]  Admission Date: 8/1/2025  Hospital Length of Stay: 2 days  Discharge Date and Time: 08/03/2025 1:03 PM  Attending Physician: Brad Padilla MD   Discharging Provider: MAR HERNANDEZ NP  Primary Care Provider: Luan Lazar MD  Reason for Admission: DKA    HPI:   50 y.o. male with PMHx of CHF, type 2 DM, & essential HTN presented to [facility] ED with c/o SOB, abdominal pain, & bilateral flank pain. The pt reports having abdominal cramping and severe SOB. Pt and his family has been traveling for the past week. Pt said he has not been taking his Insulin for the past week for his traveling out of the [facility]. Pt is having flank pain and trouble breathing while here in the ED. Critical care service was asked to admit patient for DKA that is requiring an insulin infusion.    In the ED, patient received Humulin R 10 units IV, NS x 2 liters, CXR showed no acute cardiopulmonary findings, CT Renal Stone Study & abd/pelvis showed Right-sided nonobstructing nephrolithiasis.  No evidence of obstructive uropathy. Marked distention of the stomach.  The findings may reflect evolving gastric outlet obstruction.  No obstructing lesion identified.   Marked distention of the urinary bladder. Bladder scan done & showing about 800 ml of urine, mohr will be placed if patient unable to void. Urinalysis collected. Labs reviewed, CO2 was noted to be 5, sodium bicarbonate 1 amp given IVP.     * No surgery found *    Indwelling Lines/Drains at Time of Discharge:   Lines/Drains/Airways       None                 Hospital Course:   50 year old male with PMHx of CHF, type 2 DM, & essential HTN, presented to [facility] ED on 8/1/25 with c/o SOB, abdominal pain, & bilateral flank pain. The pt reports having abdominal cramping and severe SOB. Pt and his family has been traveling for the past week. Pt said he has not been  taking his Insulin for the past week for his [place]. Pt is having flank pain and trouble breathing while in the ED. After obtained labs & other diagnostic testing, he was found to be in DKA. Critical care service was asked to admit patient to the ICU for DKA that is requiring an insulin infusion.     Over the course of the ICU stay, acidosis was corrected & patient was transitioned off the DKA protocol. Long acting insulin with SSI PRN was restarted. NGT was able to be discontinued after it was confirmed he could tolerate PO intake. K & Phos have been replaced. It is felt that he has reached the maximum benefit of the ICU & will be discharged home today. Patient understands to follow up with his PCP in 1 week & to continue taking his home medications, including his insulin, as ordered.           Consults (From admission, onward)          Status Ordering Provider     Inpatient consult to Registered Dietitian/Nutritionist  Once        Provider:  (Not yet assigned)    MAR Orozco          Significant Labs:  All pertinent labs within the past 24 hours have been reviewed.    Significant Imaging:  I have reviewed all pertinent imaging results/findings within the past 24 hours.    Pending Diagnostic Studies:       Procedure Component Value Units Date/Time    EKG 12-lead [[id number]]     Order Status: Sent Lab Status: No result     EXTRA TUBES [[id number]]     Order Status: Sent Lab Status: No result     Specimen: Blood, Venous     Narrative:      The following orders were created for panel order EXTRA TUBES.  Procedure                               Abnormality         Status                     ---------                               -----------         ------                     Lavender Top Hold[[id number]]                                                            Please view results for these tests on the individual orders.    Lavender Top Hold [[id number]]     Order Status: Sent Lab  "Status: No result     Specimen: Blood, Venous           Final Active Diagnoses:    Diagnosis Date Noted POA    PRINCIPAL PROBLEM:  Diabetic ketoacidosis without coma associated with type 2 diabetes mellitus [E11.10] 12/20/2016 Yes    Gastric outlet obstruction [K31.1] 08/02/2025 Yes    Hypophosphatemia [E83.39] 08/02/2025 No    Hypokalemia [E87.6] 08/02/2025 No    Essential hypertension [I10] 08/01/2025 Yes    CHF (congestive heart failure) [I50.9] 05/26/2023 Yes    PARVEEN (acute kidney injury) [N17.9] 01/03/2023 Yes      Problems Resolved During this Admission:     Cardiac/Vascular  Essential hypertension  Noted  - BP stable at present    CHF (congestive heart failure)  Patient has Systolic (HFrEF) heart failure that is Chronic. On presentation their CHF was well compensated. Most recent BNP and echo results are listed below.  No results for input(s): "BNP" in the last 72 hours.  Latest ECHO  Results for orders placed during the hospital encounter of 08/22/24    Echo    Interpretation Summary    Left Ventricle: The left ventricle is normal in size. Normal wall thickness. There is normal systolic function with a visually estimated ejection fraction of 60 - 65%. There is normal diastolic function.    Right Ventricle: Normal right ventricular cavity size. Wall thickness is normal. Systolic function is normal.    IVC/SVC: Normal venous pressure at 3 mmHg.    Current Heart Failure Medications       Plan  - Monitor strict I&Os and daily weights.    - Place on telemetry  - Low sodium diet  - Place on fluid restriction of none for now, being volume resuscitated for dehydration.   - Cardiology has not been consulted  - The patient's volume status is at their baseline  - received 2 L NS, now on maintenance IVF             Renal/  Hypokalemia  - supplement for goal K of 4.0    Hypophosphatemia  Supplement      PARVEEN (acute kidney injury)  RESOLVED    Endocrine  * Diabetic ketoacidosis without coma associated with type 2 diabetes " "mellitus  2/2 to not taking his insulin for roughly the last week while traveling  - Insulin infusion now off & long acting has been restarted  - Glucose is controlled & anion gap has remained closed    GI  Gastric outlet obstruction  NGT out & tolerating a full diet        Discharged Condition: good    Disposition:       Patient Instructions:   No discharge procedures on file.  Medications:  Reconciled Home Medications:      Medication List        CONTINUE taking these medications      BD ULTRA-FINE YASHIRA PEN NEEDLE 32 gauge x 5/32" Ndle  Generic drug: pen needle, diabetic  To use 7 times per day with insulin injections- for meals, long-acting insulin and for snacks     cyanocobalamin 1000 MCG tablet  Commonly known as: VITAMIN B-12  Take 1 tablet (1,000 mcg total) by mouth once daily.     DEXCOM G6 SENSOR Chen  Generic drug: blood-glucose sensor  Use 1 sensor every 10 days     DEXCOM G6 TRANSMITTER Chen  Generic drug: blood-glucose transmitter  1 transmitter every 3 months     ENTRESTO 24-26 mg per tablet  Generic drug: sacubitriL-valsartan  Take 1 tablet by mouth 2 (two) times daily.     finasteride 1 mg tablet  Commonly known as: PROPECIA  Take 1 tablet by mouth daily. Optional to take 3 times a week     insulin degludec 100 unit/mL (3 mL) insulin pen  Commonly known as: TRESIBA FLEXTOUCH U-100  Inject 34 Units into the skin every evening.     LYUMJEV KWIKPEN U-100 INSULIN 100 unit/mL pen  Generic drug: insulin lispro-aabc  Inject 10 units 3 times per day before meals, 2-4 units for snack, . + correction scale. MAX TDD Of 50 units     papaverine-phentolamin-alprost 150 mg-5 mg- 50 mcg Solr  0.5 mLs by Intracavernosal route as needed (ED).     tadalafiL 10 MG tablet  Commonly known as: CIALIS  Take 1 tablet (10 mg total) by mouth daily as needed (ED).     testosterone cypionate 200 mg/mL injection  Commonly known as: DEPOTESTOTERONE CYPIONATE  Inject 1 mL (200 mg total) into the muscle once a week.   "   triamcinolone acetonide 0.1% 0.1 % cream  Commonly known as: KENALOG  Apply topically 2 (two) times daily.               MAR HERNANDEZ NP  Critical Care Medicine  [facility]   all other ROS negative except as per HPI

## 2025-08-05 LAB — GLUCOSE SERPL-MCNC: 573 MG/DL (ref 70–105)

## 2025-08-07 LAB
BACTERIA BLD CULT: NORMAL
BACTERIA BLD CULT: NORMAL

## 2025-08-08 ENCOUNTER — OFFICE VISIT (OUTPATIENT)
Dept: URGENT CARE | Facility: CLINIC | Age: 50
End: 2025-08-08
Payer: COMMERCIAL

## 2025-08-08 ENCOUNTER — HOSPITAL ENCOUNTER (OUTPATIENT)
Dept: RADIOLOGY | Facility: HOSPITAL | Age: 50
Discharge: HOME | End: 2025-08-08
Payer: COMMERCIAL

## 2025-08-08 VITALS
HEART RATE: 112 BPM | OXYGEN SATURATION: 98 % | DIASTOLIC BLOOD PRESSURE: 82 MMHG | RESPIRATION RATE: 18 BRPM | TEMPERATURE: 98 F | SYSTOLIC BLOOD PRESSURE: 148 MMHG

## 2025-08-08 DIAGNOSIS — Z92.89 HISTORY OF RECENT HOSPITALIZATION: ICD-10-CM

## 2025-08-08 DIAGNOSIS — R05.1 ACUTE COUGH: Primary | ICD-10-CM

## 2025-08-08 DIAGNOSIS — R05.1 ACUTE COUGH: ICD-10-CM

## 2025-08-08 DIAGNOSIS — J18.9 PNEUMONIA OF BOTH LOWER LOBES DUE TO INFECTIOUS ORGANISM: ICD-10-CM

## 2025-08-08 PROCEDURE — 71046 X-RAY EXAM CHEST 2 VIEWS: CPT | Mod: 26,,, | Performed by: RADIOLOGY

## 2025-08-08 PROCEDURE — 71046 X-RAY EXAM CHEST 2 VIEWS: CPT | Mod: TC,PO

## 2025-08-08 RX ORDER — AZITHROMYCIN 250 MG/1
250 TABLET, FILM COATED ORAL DAILY
Qty: 6 TABLET | Refills: 0 | Status: SHIPPED | OUTPATIENT
Start: 2025-08-08 | End: 2025-08-13

## 2025-08-08 RX ORDER — BENZONATATE 200 MG/1
200 CAPSULE ORAL 3 TIMES DAILY PRN
Qty: 21 CAPSULE | Refills: 0 | Status: SHIPPED | OUTPATIENT
Start: 2025-08-08 | End: 2025-08-15

## 2025-08-08 RX ORDER — AMOXICILLIN AND CLAVULANATE POTASSIUM 875; 125 MG/1; MG/1
1 TABLET, FILM COATED ORAL 2 TIMES DAILY
Qty: 14 TABLET | Refills: 0 | Status: SHIPPED | OUTPATIENT
Start: 2025-08-08 | End: 2025-08-15

## 2025-08-08 NOTE — PATIENT INSTRUCTIONS
Pneumonia    Rest and fluids are important. Dont let yourself get overly tired when you go back to your activities. Drink 6 to 8 glasses of fluids every day to make sure you are getting enough fluids. (Your appetite may be poor, so a light diet is fine.)  Take antibiotic medicine prescribed until it is all gone, even if you are feeling better after a few days.  You may use acetaminophen or ibuprofen to control fever or pain     Please follow up with your primary care doctor or specialist in the next 48-72hrs as needed and if no improvement.    Repeat Chest Xray in the next 10 days if symptoms worsen.  If you smoke, please stop smoking. Stay away from cigarette smoke - yours or other peoples.    - You must understand that you have received an Urgent Care treatment only and that you may be released before all of your medical problems are known or treated.   - You, the patient, will arrange for follow up care as instructed with your primary care provider or recommended specialist.   - If your condition worsens or fails to improve we recommend that you receive another evaluation at the ER immediately or contact your PCP to discuss your concerns, or return here.   - Please do not drive or make any important decisions for 24 hours if you have received any pain medications, sedatives or mood altering drugs during your visit.    Disclaimer: This document was drafted with the use of a voice recognition device and is likely to have sound alike errors.

## 2025-08-08 NOTE — PROGRESS NOTES
Subjective:      Patient ID: Jason Sandra is a 50 y.o. male.    Vitals:  oral temperature is 98.1 °F (36.7 °C). His blood pressure is 148/82 (abnormal) and his pulse is 112 (abnormal). His respiration is 18 and oxygen saturation is 98%.     Chief Complaint: Cough    49 yo male Pt presents today with concerns of pneumonia x's 5 days. Pt says he was recently hospitalized for DKA and had a tube down his throat and is worried he contracted a hospital illness. Pt is complaining of cough but denies any other flu-like symptoms including fever, chills, cp, chest tightness, sob, ear pain, trouble swallowing, rash. Tried cough drops without significant improvement. Denies pulmonary history of pneumonia, asthma, copd, bronchitis. Patient states he has a pregnant daughter living at home and does not want to spread any illness to her if he needs antibiotics. Patient deferring covid/flu testing at present. NKDA.     Cough  This is a new problem. The current episode started yesterday. The problem has been unchanged. The problem occurs constantly. The cough is Non-productive. Pertinent negatives include no chest pain, chills, ear pain, eye redness, fever, myalgias, rash, sore throat, shortness of breath or wheezing. Nothing aggravates the symptoms. He has tried nothing for the symptoms. The treatment provided no relief.       Constitution: Negative for chills, sweating and fever.   HENT:  Negative for ear pain, congestion, sore throat, trouble swallowing and voice change.    Neck: Negative for neck pain and neck stiffness.   Cardiovascular:  Negative for chest pain.   Eyes:  Negative for eye discharge, eye itching and eye redness.   Respiratory:  Positive for cough. Negative for chest tightness, sputum production, COPD, shortness of breath, wheezing and asthma.    Musculoskeletal:  Negative for muscle ache.   Skin:  Negative for rash.   Allergic/Immunologic: Negative for asthma and sneezing.      Objective:     Vitals:     08/08/25 1457   BP: (!) 148/82   Pulse: (!) 112   Resp: 18   Temp: 98.1 °F (36.7 °C)       Physical Exam   Constitutional: He is oriented to person, place, and time. He appears well-developed. He is cooperative.  Non-toxic appearance. He does not appear ill. No distress.   HENT:   Head: Normocephalic and atraumatic.   Ears:   Right Ear: Hearing, tympanic membrane, external ear and ear canal normal. no impacted cerumen  Left Ear: Hearing, tympanic membrane, external ear and ear canal normal. no impacted cerumen  Nose: Nose normal. No mucosal edema, rhinorrhea or nasal deformity. No epistaxis. Right sinus exhibits no maxillary sinus tenderness and no frontal sinus tenderness. Left sinus exhibits no maxillary sinus tenderness and no frontal sinus tenderness.   Mouth/Throat: Uvula is midline, oropharynx is clear and moist and mucous membranes are normal. Mucous membranes are moist. No trismus in the jaw. Normal dentition. No uvula swelling. No oropharyngeal exudate, posterior oropharyngeal edema or posterior oropharyngeal erythema.   Eyes: Conjunctivae and lids are normal. Pupils are equal, round, and reactive to light. Right eye exhibits no discharge. Left eye exhibits no discharge. No scleral icterus.   Neck: Trachea normal and phonation normal. Neck supple. No edema present. No erythema present. No neck rigidity present.   Cardiovascular: Normal rate, regular rhythm, normal heart sounds and normal pulses.   Pulmonary/Chest: Effort normal and breath sounds normal. No accessory muscle usage or stridor. No tachypnea. No respiratory distress. He has no decreased breath sounds. He has no wheezes. He has no rhonchi. He has no rales.   No evidence of respiratory distress noted, able to speak in complete sentences without pause; no wheezing, rales, or rhonchi appreciated; O2 sat 98% on RA           Comments: No evidence of respiratory distress noted, able to speak in complete sentences without pause; no wheezing, rales, or  "rhonchi appreciated; O2 sat 98% on RA      Abdominal: Normal appearance.   Musculoskeletal: Normal range of motion.         General: No deformity. Normal range of motion.   Lymphadenopathy:     He has cervical adenopathy.   Neurological: He is alert and oriented to person, place, and time. He exhibits normal muscle tone. Coordination normal.   Skin: Skin is warm, dry, intact, not diaphoretic, not pale and no rash.   Psychiatric: His speech is normal and behavior is normal. Judgment and thought content normal.   Nursing note and vitals reviewed.      Assessment:     1. Acute cough    2. History of recent hospitalization    3. Pneumonia of both lower lobes due to infectious organism      XR CHEST PA AND LATERAL  Result Date: 8/8/2025  EXAM:   XR CHEST PA AND LATERAL CLINICAL HISTORY: Acute cough COMPARISON: 08/01/2025. TECHNIQUE: PA and lateral views FINDINGS: Bilateral lower lobe patchy alveolar and interstitial infiltrates.  The upper lobes are clear.  No effusions. No pneumothorax. The cardiac silhouette size and contour is within normal limits.     Bilateral lower lobe infiltrates. Finalized on: 8/8/2025 3:39 PM By:  KYLEE Lopez MD, MD [facility]# [id number]      2025-08-08 15:[id number]     [facility]      X-Ray Chest AP  Result Date: 8/1/2025  EXAMINATION: XR CHEST AP PORTABLE CLINICAL HISTORY: Provided history is "sob;  ". TECHNIQUE: One view of the chest. COMPARISON: 04/17/2024. FINDINGS: Cardiac wires overlie the chest.  Cardiac silhouette is not enlarged.  No focal consolidation.  No sizable pleural effusion.  No pneumothorax.  Nonspecific punctate calcifications overlying the lateral aspect of the right proximal humerus.     No acute cardiopulmonary finding identified on this single view. Electronically signed by: Ashvin Jones MD Date:    08/01/2025 Time:    14:17      Plan:       Acute cough  -     XR CHEST PA AND LATERAL; Future; Expected date: 08/08/2025  -     benzonatate (TESSALON) 200 MG capsule; Take 1 " capsule (200 mg total) by mouth 3 (three) times daily as needed for Cough.  Dispense: 21 capsule; Refill: 0    History of recent hospitalization  -     XR CHEST PA AND LATERAL; Future; Expected date: 08/08/2025    Pneumonia of both lower lobes due to infectious organism  -     azithromycin (Z-NATE) 250 MG tablet; Take 1 tablet (250 mg total) by mouth once daily. Take 2 tablets by mouth on day 1, then one tablet daily on days 2-5. for 5 days  Dispense: 6 tablet; Refill: 0  -     amoxicillin-clavulanate 875-125mg (AUGMENTIN) 875-125 mg per tablet; Take 1 tablet by mouth 2 (two) times a day. for 7 days  Dispense: 14 tablet; Refill: 0        Patient Instructions   Pneumonia    Rest and fluids are important. Dont let yourself get overly tired when you go back to your activities. Drink 6 to 8 glasses of fluids every day to make sure you are getting enough fluids. (Your appetite may be poor, so a light diet is fine.)  Take antibiotic medicine prescribed until it is all gone, even if you are feeling better after a few days.  You may use acetaminophen or ibuprofen to control fever or pain     Please follow up with your primary care doctor or specialist in the next 48-72hrs as needed and if no improvement.    Repeat Chest Xray in the next 10 days if symptoms worsen.  If you smoke, please stop smoking. Stay away from cigarette smoke - yours or other peoples.    - You must understand that you have received an Urgent Care treatment only and that you may be released before all of your medical problems are known or treated.   - You, the patient, will arrange for follow up care as instructed with your primary care provider or recommended specialist.   - If your condition worsens or fails to improve we recommend that you receive another evaluation at the ER immediately or contact your PCP to discuss your concerns, or return here.   - Please do not drive or make any important decisions for 24 hours if you have received any pain  medications, sedatives or mood altering drugs during your visit.    Disclaimer: This document was drafted with the use of a voice recognition device and is likely to have sound alike errors.         Medical Decision Making:   History:   Old Medical Records: I decided to obtain old medical records.  Independently Interpreted Test(s):        <> Summary of X-Ray Reading(s): Increased haziness on left lower lobe compared to CXR done 1 week ago as well as on RLL. Will confirm with Radiologist  Clinical Tests:   Radiological Study: Ordered and Reviewed    Additional MDM:     Heart Failure Score:   COPD = No

## 2025-08-11 ENCOUNTER — OFFICE VISIT (OUTPATIENT)
Dept: INTERNAL MEDICINE | Facility: CLINIC | Age: 50
End: 2025-08-11
Payer: COMMERCIAL

## 2025-08-11 ENCOUNTER — HOSPITAL ENCOUNTER (OUTPATIENT)
Dept: RADIOLOGY | Facility: HOSPITAL | Age: 50
Discharge: HOME | End: 2025-08-11
Attending: PHYSICIAN ASSISTANT
Payer: COMMERCIAL

## 2025-08-11 VITALS
HEIGHT: 67 IN | SYSTOLIC BLOOD PRESSURE: 130 MMHG | TEMPERATURE: 98 F | HEART RATE: 96 BPM | WEIGHT: 156.31 LBS | OXYGEN SATURATION: 98 % | RESPIRATION RATE: 20 BRPM | DIASTOLIC BLOOD PRESSURE: 70 MMHG | BODY MASS INDEX: 24.53 KG/M2

## 2025-08-11 DIAGNOSIS — E87.6 HYPOKALEMIA: ICD-10-CM

## 2025-08-11 DIAGNOSIS — Z09 HOSPITAL DISCHARGE FOLLOW-UP: Primary | ICD-10-CM

## 2025-08-11 DIAGNOSIS — M54.2 NECK PAIN ON LEFT SIDE: ICD-10-CM

## 2025-08-11 DIAGNOSIS — E11.10 DIABETIC KETOACIDOSIS WITHOUT COMA ASSOCIATED WITH TYPE 2 DIABETES MELLITUS: ICD-10-CM

## 2025-08-11 DIAGNOSIS — I50.9 CONGESTIVE HEART FAILURE, UNSPECIFIED HF CHRONICITY, UNSPECIFIED HEART FAILURE TYPE: ICD-10-CM

## 2025-08-11 DIAGNOSIS — E83.39 HYPOPHOSPHATEMIA: ICD-10-CM

## 2025-08-11 DIAGNOSIS — I10 ESSENTIAL HYPERTENSION: ICD-10-CM

## 2025-08-11 PROCEDURE — 3046F HEMOGLOBIN A1C LEVEL >9.0%: CPT | Mod: CPTII,S$GLB,, | Performed by: PHYSICIAN ASSISTANT

## 2025-08-11 PROCEDURE — 72040 X-RAY EXAM NECK SPINE 2-3 VW: CPT | Mod: 26,,, | Performed by: RADIOLOGY

## 2025-08-11 PROCEDURE — 3078F DIAST BP <80 MM HG: CPT | Mod: CPTII,S$GLB,, | Performed by: PHYSICIAN ASSISTANT

## 2025-08-11 PROCEDURE — 4010F ACE/ARB THERAPY RXD/TAKEN: CPT | Mod: CPTII,S$GLB,, | Performed by: PHYSICIAN ASSISTANT

## 2025-08-11 PROCEDURE — 1160F RVW MEDS BY RX/DR IN RCRD: CPT | Mod: CPTII,S$GLB,, | Performed by: PHYSICIAN ASSISTANT

## 2025-08-11 PROCEDURE — 3075F SYST BP GE 130 - 139MM HG: CPT | Mod: CPTII,S$GLB,, | Performed by: PHYSICIAN ASSISTANT

## 2025-08-11 PROCEDURE — 96372 THER/PROPH/DIAG INJ SC/IM: CPT | Mod: S$GLB,,, | Performed by: PHYSICIAN ASSISTANT

## 2025-08-11 PROCEDURE — 1111F DSCHRG MED/CURRENT MED MERGE: CPT | Mod: CPTII,S$GLB,, | Performed by: PHYSICIAN ASSISTANT

## 2025-08-11 PROCEDURE — 99999 PR PBB SHADOW E&M-EST. PATIENT-LVL V: CPT | Mod: PBBFAC,,, | Performed by: PHYSICIAN ASSISTANT

## 2025-08-11 PROCEDURE — 99214 OFFICE O/P EST MOD 30 MIN: CPT | Mod: 25,S$GLB,, | Performed by: PHYSICIAN ASSISTANT

## 2025-08-11 PROCEDURE — 72040 X-RAY EXAM NECK SPINE 2-3 VW: CPT | Mod: TC,PO

## 2025-08-11 PROCEDURE — 1159F MED LIST DOCD IN RCRD: CPT | Mod: CPTII,S$GLB,, | Performed by: PHYSICIAN ASSISTANT

## 2025-08-11 RX ORDER — KETOROLAC TROMETHAMINE 30 MG/ML
30 INJECTION, SOLUTION INTRAMUSCULAR; INTRAVENOUS
Status: COMPLETED | OUTPATIENT
Start: 2025-08-11 | End: 2025-08-11

## 2025-08-11 RX ADMIN — KETOROLAC TROMETHAMINE 30 MG: 30 INJECTION, SOLUTION INTRAMUSCULAR; INTRAVENOUS at 11:08
